# Patient Record
Sex: MALE | Race: WHITE | HISPANIC OR LATINO | Employment: OTHER | URBAN - METROPOLITAN AREA
[De-identification: names, ages, dates, MRNs, and addresses within clinical notes are randomized per-mention and may not be internally consistent; named-entity substitution may affect disease eponyms.]

---

## 2021-06-07 ENCOUNTER — HOSPITAL ENCOUNTER (INPATIENT)
Facility: HOSPITAL | Age: 61
LOS: 4 days | Discharge: HOME | DRG: 057 | End: 2021-06-13
Attending: EMERGENCY MEDICINE | Admitting: INTERNAL MEDICINE

## 2021-06-07 ENCOUNTER — APPOINTMENT (OUTPATIENT)
Dept: MRI IMAGING | Facility: HOSPITAL | Age: 61
DRG: 057 | End: 2021-06-07

## 2021-06-07 ENCOUNTER — APPOINTMENT (EMERGENCY)
Dept: RADIOLOGY | Facility: HOSPITAL | Age: 61
DRG: 057 | End: 2021-06-07

## 2021-06-07 ENCOUNTER — APPOINTMENT (EMERGENCY)
Dept: CT IMAGING | Facility: HOSPITAL | Age: 61
DRG: 057 | End: 2021-06-07

## 2021-06-07 DIAGNOSIS — I25.10 CAD (CORONARY ARTERY DISEASE): ICD-10-CM

## 2021-06-07 DIAGNOSIS — H02.402 PTOSIS OF LEFT EYELID: Primary | ICD-10-CM

## 2021-06-07 DIAGNOSIS — R51.9 HEADACHE: ICD-10-CM

## 2021-06-07 DIAGNOSIS — H49.02 LEFT OCULOMOTOR NERVE PALSY: ICD-10-CM

## 2021-06-07 DIAGNOSIS — H53.2 DIPLOPIA: ICD-10-CM

## 2021-06-07 LAB
ALBUMIN SERPL BCP-MCNC: 3.4 G/DL (ref 3.5–5)
ALP SERPL-CCNC: 91 U/L (ref 46–116)
ALT SERPL W P-5'-P-CCNC: 23 U/L (ref 12–78)
ANION GAP SERPL CALCULATED.3IONS-SCNC: 7 MMOL/L (ref 4–13)
APTT PPP: 28 SECONDS (ref 23–37)
AST SERPL W P-5'-P-CCNC: 11 U/L (ref 5–45)
ATRIAL RATE: 74 BPM
BASE EX.OXY STD BLDV CALC-SCNC: 80.1 % (ref 60–80)
BASE EXCESS BLDV CALC-SCNC: 2.6 MMOL/L
BASOPHILS # BLD AUTO: 0.03 THOUSANDS/ΜL (ref 0–0.1)
BASOPHILS NFR BLD AUTO: 0 % (ref 0–1)
BILIRUB DIRECT SERPL-MCNC: 0.08 MG/DL (ref 0–0.2)
BILIRUB SERPL-MCNC: 0.31 MG/DL (ref 0.2–1)
BUN SERPL-MCNC: 20 MG/DL (ref 5–25)
CALCIUM SERPL-MCNC: 8.8 MG/DL (ref 8.3–10.1)
CHLORIDE SERPL-SCNC: 102 MMOL/L (ref 100–108)
CO2 SERPL-SCNC: 32 MMOL/L (ref 21–32)
CREAT SERPL-MCNC: 0.95 MG/DL (ref 0.6–1.3)
EOSINOPHIL # BLD AUTO: 0.33 THOUSAND/ΜL (ref 0–0.61)
EOSINOPHIL NFR BLD AUTO: 4 % (ref 0–6)
ERYTHROCYTE [DISTWIDTH] IN BLOOD BY AUTOMATED COUNT: 13.8 % (ref 11.6–15.1)
GFR SERPL CREATININE-BSD FRML MDRD: 85 ML/MIN/1.73SQ M
GLUCOSE SERPL-MCNC: 160 MG/DL (ref 65–140)
HCO3 BLDV-SCNC: 28.2 MMOL/L (ref 24–30)
HCT VFR BLD AUTO: 47.6 % (ref 36.5–49.3)
HGB BLD-MCNC: 15.3 G/DL (ref 12–17)
IMM GRANULOCYTES # BLD AUTO: 0.06 THOUSAND/UL (ref 0–0.2)
IMM GRANULOCYTES NFR BLD AUTO: 1 % (ref 0–2)
INR PPP: 0.94 (ref 0.84–1.19)
LYMPHOCYTES # BLD AUTO: 2.04 THOUSANDS/ΜL (ref 0.6–4.47)
LYMPHOCYTES NFR BLD AUTO: 23 % (ref 14–44)
MCH RBC QN AUTO: 27.7 PG (ref 26.8–34.3)
MCHC RBC AUTO-ENTMCNC: 32.1 G/DL (ref 31.4–37.4)
MCV RBC AUTO: 86 FL (ref 82–98)
MONOCYTES # BLD AUTO: 0.63 THOUSAND/ΜL (ref 0.17–1.22)
MONOCYTES NFR BLD AUTO: 7 % (ref 4–12)
NEUTROPHILS # BLD AUTO: 5.61 THOUSANDS/ΜL (ref 1.85–7.62)
NEUTS SEG NFR BLD AUTO: 65 % (ref 43–75)
NRBC BLD AUTO-RTO: 0 /100 WBCS
O2 CT BLDV-SCNC: 18.1 ML/DL
P AXIS: 63 DEGREES
PCO2 BLDV: 46.8 MM HG (ref 42–50)
PH BLDV: 7.4 [PH] (ref 7.3–7.4)
PLATELET # BLD AUTO: 209 THOUSANDS/UL (ref 149–390)
PMV BLD AUTO: 10.5 FL (ref 8.9–12.7)
PO2 BLDV: 43.6 MM HG (ref 35–45)
POTASSIUM SERPL-SCNC: 3.6 MMOL/L (ref 3.5–5.3)
PR INTERVAL: 160 MS
PROT SERPL-MCNC: 7.4 G/DL (ref 6.4–8.2)
PROTHROMBIN TIME: 12.1 SECONDS (ref 11.6–14.5)
QRS AXIS: -56 DEGREES
QRSD INTERVAL: 98 MS
QT INTERVAL: 404 MS
QTC INTERVAL: 448 MS
RBC # BLD AUTO: 5.53 MILLION/UL (ref 3.88–5.62)
SARS-COV-2 RNA RESP QL NAA+PROBE: NEGATIVE
SODIUM SERPL-SCNC: 141 MMOL/L (ref 136–145)
T WAVE AXIS: 99 DEGREES
VENTRICULAR RATE: 74 BPM
WBC # BLD AUTO: 8.7 THOUSAND/UL (ref 4.31–10.16)

## 2021-06-07 PROCEDURE — 99284 EMERGENCY DEPT VISIT MOD MDM: CPT | Performed by: EMERGENCY MEDICINE

## 2021-06-07 PROCEDURE — 85610 PROTHROMBIN TIME: CPT | Performed by: EMERGENCY MEDICINE

## 2021-06-07 PROCEDURE — 99220 PR INITIAL OBSERVATION CARE/DAY 70 MINUTES: CPT | Performed by: NURSE PRACTITIONER

## 2021-06-07 PROCEDURE — 71045 X-RAY EXAM CHEST 1 VIEW: CPT

## 2021-06-07 PROCEDURE — 93010 ELECTROCARDIOGRAM REPORT: CPT | Performed by: INTERNAL MEDICINE

## 2021-06-07 PROCEDURE — 99285 EMERGENCY DEPT VISIT HI MDM: CPT

## 2021-06-07 PROCEDURE — 85025 COMPLETE CBC W/AUTO DIFF WBC: CPT | Performed by: EMERGENCY MEDICINE

## 2021-06-07 PROCEDURE — G1004 CDSM NDSC: HCPCS

## 2021-06-07 PROCEDURE — 80076 HEPATIC FUNCTION PANEL: CPT | Performed by: EMERGENCY MEDICINE

## 2021-06-07 PROCEDURE — U0005 INFEC AGEN DETEC AMPLI PROBE: HCPCS | Performed by: EMERGENCY MEDICINE

## 2021-06-07 PROCEDURE — 85730 THROMBOPLASTIN TIME PARTIAL: CPT | Performed by: EMERGENCY MEDICINE

## 2021-06-07 PROCEDURE — U0003 INFECTIOUS AGENT DETECTION BY NUCLEIC ACID (DNA OR RNA); SEVERE ACUTE RESPIRATORY SYNDROME CORONAVIRUS 2 (SARS-COV-2) (CORONAVIRUS DISEASE [COVID-19]), AMPLIFIED PROBE TECHNIQUE, MAKING USE OF HIGH THROUGHPUT TECHNOLOGIES AS DESCRIBED BY CMS-2020-01-R: HCPCS | Performed by: EMERGENCY MEDICINE

## 2021-06-07 PROCEDURE — 93005 ELECTROCARDIOGRAM TRACING: CPT

## 2021-06-07 PROCEDURE — 80048 BASIC METABOLIC PNL TOTAL CA: CPT | Performed by: EMERGENCY MEDICINE

## 2021-06-07 PROCEDURE — 70450 CT HEAD/BRAIN W/O DYE: CPT

## 2021-06-07 PROCEDURE — 36415 COLL VENOUS BLD VENIPUNCTURE: CPT | Performed by: EMERGENCY MEDICINE

## 2021-06-07 PROCEDURE — 82805 BLOOD GASES W/O2 SATURATION: CPT | Performed by: EMERGENCY MEDICINE

## 2021-06-07 PROCEDURE — 70551 MRI BRAIN STEM W/O DYE: CPT

## 2021-06-07 RX ORDER — ATORVASTATIN CALCIUM 40 MG/1
40 TABLET, FILM COATED ORAL EVERY EVENING
Status: DISCONTINUED | OUTPATIENT
Start: 2021-06-07 | End: 2021-06-13 | Stop reason: HOSPADM

## 2021-06-07 RX ORDER — MULTIVIT WITH MINERALS/LUTEIN
1000 TABLET ORAL DAILY
COMMUNITY
End: 2022-06-15

## 2021-06-07 RX ORDER — ONDANSETRON 2 MG/ML
4 INJECTION INTRAMUSCULAR; INTRAVENOUS EVERY 6 HOURS PRN
Status: DISCONTINUED | OUTPATIENT
Start: 2021-06-07 | End: 2021-06-13 | Stop reason: HOSPADM

## 2021-06-07 RX ORDER — ASPIRIN 81 MG/1
81 TABLET ORAL DAILY
Status: ON HOLD | COMMUNITY
End: 2022-06-14 | Stop reason: SDUPTHER

## 2021-06-07 RX ORDER — ACETAMINOPHEN 325 MG/1
650 TABLET ORAL EVERY 4 HOURS PRN
Status: DISCONTINUED | OUTPATIENT
Start: 2021-06-07 | End: 2021-06-13 | Stop reason: HOSPADM

## 2021-06-07 RX ORDER — ASPIRIN 81 MG/1
81 TABLET, CHEWABLE ORAL DAILY
Status: DISCONTINUED | OUTPATIENT
Start: 2021-06-08 | End: 2021-06-13 | Stop reason: HOSPADM

## 2021-06-07 RX ADMIN — ATORVASTATIN CALCIUM 40 MG: 40 TABLET, FILM COATED ORAL at 19:54

## 2021-06-07 NOTE — PLAN OF CARE
Problem: Potential for Falls  Goal: Patient will remain free of falls  Description: INTERVENTIONS:  - Assess patient frequently for physical needs  -  Identify cognitive and physical deficits and behaviors that affect risk of falls  -  [place] fall precautions as indicated by assessment   - Educate patient/family on patient safety including physical limitations  - Instruct patient to call for assistance with activity based on assessment  - Modify environment to reduce risk of injury  - Consider OT/PT consult to assist with strengthening/mobility  Outcome: Progressing     Problem: PAIN - ADULT  Goal: Verbalizes/displays adequate comfort level or baseline comfort level  Description: Interventions:  - Encourage patient to monitor pain and request assistance  - Assess pain using appropriate pain scale  - Administer analgesics based on type and severity of pain and evaluate response  - Implement non-pharmacological measures as appropriate and evaluate response  - Consider cultural and social influences on pain and pain management  - Notify physician/advanced practitioner if interventions unsuccessful or patient reports new pain  Outcome: Progressing     Problem: SAFETY ADULT  Goal: Patient will remain free of falls  Description: INTERVENTIONS:  - Assess patient frequently for physical needs  -  Identify cognitive and physical deficits and behaviors that affect risk of falls    -  [place] fall precautions as indicated by assessment   - Educate patient/family on patient safety including physical limitations  - Instruct patient to call for assistance with activity based on assessment  - Modify environment to reduce risk of injury  - Consider OT/PT consult to assist with strengthening/mobility  Outcome: Progressing     Problem: Neurological Deficit  Goal: Neurological status is stable or improving  Description: Interventions:  - Monitor and assess patient's level of consciousness, motor function, sensory function, and level of assistance needed for ADLs  - Monitor and report changes from baseline  Collaborate with interdisciplinary team to initiate plan and implement interventions as ordered  - Provide and maintain a safe environment  - Consider seizure precautions  - Consider fall precautions  - Consider aspiration precautions  - Consider bleeding precautions  Outcome: Progressing     Problem: Activity Intolerance/Impaired Mobility  Goal: Mobility/activity is maintained at optimum level for patient  Description: Interventions:  - Assess and monitor patient  barriers to mobility and need for assistive/adaptive devices  - Assess patient's emotional response to limitations  - Collaborate with interdisciplinary team and initiate plans and interventions as ordered  - Encourage independent activity per ability   - Maintain proper body alignment  - Perform active/passive rom as tolerated/ordered  - Plan activities to conserve energy   - Turn patient as appropriate  Outcome: Progressing     Problem: Communication Impairment  Goal: Ability to express needs and understand communication  Description: Assess patient's communication skills and ability to understand information  Patient will demonstrate use of effective communication techniques, alternative methods of communication and understanding even if not able to speak  - Encourage communication and provide alternate methods of communication as needed  - Collaborate with case management/ for discharge needs  - Include patient/family/caregiver in decisions related to communication  Outcome: Progressing     Problem: Potential for Aspiration  Goal: Non-ventilated patient's risk of aspiration is minimized  Description: Assess and monitor vital signs, respiratory status, and labs (WBC)  Monitor for signs of aspiration (tachypnea, cough, rales, wheezing, cyanosis, fever)  - Assess and monitor patient's ability to swallow    - Place patient up in chair to eat if possible  - HOB up at 90 degrees to eat if unable to get patient up into chair   - Supervise patient during oral intake  - Instruct patient/ family to take small bites  - Instruct patient/ family to take small single sips when taking liquids  - Follow patient-specific strategies generated by speech pathologist   Outcome: Progressing  Goal: Ventilated patient's risk of aspiration is minimized  Description: Assess and monitor vital signs, respiratory status, airway cuff pressure, and labs (WBC)  Monitor for signs of aspiration (tachypnea, cough, rales, wheezing, cyanosis, fever)  - Elevate head of bed 30 degrees if patient has tube feeding   - Monitor tube feeding  Outcome: Progressing     Problem: Nutrition  Goal: Nutrition/Hydration status is improving  Description: Monitor and assess patient's nutrition/hydration status for malnutrition (ex- brittle hair, bruises, dry skin, pale skin and conjunctiva, muscle wasting, smooth red tongue, and disorientation)  Collaborate with interdisciplinary team and initiate plan and interventions as ordered  Monitor patient's weight and dietary intake as ordered or per policy  Utilize nutrition screening tool and intervene per policy  Determine patient's food preferences and provide high-protein, high-caloric foods as appropriate  - Assist patient with eating   - Allow adequate time for meals   - Encourage patient to take dietary supplement as ordered  - Collaborate with clinical nutritionist   - Include patient/family/caregiver in decisions related to nutrition    Outcome: Progressing

## 2021-06-07 NOTE — ASSESSMENT & PLAN NOTE
· Frontal in nature associated with visual disturbance  · CT head negative  · Will initiate stroke pathway given presentation  · Will obtain MRI brain for completeness  · Initiate on migraine cocktail  · Consult Neurology  · Checking hemoglobin A1c, fasting lipid panel

## 2021-06-07 NOTE — ED PROVIDER NOTES
History  Chief Complaint   Patient presents with    Facial Pain     Patient reports facial pressure on forehead and L eye for 5 or 6 weeks  Patient reports this happened to him in september was well     58 yo male who presents to ED for evaluation of 5-6 weeks of intermittent diplopia, binocular  Resolves when covering an eye  Usually only happens when looking to one side or another  Associated mild bifrontal HA which started yesterday, gradual onset, still present, no modifiers  No eye pain  No eye drainage  Denies personal h/o known DM but states very strong family h/o same and does not see a physician due to no health insurance  Denies arm, neck, scapular and chest pain  Denies facial trauma  Denies facial swelling  None       Past Medical History:   Diagnosis Date    Coronary artery disease     Hyperlipidemia        History reviewed  No pertinent surgical history  History reviewed  No pertinent family history  I have reviewed and agree with the history as documented  E-Cigarette/Vaping     E-Cigarette/Vaping Substances     Social History     Tobacco Use    Smoking status: Never Smoker    Smokeless tobacco: Never Used   Substance Use Topics    Alcohol use: Not Currently    Drug use: Not Currently       Review of Systems   Eyes: Positive for visual disturbance  Negative for photophobia, pain, discharge, redness and itching  Neurological: Positive for headaches  Negative for dizziness, tremors, seizures, syncope, facial asymmetry, speech difficulty, weakness, light-headedness and numbness  All other systems reviewed and are negative  Physical Exam  Physical Exam  Vitals signs and nursing note reviewed  Constitutional:       General: He is not in acute distress  Appearance: Normal appearance  He is well-developed  He is not ill-appearing, toxic-appearing or diaphoretic  HENT:      Head: Normocephalic and atraumatic        Right Ear: External ear normal       Left Ear: External ear normal       Nose: Nose normal  No congestion  Eyes:      Conjunctiva/sclera: Conjunctivae normal       Pupils: Pupils are equal, round, and reactive to light  Comments: Disconjugate gaze  Ptosis L eyelid  L eye reactive to light  No APD  L eye deviates inferiorly with attempted medial gaze and pt reports diplopia at that time  Neck:      Musculoskeletal: Normal range of motion and neck supple  Vascular: No JVD  Cardiovascular:      Rate and Rhythm: Normal rate and regular rhythm  Heart sounds: Normal heart sounds  No murmur  No friction rub  No gallop  Pulmonary:      Effort: Pulmonary effort is normal  No respiratory distress  Breath sounds: Normal breath sounds  No stridor  No wheezing or rales  Abdominal:      General: There is no distension  Palpations: Abdomen is soft  Tenderness: There is no abdominal tenderness  Musculoskeletal: Normal range of motion  General: No tenderness or deformity  Skin:     General: Skin is warm and dry  Capillary Refill: Capillary refill takes less than 2 seconds  Neurological:      Mental Status: He is alert and oriented to person, place, and time  Cranial Nerves: Cranial nerve deficit present  Sensory: No sensory deficit  Motor: No weakness or abnormal muscle tone        Coordination: Coordination normal          Vital Signs  ED Triage Vitals [06/07/21 1105]   Temperature Pulse Respirations Blood Pressure SpO2   98 5 °F (36 9 °C) 77 20 147/86 98 %      Temp Source Heart Rate Source Patient Position - Orthostatic VS BP Location FiO2 (%)   Oral Monitor Lying Left arm --      Pain Score       2           Vitals:    06/07/21 1105 06/07/21 1300   BP: 147/86 119/66   Pulse: 77 74   Patient Position - Orthostatic VS: Lying Lying         Visual Acuity  Visual Acuity      Most Recent Value   L Pupil Size (mm)  4   R Pupil Size (mm)  4          ED Medications  Medications - No data to display    Diagnostic Studies  Results Reviewed     Procedure Component Value Units Date/Time    Novel Coronavirus Brianne Clay Mai Siskin - 2 Hour Stat [[id number]]  (Normal) Collected: 06/07/21 1136    Lab Status: Final result Specimen: Nares from Nasopharyngeal Swab Updated: 06/07/21 1238     SARS-CoV-2 Negative    Narrative: The specimen collection materials, transport medium, and/or testing methodology utilized in the production of these test results have been proven to be reliable in a limited validation with an abbreviated program under the Emergency Utilization Authorization provided by the FDA  Testing reported as "Presumptive positive" will be confirmed with secondary testing to ensure result accuracy  Clinical caution and judgement should be used with the interpretation of these results with consideration of the clinical impression and other laboratory testing  Testing reported as "Positive" or "Negative" has been proven to be accurate according to standard laboratory validation requirements  All testing is performed with control materials showing appropriate reactivity at standard intervals        Protime-INR [[id number]]  (Normal) Collected: 06/07/21 1135    Lab Status: Final result Specimen: Blood from Arm, Left Updated: 06/07/21 1207     Protime 12 1 seconds      INR 0 94    APTT [[id number]]  (Normal) Collected: 06/07/21 1135    Lab Status: Final result Specimen: Blood from Arm, Left Updated: 06/07/21 1207     PTT 28 seconds     Hepatic function panel [[id number]]  (Abnormal) Collected: 06/07/21 1135    Lab Status: Final result Specimen: Blood from Arm, Left Updated: 06/07/21 1201     Total Bilirubin 0 31 mg/dL      Bilirubin, Direct 0 08 mg/dL      Alkaline Phosphatase 91 U/L      AST 11 U/L      ALT 23 U/L      Total Protein 7 4 g/dL      Albumin 3 4 g/dL     Basic metabolic panel [[id number]]  (Abnormal) Collected: 06/07/21 1135    Lab Status: Final result Specimen: Blood from Arm, Left Updated: 06/07/21 1201 Sodium 141 mmol/L      Potassium 3 6 mmol/L      Chloride 102 mmol/L      CO2 32 mmol/L      ANION GAP 7 mmol/L      BUN 20 mg/dL      Creatinine 0 95 mg/dL      Glucose 160 mg/dL      Calcium 8 8 mg/dL      eGFR 85 ml/min/1 73sq m     Narrative:      [place] guidelines for Chronic Kidney Disease (CKD):     Stage 1 with normal or high GFR (GFR > 90 mL/min/1 73 square meters)    Stage 2 Mild CKD (GFR = 60-89 mL/min/1 73 square meters)    Stage 3A Moderate CKD (GFR = 45-59 mL/min/1 73 square meters)    Stage 3B Moderate CKD (GFR = 30-44 mL/min/1 73 square meters)    Stage 4 Severe CKD (GFR = 15-29 mL/min/1 73 square meters)    Stage 5 End Stage CKD (GFR <15 mL/min/1 73 square meters)  Note: GFR calculation is accurate only with a steady state creatinine    Blood gas, venous [[id number]]  (Abnormal) Collected: 06/07/21 1135    Lab Status: Final result Specimen: Blood from Arm, Left Updated: 06/07/21 1146     pH, Guillermo 7 398     pCO2, Guillermo 46 8 mm Hg      pO2, Guillermo 43 6 mm Hg      HCO3, Guillermo 28 2 mmol/L      Base Excess, Guillermo 2 6 mmol/L      O2 Content, Guillermo 18 1 ml/dL      O2 HGB, VENOUS 80 1 %     CBC and differential [[id number]] Collected: 06/07/21 1135    Lab Status: Final result Specimen: Blood from Arm, Left Updated: 06/07/21 1146     WBC 8 70 Thousand/uL      RBC 5 53 Million/uL      Hemoglobin 15 3 g/dL      Hematocrit 47 6 %      MCV 86 fL      MCH 27 7 pg      MCHC 32 1 g/dL      RDW 13 8 %      MPV 10 5 fL      Platelets 274 Thousands/uL      nRBC 0 /100 WBCs      Neutrophils Relative 65 %      Immat GRANS % 1 %      Lymphocytes Relative 23 %      Monocytes Relative 7 %      Eosinophils Relative 4 %      Basophils Relative 0 %      Neutrophils Absolute 5 61 Thousands/µL      Immature Grans Absolute 0 06 Thousand/uL      Lymphocytes Absolute 2 04 Thousands/µL      Monocytes Absolute 0 63 Thousand/µL      Eosinophils Absolute 0 33 Thousand/µL      Basophils Absolute 0 03 Thousands/µL XR chest 1 view portable   ED Interpretation by Daren Villeda MD (06/07 1213)   Lung apices clear  CT head without contrast   Final Result by Jules Win MD (06/07 1231)      No acute intracranial abnormality  Workstation performed: [id number]                    Procedures  ECG 12 Lead Documentation Only    Date/Time: 6/7/2021 11:42 AM  Performed by: Daren Villeda MD  Authorized by: Daren Villeda MD     Indications / Diagnosis:  Diplopia   ECG reviewed by me, the ED Provider: yes    Patient location:  ED  Previous ECG:     Previous ECG:  Unavailable  Interpretation:     Interpretation: non-specific    Rate:     ECG rate:  74    ECG rate assessment: normal    Rhythm:     Rhythm: sinus rhythm    Comments:      Sr, lad, septal CHARLIE, no STD  No comparison ekg available  ED Course                             SBIRT 20yo+      Most Recent Value   SBIRT (22 yo +)   In order to provide better care to our patients, we are screening all of our patients for alcohol and drug use  Would it be okay to ask you these screening questions? Yes Filed at: 06/07/2021 1224   Initial Alcohol Screen: US AUDIT-C    1  How often do you have a drink containing alcohol?  0 Filed at: 06/07/2021 1224   2  How many drinks containing alcohol do you have on a typical day you are drinking? 0 Filed at: 06/07/2021 1224   3a  Male UNDER 65: How often do you have five or more drinks on one occasion? 0 Filed at: 06/07/2021 1224   Audit-C Score  0 Filed at: 06/07/2021 1224   REAL: How many times in the past year have you    Used an illegal drug or used a prescription medication for non-medical reasons?   Never Filed at: 06/07/2021 1224                    MDM  Number of Diagnoses or Management Options  Diplopia:   Left oculomotor nerve palsy:   Ptosis of left eyelid:      Amount and/or Complexity of Data Reviewed  Clinical lab tests: ordered and reviewed  Tests in the radiology section of CPT®: ordered and reviewed  Tests in the medicine section of CPT®: reviewed and ordered  Independent visualization of images, tracings, or specimens: yes        Disposition  Final diagnoses:   Ptosis of left eyelid   Diplopia   Left oculomotor nerve palsy     Time reflects when diagnosis was documented in both MDM as applicable and the Disposition within this note     Time User Action Codes Description Comment    6/7/2021 12:48 PM Lilliana Fat Add [H02 402] Ptosis of left eyelid     6/7/2021 12:48 PM Lilliana Fat Add [H53 2] Diplopia     6/7/2021 12:48 PM Lilliana Fat Add [H49 02] Left oculomotor nerve palsy       ED Disposition     ED Disposition Condition Date/Time Comment    Admit Stable Mon Jun 7, 2021  1:35 PM Case was discussed with Dr Nataly Cornelius and the patient's admission status was agreed to be Admission Status: observation status to the service of Dr Nataly Cornelius   Follow-up Information    None         Patient's Medications    No medications on file     No discharge procedures on file      PDMP Review     None          ED Provider  Electronically Signed by           Elia Albarran MD  06/07/21 [phone]

## 2021-06-07 NOTE — H&P
[address] 1960, 57 y o  male MRN: [id number]  Unit/Bed#: FT 03 Encounter: [id number]  Primary Care Provider: No primary care provider on file  Date and time admitted to hospital: 6/7/2021 11:07 AM    * Headache  Assessment & Plan  · Frontal in nature associated with visual disturbance  · CT head negative  · Will initiate stroke pathway given presentation  · Will obtain MRI brain for completeness  · Initiate on migraine cocktail  · Consult Neurology  · Checking hemoglobin A1c, fasting lipid panel    CAD (coronary artery disease)  Assessment & Plan  · Reports history of cardiac event the past on ASA      VTE Pharmacologic Prophylaxis: VTE Score: 2 Low Risk (Score 0-2) - Encourage Ambulation  Code Status: No Order full code  Discussion with family: None at bedside  Anticipated Length of Stay: Patient will be admitted on an observation basis with an anticipated length of stay of less than 2 midnights secondary to Headache with visual disturbance rule out CVA versus TIA  Total Time for Visit, including Counseling / Coordination of Care: 40 minutes Greater than 50% of this total time spent on direct patient counseling and coordination of care  Chief Complaint:  My head hurts and my vision is off    History of Present Illness:  Gladys Velarde is a 57 y o  male with a PMH of CAD history COVID who presents with 5-6 days of visual disturbance with new onset of headache for 24 hours  Patient states he has not seen medical care in many years when he started with some gradual visual disturbance that occurred when he had COVID went away and now his come back and describes the symptoms as increased blurriness difficulty focusing especially when he turns his head left and right and now with increasing headache    Patient endorses a cardiac history of a cardiac event in 2006 and has since been on a baby aspirin patient does not seek medical care does not know if he has high cholesterol her hyperlipidemia or hypertension  Patient denies any speech difficulties, any upper or lower extremity weakness and cites that the visual disturbance gets better with closing 1 eye  Review of Systems:  Review of Systems   Constitutional: Positive for activity change and fatigue  Negative for appetite change, chills, diaphoresis and fever  HENT: Negative  Eyes: Positive for visual disturbance  Negative for photophobia and pain  Respiratory: Negative  Cardiovascular: Negative  Gastrointestinal: Negative  Genitourinary: Negative  Musculoskeletal: Negative  Neurological: Positive for headaches  Negative for dizziness, seizures, syncope, facial asymmetry, speech difficulty, weakness, light-headedness and numbness  Psychiatric/Behavioral: Negative  Past Medical and Surgical History:   Past Medical History:   Diagnosis Date    Coronary artery disease     Hyperlipidemia        History reviewed  No pertinent surgical history  Meds/Allergies:  Prior to Admission medications    Medication Sig Start Date End Date Taking? Authorizing Provider   Ascorbic Acid (vitamin C) 1000 MG tablet Take 1,000 mg by mouth daily   Yes Historical Provider, MD   aspirin (ECOTRIN LOW STRENGTH) 81 mg EC tablet Take 81 mg by mouth daily   Yes Historical Provider, MD     I have reviewed home medications with patient personally  Allergies: No Known Allergies    Social History:  Marital Status: /Civil Union   Occupation:    Patient Pre-hospital Living Situation: Home  Patient Pre-hospital Level of Mobility: walks  Patient Pre-hospital Diet Restrictions:  None  Substance Use History:   Social History     Substance and Sexual Activity   Alcohol Use Not Currently     Social History     Tobacco Use   Smoking Status Never Smoker   Smokeless Tobacco Never Used     Social History     Substance and Sexual Activity   Drug Use Not Currently       Family History:  History reviewed   No pertinent family history  Physical Exam:     Vitals:   Blood Pressure: 114/66 (06/07/21 1430)  Pulse: 73 (06/07/21 1430)  Temperature: 98 5 °F (36 9 °C) (06/07/21 1105)  Temp Source: Oral (06/07/21 1105)  Respirations: 18 (06/07/21 1430)  Weight - Scale: 135 kg (297 lb 13 5 oz) (06/07/21 1136)  SpO2: 97 % (06/07/21 1430)    Physical Exam  HENT:      Head: Normocephalic and atraumatic  Mouth/Throat:      Mouth: Mucous membranes are moist    Eyes:      Pupils: Pupils are equal, round, and reactive to light  Cardiovascular:      Rate and Rhythm: Normal rate and regular rhythm  Pulmonary:      Effort: Pulmonary effort is normal       Breath sounds: Normal breath sounds  Abdominal:      General: Bowel sounds are normal    Musculoskeletal: Normal range of motion  Skin:     General: Skin is warm and dry  Neurological:      General: No focal deficit present  Mental Status: He is alert and oriented to person, place, and time  Psychiatric:         Mood and Affect: Mood normal          Behavior: Behavior normal            Additional Data:     Lab Results:  Results from last 7 days   Lab Units 06/07/21  1135   WBC Thousand/uL 8 70   HEMOGLOBIN g/dL 15 3   HEMATOCRIT % 47 6   PLATELETS Thousands/uL 209   NEUTROS PCT % 65   LYMPHS PCT % 23   MONOS PCT % 7   EOS PCT % 4     Results from last 7 days   Lab Units 06/07/21  1135   SODIUM mmol/L 141   POTASSIUM mmol/L 3 6   CHLORIDE mmol/L 102   CO2 mmol/L 32   BUN mg/dL 20   CREATININE mg/dL 0 95   ANION GAP mmol/L 7   CALCIUM mg/dL 8 8   ALBUMIN g/dL 3 4*   TOTAL BILIRUBIN mg/dL 0 31   ALK PHOS U/L 91   ALT U/L 23   AST U/L 11   GLUCOSE RANDOM mg/dL 160*     Results from last 7 days   Lab Units 06/07/21  1135   INR  0 94                   Imaging: Reviewed radiology reports from this admission including: CT head  XR chest 1 view portable   ED Interpretation by George Amaya MD (06/07 1213)   Lung apices clear         CT head without contrast   Final Result by John Murguia Juliana Chang MD (06/07 2155)      No acute intracranial abnormality  Workstation performed: [id number]             EKG and Other Studies Reviewed on Admission:   · EKG: Reviewed  ** Please Note: This note has been constructed using a voice recognition system   **

## 2021-06-08 ENCOUNTER — APPOINTMENT (OUTPATIENT)
Dept: CT IMAGING | Facility: HOSPITAL | Age: 61
DRG: 057 | End: 2021-06-08

## 2021-06-08 ENCOUNTER — APPOINTMENT (OUTPATIENT)
Dept: NON INVASIVE DIAGNOSTICS | Facility: HOSPITAL | Age: 61
DRG: 057 | End: 2021-06-08

## 2021-06-08 ENCOUNTER — APPOINTMENT (OUTPATIENT)
Dept: MRI IMAGING | Facility: HOSPITAL | Age: 61
DRG: 057 | End: 2021-06-08

## 2021-06-08 PROBLEM — H49.00 THIRD NERVE PALSY: Status: ACTIVE | Noted: 2021-06-07

## 2021-06-08 PROBLEM — H53.8 BLURRED VISION: Status: ACTIVE | Noted: 2021-06-07

## 2021-06-08 PROBLEM — H49.02 THIRD NERVE PALSY OF LEFT EYE: Status: ACTIVE | Noted: 2021-06-07

## 2021-06-08 LAB
ALBUMIN SERPL BCP-MCNC: 3 G/DL (ref 3.5–5)
ALP SERPL-CCNC: 84 U/L (ref 46–116)
ALT SERPL W P-5'-P-CCNC: 23 U/L (ref 12–78)
ANION GAP SERPL CALCULATED.3IONS-SCNC: 6 MMOL/L (ref 4–13)
AST SERPL W P-5'-P-CCNC: 15 U/L (ref 5–45)
BILIRUB SERPL-MCNC: 0.32 MG/DL (ref 0.2–1)
BUN SERPL-MCNC: 16 MG/DL (ref 5–25)
CALCIUM ALBUM COR SERPL-MCNC: 9.7 MG/DL (ref 8.3–10.1)
CALCIUM SERPL-MCNC: 8.9 MG/DL (ref 8.3–10.1)
CHLORIDE SERPL-SCNC: 103 MMOL/L (ref 100–108)
CHOLEST SERPL-MCNC: 192 MG/DL (ref 50–200)
CO2 SERPL-SCNC: 31 MMOL/L (ref 21–32)
CREAT SERPL-MCNC: 0.87 MG/DL (ref 0.6–1.3)
ERYTHROCYTE [DISTWIDTH] IN BLOOD BY AUTOMATED COUNT: 13.8 % (ref 11.6–15.1)
EST. AVERAGE GLUCOSE BLD GHB EST-MCNC: 131 MG/DL
GFR SERPL CREATININE-BSD FRML MDRD: 92 ML/MIN/1.73SQ M
GLUCOSE SERPL-MCNC: 129 MG/DL (ref 65–140)
HBA1C MFR BLD: 6.2 %
HCT VFR BLD AUTO: 46.1 % (ref 36.5–49.3)
HCV AB SER QL: NORMAL
HDLC SERPL-MCNC: 50 MG/DL
HGB BLD-MCNC: 14.6 G/DL (ref 12–17)
LDLC SERPL CALC-MCNC: 114 MG/DL (ref 0–100)
MCH RBC QN AUTO: 27.8 PG (ref 26.8–34.3)
MCHC RBC AUTO-ENTMCNC: 31.7 G/DL (ref 31.4–37.4)
MCV RBC AUTO: 88 FL (ref 82–98)
PLATELET # BLD AUTO: 194 THOUSANDS/UL (ref 149–390)
PMV BLD AUTO: 10.5 FL (ref 8.9–12.7)
POTASSIUM SERPL-SCNC: 4.2 MMOL/L (ref 3.5–5.3)
PROT SERPL-MCNC: 6.6 G/DL (ref 6.4–8.2)
RBC # BLD AUTO: 5.25 MILLION/UL (ref 3.88–5.62)
SODIUM SERPL-SCNC: 140 MMOL/L (ref 136–145)
TRIGL SERPL-MCNC: 139 MG/DL
TSH SERPL DL<=0.05 MIU/L-ACNC: 1.67 UIU/ML (ref 0.36–3.74)
WBC # BLD AUTO: 8.28 THOUSAND/UL (ref 4.31–10.16)

## 2021-06-08 PROCEDURE — 80061 LIPID PANEL: CPT | Performed by: NURSE PRACTITIONER

## 2021-06-08 PROCEDURE — 83519 RIA NONANTIBODY: CPT | Performed by: NURSE PRACTITIONER

## 2021-06-08 PROCEDURE — 86803 HEPATITIS C AB TEST: CPT | Performed by: INTERNAL MEDICINE

## 2021-06-08 PROCEDURE — 93306 TTE W/DOPPLER COMPLETE: CPT

## 2021-06-08 PROCEDURE — 70498 CT ANGIOGRAPHY NECK: CPT

## 2021-06-08 PROCEDURE — 97163 PT EVAL HIGH COMPLEX 45 MIN: CPT

## 2021-06-08 PROCEDURE — 93306 TTE W/DOPPLER COMPLETE: CPT | Performed by: INTERNAL MEDICINE

## 2021-06-08 PROCEDURE — 84443 ASSAY THYROID STIM HORMONE: CPT | Performed by: INTERNAL MEDICINE

## 2021-06-08 PROCEDURE — 97165 OT EVAL LOW COMPLEX 30 MIN: CPT

## 2021-06-08 PROCEDURE — 85027 COMPLETE CBC AUTOMATED: CPT | Performed by: NURSE PRACTITIONER

## 2021-06-08 PROCEDURE — 99244 OFF/OP CNSLTJ NEW/EST MOD 40: CPT | Performed by: PSYCHIATRY & NEUROLOGY

## 2021-06-08 PROCEDURE — 84238 ASSAY NONENDOCRINE RECEPTOR: CPT | Performed by: NURSE PRACTITIONER

## 2021-06-08 PROCEDURE — 99225 PR SBSQ OBSERVATION CARE/DAY 25 MINUTES: CPT | Performed by: NURSE PRACTITIONER

## 2021-06-08 PROCEDURE — G1004 CDSM NDSC: HCPCS

## 2021-06-08 PROCEDURE — 83036 HEMOGLOBIN GLYCOSYLATED A1C: CPT | Performed by: NURSE PRACTITIONER

## 2021-06-08 PROCEDURE — 80053 COMPREHEN METABOLIC PANEL: CPT | Performed by: NURSE PRACTITIONER

## 2021-06-08 PROCEDURE — 70496 CT ANGIOGRAPHY HEAD: CPT

## 2021-06-08 PROCEDURE — A9585 GADOBUTROL INJECTION: HCPCS | Performed by: NURSE PRACTITIONER

## 2021-06-08 PROCEDURE — 70552 MRI BRAIN STEM W/DYE: CPT

## 2021-06-08 RX ORDER — PYRIDOSTIGMINE BROMIDE 60 MG/1
30 TABLET ORAL 3 TIMES DAILY
Status: DISCONTINUED | OUTPATIENT
Start: 2021-06-08 | End: 2021-06-10

## 2021-06-08 RX ADMIN — Medication 40 G: at 17:51

## 2021-06-08 RX ADMIN — PYRIDOSTIGMINE BROMIDE 30 MG: 60 TABLET ORAL at 22:00

## 2021-06-08 RX ADMIN — IOHEXOL 85 ML: 350 INJECTION, SOLUTION INTRAVENOUS at 10:45

## 2021-06-08 RX ADMIN — PERFLUTREN 0.8 ML/MIN: 6.52 INJECTION, SUSPENSION INTRAVENOUS at 13:46

## 2021-06-08 RX ADMIN — ATORVASTATIN CALCIUM 40 MG: 40 TABLET, FILM COATED ORAL at 17:51

## 2021-06-08 RX ADMIN — GADOBUTROL 12 ML: 604.72 INJECTION INTRAVENOUS at 13:30

## 2021-06-08 RX ADMIN — PYRIDOSTIGMINE BROMIDE 30 MG: 60 TABLET ORAL at 17:50

## 2021-06-08 RX ADMIN — ASPIRIN 81 MG CHEWABLE TABLET 81 MG: 81 TABLET CHEWABLE at 10:00

## 2021-06-08 NOTE — CASE MANAGEMENT
CM met w/ pt at bedside for d/c planning assessment  CM introduced self and Reviewed CM role  Pt reports that he lives in 2 story home but stays on 1st floor w/ 2 steps to enter  Pt lives w/ wife Bettina Duncan  Son is Next of Kin  No POA  Pt does not drive lately  Pt is self employed  Pt uses [employer] for pharmacy needs  And has no issues w/ purchasing medication or making copays  No insurance  Pt has never had short term rehab psych or D & A admission history  Pt has never had VNS  Pt does no own DME  Pt declines VNA services  CM Dept will follow through d/c      CM reviewed d/c planning process including the following: identifying help at home, patient preference for d/c planning needs, Discharge Lounge, [facility] Meds to Bed program, availability of treatment team to discuss questions or concerns patient and/or family may have regarding understanding medications and recognizing signs and symptoms once discharged  CM also encouraged patient to follow up with all recommended appointments after discharge  Patient advised of importance for patient and family to participate in managing patients medical well being

## 2021-06-08 NOTE — PHYSICAL THERAPY NOTE
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]    Problem List  Patient Active Problem List   Diagnosis    Third nerve palsy    CAD (coronary artery disease)     Past Medical History  Past Medical History:   Diagnosis Date    Coronary artery disease     Hyperlipidemia      Past Surgical History  History reviewed  No pertinent surgical history  06/08/21 0811   PT Last Visit   PT Visit Date 06/08/21   Note Type   Note type Evaluation   Pain Assessment   Pain Assessment Tool 0-10   Pain Score 5   Pain Location/Orientation Location: Head  (front of head)   Pain Radiating Towards eyes   Pain Onset/Description Descriptor: Pressure   Home Living   Type of Home House  (bi-level)   Home Layout Two level  (2 CHARLIE; 6 steps up and 6 steps down)   Bathroom Shower/Tub Walk-in shower   Bathroom Toilet Standard   Bathroom Equipment Other (Comment)  (none per patient)   [address] Other (Comment)  (none per patient)   Additional Comments Pt ambulates without an AD  Prior Function   Level of [place] Independent with ADLs and functional mobility   Lives With Spouse; Son   Nerissa Palomino Help From Family   ADL Assistance Independent   IADLs Independent   Falls in the last 6 months 1 to 4  (1 fall in december; "I can't focus  ")   Vocational Self employed   Restrictions/Precautions   Weight Bearing Precautions Per Order No   Braces or Orthoses Other (Comment)  (none per patient)   Other Precautions Chair Alarm; Bed Alarm;Multiple lines;Telemetry; Fall Risk;Pain;Visual impairment   General   Family/Caregiver Present No   Cognition   Overall Cognitive Status WFL   Arousal/Participation Alert   Orientation Level Oriented X4   Memory Within functional limits   Following Commands Follows all commands and directions without difficulty   Comments Pt agreeable to PT    RLE Assessment   RLE Assessment X   Strength RLE   R Hip Flexion 4-/5   R Knee Extension 4-/5   R Ankle Dorsiflexion 4/5   LLE Assessment   LLE Assessment X   Strength LLE   L Hip Flexion 4-/5   L Knee Extension 4-/5   L Ankle Dorsiflexion 4/5   Light Touch   RLE Light Touch Grossly intact   LLE Light Touch Grossly intact   Bed Mobility   Supine to Sit 6  Modified independent   Additional items HOB elevated   Transfers   Sit to Stand 5  Supervision   Additional items Assist x 1;Verbal cues   Stand to Sit 5  Supervision   Additional items Assist x 1;Verbal cues   Ambulation/Elevation   Gait pattern Short stride; Shuffling  (increased lateral sway)   Gait Assistance 5  Supervision  (close supervision/standby assist)   Additional items Assist x 1;Verbal cues   Assistive Device None   Distance 30 feet   Stair Management Assistance Not tested   Balance   Static Sitting Normal   Dynamic Sitting Good   Static Standing Fair +   Dynamic Standing Fair   Ambulatory Fair   Endurance Deficit   Endurance Deficit Yes   Activity Tolerance   Activity Tolerance Patient tolerated treatment well   Nurse Made Aware Discussed case with ELINOR Fuentes; post session pt was left seated in recliner in NAD, all belongings within reach, +chair alarm   Assessment   Prognosis Good   Problem List Decreased strength;Decreased endurance; Impaired balance;Decreased mobility; Decreased safety awareness; Impaired vision;Obesity;Pain   Assessment Pt is 57year old male seen for PT evaluation s/p admit to [facility] on 6/7/2021 with Third nerve palsy  PT consulted to assess pt's functional mobility and d/c needs  Order placed for PT eval and tx, with ambulate patient order  Comorbidities affecting pt's physical performance at time of assessment include CAD, hyperlipidemia, and headache  PTA, pt was independent with all functional mobility without an AD   Personal factors affecting pt at time of IE include lives in a bi-level house, inability to navigate community distances, unable to perform dynamic tasks in community, positive fall history, visual impairments, and inability to perform IADLs  Please find objective findings from PT assessment regarding body systems outlined above with impairments and limitations including weakness, impaired balance, decreased endurance, gait deviations, pain, decreased activity tolerance, decreased functional mobility tolerance, decreased safety awareness, visual impairments, and fall risk  The following objective measures performed on IE also reveal limitations: Barthel Index: 60/100 and Modified [place]: 3 (moderate disability)  Pt's clinical presentation is currently unstable/unpredictable seen in pt's presentation of need for ongoing medical management/monitoring and pt requires cues/assist for safety with functional mobility  Pt to benefit from continued PT tx to address deficits as defined above and maximize level of functional independent mobility and consistency  From PT/mobility standpoint, recommendation at time of d/c would be home with outpatient PT pending progress in order to facilitate return to PLOF  Barriers to Discharge Inaccessible home environment   Goals   STG Expiration Date 06/18/21   Short Term Goal #1 In 7-10 days: Increase bilateral LE strength 1/2 grade to facilitate independent mobility, Perform all transfers modified independent to improve independence, Ambulate > 150 ft  with least restrictive assistive device modified independent w/o LOB and w/ normalized gait pattern 100% of the time, Navigate 12 stairs modified independent with unilateral handrail to facilitate return to previous living environment and Increase all balance 1/2 grade to decrease risk for falls   Plan   Treatment/Interventions Functional transfer training;LE strengthening/ROM; Elevations; Therapeutic exercise; Endurance training;Patient/family training;Bed mobility;Gait training;Spoke to nursing;OT   PT Frequency 2-3x/wk   Recommendation   PT Discharge Recommendation Home with outpatient rehabilitation   PT - OK to Discharge No   AM-PAC Basic Mobility Inpatient   Turning in Bed Without Bedrails 4   Lying on Back to Sitting on Edge of Flat Bed 4   Moving Bed to Chair 3   Standing Up From Chair 3   Walk in Room 3   Climb 3-5 Stairs 3   Basic Mobility Inpatient Raw Score 20   Basic Mobility Standardized Score 43 99   Modified Phill Scale   Modified Dodge Scale 3   Barthel Index   Feeding 10   Bathing 0   Grooming Score 5   Dressing Score 10   Bladder Score 10   Bowels Score 10   Toilet Use Score 5   Transfers (Bed/Chair) Score 10   Mobility (Level Surface) Score 0   Stairs Score 0   Barthel Index Score 60     Margarette Bumpers, PT, DPT

## 2021-06-08 NOTE — UTILIZATION REVIEW
Initial Clinical Review    Admission: Date/Time/Statement:   Admission Orders (From admission, onward)     Ordered        06/07/21 1335  Place in Observation  Once                   Orders Placed This Encounter   Procedures    Place in Observation     Standing Status:   Standing     Number of Occurrences:   1     Order Specific Question:   Level of Care     Answer:   Med Surg [16]     ED Arrival Information     Expected Arrival Acuity Means of Arrival Escorted By Service Admission Type    - 6/7/2021 10:48 Urgent Walk-In Self General Medicine Urgent    Arrival Complaint    FACIAL SWELLING        Chief Complaint   Patient presents with    Facial Pain     Patient reports facial pressure on forehead and L eye for 5 or 6 weeks  Patient reports this happened to him in september was well       Initial Presentation:  56 yo male to ED from home w/ 5-6 days of visual disturbance with new onset of headache for 24 hours  Describes the symptoms as increased blurriness difficulty focusing especially when he turns his head left and right and now with increasing headache  CT head neg   Admitted OBS status plan for neuro consult , migraine cocktail , MRI brain , check A1c , lipid panel   6/8 IM Note   reporting no improvement of his vision favoring more on the left side, HA resolved  MRI neg for acute CVA , FLP total cholesterol 192  continue statin for now   Cont ASA  Pt w/ L eye lid droop on nursing assessment   No other deficits     6/8 Neuro consult   Third nerve palsy unclear etiology   MRI brain, check CTA head   Check labs: Acetylcholine binding, blocking, and modulating and MUSK antibody, Aspirin 81 mg , neuro checks , neuro checks      ED Triage Vitals [06/07/21 1105]   Temperature Pulse Respirations Blood Pressure SpO2   98 5 °F (36 9 °C) 77 20 147/86 98 %      Temp Source Heart Rate Source Patient Position - Orthostatic VS BP Location FiO2 (%)   Oral Monitor Lying Left arm --      Pain Score       2 Wt Readings from Last 1 Encounters:   06/07/21 (!) 139 kg (305 lb 12 5 oz)     Additional Vital Signs:   06/08/21 0600  98 4 °F (36 9 °C)  61  19  128/71  --  96 %  None (Room air)  Lying   06/08/21 04:14:04  --  58  --  131/72  92  95 %  --  --   06/08/21 0400  97 9 °F (36 6 °C)  66  19  142/80  105  97 %  None (Room air)  Lying   06/08/21 0200  97 5 °F (36 4 °C)  59  18  123/65  --  98 %  None (Room air)  Lying   06/08/21 0000  97 5 °F (36 4 °C)  --  18  120/69  --  97 %  None (Room air)  Lying   06/07/21 23:08:48  --  55  19  140/68  --  96 %  --  --   06/07/21 2200  97 6 °F (36 4 °C)  64  18  118/72  --  96 %  None (Room air)  Lying   06/07/21 2100  97 8 °F (36 6 °C)  64  19  114/65  --  96 %  None (Room air)  Sitting   06/07/21 2000  97 6 °F (36 4 °C)  --  18  110/62  --  95 %  None (Room air)  Sitting   06/07/21 19:22:01  97 7 °F (36 5 °C)  66  18  105/61  76  96 %  --  --   06/07/21 1901  --  --  --  --  --  98 %  None (Room air)  --   06/07/21 1900  97 7 °F (36 5 °C)  65  19  111/63  --  98 %  None (Room air)  Sitting   06/07/21 1737  --  --  --  --  --  --  None (Room air)  --   06/07/21 17:12:12  97 8 °F (36 6 °C)  62  18  120/72  88  95 %  None (Room air)  Sitting   06/07/21 1430  --  73  18  114/66  85  97 %  None (Room air)  Lying   06/07/21 1300  --  74  18  119/66  86  92 %  None (Room          Pertinent Labs/Diagnostic Test Results:   6/7  MRI brain pending   XR chest 1 view portable   ED Interpretation by Janis Dasilva MD (06/07 1213)   Lung apices clear         CT head without contrast   Final Result by Anurag Garcia MD (06/07 1231)       No acute intracranial abnormality     6/7 EKG  NSR   6/7 Echo - pending     Results from last 7 days   Lab Units 06/07/21  1136   SARS-COV-2  Negative     Results from last 7 days   Lab Units 06/08/21  0528 06/07/21  1135   WBC Thousand/uL 8 28 8 70   HEMOGLOBIN g/dL 14 6 15 3   HEMATOCRIT % 46 1 47 6   PLATELETS Thousands/uL 194 209   NEUTROS ABS Thousands/µL --  5 61     Results from last 7 days   Lab Units 06/07/21  1135   SODIUM mmol/L 141   POTASSIUM mmol/L 3 6   CHLORIDE mmol/L 102   CO2 mmol/L 32   ANION GAP mmol/L 7   BUN mg/dL 20   CREATININE mg/dL 0 95   EGFR ml/min/1 73sq m 85   CALCIUM mg/dL 8 8     Results from last 7 days   Lab Units 06/07/21  1135   AST U/L 11   ALT U/L 23   ALK PHOS U/L 91   TOTAL PROTEIN g/dL 7 4   ALBUMIN g/dL 3 4*   TOTAL BILIRUBIN mg/dL 0 31   BILIRUBIN DIRECT mg/dL 0 08     Results from last 7 days   Lab Units 06/07/21  1135   GLUCOSE RANDOM mg/dL 160*     Results from last 7 days   Lab Units 06/07/21  1135   PH PANCHITO  7 398   PCO2 PANCHITO mm Hg 46 8   PO2 PANCHITO mm Hg 43 6   HCO3 PANCHITO mmol/L 28 2   BASE EXC PANCHITO mmol/L 2 6   O2 CONTENT PANCHITO ml/dL 18 1   O2 HGB, VENOUS % 80 1*     Results from last 7 days   Lab Units 06/07/21  1135   PROTIME seconds 12 1   INR  0 94   PTT seconds 28     ED Treatment:   Medication Administration from 06/07/2021 1047 to 06/07/2021 1702     None        Past Medical History:   Diagnosis Date    Coronary artery disease     Hyperlipidemia      Present on Admission:  **None**      Admitting Diagnosis: Diplopia [H53 2]  Facial pain [R51 9]  CAD (coronary artery disease) [I25 10]  Ptosis of left eyelid [H02 402]  Headache [R51 9]  Left oculomotor nerve palsy [H49 02]  Age/Sex: 57 y o  male  Admission Orders:  Scheduled Medications:  aspirin, 81 mg, Oral, Daily  atorvastatin, 40 mg, Oral, QPM      Continuous IV Infusions:     PRN Meds:  acetaminophen, 650 mg, Oral, Q4H PRN  ondansetron, 4 mg, Intravenous, Q6H PRN    Tele       IP CONSULT TO NEUROLOGY  IP CONSULT TO CASE MANAGEMENT  IP CONSULT TO NUTRITION SERVICES    Network Utilization Review Department  ATTENTION: Please call with any questions or concerns to [phone] and carefully listen to the prompts so that you are directed to the right person   All voicemails are confidential   Kayleigh Buck all requests for admission clinical reviews, approved or denied determinations and any other requests to dedicated fax number below belonging to the campus where the patient is receiving treatment   List of dedicated fax numbers for the Facilities:  [address] [place] [address] DENIALS (Administrative/Medical Necessity) [phone]   [address] (Maternity/NICU/Pediatrics) [phone]   [address] [place] [address] [facility] [address] [address] [place] [address] [address] [facility] [place] [address] [phone]

## 2021-06-08 NOTE — PROGRESS NOTES
[address]  Progress Note Tiffany Martinez 1960, 57 y o  male MRN: [id number]  Unit/Bed#: -01 Encounter: [id number]  Primary Care Provider: No primary care provider on file  Date and time admitted to hospital: 2021 11:07 AM    * Headache  Assessment & Plan  · Frontal in nature associated with visual disturbance  · CT head negative  ·  MRI brain negative for acute CVA  · Neurology consulted feels could be concern for a 3rd nerve palsy further checking a CTA MRI with and without contrast also checking myasthenia antibodies appreciate ongoing recommendations  · hemoglobin A1c noted 6 2 education provided  · FLP total cholesterol 192  continue statin for now   · Continue ASA   · OOB with assistance     CAD (coronary artery disease)  Assessment & Plan  · Reports history of cardiac event the past on ASA  · Continue statin and ASA           VTE Pharmacologic Prophylaxis: VTE Score: 2 Low Risk (Score 0-2) - Encourage Ambulation  Patient Centered Rounds: I performed bedside rounds with nursing staff today  Discussions with Specialists or Other Care Team Provider:  Neurology    Education and Discussions with Family / Patient: Patient declined call to   Time Spent for Care: 30 minutes  More than 50% of total time spent on counseling and coordination of care as described above  Current Length of Stay: 0 day(s)  Current Patient Status: Observation   Certification Statement: The patient, admitted on an observation basis, will now require > 2 midnight hospital stay due to Ongoing treatment for visual disturbance  Discharge Plan: Anticipate discharge tomorrow to home  Code Status: Level 1 - Full Code    Subjective:   Patient reporting no improvement of his vision favoring more on the left side  Patient endorses his headache has resolved understands need for further and neurological recommendations      Objective:     Vitals:   Temp (24hrs), Av 7 °F (36 5 °C), Min:97 5 °F (36 4 °C), Max:98 4 °F (36 9 °C)    Temp:  [97 5 °F (36 4 °C)-98 4 °F (36 9 °C)] 97 7 °F (36 5 °C)  HR:  [55-74] 60  Resp:  [17-19] 17  BP: (105-142)/(61-87) 141/87  SpO2:  [92 %-98 %] 94 %  Body mass index is 41 47 kg/m²  Input and Output Summary (last 24 hours): Intake/Output Summary (Last 24 hours) at 6/8/2021 1138  Last data filed at 6/7/2021 1755  Gross per 24 hour   Intake 240 ml   Output --   Net 240 ml       Physical Exam:   Physical Exam  Constitutional:       Appearance: He is obese  HENT:      Head: Normocephalic and atraumatic  Eyes:      Extraocular Movements:      Right eye: No nystagmus  Left eye: No nystagmus  Pupils: Pupils are equal, round, and reactive to light  Cardiovascular:      Rate and Rhythm: Normal rate and regular rhythm  Pulmonary:      Effort: Pulmonary effort is normal       Breath sounds: Normal breath sounds  Abdominal:      General: Bowel sounds are normal    Musculoskeletal: Normal range of motion  Skin:     General: Skin is warm and dry  Neurological:      General: No focal deficit present  Mental Status: He is alert and oriented to person, place, and time     Psychiatric:         Mood and Affect: Mood normal          Behavior: Behavior normal           Additional Data:     Labs:  Results from last 7 days   Lab Units 06/08/21  0528 06/07/21  1135   WBC Thousand/uL 8 28 8 70   HEMOGLOBIN g/dL 14 6 15 3   HEMATOCRIT % 46 1 47 6   PLATELETS Thousands/uL 194 209   NEUTROS PCT %  --  65   LYMPHS PCT %  --  23   MONOS PCT %  --  7   EOS PCT %  --  4     Results from last 7 days   Lab Units 06/08/21  0528   SODIUM mmol/L 140   POTASSIUM mmol/L 4 2   CHLORIDE mmol/L 103   CO2 mmol/L 31   BUN mg/dL 16   CREATININE mg/dL 0 87   ANION GAP mmol/L 6   CALCIUM mg/dL 8 9   ALBUMIN g/dL 3 0*   TOTAL BILIRUBIN mg/dL 0 32   ALK PHOS U/L 84   ALT U/L 23   AST U/L 15   GLUCOSE RANDOM mg/dL 129     Results from last 7 days   Lab Units 06/07/21  1135   INR  0 94         Results from last 7 days   Lab Units 06/08/21  0528   HEMOGLOBIN A1C % 6 2*           Lines/Drains:  Invasive Devices     Peripheral Intravenous Line            Peripheral IV 06/08/21 Right Antecubital less than 1 day                      Imaging: Reviewed radiology reports from this admission including: MRI brain    Recent Cultures (last 7 days):         Last 24 Hours Medication List:   Current Facility-Administered Medications   Medication Dose Route Frequency Provider Last Rate    acetaminophen  650 mg Oral Q4H PRN MARCELLA Lazcano      aspirin  81 mg Oral Daily MARCELLA Lazcano      atorvastatin  40 mg Oral QPM MARCELLA Lazcano      ondansetron  4 mg Intravenous Q6H PRN MARCELLA Lazcano          Today, Patient Was Seen By: MARCELLA Lazcano    **Please Note: This note may have been constructed using a voice recognition system  **

## 2021-06-08 NOTE — PLAN OF CARE
Problem: OCCUPATIONAL THERAPY ADULT  Goal: Performs self-care activities at highest level of function for planned discharge setting  See evaluation for individualized goals  Description: Treatment Interventions: ADL retraining, Visual perceptual retraining, Compensatory technique education, Activityengagement          See flowsheet documentation for full assessment, interventions and recommendations  Note: Limitation: Decreased ADL status, Decreased Safe judgement during ADL, Visual deficit, Decreased high-level ADLs  Prognosis: Good  Assessment: Patient is a 58 y/o male who was admitted to [address] [place] [address] on 6/7/2021 with c/o frontal headache with visual disturbance and a diagnosis of third nerve palsy  Co-morbidities affecting performance include CAD  Two patient identifiers were noted to confirm patient ID  Patient lives with his wife and son in a multi-level home with 2 CHARLIE  Prior to admission, patient reports independence in ADLs/IADLs  At baseline patient ambulates with no AD, drives and is employed full time  Currently, patient requires a supervision/setup level of assistance for UB ADLs and a supervision/setup level of assistance for LB ADLs  Patient ambulates with a supervision/setup level of assistance  Patient is alert and oriented x4  Personal factors impacting functional performance include decreased independence in ADLs/IADLs  Patient limitations include limited safety awareness and visual impairments   Patient will benefit from continued skilled OT services 2-3 x/week while in the hospital to address the limitations mentioned and increase functional performance in ADLs and functional mobility  Occupational performance areas to address include dressing, bathing, toileting, grooming and functional transfers/mobility  From an OT perspective, recommendation at time of d/c will be home with outpatient services        OT Discharge Recommendation: Home with outpatient rehabilitation(w/ vision emphasis)  OT - OK to Discharge: Yes(pending medical clearance)

## 2021-06-08 NOTE — CONSULTS
Consultation - Neurology   Leora Méndez 57 y o  male MRN: [id number]  Unit/Bed#: -01 Encounter: [id number]      Assessment/Plan     * Third nerve palsy  Assessment & Plan  58 y/o male with HLD, CAD on aspirin, hx COVID, who presents with blurred vision x 5-6 days with new onset of headache x 24 hours  He presented to the ED for further evaluation  Unclear etiology at this time  Left ptosis, dysconjugate gaze, restricted left upgaze noted on exam  MRI without acute infarction  Will check CTA head and neck, MRI brain w and wo contrast, and MG antibodies for further evaluation  Plan:   MRI brain w contrast   CTA head and neck    Check labs: Acetylcholine binding, blocking, and modulating and MUSK antibody   Aspirin 81 mg    Atorvastatin 40 mg   Normotension   PT/OT/ST   Frequent neuro checks  Continue to monitor and notify neurology with any changes   Medical management and supportive care per primary team  Correction of any metabolic or infectious disturbances  Results:  - CT head: No acute intracranial abnormality   - MRI brain wo contrast:  1  No acute intracranial pathology  2  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  Mild microangiopathy   - Lipid panel: cholesterol 192, triglycerides 139, HDL 50,   - TSH 1 674, A1C 6 2    CAD (coronary artery disease)  Assessment & Plan  - On aspirin at baseline  - Medical management per primary team      Recommendations for outpatient neurological follow up have yet to be determined  Case and treatment plan reviewed with attending neurologist, Dr Oswaldo Toney  History of Present Illness     Reason for Consult / Principal Problem: Diplopia, headache  HPI: Leora Méndez is a 57 y o   male with HLD, CAD on aspirin, hx COVID, who presents with blurred vision and headache  Per H&P review, patient reporting 5-6 days of visual disturbance with new onset of headache x 24 hours   Patient reported onset of visual disturbance when he had COVID, which initially resolved but now has recurred  He notes his vision is blurry and has difficulty focusing, notably when he turns his head left and right  Patient does report not following up regularly or seeking medical care for many years now  In speaking with patient, he reports that he had onset of blurred and double vision in September of 2020  He notes the symptoms lasted for a few days and then resolved spontaneously  Symptoms recurred in October of 2020 and continued to worsen throughout November  He notes that he reported to the hospital and was told that he was going to lose his vision but he was not told what was causing his symptoms  Patient reports being diagnosed with COVID in December and was hospitalized for treatment  He reports his symptoms improved throughout the hospitalization and his vision was back to normal by the time he was discharged home  He also notes at one point he noticed his right pupil was bigger than the left  His symptoms then recurred in April and have continued worsen since then, which is why he presented hospital for further evaluation  Currently, he reports having bifrontal head pressure feeling like it is pushing his eyelids down  He reports double vision which resolves with closing 1 eye or the other  He also has blurred vision but notes this is mostly in his left eye  He also has difficulty opening his left eyelid  He reports bilateral shoulder pain but denies any dizziness/lightheadedness, numbness/tingling, weakness, speech difficulty, eye pain  Patient also reports having a tooth issue on the left side to which he pulled the teeth out himself so he's not sure if there is infection that may be causing his symptoms  Inpatient consult to Neurology  Consult performed by: MARCELLA Morrison  Consult ordered by: MARCELLA Henry          Review of Systems   Constitutional: Negative for fever  HENT: Negative for trouble swallowing      Eyes: Positive for visual disturbance  Negative for photophobia and pain  Respiratory: Negative for shortness of breath  Cardiovascular: Negative for chest pain  Gastrointestinal: Negative for abdominal pain  Musculoskeletal: Positive for arthralgias  Negative for back pain, myalgias and neck pain  Skin: Negative for rash  Neurological: Positive for facial asymmetry and headaches  Negative for dizziness, tremors, seizures, syncope, speech difficulty, weakness, light-headedness and numbness  Psychiatric/Behavioral: Negative for confusion  All other systems reviewed and are negative  Historical Information   Past Medical History:   Diagnosis Date    Coronary artery disease     Hyperlipidemia      History reviewed  No pertinent surgical history  Social History   Social History     Substance and Sexual Activity   Alcohol Use Not Currently     Social History     Substance and Sexual Activity   Drug Use Not Currently     E-Cigarette/Vaping     E-Cigarette/Vaping Substances     Social History     Tobacco Use   Smoking Status Never Smoker   Smokeless Tobacco Never Used     Family History: History reviewed  No pertinent family history  Review of previous medical records was completed  Meds/Allergies   all current active meds have been reviewed, current meds:   Current Facility-Administered Medications   Medication Dose Route Frequency    acetaminophen (TYLENOL) tablet 650 mg  650 mg Oral Q4H PRN    aspirin chewable tablet 81 mg  81 mg Oral Daily    atorvastatin (LIPITOR) tablet 40 mg  40 mg Oral QPM    ondansetron (ZOFRAN) injection 4 mg  4 mg Intravenous Q6H PRN    and PTA meds:   Prior to Admission Medications   Prescriptions Last Dose Informant Patient Reported? Taking?    Ascorbic Acid (vitamin C) 1000 MG tablet   Yes Yes   Sig: Take 1,000 mg by mouth daily   aspirin (ECOTRIN LOW STRENGTH) 81 mg EC tablet   Yes Yes   Sig: Take 81 mg by mouth daily      Facility-Administered Medications: None       No Known Allergies    Objective   Vitals:Blood pressure 141/87, pulse 60, temperature 97 7 °F (36 5 °C), resp  rate 17, height 6' (1 829 m), weight (!) 139 kg (305 lb 12 5 oz), SpO2 94 %  ,Body mass index is 41 47 kg/m²  Intake/Output Summary (Last 24 hours) at 6/8/2021 1242  Last data filed at 6/8/2021 0900  Gross per 24 hour   Intake 480 ml   Output --   Net 480 ml       Invasive Devices: Invasive Devices     Peripheral Intravenous Line            Peripheral IV 06/08/21 Right Antecubital less than 1 day                Physical Exam  Vitals signs and nursing note reviewed  Constitutional:       General: He is not in acute distress  Appearance: He is obese  He is not ill-appearing  HENT:      Head: Normocephalic  Mouth/Throat:      Mouth: Mucous membranes are dry  Pharynx: Oropharynx is clear  Eyes:      General: No scleral icterus  Right eye: No discharge  Left eye: No discharge  Conjunctiva/sclera: Conjunctivae normal       Pupils: Pupils are equal, round, and reactive to light  Neck:      Musculoskeletal: Normal range of motion  Cardiovascular:      Rate and Rhythm: Normal rate  Pulmonary:      Effort: Pulmonary effort is normal  No respiratory distress  Musculoskeletal: Normal range of motion  Skin:     General: Skin is warm and dry  Coloration: Skin is not jaundiced or pale  Neurological:      Mental Status: He is alert and oriented to person, place, and time  Psychiatric:         Mood and Affect: Mood normal          Speech: Speech normal          Behavior: Behavior normal          Thought Content: Thought content normal          Judgment: Judgment normal        Neurologic Exam     Mental Status   Oriented to person, place, and time  Attention: normal  Concentration: normal    Speech: speech is normal   Level of consciousness: alert  Able to follow commands appropriately  No dysarthria noted       Cranial Nerves     CN II   Right visual field deficit: none  Left visual field deficit: none     CN III, IV, VI   Pupils are equal, round, and reactive to light  Right pupil: Shape: regular  Reactivity: brisk  Left pupil: Shape: regular  Reactivity: brisk  CN V   Facial sensation intact  CN VIII   CN VIII normal      CN IX, X   Palate: symmetric    CN XI   CN XI normal      CN XII   CN XII normal    Left ptosis noted  Dysconjugate gaze  Restricted left upgaze     Motor Exam   Muscle bulk: normal  Bilateral upper extremity strength 5 of 5 deltoids, biceps, triceps, hand   Bilateral lower extremity strength 5/5 hip flexion, dorsiflexion, plantar flexion     Sensory Exam   Light touch normal      Gait, Coordination, and Reflexes     Tremor   Resting tremor: absent  Intention tremor: absent      Lab Results:   I have personally reviewed pertinent reports    , CBC:   Results from last 7 days   Lab Units 06/08/21  0528 06/07/21  1135   WBC Thousand/uL 8 28 8 70   RBC Million/uL 5 25 5 53   HEMOGLOBIN g/dL 14 6 15 3   HEMATOCRIT % 46 1 47 6   MCV fL 88 86   PLATELETS Thousands/uL 194 209   , BMP/CMP:   Results from last 7 days   Lab Units 06/08/21  0528 06/07/21  1135   SODIUM mmol/L 140 141   POTASSIUM mmol/L 4 2 3 6   CHLORIDE mmol/L 103 102   CO2 mmol/L 31 32   BUN mg/dL 16 20   CREATININE mg/dL 0 87 0 95   CALCIUM mg/dL 8 9 8 8   AST U/L 15 11   ALT U/L 23 23   ALK PHOS U/L 84 91   EGFR ml/min/1 73sq m 92 85   , Vitamin B12:   , HgBA1C:   Results from last 7 days   Lab Units 06/08/21  0528   HEMOGLOBIN A1C % 6 2*   , TSH:   Results from last 7 days   Lab Units 06/08/21  0528   TSH 3RD GENERATON uIU/mL 1 674   , Coagulation:   Results from last 7 days   Lab Units 06/07/21  1135   INR  0 94   , Lipid Profile:   Results from last 7 days   Lab Units 06/08/21  0528   HDL mg/dL 50   LDL CALC mg/dL 114*   TRIGLYCERIDES mg/dL 139   , Ammonia:   , Urinalysis:       Invalid input(s): URIBILINOGEN, Drug Screen:   , Medication Drug Levels:       Invalid input(s): CARBAMAZEPINE,  PHENOBARB, LACOSAMIDE, OXCARBAZEPINE         Imaging Studies: I have personally reviewed pertinent reports  EKG, Pathology, and Other Studies: I have personally reviewed pertinent reports      VTE Prophylaxis: Reason for no pharmacologic prophylaxis Order to ambulate patient

## 2021-06-08 NOTE — SPEECH THERAPY NOTE
Consult received and chart reviewed  Per chart review, and discussion with RN, pt passed RN dysphagia assessment and is tolerating regular diet with thin liquids with no s/s of aspiration  No dysarthria or language deficits noted or reported  Therefore, formal speech/swallow evaluation not indicated at this time  If new concerns arise, please reconsult       ABIGAIL Quesada , Franck  Pathologist   [phone]

## 2021-06-08 NOTE — PLAN OF CARE
Problem: Potential for Falls  Goal: Patient will remain free of falls  Description: INTERVENTIONS:  - Assess patient frequently for physical needs  -  Identify cognitive and physical deficits and behaviors that affect risk of falls    -  [place] fall precautions as indicated by assessment   - Educate patient/family on patient safety including physical limitations  - Instruct patient to call for assistance with activity based on assessment  - Modify environment to reduce risk of injury  - Consider OT/PT consult to assist with strengthening/mobility  Outcome: Progressing     Problem: PAIN - ADULT  Goal: Verbalizes/displays adequate comfort level or baseline comfort level  Description: Interventions:  - Encourage patient to monitor pain and request assistance  - Assess pain using appropriate pain scale  - Administer analgesics based on type and severity of pain and evaluate response  - Implement non-pharmacological measures as appropriate and evaluate response  - Consider cultural and social influences on pain and pain management  - Notify physician/advanced practitioner if interventions unsuccessful or patient reports new pain  Outcome: Progressing     Problem: INFECTION - ADULT  Goal: Absence or prevention of progression during hospitalization  Description: INTERVENTIONS:  - Assess and monitor for signs and symptoms of infection  - Monitor lab/diagnostic results  - Monitor all insertion sites, i e  indwelling lines, tubes, and drains  - Monitor endotracheal if appropriate and nasal secretions for changes in amount and color  - [place] appropriate cooling/warming therapies per order  - Administer medications as ordered  - Instruct and encourage patient and family to use good hand hygiene technique  - Identify and instruct in appropriate isolation precautions for identified infection/condition  Outcome: Progressing  Goal: Absence of fever/infection during neutropenic period  Description: INTERVENTIONS:  - Monitor WBC    Outcome: Progressing     Problem: SAFETY ADULT  Goal: Patient will remain free of falls  Description: INTERVENTIONS:  - Assess patient frequently for physical needs  -  Identify cognitive and physical deficits and behaviors that affect risk of falls    -  [place] fall precautions as indicated by assessment   - Educate patient/family on patient safety including physical limitations  - Instruct patient to call for assistance with activity based on assessment  - Modify environment to reduce risk of injury  - Consider OT/PT consult to assist with strengthening/mobility  Outcome: Progressing  Goal: Maintain or return to baseline ADL function  Description: INTERVENTIONS:  -  Assess patient's ability to carry out ADLs; assess patient's baseline for ADL function and identify physical deficits which impact ability to perform ADLs (bathing, care of mouth/teeth, toileting, grooming, dressing, etc )  - Assess/evaluate cause of self-care deficits   - Assess range of motion  - Assess patient's mobility; develop plan if impaired  - Assess patient's need for assistive devices and provide as appropriate  - Encourage maximum independence but intervene and supervise when necessary  - Involve family in performance of ADLs  - Assess for home care needs following discharge   - Consider OT consult to assist with ADL evaluation and planning for discharge  - Provide patient education as appropriate  Outcome: Progressing  Goal: Maintain or return mobility status to optimal level  Description: INTERVENTIONS:  - Assess patient's baseline mobility status (ambulation, transfers, stairs, etc )    - Identify cognitive and physical deficits and behaviors that affect mobility  - Identify mobility aids required to assist with transfers and/or ambulation (gait belt, sit-to-stand, lift, walker, cane, etc )  - [place] fall precautions as indicated by assessment  - Record patient progress and toleration of activity level on Mobility SBAR; progress patient to next Phase/Stage  - Instruct patient to call for assistance with activity based on assessment  - Consider rehabilitation consult to assist with strengthening/weightbearing, etc   Outcome: Progressing     Problem: DISCHARGE PLANNING  Goal: Discharge to home or other facility with appropriate resources  Description: INTERVENTIONS:  - Identify barriers to discharge w/patient and caregiver  - Arrange for needed discharge resources and transportation as appropriate  - Identify discharge learning needs (meds, wound care, etc )  - Arrange for interpretive services to assist at discharge as needed  - Refer to Case Management Department for coordinating discharge planning if the patient needs post-hospital services based on physician/advanced practitioner order or complex needs related to functional status, cognitive ability, or social support system  Outcome: Progressing     Problem: Knowledge Deficit  Goal: Patient/family/caregiver demonstrates understanding of disease process, treatment plan, medications, and discharge instructions  Description: Complete learning assessment and assess knowledge base  Interventions:  - Provide teaching at level of understanding  - Provide teaching via preferred learning methods  Outcome: Progressing     Problem: Neurological Deficit  Goal: Neurological status is stable or improving  Description: Interventions:  - Monitor and assess patient's level of consciousness, motor function, sensory function, and level of assistance needed for ADLs  - Monitor and report changes from baseline  Collaborate with interdisciplinary team to initiate plan and implement interventions as ordered  - Provide and maintain a safe environment  - Consider seizure precautions  - Consider fall precautions  - Consider aspiration precautions  - Consider bleeding precautions  Outcome: Progressing     Problem:  Activity Intolerance/Impaired Mobility  Goal: Mobility/activity is maintained at optimum level for patient  Description: Interventions:  - Assess and monitor patient  barriers to mobility and need for assistive/adaptive devices  - Assess patient's emotional response to limitations  - Collaborate with interdisciplinary team and initiate plans and interventions as ordered  - Encourage independent activity per ability   - Maintain proper body alignment  - Perform active/passive rom as tolerated/ordered  - Plan activities to conserve energy   - Turn patient as appropriate  Outcome: Progressing     Problem: Communication Impairment  Goal: Ability to express needs and understand communication  Description: Assess patient's communication skills and ability to understand information  Patient will demonstrate use of effective communication techniques, alternative methods of communication and understanding even if not able to speak  - Encourage communication and provide alternate methods of communication as needed  - Collaborate with case management/ for discharge needs  - Include patient/family/caregiver in decisions related to communication  Outcome: Progressing     Problem: Potential for Aspiration  Goal: Non-ventilated patient's risk of aspiration is minimized  Description: Assess and monitor vital signs, respiratory status, and labs (WBC)  Monitor for signs of aspiration (tachypnea, cough, rales, wheezing, cyanosis, fever)  - Assess and monitor patient's ability to swallow  - Place patient up in chair to eat if possible  - HOB up at 90 degrees to eat if unable to get patient up into chair   - Supervise patient during oral intake  - Instruct patient/ family to take small bites  - Instruct patient/ family to take small single sips when taking liquids    - Follow patient-specific strategies generated by speech pathologist   Outcome: Progressing  Goal: Ventilated patient's risk of aspiration is minimized  Description: Assess and monitor vital signs, respiratory status, airway cuff pressure, and labs (WBC)  Monitor for signs of aspiration (tachypnea, cough, rales, wheezing, cyanosis, fever)  - Elevate head of bed 30 degrees if patient has tube feeding   - Monitor tube feeding  Outcome: Progressing     Problem: Nutrition  Goal: Nutrition/Hydration status is improving  Description: Monitor and assess patient's nutrition/hydration status for malnutrition (ex- brittle hair, bruises, dry skin, pale skin and conjunctiva, muscle wasting, smooth red tongue, and disorientation)  Collaborate with interdisciplinary team and initiate plan and interventions as ordered  Monitor patient's weight and dietary intake as ordered or per policy  Utilize nutrition screening tool and intervene per policy  Determine patient's food preferences and provide high-protein, high-caloric foods as appropriate  - Assist patient with eating   - Allow adequate time for meals   - Encourage patient to take dietary supplement as ordered  - Collaborate with clinical nutritionist   - Include patient/family/caregiver in decisions related to nutrition    Outcome: Progressing

## 2021-06-08 NOTE — PLAN OF CARE
Problem: PHYSICAL THERAPY ADULT  Goal: Performs mobility at highest level of function for planned discharge setting  See evaluation for individualized goals  Description: Treatment/Interventions: Functional transfer training, LE strengthening/ROM, Elevations, Therapeutic exercise, Endurance training, Patient/family training, Bed mobility, Gait training, Spoke to nursing, OT          See flowsheet documentation for full assessment, interventions and recommendations  Note: Prognosis: Good  Problem List: Decreased strength, Decreased endurance, Impaired balance, Decreased mobility, Decreased safety awareness, Impaired vision, Obesity, Pain  Assessment: Pt is 57year old male seen for PT evaluation s/p admit to [facility] on 6/7/2021 with Third nerve palsy  PT consulted to assess pt's functional mobility and d/c needs  Order placed for PT eval and tx, with ambulate patient order  Comorbidities affecting pt's physical performance at time of assessment include CAD, hyperlipidemia, and headache  PTA, pt was independent with all functional mobility without an AD  Personal factors affecting pt at time of IE include lives in a bi-level house, inability to navigate community distances, unable to perform dynamic tasks in community, positive fall history, visual impairments, and inability to perform IADLs  Please find objective findings from PT assessment regarding body systems outlined above with impairments and limitations including weakness, impaired balance, decreased endurance, gait deviations, pain, decreased activity tolerance, decreased functional mobility tolerance, decreased safety awareness, visual impairments, and fall risk  The following objective measures performed on IE also reveal limitations: Barthel Index: 60/100 and Modified [place]: 3 (moderate disability)   Pt's clinical presentation is currently unstable/unpredictable seen in pt's presentation of need for ongoing medical management/monitoring and pt requires cues/assist for safety with functional mobility  Pt to benefit from continued PT tx to address deficits as defined above and maximize level of functional independent mobility and consistency  From PT/mobility standpoint, recommendation at time of d/c would be home with outpatient PT pending progress in order to facilitate return to PLOF  Barriers to Discharge: Inaccessible home environment     PT Discharge Recommendation: Home with outpatient rehabilitation     PT - OK to Discharge: No    See flowsheet documentation for full assessment

## 2021-06-08 NOTE — OCCUPATIONAL THERAPY NOTE
Occupational Therapy Evaluation        Patient Name: Jaylene Lee  FHEOP'K Date: 6/8/2021 06/08/21 1411   OT Last Visit   OT Visit Date 06/08/21   Note Type   Note type Evaluation   Restrictions/Precautions   Weight Bearing Precautions Per Order No   Braces or Orthoses Other (Comment)  (none per pt)   Other Precautions Chair Alarm; Bed Alarm; Fall Risk;Visual impairment   Pain Assessment   Pain Assessment Tool 0-10   Pain Score No Pain   Home Living   Type of Home House  (bi-level)   Home Layout Multi-level;Performs ADLs on one level;Stairs to enter without rails  (2 CHARLIE, 6 steps up and 6 steps down)   Bathroom Shower/Tub Walk-in shower   Bathroom Toilet Standard   Bathroom Equipment Built-in shower seat   Bathroom Accessibility Accessible   Home Equipment Other (Comment)  (none per pt)   Prior Function   Level of [place] Independent with ADLs and functional mobility   Lives With Spouse; Son   Shabana Guzman Help From Family   ADL Assistance Independent   IADLs Independent   Falls in the last 6 months 1 to 4  (1 fall in December)   Vocational Self employed   Lifestyle   Autonomy Patient lives with his wife and son in a multi-level home with 2 CHARLIE  Prior to admission, patient reports independence in ADLs/IADLs  At baseline patient ambulates with no AD, drives and is self employed     Reciprocal Relationships family   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   Psychosocial   Psychosocial (WDL) WDL   ADL   Eating Assistance 6  Modified independent   Grooming Assistance 5  Supervision/Setup   UB Bathing Assistance 5  Supervision/Setup   LB Bathing Assistance 5  Supervision/Setup   UB Dressing Assistance 5  Supervision/Setup   LB Dressing Assistance 5  Supervision/Setup   Toileting Assistance  5  Supervision/Setup   Functional Assistance 5  Supervision/Setup   Additional Comments ADL assist levels based on pt's functional performance during OT evaluation   Bed Mobility   Additional Comments pt found in bathroom upon OT arrival  Post session; pt seated OOB in recliner chair with call bell and all needs within reach, chair alarm activated  Transfers   Sit to Stand 6  Modified independent   Additional items Increased time required;Verbal cues   Stand to Sit 6  Modified independent   Additional items Increased time required;Verbal cues   Additional Comments pt performed functional transfers with no AD   Functional Mobility   Functional Mobility 5  Supervision   Additional Comments x1; pt walked to/from bathroom with no overt LOB or SOB  Additional items   (no AD)   Balance   Static Sitting Good   Dynamic Sitting Good   Static Standing Fair +   Dynamic Standing Fair -   Ambulatory Fair -   Activity Tolerance   Activity Tolerance Patient tolerated treatment well   Medical Staff Made Aware OT Thedeyanira Madsen   Nurse Made Aware case discussed with ELINOR Lechuga   RUE Assessment   RUE Assessment WFL  (AROM and strength formally assessed)   LUE Assessment   LUE Assessment WFL  (AROM and strength formally assessed)   Hand Function   Gross Motor Coordination Functional  (based on pt's functional performance)   Fine Motor Coordination Functional  (based on pt's functional performance)   Sensation   Light Touch No apparent deficits  (BUEs)   Vision-Basic Assessment   Current Vision Does not wear glasses   Patient Visual Report Diplopia; Blurring of vision when changing focal distance   Vision - Complex Assessment   Tracking Decreased smoothness of horizontal tracking;Decreased smoothness of vertical tracking   Saccades Decreased speed of saccadic movement   Visual Fields   (significant ptosis noted; L>R)   Acuity Able to read clock/calendar on wall without difficulty  (unable to read employee name badge)   Visual Screen Results Decreased visual acuity;Diplopia; Ocular motor dysfunction   Diplopia Assessment Objects split side to side; Other (Comment)  (pt reports diplopia with R, L and upward gaze)   Additional Comments Patient reports resolve in diplopia when covering L eye   Cognition   Overall Cognitive Status [facility]   Arousal/Participation Alert; Responsive; Cooperative   Attention Within functional limits   Orientation Level Oriented X4   Memory Within functional limits   Following Commands Follows all commands and directions without difficulty   Comments pt agreeable to OT evaluation   Assessment   Limitation Decreased ADL status; Decreased Safe judgement during ADL;Visual deficit; Decreased high-level ADLs   Prognosis Good   Assessment Patient is a 56 y/o male who was admitted to [address] [place] [address] on 6/7/2021 with c/o frontal headache with visual disturbance and a diagnosis of third nerve palsy  Co-morbidities affecting performance include CAD  Two patient identifiers were noted to confirm patient ID  Patient lives with his wife and son in a multi-level home with 2 CHARLIE  Prior to admission, patient reports independence in ADLs/IADLs  At baseline patient ambulates with no AD, drives and is employed full time  Currently, patient requires a supervision/setup level of assistance for UB ADLs and a supervision/setup level of assistance for LB ADLs  Patient ambulates with a supervision/setup level of assistance  Patient is alert and oriented x4  Personal factors impacting functional performance include decreased independence in ADLs/IADLs  Patient limitations include limited safety awareness and visual impairments   Patient will benefit from continued skilled OT services 2-3 x/week while in the hospital to address the limitations mentioned and increase functional performance in ADLs and functional mobility  Occupational performance areas to address include dressing, bathing, toileting, grooming and functional transfers/mobility  From an OT perspective, recommendation at time of d/c will be home with outpatient services  Plan   Treatment Interventions ADL retraining;Visual perceptual retraining; Compensatory technique education; Activityengagement Goal Expiration Date 06/15/21   OT Frequency 2-3x/wk   Recommendation   OT Discharge Recommendation Home with outpatient rehabilitation  (w/ vision emphasis)   OT - OK to Discharge Yes  (pending medical clearance)   Additional Comments  The patient's raw score on the AM-PAC Daily Activity inpatient short form is 19, standardized score is 40 22, greater than 39 4  Patients at this level are likely to benefit from discharge to home  Please refer to the recommendation of the Occupational Therapist for safe discharge planning  AM-PAC Daily Activity Inpatient   Lower Body Dressing 3   Bathing 3   Toileting 3   Upper Body Dressing 3   Grooming 3   Eating 4   Daily Activity Raw Score 19   Daily Activity Standardized Score (Calc for Raw Score >=11) 40 22   AM-PAC Applied Cognition Inpatient   Following a Speech/Presentation 4   Understanding Ordinary Conversation 4   Taking Medications 4   Remembering Where Things Are Placed or Put Away 4   Remembering List of 4-5 Errands 4   Taking Care of Complicated Tasks 4   Applied Cognition Raw Score 24   Applied Cognition Standardized Score 62 21   Barthel Index   Feeding 10   Bathing 0   Grooming Score 0   Dressing Score 10   Bladder Score 10   Bowels Score 10   Toilet Use Score 10   Transfers (Bed/Chair) Score 10   Mobility (Level Surface) Score 0   Stairs Score 0   Barthel Index Score 60   Modified Phill Scale   Modified Phill Scale 3     Occupational Therapy Goals to be addressed within 5-7 days:    Pt will verbalize and demonstrate good body mechanics and joint protections techniques during ADLs and IADLs with no verbal cues  Pt will verbalize and demonstrate visual compensatory techniques to increase independence in ADLs with no verbal cues  Pt will increase independence in UB ADLs to Mod I  Pt will increase independence in LB ADLs to Mod I while sitting, using adaptive equipment as indicated  Pt will increase independence in toileting to Mod I      Malgorzata Lund [address], Karen Carlos student

## 2021-06-08 NOTE — ASSESSMENT & PLAN NOTE
56 y/o male with HLD, CAD on aspirin, hx COVID, who presents with blurred vision x 5-6 days with new onset of headache x 24 hours  He presented to the ED for further evaluation  On exam, patient with bilateral ptosis (L>R) and dysconjugate gaze  He is reporting double and blurred vision  Etiology likely related to a neuromuscular disorder such as myasthenia gravis  Antibodies pending  Imaging negative thus far  Plan:   Acetylcholine binding, blocking, and modulating and MUSK antibody pending   Continues on IVIG 400 mg/kg x5 days (today is 3/5)   Increase Mestinon to 60 mg t i d    Initiate prednisone 60 mg daily   CT chest with contrast for evaluation of thymoma   Normotension   PT/OT/ST   Frequent neuro checks  Continue to monitor and notify neurology with any changes   Medical management and supportive care per primary team  Correction of any metabolic or infectious disturbances  Results:  - CT head: No acute intracranial abnormality   - MRI brain wo contrast:  1  No acute intracranial pathology  2  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  Mild microangiopathy   - Lipid panel: cholesterol 192, triglycerides 139, HDL 50,   - TSH 1 674, A1C 6 2  - MRI brain with contrast: No abnormal postcontrast enhancement  - CTA head and neck:   1  No acute intracranial abnormality  Chronic infarct right insular cortex  2  Mild stenosis right P1 segment  No large vessel occlusion   - TTE: Ejection fraction was estimated in the range of 40 % to 45 %, moderate hypokinesis of the apical wall, Wall thickness was at the upper limits of normal  Abnormal left ventricular relaxation (grade 1 diastolic dysfunction)  LA mildly dilated

## 2021-06-08 NOTE — ASSESSMENT & PLAN NOTE
· Frontal in nature associated with visual disturbance  · CT head negative  ·  MRI brain negative for acute CVA  · Neurology consulted feels could be concern for a 3rd nerve palsy further checking a CTA MRI with and without contrast also checking myasthenia antibodies appreciate ongoing recommendations  · hemoglobin A1c noted 6 2 education provided  · FLP total cholesterol 192  continue statin for now   · Continue ASA   · OOB with assistance

## 2021-06-09 PROCEDURE — 99232 SBSQ HOSP IP/OBS MODERATE 35: CPT | Performed by: NURSE PRACTITIONER

## 2021-06-09 PROCEDURE — 99214 OFFICE O/P EST MOD 30 MIN: CPT | Performed by: NURSE PRACTITIONER

## 2021-06-09 PROCEDURE — 97110 THERAPEUTIC EXERCISES: CPT

## 2021-06-09 PROCEDURE — 97116 GAIT TRAINING THERAPY: CPT

## 2021-06-09 RX ADMIN — PYRIDOSTIGMINE BROMIDE 30 MG: 60 TABLET ORAL at 20:12

## 2021-06-09 RX ADMIN — ASPIRIN 81 MG CHEWABLE TABLET 81 MG: 81 TABLET CHEWABLE at 08:31

## 2021-06-09 RX ADMIN — PYRIDOSTIGMINE BROMIDE 30 MG: 60 TABLET ORAL at 08:31

## 2021-06-09 RX ADMIN — Medication 40 G: at 15:22

## 2021-06-09 RX ADMIN — PYRIDOSTIGMINE BROMIDE 30 MG: 60 TABLET ORAL at 17:12

## 2021-06-09 RX ADMIN — ATORVASTATIN CALCIUM 40 MG: 40 TABLET, FILM COATED ORAL at 17:12

## 2021-06-09 NOTE — UTILIZATION REVIEW
Continued Stay Review    WAS OBSERVATION 06/07/2021 @ 1335, CONVERTED TO INPATIENT ADMISSION 06/09/2021 @ 1416, DUE TO CONTINUED STAY REQUIRED TO CARE FOR PATIENT WITH  Sandra Rodríguez #rd Nerve palsy  5 days of IVIG, today is day 2 of 5  Date: 06/09/2021 06/09/21 1416  Inpatient Admission Once     Question Answer Comment   Level of Care Med Surg    Estimated length of stay More than 2 Midnights    Certification I certify that inpatient services are medically necessary for this patient for a duration of greater than two midnights  See H&P and MD Progress Notes for additional information about the patient's course of treatment  Current Patient Class: Inpatient Current Level of Care: Med/Surg    HPI:61 y o  male initially admitted on 06/07/2021   Third nerve palsy  Assessment/Plan: Acetylcholine binding, blocking, and modulating and MUSK antibody pending  Continues on IVIG 400 mg/kg x5 days (Scheduled today for dose 2/5)  Continues on Mestinon 30 mg t i d  Aspirin 81 mg   Atorvastatin 40 mg  Normotension  PT/OT/ST  Frequent neuro checks  Continue to monitor and notify neurology with any changes  VTE Pharmacologic Prophylaxis: VTE Score: 2 Low Risk (Score 0-2) - Encourage Ambulation  Vital Signs: /72   Pulse 63   Temp 98 °F (36 7 °C)   Resp 18   Ht 6' (1 829 m)   Wt (!) 139 kg (305 lb 12 5 oz)   SpO2 93%   BMI 41 47 kg/m²     Pertinent Labs/Diagnostic Results:   06/07/2021  MRI Brain:  1  No acute intracranial pathology    2   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  Mild microangiopathy  06/08/2021 ECHO:  SUMMARY:  PROCEDURE INFORMATION:  This was a technically difficult study  Echocardiographic views were limited due to poor acoustic window availability, decreased penetration, and lung interference    Intravenous contrast (  8mL of Definity in NSS) was administered to opacify the left ventricle    LEFT VENTRICLE:  Systolic function was moderately reduced  Ejection fraction was estimated in the range of 40 % to 45 %  There was moderate hypokinesis of the apical wall(s)  Wall thickness was at the upper limits of normal   Doppler parameters were consistent with abnormal left ventricular relaxation (grade 1 diastolic dysfunction)    RIGHT VENTRICLE:  The size was normal   Systolic function was normal    LEFT ATRIUM:  The atrium was mildly dilated    TRICUSPID VALVE:  There was trace regurgitation    AORTA:  The root exhibited upper limit of normal size - 3 8 cm      Results from last 7 days   Lab Units 06/07/21  1136   SARS-COV-2  Negative     Results from last 7 days   Lab Units 06/08/21  0528 06/07/21  1135   WBC Thousand/uL 8 28 8 70   HEMOGLOBIN g/dL 14 6 15 3   HEMATOCRIT % 46 1 47 6   PLATELETS Thousands/uL 194 209   NEUTROS ABS Thousands/µL  --  5 61     Results from last 7 days   Lab Units 06/08/21  0528 06/07/21  1135   SODIUM mmol/L 140 141   POTASSIUM mmol/L 4 2 3 6   CHLORIDE mmol/L 103 102   CO2 mmol/L 31 32   ANION GAP mmol/L 6 7   BUN mg/dL 16 20   CREATININE mg/dL 0 87 0 95   EGFR ml/min/1 73sq m 92 85   CALCIUM mg/dL 8 9 8 8     Results from last 7 days   Lab Units 06/08/21  0528 06/07/21  1135   AST U/L 15 11   ALT U/L 23 23   ALK PHOS U/L 84 91   TOTAL PROTEIN g/dL 6 6 7 4   ALBUMIN g/dL 3 0* 3 4*   TOTAL BILIRUBIN mg/dL 0 32 0 31   BILIRUBIN DIRECT mg/dL  --  0 08     Results from last 7 days   Lab Units 06/08/21  0528 06/07/21  1135   GLUCOSE RANDOM mg/dL 129 160*     Results from last 7 days   Lab Units 06/08/21  0528   HEMOGLOBIN A1C % 6 2*   EAG mg/dl 131     Results from last 7 days   Lab Units 06/07/21  1135   PH PANCHITO  7 398   PCO2 PANCHITO mm Hg 46 8   PO2 PANCHITO mm Hg 43 6   HCO3 PANCHITO mmol/L 28 2   BASE EXC PANCHITO mmol/L 2 6   O2 CONTENT PANCHITO ml/dL 18 1   O2 HGB, VENOUS % 80 1*     Results from last 7 days   Lab Units 06/07/21  1135   PROTIME seconds 12 1   INR  0 94   PTT seconds 28     Results from last 7 days   Lab Units 06/08/21  0528   TSH 3RD [facility] uIU/mL 1 674     Results from last 7 days   Lab Units 06/08/21  0528   HEP C AB  Non-reactive     Medications:   Scheduled Medications:  aspirin, 81 mg, Oral, Daily  atorvastatin, 40 mg, Oral, QPM  immune globulin, human, 400 mg/kg (Adjusted), Intravenous, Daily  pyridostigmine, 30 mg, Oral, TID      Continuous IV Infusions:     PRN Meds:  acetaminophen, 650 mg, Oral, Q4H PRN  ondansetron, 4 mg, Intravenous, Q6H PRN        Discharge Plan: D    Network Utilization Review Department  ATTENTION: Please call with any questions or concerns to [phone] and carefully listen to the prompts so that you are directed to the right person  All voicemails are confidential   Alec Sim all requests for admission clinical reviews, approved or denied determinations and any other requests to dedicated fax number below belonging to the campus where the patient is receiving treatment   List of dedicated fax numbers for the Facilities:  [address] DENIALS (Administrative/Medical Necessity) [phone]   [address] (Maternity/NICU/Pediatrics) [phone]   [address] [place] [address] [address] [place] [address] [id number] [address] Jennifer Miles [address] [facility] [place] [address] [phone]

## 2021-06-09 NOTE — PROGRESS NOTES
[address] [facility]     Progress Note Rima Mejia 1960, 57 y o  male MRN: [id number]  Unit/Bed#: -Harshad Encounter: [id number]  Primary Care Provider: No primary care provider on file  Date and time admitted to hospital: 6/7/2021 11:07 AM    * Third nerve palsy  Assessment & Plan  · Patient presented to the ED with complaints of 5-6 days of visual disturbances with new onset of headache, frontal in nature  · CT head negative  · MRI brain negative for acute CVA  · Neurology consulted; feels could be concern for a 3rd nerve palsy  · Myasthenia antibodies pending  · Recommending 5 days of IVIG  Today is day 2   · CTA Head/Neck (6/8/21): No acute intracranial abnormality  Chronic infarct right insular cortex  Mild stenosis right P1 segment  No large vessel occlusion  · MRI Brain w/ contrast (6/8/21): No abnormal postcontrast enhancement  · Hemoglobin A1C noted 6 2 education provided  · FLP total cholesterol 192 ; Continue statin for now   · Continue ASA   · OOB with assistance     CAD (coronary artery disease)  Assessment & Plan  · Reports history of cardiac event the past on ASA  · Continue statin and ASA     VTE Pharmacologic Prophylaxis: VTE Score: 2 Low Risk (Score 0-2) - Encourage Ambulation  Patient Centered Rounds: I performed bedside rounds with nursing staff today  Discussions with Specialists or Other Care Team Provider: Case management, Neurology    Education and Discussions with Family / Patient: Attempted to update  (son) via phone  Left voicemail  Time Spent for Care: 20 minutes  More than 50% of total time spent on counseling and coordination of care as described above  Current Length of Stay: 0 day(s)  Current Patient Status: Inpatient   Certification Statement: The patient will continue to require additional inpatient hospital stay due to ongoing treatment in setting of third nerve palsy, day 2/5 of IVIG treatment    Discharge Plan: Anticipate discharge in 48-72 hrs to home  Code Status: Level 1 - Full Code    Subjective:   CC: "I feel fine today "    Patient resting in bed, denies complaints of chest pain, shortness of breath, fever, or chills  Patient still has some visual disturbances  Denies any headaches  Reports some lightheadedness on and off  Objective:     Vitals:   Temp (24hrs), Av °F (36 7 °C), Min:97 7 °F (36 5 °C), Max:98 1 °F (36 7 °C)    Temp:  [97 7 °F (36 5 °C)-98 1 °F (36 7 °C)] 98 °F (36 7 °C)  HR:  [63-70] 63  Resp:  [14-18] 18  BP: (105-134)/(53-77) 121/72  SpO2:  [93 %-95 %] 93 %  Body mass index is 41 47 kg/m²  Input and Output Summary (last 24 hours): Intake/Output Summary (Last 24 hours) at 2021 1443  Last data filed at 2021 0900  Gross per 24 hour   Intake 600 ml   Output --   Net 600 ml       Physical Exam:   Physical Exam  Vitals signs and nursing note reviewed  Constitutional:       Appearance: He is obese  Eyes:      General: Visual field deficit present  Cardiovascular:      Rate and Rhythm: Normal rate  Pulses: Normal pulses  Heart sounds: Normal heart sounds  Pulmonary:      Effort: Pulmonary effort is normal       Breath sounds: Normal breath sounds  Abdominal:      General: Bowel sounds are normal       Palpations: Abdomen is soft  Musculoskeletal: Normal range of motion  Skin:     General: Skin is warm and dry  Capillary Refill: Capillary refill takes less than 2 seconds  Neurological:      Mental Status: He is alert  Mental status is at baseline  Cranial Nerves: Facial asymmetry present     Psychiatric:         Mood and Affect: Mood normal           Additional Data:     Labs:  Results from last 7 days   Lab Units 21  0528 21  1135   WBC Thousand/uL 8 28 8 70   HEMOGLOBIN g/dL 14 6 15 3   HEMATOCRIT % 46 1 47 6   PLATELETS Thousands/uL 194 209   NEUTROS PCT %  --  65   LYMPHS PCT %  --  23   MONOS PCT %  --  7   EOS PCT %  --  4 Results from last 7 days   Lab Units 06/08/21  0528   SODIUM mmol/L 140   POTASSIUM mmol/L 4 2   CHLORIDE mmol/L 103   CO2 mmol/L 31   BUN mg/dL 16   CREATININE mg/dL 0 87   ANION GAP mmol/L 6   CALCIUM mg/dL 8 9   ALBUMIN g/dL 3 0*   TOTAL BILIRUBIN mg/dL 0 32   ALK PHOS U/L 84   ALT U/L 23   AST U/L 15   GLUCOSE RANDOM mg/dL 129     Results from last 7 days   Lab Units 06/07/21  1135   INR  0 94         Results from last 7 days   Lab Units 06/08/21  0528   HEMOGLOBIN A1C % 6 2*           Lines/Drains:  Invasive Devices     Peripheral Intravenous Line            Peripheral IV 06/08/21 Right Antecubital 1 day                Imaging: Reviewed radiology reports from this admission including: MRI brain    Recent Cultures (last 7 days):         Last 24 Hours Medication List:   Current Facility-Administered Medications   Medication Dose Route Frequency Provider Last Rate    acetaminophen  650 mg Oral Q4H PRN ViaMATEO MurrayNP      aspirin  81 mg Oral Daily ViaMARCELLA Murray      atorvastatin  40 mg Oral QPM MARCELLA Matos      immune globulin, human  400 mg/kg (Adjusted) Intravenous Daily Eddie Gordon MD      ondansetron  4 mg Intravenous Q6H PRN MARCELAL Matos      pyridostigmine  30 mg Oral TID Eddie Godron MD          Today, Patient Was Seen By: MARCELLA Cardoza    **Please Note: This note may have been constructed using a voice recognition system  **

## 2021-06-09 NOTE — MALNUTRITION/BMI
This medical record reflects one or more clinical indicators suggestive of malnutrition and/or morbid obesity  Malnutrition Findings:              BMI Findings:  Adult BMI Classifications: Morbid Obesity 40-44 9     Body mass index is 41 47 kg/m²  See Nutrition note dated 6/9/21 for additional details  Completed nutrition assessment is viewable in the nutrition documentation

## 2021-06-09 NOTE — PLAN OF CARE
Problem: PHYSICAL THERAPY ADULT  Goal: Performs mobility at highest level of function for planned discharge setting  See evaluation for individualized goals  Description: Treatment/Interventions: Functional transfer training, LE strengthening/ROM, Elevations, Therapeutic exercise, Endurance training, Patient/family training, Bed mobility, Gait training, Spoke to nursing, OT          See flowsheet documentation for full assessment, interventions and recommendations  Outcome: Progressing  Note: Prognosis: Good  Problem List: Decreased strength, Decreased endurance, Impaired balance, Decreased mobility, Decreased safety awareness, Impaired vision, Obesity, Pain  Assessment: pt began tx session lying supine in the bed and was agreeable to participate in PT intervention  pt continues to remain consistant with mod I for all bed mobility including perorming a supine<>sit EOB transfer with use of the bed rails to pukll up into the seated EOB position  while seated EOB pt was able to perorm dynamic seated balance activities with no LOB and good form throughout TE activities  progress was noted this tx session as pt was able to perform all transfers with a decraese level of assistance required /s  in todays tx session pt was able to increase ambulation distance to 250'x1 with min LOB (lateral sway) and /s  pt was able to complete 14 steps on a 6 inch step with no UE support safely with min VC's for foot placement only and /s throughout stait trials  pt would benefit from continued focus on OOB mobility with progression of ambulation and continuation of stair training as appropriate  post tx pt seated EOB with call bell in arms reach and bed alarm activated   Barriers to Discharge: Inaccessible home environment        PT Discharge Recommendation: Home with outpatient rehabilitation     PT - OK to Discharge: No    See flowsheet documentation for full assessment

## 2021-06-09 NOTE — ASSESSMENT & PLAN NOTE
· Patient presented to the ED with complaints of 5-6 days of visual disturbances with new onset of headache, frontal in nature  · CT head negative  · MRI brain negative for acute CVA  · Neurology consulted; feels could be concern for a 3rd nerve palsy  · Myasthenia antibodies pending  · Recommending 5 days of IVIG  Today is day 2   · CTA Head/Neck (6/8/21): No acute intracranial abnormality  Chronic infarct right insular cortex  Mild stenosis right P1 segment  No large vessel occlusion  · MRI Brain w/ contrast (6/8/21): No abnormal postcontrast enhancement    · Hemoglobin A1C noted 6 2 education provided  · FLP total cholesterol 192 ; Continue statin for now   · Continue ASA   · OOB with assistance

## 2021-06-09 NOTE — PHYSICAL THERAPY NOTE
PHYSICAL THERAPY NOTE          Patient Name: Rosalie Garcia  [id number] Date: 6/9/2021 06/09/21 1417   PT Last Visit   PT Visit Date 06/09/21   Note Type   Note Type Treatment   Pain Assessment   Pain Assessment Tool 0-10   Pain Score No Pain   Pain Location/Orientation Location: Head  (front of head )   Pain Radiating Towards eyes    Pain Onset/Description Descriptor: Pressure   Patient's Stated Pain Goal No pain   Hospital Pain Intervention(s) Repositioned; Ambulation/increased activity; Elevated; Emotional support; Rest   Multiple Pain Sites No   Restrictions/Precautions   Weight Bearing Precautions Per Order No   Braces or Orthoses Other (Comment)  (none per pt)   Other Precautions Chair Alarm; Bed Alarm; Fall Risk;Visual impairment   General   Chart Reviewed Yes   Response to Previous Treatment Patient with no complaints from previous session  Family/Caregiver Present No   Cognition   Overall Cognitive Status WFL   Arousal/Participation Alert; Responsive; Cooperative   Attention Within functional limits   Orientation Level Oriented X4   Memory Within functional limits   Following Commands Follows all commands and directions without difficulty   Comments pt is agreeable to participate in PT intervention    Subjective   Subjective pt states he feels good when he is walking  Bed Mobility   Supine to Sit 6  Modified independent   Additional items HOB elevated; Bedrails   Sit to Supine Unable to assess   Additional Comments pt seated EOB post tx session    Transfers   Sit to Stand 5  Supervision   Additional items Increased time required;Verbal cues   Stand to Sit 5  Supervision   Additional items Increased time required;Verbal cues   Additional Comments pt performed all functional transfers w/o an AD    Ambulation/Elevation   Gait pattern Short stride; Shuffling  (some lateral sway )   Gait Assistance 5  Supervision   Additional items Verbal cues   Assistive Device None   Distance 250'x1    Stair Management Assistance 5  Supervision   Additional items Increased time required   Stair Management Technique No rails   Number of Stairs 14   Balance   Static Sitting Good   Dynamic Sitting Good   Static Standing Fair +   Dynamic Standing Fair -   Ambulatory Fair -   Endurance Deficit   Endurance Deficit Yes   Activity Tolerance   Activity Tolerance Patient tolerated treatment well   Nurse Made Aware Spoke to RN Corinne Oilvia    Exercises   Heelslides Supine;20 reps;AROM; Bilateral   Hip Adduction Sitting;20 reps;AROM; Bilateral   Knee AROM Short Arc Quad Supine;20 reps;AROM; Bilateral   Knee AROM Long Arc Quad Sitting;20 reps;AROM; Bilateral   Ankle Pumps Sitting;20 reps;AROM; Bilateral   Marching Sitting;20 reps;AROM; Bilateral   Assessment   Prognosis Good   Problem List Decreased strength;Decreased endurance; Impaired balance;Decreased mobility; Decreased safety awareness; Impaired vision;Obesity;Pain   Assessment pt began tx session lying supine in the bed and was agreeable to participate in PT intervention  pt continues to remain consistant with mod I for all bed mobility including perorming a supine<>sit EOB transfer with use of the bed rails to pukll up into the seated EOB position  while seated EOB pt was able to perorm dynamic seated balance activities with no LOB and good form throughout TE activities  progress was noted this tx session as pt was able to perform all transfers with a decraese level of assistance required /s  in todays tx session pt was able to increase ambulation distance to 250'x1 with min LOB (lateral sway) and /s  pt was able to complete 14 steps on a 6 inch step with no UE support safely with min VC's for foot placement only and /s throughout stait trials  pt would benefit from continued focus on OOB mobility with progression of ambulation and continuation of stair training as appropriate   post tx pt seated EOB with call bell in arms reach and bed alarm activated    Barriers to Discharge Inaccessible home environment   Goals   STG Expiration Date 06/18/21   Plan   Treatment/Interventions Functional transfer training;LE strengthening/ROM; Elevations; Therapeutic exercise; Endurance training;Patient/family training;Equipment eval/education; Bed mobility;Gait training   PT Frequency 2-3x/wk   Recommendation   PT Discharge Recommendation Home with outpatient rehabilitation   PT - OK to Discharge No   AM-PAC Basic Mobility Inpatient   Turning in Bed Without Bedrails 4   Lying on Back to Sitting on Edge of Flat Bed 4   Moving Bed to Chair 3   Standing Up From Chair 4   Walk in Room 4   Climb 3-5 Stairs 4   Basic Mobility Inpatient Raw Score 23   Basic Mobility Standardized Score 50 88       Asad Ojeda, PTA

## 2021-06-09 NOTE — PROGRESS NOTES
Progress Note - Neurology   Francies Parent 57 y o  male [id number]  Unit/Bed#: /-01    Assessment/Plan:    * Third nerve palsy  Assessment & Plan  58 y/o male with HLD, CAD on aspirin, hx COVID, who presents with blurred vision x 5-6 days with new onset of headache x 24 hours  He presented to the ED for further evaluation  Etiology likely related to a neuromuscular disorder such as myasthenia gravis  Antibodies pending  Imaging negative thus far  Patient continue on IVIG  Plan:   Acetylcholine binding, blocking, and modulating and MUSK antibody pending   Continues on IVIG 400 mg/kg x5 days (Scheduled today for dose 2/5)   Continues on Mestinon 30 mg t i d    Aspirin 81 mg    Atorvastatin 40 mg   Normotension   PT/OT/ST   Frequent neuro checks  Continue to monitor and notify neurology with any changes   Medical management and supportive care per primary team  Correction of any metabolic or infectious disturbances  Results:  - CT head: No acute intracranial abnormality   - MRI brain wo contrast:  1  No acute intracranial pathology  2  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  Mild microangiopathy   - Lipid panel: cholesterol 192, triglycerides 139, HDL 50,   - TSH 1 674, A1C 6 2  - MRI brain with contrast: No abnormal postcontrast enhancement  - CTA head and neck:   1  No acute intracranial abnormality  Chronic infarct right insular cortex  2  Mild stenosis right P1 segment  No large vessel occlusion  CAD (coronary artery disease)  Assessment & Plan  - On aspirin at baseline  - Medical management per primary team    Patient will benefit from follow-up with neuromuscular specialist in 6 weeks  May possibly need EMG as outpatient  Subjective:   Patient was seen and examined in his room today resting comfortably in bed  Patient denies headache, dizziness or nausea  Patient reports that he feels the droopiness of his left eye is improving    Patient is status post 1 dose of IVIG  Past Medical History:   Diagnosis Date    Coronary artery disease     Hyperlipidemia      History reviewed  No pertinent surgical history  History reviewed  No pertinent family history  Social History     Socioeconomic History    Marital status: /Civil Union     Spouse name: None    Number of children: None    Years of education: None    Highest education level: None   Occupational History    None   Social Needs    Financial resource strain: None    Food insecurity     Worry: None     Inability: None    Transportation needs     Medical: None     Non-medical: None   Tobacco Use    Smoking status: Never Smoker    Smokeless tobacco: Never Used   Substance and Sexual Activity    Alcohol use: Not Currently    Drug use: Not Currently    Sexual activity: None   Lifestyle    Physical activity     Days per week: None     Minutes per session: None    Stress: None   Relationships    Social connections     Talks on phone: None     Gets together: None     Attends Christian service: None     Active member of club or organization: None     Attends meetings of clubs or organizations: None     Relationship status: None    Intimate partner violence     Fear of current or ex partner: None     Emotionally abused: None     Physically abused: None     Forced sexual activity: None   Other Topics Concern    None   Social History Narrative    None         Medications:   All current active meds have been reviewed and current meds:  Scheduled Meds:  Current Facility-Administered Medications   Medication Dose Route Frequency Provider Last Rate    acetaminophen  650 mg Oral Q4H PRN MARCELLA Almeida      aspirin  81 mg Oral Daily MARCELLA Almeida      atorvastatin  40 mg Oral QPM MARCELLA Almeida      immune globulin, human  400 mg/kg (Adjusted) Intravenous Daily Nazario Campbell MD      ondansetron  4 mg Intravenous Q6H PRN MARCELLA Almeida      pyridostigmine  30 mg Oral TID Diana Streeter MD       Continuous Infusions:   PRN Meds:   acetaminophen    ondansetron       ROS:   Review of Systems   Eyes: Positive for visual disturbance  Neurological: Positive for facial asymmetry, weakness and light-headedness  All other systems reviewed and are negative  Vitals:   /72   Pulse 63   Temp 98 °F (36 7 °C)   Resp 18   Ht 6' (1 829 m)   Wt (!) 139 kg (305 lb 12 5 oz)   SpO2 93%   BMI 41 47 kg/m²     Physical Exam:   Physical Exam  Vitals signs and nursing note reviewed  Constitutional:       General: He is not in acute distress  Appearance: Normal appearance  He is obese  He is not ill-appearing  HENT:      Head: Normocephalic and atraumatic  Right Ear: External ear normal       Left Ear: External ear normal       Nose: Nose normal       Mouth/Throat:      Mouth: Mucous membranes are moist    Eyes:      General: No scleral icterus  Extraocular Movements: Extraocular movements intact  Conjunctiva/sclera: Conjunctivae normal       Pupils: Pupils are equal, round, and reactive to light  Cardiovascular:      Rate and Rhythm: Normal rate  Pulses: Normal pulses  Pulmonary:      Effort: Pulmonary effort is normal  No respiratory distress  Musculoskeletal: Normal range of motion  General: No swelling, tenderness or signs of injury  Right lower leg: No edema  Left lower leg: No edema  Skin:     General: Skin is warm and dry  Capillary Refill: Capillary refill takes less than 2 seconds  Findings: No erythema or rash  Neurological:      General: No focal deficit present  Mental Status: He is alert and oriented to person, place, and time  Coordination: Finger-Nose-Finger Test normal       Gait: Gait is intact        Deep Tendon Reflexes: Strength normal       Comments: Detailed below   Psychiatric:         Mood and Affect: Mood normal          Speech: Speech normal        Neurologic Exam     Mental Status Oriented to person, place, and time  Follows 3 step commands  Attention: normal  Concentration: normal    Speech: speech is normal   Level of consciousness: alert  Able to name object  Cranial Nerves     CN III, IV, VI   Pupils are equal, round, and reactive to light  Right pupil: Size: 4 mm  Shape: regular  Left pupil: Size: 4 mm  Shape: regular  Upgaze: abnormal (in left eye)  Conjugate gaze: absent    CN V   Facial sensation intact  CN VIII   Hearing: intact    CN IX, X   CN IX normal    CN X normal      CN XI   CN XI normal      CN XII   CN XII normal    Bilateral ptosis L>R  Pupils 3-4 and equal   Slightly diminished EOMs in left eye  Smile symmetrical   Patient able to protrude tongue midline  Motor Exam   Muscle bulk: normal  Overall muscle tone: normal  Right arm pronator drift: absent  Left arm pronator drift: absent    Strength   Strength 5/5 throughout  Sensory Exam   Light touch normal      Gait, Coordination, and Reflexes     Gait  Gait: normal    Coordination   Finger to nose coordination: normal    Tremor   Resting tremor: absent  Intention tremor: absent    Labs: I have personally reviewed pertinent reports  No results found for this or any previous visit (from the past 24 hour(s))  Imaging: I have personally reviewed pertinent imaging in PACS, including MRI brain,  and I have personally reviewed PACS reports  EKG, Pathology, and Other Studies: I have personally reviewed pertinent reports  VTE Prophylaxis: Reason for no pharmacologic prophylaxis ambulating patient    Counseling / Coordination of Care  Total time spent today 30 minutes  Greater than 50% of total time was spent with the patient and/or family counseling and/or coordination of care  A description of the counseling/coordination of care:  Patient was seen and evaluated  Discussed with attending  Chart reviewed thoroughly including laboratory and imaging studies    Plan of care discussed with patient and primary team      **Please Note: This note may have been constructed using a voice recognition system  **

## 2021-06-09 NOTE — PLAN OF CARE
Problem: Potential for Falls  Goal: Patient will remain free of falls  Description: INTERVENTIONS:  - Assess patient frequently for physical needs  -  Identify cognitive and physical deficits and behaviors that affect risk of falls    -  [place] fall precautions as indicated by assessment   - Educate patient/family on patient safety including physical limitations  - Instruct patient to call for assistance with activity based on assessment  - Modify environment to reduce risk of injury  - Consider OT/PT consult to assist with strengthening/mobility  Outcome: Progressing     Problem: PAIN - ADULT  Goal: Verbalizes/displays adequate comfort level or baseline comfort level  Description: Interventions:  - Encourage patient to monitor pain and request assistance  - Assess pain using appropriate pain scale  - Administer analgesics based on type and severity of pain and evaluate response  - Implement non-pharmacological measures as appropriate and evaluate response  - Consider cultural and social influences on pain and pain management  - Notify physician/advanced practitioner if interventions unsuccessful or patient reports new pain  Outcome: Progressing     Problem: INFECTION - ADULT  Goal: Absence or prevention of progression during hospitalization  Description: INTERVENTIONS:  - Assess and monitor for signs and symptoms of infection  - Monitor lab/diagnostic results  - Monitor all insertion sites, i e  indwelling lines, tubes, and drains  - Monitor endotracheal if appropriate and nasal secretions for changes in amount and color  - [place] appropriate cooling/warming therapies per order  - Administer medications as ordered  - Instruct and encourage patient and family to use good hand hygiene technique  - Identify and instruct in appropriate isolation precautions for identified infection/condition  Outcome: Progressing  Goal: Absence of fever/infection during neutropenic period  Description: INTERVENTIONS:  - Monitor WBC    Outcome: Progressing     Problem: SAFETY ADULT  Goal: Patient will remain free of falls  Description: INTERVENTIONS:  - Assess patient frequently for physical needs  -  Identify cognitive and physical deficits and behaviors that affect risk of falls    -  [place] fall precautions as indicated by assessment   - Educate patient/family on patient safety including physical limitations  - Instruct patient to call for assistance with activity based on assessment  - Modify environment to reduce risk of injury  - Consider OT/PT consult to assist with strengthening/mobility  Outcome: Progressing  Goal: Maintain or return to baseline ADL function  Description: INTERVENTIONS:  -  Assess patient's ability to carry out ADLs; assess patient's baseline for ADL function and identify physical deficits which impact ability to perform ADLs (bathing, care of mouth/teeth, toileting, grooming, dressing, etc )  - Assess/evaluate cause of self-care deficits   - Assess range of motion  - Assess patient's mobility; develop plan if impaired  - Assess patient's need for assistive devices and provide as appropriate  - Encourage maximum independence but intervene and supervise when necessary  - Involve family in performance of ADLs  - Assess for home care needs following discharge   - Consider OT consult to assist with ADL evaluation and planning for discharge  - Provide patient education as appropriate  Outcome: Progressing  Goal: Maintain or return mobility status to optimal level  Description: INTERVENTIONS:  - Assess patient's baseline mobility status (ambulation, transfers, stairs, etc )    - Identify cognitive and physical deficits and behaviors that affect mobility  - Identify mobility aids required to assist with transfers and/or ambulation (gait belt, sit-to-stand, lift, walker, cane, etc )  - [place] fall precautions as indicated by assessment  - Record patient progress and toleration of activity level on Mobility SBAR; progress patient to next Phase/Stage  - Instruct patient to call for assistance with activity based on assessment  - Consider rehabilitation consult to assist with strengthening/weightbearing, etc   Outcome: Progressing     Problem: DISCHARGE PLANNING  Goal: Discharge to home or other facility with appropriate resources  Description: INTERVENTIONS:  - Identify barriers to discharge w/patient and caregiver  - Arrange for needed discharge resources and transportation as appropriate  - Identify discharge learning needs (meds, wound care, etc )  - Arrange for interpretive services to assist at discharge as needed  - Refer to Case Management Department for coordinating discharge planning if the patient needs post-hospital services based on physician/advanced practitioner order or complex needs related to functional status, cognitive ability, or social support system  Outcome: Progressing     Problem: Knowledge Deficit  Goal: Patient/family/caregiver demonstrates understanding of disease process, treatment plan, medications, and discharge instructions  Description: Complete learning assessment and assess knowledge base  Interventions:  - Provide teaching at level of understanding  - Provide teaching via preferred learning methods  Outcome: Progressing     Problem: Neurological Deficit  Goal: Neurological status is stable or improving  Description: Interventions:  - Monitor and assess patient's level of consciousness, motor function, sensory function, and level of assistance needed for ADLs  - Monitor and report changes from baseline  Collaborate with interdisciplinary team to initiate plan and implement interventions as ordered  - Provide and maintain a safe environment  - Consider seizure precautions  - Consider fall precautions  - Consider aspiration precautions  - Consider bleeding precautions  Outcome: Progressing     Problem:  Activity Intolerance/Impaired Mobility  Goal: Mobility/activity is maintained at optimum level for patient  Description: Interventions:  - Assess and monitor patient  barriers to mobility and need for assistive/adaptive devices  - Assess patient's emotional response to limitations  - Collaborate with interdisciplinary team and initiate plans and interventions as ordered  - Encourage independent activity per ability   - Maintain proper body alignment  - Perform active/passive rom as tolerated/ordered  - Plan activities to conserve energy   - Turn patient as appropriate  Outcome: Progressing     Problem: Communication Impairment  Goal: Ability to express needs and understand communication  Description: Assess patient's communication skills and ability to understand information  Patient will demonstrate use of effective communication techniques, alternative methods of communication and understanding even if not able to speak  - Encourage communication and provide alternate methods of communication as needed  - Collaborate with case management/ for discharge needs  - Include patient/family/caregiver in decisions related to communication  Outcome: Progressing     Problem: Potential for Aspiration  Goal: Non-ventilated patient's risk of aspiration is minimized  Description: Assess and monitor vital signs, respiratory status, and labs (WBC)  Monitor for signs of aspiration (tachypnea, cough, rales, wheezing, cyanosis, fever)  - Assess and monitor patient's ability to swallow  - Place patient up in chair to eat if possible  - HOB up at 90 degrees to eat if unable to get patient up into chair   - Supervise patient during oral intake  - Instruct patient/ family to take small bites  - Instruct patient/ family to take small single sips when taking liquids    - Follow patient-specific strategies generated by speech pathologist   Outcome: Progressing  Goal: Ventilated patient's risk of aspiration is minimized  Description: Assess and monitor vital signs, respiratory status, airway cuff pressure, and labs (WBC)  Monitor for signs of aspiration (tachypnea, cough, rales, wheezing, cyanosis, fever)  - Elevate head of bed 30 degrees if patient has tube feeding   - Monitor tube feeding  Outcome: Progressing     Problem: Nutrition  Goal: Nutrition/Hydration status is improving  Description: Monitor and assess patient's nutrition/hydration status for malnutrition (ex- brittle hair, bruises, dry skin, pale skin and conjunctiva, muscle wasting, smooth red tongue, and disorientation)  Collaborate with interdisciplinary team and initiate plan and interventions as ordered  Monitor patient's weight and dietary intake as ordered or per policy  Utilize nutrition screening tool and intervene per policy  Determine patient's food preferences and provide high-protein, high-caloric foods as appropriate  - Assist patient with eating   - Allow adequate time for meals   - Encourage patient to take dietary supplement as ordered  - Collaborate with clinical nutritionist   - Include patient/family/caregiver in decisions related to nutrition    Outcome: Progressing

## 2021-06-10 ENCOUNTER — APPOINTMENT (INPATIENT)
Dept: CT IMAGING | Facility: HOSPITAL | Age: 61
DRG: 057 | End: 2021-06-10

## 2021-06-10 PROBLEM — E66.01 MORBID OBESITY (HCC): Status: ACTIVE | Noted: 2021-06-10

## 2021-06-10 PROCEDURE — G1004 CDSM NDSC: HCPCS

## 2021-06-10 PROCEDURE — 97116 GAIT TRAINING THERAPY: CPT

## 2021-06-10 PROCEDURE — 99232 SBSQ HOSP IP/OBS MODERATE 35: CPT | Performed by: NURSE PRACTITIONER

## 2021-06-10 PROCEDURE — 71260 CT THORAX DX C+: CPT

## 2021-06-10 PROCEDURE — 99233 SBSQ HOSP IP/OBS HIGH 50: CPT | Performed by: PSYCHIATRY & NEUROLOGY

## 2021-06-10 RX ORDER — PREDNISONE 20 MG/1
60 TABLET ORAL DAILY
Status: DISCONTINUED | OUTPATIENT
Start: 2021-06-10 | End: 2021-06-13 | Stop reason: HOSPADM

## 2021-06-10 RX ORDER — PYRIDOSTIGMINE BROMIDE 60 MG/1
60 TABLET ORAL 3 TIMES DAILY
Status: DISCONTINUED | OUTPATIENT
Start: 2021-06-10 | End: 2021-06-13 | Stop reason: HOSPADM

## 2021-06-10 RX ADMIN — ASPIRIN 81 MG CHEWABLE TABLET 81 MG: 81 TABLET CHEWABLE at 08:02

## 2021-06-10 RX ADMIN — ATORVASTATIN CALCIUM 40 MG: 40 TABLET, FILM COATED ORAL at 17:22

## 2021-06-10 RX ADMIN — PYRIDOSTIGMINE BROMIDE 60 MG: 60 TABLET ORAL at 22:38

## 2021-06-10 RX ADMIN — PREDNISONE 60 MG: 20 TABLET ORAL at 12:22

## 2021-06-10 RX ADMIN — PYRIDOSTIGMINE BROMIDE 30 MG: 60 TABLET ORAL at 08:02

## 2021-06-10 RX ADMIN — GLYCERIN, HYPROMELLOSE, POLYETHYLENE GLYCOL 1 DROP: .2; .2; 1 LIQUID OPHTHALMIC at 20:55

## 2021-06-10 RX ADMIN — PYRIDOSTIGMINE BROMIDE 60 MG: 60 TABLET ORAL at 17:22

## 2021-06-10 RX ADMIN — Medication 40 G: at 15:34

## 2021-06-10 RX ADMIN — IOHEXOL 85 ML: 350 INJECTION, SOLUTION INTRAVENOUS at 18:27

## 2021-06-10 NOTE — ASSESSMENT & PLAN NOTE
Morbid obesity due to excess calories, as evidenced by a BMI of 41 47, requiring an evaluation and diet education by a dietician  Lifestyle modifications

## 2021-06-10 NOTE — PROGRESS NOTES
[facility]  Progress Note Vijaya Brooks 1960, 57 y o  male MRN: [id number]  Unit/Bed#: -01 Encounter: [id number]  Primary Care Provider: No primary care provider on file  Date and time admitted to hospital: 6/7/2021 11:07 AM    * Third nerve palsy  Assessment & Plan  · Patient presented to the ED with complaints of 5-6 days of visual disturbances with new onset of headache, frontal in nature  · Neurology consulted; concern for a 3rd nerve palsy  · Myasthenia antibodies pending  · Acetylcholine binding, blocking, and modulating and MUSK antibody pending  · Continues on IVIG 400 mg/kg x5 days (Scheduled today for dose 3/5)  · Improving ptosis on left  Increase mestinon to 60 mg, TID  · CTA Head/Neck (6/8/21): No acute intracranial abnormality  Chronic infarct right insular cortex  Mild stenosis right P1 segment  No large vessel occlusion  CT head negative  · MRI brain negative for acute CVA  · MRI Brain w/ contrast (6/8/21): No abnormal postcontrast enhancement  · Hemoglobin A1C noted 6 2 education provided  · FLP total cholesterol 192 ; Continue statin for now   · Continue ASA     CAD (coronary artery disease)  Assessment & Plan  · Reports history of cardiac event the past on ASA  · Continue statin and ASA      VTE Pharmacologic Prophylaxis:   Pharmacologic: Low risk ambulate  Mechanical VTE Prophylaxis in Place: Yes    Patient Centered Rounds: I have performed bedside rounds with nursing staff today  Discussions with Specialists or Other Care Team Provider:  Reviewed previous provider notes reviewed neurology notes discussed with case management and  primary RN    Education and Discussions with Family / Patient:  Discussed plan of care patient denies any additional questions or concerns at this time    Time Spent for Care: 20 minutes  More than 50% of total time spent on counseling and coordination of care as described above      Current Length of Stay: 1 day(s)    Current Patient Status: Inpatient   Certification Statement: The patient will continue to require additional inpatient hospital stay due to Management of 3rd nerve palsy, pending myasthenia gravis workup and IVIG    Discharge Plan / Estimated Discharge Date:   48 hours      Code Status: Level 1 - Full Code      Subjective:   Denies any chest pain chest tightness shortness of breath or difficulty breathing  Is ambulating without difficulty and reports improvement of his vision    Objective:     Vitals:   Temp (24hrs), Av 2 °F (36 8 °C), Min:98 1 °F (36 7 °C), Max:98 3 °F (36 8 °C)    Temp:  [98 1 °F (36 7 °C)-98 3 °F (36 8 °C)] 98 3 °F (36 8 °C)  HR:  [64-67] 65  Resp:  [18] 18  BP: (122-123)/(73) 122/73  SpO2:  [89 %-94 %] 91 %  Body mass index is 41 47 kg/m²  Input and Output Summary (last 24 hours): Intake/Output Summary (Last 24 hours) at 6/10/2021 1000  Last data filed at 6/10/2021 0900  Gross per 24 hour   Intake 960 ml   Output --   Net 960 ml       Physical Exam:     Physical Exam  Vitals signs and nursing note reviewed  Constitutional:       Appearance: He is ill-appearing  Cardiovascular:      Rate and Rhythm: Normal rate  Pulses: Normal pulses  Pulmonary:      Breath sounds: Normal breath sounds  Musculoskeletal: Normal range of motion  Skin:     General: Skin is warm and dry  Neurological:      General: No focal deficit present  Mental Status: He is alert  Mental status is at baseline  Psychiatric:         Mood and Affect: Mood normal          Thought Content:  Thought content normal          Judgment: Judgment normal          Additional Data:     Labs:    Results from last 7 days   Lab Units 21  0528 21  1135   WBC Thousand/uL 8 28 8 70   HEMOGLOBIN g/dL 14 6 15 3   HEMATOCRIT % 46 1 47 6   PLATELETS Thousands/uL 194 209   NEUTROS PCT %  --  65   LYMPHS PCT %  --  23   MONOS PCT %  --  7   EOS PCT %  --  4     Results from last 7 days   Lab Units 06/08/21  0528   POTASSIUM mmol/L 4 2   CHLORIDE mmol/L 103   CO2 mmol/L 31   BUN mg/dL 16   CREATININE mg/dL 0 87   CALCIUM mg/dL 8 9   ALK PHOS U/L 84   ALT U/L 23   AST U/L 15     Results from last 7 days   Lab Units 06/07/21  1135   INR  0 94       * I Have Reviewed All Lab Data Listed Above  * Additional Pertinent Lab Tests Reviewed: [facility]    Recent Cultures (last 7 days):           Last 24 Hours Medication List:   Current Facility-Administered Medications   Medication Dose Route Frequency Provider Last Rate    acetaminophen  650 mg Oral Q4H PRN Sharlet Glaze, CRNP      aspirin  81 mg Oral Daily Sharlet Glaze, CRNP      atorvastatin  40 mg Oral QPM Sharlet Glaze, CRNP      immune globulin, human  400 mg/kg (Adjusted) Intravenous Daily Jaci Pat  8 mL/hr at 06/09/21 1710    ondansetron  4 mg Intravenous Q6H PRN Sharlet Glaze, CRNP      pyridostigmine  30 mg Oral TID Jaci Pat MD          Today, Patient Was Seen By: MARCELLA Manley    ** Please Note: Dragon 360 Dictation voice to text software may have been used in the creation of this document   **

## 2021-06-10 NOTE — PROGRESS NOTES
Progress Note - Neurology   Atiya Marcano 57 y o  male [id number]  Unit/Bed#: /-01    Assessment:    * Third nerve palsy  Assessment & Plan  58 y/o male with HLD, CAD on aspirin, hx COVID, who presents with blurred vision x 5-6 days with new onset of headache x 24 hours  He presented to the ED for further evaluation  On exam, patient with bilateral ptosis (L>R) and dysconjugate gaze  He is reporting double and blurred vision  Etiology likely related to a neuromuscular disorder such as myasthenia gravis  Antibodies pending  Imaging negative thus far  Plan:   Acetylcholine binding, blocking, and modulating and MUSK antibody pending   Continues on IVIG 400 mg/kg x5 days (today is 3/5)   Increase Mestinon to 60 mg t i d    Initiate prednisone 60 mg daily   CT chest with contrast for evaluation of thymoma   Normotension   PT/OT/ST   Frequent neuro checks  Continue to monitor and notify neurology with any changes   Medical management and supportive care per primary team  Correction of any metabolic or infectious disturbances  Results:  - CT head: No acute intracranial abnormality   - MRI brain wo contrast:  1  No acute intracranial pathology  2  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  Mild microangiopathy   - Lipid panel: cholesterol 192, triglycerides 139, HDL 50,   - TSH 1 674, A1C 6 2  - MRI brain with contrast: No abnormal postcontrast enhancement  - CTA head and neck:   1  No acute intracranial abnormality  Chronic infarct right insular cortex  2  Mild stenosis right P1 segment  No large vessel occlusion   - TTE: Ejection fraction was estimated in the range of 40 % to 45 %, moderate hypokinesis of the apical wall, Wall thickness was at the upper limits of normal  Abnormal left ventricular relaxation (grade 1 diastolic dysfunction)  LA mildly dilated       CAD (coronary artery disease)  Assessment & Plan  - On aspirin at baseline  - Medical management per primary team       Saran Parent will need follow up in in 4 weeks with neuromuscular attending  He will not require outpatient neurological testing  Case and treatment plan reviewed with attending neurologist, Dr Steve Valdivia  Subjective:   Patient sitting in the recliner  He reports that he feels his left eyelid droop is slightly improved  He is asking for hydrops for his eyes due to dryness  He continues to have double and blurred vision  Past Medical History:   Diagnosis Date    Coronary artery disease     Hyperlipidemia      History reviewed  No pertinent surgical history  History reviewed  No pertinent family history  Social History     Socioeconomic History    Marital status: /Civil Union     Spouse name: None    Number of children: None    Years of education: None    Highest education level: None   Occupational History    None   Social Needs    Financial resource strain: None    Food insecurity     Worry: None     Inability: None    Transportation needs     Medical: None     Non-medical: None   Tobacco Use    Smoking status: Never Smoker    Smokeless tobacco: Never Used   Substance and Sexual Activity    Alcohol use: Not Currently    Drug use: Not Currently    Sexual activity: None   Lifestyle    Physical activity     Days per week: None     Minutes per session: None    Stress: None   Relationships    Social connections     Talks on phone: None     Gets together: None     Attends Baptism service: None     Active member of club or organization: None     Attends meetings of clubs or organizations: None     Relationship status: None    Intimate partner violence     Fear of current or ex partner: None     Emotionally abused: None     Physically abused: None     Forced sexual activity: None   Other Topics Concern    None   Social History Narrative    None         Medications:   All current active meds have been reviewed and current meds:  Scheduled Meds:  Current Facility-Administered Medications   Medication Dose Route Frequency Provider Last Rate    acetaminophen  650 mg Oral Q4H PRN Geovani Bras, CRNP      aspirin  81 mg Oral Daily Geovani Bras, CRNP      atorvastatin  40 mg Oral QPM Geovani Bras, CRNP      immune globulin, human  400 mg/kg (Adjusted) Intravenous Daily Maria Guadalupe Melgar  8 mL/hr at 06/09/21 1710    ondansetron  4 mg Intravenous Q6H PRN Geovani Bras, CRNP      predniSONE  60 mg Oral Daily An Jean-Paul, MARCELLA      pyridostigmine  60 mg Oral TID MARCELLA Morrison       Continuous Infusions:   PRN Meds:   acetaminophen    ondansetron       ROS:   Review of Systems   Constitutional: Negative for fever  HENT: Negative for trouble swallowing  Eyes: Positive for visual disturbance  Negative for photophobia  Respiratory: Negative for shortness of breath  Cardiovascular: Negative for chest pain  Gastrointestinal: Negative for abdominal pain  Musculoskeletal: Negative for arthralgias, back pain, myalgias and neck pain  Skin: Negative for rash  Neurological: Positive for facial asymmetry  Negative for dizziness, tremors, seizures, syncope, speech difficulty, weakness, light-headedness, numbness and headaches  Psychiatric/Behavioral: Negative for confusion  All other systems reviewed and are negative  Vitals:   /73   Pulse 65   Temp 98 3 °F (36 8 °C)   Resp 18   Ht 6' (1 829 m)   Wt (!) 139 kg (305 lb 12 5 oz)   SpO2 91%   BMI 41 47 kg/m²       Physical Exam:   Physical Exam  Vitals signs and nursing note reviewed  Constitutional:       General: He is not in acute distress  Appearance: Normal appearance  He is obese  He is not ill-appearing  HENT:      Head: Normocephalic  Mouth/Throat:      Mouth: Mucous membranes are dry  Pharynx: Oropharynx is clear  Eyes:      General: No scleral icterus  Right eye: No discharge  Left eye: No discharge        Conjunctiva/sclera: Conjunctivae normal    Neck:      Musculoskeletal: Normal range of motion  Cardiovascular:      Rate and Rhythm: Normal rate  Pulmonary:      Effort: Pulmonary effort is normal  No respiratory distress  Musculoskeletal: Normal range of motion  Skin:     General: Skin is warm and dry  Coloration: Skin is not jaundiced or pale  Neurological:      Mental Status: He is alert and oriented to person, place, and time  Coordination: Finger-Nose-Finger Test normal    Psychiatric:         Mood and Affect: Mood normal          Speech: Speech normal          Behavior: Behavior normal          Thought Content: Thought content normal          Judgment: Judgment normal        Neurologic Exam     Mental Status   Oriented to person, place, and time  Attention: normal  Concentration: normal    Speech: speech is normal   Level of consciousness: alert  Able to follow commands appropriately  No dysarthria noted  Cranial Nerves     CN II   Right visual field deficit: none  Left visual field deficit: none     CN V   Facial sensation intact  CN VIII   CN VIII normal      CN IX, X   Palate: symmetric    CN XI   CN XI normal      CN XII   CN XII normal    Restricted left up gaze  Dysconjugate gaze  Bilateral ptosis (L>R)     Motor Exam   Muscle bulk: normal  Bilateral upper extremity strength 5/5 deltoid, biceps, triceps, hand   Bilateral lower extremity strength 5/5 hip flexion, dorsiflexion, plantar flexion     Sensory Exam   Light touch normal      Gait, Coordination, and Reflexes     Coordination   Finger to nose coordination: normal    Tremor   Resting tremor: absent  Intention tremor: absent          Labs: I have personally reviewed pertinent reports  No results found for this or any previous visit (from the past 24 hour(s))  Imaging: I have personally reviewed pertinent imaging in PACS, and I have personally reviewed PACS reports       EKG, Pathology, and Other Studies: I have personally reviewed pertinent reports  VTE Prophylaxis: Reason for no pharmacologic prophylaxis Patient ambulating        Total time spent today 31 minutes  Greater than 50% of total time was spent with the patient and / or family counseling and / or coordination of care  A description of the counseling / coordination of care: Patient seen and evaluated  Case reviewed with attending  Chart thoroughly reviewed including labs and imaging  Coordination of care with SLIM AP  Discussion of imaging, medications, use of eye patch and follow-up with patient

## 2021-06-10 NOTE — PHYSICAL THERAPY NOTE
06/10/21 1347   PT Last Visit   PT Visit Date 06/10/21   Note Type   Note Type Treatment   Pain Assessment   Pain Assessment Tool Pain Assessment not indicated - pt denies pain   Restrictions/Precautions   Weight Bearing Precautions Per Order No   Other Precautions Chair Alarm; Bed Alarm; Fall Risk;Visual impairment  (wears eye patch L eye)   General   Chart Reviewed Yes   Family/Caregiver Present No   Cognition   Overall Cognitive Status WFL   Arousal/Participation Alert; Cooperative   Attention Within functional limits   Orientation Level Oriented X4   Memory Within functional limits   Following Commands Follows all commands and directions without difficulty   Comments pt agreed to PT session   Subjective   Subjective "I would like to walk  It feels good to move  I wear the eye patch because of double vision, but that is getting better now "   Bed Mobility   Supine to Sit 6  Modified independent   Additional items HOB elevated; Bedrails   Additional Comments pt sat at EOB to end session, all needs in reach   Transfers   Sit to Stand 5  Supervision   Additional items Increased time required;Verbal cues   Stand to Sit 5  Supervision   Additional items Increased time required;Verbal cues   Additional Comments pt performed all mobility without AD   Ambulation/Elevation   Gait pattern Shuffling; Short stride  (some lateral sway)   Gait Assistance 5  Supervision   Additional items Assist x 1;Verbal cues   Assistive Device None   Distance 500 feet   Stair Management Assistance 5  Supervision   Additional items Assist x 1; Increased time required   Stair Management Technique One rail L;Alternating pattern   Number of Stairs 14   Balance   Static Sitting Good   Dynamic Sitting Good   Static Standing Fair +   Dynamic Standing Fair -   Ambulatory Fair -   Endurance Deficit   Endurance Deficit Yes   Activity Tolerance   Activity Tolerance Patient tolerated treatment well   Nurse Made Aware ELINOR Randle   Assessment   Prognosis Good   Problem List Decreased strength;Decreased endurance; Impaired balance;Decreased mobility; Decreased safety awareness; Impaired vision;Obesity   Assessment Pt seen for PT treatment session this date with interventions consisting of gait training w/ emphasis on improving pt's ability to ambulate level surfaces x 500 feet with close S provided by therapist with no AD and stair training x 14 stairs 1 rail S x 1 step over step pattern  Pt reports performing LE ex independently in his room during the day  Pt agreeable to PT treatment session upon arrival, pt found supine in bed w/ HOB elevated, in no apparent distress  In comparison to previous session, pt with improvements in amb distance  Post session: pt returned BTB and all needs in reach  Continue to recommend home with outpatient rehabilitation at time of d/c in order to maximize pt's functional independence and safety w/ mobility  Pt continues to be functioning below baseline level, and remains limited 2* factors listed above and including decreased strength, endurance & safe functional mobility  PT will continue to see pt during current hospitalization in order to address the deficits listed above and provide interventions consistent w/ POC in effort to achieve STGs  Barriers to Discharge Comments pt safely ascended/descended 14 stairs   Goals   Patient Goals to go home soon   PT Treatment Day 2   Plan   Treatment/Interventions Functional transfer training;LE strengthening/ROM; Elevations; Therapeutic exercise; Endurance training;Patient/family training;Equipment eval/education; Bed mobility;Gait training;Spoke to nursing   Progress Progressing toward goals   PT Frequency 2-3x/wk   Recommendation   PT Discharge Recommendation Home with outpatient rehabilitation   PT - OK to Discharge Yes  (when med cleared)   AM-PAC Basic Mobility Inpatient   Turning in Bed Without Bedrails 4   Lying on Back to Sitting on Edge of Flat Bed 4   Moving Bed to Chair 3   Standing Up From Chair 4   Walk in Room 4   Climb 3-5 Stairs 3   Basic Mobility Inpatient Raw Score 22   Basic Mobility Standardized Score 47 4   The patient's AM-PAC Basic Mobility Inpatient Short Form Raw Score is 22, Standardized Score is 47 4  A standardized score greater than 42 9 suggests the patient may benefit from discharge to home with OPPT   Please also refer to the recommendation of the Physical Therapist for safe discharge planning as discussed with Physical Therapist   Ramez Carbajal PTA

## 2021-06-10 NOTE — PLAN OF CARE
Problem: PHYSICAL THERAPY ADULT  Goal: Performs mobility at highest level of function for planned discharge setting  See evaluation for individualized goals  Description: Treatment/Interventions: Functional transfer training, LE strengthening/ROM, Elevations, Therapeutic exercise, Endurance training, Patient/family training, Bed mobility, Gait training, Spoke to nursing, OT          See flowsheet documentation for full assessment, interventions and recommendations  Outcome: Progressing  Note: Prognosis: Good  Problem List: Decreased strength, Decreased endurance, Impaired balance, Decreased mobility, Decreased safety awareness, Impaired vision, Obesity  Assessment: Pt seen for PT treatment session this date with interventions consisting of gait training w/ emphasis on improving pt's ability to ambulate level surfaces x 500 feet with close S provided by therapist with no AD and stair training x 14 stairs 1 rail S x 1 step over step pattern  Pt reports performing LE ex independently in his room during the day  Pt agreeable to PT treatment session upon arrival, pt found supine in bed w/ HOB elevated, in no apparent distress  In comparison to previous session, pt with improvements in amb distance  Post session: pt returned BTB and all needs in reach  Continue to recommend home with outpatient rehabilitation at time of d/c in order to maximize pt's functional independence and safety w/ mobility  Pt continues to be functioning below baseline level, and remains limited 2* factors listed above and including decreased strength, endurance & safe functional mobility  PT will continue to see pt during current hospitalization in order to address the deficits listed above and provide interventions consistent w/ POC in effort to achieve STGs    Barriers to Discharge: Inaccessible home environment  Barriers to Discharge Comments: pt safely ascended/descended 14 stairs     PT Discharge Recommendation: Home with outpatient rehabilitation     PT - OK to Discharge: Yes(when med cleared)    See flowsheet documentation for full assessment

## 2021-06-10 NOTE — ASSESSMENT & PLAN NOTE
· Patient presented to the ED with complaints of 5-6 days of visual disturbances with new onset of headache, frontal in nature  · Neurology consulted; concern for a 3rd nerve palsy  · Myasthenia antibodies pending  · Acetylcholine binding, blocking, and modulating and MUSK antibody pending  · Continues on IVIG 400 mg/kg x5 days (Scheduled today for dose 3/5)  · Improving ptosis on left  Increase mestinon to 60 mg, TID  · CTA Head/Neck (6/8/21): No acute intracranial abnormality  Chronic infarct right insular cortex  Mild stenosis right P1 segment  No large vessel occlusion  CT head negative  · MRI brain negative for acute CVA  · MRI Brain w/ contrast (6/8/21): No abnormal postcontrast enhancement    · Hemoglobin A1C noted 6 2 education provided  · FLP total cholesterol 192 ; Continue statin for now   · Continue ASA

## 2021-06-11 LAB
ANION GAP SERPL CALCULATED.3IONS-SCNC: 7 MMOL/L (ref 4–13)
BUN SERPL-MCNC: 15 MG/DL (ref 5–25)
CALCIUM SERPL-MCNC: 9 MG/DL (ref 8.3–10.1)
CHLORIDE SERPL-SCNC: 101 MMOL/L (ref 100–108)
CO2 SERPL-SCNC: 30 MMOL/L (ref 21–32)
CREAT SERPL-MCNC: 1 MG/DL (ref 0.6–1.3)
GFR SERPL CREATININE-BSD FRML MDRD: 80 ML/MIN/1.73SQ M
GLUCOSE SERPL-MCNC: 123 MG/DL (ref 65–140)
POTASSIUM SERPL-SCNC: 3.6 MMOL/L (ref 3.5–5.3)
SODIUM SERPL-SCNC: 138 MMOL/L (ref 136–145)

## 2021-06-11 PROCEDURE — 80048 BASIC METABOLIC PNL TOTAL CA: CPT | Performed by: NURSE PRACTITIONER

## 2021-06-11 PROCEDURE — 99232 SBSQ HOSP IP/OBS MODERATE 35: CPT | Performed by: NURSE PRACTITIONER

## 2021-06-11 RX ADMIN — ASPIRIN 81 MG CHEWABLE TABLET 81 MG: 81 TABLET CHEWABLE at 09:20

## 2021-06-11 RX ADMIN — GLYCERIN, HYPROMELLOSE, POLYETHYLENE GLYCOL 1 DROP: .2; .2; 1 LIQUID OPHTHALMIC at 16:30

## 2021-06-11 RX ADMIN — PYRIDOSTIGMINE BROMIDE 60 MG: 60 TABLET ORAL at 21:10

## 2021-06-11 RX ADMIN — GLYCERIN, HYPROMELLOSE, POLYETHYLENE GLYCOL 1 DROP: .2; .2; 1 LIQUID OPHTHALMIC at 07:32

## 2021-06-11 RX ADMIN — GLYCERIN, HYPROMELLOSE, POLYETHYLENE GLYCOL 1 DROP: .2; .2; 1 LIQUID OPHTHALMIC at 21:10

## 2021-06-11 RX ADMIN — PYRIDOSTIGMINE BROMIDE 60 MG: 60 TABLET ORAL at 16:30

## 2021-06-11 RX ADMIN — PYRIDOSTIGMINE BROMIDE 60 MG: 60 TABLET ORAL at 09:20

## 2021-06-11 RX ADMIN — PREDNISONE 60 MG: 20 TABLET ORAL at 09:20

## 2021-06-11 RX ADMIN — Medication 40 G: at 16:50

## 2021-06-11 RX ADMIN — ENOXAPARIN SODIUM 40 MG: 40 INJECTION SUBCUTANEOUS at 12:33

## 2021-06-11 RX ADMIN — ATORVASTATIN CALCIUM 40 MG: 40 TABLET, FILM COATED ORAL at 17:23

## 2021-06-11 NOTE — ASSESSMENT & PLAN NOTE
· Patient presented to the ED with complaints of 5-6 days of visual disturbances with new onset of headache, frontal in nature  · Neurology consulted; concern for a 3rd nerve palsy  · Myasthenia antibodies pending  · Acetylcholine binding, blocking, and modulating and MUSK antibody pending  · Continues on IVIG 400 mg/kg x5 days (Scheduled today for dose 4/5)  · Improving ptosis on left  Increase mestinon to 60 mg, TID  · prednisone 60 mg daily  · CTA Head/Neck (6/8/21): No acute intracranial abnormality  Chronic infarct right insular cortex  Mild stenosis right P1 segment  No large vessel occlusion  CT head negative  · MRI brain negative for acute CVA  · MRI Brain w/ contrast (6/8/21): No abnormal postcontrast enhancement    · Hemoglobin A1C noted 6 2 education provided  · FLP total cholesterol 192 ; Continue statin for now   · Continue ASA

## 2021-06-11 NOTE — PROGRESS NOTES
[address] [facility]  Progress Note Diamond Dillonvereign 1960, 57 y o  male MRN: [id number]  Unit/Bed#: -[facility] Encounter: [id number]  Primary Care Provider: No primary care provider on file  Date and time admitted to hospital: 6/7/2021 11:07 AM    * Third nerve palsy  Assessment & Plan  · Patient presented to the ED with complaints of 5-6 days of visual disturbances with new onset of headache, frontal in nature  · Neurology consulted; concern for a 3rd nerve palsy  · Myasthenia antibodies pending  · Acetylcholine binding, blocking, and modulating and MUSK antibody pending  · Continues on IVIG 400 mg/kg x5 days (Scheduled today for dose 4/5)  · Improving ptosis on left  Increase mestinon to 60 mg, TID  · prednisone 60 mg daily  · CTA Head/Neck (6/8/21): No acute intracranial abnormality  Chronic infarct right insular cortex  Mild stenosis right P1 segment  No large vessel occlusion  CT head negative  · MRI brain negative for acute CVA  · MRI Brain w/ contrast (6/8/21): No abnormal postcontrast enhancement  · Hemoglobin A1C noted 6 2 education provided  · FLP total cholesterol 192 ; Continue statin for now   · Continue ASA     Morbid obesity ([facility] [address] )  Assessment & Plan  Morbid obesity due to excess calories, as evidenced by a BMI of 41 47, requiring an evaluation and diet education by a dietician  Lifestyle modifications    CAD (coronary artery disease)  Assessment & Plan  · Reports history of cardiac event the past on ASA  · Continue statin and ASA          VTE Pharmacologic Prophylaxis:   Pharmacologic: Enoxaparin (Lovenox)  Mechanical VTE Prophylaxis in Place: Yes    Patient Centered Rounds: I have performed bedside rounds with nursing staff today  Discussions with Specialists or Other Care Team Provider:  Reviewed neurology notes discussed with case management primary RN    Education and Discussions with Family / Patient: not available    Time Spent for Care: 20 minutes    More than 50% of total time spent on counseling and coordination of care as described above  Current Length of Stay: 2 day(s)    Current Patient Status: Inpatient   Certification Statement: The patient will continue to require additional inpatient hospital stay due to IVIG, prednisone    Discharge Plan / Estimated Discharge Date: tomorrow      Code Status: Level 1 - Full Code      Subjective: Any chest pain chest tightness shortness of breath or difficulty breathing  Reports blurred vision with continued improvement  Objective:     Vitals:   Temp (24hrs), Av 9 °F (36 6 °C), Min:97 8 °F (36 6 °C), Max:98 °F (36 7 °C)    Temp:  [97 8 °F (36 6 °C)-98 °F (36 7 °C)] 97 9 °F (36 6 °C)  HR:  [74-88] 88  Resp:  [18-20] 18  BP: (106-131)/(55-69) 127/69  SpO2:  [87 %-98 %] 98 %  Body mass index is 41 47 kg/m²  Input and Output Summary (last 24 hours): Intake/Output Summary (Last 24 hours) at 2021 1015  Last data filed at 2021 0825  Gross per 24 hour   Intake 720 ml   Output --   Net 720 ml       Physical Exam:     Physical Exam  Vitals signs and nursing note reviewed  Cardiovascular:      Rate and Rhythm: Normal rate  Pulses: Normal pulses  Pulmonary:      Effort: Pulmonary effort is normal    Musculoskeletal: Normal range of motion  Skin:     General: Skin is warm and dry  Neurological:      General: No focal deficit present  Mental Status: He is alert  Mental status is at baseline  Psychiatric:         Mood and Affect: Mood normal          Behavior: Behavior normal          Thought Content:  Thought content normal          Judgment: Judgment normal          Additional Data:     Labs:    Results from last 7 days   Lab Units 21  0528 21  1135   WBC Thousand/uL 8 28 8 70   HEMOGLOBIN g/dL 14 6 15 3   HEMATOCRIT % 46 1 47 6   PLATELETS Thousands/uL 194 209   NEUTROS PCT %  --  65   LYMPHS PCT %  --  23   MONOS PCT %  --  7   EOS PCT %  --  4     Results from last 7 days   Lab Units 06/08/21  0528   POTASSIUM mmol/L 4 2   CHLORIDE mmol/L 103   CO2 mmol/L 31   BUN mg/dL 16   CREATININE mg/dL 0 87   CALCIUM mg/dL 8 9   ALK PHOS U/L 84   ALT U/L 23   AST U/L 15     Results from last 7 days   Lab Units 06/07/21  1135   INR  0 94       * I Have Reviewed All Lab Data Listed Above  * Additional Pertinent Lab Tests Reviewed: [facility]    Recent Cultures (last 7 days):           Last 24 Hours Medication List:   Current Facility-Administered Medications   Medication Dose Route Frequency Provider Last Rate    acetaminophen  650 mg Oral Q4H PRN MARCELLA Mondragon      aspirin  81 mg Oral Daily MARCELLA Mondragon      atorvastatin  40 mg Oral QPM MARCELLA Mondragon      glycerin-hypromellose-  1 drop Both Eyes Q4H PRN MARCELLA Kelley      immune globulin, human  400 mg/kg (Adjusted) Intravenous Daily Dylan Ng MD Stopped (06/10/21 1805)    ondansetron  4 mg Intravenous Q6H PRN MARCELLA Mondragon      predniSONE  60 mg Oral Daily MARCELLA Morrison      pyridostigmine  60 mg Oral TID MARCELLA Morrison          Today, Patient Was Seen By: MARCELLA Kelley    ** Please Note: Dragon 360 Dictation voice to text software may have been used in the creation of this document   **

## 2021-06-11 NOTE — PLAN OF CARE
Problem: Potential for Falls  Goal: Patient will remain free of falls  Description: INTERVENTIONS:  - Assess patient frequently for physical needs  -  Identify cognitive and physical deficits and behaviors that affect risk of falls    -  [place] fall precautions as indicated by assessment   - Educate patient/family on patient safety including physical limitations  - Instruct patient to call for assistance with activity based on assessment  - Modify environment to reduce risk of injury  - Consider OT/PT consult to assist with strengthening/mobility  Outcome: Progressing     Problem: PAIN - ADULT  Goal: Verbalizes/displays adequate comfort level or baseline comfort level  Description: Interventions:  - Encourage patient to monitor pain and request assistance  - Assess pain using appropriate pain scale  - Administer analgesics based on type and severity of pain and evaluate response  - Implement non-pharmacological measures as appropriate and evaluate response  - Consider cultural and social influences on pain and pain management  - Notify physician/advanced practitioner if interventions unsuccessful or patient reports new pain  Outcome: Progressing     Problem: INFECTION - ADULT  Goal: Absence or prevention of progression during hospitalization  Description: INTERVENTIONS:  - Assess and monitor for signs and symptoms of infection  - Monitor lab/diagnostic results  - Monitor all insertion sites, i e  indwelling lines, tubes, and drains  - Monitor endotracheal if appropriate and nasal secretions for changes in amount and color  - [place] appropriate cooling/warming therapies per order  - Administer medications as ordered  - Instruct and encourage patient and family to use good hand hygiene technique  - Identify and instruct in appropriate isolation precautions for identified infection/condition  Outcome: Progressing  Goal: Absence of fever/infection during neutropenic period  Description: INTERVENTIONS:  - Monitor WBC    Outcome: Progressing     Problem: SAFETY ADULT  Goal: Patient will remain free of falls  Description: INTERVENTIONS:  - Assess patient frequently for physical needs  -  Identify cognitive and physical deficits and behaviors that affect risk of falls    -  [place] fall precautions as indicated by assessment   - Educate patient/family on patient safety including physical limitations  - Instruct patient to call for assistance with activity based on assessment  - Modify environment to reduce risk of injury  - Consider OT/PT consult to assist with strengthening/mobility  Outcome: Progressing  Goal: Maintain or return to baseline ADL function  Description: INTERVENTIONS:  -  Assess patient's ability to carry out ADLs; assess patient's baseline for ADL function and identify physical deficits which impact ability to perform ADLs (bathing, care of mouth/teeth, toileting, grooming, dressing, etc )  - Assess/evaluate cause of self-care deficits   - Assess range of motion  - Assess patient's mobility; develop plan if impaired  - Assess patient's need for assistive devices and provide as appropriate  - Encourage maximum independence but intervene and supervise when necessary  - Involve family in performance of ADLs  - Assess for home care needs following discharge   - Consider OT consult to assist with ADL evaluation and planning for discharge  - Provide patient education as appropriate  Outcome: Progressing  Goal: Maintain or return mobility status to optimal level  Description: INTERVENTIONS:  - Assess patient's baseline mobility status (ambulation, transfers, stairs, etc )    - Identify cognitive and physical deficits and behaviors that affect mobility  - Identify mobility aids required to assist with transfers and/or ambulation (gait belt, sit-to-stand, lift, walker, cane, etc )  - [place] fall precautions as indicated by assessment  - Record patient progress and toleration of activity level on Mobility SBAR; progress patient to next Phase/Stage  - Instruct patient to call for assistance with activity based on assessment  - Consider rehabilitation consult to assist with strengthening/weightbearing, etc   Outcome: Progressing     Problem: DISCHARGE PLANNING  Goal: Discharge to home or other facility with appropriate resources  Description: INTERVENTIONS:  - Identify barriers to discharge w/patient and caregiver  - Arrange for needed discharge resources and transportation as appropriate  - Identify discharge learning needs (meds, wound care, etc )  - Arrange for interpretive services to assist at discharge as needed  - Refer to Case Management Department for coordinating discharge planning if the patient needs post-hospital services based on physician/advanced practitioner order or complex needs related to functional status, cognitive ability, or social support system  Outcome: Progressing     Problem: Knowledge Deficit  Goal: Patient/family/caregiver demonstrates understanding of disease process, treatment plan, medications, and discharge instructions  Description: Complete learning assessment and assess knowledge base  Interventions:  - Provide teaching at level of understanding  - Provide teaching via preferred learning methods  Outcome: Progressing     Problem: Neurological Deficit  Goal: Neurological status is stable or improving  Description: Interventions:  - Monitor and assess patient's level of consciousness, motor function, sensory function, and level of assistance needed for ADLs  - Monitor and report changes from baseline  Collaborate with interdisciplinary team to initiate plan and implement interventions as ordered  - Provide and maintain a safe environment  - Consider seizure precautions  - Consider fall precautions  - Consider aspiration precautions  - Consider bleeding precautions  Outcome: Progressing     Problem:  Activity Intolerance/Impaired Mobility  Goal: Mobility/activity is maintained at optimum level for patient  Description: Interventions:  - Assess and monitor patient  barriers to mobility and need for assistive/adaptive devices  - Assess patient's emotional response to limitations  - Collaborate with interdisciplinary team and initiate plans and interventions as ordered  - Encourage independent activity per ability   - Maintain proper body alignment  - Perform active/passive rom as tolerated/ordered  - Plan activities to conserve energy   - Turn patient as appropriate  Outcome: Progressing     Problem: Communication Impairment  Goal: Ability to express needs and understand communication  Description: Assess patient's communication skills and ability to understand information  Patient will demonstrate use of effective communication techniques, alternative methods of communication and understanding even if not able to speak  - Encourage communication and provide alternate methods of communication as needed  - Collaborate with case management/ for discharge needs  - Include patient/family/caregiver in decisions related to communication  Outcome: Progressing     Problem: Potential for Aspiration  Goal: Non-ventilated patient's risk of aspiration is minimized  Description: Assess and monitor vital signs, respiratory status, and labs (WBC)  Monitor for signs of aspiration (tachypnea, cough, rales, wheezing, cyanosis, fever)  - Assess and monitor patient's ability to swallow  - Place patient up in chair to eat if possible  - HOB up at 90 degrees to eat if unable to get patient up into chair   - Supervise patient during oral intake  - Instruct patient/ family to take small bites  - Instruct patient/ family to take small single sips when taking liquids    - Follow patient-specific strategies generated by speech pathologist   Outcome: Progressing  Goal: Ventilated patient's risk of aspiration is minimized  Description: Assess and monitor vital signs, respiratory status, airway cuff pressure, and labs (WBC)  Monitor for signs of aspiration (tachypnea, cough, rales, wheezing, cyanosis, fever)  - Elevate head of bed 30 degrees if patient has tube feeding   - Monitor tube feeding  Outcome: Progressing     Problem: Nutrition  Goal: Nutrition/Hydration status is improving  Description: Monitor and assess patient's nutrition/hydration status for malnutrition (ex- brittle hair, bruises, dry skin, pale skin and conjunctiva, muscle wasting, smooth red tongue, and disorientation)  Collaborate with interdisciplinary team and initiate plan and interventions as ordered  Monitor patient's weight and dietary intake as ordered or per policy  Utilize nutrition screening tool and intervene per policy  Determine patient's food preferences and provide high-protein, high-caloric foods as appropriate  - Assist patient with eating   - Allow adequate time for meals   - Encourage patient to take dietary supplement as ordered  - Collaborate with clinical nutritionist   - Include patient/family/caregiver in decisions related to nutrition    Outcome: Progressing

## 2021-06-11 NOTE — PLAN OF CARE
Problem: Potential for Falls  Goal: Patient will remain free of falls  Description: INTERVENTIONS:  - Assess patient frequently for physical needs  -  Identify cognitive and physical deficits and behaviors that affect risk of falls    -  [place] fall precautions as indicated by assessment   - Educate patient/family on patient safety including physical limitations  - Instruct patient to call for assistance with activity based on assessment  - Modify environment to reduce risk of injury  - Consider OT/PT consult to assist with strengthening/mobility  Outcome: Progressing     Problem: PAIN - ADULT  Goal: Verbalizes/displays adequate comfort level or baseline comfort level  Description: Interventions:  - Encourage patient to monitor pain and request assistance  - Assess pain using appropriate pain scale  - Administer analgesics based on type and severity of pain and evaluate response  - Implement non-pharmacological measures as appropriate and evaluate response  - Consider cultural and social influences on pain and pain management  - Notify physician/advanced practitioner if interventions unsuccessful or patient reports new pain  Outcome: Progressing     Problem: INFECTION - ADULT  Goal: Absence or prevention of progression during hospitalization  Description: INTERVENTIONS:  - Assess and monitor for signs and symptoms of infection  - Monitor lab/diagnostic results  - Monitor all insertion sites, i e  indwelling lines, tubes, and drains  - Monitor endotracheal if appropriate and nasal secretions for changes in amount and color  - [place] appropriate cooling/warming therapies per order  - Administer medications as ordered  - Instruct and encourage patient and family to use good hand hygiene technique  - Identify and instruct in appropriate isolation precautions for identified infection/condition  Outcome: Progressing  Goal: Absence of fever/infection during neutropenic period  Description: INTERVENTIONS:  - Monitor WBC    Outcome: Progressing     Problem: SAFETY ADULT  Goal: Patient will remain free of falls  Description: INTERVENTIONS:  - Assess patient frequently for physical needs  -  Identify cognitive and physical deficits and behaviors that affect risk of falls    -  [place] fall precautions as indicated by assessment   - Educate patient/family on patient safety including physical limitations  - Instruct patient to call for assistance with activity based on assessment  - Modify environment to reduce risk of injury  - Consider OT/PT consult to assist with strengthening/mobility  Outcome: Progressing  Goal: Maintain or return to baseline ADL function  Description: INTERVENTIONS:  -  Assess patient's ability to carry out ADLs; assess patient's baseline for ADL function and identify physical deficits which impact ability to perform ADLs (bathing, care of mouth/teeth, toileting, grooming, dressing, etc )  - Assess/evaluate cause of self-care deficits   - Assess range of motion  - Assess patient's mobility; develop plan if impaired  - Assess patient's need for assistive devices and provide as appropriate  - Encourage maximum independence but intervene and supervise when necessary  - Involve family in performance of ADLs  - Assess for home care needs following discharge   - Consider OT consult to assist with ADL evaluation and planning for discharge  - Provide patient education as appropriate  Outcome: Progressing  Goal: Maintain or return mobility status to optimal level  Description: INTERVENTIONS:  - Assess patient's baseline mobility status (ambulation, transfers, stairs, etc )    - Identify cognitive and physical deficits and behaviors that affect mobility  - Identify mobility aids required to assist with transfers and/or ambulation (gait belt, sit-to-stand, lift, walker, cane, etc )  - [place] fall precautions as indicated by assessment  - Record patient progress and toleration of activity level on Mobility SBAR; progress patient to next Phase/Stage  - Instruct patient to call for assistance with activity based on assessment  - Consider rehabilitation consult to assist with strengthening/weightbearing, etc   Outcome: Progressing     Problem: DISCHARGE PLANNING  Goal: Discharge to home or other facility with appropriate resources  Description: INTERVENTIONS:  - Identify barriers to discharge w/patient and caregiver  - Arrange for needed discharge resources and transportation as appropriate  - Identify discharge learning needs (meds, wound care, etc )  - Arrange for interpretive services to assist at discharge as needed  - Refer to Case Management Department for coordinating discharge planning if the patient needs post-hospital services based on physician/advanced practitioner order or complex needs related to functional status, cognitive ability, or social support system  Outcome: Progressing     Problem: Knowledge Deficit  Goal: Patient/family/caregiver demonstrates understanding of disease process, treatment plan, medications, and discharge instructions  Description: Complete learning assessment and assess knowledge base  Interventions:  - Provide teaching at level of understanding  - Provide teaching via preferred learning methods  Outcome: Progressing     Problem: Neurological Deficit  Goal: Neurological status is stable or improving  Description: Interventions:  - Monitor and assess patient's level of consciousness, motor function, sensory function, and level of assistance needed for ADLs  - Monitor and report changes from baseline  Collaborate with interdisciplinary team to initiate plan and implement interventions as ordered  - Provide and maintain a safe environment  - Consider seizure precautions  - Consider fall precautions  - Consider aspiration precautions  - Consider bleeding precautions    Outcome: Progressing     Problem: Potential for Aspiration  Goal: Non-ventilated patient's risk of aspiration is minimized  Description: Assess and monitor vital signs, respiratory status, and labs (WBC)  Monitor for signs of aspiration (tachypnea, cough, rales, wheezing, cyanosis, fever)  - Assess and monitor patient's ability to swallow  - Place patient up in chair to eat if possible  - HOB up at 90 degrees to eat if unable to get patient up into chair   - Supervise patient during oral intake  - Instruct patient/ family to take small bites  - Instruct patient/ family to take small single sips when taking liquids  - Follow patient-specific strategies generated by speech pathologist   Outcome: Progressing  Goal: Ventilated patient's risk of aspiration is minimized  Description: Assess and monitor vital signs, respiratory status, airway cuff pressure, and labs (WBC)  Monitor for signs of aspiration (tachypnea, cough, rales, wheezing, cyanosis, fever)  - Elevate head of bed 30 degrees if patient has tube feeding   - Monitor tube feeding  Outcome: Progressing     Problem: Communication Impairment  Goal: Ability to express needs and understand communication  Description: Assess patient's communication skills and ability to understand information  Patient will demonstrate use of effective communication techniques, alternative methods of communication and understanding even if not able to speak  - Encourage communication and provide alternate methods of communication as needed  - Collaborate with case management/ for discharge needs  - Include patient/family/caregiver in decisions related to communication    Outcome: Progressing

## 2021-06-12 PROBLEM — H02.409 PTOSIS: Status: ACTIVE | Noted: 2021-06-07

## 2021-06-12 PROCEDURE — 99232 SBSQ HOSP IP/OBS MODERATE 35: CPT | Performed by: NURSE PRACTITIONER

## 2021-06-12 RX ADMIN — PYRIDOSTIGMINE BROMIDE 60 MG: 60 TABLET ORAL at 15:19

## 2021-06-12 RX ADMIN — GLYCERIN, HYPROMELLOSE, POLYETHYLENE GLYCOL 1 DROP: .2; .2; 1 LIQUID OPHTHALMIC at 12:04

## 2021-06-12 RX ADMIN — PYRIDOSTIGMINE BROMIDE 60 MG: 60 TABLET ORAL at 08:23

## 2021-06-12 RX ADMIN — Medication 40 G: at 15:17

## 2021-06-12 RX ADMIN — ENOXAPARIN SODIUM 40 MG: 40 INJECTION SUBCUTANEOUS at 08:24

## 2021-06-12 RX ADMIN — ATORVASTATIN CALCIUM 40 MG: 40 TABLET, FILM COATED ORAL at 17:25

## 2021-06-12 RX ADMIN — PREDNISONE 60 MG: 20 TABLET ORAL at 08:24

## 2021-06-12 RX ADMIN — GLYCERIN, HYPROMELLOSE, POLYETHYLENE GLYCOL 1 DROP: .2; .2; 1 LIQUID OPHTHALMIC at 07:14

## 2021-06-12 RX ADMIN — PYRIDOSTIGMINE BROMIDE 60 MG: 60 TABLET ORAL at 21:04

## 2021-06-12 RX ADMIN — ASPIRIN 81 MG CHEWABLE TABLET 81 MG: 81 TABLET CHEWABLE at 08:24

## 2021-06-12 NOTE — PLAN OF CARE
Problem: Potential for Falls  Goal: Patient will remain free of falls  Description: INTERVENTIONS:  - Assess patient frequently for physical needs  -  Identify cognitive and physical deficits and behaviors that affect risk of falls    -  [place] fall precautions as indicated by assessment   - Educate patient/family on patient safety including physical limitations  - Instruct patient to call for assistance with activity based on assessment  - Modify environment to reduce risk of injury  - Consider OT/PT consult to assist with strengthening/mobility  Outcome: Progressing     Problem: PAIN - ADULT  Goal: Verbalizes/displays adequate comfort level or baseline comfort level  Description: Interventions:  - Encourage patient to monitor pain and request assistance  - Assess pain using appropriate pain scale  - Administer analgesics based on type and severity of pain and evaluate response  - Implement non-pharmacological measures as appropriate and evaluate response  - Consider cultural and social influences on pain and pain management  - Notify physician/advanced practitioner if interventions unsuccessful or patient reports new pain  Outcome: Progressing     Problem: INFECTION - ADULT  Goal: Absence or prevention of progression during hospitalization  Description: INTERVENTIONS:  - Assess and monitor for signs and symptoms of infection  - Monitor lab/diagnostic results  - Monitor all insertion sites, i e  indwelling lines, tubes, and drains  - Monitor endotracheal if appropriate and nasal secretions for changes in amount and color  - [place] appropriate cooling/warming therapies per order  - Administer medications as ordered  - Instruct and encourage patient and family to use good hand hygiene technique  - Identify and instruct in appropriate isolation precautions for identified infection/condition  Outcome: Progressing  Goal: Absence of fever/infection during neutropenic period  Description: INTERVENTIONS:  - Monitor WBC    Outcome: Progressing     Problem: SAFETY ADULT  Goal: Patient will remain free of falls  Description: INTERVENTIONS:  - Assess patient frequently for physical needs  -  Identify cognitive and physical deficits and behaviors that affect risk of falls    -  [place] fall precautions as indicated by assessment   - Educate patient/family on patient safety including physical limitations  - Instruct patient to call for assistance with activity based on assessment  - Modify environment to reduce risk of injury  - Consider OT/PT consult to assist with strengthening/mobility  Outcome: Progressing  Goal: Maintain or return to baseline ADL function  Description: INTERVENTIONS:  -  Assess patient's ability to carry out ADLs; assess patient's baseline for ADL function and identify physical deficits which impact ability to perform ADLs (bathing, care of mouth/teeth, toileting, grooming, dressing, etc )  - Assess/evaluate cause of self-care deficits   - Assess range of motion  - Assess patient's mobility; develop plan if impaired  - Assess patient's need for assistive devices and provide as appropriate  - Encourage maximum independence but intervene and supervise when necessary  - Involve family in performance of ADLs  - Assess for home care needs following discharge   - Consider OT consult to assist with ADL evaluation and planning for discharge  - Provide patient education as appropriate  Outcome: Progressing  Goal: Maintain or return mobility status to optimal level  Description: INTERVENTIONS:  - Assess patient's baseline mobility status (ambulation, transfers, stairs, etc )    - Identify cognitive and physical deficits and behaviors that affect mobility  - Identify mobility aids required to assist with transfers and/or ambulation (gait belt, sit-to-stand, lift, walker, cane, etc )  - [place] fall precautions as indicated by assessment  - Record patient progress and toleration of activity level on Mobility SBAR; progress patient to next Phase/Stage  - Instruct patient to call for assistance with activity based on assessment  - Consider rehabilitation consult to assist with strengthening/weightbearing, etc   Outcome: Progressing     Problem: DISCHARGE PLANNING  Goal: Discharge to home or other facility with appropriate resources  Description: INTERVENTIONS:  - Identify barriers to discharge w/patient and caregiver  - Arrange for needed discharge resources and transportation as appropriate  - Identify discharge learning needs (meds, wound care, etc )  - Arrange for interpretive services to assist at discharge as needed  - Refer to Case Management Department for coordinating discharge planning if the patient needs post-hospital services based on physician/advanced practitioner order or complex needs related to functional status, cognitive ability, or social support system  Outcome: Progressing     Problem: Knowledge Deficit  Goal: Patient/family/caregiver demonstrates understanding of disease process, treatment plan, medications, and discharge instructions  Description: Complete learning assessment and assess knowledge base  Interventions:  - Provide teaching at level of understanding  - Provide teaching via preferred learning methods  Outcome: Progressing     Problem: Neurological Deficit  Goal: Neurological status is stable or improving  Description: Interventions:  - Monitor and assess patient's level of consciousness, motor function, sensory function, and level of assistance needed for ADLs  - Monitor and report changes from baseline  Collaborate with interdisciplinary team to initiate plan and implement interventions as ordered  - Provide and maintain a safe environment  - Consider seizure precautions  - Consider fall precautions  - Consider aspiration precautions  - Consider bleeding precautions  Outcome: Progressing     Problem:  Activity Intolerance/Impaired Mobility  Goal: Mobility/activity is maintained at optimum level for patient  Description: Interventions:  - Assess and monitor patient  barriers to mobility and need for assistive/adaptive devices  - Assess patient's emotional response to limitations  - Collaborate with interdisciplinary team and initiate plans and interventions as ordered  - Encourage independent activity per ability   - Maintain proper body alignment  - Perform active/passive rom as tolerated/ordered  - Plan activities to conserve energy   - Turn patient as appropriate  Outcome: Progressing     Problem: Communication Impairment  Goal: Ability to express needs and understand communication  Description: Assess patient's communication skills and ability to understand information  Patient will demonstrate use of effective communication techniques, alternative methods of communication and understanding even if not able to speak  - Encourage communication and provide alternate methods of communication as needed  - Collaborate with case management/ for discharge needs  - Include patient/family/caregiver in decisions related to communication  Outcome: Progressing     Problem: Potential for Aspiration  Goal: Non-ventilated patient's risk of aspiration is minimized  Description: Assess and monitor vital signs, respiratory status, and labs (WBC)  Monitor for signs of aspiration (tachypnea, cough, rales, wheezing, cyanosis, fever)  - Assess and monitor patient's ability to swallow  - Place patient up in chair to eat if possible  - HOB up at 90 degrees to eat if unable to get patient up into chair   - Supervise patient during oral intake  - Instruct patient/ family to take small bites  - Instruct patient/ family to take small single sips when taking liquids    - Follow patient-specific strategies generated by speech pathologist   Outcome: Progressing  Goal: Ventilated patient's risk of aspiration is minimized  Description: Assess and monitor vital signs, respiratory status, airway cuff pressure, and labs (WBC)  Monitor for signs of aspiration (tachypnea, cough, rales, wheezing, cyanosis, fever)  - Elevate head of bed 30 degrees if patient has tube feeding   - Monitor tube feeding  Outcome: Progressing     Problem: Nutrition  Goal: Nutrition/Hydration status is improving  Description: Monitor and assess patient's nutrition/hydration status for malnutrition (ex- brittle hair, bruises, dry skin, pale skin and conjunctiva, muscle wasting, smooth red tongue, and disorientation)  Collaborate with interdisciplinary team and initiate plan and interventions as ordered  Monitor patient's weight and dietary intake as ordered or per policy  Utilize nutrition screening tool and intervene per policy  Determine patient's food preferences and provide high-protein, high-caloric foods as appropriate  - Assist patient with eating   - Allow adequate time for meals   - Encourage patient to take dietary supplement as ordered  - Collaborate with clinical nutritionist   - Include patient/family/caregiver in decisions related to nutrition    Outcome: Progressing

## 2021-06-12 NOTE — PROGRESS NOTES
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, no further inpatient recommendations in can follow up outpatient  · Myasthenia antibodies pending, can follow up outpatient  · Acetylcholine binding, blocking, and modulating and MUSK antibody pending  · Continues on IVIG 400 mg/kg x5 days (Scheduled today for dose 5/5)  · Improving ptosis on left  Continue mestinon to 60 mg, TID  · Continue prednisone 60 mg daily  · CTA Head/Neck (6/8/21): No acute intracranial abnormality  Chronic infarct right insular cortex  Mild stenosis right P1 segment  No large vessel occlusion  CT head negative  · MRI brain negative for acute CVA  · MRI Brain w/ contrast (6/8/21): No abnormal postcontrast enhancement  · Hemoglobin A1C noted 6 2 education provided  · FLP total cholesterol 192 ; Continue statin for now   · Continue ASA     Morbid obesity ([address] )  Assessment & Plan  Morbid obesity due to excess calories, as evidenced by a BMI of 41 47, requiring an evaluation and diet education by a dietician  Lifestyle modifications    CAD (coronary artery disease)  Assessment & Plan  · Reports history of cardiac event the past on ASA  · Continue statin and ASA      VTE Pharmacologic Prophylaxis:   Pharmacologic: Enoxaparin (Lovenox)  Mechanical VTE Prophylaxis in Place: Yes    Patient Centered Rounds: I have performed bedside rounds with nursing staff today      Discussions with Specialists or Other Care Team Provider:  Reviewed neuro notes discussed with case management primary RN    Education and Discussions with Family / Patient:  Discussed plan of care with patient denies any additional questions or concerns at this time    Time Spent for Care: 20 minutes  More than 50% of total time spent on counseling and coordination of care as described above  Current Length of Stay: 3 day(s)    Current Patient Status: Inpatient   Certification Statement: The patient will continue to require additional inpatient hospital stay due to IV IG, steroids, supportive care    Discharge Plan / Estimated Discharge Date:  24 hours    Code Status: Level 1 - Full Code    Subjective:   Denies any chest pain chest tightness shortness of breath or difficulty breathing is ambulating without issue is concerned that his eye has not completely resolved, educated patient that this might take several days to week for things to improve    Objective:     Vitals:   Temp (24hrs), Av 6 °F (36 4 °C), Min:97 4 °F (36 3 °C), Max:97 8 °F (36 6 °C)    Temp:  [97 4 °F (36 3 °C)-97 8 °F (36 6 °C)] 97 4 °F (36 3 °C)  HR:  [63] 63  Resp:  [18-20] 18  BP: (108-121)/(57-70) 121/70  SpO2:  [91 %-92 %] 92 %  Body mass index is 41 47 kg/m²  Input and Output Summary (last 24 hours): Intake/Output Summary (Last 24 hours) at 2021 1203  Last data filed at 2021 0831  Gross per 24 hour   Intake 720 ml   Output --   Net 720 ml       Physical Exam:     Physical Exam  Vitals signs and nursing note reviewed  Cardiovascular:      Rate and Rhythm: Normal rate  Pulses: Normal pulses  Pulmonary:      Effort: Pulmonary effort is normal    Abdominal:      Palpations: Abdomen is soft  Musculoskeletal: Normal range of motion  Skin:     General: Skin is warm  Neurological:      General: No focal deficit present  Mental Status: He is alert  Mental status is at baseline  Psychiatric:         Mood and Affect: Mood normal          Thought Content:  Thought content normal          Judgment: Judgment normal        Additional Data: Labs:    Results from last 7 days   Lab Units 06/08/21  0528 06/07/21  1135   WBC Thousand/uL 8 28 8 70   HEMOGLOBIN g/dL 14 6 15 3   HEMATOCRIT % 46 1 47 6   PLATELETS Thousands/uL 194 209   NEUTROS PCT %  --  65   LYMPHS PCT %  --  23   MONOS PCT %  --  7   EOS PCT %  --  4     Results from last 7 days   Lab Units 06/11/21  1031 06/08/21  0528   POTASSIUM mmol/L 3 6 4 2   CHLORIDE mmol/L 101 103   CO2 mmol/L 30 31   BUN mg/dL 15 16   CREATININE mg/dL 1 00 0 87   CALCIUM mg/dL 9 0 8 9   ALK PHOS U/L  --  84   ALT U/L  --  23   AST U/L  --  15     Results from last 7 days   Lab Units 06/07/21  1135   INR  0 94       * I Have Reviewed All Lab Data Listed Above  * Additional Pertinent Lab Tests Reviewed: All [facility] Admission Reviewed    Recent Cultures (last 7 days):           Last 24 Hours Medication List:   Current Facility-Administered Medications   Medication Dose Route Frequency Provider Last Rate    acetaminophen  650 mg Oral Q4H PRN MARCELLA Em      aspirin  81 mg Oral Daily MARCELLA Em      atorvastatin  40 mg Oral QPM MARCELLA Em      enoxaparin  40 mg Subcutaneous Q24H [address] MARCELLA Garcia      glycerin-hypromellose-  1 drop Both Eyes Q4H PRN MARCELLA Garcia      immune globulin, human  400 mg/kg (Adjusted) Intravenous Daily Lm Vincent  8 mL/hr at 06/11/21 1750    ondansetron  4 mg Intravenous Q6H PRN MARCELLA Em      predniSONE  60 mg Oral Daily MARCELLA Morrison      pyridostigmine  60 mg Oral TID MARCELLA Morrison          Today, Patient Was Seen By: MARCELLA Garcia    ** Please Note: Dragon 360 Dictation voice to text software may have been used in the creation of this document   **

## 2021-06-12 NOTE — ASSESSMENT & PLAN NOTE
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, no further inpatient recommendations in can follow up outpatient  · Myasthenia antibodies pending, can follow up outpatient  · Acetylcholine binding, blocking, and modulating and MUSK antibody pending  · Continues on IVIG 400 mg/kg x5 days (Scheduled today for dose 5/5)  · Improving ptosis on left  Continue mestinon to 60 mg, TID  · Continue prednisone 60 mg daily  · CTA Head/Neck (6/8/21): No acute intracranial abnormality  Chronic infarct right insular cortex  Mild stenosis right P1 segment  No large vessel occlusion  CT head negative  · MRI brain negative for acute CVA  · MRI Brain w/ contrast (6/8/21): No abnormal postcontrast enhancement    · Hemoglobin A1C noted 6 2 education provided  · FLP total cholesterol 192 ; Continue statin for now   · Continue ASA

## 2021-06-13 VITALS
WEIGHT: 305.78 LBS | TEMPERATURE: 97.9 F | OXYGEN SATURATION: 98 % | SYSTOLIC BLOOD PRESSURE: 102 MMHG | DIASTOLIC BLOOD PRESSURE: 62 MMHG | BODY MASS INDEX: 41.42 KG/M2 | HEIGHT: 72 IN | HEART RATE: 55 BPM | RESPIRATION RATE: 17 BRPM

## 2021-06-13 PROCEDURE — 99239 HOSP IP/OBS DSCHRG MGMT >30: CPT | Performed by: NURSE PRACTITIONER

## 2021-06-13 RX ORDER — PREDNISONE 20 MG/1
60 TABLET ORAL DAILY
Qty: 90 TABLET | Refills: 0 | Status: SHIPPED | OUTPATIENT
Start: 2021-06-14 | End: 2021-07-14

## 2021-06-13 RX ORDER — PYRIDOSTIGMINE BROMIDE 60 MG/1
60 TABLET ORAL 3 TIMES DAILY
Qty: 90 TABLET | Refills: 0 | Status: SHIPPED | OUTPATIENT
Start: 2021-06-13 | End: 2021-10-01 | Stop reason: SDUPTHER

## 2021-06-13 RX ORDER — ATORVASTATIN CALCIUM 40 MG/1
40 TABLET, FILM COATED ORAL EVERY EVENING
Qty: 30 TABLET | Refills: 0 | Status: SHIPPED | OUTPATIENT
Start: 2021-06-13 | End: 2022-06-15

## 2021-06-13 RX ADMIN — PREDNISONE 60 MG: 20 TABLET ORAL at 08:17

## 2021-06-13 RX ADMIN — ASPIRIN 81 MG CHEWABLE TABLET 81 MG: 81 TABLET CHEWABLE at 08:17

## 2021-06-13 RX ADMIN — PYRIDOSTIGMINE BROMIDE 60 MG: 60 TABLET ORAL at 08:17

## 2021-06-13 RX ADMIN — GLYCERIN, HYPROMELLOSE, POLYETHYLENE GLYCOL 1 DROP: .2; .2; 1 LIQUID OPHTHALMIC at 08:17

## 2021-06-13 RX ADMIN — ENOXAPARIN SODIUM 40 MG: 40 INJECTION SUBCUTANEOUS at 08:17

## 2021-06-13 NOTE — DISCHARGE INSTR - AVS FIRST PAGE
Thank you for choosing [facility] year care, please take all prescriptions as instructed, please make appropriate follow-up visits    Follow-up with neurology in 2 weeks  Continue take medications as prescribed

## 2021-06-13 NOTE — DISCHARGE INSTRUCTIONS
Coronary Artery Disease   AMBULATORY CARE:   Coronary artery disease (CAD)  is narrowing of the arteries to your heart caused by a buildup of plaque  Plaque is made up of cholesterol and other substances  The narrowing in your arteries decreases the amount of blood that can flow to your heart  This causes your heart to get less oxygen  You may not have any symptoms of CAD  It is important for you to manage CAD even if you feel well  CAD can lead to a heart attack if it is not managed  Common symptoms include the following:   · Chest pain (angina), causing burning, squeezing, or crushing tightness in your chest    · Pain that spreads to your neck, jaw, or shoulder blade    · Nausea, vomiting, sweating, fainting, and hands and feet that are cold to the touch    Call 911 for any of the following:   · You have any of the following signs of a heart attack:      ? Squeezing, pressure, or pain in your chest    ? You may  also have any of the following:     § Discomfort or pain in your back, neck, jaw, stomach, or arm    § Shortness of breath    § Nausea or vomiting    § Lightheadedness or a sudden cold sweat      Contact your healthcare provider if:   · You have chest pain that is more frequent, or you have chest pain at rest     · You have questions or concerns about your condition or care  Medicines used to treat CAD:   · Blood pressure medicines  are given to lower your blood pressure  ACE inhibitors help keep your blood vessels relaxed and open to help keep blood flowing into your heart  Beta-blockers keep your heart pumping strongly and regularly so it does not work too hard to get oxygen  · Cholesterol medicines  help lower blood cholesterol levels  · Nitrates , such as nitroglycerin, relax the arteries of your heart so it gets more oxygen  They help to relieve your chest pain  · Antiplatelets , such as aspirin, help prevent blood clots  Take your antiplatelet medicine exactly as directed   These medicines make it more likely for you to bleed or bruise  If you are told to take aspirin, do not take acetaminophen or ibuprofen instead  · Blood thinners  keep clots from forming in your blood  Clots may cause heart attacks, strokes, or death  This medicine makes it more likely for you to bleed or bruise  · Do not take certain medicines without asking your healthcare provider first   These include NSAIDs, herbal or vitamin supplements, or hormones (estrogen or progestin)  Procedures used to treat CAD:   · Angioplasty  may be done to open an artery blocked by plaque  A tube with a balloon on the end is threaded into the blocked artery  Once the tube is in the artery, the balloon is inflated  As the balloon inflates, it presses the plaque against the artery wall to open the artery  A stent may be placed in your artery to keep it open  · Coronary artery bypass graft surgery (CABG)  is open heart surgery  Healthcare providers take arteries or veins from other areas in your body and use them to bypass or go around the blocked arteries of your heart  Cardiac rehabilitation:  Your healthcare provider may recommend that you attend cardiac rehabilitation (rehab)  This is a program run by specialists who will help you safely strengthen your heart and reduce the risk for more heart disease  The plan includes exercise, relaxation, stress management, and heart-healthy nutrition  Healthcare providers will also check to make sure any medicines you are taking are working  Manage CAD to prevent a heart attack:   · Do not smoke  Nicotine and other chemicals in cigarettes and cigars can cause heart and lung damage  Ask your healthcare provider for information if you currently smoke and need help to quit  E-cigarettes or smokeless tobacco still contain nicotine  Talk to your healthcare provider before you use these products  · Exercise regularly    Exercise at least 30 minutes each day, on most days of the week  Exercise helps to lower high cholesterol and high blood pressure  It can also help you maintain a healthy weight  Ask your healthcare provider about the kind of exercise you should do and how to get started  · Maintain a healthy weight  If you are overweight, talk to your healthcare provider about how to lose weight  A weight loss of 10% can improve your heart health  · Eat heart-healthy foods  Include fresh fruits and vegetables in your meal plan  Choose low-fat foods, such as skim or 1% fat milk, low-fat cheese and yogurt, fish, chicken (without skin), and lean meats  Eat two 4-ounce servings of fish high in omega-3 fats each week, such as salmon, fresh tuna, and herring  Do not eat foods that are high in sodium, such as canned foods, potato chips, salty snacks, and cold cuts  Put less table salt on your food  · Limit or do not drink alcohol  A drink of alcohol is 12 ounces of beer, 5 ounces of wine, or 1½ ounces of liquor  · Manage other health conditions  Follow your healthcare provider's advice on how to manage other conditions that can affect your heart health  These include diabetes, high blood pressure, and high cholesterol  You may need to take medicines for these conditions and make other lifestyle changes  · Ask if you should have a flu vaccine  The flu can be dangerous for a person who has CAD  The flu vaccine is available every year in the fall  Follow up with your healthcare provider as directed: You may need to return for other tests  You may also be referred to a cardiac surgeon  Write down your questions so you remember to ask them during your visits  © Copyright [employer] 2021 Information is for End User's use only and may not be sold, redistributed or otherwise used for commercial purposes   All illustrations and images included in CareNotes® are the copyrighted property of A D A M , Inc  or Sean Bender   The above information is an educational aid only  It is not intended as medical advice for individual conditions or treatments  Talk to your doctor, nurse or pharmacist before following any medical regimen to see if it is safe and effective for you  Ptosis, Ambulatory Care   GENERAL INFORMATION:   Ptosis  is the drooping of one or both eyelids  It may affect your vision  You may tilt your head back to see underneath the drooping eyelid  You may also try to lift your eyelids by raising your eyebrows  Contact your healthcare provider for the following symptoms:   · Worsening vision    · New symptoms    · Questions or concerns about your condition or care  Treatment for ptosis  may not be needed if your ptosis is mild  If your ptosis affects your vision, you may need surgery to tighten your levator muscle or to reattach it  If your levator muscle is too weak, your eyelid may be attached to or suspended from the area under your eyebrow  This will allow your forehead muscles to do the work of lifting your eyelid  Follow up with your healthcare provider as directed:  Write down your questions so you remember to ask them during your visits  CARE AGREEMENT:   You have the right to help plan your care  Learn about your health condition and how it may be treated  Discuss treatment options with your caregivers to decide what care you want to receive  You always have the right to refuse treatment  The above information is an  only  It is not intended as medical advice for individual conditions or treatments  Talk to your doctor, nurse or pharmacist before following any medical regimen to see if it is safe and effective for you  © 2014 [address] is for End User's use only and may not be sold, redistributed or otherwise used for commercial purposes  All illustrations and images included in CareNotes® are the copyrighted property of A D A M , Inc  or Mariusz Dorothy        Myasthenia Gravis   WHAT YOU NEED TO KNOW:   Myasthenia gravis, or MG, is a long-term disease that causes severe muscle weakness  It happens when your nerve endings fail to interact properly with your muscles  MG usually affects muscles of the eyes, face, neck, arms, and legs  MG is most common in young women 21to 27years of age, and in men 61to 79years of age  DISCHARGE INSTRUCTIONS:   Medicines:   · Anticholinesterase medicine: This medicine helps improve energy and strength  · Immunosuppressives:  Steroid medicine or other immunosuppressive medicine may be given to slow down your immune system and slow the progression of MG  · Take your medicine as directed  Contact your healthcare provider if you think your medicine is not helping or if you have side effects  Tell him or her if you are allergic to any medicine  Keep a list of the medicines, vitamins, and herbs you take  Include the amounts, and when and why you take them  Bring the list or the pill bottles to follow-up visits  Carry your medicine list with you in case of an emergency  Follow up with your healthcare provider or neurologist as directed:  Write down your questions so you remember to ask them during your visits  Prevent colds or the flu:  Stay away from people who have colds or the flu  Ask if you should get immunizations to prevent the flu and pneumonia  Try to avoid large groups of people to lower your exposure to germs  Home safety:  A 4 prong (pointed) cane or a walker may help you feel safer when you walk  Remove rugs and loose carpeting from the floor to help prevent falls  Chairs with side arms and hard cushions may make it easier to get up or out of a chair  Put grab bars on the walls beside toilets and inside showers and bathtubs  These will help you get up after using the toilet or after bathing  You may want to put a shower chair inside the shower     For support and more information:   · [address] , [place]  Phone: 7- 698 - [phone]  Web Address: [employer]    Contact your healthcare provider or neurologist if:   · You have a fever  · You have diarrhea  · You have more weakness than usual     · You are having problems swallowing  · You are depressed and feel that you cannot cope with your illness  · You have questions or concerns about your condition or care  Seek care immediately or call 911 if:   · You have severe breathing problems  © Copyright [address] Information is for End User's use only and may not be sold, redistributed or otherwise used for commercial purposes  All illustrations and images included in CareNotes® are the copyrighted property of A D A M , Inc  or [address] [place], [place] [address]   The above information is an  only  It is not intended as medical advice for individual conditions or treatments  Talk to your doctor, nurse or pharmacist before following any medical regimen to see if it is safe and effective for you

## 2021-06-13 NOTE — NURSING NOTE
Discharge documents given to the patient  Patient demonstrated understanding of given instructions  CM and RN explained that medications will be available in the [address] with discount and patient needs to follow up with family practitioner and neurologist  IV removed

## 2021-06-13 NOTE — PLAN OF CARE
Problem: Potential for Falls  Goal: Patient will remain free of falls  Description: INTERVENTIONS:  - Assess patient frequently for physical needs  -  Identify cognitive and physical deficits and behaviors that affect risk of falls    -  [place] fall precautions as indicated by assessment   - Educate patient/family on patient safety including physical limitations  - Instruct patient to call for assistance with activity based on assessment  - Modify environment to reduce risk of injury  - Consider OT/PT consult to assist with strengthening/mobility  Outcome: Completed     Problem: PAIN - ADULT  Goal: Verbalizes/displays adequate comfort level or baseline comfort level  Description: Interventions:  - Encourage patient to monitor pain and request assistance  - Assess pain using appropriate pain scale  - Administer analgesics based on type and severity of pain and evaluate response  - Implement non-pharmacological measures as appropriate and evaluate response  - Consider cultural and social influences on pain and pain management  - Notify physician/advanced practitioner if interventions unsuccessful or patient reports new pain  Outcome: Completed     Problem: INFECTION - ADULT  Goal: Absence or prevention of progression during hospitalization  Description: INTERVENTIONS:  - Assess and monitor for signs and symptoms of infection  - Monitor lab/diagnostic results  - Monitor all insertion sites, i e  indwelling lines, tubes, and drains  - Monitor endotracheal if appropriate and nasal secretions for changes in amount and color  - [place] appropriate cooling/warming therapies per order  - Administer medications as ordered  - Instruct and encourage patient and family to use good hand hygiene technique  - Identify and instruct in appropriate isolation precautions for identified infection/condition  Outcome: Completed  Goal: Absence of fever/infection during neutropenic period  Description: INTERVENTIONS:  - Monitor WBC    Outcome: Completed     Problem: SAFETY ADULT  Goal: Patient will remain free of falls  Description: INTERVENTIONS:  - Assess patient frequently for physical needs  -  Identify cognitive and physical deficits and behaviors that affect risk of falls    -  [place] fall precautions as indicated by assessment   - Educate patient/family on patient safety including physical limitations  - Instruct patient to call for assistance with activity based on assessment  - Modify environment to reduce risk of injury  - Consider OT/PT consult to assist with strengthening/mobility  Outcome: Completed  Goal: Maintain or return to baseline ADL function  Description: INTERVENTIONS:  -  Assess patient's ability to carry out ADLs; assess patient's baseline for ADL function and identify physical deficits which impact ability to perform ADLs (bathing, care of mouth/teeth, toileting, grooming, dressing, etc )  - Assess/evaluate cause of self-care deficits   - Assess range of motion  - Assess patient's mobility; develop plan if impaired  - Assess patient's need for assistive devices and provide as appropriate  - Encourage maximum independence but intervene and supervise when necessary  - Involve family in performance of ADLs  - Assess for home care needs following discharge   - Consider OT consult to assist with ADL evaluation and planning for discharge  - Provide patient education as appropriate  Outcome: Completed  Goal: Maintain or return mobility status to optimal level  Description: INTERVENTIONS:  - Assess patient's baseline mobility status (ambulation, transfers, stairs, etc )    - Identify cognitive and physical deficits and behaviors that affect mobility  - Identify mobility aids required to assist with transfers and/or ambulation (gait belt, sit-to-stand, lift, walker, cane, etc )  - [place] fall precautions as indicated by assessment  - Record patient progress and toleration of activity level on Mobility SBAR; progress patient to next Phase/Stage  - Instruct patient to call for assistance with activity based on assessment  - Consider rehabilitation consult to assist with strengthening/weightbearing, etc   Outcome: Completed     Problem: DISCHARGE PLANNING  Goal: Discharge to home or other facility with appropriate resources  Description: INTERVENTIONS:  - Identify barriers to discharge w/patient and caregiver  - Arrange for needed discharge resources and transportation as appropriate  - Identify discharge learning needs (meds, wound care, etc )  - Arrange for interpretive services to assist at discharge as needed  - Refer to Case Management Department for coordinating discharge planning if the patient needs post-hospital services based on physician/advanced practitioner order or complex needs related to functional status, cognitive ability, or social support system  Outcome: Completed     Problem: Knowledge Deficit  Goal: Patient/family/caregiver demonstrates understanding of disease process, treatment plan, medications, and discharge instructions  Description: Complete learning assessment and assess knowledge base  Interventions:  - Provide teaching at level of understanding  - Provide teaching via preferred learning methods  Outcome: Completed     Problem: Neurological Deficit  Goal: Neurological status is stable or improving  Description: Interventions:  - Monitor and assess patient's level of consciousness, motor function, sensory function, and level of assistance needed for ADLs  - Monitor and report changes from baseline  Collaborate with interdisciplinary team to initiate plan and implement interventions as ordered  - Provide and maintain a safe environment  - Consider seizure precautions  - Consider fall precautions  - Consider aspiration precautions  - Consider bleeding precautions  Outcome: Completed     Problem:  Activity Intolerance/Impaired Mobility  Goal: Mobility/activity is maintained at optimum level for patient  Description: Interventions:  - Assess and monitor patient  barriers to mobility and need for assistive/adaptive devices  - Assess patient's emotional response to limitations  - Collaborate with interdisciplinary team and initiate plans and interventions as ordered  - Encourage independent activity per ability   - Maintain proper body alignment  - Perform active/passive rom as tolerated/ordered  - Plan activities to conserve energy   - Turn patient as appropriate  Outcome: Completed     Problem: Communication Impairment  Goal: Ability to express needs and understand communication  Description: Assess patient's communication skills and ability to understand information  Patient will demonstrate use of effective communication techniques, alternative methods of communication and understanding even if not able to speak  - Encourage communication and provide alternate methods of communication as needed  - Collaborate with case management/ for discharge needs  - Include patient/family/caregiver in decisions related to communication  Outcome: Completed     Problem: Potential for Aspiration  Goal: Non-ventilated patient's risk of aspiration is minimized  Description: Assess and monitor vital signs, respiratory status, and labs (WBC)  Monitor for signs of aspiration (tachypnea, cough, rales, wheezing, cyanosis, fever)  - Assess and monitor patient's ability to swallow  - Place patient up in chair to eat if possible  - HOB up at 90 degrees to eat if unable to get patient up into chair   - Supervise patient during oral intake  - Instruct patient/ family to take small bites  - Instruct patient/ family to take small single sips when taking liquids    - Follow patient-specific strategies generated by speech pathologist   Outcome: Completed  Goal: Ventilated patient's risk of aspiration is minimized  Description: Assess and monitor vital signs, respiratory status, airway cuff pressure, and labs (WBC)  Monitor for signs of aspiration (tachypnea, cough, rales, wheezing, cyanosis, fever)  - Elevate head of bed 30 degrees if patient has tube feeding   - Monitor tube feeding  Outcome: Completed     Problem: Nutrition  Goal: Nutrition/Hydration status is improving  Description: Monitor and assess patient's nutrition/hydration status for malnutrition (ex- brittle hair, bruises, dry skin, pale skin and conjunctiva, muscle wasting, smooth red tongue, and disorientation)  Collaborate with interdisciplinary team and initiate plan and interventions as ordered  Monitor patient's weight and dietary intake as ordered or per policy  Utilize nutrition screening tool and intervene per policy  Determine patient's food preferences and provide high-protein, high-caloric foods as appropriate  - Assist patient with eating   - Allow adequate time for meals   - Encourage patient to take dietary supplement as ordered  - Collaborate with clinical nutritionist   - Include patient/family/caregiver in decisions related to nutrition    Outcome: Completed

## 2021-06-13 NOTE — ASSESSMENT & PLAN NOTE
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, no further inpatient recommendations in can follow up outpatient  · Myasthenia antibodies pending, can follow up outpatient  · Acetylcholine binding, blocking, and modulating and MUSK antibody  still pending follow up outpatient  · Completed  IVIG 400 mg/kg x5 days   · Improving ptosis on left  Continue mestinon to 60 mg, TID  · Continue prednisone 60 mg daily  · CTA Head/Neck (6/8/21): No acute intracranial abnormality  Chronic infarct right insular cortex  Mild stenosis right P1 segment  No large vessel occlusion  CT head negative  · MRI brain negative for acute CVA  · MRI Brain w/ contrast (6/8/21): No abnormal postcontrast enhancement    · Hemoglobin A1C noted 6 2 education provided  · FLP total cholesterol 192 ; Continue statin for now   · Continue ASA

## 2021-06-13 NOTE — CASE MANAGEMENT
Cm spoke w Domonique Alonso  All 3 new medications under 25$ each  Cm spoke to pt who is agreeable  Pt requesting lyft home  Cm set up for 1 p   Address verified and waiver signed and placed in medical records

## 2021-06-13 NOTE — DISCHARGE SUMMARY
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, no further inpatient recommendations in can follow up outpatient  · Myasthenia antibodies pending, can follow up outpatient  · Acetylcholine binding, blocking, and modulating and MUSK antibody  still pending follow up outpatient  · Completed  IVIG 400 mg/kg x5 days   · Improving ptosis on left  Continue mestinon to 60 mg, TID  · Continue prednisone 60 mg daily  · CTA Head/Neck (6/8/21): No acute intracranial abnormality  Chronic infarct right insular cortex  Mild stenosis right P1 segment  No large vessel occlusion  CT head negative  · MRI brain negative for acute CVA  · MRI Brain w/ contrast (6/8/21): No abnormal postcontrast enhancement  · Hemoglobin A1C noted 6 2 education provided  · FLP total cholesterol 192 ; Continue statin for now   · Continue ASA     Morbid obesity ([address] )  Assessment & Plan  Morbid obesity due to excess calories, as evidenced by a BMI of 41 47, requiring an evaluation and diet education by a dietician  Lifestyle modifications    CAD (coronary artery disease)  Assessment & Plan  · Reports history of cardiac event the past on ASA  · Continue statin and ASA           Discharging Physician / Practitioner: MARCELLA Hickman  PCP: No primary care provider on file    Admission Date:   Admission Orders (From admission, onward)     Ordered        06/09/21 1416  Inpatient Admission  Once         06/07/21 1335  Place in Observation  Once                   Discharge Date: 06/13/21    Medical Problems     Resolved Problems  Date Reviewed: 6/9/2021    None                Consultations During Hospital Stay:   [address] TO CASE MANAGEMENT  · IP CONSULT TO NUTRITION SERVICES    Procedures Performed:   · CT chest  · MRI brain  · Chest x-ray  · CT head  Significant Findings / Test Results:      CT chest w contrast   Final Result by Pineda Almeida MD (06/10 2119)      No evidence of thymoma  No acute findings in the chest   Several nonemergent findings above  Small left adrenal nodule, indeterminate by Hounsfield units, but most likely a benign adenoma  If there are no outside prior studies for comparison, then recommend nonemergent adrenal MR  Workstation performed: [id number]         MRI brain w contrast   Final Result by Kenzie Pinedo DO (06/08 7025)      No abnormal postcontrast enhancement  Workstation performed: [id number]         CTA head and neck w wo contrast   Final Result by Kenzie Pinedo DO (06/08 2687)      No acute intracranial abnormality  Chronic infarct right insular cortex  Mild stenosis right P1 segment  No large vessel occlusion  Workstation performed: [id number]         MRI brain wo contrast   Final Result by Carlton Franco MD (06/08 9654)      1  No acute intracranial pathology  2   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  Mild microangiopathy  Workstation performed: [id number]         XR chest 1 view portable   ED Interpretation by Vida Leal MD (06/07 1213)   Lung apices clear  Final Result by Suze Anderson MD (06/08 1268)      No acute cardiopulmonary disease  Workstation performed: [id number]         CT head without contrast   Final Result by Juni Castellanos MD (06/07 1231)      No acute intracranial abnormality                    Workstation performed: [id number]         ·   ·     Incidental Findings:   · none     Test Results Pending at Discharge (will require follow up): · MG labs     Outpatient Tests Requested:  · None    Complications:  None    Reason for Admission:    Chief Complaint   Patient presents with    Facial Pain     Patient reports facial pressure on forehead and L eye for 5 or 6 weeks  Patient reports this happened to him in september was well         Hospital Course:     Jaylene Lee is a 57 y o  male patient who originally presented to the hospital on 6/7/2021 due to facial pain and difficulty opening his eyes  Further evaluation patient was found to have concerning findings for myasthenia gravis labs were collected and are pending you need to follow-up with neurology  He was started on prednisone, Mooney tone in, and completed a course of IVIG symptoms have improved face pain has resolved the ptosis is improved  Denies any chest pain chest tightness shortness of breath or difficulty breathing overall feels significantly better than on admission    Please see above list of diagnoses and related plan for additional information  Condition at Discharge: stable     Discharge Day Visit / Exam:     Subjective:  Denies any chest pain chest tightness shortness of breath or difficulty breathing understands the importance of follow-up in taking medications as prescribed  Vitals: Blood Pressure: 102/62 (06/13/21 0718)  Pulse: 55 (06/12/21 2216)  Temperature: 97 9 °F (36 6 °C) (06/13/21 0718)  Temp Source: Oral (06/08/21 0600)  Respirations: 17 (06/12/21 2216)  Height: 6' (182 9 cm) (06/09/21 1232)  Weight - Scale: (!) 139 kg (305 lb 12 5 oz) (06/07/21 1712)  SpO2: 98 % (06/13/21 0718)  Exam:   Physical Exam  Vitals and nursing note reviewed  Constitutional:       General: He is not in acute distress  Appearance: He is obese  He is ill-appearing  HENT:      Head: Normocephalic  Cardiovascular:      Rate and Rhythm: Normal rate  Pulmonary:      Effort: Pulmonary effort is normal    Abdominal:      Palpations: Abdomen is soft     Musculoskeletal: General: Normal range of motion  Neurological:      General: No focal deficit present  Mental Status: He is alert  Mental status is at baseline  Psychiatric:         Mood and Affect: Mood normal          Thought Content: Thought content normal          Judgment: Judgment normal        Discussion with Family:  Not available    Discharge instructions/Information to patient and family:   See after visit summary for information provided to patient and family  Provisions for Follow-Up Care:  See after visit summary for information related to follow-up care and any pertinent home health orders  Disposition:     Home    For Discharges to [facility] SN[facility]:   · Not Applicable to this Patient - Not Applicable to this Patient    Planned Readmission:  No     Discharge Statement:  I spent 45 minutes discharging the patient  This time was spent on the day of discharge  I had direct contact with the patient on the day of discharge  Greater than 50% of the total time was spent examining patient, answering all patient questions, arranging and discussing plan of care with patient as well as directly providing post-discharge instructions  Additional time then spent on discharge activities  Discharge Medications:  See after visit summary for reconciled discharge medications provided to patient and family        ** Please Note: This note has been constructed using a voice recognition system **

## 2021-06-13 NOTE — CASE MANAGEMENT
Pt to [facility] today on 2 new medications  Needs neurology follow up OP  Has no ins  Cm met w pt who can afford his current medications oop  However, unsure about new ones  Cm to price check new ones at [facility]  Pt reports being out of work for 6 months post covid and not having a lot of money  He has met w [employer] and has all paperwork to complete  Pt is unsure if he will be able to afford an OP neurology appt  Cm instructed him to f u w the office on Monday for pricing   Cm provided him w [facility] resource should he need an affordable medical office in the interim until the medicaid comes thru

## 2021-06-13 NOTE — INCIDENTAL FINDINGS
The following findings require follow up:  Radiographic finding   Finding: CT chest w contrast: No evidence of thymoma , No acute findings in the chest   Several nonemergent findings above , Small left adrenal nodule, indeterminate by Hounsfield units, but most likely a benign adenoma    If there are no outside prior studies for comparison, then recommend nonemergent adrenal MR , Workstation performed: [id number]   Follow up required:  Yes   Follow up should be done within 6 month(s)    Please notify the following clinician to assist with the follow up:   Dr STERLING

## 2021-06-14 LAB — ACHR BIND AB SER-SCNC: 13.7 NMOL/L (ref 0–0.24)

## 2021-06-15 LAB
ACHR BLOCK AB/ACHR TOTAL SFR SER: 49 % (ref 0–25)
ACHR MOD AB/ACHR TOTAL SFR SER: 51 % (ref 0–20)

## 2021-06-16 LAB — MUSK AB SER IA-ACNC: <1 U/ML

## 2021-06-21 ENCOUNTER — TELEPHONE (OUTPATIENT)
Dept: NEUROLOGY | Facility: CLINIC | Age: 61
End: 2021-06-21

## 2021-06-21 NOTE — TELEPHONE ENCOUNTER
Spoke with patient to schedule his hospital follow up appointment  Patient is scheduled for 09/29 with Dr Anthony Song in the [facility] office  I did offer a sooner appointment in the [place] office but patient declined due to transportation  Placed patient on the waitlist      SLMO/Ptosis/Self Pay    Notes from chart:  Jesenia Blake will need follow up in in 4 weeks with neuromuscular attending  He will not require outpatient neurological testing

## 2021-09-28 ENCOUNTER — TELEPHONE (OUTPATIENT)
Dept: NEUROLOGY | Facility: CLINIC | Age: 61
End: 2021-09-28

## 2021-09-30 ENCOUNTER — TELEPHONE (OUTPATIENT)
Dept: NEUROLOGY | Facility: CLINIC | Age: 61
End: 2021-09-30

## 2021-10-01 ENCOUNTER — OFFICE VISIT (OUTPATIENT)
Dept: NEUROLOGY | Facility: CLINIC | Age: 61
End: 2021-10-01

## 2021-10-01 VITALS
HEIGHT: 72 IN | SYSTOLIC BLOOD PRESSURE: 132 MMHG | RESPIRATION RATE: 14 BRPM | BODY MASS INDEX: 42.66 KG/M2 | HEART RATE: 66 BPM | TEMPERATURE: 99.5 F | DIASTOLIC BLOOD PRESSURE: 80 MMHG | WEIGHT: 315 LBS

## 2021-10-01 DIAGNOSIS — I25.10 CORONARY ARTERY DISEASE WITHOUT ANGINA PECTORIS, UNSPECIFIED VESSEL OR LESION TYPE, UNSPECIFIED WHETHER NATIVE OR TRANSPLANTED HEART: ICD-10-CM

## 2021-10-01 DIAGNOSIS — H02.402 PTOSIS OF LEFT EYELID: ICD-10-CM

## 2021-10-01 DIAGNOSIS — G70.00 MYASTHENIA GRAVIS (HCC): Primary | ICD-10-CM

## 2021-10-01 DIAGNOSIS — E66.01 MORBID OBESITY (HCC): ICD-10-CM

## 2021-10-01 PROCEDURE — 99214 OFFICE O/P EST MOD 30 MIN: CPT | Performed by: PSYCHIATRY & NEUROLOGY

## 2021-10-01 RX ORDER — PYRIDOSTIGMINE BROMIDE 60 MG/1
60 TABLET ORAL 3 TIMES DAILY
Qty: 90 TABLET | Refills: 5 | Status: SHIPPED | OUTPATIENT
Start: 2021-10-01 | End: 2021-12-06 | Stop reason: HOSPADM

## 2021-10-04 DIAGNOSIS — G70.00 MYASTHENIA GRAVIS (HCC): Primary | ICD-10-CM

## 2021-10-08 ENCOUNTER — TELEPHONE (OUTPATIENT)
Dept: NEUROLOGY | Facility: CLINIC | Age: 61
End: 2021-10-08

## 2021-10-29 ENCOUNTER — TELEPHONE (OUTPATIENT)
Dept: NEUROLOGY | Facility: CLINIC | Age: 61
End: 2021-10-29

## 2021-11-08 ENCOUNTER — TELEPHONE (OUTPATIENT)
Dept: NEUROLOGY | Facility: CLINIC | Age: 61
End: 2021-11-08

## 2021-12-01 ENCOUNTER — TELEPHONE (OUTPATIENT)
Dept: NEUROLOGY | Facility: CLINIC | Age: 61
End: 2021-12-01

## 2021-12-01 DIAGNOSIS — G70.00 MYASTHENIA GRAVIS (HCC): Primary | ICD-10-CM

## 2021-12-01 DIAGNOSIS — R29.90 STROKE-LIKE SYMPTOMS: ICD-10-CM

## 2021-12-02 ENCOUNTER — HOSPITAL ENCOUNTER (INPATIENT)
Facility: HOSPITAL | Age: 61
LOS: 3 days | Discharge: LEFT BEFORE DISCHARGE | DRG: 057 | End: 2021-12-06
Attending: EMERGENCY MEDICINE | Admitting: STUDENT IN AN ORGANIZED HEALTH CARE EDUCATION/TRAINING PROGRAM

## 2021-12-02 DIAGNOSIS — H02.409 PTOSIS: Primary | ICD-10-CM

## 2021-12-02 DIAGNOSIS — G70.01 MYASTHENIA GRAVIS WITH ACUTE EXACERBATION (HCC): ICD-10-CM

## 2021-12-02 DIAGNOSIS — G70.00 MYASTHENIA GRAVIS (HCC): ICD-10-CM

## 2021-12-02 LAB
ALBUMIN SERPL BCP-MCNC: 3.2 G/DL (ref 3.5–5)
ALP SERPL-CCNC: 86 U/L (ref 46–116)
ALT SERPL W P-5'-P-CCNC: 22 U/L (ref 12–78)
ANION GAP SERPL CALCULATED.3IONS-SCNC: 6 MMOL/L (ref 4–13)
AST SERPL W P-5'-P-CCNC: 14 U/L (ref 5–45)
BASOPHILS # BLD AUTO: 0.03 THOUSANDS/ΜL (ref 0–0.1)
BASOPHILS NFR BLD AUTO: 0 % (ref 0–1)
BILIRUB SERPL-MCNC: 0.28 MG/DL (ref 0.2–1)
BUN SERPL-MCNC: 15 MG/DL (ref 5–25)
CALCIUM ALBUM COR SERPL-MCNC: 9.3 MG/DL (ref 8.3–10.1)
CALCIUM SERPL-MCNC: 8.7 MG/DL (ref 8.3–10.1)
CHLORIDE SERPL-SCNC: 105 MMOL/L (ref 100–108)
CHOLEST SERPL-MCNC: 198 MG/DL
CO2 SERPL-SCNC: 33 MMOL/L (ref 21–32)
CREAT SERPL-MCNC: 1.17 MG/DL (ref 0.6–1.3)
EOSINOPHIL # BLD AUTO: 0.38 THOUSAND/ΜL (ref 0–0.61)
EOSINOPHIL NFR BLD AUTO: 5 % (ref 0–6)
ERYTHROCYTE [DISTWIDTH] IN BLOOD BY AUTOMATED COUNT: 13.3 % (ref 11.6–15.1)
EST. AVERAGE GLUCOSE BLD GHB EST-MCNC: 128 MG/DL
FLUAV RNA RESP QL NAA+PROBE: NEGATIVE
FLUBV RNA RESP QL NAA+PROBE: NEGATIVE
FOLATE SERPL-MCNC: >20 NG/ML (ref 3.1–17.5)
GFR SERPL CREATININE-BSD FRML MDRD: 66 ML/MIN/1.73SQ M
GLUCOSE SERPL-MCNC: 107 MG/DL (ref 65–140)
HBA1C MFR BLD: 6.1 %
HCT VFR BLD AUTO: 46.8 % (ref 36.5–49.3)
HDLC SERPL-MCNC: 46 MG/DL
HGB BLD-MCNC: 15.1 G/DL (ref 12–17)
IMM GRANULOCYTES # BLD AUTO: 0.01 THOUSAND/UL (ref 0–0.2)
IMM GRANULOCYTES NFR BLD AUTO: 0 % (ref 0–2)
LDLC SERPL CALC-MCNC: 128 MG/DL (ref 0–100)
LYMPHOCYTES # BLD AUTO: 1.88 THOUSANDS/ΜL (ref 0.6–4.47)
LYMPHOCYTES NFR BLD AUTO: 24 % (ref 14–44)
MCH RBC QN AUTO: 28.3 PG (ref 26.8–34.3)
MCHC RBC AUTO-ENTMCNC: 32.3 G/DL (ref 31.4–37.4)
MCV RBC AUTO: 88 FL (ref 82–98)
MONOCYTES # BLD AUTO: 0.69 THOUSAND/ΜL (ref 0.17–1.22)
MONOCYTES NFR BLD AUTO: 9 % (ref 4–12)
NEUTROPHILS # BLD AUTO: 4.94 THOUSANDS/ΜL (ref 1.85–7.62)
NEUTS SEG NFR BLD AUTO: 62 % (ref 43–75)
NRBC BLD AUTO-RTO: 0 /100 WBCS
PLATELET # BLD AUTO: 224 THOUSANDS/UL (ref 149–390)
PMV BLD AUTO: 10.4 FL (ref 8.9–12.7)
POTASSIUM SERPL-SCNC: 3.9 MMOL/L (ref 3.5–5.3)
PROT SERPL-MCNC: 7 G/DL (ref 6.4–8.2)
RBC # BLD AUTO: 5.33 MILLION/UL (ref 3.88–5.62)
RSV RNA RESP QL NAA+PROBE: NEGATIVE
SARS-COV-2 RNA RESP QL NAA+PROBE: NEGATIVE
SODIUM SERPL-SCNC: 144 MMOL/L (ref 136–145)
TRIGL SERPL-MCNC: 118 MG/DL
TSH SERPL DL<=0.05 MIU/L-ACNC: 2.53 UIU/ML (ref 0.36–3.74)
VIT B12 SERPL-MCNC: 277 PG/ML (ref 100–900)
WBC # BLD AUTO: 7.93 THOUSAND/UL (ref 4.31–10.16)

## 2021-12-02 PROCEDURE — 94150 VITAL CAPACITY TEST: CPT

## 2021-12-02 PROCEDURE — 36415 COLL VENOUS BLD VENIPUNCTURE: CPT | Performed by: EMERGENCY MEDICINE

## 2021-12-02 PROCEDURE — 30233S1 TRANSFUSION OF NONAUTOLOGOUS GLOBULIN INTO PERIPHERAL VEIN, PERCUTANEOUS APPROACH: ICD-10-PCS | Performed by: STUDENT IN AN ORGANIZED HEALTH CARE EDUCATION/TRAINING PROGRAM

## 2021-12-02 PROCEDURE — 83036 HEMOGLOBIN GLYCOSYLATED A1C: CPT | Performed by: NURSE PRACTITIONER

## 2021-12-02 PROCEDURE — 96360 HYDRATION IV INFUSION INIT: CPT

## 2021-12-02 PROCEDURE — 82746 ASSAY OF FOLIC ACID SERUM: CPT | Performed by: NURSE PRACTITIONER

## 2021-12-02 PROCEDURE — 80053 COMPREHEN METABOLIC PANEL: CPT | Performed by: EMERGENCY MEDICINE

## 2021-12-02 PROCEDURE — 99245 OFF/OP CONSLTJ NEW/EST HI 55: CPT | Performed by: PSYCHIATRY & NEUROLOGY

## 2021-12-02 PROCEDURE — 0241U HB NFCT DS VIR RESP RNA 4 TRGT: CPT | Performed by: NURSE PRACTITIONER

## 2021-12-02 PROCEDURE — 85025 COMPLETE CBC W/AUTO DIFF WBC: CPT | Performed by: EMERGENCY MEDICINE

## 2021-12-02 PROCEDURE — 99285 EMERGENCY DEPT VISIT HI MDM: CPT | Performed by: EMERGENCY MEDICINE

## 2021-12-02 PROCEDURE — 84443 ASSAY THYROID STIM HORMONE: CPT | Performed by: NURSE PRACTITIONER

## 2021-12-02 PROCEDURE — 82607 VITAMIN B-12: CPT | Performed by: NURSE PRACTITIONER

## 2021-12-02 PROCEDURE — 99220 PR INITIAL OBSERVATION CARE/DAY 70 MINUTES: CPT | Performed by: INTERNAL MEDICINE

## 2021-12-02 PROCEDURE — 80061 LIPID PANEL: CPT | Performed by: NURSE PRACTITIONER

## 2021-12-02 PROCEDURE — 99284 EMERGENCY DEPT VISIT MOD MDM: CPT

## 2021-12-02 RX ORDER — PREDNISONE 20 MG/1
60 TABLET ORAL DAILY
Status: DISCONTINUED | OUTPATIENT
Start: 2021-12-03 | End: 2021-12-06 | Stop reason: HOSPADM

## 2021-12-02 RX ORDER — PYRIDOSTIGMINE BROMIDE 60 MG/1
60 TABLET ORAL ONCE
Status: DISCONTINUED | OUTPATIENT
Start: 2021-12-02 | End: 2021-12-02

## 2021-12-02 RX ORDER — ACETAMINOPHEN 325 MG/1
650 TABLET ORAL EVERY 6 HOURS PRN
Status: DISCONTINUED | OUTPATIENT
Start: 2021-12-02 | End: 2021-12-06 | Stop reason: HOSPADM

## 2021-12-02 RX ORDER — PANTOPRAZOLE SODIUM 40 MG/1
40 INJECTION, POWDER, FOR SOLUTION INTRAVENOUS DAILY
Status: DISCONTINUED | OUTPATIENT
Start: 2021-12-03 | End: 2021-12-04

## 2021-12-02 RX ORDER — PYRIDOSTIGMINE BROMIDE 60 MG/1
60 TABLET ORAL 4 TIMES DAILY
Status: DISCONTINUED | OUTPATIENT
Start: 2021-12-02 | End: 2021-12-06 | Stop reason: HOSPADM

## 2021-12-02 RX ADMIN — ACETAMINOPHEN 650 MG: 325 TABLET, FILM COATED ORAL at 19:14

## 2021-12-02 RX ADMIN — SODIUM CHLORIDE 500 ML: 0.9 INJECTION, SOLUTION INTRAVENOUS at 19:14

## 2021-12-02 RX ADMIN — PYRIDOSTIGMINE BROMIDE 60 MG: 60 TABLET ORAL at 19:14

## 2021-12-02 RX ADMIN — IMMUNE GLOBULIN (HUMAN) 40 G: 10 INJECTION INTRAVENOUS; SUBCUTANEOUS at 19:27

## 2021-12-02 RX ADMIN — SODIUM CHLORIDE 1000 ML: 0.9 INJECTION, SOLUTION INTRAVENOUS at 13:34

## 2021-12-03 LAB
ANION GAP SERPL CALCULATED.3IONS-SCNC: 5 MMOL/L (ref 4–13)
BASOPHILS # BLD AUTO: 0.03 THOUSANDS/ΜL (ref 0–0.1)
BASOPHILS NFR BLD AUTO: 1 % (ref 0–1)
BUN SERPL-MCNC: 13 MG/DL (ref 5–25)
CALCIUM SERPL-MCNC: 8.8 MG/DL (ref 8.3–10.1)
CHLORIDE SERPL-SCNC: 104 MMOL/L (ref 100–108)
CO2 SERPL-SCNC: 32 MMOL/L (ref 21–32)
CREAT SERPL-MCNC: 1.01 MG/DL (ref 0.6–1.3)
EOSINOPHIL # BLD AUTO: 0.37 THOUSAND/ΜL (ref 0–0.61)
EOSINOPHIL NFR BLD AUTO: 6 % (ref 0–6)
ERYTHROCYTE [DISTWIDTH] IN BLOOD BY AUTOMATED COUNT: 13.2 % (ref 11.6–15.1)
GFR SERPL CREATININE-BSD FRML MDRD: 79 ML/MIN/1.73SQ M
GLUCOSE P FAST SERPL-MCNC: 110 MG/DL (ref 65–99)
GLUCOSE SERPL-MCNC: 110 MG/DL (ref 65–140)
HCT VFR BLD AUTO: 47.1 % (ref 36.5–49.3)
HGB BLD-MCNC: 14.7 G/DL (ref 12–17)
IMM GRANULOCYTES # BLD AUTO: 0 THOUSAND/UL (ref 0–0.2)
IMM GRANULOCYTES NFR BLD AUTO: 0 % (ref 0–2)
LYMPHOCYTES # BLD AUTO: 1.79 THOUSANDS/ΜL (ref 0.6–4.47)
LYMPHOCYTES NFR BLD AUTO: 28 % (ref 14–44)
MCH RBC QN AUTO: 27.8 PG (ref 26.8–34.3)
MCHC RBC AUTO-ENTMCNC: 31.2 G/DL (ref 31.4–37.4)
MCV RBC AUTO: 89 FL (ref 82–98)
MONOCYTES # BLD AUTO: 0.65 THOUSAND/ΜL (ref 0.17–1.22)
MONOCYTES NFR BLD AUTO: 10 % (ref 4–12)
NEUTROPHILS # BLD AUTO: 3.55 THOUSANDS/ΜL (ref 1.85–7.62)
NEUTS SEG NFR BLD AUTO: 55 % (ref 43–75)
NRBC BLD AUTO-RTO: 0 /100 WBCS
PLATELET # BLD AUTO: 201 THOUSANDS/UL (ref 149–390)
PMV BLD AUTO: 10.4 FL (ref 8.9–12.7)
POTASSIUM SERPL-SCNC: 4.2 MMOL/L (ref 3.5–5.3)
RBC # BLD AUTO: 5.28 MILLION/UL (ref 3.88–5.62)
SODIUM SERPL-SCNC: 141 MMOL/L (ref 136–145)
WBC # BLD AUTO: 6.39 THOUSAND/UL (ref 4.31–10.16)

## 2021-12-03 PROCEDURE — 85025 COMPLETE CBC W/AUTO DIFF WBC: CPT | Performed by: INTERNAL MEDICINE

## 2021-12-03 PROCEDURE — C9113 INJ PANTOPRAZOLE SODIUM, VIA: HCPCS | Performed by: NURSE PRACTITIONER

## 2021-12-03 PROCEDURE — 99233 SBSQ HOSP IP/OBS HIGH 50: CPT | Performed by: PSYCHIATRY & NEUROLOGY

## 2021-12-03 PROCEDURE — 36415 COLL VENOUS BLD VENIPUNCTURE: CPT | Performed by: INTERNAL MEDICINE

## 2021-12-03 PROCEDURE — 99233 SBSQ HOSP IP/OBS HIGH 50: CPT | Performed by: STUDENT IN AN ORGANIZED HEALTH CARE EDUCATION/TRAINING PROGRAM

## 2021-12-03 PROCEDURE — 80048 BASIC METABOLIC PNL TOTAL CA: CPT | Performed by: INTERNAL MEDICINE

## 2021-12-03 RX ADMIN — PYRIDOSTIGMINE BROMIDE 60 MG: 60 TABLET ORAL at 22:33

## 2021-12-03 RX ADMIN — PANTOPRAZOLE SODIUM 40 MG: 40 INJECTION, POWDER, FOR SOLUTION INTRAVENOUS at 08:57

## 2021-12-03 RX ADMIN — ACETAMINOPHEN 650 MG: 325 TABLET, FILM COATED ORAL at 02:30

## 2021-12-03 RX ADMIN — PYRIDOSTIGMINE BROMIDE 60 MG: 60 TABLET ORAL at 13:20

## 2021-12-03 RX ADMIN — PYRIDOSTIGMINE BROMIDE 60 MG: 60 TABLET ORAL at 17:16

## 2021-12-03 RX ADMIN — IMMUNE GLOBULIN (HUMAN) 40 G: 10 INJECTION INTRAVENOUS; SUBCUTANEOUS at 20:17

## 2021-12-03 RX ADMIN — PREDNISONE 60 MG: 20 TABLET ORAL at 08:57

## 2021-12-03 RX ADMIN — PYRIDOSTIGMINE BROMIDE 60 MG: 60 TABLET ORAL at 08:57

## 2021-12-04 LAB
ALBUMIN SERPL BCP-MCNC: 2.9 G/DL (ref 3.5–5)
ALP SERPL-CCNC: 74 U/L (ref 46–116)
ALT SERPL W P-5'-P-CCNC: 16 U/L (ref 12–78)
ANION GAP SERPL CALCULATED.3IONS-SCNC: 4 MMOL/L (ref 4–13)
AST SERPL W P-5'-P-CCNC: 17 U/L (ref 5–45)
BILIRUB SERPL-MCNC: 0.25 MG/DL (ref 0.2–1)
BUN SERPL-MCNC: 15 MG/DL (ref 5–25)
CALCIUM ALBUM COR SERPL-MCNC: 9.6 MG/DL (ref 8.3–10.1)
CALCIUM SERPL-MCNC: 8.7 MG/DL (ref 8.3–10.1)
CHLORIDE SERPL-SCNC: 105 MMOL/L (ref 100–108)
CO2 SERPL-SCNC: 32 MMOL/L (ref 21–32)
CREAT SERPL-MCNC: 0.98 MG/DL (ref 0.6–1.3)
ERYTHROCYTE [DISTWIDTH] IN BLOOD BY AUTOMATED COUNT: 13.2 % (ref 11.6–15.1)
GFR SERPL CREATININE-BSD FRML MDRD: 82 ML/MIN/1.73SQ M
GLUCOSE SERPL-MCNC: 147 MG/DL (ref 65–140)
HCT VFR BLD AUTO: 43.1 % (ref 36.5–49.3)
HGB BLD-MCNC: 14.1 G/DL (ref 12–17)
MCH RBC QN AUTO: 28.8 PG (ref 26.8–34.3)
MCHC RBC AUTO-ENTMCNC: 32.7 G/DL (ref 31.4–37.4)
MCV RBC AUTO: 88 FL (ref 82–98)
PLATELET # BLD AUTO: 210 THOUSANDS/UL (ref 149–390)
PMV BLD AUTO: 10.5 FL (ref 8.9–12.7)
POTASSIUM SERPL-SCNC: 4 MMOL/L (ref 3.5–5.3)
PROT SERPL-MCNC: 7.7 G/DL (ref 6.4–8.2)
RBC # BLD AUTO: 4.9 MILLION/UL (ref 3.88–5.62)
SODIUM SERPL-SCNC: 141 MMOL/L (ref 136–145)
WBC # BLD AUTO: 9.56 THOUSAND/UL (ref 4.31–10.16)

## 2021-12-04 PROCEDURE — 80053 COMPREHEN METABOLIC PANEL: CPT | Performed by: STUDENT IN AN ORGANIZED HEALTH CARE EDUCATION/TRAINING PROGRAM

## 2021-12-04 PROCEDURE — C9113 INJ PANTOPRAZOLE SODIUM, VIA: HCPCS | Performed by: NURSE PRACTITIONER

## 2021-12-04 PROCEDURE — 85027 COMPLETE CBC AUTOMATED: CPT | Performed by: STUDENT IN AN ORGANIZED HEALTH CARE EDUCATION/TRAINING PROGRAM

## 2021-12-04 PROCEDURE — 99232 SBSQ HOSP IP/OBS MODERATE 35: CPT | Performed by: PSYCHIATRY & NEUROLOGY

## 2021-12-04 PROCEDURE — 99232 SBSQ HOSP IP/OBS MODERATE 35: CPT | Performed by: STUDENT IN AN ORGANIZED HEALTH CARE EDUCATION/TRAINING PROGRAM

## 2021-12-04 RX ORDER — PANTOPRAZOLE SODIUM 40 MG/1
40 TABLET, DELAYED RELEASE ORAL
Status: DISCONTINUED | OUTPATIENT
Start: 2021-12-05 | End: 2021-12-06 | Stop reason: HOSPADM

## 2021-12-04 RX ORDER — MINERAL OIL AND PETROLATUM 150; 830 MG/G; MG/G
OINTMENT OPHTHALMIC 2 TIMES DAILY PRN
Status: DISCONTINUED | OUTPATIENT
Start: 2021-12-04 | End: 2021-12-06 | Stop reason: HOSPADM

## 2021-12-04 RX ADMIN — PYRIDOSTIGMINE BROMIDE 60 MG: 60 TABLET ORAL at 14:52

## 2021-12-04 RX ADMIN — PYRIDOSTIGMINE BROMIDE 60 MG: 60 TABLET ORAL at 08:06

## 2021-12-04 RX ADMIN — PREDNISONE 60 MG: 20 TABLET ORAL at 08:01

## 2021-12-04 RX ADMIN — WHITE PETROLATUM 57.7 %-MINERAL OIL 31.9 % EYE OINTMENT: at 22:18

## 2021-12-04 RX ADMIN — PANTOPRAZOLE SODIUM 40 MG: 40 INJECTION, POWDER, FOR SOLUTION INTRAVENOUS at 08:01

## 2021-12-04 RX ADMIN — PYRIDOSTIGMINE BROMIDE 60 MG: 60 TABLET ORAL at 22:18

## 2021-12-04 RX ADMIN — PYRIDOSTIGMINE BROMIDE 60 MG: 60 TABLET ORAL at 17:55

## 2021-12-04 RX ADMIN — IMMUNE GLOBULIN (HUMAN) 40 G: 10 INJECTION INTRAVENOUS; SUBCUTANEOUS at 08:01

## 2021-12-04 RX ADMIN — WHITE PETROLATUM 57.7 %-MINERAL OIL 31.9 % EYE OINTMENT 1 APPLICATION: at 17:55

## 2021-12-04 RX ADMIN — ENOXAPARIN SODIUM 40 MG: 40 INJECTION SUBCUTANEOUS at 17:55

## 2021-12-05 LAB
ANION GAP SERPL CALCULATED.3IONS-SCNC: 5 MMOL/L (ref 4–13)
BUN SERPL-MCNC: 14 MG/DL (ref 5–25)
CALCIUM SERPL-MCNC: 8.7 MG/DL (ref 8.3–10.1)
CHLORIDE SERPL-SCNC: 105 MMOL/L (ref 100–108)
CO2 SERPL-SCNC: 31 MMOL/L (ref 21–32)
CREAT SERPL-MCNC: 1.01 MG/DL (ref 0.6–1.3)
ERYTHROCYTE [DISTWIDTH] IN BLOOD BY AUTOMATED COUNT: 13.3 % (ref 11.6–15.1)
GFR SERPL CREATININE-BSD FRML MDRD: 79 ML/MIN/1.73SQ M
GLUCOSE SERPL-MCNC: 83 MG/DL (ref 65–140)
HCT VFR BLD AUTO: 45.8 % (ref 36.5–49.3)
HGB BLD-MCNC: 14.2 G/DL (ref 12–17)
MCH RBC QN AUTO: 27.2 PG (ref 26.8–34.3)
MCHC RBC AUTO-ENTMCNC: 31 G/DL (ref 31.4–37.4)
MCV RBC AUTO: 88 FL (ref 82–98)
PLATELET # BLD AUTO: 214 THOUSANDS/UL (ref 149–390)
PMV BLD AUTO: 10.9 FL (ref 8.9–12.7)
POTASSIUM SERPL-SCNC: 4 MMOL/L (ref 3.5–5.3)
RBC # BLD AUTO: 5.22 MILLION/UL (ref 3.88–5.62)
SODIUM SERPL-SCNC: 141 MMOL/L (ref 136–145)
WBC # BLD AUTO: 8.7 THOUSAND/UL (ref 4.31–10.16)

## 2021-12-05 PROCEDURE — 85027 COMPLETE CBC AUTOMATED: CPT | Performed by: STUDENT IN AN ORGANIZED HEALTH CARE EDUCATION/TRAINING PROGRAM

## 2021-12-05 PROCEDURE — 80048 BASIC METABOLIC PNL TOTAL CA: CPT | Performed by: STUDENT IN AN ORGANIZED HEALTH CARE EDUCATION/TRAINING PROGRAM

## 2021-12-05 PROCEDURE — 99232 SBSQ HOSP IP/OBS MODERATE 35: CPT | Performed by: STUDENT IN AN ORGANIZED HEALTH CARE EDUCATION/TRAINING PROGRAM

## 2021-12-05 RX ADMIN — PYRIDOSTIGMINE BROMIDE 60 MG: 60 TABLET ORAL at 21:37

## 2021-12-05 RX ADMIN — PANTOPRAZOLE SODIUM 40 MG: 40 TABLET, DELAYED RELEASE ORAL at 07:30

## 2021-12-05 RX ADMIN — WHITE PETROLATUM 57.7 %-MINERAL OIL 31.9 % EYE OINTMENT: at 09:54

## 2021-12-05 RX ADMIN — ENOXAPARIN SODIUM 40 MG: 40 INJECTION SUBCUTANEOUS at 09:54

## 2021-12-05 RX ADMIN — PYRIDOSTIGMINE BROMIDE 60 MG: 60 TABLET ORAL at 09:55

## 2021-12-05 RX ADMIN — PYRIDOSTIGMINE BROMIDE 60 MG: 60 TABLET ORAL at 17:09

## 2021-12-05 RX ADMIN — PYRIDOSTIGMINE BROMIDE 60 MG: 60 TABLET ORAL at 12:18

## 2021-12-05 RX ADMIN — IMMUNE GLOBULIN (HUMAN) 40 G: 10 INJECTION INTRAVENOUS; SUBCUTANEOUS at 09:51

## 2021-12-05 RX ADMIN — PREDNISONE 60 MG: 20 TABLET ORAL at 09:54

## 2021-12-06 VITALS
HEART RATE: 69 BPM | OXYGEN SATURATION: 87 % | HEIGHT: 72 IN | TEMPERATURE: 98 F | WEIGHT: 315 LBS | RESPIRATION RATE: 18 BRPM | SYSTOLIC BLOOD PRESSURE: 167 MMHG | DIASTOLIC BLOOD PRESSURE: 73 MMHG | BODY MASS INDEX: 42.66 KG/M2

## 2021-12-06 PROCEDURE — 99232 SBSQ HOSP IP/OBS MODERATE 35: CPT | Performed by: STUDENT IN AN ORGANIZED HEALTH CARE EDUCATION/TRAINING PROGRAM

## 2021-12-06 PROCEDURE — 99239 HOSP IP/OBS DSCHRG MGMT >30: CPT | Performed by: STUDENT IN AN ORGANIZED HEALTH CARE EDUCATION/TRAINING PROGRAM

## 2021-12-06 PROCEDURE — 99232 SBSQ HOSP IP/OBS MODERATE 35: CPT | Performed by: PSYCHIATRY & NEUROLOGY

## 2021-12-06 RX ORDER — PYRIDOSTIGMINE BROMIDE 60 MG/1
60 TABLET ORAL 4 TIMES DAILY
Qty: 120 TABLET | Refills: 0 | Status: SHIPPED | OUTPATIENT
Start: 2021-12-06 | End: 2022-01-11 | Stop reason: SDUPTHER

## 2021-12-06 RX ORDER — PREDNISONE 20 MG/1
20 TABLET ORAL DAILY
Status: DISCONTINUED | OUTPATIENT
Start: 2021-12-31 | End: 2021-12-06 | Stop reason: HOSPADM

## 2021-12-06 RX ORDER — PREDNISONE 20 MG/1
40 TABLET ORAL DAILY
Status: DISCONTINUED | OUTPATIENT
Start: 2021-12-17 | End: 2021-12-06 | Stop reason: HOSPADM

## 2021-12-06 RX ORDER — PREDNISONE 20 MG/1
TABLET ORAL
Qty: 100 TABLET | Refills: 0 | Status: SHIPPED | OUTPATIENT
Start: 2021-12-07 | End: 2022-02-11

## 2021-12-06 RX ADMIN — PANTOPRAZOLE SODIUM 40 MG: 40 TABLET, DELAYED RELEASE ORAL at 05:20

## 2021-12-06 RX ADMIN — PYRIDOSTIGMINE BROMIDE 60 MG: 60 TABLET ORAL at 17:00

## 2021-12-06 RX ADMIN — PYRIDOSTIGMINE BROMIDE 60 MG: 60 TABLET ORAL at 12:01

## 2021-12-06 RX ADMIN — PREDNISONE 60 MG: 20 TABLET ORAL at 08:03

## 2021-12-06 RX ADMIN — ENOXAPARIN SODIUM 40 MG: 40 INJECTION SUBCUTANEOUS at 08:03

## 2021-12-06 RX ADMIN — IMMUNE GLOBULIN (HUMAN) 40 G: 10 INJECTION INTRAVENOUS; SUBCUTANEOUS at 10:34

## 2021-12-06 RX ADMIN — PYRIDOSTIGMINE BROMIDE 60 MG: 60 TABLET ORAL at 08:03

## 2021-12-15 ENCOUNTER — TELEPHONE (OUTPATIENT)
Dept: NEUROLOGY | Facility: CLINIC | Age: 61
End: 2021-12-15

## 2022-01-11 DIAGNOSIS — G70.01 MYASTHENIA GRAVIS WITH ACUTE EXACERBATION (HCC): ICD-10-CM

## 2022-01-11 DIAGNOSIS — G70.00 MYASTHENIA GRAVIS (HCC): ICD-10-CM

## 2022-01-11 RX ORDER — PYRIDOSTIGMINE BROMIDE 60 MG/1
60 TABLET ORAL 4 TIMES DAILY
Qty: 360 TABLET | Refills: 0 | Status: ON HOLD | OUTPATIENT
Start: 2022-01-11 | End: 2022-06-14 | Stop reason: SDUPTHER

## 2022-01-11 NOTE — TELEPHONE ENCOUNTER
Pt calling for refill of Mestinon  Pt taking QID  Request med be sent to [facility] DR MINDY JENKNIS  Next OV 1/13/2022    Requests cb if/when script approved  [phone]    Dr Tuan Panda - Rx entered  Please review and sign if in agreement

## 2022-02-24 ENCOUNTER — TELEPHONE (OUTPATIENT)
Dept: NEUROLOGY | Facility: CLINIC | Age: 62
End: 2022-02-24

## 2022-05-13 ENCOUNTER — TELEPHONE (OUTPATIENT)
Dept: NEUROLOGY | Facility: CLINIC | Age: 62
End: 2022-05-13

## 2022-05-13 NOTE — TELEPHONE ENCOUNTER
Pt called c/o worsening ptosis and at times, he cannot open his eyes  States that he was given steroids while in the hospital which helped his symptoms    New or worsening weakness? Yes-worsening ptosis  Difficulty walking? Yes  Off  balance at times  No falls    Shortness of breath? No    Difficulty speaking? Yes  At times difficulty speaking  Medications confirmed as:  Mestinon 60 mg qid     He was getting IVIG in the past  He tolerated it well  Advised that he missed his HFU appt on 3/3/22 (30 min)  States that he has no income that is why he missed a couple of his appts  He is agreeable to reschedule this appt     Accepted 5/18/22 at 11 am but I was having issue scheduling this pt    Message sent to Lolly to assist w/ scheduling pt on 5/18/22 at 11 am              [phone] ok to leave detailed message

## 2022-05-16 ENCOUNTER — TELEPHONE (OUTPATIENT)
Dept: NEUROLOGY | Facility: CLINIC | Age: 62
End: 2022-05-16

## 2022-06-09 ENCOUNTER — APPOINTMENT (INPATIENT)
Dept: CT IMAGING | Facility: HOSPITAL | Age: 62
DRG: 042 | End: 2022-06-09
Payer: COMMERCIAL

## 2022-06-09 ENCOUNTER — APPOINTMENT (EMERGENCY)
Dept: RADIOLOGY | Facility: HOSPITAL | Age: 62
DRG: 042 | End: 2022-06-09
Payer: COMMERCIAL

## 2022-06-09 ENCOUNTER — TELEPHONE (OUTPATIENT)
Dept: NEUROLOGY | Facility: CLINIC | Age: 62
End: 2022-06-09

## 2022-06-09 ENCOUNTER — HOSPITAL ENCOUNTER (INPATIENT)
Facility: HOSPITAL | Age: 62
LOS: 6 days | Discharge: HOME | DRG: 042 | End: 2022-06-15
Attending: EMERGENCY MEDICINE | Admitting: INTERNAL MEDICINE
Payer: COMMERCIAL

## 2022-06-09 DIAGNOSIS — G70.01 MYASTHENIA GRAVIS WITH ACUTE EXACERBATION (HCC): ICD-10-CM

## 2022-06-09 DIAGNOSIS — G70.01 MYASTHENIA GRAVIS IN CRISIS (HCC): ICD-10-CM

## 2022-06-09 DIAGNOSIS — G70.00 MYASTHENIA GRAVIS (HCC): Primary | ICD-10-CM

## 2022-06-09 LAB
2HR DELTA HS TROPONIN: 1 NG/L
4HR DELTA HS TROPONIN: 0 NG/L
ANION GAP SERPL CALCULATED.3IONS-SCNC: 8 MMOL/L (ref 4–13)
ATRIAL RATE: 64 BPM
ATRIAL RATE: 66 BPM
ATRIAL RATE: 68 BPM
ATRIAL RATE: 75 BPM
BASOPHILS # BLD AUTO: 0.03 THOUSANDS/ΜL (ref 0–0.1)
BASOPHILS NFR BLD AUTO: 0 % (ref 0–1)
BUN SERPL-MCNC: 18 MG/DL (ref 5–25)
CALCIUM SERPL-MCNC: 8.5 MG/DL (ref 8.3–10.1)
CARDIAC TROPONIN I PNL SERPL HS: 6 NG/L
CARDIAC TROPONIN I PNL SERPL HS: 6 NG/L
CARDIAC TROPONIN I PNL SERPL HS: 7 NG/L
CHLORIDE SERPL-SCNC: 103 MMOL/L (ref 100–108)
CO2 SERPL-SCNC: 27 MMOL/L (ref 21–32)
CREAT SERPL-MCNC: 1.02 MG/DL (ref 0.6–1.3)
EOSINOPHIL # BLD AUTO: 0.35 THOUSAND/ΜL (ref 0–0.61)
EOSINOPHIL NFR BLD AUTO: 5 % (ref 0–6)
ERYTHROCYTE [DISTWIDTH] IN BLOOD BY AUTOMATED COUNT: 13.4 % (ref 11.6–15.1)
FLUAV RNA RESP QL NAA+PROBE: NEGATIVE
FLUBV RNA RESP QL NAA+PROBE: NEGATIVE
GFR SERPL CREATININE-BSD FRML MDRD: 77 ML/MIN/1.73SQ M
GLUCOSE SERPL-MCNC: 150 MG/DL (ref 65–140)
GLUCOSE SERPL-MCNC: 152 MG/DL (ref 65–140)
HCT VFR BLD AUTO: 47 % (ref 36.5–49.3)
HGB BLD-MCNC: 15 G/DL (ref 12–17)
IMM GRANULOCYTES # BLD AUTO: 0.02 THOUSAND/UL (ref 0–0.2)
IMM GRANULOCYTES NFR BLD AUTO: 0 % (ref 0–2)
LYMPHOCYTES # BLD AUTO: 1.78 THOUSANDS/ΜL (ref 0.6–4.47)
LYMPHOCYTES NFR BLD AUTO: 25 % (ref 14–44)
MAGNESIUM SERPL-MCNC: 2 MG/DL (ref 1.6–2.6)
MCH RBC QN AUTO: 27.4 PG (ref 26.8–34.3)
MCHC RBC AUTO-ENTMCNC: 31.9 G/DL (ref 31.4–37.4)
MCV RBC AUTO: 86 FL (ref 82–98)
MONOCYTES # BLD AUTO: 0.56 THOUSAND/ΜL (ref 0.17–1.22)
MONOCYTES NFR BLD AUTO: 8 % (ref 4–12)
NEUTROPHILS # BLD AUTO: 4.34 THOUSANDS/ΜL (ref 1.85–7.62)
NEUTS SEG NFR BLD AUTO: 62 % (ref 43–75)
NRBC BLD AUTO-RTO: 0 /100 WBCS
NT-PROBNP SERPL-MCNC: 197 PG/ML
P AXIS: 10 DEGREES
P AXIS: 16 DEGREES
P AXIS: 6 DEGREES
P AXIS: 9 DEGREES
PLATELET # BLD AUTO: 219 THOUSANDS/UL (ref 149–390)
PLATELET # BLD AUTO: 222 THOUSANDS/UL (ref 149–390)
PMV BLD AUTO: 10.2 FL (ref 8.9–12.7)
PMV BLD AUTO: 10.5 FL (ref 8.9–12.7)
POTASSIUM SERPL-SCNC: 3.6 MMOL/L (ref 3.5–5.3)
PR INTERVAL: 132 MS
PR INTERVAL: 148 MS
PR INTERVAL: 150 MS
PR INTERVAL: 154 MS
QRS AXIS: -43 DEGREES
QRS AXIS: -45 DEGREES
QRS AXIS: -47 DEGREES
QRS AXIS: -64 DEGREES
QRSD INTERVAL: 110 MS
QRSD INTERVAL: 114 MS
QRSD INTERVAL: 118 MS
QRSD INTERVAL: 118 MS
QT INTERVAL: 390 MS
QT INTERVAL: 426 MS
QTC INTERVAL: 435 MS
QTC INTERVAL: 439 MS
QTC INTERVAL: 446 MS
QTC INTERVAL: 452 MS
RBC # BLD AUTO: 5.48 MILLION/UL (ref 3.88–5.62)
RSV RNA RESP QL NAA+PROBE: NEGATIVE
SARS-COV-2 RNA RESP QL NAA+PROBE: NEGATIVE
SODIUM SERPL-SCNC: 138 MMOL/L (ref 136–145)
T WAVE AXIS: 111 DEGREES
T WAVE AXIS: 124 DEGREES
T WAVE AXIS: 79 DEGREES
T WAVE AXIS: 84 DEGREES
VENTRICULAR RATE: 64 BPM
VENTRICULAR RATE: 66 BPM
VENTRICULAR RATE: 68 BPM
VENTRICULAR RATE: 75 BPM
WBC # BLD AUTO: 7.08 THOUSAND/UL (ref 4.31–10.16)

## 2022-06-09 PROCEDURE — 36415 COLL VENOUS BLD VENIPUNCTURE: CPT

## 2022-06-09 PROCEDURE — 96376 TX/PRO/DX INJ SAME DRUG ADON: CPT

## 2022-06-09 PROCEDURE — 80048 BASIC METABOLIC PNL TOTAL CA: CPT

## 2022-06-09 PROCEDURE — 83880 ASSAY OF NATRIURETIC PEPTIDE: CPT

## 2022-06-09 PROCEDURE — 93005 ELECTROCARDIOGRAM TRACING: CPT

## 2022-06-09 PROCEDURE — 99285 EMERGENCY DEPT VISIT HI MDM: CPT

## 2022-06-09 PROCEDURE — 85049 AUTOMATED PLATELET COUNT: CPT | Performed by: INTERNAL MEDICINE

## 2022-06-09 PROCEDURE — 99285 EMERGENCY DEPT VISIT HI MDM: CPT | Performed by: PSYCHIATRY & NEUROLOGY

## 2022-06-09 PROCEDURE — G1004 CDSM NDSC: HCPCS

## 2022-06-09 PROCEDURE — 93010 ELECTROCARDIOGRAM REPORT: CPT | Performed by: INTERNAL MEDICINE

## 2022-06-09 PROCEDURE — 71045 X-RAY EXAM CHEST 1 VIEW: CPT

## 2022-06-09 PROCEDURE — 94150 VITAL CAPACITY TEST: CPT

## 2022-06-09 PROCEDURE — 0241U HB NFCT DS VIR RESP RNA 4 TRGT: CPT

## 2022-06-09 PROCEDURE — 82948 REAGENT STRIP/BLOOD GLUCOSE: CPT

## 2022-06-09 PROCEDURE — 85025 COMPLETE CBC W/AUTO DIFF WBC: CPT

## 2022-06-09 PROCEDURE — 96365 THER/PROPH/DIAG IV INF INIT: CPT

## 2022-06-09 PROCEDURE — NC001 PR NO CHARGE: Performed by: PSYCHIATRY & NEUROLOGY

## 2022-06-09 PROCEDURE — 83735 ASSAY OF MAGNESIUM: CPT

## 2022-06-09 PROCEDURE — 84484 ASSAY OF TROPONIN QUANT: CPT

## 2022-06-09 PROCEDURE — 70450 CT HEAD/BRAIN W/O DYE: CPT

## 2022-06-09 PROCEDURE — 99223 1ST HOSP IP/OBS HIGH 75: CPT | Performed by: INTERNAL MEDICINE

## 2022-06-09 RX ORDER — PYRIDOSTIGMINE BROMIDE 60 MG/1
60 TABLET ORAL 4 TIMES DAILY
Status: DISCONTINUED | OUTPATIENT
Start: 2022-06-09 | End: 2022-06-15 | Stop reason: HOSPADM

## 2022-06-09 RX ORDER — INSULIN LISPRO 100 [IU]/ML
2-12 INJECTION, SOLUTION INTRAVENOUS; SUBCUTANEOUS
Status: DISCONTINUED | OUTPATIENT
Start: 2022-06-09 | End: 2022-06-11

## 2022-06-09 RX ORDER — ASPIRIN 81 MG/1
81 TABLET ORAL DAILY
Status: DISCONTINUED | OUTPATIENT
Start: 2022-06-10 | End: 2022-06-09

## 2022-06-09 RX ORDER — ENOXAPARIN SODIUM 100 MG/ML
40 INJECTION SUBCUTANEOUS DAILY
Status: DISCONTINUED | OUTPATIENT
Start: 2022-06-10 | End: 2022-06-15 | Stop reason: HOSPADM

## 2022-06-09 RX ORDER — ATORVASTATIN CALCIUM 40 MG/1
40 TABLET, FILM COATED ORAL EVERY EVENING
Status: DISCONTINUED | OUTPATIENT
Start: 2022-06-09 | End: 2022-06-15 | Stop reason: HOSPADM

## 2022-06-09 RX ORDER — ASPIRIN 81 MG/1
81 TABLET ORAL DAILY
Status: DISCONTINUED | OUTPATIENT
Start: 2022-06-09 | End: 2022-06-15 | Stop reason: HOSPADM

## 2022-06-09 RX ORDER — METHYLPREDNISOLONE SODIUM SUCCINATE 125 MG/2ML
125 INJECTION, POWDER, LYOPHILIZED, FOR SOLUTION INTRAMUSCULAR; INTRAVENOUS ONCE
Status: COMPLETED | OUTPATIENT
Start: 2022-06-09 | End: 2022-06-09

## 2022-06-09 RX ORDER — SODIUM CHLORIDE 9 MG/ML
3 INJECTION INTRAVENOUS
Status: DISCONTINUED | OUTPATIENT
Start: 2022-06-09 | End: 2022-06-15 | Stop reason: HOSPADM

## 2022-06-09 RX ADMIN — METHYLPREDNISOLONE SODIUM SUCCINATE 125 MG: 125 INJECTION, POWDER, FOR SOLUTION INTRAMUSCULAR; INTRAVENOUS at 14:59

## 2022-06-09 RX ADMIN — ASPIRIN 81 MG: 81 TABLET ORAL at 18:47

## 2022-06-09 RX ADMIN — SODIUM CHLORIDE 875 MG: 0.9 INJECTION, SOLUTION INTRAVENOUS at 15:47

## 2022-06-09 RX ADMIN — INSULIN LISPRO 2 UNITS: 100 INJECTION, SOLUTION INTRAVENOUS; SUBCUTANEOUS at 18:47

## 2022-06-09 RX ADMIN — ATORVASTATIN CALCIUM 40 MG: 40 TABLET, FILM COATED ORAL at 18:36

## 2022-06-09 RX ADMIN — PYRIDOSTIGMINE BROMIDE 60 MG: 60 TABLET ORAL at 21:51

## 2022-06-09 RX ADMIN — PYRIDOSTIGMINE BROMIDE 60 MG: 60 TABLET ORAL at 18:49

## 2022-06-09 NOTE — ASSESSMENT & PLAN NOTE
-patient with ocular bulbar myasthenia gravis presents with dysphagia, weakness, shortness of breath with CT chest no acute abnormality, chest x-ray NAD likely secondary myasthenic crisis  -started on IV Solu-Medrol 1 g in E  -will continue Solu-Medrol 1 g daily  -monitor FVC/NIF  -continue home pyridostigmine 60mg qid  -neuro on board  -possible IVIG infusion tomorrow if no improvement with above treatment

## 2022-06-09 NOTE — PLAN OF CARE
Problem: Potential for Falls  Goal: Patient will remain free of falls  Description: INTERVENTIONS:  - Educate patient/family on patient safety including physical limitations  - Instruct patient to call for assistance with activity   - Consult OT/PT to assist with strengthening/mobility   - Keep Call bell within reach  - Keep bed low and locked with side rails adjusted as appropriate  - Keep care items and personal belongings within reach  - Initiate and maintain comfort rounds  - Make Fall Risk Sign visible to staff  - Offer Toileting every 2 Hours, in advance of need  - Initiate/Maintain bed alarm  - Obtain necessary fall risk management equipment:   - Apply yellow socks and bracelet for high fall risk patients  - Consider moving patient to room near nurses station  Outcome: Progressing     Problem: PAIN - ADULT  Goal: Verbalizes/displays adequate comfort level or baseline comfort level  Description: Interventions:  - Encourage patient to monitor pain and request assistance  - Assess pain using appropriate pain scale  - Administer analgesics based on type and severity of pain and evaluate response  - Implement non-pharmacological measures as appropriate and evaluate response  - Consider cultural and social influences on pain and pain management  - Notify physician/advanced practitioner if interventions unsuccessful or patient reports new pain  Outcome: Progressing     Problem: INFECTION - ADULT  Goal: Absence or prevention of progression during hospitalization  Description: INTERVENTIONS:  - Assess and monitor for signs and symptoms of infection  - Monitor lab/diagnostic results  - Monitor all insertion sites, i e  indwelling lines, tubes, and drains  - Monitor endotracheal if appropriate and nasal secretions for changes in amount and color  - Java appropriate cooling/warming therapies per order  - Administer medications as ordered  - Instruct and encourage patient and family to use good hand hygiene technique  - Identify and instruct in appropriate isolation precautions for identified infection/condition  Outcome: Progressing     Problem: SAFETY ADULT  Goal: Patient will remain free of falls  Description: INTERVENTIONS:  - Educate patient/family on patient safety including physical limitations  - Instruct patient to call for assistance with activity   - Consult OT/PT to assist with strengthening/mobility   - Keep Call bell within reach  - Keep bed low and locked with side rails adjusted as appropriate  - Keep care items and personal belongings within reach  - Initiate and maintain comfort rounds  - Make Fall Risk Sign visible to staff  - Offer Toileting every 2 Hours, in advance of need  - Initiate/Maintain bed alarm  - Obtain necessary fall risk management equipment:   - Apply yellow socks and bracelet for high fall risk patients  - Consider moving patient to room near nurses station  Outcome: Progressing  Goal: Maintain or return to baseline ADL function  Description: INTERVENTIONS:  -  Assess patient's ability to carry out ADLs; assess patient's baseline for ADL function and identify physical deficits which impact ability to perform ADLs (bathing, care of mouth/teeth, toileting, grooming, dressing, etc )  - Assess/evaluate cause of self-care deficits   - Assess range of motion  - Assess patient's mobility; develop plan if impaired  - Assess patient's need for assistive devices and provide as appropriate  - Encourage maximum independence but intervene and supervise when necessary  - Involve family in performance of ADLs  - Assess for home care needs following discharge   - Consider OT consult to assist with ADL evaluation and planning for discharge  - Provide patient education as appropriate  Outcome: Progressing  Goal: Maintains/Returns to pre admission functional level  Description: INTERVENTIONS:  - Perform BMAT or MOVE assessment daily    - Set and communicate daily mobility goal to care team and patient/family/caregiver  - Collaborate with rehabilitation services on mobility goals if consulted  - Perform Range of Motion 3 times a day  - Reposition patient every 2 hours  - Dangle patient 3 times a day  - Stand patient 3 times a day  - Ambulate patient 3 times a day  - Out of bed to chair 3 times a day   - Out of bed for meals 3 times a day  - Out of bed for toileting  - Record patient progress and toleration of activity level   Outcome: Progressing     Problem: DISCHARGE PLANNING  Goal: Discharge to home or other facility with appropriate resources  Description: INTERVENTIONS:  - Identify barriers to discharge w/patient and caregiver  - Arrange for needed discharge resources and transportation as appropriate  - Identify discharge learning needs (meds, wound care, etc )  - Arrange for interpretive services to assist at discharge as needed  - Refer to Case Management Department for coordinating discharge planning if the patient needs post-hospital services based on physician/advanced practitioner order or complex needs related to functional status, cognitive ability, or social support system  Outcome: Progressing     Problem: Knowledge Deficit  Goal: Patient/family/caregiver demonstrates understanding of disease process, treatment plan, medications, and discharge instructions  Description: Complete learning assessment and assess knowledge base    Interventions:  - Provide teaching at level of understanding  - Provide teaching via preferred learning methods  Outcome: Progressing     Problem: CARDIOVASCULAR - ADULT  Goal: Maintains optimal cardiac output and hemodynamic stability  Description: INTERVENTIONS:  - Monitor I/O, vital signs and rhythm  - Monitor for S/S and trends of decreased cardiac output  - Administer and titrate ordered vasoactive medications to optimize hemodynamic stability  - Assess quality of pulses, skin color and temperature  - Assess for signs of decreased coronary artery perfusion  - Instruct patient to report change in severity of symptoms  Outcome: Progressing  Goal: Absence of cardiac dysrhythmias or at baseline rhythm  Description: INTERVENTIONS:  - Continuous cardiac monitoring, vital signs, obtain 12 lead EKG if ordered  - Administer antiarrhythmic and heart rate control medications as ordered  - Monitor electrolytes and administer replacement therapy as ordered  Outcome: Progressing

## 2022-06-09 NOTE — TELEPHONE ENCOUNTER
Patient calling in  With a flare up of his MG  States he has trouble swallowing " my own saliva", as well as trouble with his speech at times,  as well as both eyelids are drooping for x 1 week now  Pt is on Mestinon 60 mg tablets 1 tablet by mouth QID  Pt states when he was hospitalized last year he recv'd infusions x 4 days  Pt has not been seen in our office since 10/2021  He No Showed for appt in March 2022 for no insurance, and he currently     I am recommending the ED  Please advise    [phone]

## 2022-06-09 NOTE — CONSULTS
Consultation - Neurology   Maicol Ramírez 60 y o  male MRN: [id number]  Unit/Bed#: ED 08 Encounter: [id number]      Assessment/Plan     Myasthenia gravis, bulbar , Ptosis  Assessment & Plan  60 y o  male with myesthenia gravis maintained on mestinon, cad s/p mi, and hld who presented to the ED 6/9/2022 with reported SOB since yesterday  In addition to feeling SOB, he feels weak with BLE muscle aches  He also reports difficulty swallowing and bilateral eye ptosis  He reports that he may have missed some of his mestinon doses  NIF -40    Images:  CXR: IMPRESSION:  No acute cardiopulmonary disease  CTH: pending    Pertinent labs:  COVID/FLU/RSV negative        Recommendations:  -solu medrol 1gm iv x1 now  -nif/evc q8hr  -continue outpatient mestinon 60mg qid  -will consider ivig pending response to steroids  - Medical management and supportive care per primary team  Correction of any metabolic or infectious disturbances  Please refer to attending attestation for neurologic exam and additional recommendations      Pt has outpatient neurology appointment scheduled 7/15/2022 with Dr Jeni Gonzalez    Reason for Consult / Principal Problem: "myesthenia gravis in crisis"  HPI: Maicol Ramírez is a 60 y o  male with myesthenia gravis maintained on mestinon, cad s/p mi, and hld who presented to the ED 6/9/2022 with reported SOB since yesterday  In addition to feeling SOB, he feels weak with BLE muscle aches  He also reports difficulty swallowing and bilateral eye ptosis  He reports that he may have missed some of his mestinon doses  NIF -40    He called his outpatient neurologist office earlier today to report trouble swallowing his own saliva as well as trouble with his speech at times in addition to both eyelids drooping for the past week  He is on mestinon 60mg qid but has not been seen by outpatient neurology since 10/2021 therefore he was referred to the ED for further evaluation      He was initially diagnosed with myesthenia gravis 6/2021 following 5 days of visual disturbance with new onset headache  He was discharged on prednisone and mestinon for 1 month  He followed up with outpatient neurology 10/21 and was restarted on mestinon  He was hospitalized 12/2021 with ocular bulbar myesthenia gravis and completed ivig x5 12/6/2021  He was discharged on mestinon 60mg qid and prednisone with bilateral lid weakness and outpatient neurology follow-up     Consults    Review of Systems   HENT: Positive for trouble swallowing  Eyes: Positive for visual disturbance (double vision)  Bilateral eye ptosis   Respiratory: Positive for shortness of breath (worse with ambulation)  Musculoskeletal: Positive for myalgias  Neurological: Positive for weakness  Negative for dizziness, light-headedness and headaches  Feels off balance at times       Historical Information   Past Medical History:   Diagnosis Date    Coronary artery disease     Heart attack ([facility] [address] )     Hyperlipidemia      Past Surgical History:   Procedure Laterality Date    CORONARY ANGIOPLASTY WITH STENT PLACEMENT       Social History   Social History     Substance and Sexual Activity   Alcohol Use Not Currently     Social History     Substance and Sexual Activity   Drug Use Not Currently     E-Cigarette/Vaping    E-Cigarette Use Never User      E-Cigarette/Vaping Substances    Nicotine No     THC No     CBD No     Flavoring No     Other No     Unknown No      Social History     Tobacco Use   Smoking Status Never Smoker   Smokeless Tobacco Never Used     Family History: No family history on file  Review of previous medical records was completed       Meds/Allergies   current meds:   Current Facility-Administered Medications   Medication Dose Route Frequency    [START ON 6/10/2022] methylPREDNISolone sodium succinate (Solu-MEDROL) 1,000 mg in sodium chloride 0 9 % 250 mL IVPB  1,000 mg Intravenous Q24H    pyridostigmine (MESTINON) tablet 60 mg  60 mg Oral 4x Daily    sodium chloride (PF) 0 9 % injection 3 mL  3 mL Intravenous Q1H PRN    and PTA meds:   Prior to Admission Medications   Prescriptions Last Dose Informant Patient Reported? Taking? Ascorbic Acid (vitamin C) 1000 MG tablet   Yes No   Sig: Take 1,000 mg by mouth daily   aspirin (ECOTRIN LOW STRENGTH) 81 mg EC tablet   Yes No   Sig: Take 81 mg by mouth daily   atorvastatin (LIPITOR) 40 mg tablet   No No   Sig: Take 1 tablet (40 mg total) by mouth every evening   Patient not taking: Reported on 10/1/2021   pyridostigmine (MESTINON) 60 mg tablet   No No   Sig: Take 1 tablet (60 mg total) by mouth 4 (four) times a day      Facility-Administered Medications: None       No Known Allergies    Objective   Vitals:Blood pressure 152/81, pulse 60, temperature 98 6 °F (37 °C), temperature source Tympanic, resp  rate 18, SpO2 96 %  ,There is no height or weight on file to calculate BMI  Intake/Output Summary (Last 24 hours) at 6/9/2022 1737  Last data filed at 6/9/2022 1719  Gross per 24 hour   Intake 250 ml   Output --   Net 250 ml       Invasive Devices: Invasive Devices  Report    Peripheral Intravenous Line  Duration           Peripheral IV 06/09/22 Right Hand <1 day                Physical Exam  Vitals reviewed  Constitutional:       General: He is not in acute distress  Appearance: He is obese  HENT:      Head: Normocephalic and atraumatic  Pulmonary:      Effort: No respiratory distress  Musculoskeletal:      Cervical back: Neck supple  Skin:     General: Skin is warm and dry  Neurological:      Mental Status: He is alert         Neurologic Exam    Lab Results:   CBC:   Results from last 7 days   Lab Units 06/09/22  1502   WBC Thousand/uL 7 08   RBC Million/uL 5 48   HEMOGLOBIN g/dL 15 0   HEMATOCRIT % 47 0   MCV fL 86   PLATELETS Thousands/uL 222   BMP/CMP:   Results from last 7 days   Lab Units 06/09/22  1502   SODIUM mmol/L 138   POTASSIUM mmol/L 3 6   CHLORIDE mmol/L 103 CO2 mmol/L 27   BUN mg/dL 18   CREATININE mg/dL 1 02   CALCIUM mg/dL 8 5   EGFR ml/min/1 73sq m 77     Imaging Studies: I have personally reviewed pertinent reports  and I have personally reviewed pertinent films in PACS     EKG, Pathology, and Other Studies: I have personally reviewed pertinent reports  Counseling / Coordination of Care  Assessment and plan reviewed with Dr Vernadine Alpers   Plan discussed with patient and ED provider

## 2022-06-09 NOTE — ASSESSMENT & PLAN NOTE
-patient reports history of stents in 2006  -currently asymptomatic  -he has been noncompliant with statin and aspirin  -will restart atorvastatin and aspirin  -counseled on compliance of medications

## 2022-06-09 NOTE — TELEPHONE ENCOUNTER
Rodriguez Graham is correct - patients cannot be turned away based upon their inability to pay  MSW attempted to reach the [address] [place] [address],[facility] at [phone]  No answer  MSW left a message requesting callback  MSW then reached out to [employer], [employer] [facility]  She advised that patient has an outstanding balance because he refused to apply for PA MA in the past  Mary Ellen advised that [address] is not applicable if pt does not cooperate on PA MA application  Mary Ellen advised that the new ED visit he just had will be again referred to [facility] and patient should cooperate on the PA MA application  Mary Ellen stated that if patient would be denied for other reason than refusal/non-cooperation - pt can apply for [address]  Per notes on the inpatient accts -  Financial Counselor group even sent him application for Financial Assistance and he did not cooperate on that either back in 2021  It is not appropriate for MSW to arrange transportation to get to the ER  Due to patient's complaints of shortness of breath an ambulance would be the safest mode of transportation  MSW attempted to reach patient at [phone] to make him aware of above  No answer  MSW left message requesting callback  Awaiting same

## 2022-06-09 NOTE — TELEPHONE ENCOUNTER
Called patient and advised to go to the ED  Patient states he has no insurance and and has an outstanding bill of over $34,000  Patient is afraid he will be turned away from the ED due to having no insurance and money owed to the hospital     I reassured the patient it is law that a patient can't be turned away Emergency care due to having no insurance  Pt stated he will try to get transport to the hospital today or tomorrow  I advised for patient to go asap  Messaging MSW on this encounter      FYI Dr Chris Preciado

## 2022-06-09 NOTE — H&P
[address] 1960, 60 y o  male MRN: [id number]  Unit/Bed#: -01 Encounter: [id number]  Primary Care Provider: No primary care provider on file  Date and time admitted to hospital: 6/9/2022  2:37 PM    * Myasthenia gravis, bulbar , Ptosis  Assessment & Plan  -patient with ocular bulbar myasthenia gravis presents with dysphagia, weakness, shortness of breath with CT chest no acute abnormality, chest x-ray NAD likely secondary myasthenic crisis  -started on IV Solu-Medrol 1 g in E  -will continue Solu-Medrol 1 g daily  -monitor FVC/NIF  -continue home pyridostigmine 60mg qid  -neuro on board  -possible IVIG infusion tomorrow if no improvement with above treatment    Morbid obesity ([facility] [address] )  Assessment & Plan  -counseled on lifestyle intervention with diet, exercise and weight loss    CAD (coronary artery disease)  Assessment & Plan  -patient reports history of stents in 2006  -currently asymptomatic  -he has been noncompliant with statin and aspirin  -will restart atorvastatin and aspirin  -counseled on compliance of medications    VTE Pharmacologic Prophylaxis: VTE Score: 6 High Risk (Score >/= 5) - Pharmacological DVT Prophylaxis Ordered: heparin  Sequential Compression Devices Ordered  Code Status: Level 1 - Full Code   Discussion with family: Son not at bedside  Anticipated Length of Stay: Patient will be admitted on an inpatient basis with an anticipated length of stay of greater than 2 midnights secondary to Myasthenic crisis  Total Time for Visit, including Counseling / Coordination of Care: 60 minutes Greater than 50% of this total time spent on direct patient counseling and coordination of care  Chief Complaint: SOB    History of Present Illness:  Dorita Terry is a 60 y o  male with a PMH of CAD s/p stent 2006, myasthenia gravis who presents with shortness of breath started few days ago    This morning, he had progressive worsening shortness of breaths, headache, difficulty swallowing, weakness and increase bilateral eye proptosis  He reports noncompliance of pyridostigmine 3 days last week because he did not have his refill  He is also noncompliant with aspirin and atorvastatin  He denies history of asthma, COPD, CHF, nausea, vomiting, fever, chills, chest pain, palpitation, orthopnea, prior intubation, sick contact or any other complaint  Review of Systems:  Review of Systems  Comprehensive review of systems negative except in above HPI  Past Medical and Surgical History:   Past Medical History:   Diagnosis Date    Coronary artery disease     Heart attack ([facility] [address] )     Hyperlipidemia        Past Surgical History:   Procedure Laterality Date    CORONARY ANGIOPLASTY WITH STENT PLACEMENT         Meds/Allergies:  Prior to Admission medications    Medication Sig Start Date End Date Taking? Authorizing Provider   Ascorbic Acid (vitamin C) 1000 MG tablet Take 1,000 mg by mouth daily    Historical Provider, MD   aspirin (ECOTRIN LOW STRENGTH) 81 mg EC tablet Take 81 mg by mouth daily    Historical Provider, MD   atorvastatin (LIPITOR) 40 mg tablet Take 1 tablet (40 mg total) by mouth every evening  Patient not taking: Reported on 10/1/2021 6/13/21   MARCELLA Timmons   pyridostigmine (MESTINON) 60 mg tablet Take 1 tablet (60 mg total) by mouth 4 (four) times a day 1/11/22   Edy Hernandez MD     I have reviewed home medications with patient personally      Allergies: No Known Allergies    Social History:  Marital Status: /Civil Union   Occupation:   Patient Pre-hospital Living Situation: With other family member: son  Patient Pre-hospital Level of Mobility: walks  Patient Pre-hospital Diet Restrictions: none   Substance Use History:   Social History     Substance and Sexual Activity   Alcohol Use Not Currently     Social History     Tobacco Use   Smoking Status Never Smoker   Smokeless Tobacco Never Used     Social History     Substance and Sexual Activity   Drug Use Not Currently       Family History:  No family history on file  Physical Exam:     Vitals:   Blood Pressure: 152/81 (06/09/22 1819)  Pulse: 62 (06/09/22 1819)  Temperature: 98 °F (36 7 °C) (06/09/22 1819)  Temp Source: Oral (06/09/22 1819)  Respirations: 18 (06/09/22 1819)  Height: 6' (182 9 cm) (06/09/22 1819)  Weight - Scale: 67 9 kg (149 lb 12 8 oz) (06/09/22 1819)  SpO2: 93 % (06/09/22 1819)    Physical Exam  Vitals and nursing note reviewed  Constitutional:       Appearance: Normal appearance  He is well-developed  He is obese  He is not toxic-appearing or diaphoretic  HENT:      Head: Normocephalic and atraumatic  Eyes:      Extraocular Movements: Extraocular movements intact  Conjunctiva/sclera: Conjunctivae normal       Pupils: Pupils are equal, round, and reactive to light  Comments: Bilateral  proptosis   Cardiovascular:      Rate and Rhythm: Normal rate and regular rhythm  Pulses: Normal pulses  Heart sounds: Normal heart sounds  No murmur heard  Pulmonary:      Effort: Pulmonary effort is normal  No respiratory distress  Breath sounds: Normal breath sounds  Abdominal:      General: Abdomen is flat  Bowel sounds are normal  There is no distension  Palpations: Abdomen is soft  Tenderness: There is no abdominal tenderness  Musculoskeletal:      Cervical back: Neck supple  Right lower leg: No edema  Left lower leg: No edema  Skin:     General: Skin is warm and dry  Neurological:      Mental Status: He is alert and oriented to person, place, and time  Sensory: No sensory deficit  Motor: No weakness        Coordination: Coordination normal    Psychiatric:         Mood and Affect: Mood normal          Behavior: Behavior normal           Additional Data:     Lab Results:  Results from last 7 days   Lab Units 06/09/22  1502   WBC Thousand/uL 7 08   HEMOGLOBIN g/dL 15 0   HEMATOCRIT % 47 0   PLATELETS Thousands/uL 222   NEUTROS PCT % 62   LYMPHS PCT % 25   MONOS PCT % 8   EOS PCT % 5     Results from last 7 days   Lab Units 06/09/22  1502   SODIUM mmol/L 138   POTASSIUM mmol/L 3 6   CHLORIDE mmol/L 103   CO2 mmol/L 27   BUN mg/dL 18   CREATININE mg/dL 1 02   ANION GAP mmol/L 8   CALCIUM mg/dL 8 5   GLUCOSE RANDOM mg/dL 150*                       Imaging: Reviewed radiology reports from this admission including: chest xray  CT head wo contrast   Final Result by Mary La MD (06/09 1816)      No acute intracranial abnormality  Unchanged chronic right insular cortex infarct  Workstation performed: [id number]         X-ray chest 1 view portable   ED Interpretation by MARCELLA Mccormack (06/09 1522)   No acute cardiopulmonary disease      Final Result by Miguel Gustafson MD (06/09 1608)      No acute cardiopulmonary disease  Workstation performed: [id number]             EKG and Other Studies Reviewed on Admission:   · EKG: NSR  HR 75bpm IRBBB, LAFB  · Septal and lateral q wave    ** Please Note: This note has been constructed using a voice recognition system   **

## 2022-06-09 NOTE — ED PROVIDER NOTES
History  Chief Complaint   Patient presents with    Shortness of Breath     Patient c/o shortness of breath since yesterday  Patient is a 58-year-old male with a past medical history of myasthenia gravis, mi, high cholesterol arriving for shortness of breath that started last night  Patient reports that this morning he started having headache difficulty swallowing  Patient reports ongoing ptosis bilateral eyes months  Patient denies not complying with medication  Patient denies lateralizing weakness  Patient reports that this feels like his previous myasthenia gravis crisis, but worse  Patient denies fever  Patient denies intubations in the past   Patient maintaining on airway currently  Patient denies any recent fever, illness  Prior to Admission Medications   Prescriptions Last Dose Informant Patient Reported? Taking? Ascorbic Acid (vitamin C) 1000 MG tablet More than a month at Unknown time  Yes No   Sig: Take 1,000 mg by mouth daily   aspirin (ECOTRIN LOW STRENGTH) 81 mg EC tablet 6/8/2022 at Unknown time  Yes Yes   Sig: Take 81 mg by mouth daily   atorvastatin (LIPITOR) 40 mg tablet Not Taking at Unknown time  No No   Sig: Take 1 tablet (40 mg total) by mouth every evening   Patient not taking: No sig reported   pyridostigmine (MESTINON) 60 mg tablet 6/9/2022 at Unknown time  No Yes   Sig: Take 1 tablet (60 mg total) by mouth 4 (four) times a day      Facility-Administered Medications: None       Past Medical History:   Diagnosis Date    Coronary artery disease     Heart attack ([address] )     Hyperlipidemia        Past Surgical History:   Procedure Laterality Date    CORONARY ANGIOPLASTY WITH STENT PLACEMENT         No family history on file  I have reviewed and agree with the history as documented      E-Cigarette/Vaping    E-Cigarette Use Never User      E-Cigarette/Vaping Substances    Nicotine No     THC No     CBD No     Flavoring No     Other No     Unknown No      Social History     Tobacco Use    Smoking status: Never Smoker    Smokeless tobacco: Never Used   Vaping Use    Vaping Use: Never used   Substance Use Topics    Alcohol use: Not Currently    Drug use: Not Currently       Review of Systems   Constitutional: Negative  HENT: Negative  Eyes: Negative  Respiratory: Positive for shortness of breath  Negative for cough  Cardiovascular: Negative  Negative for chest pain, palpitations and leg swelling  Gastrointestinal: Negative  Endocrine: Negative  Genitourinary: Negative  Musculoskeletal: Negative  Skin: Negative  Neurological: Positive for weakness and headaches  Negative for dizziness, tremors, seizures, syncope, facial asymmetry, speech difficulty, light-headedness and numbness  Hematological: Negative  Psychiatric/Behavioral: Negative  All other systems reviewed and are negative  Physical Exam  Physical Exam  Vitals and nursing note reviewed  Constitutional:       Appearance: He is well-developed and normal weight  Interventions: He is not intubated  HENT:      Head: Normocephalic  Mouth/Throat:      Mouth: Mucous membranes are moist    Eyes:      Pupils: Pupils are equal, round, and reactive to light  Comments: B/l ptosis    Cardiovascular:      Rate and Rhythm: Normal rate and regular rhythm  Pulses: Normal pulses  Pulmonary:      Effort: Tachypnea present  No bradypnea, accessory muscle usage or respiratory distress  He is not intubated  Breath sounds: Normal breath sounds  Abdominal:      General: Bowel sounds are normal       Palpations: Abdomen is soft  Musculoskeletal:         General: Normal range of motion  Cervical back: Normal range of motion and neck supple  Skin:     General: Skin is warm  Capillary Refill: Capillary refill takes less than 2 seconds  Neurological:      General: No focal deficit present        Mental Status: He is alert and oriented to person, place, and time  Mental status is at baseline  GCS: GCS eye subscore is 4  GCS verbal subscore is 5  GCS motor subscore is 6  Cranial Nerves: Cranial nerves are intact  No cranial nerve deficit, dysarthria or facial asymmetry  Sensory: Sensation is intact  No sensory deficit  Motor: Motor function is intact  No weakness or pronator drift  Coordination: Coordination is intact  Heel to [facility] Test normal       Gait: Gait is intact   Gait normal       Comments: 5/5 upper extremity strength, no numbness or tingling  5/5 lower extremity strength, no numbness or tingling         Vital Signs  ED Triage Vitals   Temperature Pulse Respirations Blood Pressure SpO2   06/09/22 1436 06/09/22 1436 06/09/22 1436 06/09/22 1436 06/09/22 1436   98 6 °F (37 °C) 76 (!) 24 (!) 194/85 98 %      Temp Source Heart Rate Source Patient Position - Orthostatic VS BP Location FiO2 (%)   06/09/22 1436 06/09/22 1600 06/09/22 1436 06/09/22 1436 --   Tympanic Monitor Sitting Left arm       Pain Score       06/09/22 1700       No Pain           Vitals:    06/11/22 0709 06/11/22 0803 06/11/22 1620 06/11/22 2226   BP: 108/66  115/68 131/66   Pulse: 64 61 77 72   Patient Position - Orthostatic VS: Lying   Lying         Visual Acuity      ED Medications  Medications   sodium chloride (PF) 0 9 % injection 3 mL (has no administration in time range)   enoxaparin (LOVENOX) subcutaneous injection 40 mg (40 mg Subcutaneous Given 6/11/22 0918)   pyridostigmine (MESTINON) tablet 60 mg (60 mg Oral Given 6/11/22 2144)   atorvastatin (LIPITOR) tablet 40 mg (40 mg Oral Given 6/11/22 1802)   aspirin (ECOTRIN LOW STRENGTH) EC tablet 81 mg (81 mg Oral Given 6/11/22 0918)   polyvinyl alcohol (LIQUIFILM TEARS) 1 4 % ophthalmic solution 1 drop (1 drop Both Eyes Given 6/11/22 0925)   sodium chloride 0 9 % infusion (0 mL/hr Intravenous Stopped 6/11/22 1831)   immune globulin, human (GAMUNEX-C) 42 5 g 425 mL IV soln (0 g Intravenous Stopped 6/11/22 2571) insulin lispro (HumaLOG) 100 units/mL subcutaneous injection 2-12 Units (2 Units Subcutaneous Given 6/11/22 1802)   methylPREDNISolone sodium succinate (Solu-MEDROL) injection 125 mg (125 mg Intravenous Given 6/9/22 1459)   methylPREDNISolone sodium succinate (Solu-MEDROL) 875 mg in sodium chloride 0 9 % 250 mL IVPB (0 mg Intravenous Stopped 6/9/22 1719)       Diagnostic Studies  Results Reviewed     Procedure Component Value Units Date/Time    HS Troponin I 4hr [[id number]]  (Normal) Collected: 06/09/22 1951    Lab Status: Final result Specimen: Blood from Arm, Left Updated: 06/09/22 2033     hs TnI 4hr 6 ng/L      Delta 4hr hsTnI 0 ng/L     HS Troponin I 2hr [[id number]]  (Normal) Collected: 06/09/22 1659    Lab Status: Final result Specimen: Blood from Arm, Right Updated: 06/09/22 1739     hs TnI 2hr 7 ng/L      Delta 2hr hsTnI 1 ng/L     COVID/FLU/RSV [[id number]]  (Normal) Collected: 06/09/22 1502    Lab Status: Final result Specimen: Nares from Nose Updated: 06/09/22 1545     SARS-CoV-2 Negative     INFLUENZA A PCR Negative     INFLUENZA B PCR Negative     RSV PCR Negative    Narrative:      FOR PEDIATRIC PATIENTS - copy/paste COVID Guidelines URL to browser: [email or web address]    SARS-CoV-2 assay is a Nucleic Acid Amplification assay intended for the  qualitative detection of nucleic acid from SARS-CoV-2 in nasopharyngeal  swabs  Results are for the presumptive identification of SARS-CoV-2 RNA  Positive results are indicative of infection with SARS-CoV-2, the virus  causing COVID-19, but do not rule out bacterial infection or co-infection  with other viruses  Laboratories within the [place] and its  territories are required to report all positive results to the appropriate  public health authorities   Negative results do not preclude SARS-CoV-2  infection and should not be used as the sole basis for treatment or other  patient management decisions  Negative results must be combined with  clinical observations, patient history, and epidemiological information  This test has not been FDA cleared or approved  This test has been authorized by FDA under an Emergency Use Authorization  (EUA)  This test is only authorized for the duration of time the  declaration that circumstances exist justifying the authorization of the  emergency use of an in vitro diagnostic tests for detection of SARS-CoV-2  virus and/or diagnosis of COVID-19 infection under section 564(b)(1) of  the Act, 21 U  S C  051TZS-5(I)(8), unless the authorization is terminated  or revoked sooner  The test has been validated but independent review by FDA  and CLIA is pending  Test performed using [employer] GeneXpert: This RT-PCR assay targets N2,  a region unique to SARS-CoV-2  A conserved region in the E-gene was chosen  for pan-Sarbecovirus detection which includes SARS-CoV-2      HS Troponin 0hr (reflex protocol) [[id number]]  (Normal) Collected: 06/09/22 1502    Lab Status: Final result Specimen: Blood from Hand, Right Updated: 06/09/22 1534     hs TnI 0hr 6 ng/L     NT-BNP PRO [[id number]]  (Abnormal) Collected: 06/09/22 1502    Lab Status: Final result Specimen: Blood from Hand, Right Updated: 06/09/22 1533     NT-proBNP 197 pg/mL     Basic metabolic panel [[id number]]  (Abnormal) Collected: 06/09/22 1502    Lab Status: Final result Specimen: Blood from Hand, Right Updated: 06/09/22 1520     Sodium 138 mmol/L      Potassium 3 6 mmol/L      Chloride 103 mmol/L      CO2 27 mmol/L      ANION GAP 8 mmol/L      BUN 18 mg/dL      Creatinine 1 02 mg/dL      Glucose 150 mg/dL      Calcium 8 5 mg/dL      eGFR 77 ml/min/1 73sq m     Narrative:      Jelena guidelines for Chronic Kidney Disease (CKD):     Stage 1 with normal or high GFR (GFR > 90 mL/min/1 73 square meters)    Stage 2 Mild CKD (GFR = 60-89 mL/min/1 73 square meters)    Stage 3A Moderate CKD (GFR = 45-59 mL/min/1 73 square meters)    Stage 3B Moderate CKD (GFR = 30-44 mL/min/1 73 square meters)    Stage 4 Severe CKD (GFR = 15-29 mL/min/1 73 square meters)    Stage 5 End Stage CKD (GFR <15 mL/min/1 73 square meters)  Note: GFR calculation is accurate only with a steady state creatinine    Magnesium [[id number]]  (Normal) Collected: 06/09/22 1502    Lab Status: Final result Specimen: Blood from Hand, Right Updated: 06/09/22 1520     Magnesium 2 0 mg/dL     CBC and differential [[id number]] Collected: 06/09/22 1502    Lab Status: Final result Specimen: Blood from Hand, Right Updated: 06/09/22 1507     WBC 7 08 Thousand/uL      RBC 5 48 Million/uL      Hemoglobin 15 0 g/dL      Hematocrit 47 0 %      MCV 86 fL      MCH 27 4 pg      MCHC 31 9 g/dL      RDW 13 4 %      MPV 10 5 fL      Platelets 462 Thousands/uL      nRBC 0 /100 WBCs      Neutrophils Relative 62 %      Immat GRANS % 0 %      Lymphocytes Relative 25 %      Monocytes Relative 8 %      Eosinophils Relative 5 %      Basophils Relative 0 %      Neutrophils Absolute 4 34 Thousands/µL      Immature Grans Absolute 0 02 Thousand/uL      Lymphocytes Absolute 1 78 Thousands/µL      Monocytes Absolute 0 56 Thousand/µL      Eosinophils Absolute 0 35 Thousand/µL      Basophils Absolute 0 03 Thousands/µL                  CT head wo contrast   Final Result by Sander Kolb MD (06/09 1816)      No acute intracranial abnormality  Unchanged chronic right insular cortex infarct  Workstation performed: [id number]         X-ray chest 1 view portable   ED Interpretation by MARCELLA Teresa (06/09 1522)   No acute cardiopulmonary disease      Final Result by Ladonna Valerio MD (06/09 1608)      No acute cardiopulmonary disease                    Workstation performed: [id number]                    Procedures  ECG 12 Lead Documentation Only    Date/Time: 6/9/2022 3:00 PM  Performed by: MARCELLA Teresa  Authorized by: Kasey Shea CRNP     Indications / Diagnosis:  Shortness of breath  ECG reviewed by me, the ED Provider: yes    Patient location:  ED  Previous ECG:     Previous ECG:  Compared to current    Comparison ECG info:  6/07/2021    Similarity:  No change  Interpretation:     Interpretation: normal    Rate:     ECG rate:  75    ECG rate assessment: normal    Rhythm:     Rhythm: sinus rhythm    Ectopy:     Ectopy: none    QRS:     QRS axis:  Normal  Conduction:     Conduction: abnormal      Abnormal conduction: incomplete RBBB    ST segments:     ST segments:  Non-specific    Depression:  AVL  T waves:     T waves: non-specific               ED Course                               SBIRT 22yo+    Flowsheet Row Most Recent Value   SBIRT (23 yo +)    In order to provide better care to our patients, we are screening all of our patients for alcohol and drug use  Would it be okay to ask you these screening questions? Yes Filed at: 06/09/2022 1503   Initial Alcohol Screen: US AUDIT-C     1  How often do you have a drink containing alcohol? 0 Filed at: 06/09/2022 1503   2  How many drinks containing alcohol do you have on a typical day you are drinking? 0 Filed at: 06/09/2022 1503   3a  Male UNDER 65: How often do you have five or more drinks on one occasion? 0 Filed at: 06/09/2022 1503   Audit-C Score 0 Filed at: 06/09/2022 1503   REAL: How many times in the past year have you    Used an illegal drug or used a prescription medication for non-medical reasons? Never Filed at: 06/09/2022 1503                    MDM  Number of Diagnoses or Management Options  Myasthenia gravis ([facility] [address] )  Myasthenia gravis in crisis [facility])  Diagnosis management comments: Patient arriving shortness of breath, trouble swallowing, ptosis in the presence of known by myasthenia gravis  Patient denies ever being intubated to manage his MG  Pt maintaining own airway currently, without stridor   Pt has oxygen supplementation requirement, and slight increase work of breathing  Pt denies fever/chills  Pt denies sick contacts  Contacted Neurology, Renee Dillon who recommended 1 g of Solu-Medrol  NIF order placed for respiratory therapy  Nif= - 40  Will also rule out heart failure, and ACS for cause of shortness of breath, although it is likely a myasthenia gravis crisis  Pt reports his symptoms are improved after 1 g of solumedrol  Patient evaluated by neurologist   Patient admitted to plan for MG crisis  Will write for pt's mestinon 60 mg per neurology recommendation  Amount and/or Complexity of Data Reviewed  Clinical lab tests: reviewed and ordered  Tests in the radiology section of CPT®: ordered and reviewed    Risk of Complications, Morbidity, and/or Mortality  General comments: Pt admitted for MG crisis  Patient Progress  Patient progress: stable      Disposition  Final diagnoses:   Myasthenia gravis ([facility] [address] )   Myasthenia gravis in crisis [facility])     Time reflects when diagnosis was documented in both MDM as applicable and the Disposition within this note     Time User Action Codes Description Comment    6/9/2022  4:02 PM Kamilla German Add [G70 00] Myasthenia gravis ([facility] [address] )     6/9/2022  4:02 PM Kamilla German Add [G70 01] Myasthenia gravis in crisis [facility])       ED Disposition     ED Disposition   Admit    Condition   Stable    Date/Time   u Jun 9, 2022  4:03 PM    Comment   Case was discussed with Alexandrea and the patient's admission status was agreed to be Admission Status: inpatient status to the service of Dr Marion Gracia   Follow-up Information     Follow up With Specialties Details Why Contact Info    [employer]  Call  [phone]      [address] [place] [address]  Call Please call [phone] to complete MA application  [address] [place] [address], [address] [place] [address]  Email: [email or web address]  Phone: [phone]          Current Discharge Medication List      CONTINUE these medications which have NOT CHANGED    Details   aspirin (ECOTRIN LOW STRENGTH) 81 mg EC tablet Take 81 mg by mouth daily      pyridostigmine (MESTINON) 60 mg tablet Take 1 tablet (60 mg total) by mouth 4 (four) times a day  Qty: 360 tablet, Refills: 0    Associated Diagnoses: Myasthenia gravis ([address] ); Myasthenia gravis with acute exacerbation (HCC)      Ascorbic Acid (vitamin C) 1000 MG tablet Take 1,000 mg by mouth daily      atorvastatin (LIPITOR) 40 mg tablet Take 1 tablet (40 mg total) by mouth every evening  Qty: 30 tablet, Refills: 0    Associated Diagnoses: Ptosis of left eyelid             No discharge procedures on file      PDMP Review     None          ED Provider  Electronically Signed by           MARCELLA Barraza  06/12/22 0005

## 2022-06-09 NOTE — TELEPHONE ENCOUNTER
Discussed with patient, he is having some breathing difficulty I advised him to call 911 and go to the hospital ASAP

## 2022-06-10 LAB
ANION GAP SERPL CALCULATED.3IONS-SCNC: 11 MMOL/L (ref 4–13)
BUN SERPL-MCNC: 14 MG/DL (ref 5–25)
CALCIUM SERPL-MCNC: 9.3 MG/DL (ref 8.3–10.1)
CHLORIDE SERPL-SCNC: 102 MMOL/L (ref 100–108)
CO2 SERPL-SCNC: 23 MMOL/L (ref 21–32)
CREAT SERPL-MCNC: 1.09 MG/DL (ref 0.6–1.3)
ERYTHROCYTE [DISTWIDTH] IN BLOOD BY AUTOMATED COUNT: 13.1 % (ref 11.6–15.1)
GFR SERPL CREATININE-BSD FRML MDRD: 71 ML/MIN/1.73SQ M
GLUCOSE SERPL-MCNC: 175 MG/DL (ref 65–140)
GLUCOSE SERPL-MCNC: 191 MG/DL (ref 65–140)
GLUCOSE SERPL-MCNC: 210 MG/DL (ref 65–140)
GLUCOSE SERPL-MCNC: 225 MG/DL (ref 65–140)
GLUCOSE SERPL-MCNC: 257 MG/DL (ref 65–140)
HCT VFR BLD AUTO: 47.5 % (ref 36.5–49.3)
HGB BLD-MCNC: 15.5 G/DL (ref 12–17)
IGA SERPL-MCNC: 337 MG/DL (ref 70–400)
MCH RBC QN AUTO: 27.4 PG (ref 26.8–34.3)
MCHC RBC AUTO-ENTMCNC: 32.6 G/DL (ref 31.4–37.4)
MCV RBC AUTO: 84 FL (ref 82–98)
PLATELET # BLD AUTO: 242 THOUSANDS/UL (ref 149–390)
PMV BLD AUTO: 10.2 FL (ref 8.9–12.7)
POTASSIUM SERPL-SCNC: 4.4 MMOL/L (ref 3.5–5.3)
RBC # BLD AUTO: 5.65 MILLION/UL (ref 3.88–5.62)
SODIUM SERPL-SCNC: 136 MMOL/L (ref 136–145)
WBC # BLD AUTO: 11.8 THOUSAND/UL (ref 4.31–10.16)

## 2022-06-10 PROCEDURE — 80048 BASIC METABOLIC PNL TOTAL CA: CPT | Performed by: INTERNAL MEDICINE

## 2022-06-10 PROCEDURE — 82784 ASSAY IGA/IGD/IGG/IGM EACH: CPT | Performed by: NURSE PRACTITIONER

## 2022-06-10 PROCEDURE — 82948 REAGENT STRIP/BLOOD GLUCOSE: CPT

## 2022-06-10 PROCEDURE — 99232 SBSQ HOSP IP/OBS MODERATE 35: CPT | Performed by: PSYCHIATRY & NEUROLOGY

## 2022-06-10 PROCEDURE — 85027 COMPLETE CBC AUTOMATED: CPT | Performed by: INTERNAL MEDICINE

## 2022-06-10 PROCEDURE — 94150 VITAL CAPACITY TEST: CPT

## 2022-06-10 PROCEDURE — 99233 SBSQ HOSP IP/OBS HIGH 50: CPT | Performed by: INTERNAL MEDICINE

## 2022-06-10 RX ORDER — SODIUM CHLORIDE 9 MG/ML
50 INJECTION, SOLUTION INTRAVENOUS CONTINUOUS
Status: DISCONTINUED | OUTPATIENT
Start: 2022-06-10 | End: 2022-06-15 | Stop reason: HOSPADM

## 2022-06-10 RX ORDER — POLYVINYL ALCOHOL 14 MG/ML
1 SOLUTION/ DROPS OPHTHALMIC
Status: DISCONTINUED | OUTPATIENT
Start: 2022-06-10 | End: 2022-06-15 | Stop reason: HOSPADM

## 2022-06-10 RX ADMIN — PYRIDOSTIGMINE BROMIDE 60 MG: 60 TABLET ORAL at 17:48

## 2022-06-10 RX ADMIN — ATORVASTATIN CALCIUM 40 MG: 40 TABLET, FILM COATED ORAL at 17:48

## 2022-06-10 RX ADMIN — INSULIN LISPRO 2 UNITS: 100 INJECTION, SOLUTION INTRAVENOUS; SUBCUTANEOUS at 17:48

## 2022-06-10 RX ADMIN — PYRIDOSTIGMINE BROMIDE 60 MG: 60 TABLET ORAL at 12:19

## 2022-06-10 RX ADMIN — ASPIRIN 81 MG: 81 TABLET ORAL at 08:55

## 2022-06-10 RX ADMIN — PYRIDOSTIGMINE BROMIDE 60 MG: 60 TABLET ORAL at 21:44

## 2022-06-10 RX ADMIN — ENOXAPARIN SODIUM 40 MG: 40 INJECTION SUBCUTANEOUS at 08:54

## 2022-06-10 RX ADMIN — SODIUM CHLORIDE 50 ML/HR: 0.9 INJECTION, SOLUTION INTRAVENOUS at 15:41

## 2022-06-10 RX ADMIN — INSULIN LISPRO 2 UNITS: 100 INJECTION, SOLUTION INTRAVENOUS; SUBCUTANEOUS at 08:53

## 2022-06-10 RX ADMIN — Medication 42.5 G: at 15:41

## 2022-06-10 RX ADMIN — PYRIDOSTIGMINE BROMIDE 60 MG: 60 TABLET ORAL at 08:55

## 2022-06-10 NOTE — ASSESSMENT & PLAN NOTE
Patient is morbidly obese   -glucose elevated, last HbA1c 12/6/21: 6 1  -continue insulin SQ regimen  -recommend lifestyle modifications and PCP outpatient follow-up

## 2022-06-10 NOTE — PLAN OF CARE
Problem: Potential for Falls  Goal: Patient will remain free of falls  Description: INTERVENTIONS:  - Educate patient/family on patient safety including physical limitations  - Instruct patient to call for assistance with activity   - Consult OT/PT to assist with strengthening/mobility   - Keep Call bell within reach  - Keep bed low and locked with side rails adjusted as appropriate  - Keep care items and personal belongings within reach  - Initiate and maintain comfort rounds  - Make Fall Risk Sign visible to staff  - Offer Toileting every  Hours, in advance of need  - Initiate/Maintain alarm  - Obtain necessary fall risk management equipment:  - Apply yellow socks and bracelet for high fall risk patients  - Consider moving patient to room near nurses station  6/10/2022 1045 by Maritza De Anda, RN  Outcome: Progressing  6/10/2022 1044 by Maritza De Anda, RN  Outcome: Progressing

## 2022-06-10 NOTE — ASSESSMENT & PLAN NOTE
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

## 2022-06-10 NOTE — PLAN OF CARE
Problem: Potential for Falls  Goal: Patient will remain free of falls  Description: INTERVENTIONS:  - Educate patient/family on patient safety including physical limitations  - Instruct patient to call for assistance with activity   - Consult OT/PT to assist with strengthening/mobility   - Keep Call bell within reach  - Keep bed low and locked with side rails adjusted as appropriate  - Keep care items and personal belongings within reach  - Initiate and maintain comfort rounds  - Make Fall Risk Sign visible to staff  - Offer Toileting every  Hours, in advance of need  - Initiate/Maintain alarm  - Obtain necessary fall risk management equipment:  - Apply yellow socks and bracelet for high fall risk patients  - Consider moving patient to room near nurses station  Outcome: Progressing     Problem: PAIN - ADULT  Goal: Verbalizes/displays adequate comfort level or baseline comfort level  Description: Interventions:  - Encourage patient to monitor pain and request assistance  - Assess pain using appropriate pain scale  - Administer analgesics based on type and severity of pain and evaluate response  - Implement non-pharmacological measures as appropriate and evaluate response  - Consider cultural and social influences on pain and pain management  - Notify physician/advanced practitioner if interventions unsuccessful or patient reports new pain  Outcome: Progressing     Problem: INFECTION - ADULT  Goal: Absence or prevention of progression during hospitalization  Description: INTERVENTIONS:  - Assess and monitor for signs and symptoms of infection  - Monitor lab/diagnostic results  - Monitor all insertion sites, i e  indwelling lines, tubes, and drains  - Monitor endotracheal if appropriate and nasal secretions for changes in amount and color  - [place] appropriate cooling/warming therapies per order  - Administer medications as ordered  - Instruct and encourage patient and family to use good hand hygiene technique  - Identify and instruct in appropriate isolation precautions for identified infection/condition  Outcome: Progressing     Problem: SAFETY ADULT  Goal: Patient will remain free of falls  Description: INTERVENTIONS:  - Educate patient/family on patient safety including physical limitations  - Instruct patient to call for assistance with activity   - Consult OT/PT to assist with strengthening/mobility   - Keep Call bell within reach  - Keep bed low and locked with side rails adjusted as appropriate  - Keep care items and personal belongings within reach  - Initiate and maintain comfort rounds  - Make Fall Risk Sign visible to staff  - Offer Toileting every  Hours, in advance of need  - Initiate/Maintain alarm  - Obtain necessary fall risk management equipment:   - Apply yellow socks and bracelet for high fall risk patients  - Consider moving patient to room near nurses station  Outcome: Progressing  Goal: Maintain or return to baseline ADL function  Description: INTERVENTIONS:  -  Assess patient's ability to carry out ADLs; assess patient's baseline for ADL function and identify physical deficits which impact ability to perform ADLs (bathing, care of mouth/teeth, toileting, grooming, dressing, etc )  - Assess/evaluate cause of self-care deficits   - Assess range of motion  - Assess patient's mobility; develop plan if impaired  - Assess patient's need for assistive devices and provide as appropriate  - Encourage maximum independence but intervene and supervise when necessary  - Involve family in performance of ADLs  - Assess for home care needs following discharge   - Consider OT consult to assist with ADL evaluation and planning for discharge  - Provide patient education as appropriate  Outcome: Progressing  Goal: Maintains/Returns to pre admission functional level  Description: INTERVENTIONS:  - Perform BMAT or MOVE assessment daily    - Set and communicate daily mobility goal to care team and patient/family/caregiver  - Collaborate with rehabilitation services on mobility goals if consulted  - Perform Range of Motion  times a day  - Reposition patient every  hours  - Dangle patient  times a day  - Stand patient  times a day  - Ambulate patient  times a day  - Out of bed to chair  times a day   - Out of bed for meal times a day  - Out of bed for toileting  - Record patient progress and toleration of activity level   Outcome: Progressing     Problem: DISCHARGE PLANNING  Goal: Discharge to home or other facility with appropriate resources  Description: INTERVENTIONS:  - Identify barriers to discharge w/patient and caregiver  - Arrange for needed discharge resources and transportation as appropriate  - Identify discharge learning needs (meds, wound care, etc )  - Arrange for interpretive services to assist at discharge as needed  - Refer to Case Management Department for coordinating discharge planning if the patient needs post-hospital services based on physician/advanced practitioner order or complex needs related to functional status, cognitive ability, or social support system  Outcome: Progressing     Problem: Knowledge Deficit  Goal: Patient/family/caregiver demonstrates understanding of disease process, treatment plan, medications, and discharge instructions  Description: Complete learning assessment and assess knowledge base    Interventions:  - Provide teaching at level of understanding  - Provide teaching via preferred learning methods  Outcome: Progressing     Problem: CARDIOVASCULAR - ADULT  Goal: Maintains optimal cardiac output and hemodynamic stability  Description: INTERVENTIONS:  - Monitor I/O, vital signs and rhythm  - Monitor for S/S and trends of decreased cardiac output  - Administer and titrate ordered vasoactive medications to optimize hemodynamic stability  - Assess quality of pulses, skin color and temperature  - Assess for signs of decreased coronary artery perfusion  - Instruct patient to report change in severity of symptoms  Outcome: Progressing  Goal: Absence of cardiac dysrhythmias or at baseline rhythm  Description: INTERVENTIONS:  - Continuous cardiac monitoring, vital signs, obtain 12 lead EKG if ordered  - Administer antiarrhythmic and heart rate control medications as ordered  - Monitor electrolytes and administer replacement therapy as ordered  Outcome: Progressing

## 2022-06-10 NOTE — ASSESSMENT & PLAN NOTE
Patient had b/l ptosis, difficulty swallowing, difficulty speaking, SOB, and b/l LE weakness for a few days to a week, after which he was told to come to the ED by his outpatient neurology office  He did not take his 60 mg pyridostigmine qid for a few days because he ran out of it   He has not f/u with his outpatient neurologist since 10/2021   -was given solumedrol 1g IV on day of admission with minimal improvement  -IVIG started 6/10/22 per Neurology, continue for 5 days  -continue pyridostigmine 60 mg qid  -monitor NIF/EVC q8hr, improving since admission  -neurology following, appreciate recs

## 2022-06-10 NOTE — TELEPHONE ENCOUNTER
MAYA received the following staff message from Jordi Valdez [facility]:    "YANET Loving! Wanted to reach out to you regarding this patient  I know that you are following him out in the community  I did speak with him regarding completing the MA application and he was given another PATHS referrals for follow-up  He's definitely very high anxiety but I let him know that he has been given the resources and supports he needs to work through [employer]  Hopefully you can continue to follow with him after discharge       Thanks,     MAYA Loving, YANET, [facility]-   06/10/22 2:25 PM"

## 2022-06-10 NOTE — UTILIZATION REVIEW
Initial Clinical Review    Admission: Date/Time/Statement:   Admission Orders (From admission, onward)     Ordered        06/09/22 1604  Inpatient Admission  Once                      Orders Placed This Encounter   Procedures    Inpatient Admission     Standing Status:   Standing     Number of Occurrences:   1     Order Specific Question:   Level of Care     Answer:   Med Surg [16]     Order Specific Question:   Estimated length of stay     Answer:   More than 2 Midnights     Order Specific Question:   Certification     Answer:   I certify that inpatient services are medically necessary for this patient for a duration of greater than two midnights  See H&P and MD Progress Notes for additional information about the patient's course of treatment  ED Arrival Information     Expected   -    Arrival   6/9/2022 14:26    Acuity   Emergent            Means of arrival   Walk-In    Escorted by   Family Member    Service   General Medicine    Admission type   Emergency            Arrival complaint   SOB           Chief Complaint   Patient presents with    Shortness of Breath     Patient c/o shortness of breath since yesterday  Initial Presentation: 60 y o  male to the ED from home with complaints of shortness of breath for a day, bilateral lower extremity weakness  Admitted to inpatient for myasthenia gravis bulbar, ptosis, CAD  H/O CAD, myasthenia gravis, COPD, CHF, has had progressive worsening shortness of breaths, headache, difficulty swallowing, weakness and increase bilateral eye proptosis  He reports noncompliance of pyridostigmine 3 days last week because he did not have his refill  Noncompliant with ASA, atorvastatin  Has had prior intubation  Arrives with bilateral proptosis  CT head neg  Neurology consult  Neurology consult: H/O bulbar myasthenia  Start on IV solumedrol  COnsider IVIG       Date: 6/10   Day 2:       ED Triage Vitals   Temperature Pulse Respirations Blood Pressure SpO2   06/09/22 1436 06/09/22 1436 06/09/22 1436 06/09/22 1436 06/09/22 1436   98 6 °F (37 °C) 76 (!) 24 (!) 194/85 98 %      Temp Source Heart Rate Source Patient Position - Orthostatic VS BP Location FiO2 (%)   06/09/22 1436 06/09/22 1600 06/09/22 1436 06/09/22 1436 --   Tympanic Monitor Sitting Left arm       Pain Score       06/09/22 1700       No Pain          Wt Readings from Last 1 Encounters:   06/09/22 67 9 kg (149 lb 12 8 oz)     Additional Vital Signs:   Time Temp Pulse Resp BP MAP (mmHg) SpO2 Calculated FIO2 (%) - Nasal Cannula Nasal Cannula O2 Flow Rate (L/min) O2 Device Patient Position - Orthostatic VS   06/10/22 0750 -- 80 16 -- -- 94 % -- -- -- --   06/10/22 06:59:14 98 2 °F (36 8 °C) 74 18 135/76 96 92 % -- -- None (Room air) Sitting   06/09/22 22:22:31 98 5 °F (36 9 °C) 82 16 136/84 101 92 % -- -- -- --   06/09/22 21:53:07 97 6 °F (36 4 °C) 80 18 134/83 100 93 % -- -- None (Room air) --   06/09/22 18:19:01 98 °F (36 7 °C) 62 18 152/81 108 93 % -- -- None (Room air) Sitting   06/09/22 1700 98 °F (36 7 °C) 60 18 152/81 108 96 % -- -- -- Sitting   06/09/22 1600 -- 62 18 157/73 105 98 % -- -- Nasal cannula Sitting   06/09/22 1446 -- -- 14 -- -- -- -- -- -- --   06/09/22 1445 -- 78 24 Abnormal  185/79 Abnormal  114 93 % -- -- -- --   06/09/22 1436 98 6 °F (37 °C) 76 24 Abnormal  194/85 Abnormal  -- 98 % 24 1 L/min Nasal cannula Sitting     Pertinent Labs/Diagnostic Test Results:   6/9 EKG: Normal sinus rhythm  Left anterior fascicular block  Septal infarct (cited on or before 07-JUN-2021)  CT head wo contrast   Final Result by Dick Chaney MD (06/09 1816)      No acute intracranial abnormality  Unchanged chronic right insular cortex infarct  Workstation performed: [id number]         X-ray chest 1 view portable   ED Interpretation by MARCELLA Noel (06/09 1522)   No acute cardiopulmonary disease      Final Result by Jan Saravia MD (06/09 9731)      No acute cardiopulmonary disease  Workstation performed: [id number]           Results from last 7 days   Lab Units 06/09/22  1502   SARS-COV-2  Negative     Results from last 7 days   Lab Units 06/10/22  0527 06/09/22  1951 06/09/22  1502   WBC Thousand/uL 11 80*  --  7 08   HEMOGLOBIN g/dL 15 5  --  15 0   HEMATOCRIT % 47 5  --  47 0   PLATELETS Thousands/uL 242 219 222   NEUTROS ABS Thousands/µL  --   --  4 34         Results from last 7 days   Lab Units 06/10/22  0527 06/09/22  1502   SODIUM mmol/L 136 138   POTASSIUM mmol/L 4 4 3 6   CHLORIDE mmol/L 102 103   CO2 mmol/L 23 27   ANION GAP mmol/L 11 8   BUN mg/dL 14 18   CREATININE mg/dL 1 09 1 02   EGFR ml/min/1 73sq m 71 77   CALCIUM mg/dL 9 3 8 5   MAGNESIUM mg/dL  --  2 0         Results from last 7 days   Lab Units 06/10/22  0736 06/09/22  1847   POC GLUCOSE mg/dl 175* 152*     Results from last 7 days   Lab Units 06/10/22  0527 06/09/22  1502   GLUCOSE RANDOM mg/dL 210* 150*             Results from last 7 days   Lab Units 06/09/22 1951 06/09/22  1659 06/09/22  1502   HS TNI 0HR ng/L  --   --  6   HS TNI 2HR ng/L  --  7  --    HSTNI D2 ng/L  --  1  --    HS TNI 4HR ng/L 6  --   --    HSTNI D4 ng/L 0  --   --          Results from last 7 days   Lab Units 06/09/22  1502   NT-PRO BNP pg/mL 197*         Results from last 7 days   Lab Units 06/09/22  1502   INFLUENZA A PCR  Negative   INFLUENZA B PCR  Negative   RSV PCR  Negative         ED Treatment:   Medication Administration from 06/09/2022 1426 to 06/09/2022 1808       Date/Time Order Dose Route Action     06/09/2022 1459 methylPREDNISolone sodium succinate (Solu-MEDROL) injection 125 mg 125 mg Intravenous Given     06/09/2022 1547 methylPREDNISolone sodium succinate (Solu-MEDROL) 875 mg in sodium chloride 0 9 % 250 mL IVPB 875 mg Intravenous New Bag        Past Medical History:   Diagnosis Date    Coronary artery disease     Heart attack ([address] )     Hyperlipidemia        Admitting Diagnosis: SOB (shortness of breath) [R06 02]  Myasthenia gravis ([facility] [address] ) [G70 00]  Myasthenia gravis in crisis ([facility] ) [G70 01]  Age/Sex: 60 y o  male  Admission Orders:  Scheduled Medications:  aspirin, 81 mg, Oral, Daily  atorvastatin, 40 mg, Oral, QPM  enoxaparin, 40 mg, Subcutaneous, Daily  insulin lispro, 2-12 Units, Subcutaneous, BID after meals  methylPREDNISolone sodium succinate, 1,000 mg, Intravenous, Q24H  pyridostigmine, 60 mg, Oral, 4x Daily      Continuous IV Infusions:     PRN Meds:  polyvinyl alcohol, 1 drop, Both Eyes, Q3H PRN  sodium chloride (PF), 3 mL, Intravenous, Q1H PRN        IP CONSULT TO NEUROLOGY    Network Utilization Review Department  ATTENTION: Please call with any questions or concerns to [phone] and carefully listen to the prompts so that you are directed to the right person  All voicemails are confidential   Berniece Crystal all requests for admission clinical reviews, approved or denied determinations and any other requests to dedicated fax number below belonging to the campus where the patient is receiving treatment   List of dedicated fax numbers for the Facilities:  [address] DENIALS (Administrative/Medical Necessity) [phone]   [address] (Maternity/NICU/Pediatrics) [phone]   [address]  [address] [facility] [place] [address] [address] [place] [address] [address] [facility] [address] [phone]

## 2022-06-10 NOTE — PROGRESS NOTES
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 since June 21 presents with dysphagia, weakness, shortness of breath      CT chest no acute abnormality, chest x-ray NAD likely secondary myasthenic crisis  NIF--40 at presentation, improved to -55    -symptomatic improvement with 1 g of IV Solu-Medrol  -neurology on board, follow recommendations  -initiated patient on IVIG for 5 days  -continue home pyridostigmine 60mg qid  -follow IgA levels  -Check NIF and EVC daily  -outpatient neurology follow up, once stable for discharge    Morbid obesity ([address] )  Assessment & Plan  -counseled on lifestyle intervention with diet, exercise and weight loss    CAD (coronary artery disease)  Assessment & Plan  -patient reports history of stents in 2006  -currently asymptomatic  -he has been noncompliant with statin and aspirin  -will restart atorvastatin and aspirin  -counseled on compliance of medications      VTE Pharmacologic Prophylaxis:   VTE Score: 6 Moderate Risk (Score 3-4) - Pharmacological DVT Prophylaxis Ordered: Enoxaparin (Lovenox)  Mechanical VTE Prophylaxis in Place: Yes    Patient Centered Rounds: I have performed bedside rounds with nursing staff today  Discussions with Specialists or Other Care Team Provider: yes    Education and Discussions with Family / Patient: yes    Current Length of Stay: 1 day(s)    Current Patient Status: Inpatient     Discharge Plan / Estimated Discharge Date: Anticipate discharge in 48-72 hrs to home  Code Status: Level 1 - Full Code      Subjective:   Seen and examined at bedside, awake alert and oriented  Feeling better  Denied shortness of breath  Objective:     Vitals:   Temp (24hrs), Av °F (36 7 °C), Min:97 6 °F (36 4 °C), Max:98 5 °F (36 9 °C)    Temp:  [97 6 °F (36 4 °C)-98 5 °F (36 9 °C)] 97 7 °F (36 5 °C)  HR:  [60-85] 85  Resp:  [16-19] 19  BP: (134-157)/(73-84) 142/82  SpO2:  [92 %-98 %] 93 %  Body mass index is 45 12 kg/m²  Input and Output Summary (last 24 hours): Intake/Output Summary (Last 24 hours) at 6/10/2022 1447  Last data filed at 2022 1719  Gross per 24 hour   Intake 250 ml   Output --   Net 250 ml       Physical Exam:     Physical Exam  Vitals reviewed  Constitutional:       Appearance: He is obese  HENT:      Head: Atraumatic  Eyes:      General: No scleral icterus  Cardiovascular:      Rate and Rhythm: Normal rate and regular rhythm  Heart sounds: Normal heart sounds  Pulmonary:      Breath sounds: Normal breath sounds  Abdominal:      General: Bowel sounds are normal       Palpations: Abdomen is soft  Musculoskeletal:         General: Normal range of motion  Cervical back: Neck supple  Skin:     Findings: No lesion or rash  Neurological:      Mental Status: He is alert  Mental status is at baseline  Psychiatric:         Mood and Affect: Mood normal           Additional Data:     Labs:  Results from last 7 days   Lab Units 06/10/22  0522  19522  1502   WBC Thousand/uL 11 80*  --  7 08   HEMOGLOBIN g/dL 15 5  --  15 0   HEMATOCRIT % 47 5  --  47 0   PLATELETS Thousands/uL 242   < > 222   NEUTROS PCT %  --   --  62   LYMPHS PCT %  --   --  25   MONOS PCT %  --   --  8   EOS PCT %  --   --  5    < > = values in this interval not displayed       Results from last 7 days   Lab Units 06/10/22  0527   SODIUM mmol/L 136   POTASSIUM mmol/L 4 4   CHLORIDE mmol/L 102   CO2 mmol/L 23   BUN mg/dL 14   CREATININE mg/dL 1 09   ANION GAP mmol/L 11   CALCIUM mg/dL 9 3   GLUCOSE RANDOM mg/dL 210*         Results from last 7 days   Lab Units 06/10/22  1106 06/10/22  0736 06/09/22  1847   POC GLUCOSE mg/dl 257* 175* 152*               Imaging: No pertinent imaging reviewed  Recent Cultures (last 7 days):           Lines/Drains:  Invasive Devices  Report    Peripheral Intravenous Line  Duration           Peripheral IV 06/09/22 Right Hand <1 day                Telemetry:        Last 24 Hours Medication List:   Current Facility-Administered Medications   Medication Dose Route Frequency Provider Last Rate    aspirin  81 mg Oral Daily Janneth Hughes MD      atorvastatin  40 mg Oral QPM Janneth Hughes MD      enoxaparin  40 mg Subcutaneous Daily Janneth Hughes MD      immune globulin, human  0 4 g/kg (Adjusted) Intravenous Daily MARCELLA Saleh      insulin lispro  2-12 Units Subcutaneous BID after meals Janneth Hughes MD      polyvinyl alcohol  1 drop Both Eyes Q3H PRN Janneth Hughes MD      pyridostigmine  60 mg Oral 4x Daily MARCELLA Toney      sodium chloride (PF)  3 mL Intravenous Q1H PRN MARCELLA Marinelli      sodium chloride  50 mL/hr Intravenous Continuous MARCELLA Saleh          Today, Patient Was Seen By: Randy Cerrato MD    ** Please Note: This note has been constructed using a voice recognition system   **

## 2022-06-10 NOTE — RESPIRATORY THERAPY NOTE
06/10/22 0750   Respiratory Assessment   Assessment Type Assess only   General Appearance Alert; Awake   Respiratory Pattern Normal   Chest Assessment Chest expansion symmetrical   Bilateral Breath Sounds Diminished;Clear   Cough None   Resp Comments Pt sitting in chair in no distress at this time  Pt states breathing is ok, states having some trouble w/ his saliva     O2 Device RA   Additional Assessments   Pulse 80   Respirations 16   SpO2 94 %   $ Vital Capacity Mech/Peak Flow Yes   Position Sitting   Vital Capacity 2 4 L   NIF -55 cm H2O

## 2022-06-10 NOTE — ASSESSMENT & PLAN NOTE
Patient has history of MI in 2009 and PCI with stent placement   Patient not compliant with aspirin and statin   -multiple EKGs show normal sinus rhythm, septal infarct, left axis deviation, left fascicular block, occasional PVCs with ventricular escape complexes, and T wave abnormality  -EKGs not concerning for acute ischemia  -Lipitor 40 mg daily, aspirin 81 mg daily

## 2022-06-10 NOTE — ASSESSMENT & PLAN NOTE
Patient is morbidly obese     -glucose elevated, last HbA1c 12/6/21: 6 1  -continue insulin regimen  -recommend lifestyle modifications and PCP outpatient follow-up

## 2022-06-10 NOTE — CASE MANAGEMENT
Case Management Assessment & Discharge Planning Note    Patient name Theodora Alcala  Location /-55 MRN [id number]  : 1960 Date 6/10/2022       Current Admission Date: 2022  Current Admission Diagnosis:Myasthenia gravis, bulbar , Ptosis   Patient Active Problem List    Diagnosis Date Noted    Myasthenia gravis, bulbar , Ptosis 10/01/2021    Morbid obesity ([address] ) 06/10/2021    Ptosis 2021    CAD (coronary artery disease) 2021      LOS (days): 1  Geometric Mean LOS (GMLOS) (days):   Days to GMLOS:     OBJECTIVE:    Risk of Unplanned Readmission Score: 9 41         Current admission status: Inpatient       Preferred Pharmacy:   PATIENT/FAMILY REPORTS NO PREFERRED PHARMACY  No address on file      [facility] [address] MINDY [id number] [place] [address] 59  N  Phone: [phone] Fax: [phone]    Primary Care Provider: No primary care provider on file  Primary Insurance:   Secondary Insurance:     ASSESSMENT:    Readmission Root Cause  30 Day Readmission: No    Patient Information  Admitted from[de-identified] Home  Mental Status: Alert  During Assessment patient was accompanied by: Not accompanied during assessment  Assessment information provided by[de-identified] Patient  Primary Caregiver: Self  Support Systems: Self, [facility] of Residence: [place] [address] do you live in?: [address] entry access options   Select all that apply : Stairs  Number of steps to enter home : 2  Do the steps have railings?: Yes  Type of Current Residence: [place]  In the last 12 months, was there a time when you were not able to pay the mortgage or rent on time?: Patient refused  In the last 12 months, how many places have you lived?: 2  In the last 12 months, was there a time when you did not have a steady place to sleep or slept in a shelter (including now)?: No  Homeless/housing insecurity resource given?: No  Living Arrangements: Lives w/ Son  Is patient a ?: No    Activities of Daily Living Prior to Admission  Functional Status: Independent  Completes ADLs independently?: Yes  Ambulates independently?: Yes  Does patient use assisted devices?: No  Does patient currently own DME?: No  Does patient have a history of Outpatient Therapy (PT/OT)?: No  Does the patient have a history of Short-Term Rehab?: No  Does patient have a history of HHC?: No  Does patient currently have [address]?: No    Patient Information Continued  Income Source: Unemployed  Does patient have prescription coverage?: No  Within the past 12 months, you worried that your food would run out before you got the money to buy more : Never true  Within the past 12 months, the food you bought just didn't last and you didn't have money to get more : Never true  Food insecurity resource given?: No  Does patient receive dialysis treatments?: No  Does patient have a history of substance abuse?: No  Does patient have a history of Mental Health Diagnosis?: No    PHQ 2/9 Screening   Reviewed PHQ 2/9 Depression Screening Score?: No    Means of Transportation  Means of Transport to Appts[de-identified] Family transport  In the past 12 months, has lack of transportation kept you from medical appointments or from getting medications?: No  In the past 12 months, has lack of transportation kept you from meetings, work, or from getting things needed for daily living?: No  Was application for public transport provided?: No    DISCHARGE DETAILS:    Discharge planning discussed with[de-identified] Patient  Freedom of Choice: Yes     CM contacted family/caregiver?: No- see comments (Declined)  Were Treatment Team discharge recommendations reviewed with patient/caregiver?: Yes  Did patient/caregiver verbalize understanding of patient care needs?: Yes  Were patient/caregiver advised of the risks associated with not following Treatment Team discharge recommendations?: Yes    [facility]         Is the patient interested in [address] at discharge?: No    DME Referral Provided  Referral made for DME?: No    Other Referral/Resources/Interventions Provided:  Financial Resources Provided: Medicaid    Would you like to participate in our [address] service program?  : No - Declined    Treatment Team Recommendation: Home  Discharge Destination Plan[de-identified] Home  Transport at Discharge : Family, [facility]        Additional Comments: CM s/w patient bedside regarding no insurance  Patient reports that he was previously referred to [facility] by financial counselors during last admission  Patient reports that he failed to provide all needed documentation in order to complete application  Patient reports stressors related to finances and states that he has a bill over $30K due to last hopsitalization  CM encouraged patient to reach out to [facility] again and complete applications to avoid any additional healthcare-related expenses  Patient verbalized understanding  Patient reports that he does not have a PCP, refused CM establishing primary care with [facility]  CM provided [employer] card to patient to establish independently  Patient unable to be referred to OP CM due to not following with [facility]  Patient being followed OP Neuro MSW, in-basket message sent

## 2022-06-10 NOTE — PROGRESS NOTES
Progress Note - Neurology   Dexter Rowan 60 y o  male [id number]  Unit/Bed#: /-01    Assessment/Plan:  * Myasthenia gravis, bulbar , Ptosis  Assessment & Plan  60 y o  male with myesthenia gravis maintained on mestinon, cad s/p mi, and hld who presented to the ED 6/9/2022 with reported SOB since yesterday  In addition to feeling SOB, he feels weak with BLE muscle aches  He also reports difficulty swallowing and bilateral eye ptosis  He reports that he may have missed some of his mestinon doses  Initial NIF -40, now -55    Images:  CXR: IMPRESSION:  No acute cardiopulmonary disease  CTH: IMPRESSION:  No acute intracranial abnormality  Unchanged chronic right insular cortex infarct        Pertinent labs:  COVID/FLU/RSV negative    Recommendations:  -symptoms improved s/p solu medrol 1gm iv x1   -check nif/evc daily  -55/2 4L this am  -continue outpatient mestinon 60mg qid  -check IgA  -initiate ivig today 400mg/kg (#1/5)  - Medical management and supportive care per primary team  Correction of any metabolic or infectious disturbances  Please refer to attending attestation for additional recommendations      Pt has outpatient neurology appointment scheduled 7/15/2022 with Dr Fonda Goodpasture:   Josesito Mancia he only had transient improvement in his symptoms with steroids  SOB with conversation at present  Feels SOB and swallowing a little better than yesterday  B/L eye ptosis L>R slightly improved   Off balance at times for at least the past year  Pt requesting ivig   Does not drive; has difficulty getting to appointments    Past Medical History:   Diagnosis Date    Coronary artery disease     Heart attack [address] [address] )     Hyperlipidemia      Past Surgical History:   Procedure Laterality Date    CORONARY ANGIOPLASTY WITH STENT PLACEMENT       No family history on file    Social History     Socioeconomic History    Marital status: /Civil Union     Spouse name: Not on file    Number of children: Not on file    Years of education: Not on file    Highest education level: Not on file   Occupational History    Not on file   Tobacco Use    Smoking status: Never Smoker    Smokeless tobacco: Never Used   Vaping Use    Vaping Use: Never used   Substance and Sexual Activity    Alcohol use: Not Currently    Drug use: Not Currently    Sexual activity: Not on file   Other Topics Concern    Not on file   Social History Narrative    Not on file     Social Determinants of Health     Financial Resource Strain: Not on file   Food Insecurity: No Food Insecurity    Worried About Running Out of Food in the Last Year: Never true    Remy of Food in the Last Year: Never true   Transportation Needs: No Transportation Needs    Lack of Transportation (Medical): No    Lack of Transportation (Non-Medical): No   Physical Activity: Not on file   Stress: Not on file   Social Connections: Not on file   Intimate Partner Violence: Not on file   Housing Stability: Unknown    Unable to Pay for Housing in the Last Year: Patient refused    Number of Places Lived in the Last Year: 2    Unstable Housing in the Last Year: No     E-Cigarette/Vaping    E-Cigarette Use Never User      E-Cigarette/Vaping Substances    Nicotine No     THC No     CBD No     Flavoring No     Other No     Unknown No      Medications:   All current active meds have been reviewed and current meds:  Scheduled Meds:  Current Facility-Administered Medications   Medication Dose Route Frequency Provider Last Rate    aspirin  81 mg Oral Daily Laura Hernández MD      atorvastatin  40 mg Oral QPM Laura Hernández MD      enoxaparin  40 mg Subcutaneous Daily Laura Hernández MD      immune globulin, human  0 4 g/kg (Adjusted) Intravenous Daily MARCELLA Saleh      insulin lispro  2-12 Units Subcutaneous BID after meals Laura Hernández MD      methylPREDNISolone sodium succinate  1,000 mg Intravenous Q24H Laura Hernández MD      polyvinyl alcohol  1 drop Both Eyes Q3H PRN Dexter Stewart MD      pyridostigmine  60 mg Oral 4x Daily MARCELLA Retana      sodium chloride (PF)  3 mL Intravenous Q1H PRN MARCELLA David      sodium chloride  50 mL/hr Intravenous Continuous MARCELLA Saleh       Continuous Infusions:sodium chloride, 50 mL/hr      PRN Meds:    polyvinyl alcohol    Insert peripheral IV **AND** sodium chloride (PF)     ROS:   Review of Systems   Constitutional: Negative for chills and fever  Eyes: Positive for visual disturbance (some times has double vision)  Respiratory: Positive for shortness of breath (conversational dyspnea)  Cardiovascular: Negative for chest pain  Gastrointestinal: Negative for abdominal pain  Neurological: Negative for dizziness, speech difficulty, light-headedness, numbness and headaches  Feel off balance with ambulation   Psychiatric/Behavioral:        Forgetful at times for at least the past month     Vitals:   /82   Pulse 85   Temp 97 7 °F (36 5 °C)   Resp 19   Ht 6' (1 829 m)   Wt (!) 151 kg (332 lb 10 8 oz)   SpO2 93%   BMI 45 12 kg/m²     Physical Exam:   Physical Exam  Vitals reviewed  Constitutional:       General: He is not in acute distress  Eyes:      Extraocular Movements: Extraocular movements intact and EOM normal       Pupils: Pupils are equal, round, and reactive to light  Pulmonary:      Effort: Pulmonary effort is normal    Musculoskeletal:      Cervical back: Neck supple  Skin:     General: Skin is warm and dry  Neurological:      Mental Status: He is alert and oriented to person, place, and time  Coordination: Finger-Nose-Finger Test normal       Deep Tendon Reflexes: Strength normal       Reflex Scores:       Brachioradialis reflexes are 1+ on the right side and 1+ on the left side  Patellar reflexes are 0 on the right side and 0 on the left side    Psychiatric:         Speech: Speech normal        Neurologic Exam     Mental Status   Oriented to person, place, and time  Attention: normal  Concentration: normal    Speech: speech is normal   Level of consciousness: alert  Able to name object  Able to repeat  Unable to recall 3 objects after 5 minutes     Cranial Nerves     CN II   Visual acuity: normal  Right visual field deficit: none  Left visual field deficit: none     CN III, IV, VI   Pupils are equal, round, and reactive to light  Extraocular motions are normal    Nystagmus: none   Conjugate gaze: present    CN V   Right facial sensation deficit: none  Left facial sensation deficit: none    CN VIII   Hearing: intact    CN IX, X   Palate: symmetric    CN XI   Right sternocleidomastoid strength: normal  Right trapezius strength: normal    CN XII   Tongue deviation: none  Bilateral eye ptosis L>R  Decreased extraocular movements bilaterally      Motor Exam     Strength   Strength 5/5 throughout  Right neck flexion: 5/5  Left neck flexion: 5/5  Right neck extension: 5/5  Left neck extension: 5/5    Sensory Exam   Light touch normal      Gait, Coordination, and Reflexes     Coordination   Finger to nose coordination: normal    Tremor   Resting tremor: absent    Reflexes   Right brachioradialis: 1+  Left brachioradialis: 1+  Right patellar: 0  Left patellar: 0  Right plantar: normal  Left plantar: normalGait deferred       Labs: I have personally reviewed pertinent reports     Recent Results (from the past 24 hour(s))   ECG 12 lead    Collection Time: 06/09/22  2:45 PM   Result Value Ref Range    Ventricular Rate 75 BPM    Atrial Rate 75 BPM    [place] Interval 132 ms    QRSD Interval 110 ms    QT Interval 390 ms    QTC Interval 435 ms    P Axis 10 degrees    QRS Axis -64 degrees    T Wave Axis 84 degrees   CBC and differential    Collection Time: 06/09/22  3:02 PM   Result Value Ref Range    WBC 7 08 4 31 - 10 16 Thousand/uL    RBC 5 48 3 88 - 5 62 Million/uL    Hemoglobin 15 0 12 0 - 17 0 g/dL    Hematocrit 47 0 36 5 - 49 3 %    MCV 86 82 - 98 fL    MCH 27 4 26 8 - 34 3 pg    MCHC 31 9 31 4 - 37 4 g/dL    RDW 13 4 11 6 - 15 1 %    MPV 10 5 8 9 - 12 7 fL    Platelets 832 439 - 821 Thousands/uL    nRBC 0 /100 WBCs    Neutrophils Relative 62 43 - 75 %    Immat GRANS % 0 0 - 2 %    Lymphocytes Relative 25 14 - 44 %    Monocytes Relative 8 4 - 12 %    Eosinophils Relative 5 0 - 6 %    Basophils Relative 0 0 - 1 %    Neutrophils Absolute 4 34 1 85 - 7 62 Thousands/µL    Immature Grans Absolute 0 02 0 00 - 0 20 Thousand/uL    Lymphocytes Absolute 1 78 0 60 - 4 47 Thousands/µL    Monocytes Absolute 0 56 0 17 - 1 22 Thousand/µL    Eosinophils Absolute 0 35 0 00 - 0 61 Thousand/µL    Basophils Absolute 0 03 0 00 - 0 10 Thousands/µL   Basic metabolic panel    Collection Time: 06/09/22  3:02 PM   Result Value Ref Range    Sodium 138 136 - 145 mmol/L    Potassium 3 6 3 5 - 5 3 mmol/L    Chloride 103 100 - 108 mmol/L    CO2 27 21 - 32 mmol/L    ANION GAP 8 4 - 13 mmol/L    BUN 18 5 - 25 mg/dL    Creatinine 1 02 0 60 - 1 30 mg/dL    Glucose 150 (H) 65 - 140 mg/dL    Calcium 8 5 8 3 - 10 1 mg/dL    eGFR 77 ml/min/1 73sq m   HS Troponin 0hr (reflex protocol)    Collection Time: 06/09/22  3:02 PM   Result Value Ref Range    hs TnI 0hr 6 "Refer to ACS Flowchart"- see link ng/L   Magnesium    Collection Time: 06/09/22  3:02 PM   Result Value Ref Range    Magnesium 2 0 1 6 - 2 6 mg/dL   COVID/FLU/RSV    Collection Time: 06/09/22  3:02 PM    Specimen: Nose; Nares   Result Value Ref Range    SARS-CoV-2 Negative Negative    INFLUENZA A PCR Negative Negative    INFLUENZA B PCR Negative Negative    RSV PCR Negative Negative   NT-BNP PRO    Collection Time: 06/09/22  3:02 PM   Result Value Ref Range    NT-proBNP 197 (H) <125 pg/mL   HS Troponin I 2hr    Collection Time: 06/09/22  4:59 PM   Result Value Ref Range    hs TnI 2hr 7 "Refer to ACS Flowchart"- see link ng/L    Delta 2hr hsTnI 1 <20 ng/L   Fingerstick Glucose (POCT)    Collection Time: 06/09/22  6:47 PM   Result Value Ref Range POC Glucose 152 (H) 65 - 140 mg/dl   ECG 12 lead    Collection Time: 06/09/22  7:42 PM   Result Value Ref Range    Ventricular Rate 68 BPM    Atrial Rate 68 BPM    [place] Interval 148 ms    QRSD Interval 114 ms    QT Interval 426 ms    QTC Interval 452 ms    P Axis 6 degrees    QRS Axis -43 degrees    T Wave Axis 79 degrees   ECG 12 lead    Collection Time: 06/09/22  7:42 PM   Result Value Ref Range    Ventricular Rate 64 BPM    Atrial Rate 64 BPM    [place] Interval 150 ms    QRSD Interval 118 ms    QT Interval 426 ms    QTC Interval 439 ms    P [place] 16 degrees    QRS Axis -45 degrees    T Wave Axis 111 degrees   ECG 12 lead    Collection Time: 06/09/22  7:43 PM   Result Value Ref Range    Ventricular Rate 66 BPM    Atrial Rate 66 BPM    [place] Interval 154 ms    QRSD Interval 118 ms    QT Interval 426 ms    QTC Interval 446 ms    P Axis 9 degrees    QRS Axis -47 degrees    T Wave [place] 124 degrees   HS Troponin I 4hr    Collection Time: 06/09/22  7:51 PM   Result Value Ref Range    hs TnI 4hr 6 "Refer to ACS Flowchart"- see link ng/L    Delta 4hr hsTnI 0 <20 ng/L   Platelet count    Collection Time: 06/09/22  7:51 PM   Result Value Ref Range    Platelets 128 302 - 418 Thousands/uL    MPV 10 2 8 9 - 12 7 fL   Basic metabolic panel    Collection Time: 06/10/22  5:27 AM   Result Value Ref Range    Sodium 136 136 - 145 mmol/L    Potassium 4 4 3 5 - 5 3 mmol/L    Chloride 102 100 - 108 mmol/L    CO2 23 21 - 32 mmol/L    ANION GAP 11 4 - 13 mmol/L    BUN 14 5 - 25 mg/dL    Creatinine 1 09 0 60 - 1 30 mg/dL    Glucose 210 (H) 65 - 140 mg/dL    Calcium 9 3 8 3 - 10 1 mg/dL    eGFR 71 ml/min/1 73sq m   CBC    Collection Time: 06/10/22  5:27 AM   Result Value Ref Range    WBC 11 80 (H) 4 31 - 10 16 Thousand/uL    RBC 5 65 (H) 3 88 - 5 62 Million/uL    Hemoglobin 15 5 12 0 - 17 0 g/dL    Hematocrit 47 5 36 5 - 49 3 %    MCV 84 82 - 98 fL    MCH 27 4 26 8 - 34 3 pg    MCHC 32 6 31 4 - 37 4 g/dL    RDW 13 1 11 6 - 15 1 %    Platelets 997 149 - 390 Thousands/uL    MPV 10 2 8 9 - 12 7 fL   Fingerstick Glucose (POCT)    Collection Time: 06/10/22  7:36 AM   Result Value Ref Range    POC Glucose 175 (H) 65 - 140 mg/dl   Fingerstick Glucose (POCT)    Collection Time: 06/10/22 11:06 AM   Result Value Ref Range    POC Glucose 257 (H) 65 - 140 mg/dl     Imaging: I have personally reviewed pertinent imaging in PACS  and I have personally reviewed PACS reports  EKG, Pathology, and Other Studies: I have personally reviewed pertinent reports  Counseling / Coordination of Care  Total time spent today 25minutes  Greater than 50% of total time was spent with the patient and/or family counseling and/or coordination of care  Patient was seen and evaluated  Discussed with attending neurologist, Dr Jeannine Garcia  Chart reviewed thoroughly including laboratory and imaging studies    Plan of care discussed with patient and primary team

## 2022-06-11 LAB
ANION GAP SERPL CALCULATED.3IONS-SCNC: 5 MMOL/L (ref 4–13)
BASOPHILS # BLD AUTO: 0.02 THOUSANDS/ΜL (ref 0–0.1)
BASOPHILS NFR BLD AUTO: 0 % (ref 0–1)
BUN SERPL-MCNC: 17 MG/DL (ref 5–25)
CALCIUM SERPL-MCNC: 8.6 MG/DL (ref 8.3–10.1)
CHLORIDE SERPL-SCNC: 106 MMOL/L (ref 100–108)
CO2 SERPL-SCNC: 30 MMOL/L (ref 21–32)
CREAT SERPL-MCNC: 0.92 MG/DL (ref 0.6–1.3)
EOSINOPHIL # BLD AUTO: 0.04 THOUSAND/ΜL (ref 0–0.61)
EOSINOPHIL NFR BLD AUTO: 0 % (ref 0–6)
ERYTHROCYTE [DISTWIDTH] IN BLOOD BY AUTOMATED COUNT: 13.5 % (ref 11.6–15.1)
EST. AVERAGE GLUCOSE BLD GHB EST-MCNC: 134 MG/DL
GFR SERPL CREATININE-BSD FRML MDRD: 88 ML/MIN/1.73SQ M
GLUCOSE SERPL-MCNC: 115 MG/DL (ref 65–140)
GLUCOSE SERPL-MCNC: 124 MG/DL (ref 65–140)
GLUCOSE SERPL-MCNC: 152 MG/DL (ref 65–140)
HBA1C MFR BLD: 6.3 %
HCT VFR BLD AUTO: 45.1 % (ref 36.5–49.3)
HGB BLD-MCNC: 14.3 G/DL (ref 12–17)
IMM GRANULOCYTES # BLD AUTO: 0.06 THOUSAND/UL (ref 0–0.2)
IMM GRANULOCYTES NFR BLD AUTO: 0 % (ref 0–2)
LYMPHOCYTES # BLD AUTO: 2.02 THOUSANDS/ΜL (ref 0.6–4.47)
LYMPHOCYTES NFR BLD AUTO: 13 % (ref 14–44)
MCH RBC QN AUTO: 27.4 PG (ref 26.8–34.3)
MCHC RBC AUTO-ENTMCNC: 31.7 G/DL (ref 31.4–37.4)
MCV RBC AUTO: 87 FL (ref 82–98)
MONOCYTES # BLD AUTO: 0.99 THOUSAND/ΜL (ref 0.17–1.22)
MONOCYTES NFR BLD AUTO: 6 % (ref 4–12)
NEUTROPHILS # BLD AUTO: 12.35 THOUSANDS/ΜL (ref 1.85–7.62)
NEUTS SEG NFR BLD AUTO: 81 % (ref 43–75)
NRBC BLD AUTO-RTO: 0 /100 WBCS
PLATELET # BLD AUTO: 222 THOUSANDS/UL (ref 149–390)
PMV BLD AUTO: 10.1 FL (ref 8.9–12.7)
POTASSIUM SERPL-SCNC: 4.2 MMOL/L (ref 3.5–5.3)
RBC # BLD AUTO: 5.21 MILLION/UL (ref 3.88–5.62)
SODIUM SERPL-SCNC: 141 MMOL/L (ref 136–145)
WBC # BLD AUTO: 15.48 THOUSAND/UL (ref 4.31–10.16)

## 2022-06-11 PROCEDURE — 99232 SBSQ HOSP IP/OBS MODERATE 35: CPT | Performed by: STUDENT IN AN ORGANIZED HEALTH CARE EDUCATION/TRAINING PROGRAM

## 2022-06-11 PROCEDURE — 82948 REAGENT STRIP/BLOOD GLUCOSE: CPT

## 2022-06-11 PROCEDURE — 80048 BASIC METABOLIC PNL TOTAL CA: CPT | Performed by: INTERNAL MEDICINE

## 2022-06-11 PROCEDURE — 83036 HEMOGLOBIN GLYCOSYLATED A1C: CPT | Performed by: INTERNAL MEDICINE

## 2022-06-11 PROCEDURE — 92610 EVALUATE SWALLOWING FUNCTION: CPT

## 2022-06-11 PROCEDURE — 85025 COMPLETE CBC W/AUTO DIFF WBC: CPT | Performed by: INTERNAL MEDICINE

## 2022-06-11 PROCEDURE — 94150 VITAL CAPACITY TEST: CPT

## 2022-06-11 RX ORDER — INSULIN LISPRO 100 [IU]/ML
2-12 INJECTION, SOLUTION INTRAVENOUS; SUBCUTANEOUS
Status: DISCONTINUED | OUTPATIENT
Start: 2022-06-11 | End: 2022-06-15 | Stop reason: HOSPADM

## 2022-06-11 RX ADMIN — ASPIRIN 81 MG: 81 TABLET ORAL at 09:18

## 2022-06-11 RX ADMIN — SODIUM CHLORIDE 50 ML/HR: 0.9 INJECTION, SOLUTION INTRAVENOUS at 09:19

## 2022-06-11 RX ADMIN — ENOXAPARIN SODIUM 40 MG: 40 INJECTION SUBCUTANEOUS at 09:18

## 2022-06-11 RX ADMIN — PYRIDOSTIGMINE BROMIDE 60 MG: 60 TABLET ORAL at 11:59

## 2022-06-11 RX ADMIN — PYRIDOSTIGMINE BROMIDE 60 MG: 60 TABLET ORAL at 18:02

## 2022-06-11 RX ADMIN — ATORVASTATIN CALCIUM 40 MG: 40 TABLET, FILM COATED ORAL at 18:02

## 2022-06-11 RX ADMIN — Medication 42.5 G: at 09:18

## 2022-06-11 RX ADMIN — INSULIN LISPRO 2 UNITS: 100 INJECTION, SOLUTION INTRAVENOUS; SUBCUTANEOUS at 18:02

## 2022-06-11 RX ADMIN — PYRIDOSTIGMINE BROMIDE 60 MG: 60 TABLET ORAL at 09:18

## 2022-06-11 RX ADMIN — PYRIDOSTIGMINE BROMIDE 60 MG: 60 TABLET ORAL at 21:44

## 2022-06-11 RX ADMIN — POLYVINYL ALCOHOL 1 DROP: 14 SOLUTION/ DROPS OPHTHALMIC at 09:25

## 2022-06-11 NOTE — PLAN OF CARE
Problem: Potential for Falls  Goal: Patient will remain free of falls  Description: INTERVENTIONS:  - Educate patient/family on patient safety including physical limitations  - Instruct patient to call for assistance with activity   - Consult OT/PT to assist with strengthening/mobility   - Keep Call bell within reach  - Keep bed low and locked with side rails adjusted as appropriate  - Keep care items and personal belongings within reach  - Initiate and maintain comfort rounds  - Make Fall Risk Sign visible to staff  - Offer Toileting every 2 Hours, in advance of need  - Obtain necessary fall risk management equipment: nonskid socks  - Apply yellow socks and bracelet for high fall risk patients  - Consider moving patient to room near nurses station  Outcome: Progressing     Problem: PAIN - ADULT  Goal: Verbalizes/displays adequate comfort level or baseline comfort level  Description: Interventions:  - Encourage patient to monitor pain and request assistance  - Assess pain using appropriate pain scale  - Administer analgesics based on type and severity of pain and evaluate response  - Implement non-pharmacological measures as appropriate and evaluate response  - Consider cultural and social influences on pain and pain management  - Notify physician/advanced practitioner if interventions unsuccessful or patient reports new pain  Outcome: Progressing     Problem: INFECTION - ADULT  Goal: Absence or prevention of progression during hospitalization  Description: INTERVENTIONS:  - Assess and monitor for signs and symptoms of infection  - Monitor lab/diagnostic results  - Monitor all insertion sites, i e  indwelling lines, tubes, and drains  - Monitor endotracheal if appropriate and nasal secretions for changes in amount and color  - [place] appropriate cooling/warming therapies per order  - Administer medications as ordered  - Instruct and encourage patient and family to use good hand hygiene technique  - Identify and instruct in appropriate isolation precautions for identified infection/condition  Outcome: Progressing     Problem: SAFETY ADULT  Goal: Patient will remain free of falls  Description: INTERVENTIONS:  - Educate patient/family on patient safety including physical limitations  - Instruct patient to call for assistance with activity   - Consult OT/PT to assist with strengthening/mobility   - Keep Call bell within reach  - Keep bed low and locked with side rails adjusted as appropriate  - Keep care items and personal belongings within reach  - Initiate and maintain comfort rounds  - Make Fall Risk Sign visible to staff  - Offer Toileting every 2 Hours, in advance of need  - Obtain necessary fall risk management equipment: nonskid socks  - Apply yellow socks and bracelet for high fall risk patients  - Consider moving patient to room near nurses station  Outcome: Progressing  Goal: Maintain or return to baseline ADL function  Description: INTERVENTIONS:  -  Assess patient's ability to carry out ADLs; assess patient's baseline for ADL function and identify physical deficits which impact ability to perform ADLs (bathing, care of mouth/teeth, toileting, grooming, dressing, etc )  - Assess/evaluate cause of self-care deficits   - Assess range of motion  - Assess patient's mobility; develop plan if impaired  - Assess patient's need for assistive devices and provide as appropriate  - Encourage maximum independence but intervene and supervise when necessary  - Involve family in performance of ADLs  - Assess for home care needs following discharge   - Consider OT consult to assist with ADL evaluation and planning for discharge  - Provide patient education as appropriate  Outcome: Progressing  Goal: Maintains/Returns to pre admission functional level  Description: INTERVENTIONS:  - Perform BMAT or MOVE assessment daily    - Set and communicate daily mobility goal to care team and patient/family/caregiver     - Collaborate with rehabilitation services on mobility goals if consulted  - Perform Range of Motion 4 times a day  - Reposition patient every 4 hours  - Dangle patient 4 times a day  - Stand patient 4 times a day  - Ambulate patient 4 times a day  - Out of bed to chair 3 times a day   - Out of bed for meals 3 times a day  - Out of bed for toileting  - Record patient progress and toleration of activity level   Outcome: Progressing     Problem: DISCHARGE PLANNING  Goal: Discharge to home or other facility with appropriate resources  Description: INTERVENTIONS:  - Identify barriers to discharge w/patient and caregiver  - Arrange for needed discharge resources and transportation as appropriate  - Identify discharge learning needs (meds, wound care, etc )  - Arrange for interpretive services to assist at discharge as needed  - Refer to Case Management Department for coordinating discharge planning if the patient needs post-hospital services based on physician/advanced practitioner order or complex needs related to functional status, cognitive ability, or social support system  Outcome: Progressing     Problem: Knowledge Deficit  Goal: Patient/family/caregiver demonstrates understanding of disease process, treatment plan, medications, and discharge instructions  Description: Complete learning assessment and assess knowledge base    Interventions:  - Provide teaching at level of understanding  - Provide teaching via preferred learning methods  Outcome: Progressing     Problem: CARDIOVASCULAR - ADULT  Goal: Maintains optimal cardiac output and hemodynamic stability  Description: INTERVENTIONS:  - Monitor I/O, vital signs and rhythm  - Monitor for S/S and trends of decreased cardiac output  - Administer and titrate ordered vasoactive medications to optimize hemodynamic stability  - Assess quality of pulses, skin color and temperature  - Assess for signs of decreased coronary artery perfusion  - Instruct patient to report change in severity of symptoms  Outcome: Progressing  Goal: Absence of cardiac dysrhythmias or at baseline rhythm  Description: INTERVENTIONS:  - Continuous cardiac monitoring, vital signs, obtain 12 lead EKG if ordered  - Administer antiarrhythmic and heart rate control medications as ordered  - Monitor electrolytes and administer replacement therapy as ordered  Outcome: Progressing

## 2022-06-11 NOTE — RESPIRATORY THERAPY NOTE
06/11/22 0803   Respiratory Assessment   Assessment Type Assess only   General Appearance Alert; Awake   Respiratory Pattern Normal   Chest Assessment Chest expansion symmetrical   Bilateral Breath Sounds Diminished;Clear   Cough None   Resp Comments Pt sitting in chair in no distress  Does c/o some SOB/inability to take deep breath  Manuevers done     O2 Device RA   Additional Assessments   Pulse 61   Respirations 18   SpO2 91 %   $ Vital Capacity Mech/Peak Flow Yes   Position Sitting   Vital Capacity 2 7 L   NIF -50 cm H2O

## 2022-06-11 NOTE — SPEECH THERAPY NOTE
Speech-Language Pathology Bedside Swallow Evaluation        Patient Name: Anjali Foss    NAIXP'K Date: 6/11/2022     Problem List  Patient Active Problem List   Diagnosis    Ptosis    CAD (coronary artery disease)    Morbid obesity ([facility] [address] )    Exacerbation of Myasthenia gravis, bulbar , Ptosis       Past Medical History  Past Medical History:   Diagnosis Date    Coronary artery disease     Heart attack ([facility] [address] )     Hyperlipidemia        Past Surgical History  Past Surgical History:   Procedure Laterality Date    CORONARY ANGIOPLASTY WITH STENT PLACEMENT           Current Medical Status  Pt is a 60 y o  male who presented to [facility] with SOB, dysphagia and bilateral ptosis  ST consult requested to further assess the swallow function  The patient reports his dysphagia started with this myasthenia gravis crisis and waxes and wanes  He reports it is worse as the day progresses  He characterizes his dysphagia as globus sensation and if he is not careful he will cough or choke on the food  He reports he is avoiding certain foods that are more challenging ( hard meats, rice etc )  He admits to globus sensation even with his saliva at times  He reports going slow and utilizing liquid washes are beneficial      Past medical history:   Please see H&P for details    Special Studies:  6/9 CT head: No acute cardiopulmonary disease  6/9 CXR: No acute cardiopulmonary disease      Social/Education/Vocational Hx:  Pt lives with family    Swallow Information   Current Risks for Dysphagia & Aspiration: myasthenia gravis     Current Symptoms/Concerns: coughing with po and globus sensation    Current Diet: regular diet and thin liquids      Baseline Diet: regular diet and thin liquids      Baseline Assessment   Behavior/Cognition: alert    Speech/Language Status: able to participate in conversation and able to follow commands    Patient Positioning: upright in recliner    Swallow Mechanism Exam   Facial: symmetrical  Labial: WFL  Lingual: WFL  Velum: symmetrical  Mandible: adequate ROM  Dentition: adequate  Vocal quality:clear/adequate  Volitional Cough: strong/productive   Resp: RA    Consistencies Assessed and Performance   Consistencies Administered: thin liquids, puree, hard solids and mixed consistency  Specific materials administered included: applesauce, raisin bran with thin milk, thin juice    Oral Stage: WFL  Mastication was adequate with the materials administered today  Bolus formation and transfer were functional with nosignificant oral residue noted  No overt s/s reduced oral control  Pharyngeal Stage: [facility]  Swallowing initiation appeared prompt  Laryngeal rise was palpated and judged to be within functional limits  No coughing, throat clearing, change in vocal quality or respiratory status noted today  Esophageal Concerns: globus sensation    Summary   Pts oropharyngeal swallow function appears generally WFL at this time with the materials administered today  Per patient his dysphagia is variable with increased fatigue throughout the day consistent with myasthenia diagnosis  He is already utilizing strategies beneficial to him and denies any further coughing/choking episodes  We discussed diet downgrades but he reports he feels he does not need them at this time and I agree it is not necessary as the patient is independent in strategy use and is already choosing softer items  Reviewed benefical safe swallowing strategies  Reciprocal comprehension was verbally expressed  Recommendations: regular diet and thin liquids (patient to choose softer/moist items items)    Recommended Form of Meds: as tolerated     Aspiration precautions and compensatory swallowing strategies: upright posture, slow rate of feeding, small bites/sips, alternating bites and sips and use of added moisteners    Results Reviewed with: patient     Plan  No additional ST indicated at this time       ABIGAIL Melendez , CCC-SLP  Speech Language Pathologist   Available via [address] [place] [address] [id number]  [place] [id number]

## 2022-06-11 NOTE — PLAN OF CARE
Problem: Potential for Falls  Goal: Patient will remain free of falls  Description: INTERVENTIONS:  - Educate patient/family on patient safety including physical limitations  - Instruct patient to call for assistance with activity   - Consult OT/PT to assist with strengthening/mobility   - Keep Call bell within reach  - Keep bed low and locked with side rails adjusted as appropriate  - Keep care items and personal belongings within reach  - Initiate and maintain comfort rounds  - Make Fall Risk Sign visible to staff  - Offer Toileting every 2 Hours, in advance of need  - Initiate/Maintain bed alarm  - Obtain necessary fall risk management equipment:   - Apply yellow socks and bracelet for high fall risk patients  - Consider moving patient to room near nurses station  Outcome: Progressing     Problem: PAIN - ADULT  Goal: Verbalizes/displays adequate comfort level or baseline comfort level  Description: Interventions:  - Encourage patient to monitor pain and request assistance  - Assess pain using appropriate pain scale  - Administer analgesics based on type and severity of pain and evaluate response  - Implement non-pharmacological measures as appropriate and evaluate response  - Consider cultural and social influences on pain and pain management  - Notify physician/advanced practitioner if interventions unsuccessful or patient reports new pain  Outcome: Progressing     Problem: INFECTION - ADULT  Goal: Absence or prevention of progression during hospitalization  Description: INTERVENTIONS:  - Assess and monitor for signs and symptoms of infection  - Monitor lab/diagnostic results  - Monitor all insertion sites, i e  indwelling lines, tubes, and drains  - Monitor endotracheal if appropriate and nasal secretions for changes in amount and color  - [place] appropriate cooling/warming therapies per order  - Administer medications as ordered  - Instruct and encourage patient and family to use good hand hygiene technique  - Identify and instruct in appropriate isolation precautions for identified infection/condition  Outcome: Progressing     Problem: SAFETY ADULT  Goal: Patient will remain free of falls  Description: INTERVENTIONS:  - Educate patient/family on patient safety including physical limitations  - Instruct patient to call for assistance with activity   - Consult OT/PT to assist with strengthening/mobility   - Keep Call bell within reach  - Keep bed low and locked with side rails adjusted as appropriate  - Keep care items and personal belongings within reach  - Initiate and maintain comfort rounds  - Make Fall Risk Sign visible to staff  - Offer Toileting every 2 Hours, in advance of need  - Initiate/Maintain bed alarm  - Obtain necessary fall risk management equipment:   - Apply yellow socks and bracelet for high fall risk patients  - Consider moving patient to room near nurses station  Outcome: Progressing  Goal: Maintain or return to baseline ADL function  Description: INTERVENTIONS:  -  Assess patient's ability to carry out ADLs; assess patient's baseline for ADL function and identify physical deficits which impact ability to perform ADLs (bathing, care of mouth/teeth, toileting, grooming, dressing, etc )  - Assess/evaluate cause of self-care deficits   - Assess range of motion  - Assess patient's mobility; develop plan if impaired  - Assess patient's need for assistive devices and provide as appropriate  - Encourage maximum independence but intervene and supervise when necessary  - Involve family in performance of ADLs  - Assess for home care needs following discharge   - Consider OT consult to assist with ADL evaluation and planning for discharge  - Provide patient education as appropriate  Outcome: Progressing  Goal: Maintains/Returns to pre admission functional level  Description: INTERVENTIONS:  - Perform BMAT or MOVE assessment daily    - Set and communicate daily mobility goal to care team and patient/family/caregiver  - Collaborate with rehabilitation services on mobility goals if consulted  - Perform Range of Motion 3 times a day  - Reposition patient every 2 hours  - Dangle patient 3 times a day  - Stand patient 3 times a day  - Ambulate patient 3 times a day  - Out of bed to chair 3 times a day   - Out of bed for meals 3 times a day  - Out of bed for toileting  - Record patient progress and toleration of activity level   Outcome: Progressing     Problem: DISCHARGE PLANNING  Goal: Discharge to home or other facility with appropriate resources  Description: INTERVENTIONS:  - Identify barriers to discharge w/patient and caregiver  - Arrange for needed discharge resources and transportation as appropriate  - Identify discharge learning needs (meds, wound care, etc )  - Arrange for interpretive services to assist at discharge as needed  - Refer to Case Management Department for coordinating discharge planning if the patient needs post-hospital services based on physician/advanced practitioner order or complex needs related to functional status, cognitive ability, or social support system  Outcome: Progressing     Problem: Knowledge Deficit  Goal: Patient/family/caregiver demonstrates understanding of disease process, treatment plan, medications, and discharge instructions  Description: Complete learning assessment and assess knowledge base    Interventions:  - Provide teaching at level of understanding  - Provide teaching via preferred learning methods  Outcome: Progressing     Problem: CARDIOVASCULAR - ADULT  Goal: Maintains optimal cardiac output and hemodynamic stability  Description: INTERVENTIONS:  - Monitor I/O, vital signs and rhythm  - Monitor for S/S and trends of decreased cardiac output  - Administer and titrate ordered vasoactive medications to optimize hemodynamic stability  - Assess quality of pulses, skin color and temperature  - Assess for signs of decreased coronary artery perfusion  - Instruct patient to report change in severity of symptoms  Outcome: Progressing  Goal: Absence of cardiac dysrhythmias or at baseline rhythm  Description: INTERVENTIONS:  - Continuous cardiac monitoring, vital signs, obtain 12 lead EKG if ordered  - Administer antiarrhythmic and heart rate control medications as ordered  - Monitor electrolytes and administer replacement therapy as ordered  Outcome: Progressing

## 2022-06-11 NOTE — ASSESSMENT & PLAN NOTE
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

## 2022-06-11 NOTE — PROGRESS NOTES
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 presents with dysphagia, weakness, shortness of breath      CT chest no acute abnormality, chest x-ray NAD likely secondary myasthenic crisis  NIF--40 at presentation, improved to -55    -symptomatic improvement with 1 g of IV Solu-Medrol, currently on IVIG - day 2/5   -neurology on board, follow recommendations  -continue home pyridostigmine 60mg qid  -Check NIF and EVC daily  -outpatient neurology follow up, once stable for discharge    Morbid obesity ([address] )  Assessment & Plan  -counseled on lifestyle intervention with diet, exercise and weight loss    CAD (coronary artery disease)  Assessment & Plan  -patient reports history of stents in 2006  -currently asymptomatic  -he has been noncompliant with statin and aspirin  -will restart atorvastatin and aspirin  -counseled on compliance of medications        VTE Pharmacologic Prophylaxis:   VTE Score: 6 Moderate Risk (Score 3-4) - Pharmacological DVT Prophylaxis Ordered: Enoxaparin (Lovenox)  Mechanical VTE Prophylaxis in Place: Yes    Patient Centered Rounds: I have performed bedside rounds with nursing staff today  Discussions with Specialists or Other Care Team Provider: yes    Education and Discussions with Family / Patient: yes    Current Length of Stay: 2 day(s)    Current Patient Status: Inpatient     Discharge Plan / Estimated Discharge Date: Anticipate discharge in 48-72 hrs to home  Code Status: Level 1 - Full Code      Subjective:   Seen and examined bedside, weak alert and oriented  Comfortable on room air    Complains of intermittent shortness of breath  Having doses  No overnight event reported  Objective:     Vitals:   Temp (24hrs), Av 5 °F (36 4 °C), Min:97 3 °F (36 3 °C), Max:97 7 °F (36 5 °C)    Temp:  [97 3 °F (36 3 °C)-97 7 °F (36 5 °C)] 97 3 °F (36 3 °C)  HR:  [61-85] 61  Resp:  [16-19] 18  BP: ()/(45-82) 108/66  SpO2:  [90 %-93 %] 91 %  Body mass index is 45 12 kg/m²  Input and Output Summary (last 24 hours): Intake/Output Summary (Last 24 hours) at 2022 1031  Last data filed at 2022 0919  Gross per 24 hour   Intake 2231 67 ml   Output 2100 ml   Net 131 67 ml       Physical Exam:     Physical Exam  Vitals reviewed  Constitutional:       Appearance: He is obese  HENT:      Head: Atraumatic  Eyes:      General: No scleral icterus  Comments: ptosis   Cardiovascular:      Rate and Rhythm: Normal rate and regular rhythm  Heart sounds: Normal heart sounds  Pulmonary:      Effort: No respiratory distress  Breath sounds: Normal breath sounds  Abdominal:      General: Bowel sounds are normal       Palpations: Abdomen is soft  Musculoskeletal:         General: Normal range of motion  Cervical back: Neck supple  Skin:     Findings: No lesion or rash  Neurological:      Mental Status: He is alert  Mental status is at baseline     Psychiatric:         Mood and Affect: Mood normal           Additional Data:     Labs:  Results from last 7 days   Lab Units 22  0507   WBC Thousand/uL 15 48*   HEMOGLOBIN g/dL 14 3   HEMATOCRIT % 45 1   PLATELETS Thousands/uL 222   NEUTROS PCT % 81*   LYMPHS PCT % 13*   MONOS PCT % 6   EOS PCT % 0     Results from last 7 days   Lab Units 22  0507   SODIUM mmol/L 141   POTASSIUM mmol/L 4 2   CHLORIDE mmol/L 106   CO2 mmol/L 30   BUN mg/dL 17   CREATININE mg/dL 0 92   ANION GAP mmol/L 5   CALCIUM mg/dL 8 6   GLUCOSE RANDOM mg/dL 124         Results from last 7 days   Lab Units 22  0708 06/10/22  2056 06/10/22  9156 06/10/22  1106 06/10/22  0736 06/09/22  1847   POC GLUCOSE mg/dl 115 225* 191* 257* 175* 152*               Imaging: No pertinent imaging reviewed  Recent Cultures (last 7 days):           Lines/Drains:  Invasive Devices  Report    Peripheral Intravenous Line  Duration           Peripheral IV 06/09/22 Right Hand 1 day                Telemetry:        Last 24 Hours Medication List:   Current Facility-Administered Medications   Medication Dose Route Frequency Provider Last Rate    aspirin  81 mg Oral Daily Reid Castellano MD      atorvastatin  40 mg Oral QPM Reid Castellano MD      enoxaparin  40 mg Subcutaneous Daily Reid Castellano MD      immune globulin, human  0 4 g/kg (Adjusted) Intravenous Daily Tanja Dye, CRNP 192 6 mL/hr at 06/11/22 1020    insulin lispro  2-12 Units Subcutaneous BID after meals Heriberto Bella MD      polyvinyl alcohol  1 drop Both Eyes Q3H PRN Reid Castellano MD      pyridostigmine  60 mg Oral 4x Daily MARCELLA Retana      sodium chloride (PF)  3 mL Intravenous Q1H PRN MARCELLA Teresa      sodium chloride  50 mL/hr Intravenous Continuous Tanja Dye, CRNP 50 mL/hr (06/11/22 0919)        Today, Patient Was Seen By: Heriberto Bella MD    ** Please Note: This note has been constructed using a voice recognition system   **

## 2022-06-12 LAB
ANION GAP SERPL CALCULATED.3IONS-SCNC: 5 MMOL/L (ref 4–13)
BASOPHILS # BLD AUTO: 0.03 THOUSANDS/ΜL (ref 0–0.1)
BASOPHILS NFR BLD AUTO: 0 % (ref 0–1)
BUN SERPL-MCNC: 16 MG/DL (ref 5–25)
CALCIUM SERPL-MCNC: 8.6 MG/DL (ref 8.3–10.1)
CHLORIDE SERPL-SCNC: 104 MMOL/L (ref 100–108)
CO2 SERPL-SCNC: 31 MMOL/L (ref 21–32)
CREAT SERPL-MCNC: 0.92 MG/DL (ref 0.6–1.3)
EOSINOPHIL # BLD AUTO: 0.21 THOUSAND/ΜL (ref 0–0.61)
EOSINOPHIL NFR BLD AUTO: 3 % (ref 0–6)
ERYTHROCYTE [DISTWIDTH] IN BLOOD BY AUTOMATED COUNT: 13.7 % (ref 11.6–15.1)
GFR SERPL CREATININE-BSD FRML MDRD: 88 ML/MIN/1.73SQ M
GLUCOSE SERPL-MCNC: 111 MG/DL (ref 65–140)
GLUCOSE SERPL-MCNC: 159 MG/DL (ref 65–140)
GLUCOSE SERPL-MCNC: 164 MG/DL (ref 65–140)
HCT VFR BLD AUTO: 46.7 % (ref 36.5–49.3)
HGB BLD-MCNC: 14.8 G/DL (ref 12–17)
IMM GRANULOCYTES # BLD AUTO: 0.02 THOUSAND/UL (ref 0–0.2)
IMM GRANULOCYTES NFR BLD AUTO: 0 % (ref 0–2)
LYMPHOCYTES # BLD AUTO: 2.19 THOUSANDS/ΜL (ref 0.6–4.47)
LYMPHOCYTES NFR BLD AUTO: 28 % (ref 14–44)
MCH RBC QN AUTO: 27.4 PG (ref 26.8–34.3)
MCHC RBC AUTO-ENTMCNC: 31.7 G/DL (ref 31.4–37.4)
MCV RBC AUTO: 87 FL (ref 82–98)
MONOCYTES # BLD AUTO: 0.56 THOUSAND/ΜL (ref 0.17–1.22)
MONOCYTES NFR BLD AUTO: 7 % (ref 4–12)
NEUTROPHILS # BLD AUTO: 4.96 THOUSANDS/ΜL (ref 1.85–7.62)
NEUTS SEG NFR BLD AUTO: 62 % (ref 43–75)
NRBC BLD AUTO-RTO: 0 /100 WBCS
PLATELET # BLD AUTO: 226 THOUSANDS/UL (ref 149–390)
PMV BLD AUTO: 10.3 FL (ref 8.9–12.7)
POTASSIUM SERPL-SCNC: 4.3 MMOL/L (ref 3.5–5.3)
RBC # BLD AUTO: 5.4 MILLION/UL (ref 3.88–5.62)
SODIUM SERPL-SCNC: 140 MMOL/L (ref 136–145)
WBC # BLD AUTO: 7.97 THOUSAND/UL (ref 4.31–10.16)

## 2022-06-12 PROCEDURE — 99233 SBSQ HOSP IP/OBS HIGH 50: CPT | Performed by: INTERNAL MEDICINE

## 2022-06-12 PROCEDURE — 85025 COMPLETE CBC W/AUTO DIFF WBC: CPT | Performed by: INTERNAL MEDICINE

## 2022-06-12 PROCEDURE — 82948 REAGENT STRIP/BLOOD GLUCOSE: CPT

## 2022-06-12 PROCEDURE — 99233 SBSQ HOSP IP/OBS HIGH 50: CPT | Performed by: PSYCHIATRY & NEUROLOGY

## 2022-06-12 PROCEDURE — 94150 VITAL CAPACITY TEST: CPT

## 2022-06-12 PROCEDURE — 80048 BASIC METABOLIC PNL TOTAL CA: CPT | Performed by: INTERNAL MEDICINE

## 2022-06-12 RX ORDER — PANTOPRAZOLE SODIUM 40 MG/1
40 TABLET, DELAYED RELEASE ORAL
Status: DISCONTINUED | OUTPATIENT
Start: 2022-06-12 | End: 2022-06-15 | Stop reason: HOSPADM

## 2022-06-12 RX ORDER — PREDNISONE 20 MG/1
60 TABLET ORAL DAILY
Status: DISCONTINUED | OUTPATIENT
Start: 2022-06-12 | End: 2022-06-15 | Stop reason: HOSPADM

## 2022-06-12 RX ADMIN — PANTOPRAZOLE SODIUM 40 MG: 40 TABLET, DELAYED RELEASE ORAL at 12:33

## 2022-06-12 RX ADMIN — PYRIDOSTIGMINE BROMIDE 60 MG: 60 TABLET ORAL at 09:46

## 2022-06-12 RX ADMIN — PYRIDOSTIGMINE BROMIDE 60 MG: 60 TABLET ORAL at 21:38

## 2022-06-12 RX ADMIN — INSULIN LISPRO 2 UNITS: 100 INJECTION, SOLUTION INTRAVENOUS; SUBCUTANEOUS at 17:53

## 2022-06-12 RX ADMIN — PYRIDOSTIGMINE BROMIDE 60 MG: 60 TABLET ORAL at 12:34

## 2022-06-12 RX ADMIN — ASPIRIN 81 MG: 81 TABLET ORAL at 09:46

## 2022-06-12 RX ADMIN — PYRIDOSTIGMINE BROMIDE 60 MG: 60 TABLET ORAL at 17:53

## 2022-06-12 RX ADMIN — INSULIN LISPRO 2 UNITS: 100 INJECTION, SOLUTION INTRAVENOUS; SUBCUTANEOUS at 09:47

## 2022-06-12 RX ADMIN — ENOXAPARIN SODIUM 40 MG: 40 INJECTION SUBCUTANEOUS at 09:46

## 2022-06-12 RX ADMIN — ATORVASTATIN CALCIUM 40 MG: 40 TABLET, FILM COATED ORAL at 17:53

## 2022-06-12 RX ADMIN — PREDNISONE 60 MG: 20 TABLET ORAL at 12:33

## 2022-06-12 RX ADMIN — Medication 42.5 G: at 09:47

## 2022-06-12 NOTE — PROGRESS NOTES
[facility]  Progress Note Jon Anthony 1960, 60 y o  male MRN: [id number]  Unit/Bed#: -01 Encounter: [id number]  Primary Care Provider: No primary care provider on file  Date and time admitted to hospital: 6/9/2022  2:37 PM    * Exacerbation of Myasthenia gravis, bulbar , Ptosis  Assessment & Plan  Patient had b/l ptosis, difficulty swallowing, difficulty speaking, SOB, and b/l LE weakness for a few days to a week, after which he was told to come to the ED by his outpatient neurology office  He did not take his 60 mg pyridostigmine qid for a few days because he ran out of it  He has not f/u with his outpatient neurologist since 10/2021   -was given solumedrol 1g IV on day of admission with minimal improvement  -IVIG started 6/10/22 per Neurology, continue for 5 days  -continue pyridostigmine 60 mg qid  -monitor NIF/EVC q8hr, improving since admission  -neurology following, appreciate recs    CAD (coronary artery disease)  Assessment & Plan  Patient has history of MI in 2009 and PCI with stent placement  Patient not compliant with aspirin and statin   -multiple EKGs show normal sinus rhythm, septal infarct, left axis deviation, left fascicular block, occasional PVCs with ventricular escape complexes, and T wave abnormality  -EKGs not concerning for acute ischemia  -Lipitor 40 mg daily, aspirin 81 mg daily    Morbid obesity ([facility] [address] )  Assessment & Plan  Patient is morbidly obese   -glucose elevated, last HbA1c 12/6/21: 6 1  -continue insulin SQ regimen  -recommend lifestyle modifications and PCP outpatient follow-up    Ptosis  Assessment & Plan  · See plan under Myasthenia gravis        VTE Pharmacologic Prophylaxis:   VTE Score: 6 High Risk (Score >/= 5) - Pharmacological DVT Prophylaxis Ordered: Enoxaparin (Lovenox)  Sequential Compression Devices Ordered      Mechanical VTE Prophylaxis in Place: Yes    Patient Centered Rounds: Treatment team aware of changes    Discussions with Specialists or Other Care Team Provider: Neurology    Education and Discussions with Family / Patient: See additional notes    Current Length of Stay: 3 day(s)    Current Patient Status: Inpatient     Discharge Plan / Estimated Discharge Date: Anticipate discharge in 48 hrs to home  Code Status: Level 1 - Full Code      Subjective:   No acute overnight events  Patient reports some improvement with IVIG  Ptosis is still present  He states that his memory is declining and that IV fluids are decreasing his urine output  Patient also believes that he does need to be on insulin, but is agreeable to continue insulin during his hospital stay  He denies fever, chills, chest pain, SOB, abdominal pain, and extremity swelling  Objective:     Vitals:   Temp (24hrs), Av 4 °F (36 9 °C), Min:98 3 °F (36 8 °C), Max:98 4 °F (36 9 °C)    Temp:  [98 3 °F (36 8 °C)-98 4 °F (36 9 °C)] 98 4 °F (36 9 °C)  HR:  [72-85] 85  Resp:  [16-18] 16  BP: (115-137)/(66-81) 137/81  SpO2:  [91 %-92 %] 91 %  Body mass index is 45 12 kg/m²  Input and Output Summary (last 24 hours): Intake/Output Summary (Last 24 hours) at 2022 1350  Last data filed at 2022 0452  Gross per 24 hour   Intake 940 ml   Output 1200 ml   Net -260 ml       Physical Exam:     Physical Exam  Constitutional:       Appearance: He is obese  HENT:      Head: Normocephalic and atraumatic  Cardiovascular:      Rate and Rhythm: Normal rate and regular rhythm  Heart sounds: Normal heart sounds  No murmur heard  No friction rub  No gallop  Pulmonary:      Breath sounds: Normal breath sounds  Comments: Increased effort with repeated breaths  Abdominal:      General: Bowel sounds are normal       Palpations: Abdomen is soft  Skin:     General: Skin is warm and dry  Neurological:      General: No focal deficit present  Mental Status: He is alert and oriented to person, place, and time  Motor: No weakness        Comments: B/l ptosis, wearing tape to keep eyes open  5/5 strength b/l   Psychiatric:         Mood and Affect: Mood normal          Behavior: Behavior normal          Thought Content: Thought content normal           Additional Data:     Labs:  Results from last 7 days   Lab Units 06/12/22  0447   WBC Thousand/uL 7 97   HEMOGLOBIN g/dL 14 8   HEMATOCRIT % 46 7   PLATELETS Thousands/uL 226   NEUTROS PCT % 62   LYMPHS PCT % 28   MONOS PCT % 7   EOS PCT % 3     Results from last 7 days   Lab Units 06/12/22  0447   SODIUM mmol/L 140   POTASSIUM mmol/L 4 3   CHLORIDE mmol/L 104   CO2 mmol/L 31   BUN mg/dL 16   CREATININE mg/dL 0 92   ANION GAP mmol/L 5   CALCIUM mg/dL 8 6   GLUCOSE RANDOM mg/dL 111         Results from last 7 days   Lab Units 06/12/22  0734 06/11/22  1702 06/11/22  0708 06/10/22  2056 06/10/22  1546 06/10/22  1106 06/10/22  0736 06/09/22  1847   POC GLUCOSE mg/dl 164* 152* 115 225* 191* 257* 175* 152*     Results from last 7 days   Lab Units 06/11/22  0507   HEMOGLOBIN A1C % 6 3*           Imaging: No pertinent imaging reviewed      Recent Cultures (last 7 days):           Lines/Drains:  Invasive Devices  Report    Peripheral Intravenous Line  Duration           Peripheral IV 06/12/22 Left Antecubital <1 day                Telemetry:        Last 24 Hours Medication List:   Current Facility-Administered Medications   Medication Dose Route Frequency Provider Last Rate    aspirin  81 mg Oral Daily Aman King MD      atorvastatin  40 mg Oral QPM Aman King MD      enoxaparin  40 mg Subcutaneous Daily Aman King MD      immune globulin, human  0 4 g/kg (Adjusted) Intravenous Daily MARCELLA Saleh 0 g (06/11/22 1143)    insulin lispro  2-12 Units Subcutaneous BID after meals Colonel Ronnie MD      pantoprazole  40 mg Oral Early Morning Yinka Jerome MD      polyvinyl alcohol  1 drop Both Eyes Q3H PRN Aman King MD      predniSONE  60 mg Oral Daily Yinka Jerome MD      pyridostigmine  60 mg Oral 4x Daily MARCELLA Centeno      sodium chloride (PF)  3 mL Intravenous Q1H PRN MARCELLA Barraza      sodium chloride  50 mL/hr Intravenous Continuous MARCELLA Saleh Stopped (06/11/22 1831)        Today, Patient Was Seen By: Abdoulaye Mccullough

## 2022-06-12 NOTE — PROGRESS NOTES
[address] [facility]  Progress Note Jean Marie Bowles 1960, 60 y o  male MRN: [id number]  Unit/Bed#: -01 Encounter: [id number]  Primary Care Provider: No primary care provider on file  Date and time admitted to hospital: 6/9/2022  2:37 PM    * Exacerbation of Myasthenia gravis, bulbar , Ptosis  Assessment & Plan  Patient had b/l ptosis, difficulty swallowing, difficulty speaking, SOB, and b/l LE weakness for a few days to a week, after which he was told to come to the ED by his outpatient neurology office  He did not take his 60 mg pyridostigmine qid for a few days because he ran out of it  He has not f/u with his outpatient neurologist since 10/2021   -was given solumedrol 1g IV on day of admission with minimal improvement  -monitor NIF/EVC q8hr, improving since admission  -continue pyridostigmine 60 mg qid  -neurology following, appreciate recs  -on IVIG per Neurology- started on 6/10/2022    Morbid obesity [facility])  Assessment & Plan  Patient is morbidly obese   -glucose elevated, last HbA1c 12/6/21: 6 1  -continue insulin SQ regimen  -recommend lifestyle modifications and PCP outpatient follow-up    CAD (coronary artery disease)  Assessment & Plan  Patient has history of MI in 2009 and PCI with stent placement  Patient not compliant with aspirin and statin   -multiple EKGs show normal sinus rhythm, septal infarct, left axis deviation, left fascicular block, occasional PVCs with ventricular escape complexes, and T wave abnormality  -EKGs not concerning for acute ischemia  -Lipitor 40 mg daily, aspirin 81 mg daily    Ptosis  Assessment & Plan  · See plan under Myasthenia gravis      TE Pharmacologic Prophylaxis: Enoxaparin (Lovenox)    Patient Centered Rounds: I have performed bedside rounds with nursing staff today    Discussions with Specialists or Other Care Team Provider:  Neurology  Education and Discussions with Family / Patient:  Patient    Current Length of Stay: 3 day(s)  Current Patient Status: Inpatient     Certification Statement: The patient will continue to require additional inpatient hospital stay due to Myasthenia gravis, bulbar [facility])  Discharge Plan / Estimated Discharge Date:  2-3 days    Code Status: Level 1 - Full Code  ______________________________________________________________________________    Subjective:   Patient seen and examined by me  Patient in stools is weakness is much better but he still seems to have eyelid droop  Patient is and is a little forgetful but otherwise feels okay  He was initially refusing insulin but understands the rationale for the same  After detailed conversation with Neurology, the patient requested to be started on prednisone    Objective:   Vitals: Blood pressure 137/81, pulse 85, temperature 98 4 °F (36 9 °C), resp  rate 16, height 6' (1 829 m), weight (!) 151 kg (332 lb 10 8 oz), SpO2 91 %      Physical Exam:   General appearance: alert, appears stated age and cooperative  Constitutional- looks a little weak  HEENT - atraumatic and normocephalic  Neck- supple  Skin - no fresh rash  Extremities no fresh focal deformities  Cardiovascular- S1-S2 heard  Respiratory- bilateral air entry present, no crackles or rhonchi  Skin - no fresh rash  Abdomen - normal bowel sounds present, no rebound tenderness  CNS- No fresh focal deficits  Psych- no acute psychosis     Additional Data:   Labs:  Results from last 7 days   Lab Units 06/12/22 0447 06/11/22  0507 06/10/22  0527 06/09/22 1951 06/09/22  1502   WBC Thousand/uL 7 97 15 48* 11 80*  --  7 08   HEMOGLOBIN g/dL 14 8 14 3 15 5  --  15 0   HEMATOCRIT % 46 7 45 1 47 5  --  47 0   MCV fL 87 87 84  --  86   PLATELETS Thousands/uL 226 222 242 219 222     Results from last 7 days   Lab Units 06/12/22  0447 06/11/22  0507 06/10/22  0527 06/09/22  1502   SODIUM mmol/L 140 141 136 138   POTASSIUM mmol/L 4 3 4 2 4 4 3 6   CHLORIDE mmol/L 104 106 102 103   CO2 mmol/L 31 30 23 27   ANION GAP mmol/L 5 5 11 8 BUN mg/dL 16 17 14 18   CREATININE mg/dL 0 92 0 92 1 09 1 02   CALCIUM mg/dL 8 6 8 6 9 3 8 5   EGFR ml/min/1 73sq m 88 88 71 77   GLUCOSE RANDOM mg/dL 111 124 210* 150*     Results from last 7 days   Lab Units 06/09/22  1502   MAGNESIUM mg/dL 2 0         Results from last 7 days   Lab Units 06/09/22  1502   NT-PRO BNP pg/mL 197*          Results from last 7 days   Lab Units 06/12/22  0734 06/11/22  1702 06/11/22  0708 06/10/22  2056 06/10/22  1546 06/10/22  1106 06/10/22  0736 06/09/22  1847   POC GLUCOSE mg/dl 164* 152* 115 225* 191* 257* 175* 152*     Results from last 7 days   Lab Units 06/11/22  0507   HEMOGLOBIN A1C % 6 3*         * I Have Reviewed All Lab Data Listed Above  Cultures:   Results from last 7 days   Lab Units 06/09/22  1502   INFLUENZA A PCR  Negative     Results from last 7 days   Lab Units 06/09/22  1502   INFLUENZA A PCR  Negative   INFLUENZA B PCR  Negative   RSV PCR  Negative         Imaging:  Imaging Reports Reviewed Today Include:   X-ray chest 1 view portable    Result Date: 6/9/2022  Impression: No acute cardiopulmonary disease  Workstation performed: [id number]     CT head wo contrast    Result Date: 6/9/2022  Impression: No acute intracranial abnormality  Unchanged chronic right insular cortex infarct    Workstation performed: [id number]     Scheduled Meds:  Current Facility-Administered Medications   Medication Dose Route Frequency Provider Last Rate    aspirin  81 mg Oral Daily Ortiz Doe MD      atorvastatin  40 mg Oral QPM Ortiz Doe MD      enoxaparin  40 mg Subcutaneous Daily Ortiz Doe MD      immune globulin, human  0 4 g/kg (Adjusted) Intravenous Daily MARCELLA Saleh 0 g (06/11/22 1143)    insulin lispro  2-12 Units Subcutaneous BID after meals Tristan Sommer MD      polyvinyl alcohol  1 drop Both Eyes Q3H PRN Ortiz Doe MD      pyridostigmine  60 mg Oral 4x Daily MARCELLA Kwon      sodium chloride (PF)  3 mL Intravenous Q1H PRN Bartolo Murillo MARCELLA Goodwin      sodium chloride  50 mL/hr Intravenous Continuous MARCELLA Saleh Stopped (06/11/22 1831)       Kirsten Romero MD  [place] [address] Internal Medicine    ** Please Note: This note has been constructed using a voice recognition system   **

## 2022-06-12 NOTE — PLAN OF CARE
Problem: Potential for Falls  Goal: Patient will remain free of falls  Description: INTERVENTIONS:  - Educate patient/family on patient safety including physical limitations  - Instruct patient to call for assistance with activity   - Consult OT/PT to assist with strengthening/mobility   - Keep Call bell within reach  - Keep bed low and locked with side rails adjusted as appropriate  - Keep care items and personal belongings within reach  - Initiate and maintain comfort rounds  - Make Fall Risk Sign visible to staff  - Offer Toileting every  Hours, in advance of need  - Initiate/Maintain alarm  - Obtain necessary fall risk management equipment:   - Apply yellow socks and bracelet for high fall risk patients  - Consider moving patient to room near nurses station  Outcome: Progressing     Problem: PAIN - ADULT  Goal: Verbalizes/displays adequate comfort level or baseline comfort level  Description: Interventions:  - Encourage patient to monitor pain and request assistance  - Assess pain using appropriate pain scale  - Administer analgesics based on type and severity of pain and evaluate response  - Implement non-pharmacological measures as appropriate and evaluate response  - Consider cultural and social influences on pain and pain management  - Notify physician/advanced practitioner if interventions unsuccessful or patient reports new pain  Outcome: Progressing     Problem: INFECTION - ADULT  Goal: Absence or prevention of progression during hospitalization  Description: INTERVENTIONS:  - Assess and monitor for signs and symptoms of infection  - Monitor lab/diagnostic results  - Monitor all insertion sites, i e  indwelling lines, tubes, and drains  - Monitor endotracheal if appropriate and nasal secretions for changes in amount and color  - [place] appropriate cooling/warming therapies per order  - Administer medications as ordered  - Instruct and encourage patient and family to use good hand hygiene technique  - Identify and instruct in appropriate isolation precautions for identified infection/condition  Outcome: Progressing     Problem: SAFETY ADULT  Goal: Patient will remain free of falls  Description: INTERVENTIONS:  - Educate patient/family on patient safety including physical limitations  - Instruct patient to call for assistance with activity   - Consult OT/PT to assist with strengthening/mobility   - Keep Call bell within reach  - Keep bed low and locked with side rails adjusted as appropriate  - Keep care items and personal belongings within reach  - Initiate and maintain comfort rounds  - Make Fall Risk Sign visible to staff  - Offer Toileting every  Hours, in advance of need  - Initiate/Maintain alarm  - Obtain necessary fall risk management equipment:   - Apply yellow socks and bracelet for high fall risk patients  - Consider moving patient to room near nurses station  Outcome: Progressing  Goal: Maintain or return to baseline ADL function  Description: INTERVENTIONS:  -  Assess patient's ability to carry out ADLs; assess patient's baseline for ADL function and identify physical deficits which impact ability to perform ADLs (bathing, care of mouth/teeth, toileting, grooming, dressing, etc )  - Assess/evaluate cause of self-care deficits   - Assess range of motion  - Assess patient's mobility; develop plan if impaired  - Assess patient's need for assistive devices and provide as appropriate  - Encourage maximum independence but intervene and supervise when necessary  - Involve family in performance of ADLs  - Assess for home care needs following discharge   - Consider OT consult to assist with ADL evaluation and planning for discharge  - Provide patient education as appropriate  Outcome: Progressing  Goal: Maintains/Returns to pre admission functional level  Description: INTERVENTIONS:  - Perform BMAT or MOVE assessment daily    - Set and communicate daily mobility goal to care team and patient/family/caregiver  - Collaborate with rehabilitation services on mobility goals if consulted  - Perform Range of Motion  times a day  - Reposition patient every  hours  - Dangle patient  times a day  - Stand patient  times a day  - Ambulate patient  times a day  - Out of bed to chair  times a day   - Out of bed for meals times a day  - Out of bed for toileting  - Record patient progress and toleration of activity level   Outcome: Progressing     Problem: DISCHARGE PLANNING  Goal: Discharge to home or other facility with appropriate resources  Description: INTERVENTIONS:  - Identify barriers to discharge w/patient and caregiver  - Arrange for needed discharge resources and transportation as appropriate  - Identify discharge learning needs (meds, wound care, etc )  - Arrange for interpretive services to assist at discharge as needed  - Refer to Case Management Department for coordinating discharge planning if the patient needs post-hospital services based on physician/advanced practitioner order or complex needs related to functional status, cognitive ability, or social support system  Outcome: Progressing     Problem: Knowledge Deficit  Goal: Patient/family/caregiver demonstrates understanding of disease process, treatment plan, medications, and discharge instructions  Description: Complete learning assessment and assess knowledge base    Interventions:  - Provide teaching at level of understanding  - Provide teaching via preferred learning methods  Outcome: Progressing     Problem: CARDIOVASCULAR - ADULT  Goal: Maintains optimal cardiac output and hemodynamic stability  Description: INTERVENTIONS:  - Monitor I/O, vital signs and rhythm  - Monitor for S/S and trends of decreased cardiac output  - Administer and titrate ordered vasoactive medications to optimize hemodynamic stability  - Assess quality of pulses, skin color and temperature  - Assess for signs of decreased coronary artery perfusion  - Instruct patient to report change in severity of symptoms  Outcome: Progressing  Goal: Absence of cardiac dysrhythmias or at baseline rhythm  Description: INTERVENTIONS:  - Continuous cardiac monitoring, vital signs, obtain 12 lead EKG if ordered  - Administer antiarrhythmic and heart rate control medications as ordered  - Monitor electrolytes and administer replacement therapy as ordered  Outcome: Progressing

## 2022-06-12 NOTE — RESPIRATORY THERAPY NOTE
06/12/22 0721   Respiratory Assessment   Assessment Type Assess only   General Appearance Alert; Awake   Respiratory Pattern Normal   Chest Assessment Chest expansion symmetrical   Bilateral Breath Sounds Diminished;Clear   Cough None   Resp Comments Pt sitting in chair in no apparent distress  Offers no complaints     O2 Device RA   Additional Assessments   Pulse 80   Respirations 16   SpO2 91 %   $ Vital Capacity Mech/Peak Flow Yes   Position Sitting   Vital Capacity 2 4 L   NIF -45 cm H2O

## 2022-06-12 NOTE — PROGRESS NOTES
Progress Note - Neurology   Krystyna Davison 60 y o  male MRN: [id number]  Unit/Bed#: -01 Encounter: [id number]      Assessment/Plan   * Exacerbation of Myasthenia gravis, bulbar , Ptosis  Assessment & Plan  Krystyna Davison is a 60 y o  male with myesthenia gravis maintained on mestinon, CAD, and HLD who presented to [place] ED on 6/9/2022 with SOB and weakness with BLE muscle aches  He also reports difficulty swallowing and bilateral eye ptosis  He reports that he may have missed some of his mestinon doses  Initial NIF -40    Images:  CXR: IMPRESSION:  No acute cardiopulmonary disease  CTH: IMPRESSION:  No acute intracranial abnormality  Unchanged chronic right insular cortex infarct        Pertinent labs:  COVID/FLU/RSV negative  IgA WNL    Plan:  - Symptoms improved s/p solu medrol 1gm IV x1   - Continue to monitor NIF/VC daily  · Most recent this morning (06/12) at 0721 with a NIF of -45 and VC of 2 4  - Continue Mestinon 60mg 4 times daily   - Started on Prednisone 60 mg daily starting 06/12/2022  - Started IVIG on 06/10/2022 at 400 mg/kg, today is day 3/5  - Medical management and supportive care per primary team  Correction of any metabolic or infectious disturbances  - Please refer to attending attestation for additional recommendations    Krystyna Davison will need follow up in in 4 weeks with neuromuscular attending  He will not require outpatient neurological testing  Subjective:   Patient reports he continues to have some short of breath which he reports is worse when he receives IV fluids  Patient states he continue to have some weakness in his eyelids, requiring tape to keep his eyes open  Patient admits to some generalized weakness  Denies any chest pain, abdominal pain, nausea, vomiting, headache  ROS:  12 point ROS performed, as stated above, all others negative  Vitals: Blood pressure 137/81, pulse 85, temperature 98 4 °F (36 9 °C), resp   rate 16, height 6' (1 829 m), weight (!) 151 kg (332 lb 10 8 oz), SpO2 91 %  ,Body mass index is 45 12 kg/m²  Physical Exam  Vitals and nursing note reviewed  Constitutional:       General: He is not in acute distress  Appearance: He is obese  He is not ill-appearing, toxic-appearing or diaphoretic  HENT:      Head: Normocephalic and atraumatic  Eyes:      General: No scleral icterus  Right eye: No discharge  Left eye: No discharge  Conjunctiva/sclera: Conjunctivae normal       Comments: Bilateral eyelids taped with medical tape   Neck:      Comments: No cervical neck weakness with repetitive neck flexion testing  Musculoskeletal:      Cervical back: Normal range of motion and neck supple  Skin:     General: Skin is warm and dry  Coloration: Skin is not jaundiced or pale  Findings: No bruising, erythema, lesion or rash  Neurological:      Mental Status: He is alert  Psychiatric:         Mood and Affect: Mood normal          Behavior: Behavior normal          Thought Content: Thought content normal          Judgment: Judgment normal        Neurologic Exam     Mental Status   Patient is alert, sitting in chair  Oriented x3  No dysarthria or aphasia noted  Able to name objects provided, follows central and appendicular commands, answers all questions appropriately  Cranial Nerves     CN V   Facial sensation intact  CN VIII   Hearing: intact    CN IX, X   Asymmetric palate: Unable to visualize palate    Pupils equal, round, reactive bilaterally  Bilateral eyelids taped up with medical tape  When tape is removed, patient noted to have Left> Right ptosis   Unable to perform upgaze bilaterally   Limited horizontal EOMs bilaterally, worse in left eye  Primary gaze conjugate, becomes dysconjugate with EOM testing due to weakness in abduction>adduction of left eye  No evidence of nystagmus  No obvious visual field deficits  Symmetric smile  Tongue midline     Motor Exam   Muscle bulk: normal  Overall muscle tone: normal  No drift or pronation in bilateral upper extremities    strength symmetric, 5/5 bilaterally  Bilateral upper and lower extremity strength testing 5/5 throughout  No evidence of weakness with repetitive strength testing     Sensory Exam   Light touch normal      Gait, Coordination, and Reflexes     Tremor   Resting tremor: absent  No ataxia or dysmetria on bilateral upper extremity finger-to-nose testing  No involuntary movements noted throughout exam     Lab Results: I have personally reviewed pertinent reports    Recent Results (from the past 24 hour(s))   Fingerstick Glucose (POCT)    Collection Time: 06/11/22  5:02 PM   Result Value Ref Range    POC Glucose 152 (H) 65 - 140 mg/dl   CBC and differential    Collection Time: 06/12/22  4:47 AM   Result Value Ref Range    WBC 7 97 4 31 - 10 16 Thousand/uL    RBC 5 40 3 88 - 5 62 Million/uL    Hemoglobin 14 8 12 0 - 17 0 g/dL    Hematocrit 46 7 36 5 - 49 3 %    MCV 87 82 - 98 fL    MCH 27 4 26 8 - 34 3 pg    MCHC 31 7 31 4 - 37 4 g/dL    RDW 13 7 11 6 - 15 1 %    MPV 10 3 8 9 - 12 7 fL    Platelets 293 987 - 239 Thousands/uL    nRBC 0 /100 WBCs    Neutrophils Relative 62 43 - 75 %    Immat GRANS % 0 0 - 2 %    Lymphocytes Relative 28 14 - 44 %    Monocytes Relative 7 4 - 12 %    Eosinophils Relative 3 0 - 6 %    Basophils Relative 0 0 - 1 %    Neutrophils Absolute 4 96 1 85 - 7 62 Thousands/µL    Immature Grans Absolute 0 02 0 00 - 0 20 Thousand/uL    Lymphocytes Absolute 2 19 0 60 - 4 47 Thousands/µL    Monocytes Absolute 0 56 0 17 - 1 22 Thousand/µL    Eosinophils Absolute 0 21 0 00 - 0 61 Thousand/µL    Basophils Absolute 0 03 0 00 - 0 10 Thousands/µL   Basic metabolic panel    Collection Time: 06/12/22  4:47 AM   Result Value Ref Range    Sodium 140 136 - 145 mmol/L    Potassium 4 3 3 5 - 5 3 mmol/L    Chloride 104 100 - 108 mmol/L    CO2 31 21 - 32 mmol/L    ANION GAP 5 4 - 13 mmol/L    BUN 16 5 - 25 mg/dL    Creatinine 0 92 0 60 - 1 30 mg/dL    Glucose 111 65 - 140 mg/dL    Calcium 8 6 8 3 - 10 1 mg/dL    eGFR 88 ml/min/1 73sq m   Fingerstick Glucose (POCT)    Collection Time: 06/12/22  7:34 AM   Result Value Ref Range    POC Glucose 164 (H) 65 - 140 mg/dl   ]  Imaging Studies: I have personally reviewed pertinent reports and I have personally reviewed pertinent films in PACS  EKG, Pathology, and Other Studies: I have personally reviewed pertinent reports  VTE Prophylaxis: Enoxaparin (Lovenox)    Counseling / Coordination of Care  Total time spent today 32 minutes  Greater than 50% of total time was spent with the patient and/or family counseling and/or coordination of care  A description of the counseling/coordination of care:  Patient was seen and evaluated  Discussed with attending  Chart reviewed thoroughly including laboratory and imaging studies  Plan of care discussed with patient and primary team   Discussed plan to continue IVIG for a total 5 days with the patient  Dictation voice to text software has been used in the creation of this document  Please consider this in light of any contextual or grammatical errors

## 2022-06-12 NOTE — PLAN OF CARE
Problem: Potential for Falls  Goal: Patient will remain free of falls  Description: INTERVENTIONS:  - Educate patient/family on patient safety including physical limitations  - Instruct patient to call for assistance with activity   - Consult OT/PT to assist with strengthening/mobility   - Keep Call bell within reach  - Keep bed low and locked with side rails adjusted as appropriate  - Keep care items and personal belongings within reach  - Initiate and maintain comfort rounds  - Make Fall Risk Sign visible to staff  - Offer Toileting every 2 Hours, in advance of need  - Obtain necessary fall risk management equipment: nonskid socks  - Apply yellow socks and bracelet for high fall risk patients  - Consider moving patient to room near nurses station  Outcome: Progressing     Problem: PAIN - ADULT  Goal: Verbalizes/displays adequate comfort level or baseline comfort level  Description: Interventions:  - Encourage patient to monitor pain and request assistance  - Assess pain using appropriate pain scale  - Administer analgesics based on type and severity of pain and evaluate response  - Implement non-pharmacological measures as appropriate and evaluate response  - Consider cultural and social influences on pain and pain management  - Notify physician/advanced practitioner if interventions unsuccessful or patient reports new pain  Outcome: Progressing     Problem: INFECTION - ADULT  Goal: Absence or prevention of progression during hospitalization  Description: INTERVENTIONS:  - Assess and monitor for signs and symptoms of infection  - Monitor lab/diagnostic results  - Monitor all insertion sites, i e  indwelling lines, tubes, and drains  - Monitor endotracheal if appropriate and nasal secretions for changes in amount and color  - [place] appropriate cooling/warming therapies per order  - Administer medications as ordered  - Instruct and encourage patient and family to use good hand hygiene technique  - Identify and instruct in appropriate isolation precautions for identified infection/condition  Outcome: Progressing     Problem: SAFETY ADULT  Goal: Patient will remain free of falls  Description: INTERVENTIONS:  - Educate patient/family on patient safety including physical limitations  - Instruct patient to call for assistance with activity   - Consult OT/PT to assist with strengthening/mobility   - Keep Call bell within reach  - Keep bed low and locked with side rails adjusted as appropriate  - Keep care items and personal belongings within reach  - Initiate and maintain comfort rounds  - Make Fall Risk Sign visible to staff  - Offer Toileting every 2 Hours, in advance of need  - Obtain necessary fall risk management equipment: nonskid socks  - Apply yellow socks and bracelet for high fall risk patients  - Consider moving patient to room near nurses station  Outcome: Progressing  Goal: Maintain or return to baseline ADL function  Description: INTERVENTIONS:  -  Assess patient's ability to carry out ADLs; assess patient's baseline for ADL function and identify physical deficits which impact ability to perform ADLs (bathing, care of mouth/teeth, toileting, grooming, dressing, etc )  - Assess/evaluate cause of self-care deficits   - Assess range of motion  - Assess patient's mobility; develop plan if impaired  - Assess patient's need for assistive devices and provide as appropriate  - Encourage maximum independence but intervene and supervise when necessary  - Involve family in performance of ADLs  - Assess for home care needs following discharge   - Consider OT consult to assist with ADL evaluation and planning for discharge  - Provide patient education as appropriate  Outcome: Progressing  Goal: Maintains/Returns to pre admission functional level  Description: INTERVENTIONS:  - Perform BMAT or MOVE assessment daily    - Set and communicate daily mobility goal to care team and patient/family/caregiver     - Collaborate with rehabilitation services on mobility goals if consulted  - Perform Range of Motion 4 times a day  - Reposition patient every 4 hours  - Dangle patient 4 times a day  - Stand patient 4 times a day  - Ambulate patient 4 times a day  - Out of bed to chair 3 times a day   - Out of bed for meals 3 times a day  - Out of bed for toileting  - Record patient progress and toleration of activity level   Outcome: Progressing     Problem: DISCHARGE PLANNING  Goal: Discharge to home or other facility with appropriate resources  Description: INTERVENTIONS:  - Identify barriers to discharge w/patient and caregiver  - Arrange for needed discharge resources and transportation as appropriate  - Identify discharge learning needs (meds, wound care, etc )  - Arrange for interpretive services to assist at discharge as needed  - Refer to Case Management Department for coordinating discharge planning if the patient needs post-hospital services based on physician/advanced practitioner order or complex needs related to functional status, cognitive ability, or social support system  Outcome: Progressing     Problem: Knowledge Deficit  Goal: Patient/family/caregiver demonstrates understanding of disease process, treatment plan, medications, and discharge instructions  Description: Complete learning assessment and assess knowledge base    Interventions:  - Provide teaching at level of understanding  - Provide teaching via preferred learning methods  Outcome: Progressing     Problem: CARDIOVASCULAR - ADULT  Goal: Maintains optimal cardiac output and hemodynamic stability  Description: INTERVENTIONS:  - Monitor I/O, vital signs and rhythm  - Monitor for S/S and trends of decreased cardiac output  - Administer and titrate ordered vasoactive medications to optimize hemodynamic stability  - Assess quality of pulses, skin color and temperature  - Assess for signs of decreased coronary artery perfusion  - Instruct patient to report change in severity of symptoms  Outcome: Progressing  Goal: Absence of cardiac dysrhythmias or at baseline rhythm  Description: INTERVENTIONS:  - Continuous cardiac monitoring, vital signs, obtain 12 lead EKG if ordered  - Administer antiarrhythmic and heart rate control medications as ordered  - Monitor electrolytes and administer replacement therapy as ordered  Outcome: Progressing

## 2022-06-13 LAB
GLUCOSE SERPL-MCNC: 116 MG/DL (ref 65–140)
GLUCOSE SERPL-MCNC: 167 MG/DL (ref 65–140)

## 2022-06-13 PROCEDURE — 99232 SBSQ HOSP IP/OBS MODERATE 35: CPT | Performed by: PSYCHIATRY & NEUROLOGY

## 2022-06-13 PROCEDURE — 82948 REAGENT STRIP/BLOOD GLUCOSE: CPT

## 2022-06-13 PROCEDURE — 99233 SBSQ HOSP IP/OBS HIGH 50: CPT | Performed by: INTERNAL MEDICINE

## 2022-06-13 PROCEDURE — 94150 VITAL CAPACITY TEST: CPT

## 2022-06-13 PROCEDURE — 97163 PT EVAL HIGH COMPLEX 45 MIN: CPT

## 2022-06-13 RX ADMIN — Medication 42.5 G: at 09:38

## 2022-06-13 RX ADMIN — PREDNISONE 60 MG: 20 TABLET ORAL at 09:38

## 2022-06-13 RX ADMIN — PYRIDOSTIGMINE BROMIDE 60 MG: 60 TABLET ORAL at 21:55

## 2022-06-13 RX ADMIN — PYRIDOSTIGMINE BROMIDE 60 MG: 60 TABLET ORAL at 09:38

## 2022-06-13 RX ADMIN — ATORVASTATIN CALCIUM 40 MG: 40 TABLET, FILM COATED ORAL at 17:03

## 2022-06-13 RX ADMIN — PYRIDOSTIGMINE BROMIDE 60 MG: 60 TABLET ORAL at 11:43

## 2022-06-13 RX ADMIN — PANTOPRAZOLE SODIUM 40 MG: 40 TABLET, DELAYED RELEASE ORAL at 05:42

## 2022-06-13 RX ADMIN — INSULIN LISPRO 2 UNITS: 100 INJECTION, SOLUTION INTRAVENOUS; SUBCUTANEOUS at 17:04

## 2022-06-13 RX ADMIN — PYRIDOSTIGMINE BROMIDE 60 MG: 60 TABLET ORAL at 17:03

## 2022-06-13 RX ADMIN — ASPIRIN 81 MG: 81 TABLET ORAL at 09:40

## 2022-06-13 RX ADMIN — ENOXAPARIN SODIUM 40 MG: 40 INJECTION SUBCUTANEOUS at 09:39

## 2022-06-13 NOTE — PROGRESS NOTES
[facility]  Progress Note Sarah Beth Crowell 1960, 60 y o  male MRN: [id number]  Unit/Bed#: -01 Encounter: [id number]  Primary Care Provider: No primary care provider on file  Date and time admitted to hospital: 6/9/2022  2:37 PM    * Exacerbation of Myasthenia gravis, bulbar , Ptosis  Assessment & Plan  Patient had b/l ptosis, difficulty swallowing, difficulty speaking, SOB, and b/l LE weakness for a few days to a week, after which he was told to come to the ED by his outpatient neurology office  He did not take his 60 mg pyridostigmine qid for a few days because he ran out of it  He has not f/u with his outpatient neurologist since 10/2021   -was given solumedrol 1g IV on day of admission with minimal improvement  -IVIG started 6/10/22 per Neurology, continue for 5 days till 6/14/2022  -continue pyridostigmine 60 mg qid, started on oral steroids on 6/12/2022  -monitor NIF/EVC q8hr, improving since admission  -neurology following, appreciate input    Morbid obesity [facility])  Assessment & Plan  Patient is morbidly obese   -glucose elevated, last HbA1c 12/6/21: 6 1  -continue insulin SQ regimen while on steroids  -recommend lifestyle modifications and PCP outpatient follow-up  CAD (coronary artery disease)  Assessment & Plan  Patient has history of MI in 2009 and PCI with stent placement  Patient not compliant with aspirin and statin   -multiple EKGs show normal sinus rhythm, septal infarct, left axis deviation, left fascicular block, occasional PVCs with ventricular escape complexes, and T wave abnormality  -EKGs not concerning for acute ischemia  -Lipitor 40 mg daily, aspirin 81 mg daily  Ptosis  Assessment & Plan  · See plan under Myasthenia gravis  TE Pharmacologic Prophylaxis: Enoxaparin (Lovenox)    Patient Centered Rounds: I have performed bedside rounds with nursing staff today    Discussions with Specialists or Other Care Team Provider: neurology  Education and Discussions with Family / Patient:  Patient    Current Length of Stay: 4 day(s)  Current Patient Status: Inpatient     Certification Statement: The patient will continue to require additional inpatient hospital stay due to Myasthenia gravis, [facility]  Discharge Plan / Estimated Discharge Date: 1-2 days    Code Status: Level 1 - Full Code  ______________________________________________________________________________    Subjective:   Patient seen and examined by me  No chest pain, palpitations or diaphoresis  Weakness especially the swallowing better  Ptosis still present  No nausea or vomiting  Appetite okay  Objective:   Vitals: Blood pressure 153/72, pulse 61, temperature 97 7 °F (36 5 °C), temperature source Oral, resp  rate 16, height 6' (1 829 m), weight (!) 151 kg (332 lb 10 8 oz), SpO2 96 %      Physical Exam:   General appearance: alert, appears stated age and cooperative  Constitutional- looks a little weak  HEENT - atraumatic and normocephalic  Neck- supple  Skin - no fresh rash  Extremities no fresh focal deformities  Cardiovascular- S1-S2 heard  Respiratory- bilateral air entry present, no crackles or rhonchi  Skin - no fresh rash  Abdomen - normal bowel sounds present, no rebound tenderness  CNS- No fresh focal deficits except for ptosis  Psych- no acute psychosis     Additional Data:   Labs:  Results from last 7 days   Lab Units 06/12/22 0447 06/11/22  0507 06/10/22  0527 06/09/22  1951 06/09/22  1502   WBC Thousand/uL 7 97 15 48* 11 80*  --  7 08   HEMOGLOBIN g/dL 14 8 14 3 15 5  --  15 0   HEMATOCRIT % 46 7 45 1 47 5  --  47 0   MCV fL 87 87 84  --  86   PLATELETS Thousands/uL 226 222 242 219 222     Results from last 7 days   Lab Units 06/12/22 0447 06/11/22  0507 06/10/22  0527 06/09/22  1502   SODIUM mmol/L 140 141 136 138   POTASSIUM mmol/L 4 3 4 2 4 4 3 6   CHLORIDE mmol/L 104 106 102 103   CO2 mmol/L 31 30 23 27   ANION GAP mmol/L 5 5 11 8   BUN mg/dL 16 17 14 18   CREATININE mg/dL 0  92 0 92 1 09 1 02   CALCIUM mg/dL 8 6 8 6 9 3 8 5   EGFR ml/min/1 73sq m 88 88 71 77   GLUCOSE RANDOM mg/dL 111 124 210* 150*     Results from last 7 days   Lab Units 06/09/22  1502   MAGNESIUM mg/dL 2 0         Results from last 7 days   Lab Units 06/09/22  1502   NT-PRO BNP pg/mL 197*          Results from last 7 days   Lab Units 06/13/22  0644 06/12/22  1732 06/12/22  0734 06/11/22  1702 06/11/22  0708 06/10/22  2056 06/10/22  1546 06/10/22  1106 06/10/22  0736 06/09/22  1847   POC GLUCOSE mg/dl 116 159* 164* 152* 115 225* 191* 257* 175* 152*     Results from last 7 days   Lab Units 06/11/22  0507   HEMOGLOBIN A1C % 6 3*         * I Have Reviewed All Lab Data Listed Above  Cultures:   Results from last 7 days   Lab Units 06/09/22  1502   INFLUENZA A PCR  Negative     Results from last 7 days   Lab Units 06/09/22  1502   INFLUENZA A PCR  Negative   INFLUENZA B PCR  Negative   RSV PCR  Negative         Imaging:  Imaging Reports Reviewed Today Include:   X-ray chest 1 view portable    Result Date: 6/9/2022  Impression: No acute cardiopulmonary disease  Workstation performed: [id number]     CT head wo contrast    Result Date: 6/9/2022  Impression: No acute intracranial abnormality  Unchanged chronic right insular cortex infarct    Workstation performed: [id number]     Scheduled Meds:  Current Facility-Administered Medications   Medication Dose Route Frequency Provider Last Rate    aspirin  81 mg Oral Daily Claire Oden MD      atorvastatin  40 mg Oral QPM Claire Oden MD      enoxaparin  40 mg Subcutaneous Daily Claire Oden MD      immune globulin, human  0 4 g/kg (Adjusted) Intravenous Daily MARCELLA Saleh 0 g (06/11/22 1143)    insulin lispro  2-12 Units Subcutaneous BID after meals Helen Rizvi MD      pantoprazole  40 mg Oral Early Morning Sylvia Burnette MD      polyvinyl alcohol  1 drop Both Eyes Q3H PRN Claire Oden MD      predniSONE  60 mg Oral Daily Sylvia Burnette MD     [facility] pyridostigmine  60 mg Oral 4x Daily Bartlett Mcburney [place], CRNP      sodium chloride (PF)  3 mL Intravenous Q1H PRN MARCELLA Parikh      sodium chloride  50 mL/hr Intravenous Continuous MARCELLA Saleh Stopped (06/11/22 1831)       MD [place] [facility]    ** Please Note: This note has been constructed using a voice recognition system   **

## 2022-06-13 NOTE — ASSESSMENT & PLAN NOTE
Patient is morbidly obese   -glucose elevated, last HbA1c 12/6/21: 6 1  -continue insulin SQ regimen while on steroids  -recommend lifestyle modifications and PCP outpatient follow-up

## 2022-06-13 NOTE — TELEPHONE ENCOUNTER
MSW reviewed chart  Patient remains hospitalized  Patient was encouraged by inpatient  to work with PATHS to complete Medicaid application  MSW will follow

## 2022-06-13 NOTE — PLAN OF CARE
Problem: Potential for Falls  Goal: Patient will remain free of falls  Description: INTERVENTIONS:  - Educate patient/family on patient safety including physical limitations  - Instruct patient to call for assistance with activity   - Consult OT/PT to assist with strengthening/mobility   - Keep Call bell within reach  - Keep bed low and locked with side rails adjusted as appropriate  - Keep care items and personal belongings within reach  - Initiate and maintain comfort rounds  - Make Fall Risk Sign visible to staff  - Offer Toileting every  Hours, in advance of need  - Initiate/Maintain alarm  - Obtain necessary fall risk management equipment:   - Apply yellow socks and bracelet for high fall risk patients  - Consider moving patient to room near nurses station  Outcome: Progressing     Problem: PAIN - ADULT  Goal: Verbalizes/displays adequate comfort level or baseline comfort level  Description: Interventions:  - Encourage patient to monitor pain and request assistance  - Assess pain using appropriate pain scale  - Administer analgesics based on type and severity of pain and evaluate response  - Implement non-pharmacological measures as appropriate and evaluate response  - Consider cultural and social influences on pain and pain management  - Notify physician/advanced practitioner if interventions unsuccessful or patient reports new pain  Outcome: Progressing     Problem: INFECTION - ADULT  Goal: Absence or prevention of progression during hospitalization  Description: INTERVENTIONS:  - Assess and monitor for signs and symptoms of infection  - Monitor lab/diagnostic results  - Monitor all insertion sites, i e  indwelling lines, tubes, and drains  - Monitor endotracheal if appropriate and nasal secretions for changes in amount and color  - [place] appropriate cooling/warming therapies per order  - Administer medications as ordered  - Instruct and encourage patient and family to use good hand hygiene technique  - Identify and instruct in appropriate isolation precautions for identified infection/condition  Outcome: Progressing     Problem: SAFETY ADULT  Goal: Patient will remain free of falls  Description: INTERVENTIONS:  - Educate patient/family on patient safety including physical limitations  - Instruct patient to call for assistance with activity   - Consult OT/PT to assist with strengthening/mobility   - Keep Call bell within reach  - Keep bed low and locked with side rails adjusted as appropriate  - Keep care items and personal belongings within reach  - Initiate and maintain comfort rounds  - Make Fall Risk Sign visible to staff  - Offer Toileting every  Hours, in advance of need  - Initiate/Maintain alarm  - Obtain necessary fall risk management equipment:   - Apply yellow socks and bracelet for high fall risk patients  - Consider moving patient to room near nurses station  Outcome: Progressing  Goal: Maintain or return to baseline ADL function  Description: INTERVENTIONS:  -  Assess patient's ability to carry out ADLs; assess patient's baseline for ADL function and identify physical deficits which impact ability to perform ADLs (bathing, care of mouth/teeth, toileting, grooming, dressing, etc )  - Assess/evaluate cause of self-care deficits   - Assess range of motion  - Assess patient's mobility; develop plan if impaired  - Assess patient's need for assistive devices and provide as appropriate  - Encourage maximum independence but intervene and supervise when necessary  - Involve family in performance of ADLs  - Assess for home care needs following discharge   - Consider OT consult to assist with ADL evaluation and planning for discharge  - Provide patient education as appropriate  Outcome: Progressing  Goal: Maintains/Returns to pre admission functional level  Description: INTERVENTIONS:  - Perform BMAT or MOVE assessment daily    - Set and communicate daily mobility goal to care team and patient/family/caregiver  - Collaborate with rehabilitation services on mobility goals if consulted  - Perform Range of Motion  times a day  - Reposition patient every  hours  - Dangle patient  times a day  - Stand patient  times a day  - Ambulate patient  times a day  - Out of bed to chair  times a day   - Out of bed for meals times a day  - Out of bed for toileting  - Record patient progress and toleration of activity level   Outcome: Progressing     Problem: DISCHARGE PLANNING  Goal: Discharge to home or other facility with appropriate resources  Description: INTERVENTIONS:  - Identify barriers to discharge w/patient and caregiver  - Arrange for needed discharge resources and transportation as appropriate  - Identify discharge learning needs (meds, wound care, etc )  - Arrange for interpretive services to assist at discharge as needed  - Refer to Case Management Department for coordinating discharge planning if the patient needs post-hospital services based on physician/advanced practitioner order or complex needs related to functional status, cognitive ability, or social support system  Outcome: Progressing     Problem: Knowledge Deficit  Goal: Patient/family/caregiver demonstrates understanding of disease process, treatment plan, medications, and discharge instructions  Description: Complete learning assessment and assess knowledge base    Interventions:  - Provide teaching at level of understanding  - Provide teaching via preferred learning methods  Outcome: Progressing     Problem: CARDIOVASCULAR - ADULT  Goal: Maintains optimal cardiac output and hemodynamic stability  Description: INTERVENTIONS:  - Monitor I/O, vital signs and rhythm  - Monitor for S/S and trends of decreased cardiac output  - Administer and titrate ordered vasoactive medications to optimize hemodynamic stability  - Assess quality of pulses, skin color and temperature  - Assess for signs of decreased coronary artery perfusion  - Instruct patient to report change in severity of symptoms  Outcome: Progressing  Goal: Absence of cardiac dysrhythmias or at baseline rhythm  Description: INTERVENTIONS:  - Continuous cardiac monitoring, vital signs, obtain 12 lead EKG if ordered  - Administer antiarrhythmic and heart rate control medications as ordered  - Monitor electrolytes and administer replacement therapy as ordered  Outcome: Progressing

## 2022-06-13 NOTE — ASSESSMENT & PLAN NOTE
Patient had b/l ptosis, difficulty swallowing, difficulty speaking, SOB, and b/l LE weakness for a few days to a week, after which he was told to come to the ED by his outpatient neurology office  He did not take his 60 mg pyridostigmine qid for a few days because he ran out of it   He has not f/u with his outpatient neurologist since 10/2021   -was given solumedrol 1g IV on day of admission with minimal improvement  -IVIG started 6/10/22 per Neurology, continue for 5 days till 6/14/2022  -continue pyridostigmine 60 mg qid, started on oral steroids on 6/12/2022  -monitor NIF/EVC q8hr, improving since admission  -will be discharged with prednisone 60 mg to be tapered over a month, per neurology recs  -emphasize importance of follow-up with neuromuscular specialist after discharge  -neurology following, appreciate input

## 2022-06-13 NOTE — RESPIRATORY THERAPY NOTE
06/13/22 0728   Respiratory Assessment   Assessment Type Assess only   General Appearance Alert; Awake   Respiratory Pattern Normal   Chest Assessment Chest expansion symmetrical   Bilateral Breath Sounds Clear   Cough Non-productive   Resp Comments Pt sitting in chair in no distress  Offers no complaints  Manuevers completed     O2 Device RA   Additional Assessments   Pulse 61   Respirations 16   SpO2 96 %   $ Vital Capacity Mech/Peak Flow Yes   Position Sitting   Vital Capacity 2 6 L   NIF -55 cm H2O

## 2022-06-13 NOTE — ASSESSMENT & PLAN NOTE
Patient had b/l ptosis, difficulty swallowing, difficulty speaking, SOB, and b/l LE weakness for a few days to a week, after which he was told to come to the ED by his outpatient neurology office  He did not take his 60 mg pyridostigmine qid for a few days because he ran out of it   He has not f/u with his outpatient neurologist since 10/2021   -was given solumedrol 1g IV on day of admission with minimal improvement  -IVIG started 6/10/22 per Neurology, continue for 5 days till 6/14/2022  -continue pyridostigmine 60 mg qid, started on oral steroids on 6/12/2022  -monitor NIF/EVC q8hr, improving since admission  -neurology following, appreciate input

## 2022-06-13 NOTE — PROGRESS NOTES
Progress Note - Neurology   Nikki Olson 60 y o  male [id number]  Unit/Bed#: /-01    Assessment/Plan:  * Exacerbation of Myasthenia gravis, bulbar , Ptosis  Assessment & Plan  Nikki Olson is a 60 y o  male with myesthenia gravis maintained on mestinon, CAD, and HLD who presented to [place] [address] ED on 6/9/2022 with SOB and weakness with BLE muscle aches  He also reported difficulty swallowing and bilateral eye ptosis  He admits that he may have missed some of his mestinon doses  Initial NIF -40    Images:  CXR: IMPRESSION:  No acute cardiopulmonary disease  CTH: IMPRESSION:  No acute intracranial abnormality  Unchanged chronic right insular cortex infarct        Pertinent labs:  COVID/FLU/RSV negative  IgA WNL    Plan:  - Symptoms improved s/p solu medrol 1gm IV x1   - Continue to monitor NIF/VC daily  · 6/12 NIF -45 VC of 2 4L  · 6/13 NIF -55 VC 2 6L  - Continue Mestinon 60mg 4 times daily   - Prednisone 60 mg daily started 06/12/2022; plan to taper by 20mg weekly  - Started IVIG on 06/10/2022 at 400 mg/kg, today is day 4/5  - Medical management and supportive care per primary team  Correction of any metabolic or infectious disturbances  - Please refer to attending attestation for additional recommendations    Nikki Olson will need follow up in in 4 weeks with neuromuscular attending  He will not require outpatient neurological testing  Subjective:   "feeling much better today"  Denies difficulty swallowing or slurred speech  Continues with bilateral eyelid weakness requiring tape to keep his eyes open      Past Medical History:   Diagnosis Date    Coronary artery disease     [facility] [address] )     Hyperlipidemia      Past Surgical History:   Procedure Laterality Date    CORONARY ANGIOPLASTY WITH STENT PLACEMENT       No family history on file    Social History     Socioeconomic History    Marital status: /Civil Union     Spouse name: Not on file    Number of children: Not on file  Years of education: Not on file    Highest education level: Not on file   Occupational History    Not on file   Tobacco Use    Smoking status: Never Smoker    Smokeless tobacco: Never Used   Vaping Use    Vaping Use: Never used   Substance and Sexual Activity    Alcohol use: Not Currently    Drug use: Not Currently    Sexual activity: Not on file   Other Topics Concern    Not on file   Social History Narrative    Not on file     Social Determinants of Health     Financial Resource Strain: Not on file   Food Insecurity: No Food Insecurity    Worried About Running Out of Food in the Last Year: Never true    Remy of Food in the Last Year: Never true   Transportation Needs: No Transportation Needs    Lack of Transportation (Medical): No    Lack of Transportation (Non-Medical): No   Physical Activity: Not on file   Stress: Not on file   Social Connections: Not on file   Intimate Partner Violence: Not on file   Housing Stability: Unknown    Unable to Pay for Housing in the Last Year: Patient refused    Number of Places Lived in the Last Year: 2    Unstable Housing in the Last Year: No     E-Cigarette/Vaping    E-Cigarette Use Never User      E-Cigarette/Vaping Substances    Nicotine No     THC No     CBD No     Flavoring No     Other No     Unknown No      Medications:   All current active meds have been reviewed and current meds:  Scheduled Meds:  Current Facility-Administered Medications   Medication Dose Route Frequency Provider Last Rate    aspirin  81 mg Oral Daily Luis Lucero MD      atorvastatin  40 mg Oral QPM Luis Lucero MD      enoxaparin  40 mg Subcutaneous Daily Luis Lucero MD      immune globulin, human  0 4 g/kg (Adjusted) Intravenous Daily MARCELLA Saleh 0 g (06/11/22 1143)    insulin lispro  2-12 Units Subcutaneous BID after meals Carol Silva MD      pantoprazole  40 mg Oral Early Morning Jomar Russo MD      polyvinyl alcohol  1 drop Both Eyes Q3H PRN Boston Mejia MD      predniSONE  60 mg Oral Daily Bubba Montelongo MD      pyridostigmine  60 mg Oral 4x Daily MARCELLA Whitten      sodium chloride (PF)  3 mL Intravenous Q1H PRN MARCELLA Miller      sodium chloride  50 mL/hr Intravenous Continuous MARCELLA Saleh Stopped (06/11/22 1831)     Continuous Infusions:sodium chloride, 50 mL/hr, Last Rate: Stopped (06/11/22 1831)      PRN Meds:    polyvinyl alcohol    Insert peripheral IV **AND** sodium chloride (PF)     ROS:   Review of Systems   HENT: Negative for trouble swallowing  Neurological: Negative for speech difficulty and weakness  All other systems reviewed and are negative  Vitals:   /72 (BP Location: Left arm)   Pulse 61   Temp 97 7 °F (36 5 °C) (Oral)   Resp 16   Ht 6' (1 829 m)   Wt (!) 151 kg (332 lb 10 8 oz)   SpO2 96%   BMI 45 12 kg/m²     Physical Exam:   Physical Exam  Vitals reviewed  Constitutional:       General: He is not in acute distress  Appearance: He is obese  HENT:      Head: Normocephalic and atraumatic  Eyes:      Pupils: Pupils are equal, round, and reactive to light  Comments: Tape to bilateral eye lids to assist with eye opening   Pulmonary:      Effort: Pulmonary effort is normal    Musculoskeletal:      Cervical back: Neck supple  Skin:     General: Skin is warm and dry  Neurological:      Mental Status: He is alert and oriented to person, place, and time  Coordination: Finger-Nose-Finger Test normal       Deep Tendon Reflexes: Strength normal    Psychiatric:         Speech: Speech normal        Neurologic Exam     Mental Status   Oriented to person, place, and time  Attention: normal  Concentration: normal    Speech: speech is normal   Level of consciousness: alert  Able to name object  Able to repeat       Cranial Nerves     CN II   Right visual field deficit: none  Left visual field deficit: none     CN III, IV, VI   Pupils are equal, round, and reactive to light   Nystagmus: none   Ophthalmoparesis: right abduction, right adduction, left abduction and left adduction  Conjugate gaze: present    CN V   Facial sensation intact  CN VII   Facial expression full, symmetric  CN VIII   Hearing: intact    CN XI   Right sternocleidomastoid strength: normal  Right trapezius strength: normal    CN XII   Tongue deviation: none  Weak extra-occular movements with limited horizontal and upgaze  When eyelid tape is removed, patient noted to have Left> Right ptosis          Motor Exam     Strength   Strength 5/5 throughout  Sensory Exam   Light touch normal      Gait, Coordination, and Reflexes     Coordination   Finger to nose coordination: normal    Tremor   Resting tremor: absentGait deferred     Labs: I have personally reviewed pertinent reports  Recent Results (from the past 24 hour(s))   Fingerstick Glucose (POCT)    Collection Time: 06/12/22  5:32 PM   Result Value Ref Range    POC Glucose 159 (H) 65 - 140 mg/dl   Fingerstick Glucose (POCT)    Collection Time: 06/13/22  6:44 AM   Result Value Ref Range    POC Glucose 116 65 - 140 mg/dl     Imaging: I have personally reviewed pertinent imaging in PACS and I have personally reviewed PACS reports  EKG, Pathology, and Other Studies: I have personally reviewed pertinent reports  Counseling / Coordination of Care  Total time spent today 25 minutes  Greater than 50% of total time was spent with the patient and/or family counseling and/or coordination of care  Chart reviewed thoroughly including laboratory and imaging studies  Patient was seen and evaluated  Discussed with attending neurologist, Dr Matthew Case    Plan of care discussed with patient and primary team

## 2022-06-13 NOTE — PLAN OF CARE
Problem: Potential for Falls  Goal: Patient will remain free of falls  Description: INTERVENTIONS:  - Educate patient/family on patient safety including physical limitations  - Instruct patient to call for assistance with activity   - Consult OT/PT to assist with strengthening/mobility   - Keep Call bell within reach  - Keep bed low and locked with side rails adjusted as appropriate  - Keep care items and personal belongings within reach  - Initiate and maintain comfort rounds  - Make Fall Risk Sign visible to staff  - Offer Toileting every 2 Hours, in advance of need  - Initiate/Maintain bed alarm  - Obtain necessary fall risk management equipment: bed alarm, nonskid socks  - Apply yellow socks and bracelet for high fall risk patients  - Consider moving patient to room near nurses station  Outcome: Progressing     Problem: PAIN - ADULT  Goal: Verbalizes/displays adequate comfort level or baseline comfort level  Description: Interventions:  - Encourage patient to monitor pain and request assistance  - Assess pain using appropriate pain scale  - Administer analgesics based on type and severity of pain and evaluate response  - Implement non-pharmacological measures as appropriate and evaluate response  - Consider cultural and social influences on pain and pain management  - Notify physician/advanced practitioner if interventions unsuccessful or patient reports new pain  Outcome: Progressing     Problem: INFECTION - ADULT  Goal: Absence or prevention of progression during hospitalization  Description: INTERVENTIONS:  - Assess and monitor for signs and symptoms of infection  - Monitor lab/diagnostic results  - Monitor all insertion sites, i e  indwelling lines, tubes, and drains  - Monitor endotracheal if appropriate and nasal secretions for changes in amount and color  - [place] appropriate cooling/warming therapies per order  - Administer medications as ordered  - Instruct and encourage patient and family to use good hand hygiene technique  - Identify and instruct in appropriate isolation precautions for identified infection/condition  Outcome: Progressing     Problem: SAFETY ADULT  Goal: Patient will remain free of falls  Description: INTERVENTIONS:  - Educate patient/family on patient safety including physical limitations  - Instruct patient to call for assistance with activity   - Consult OT/PT to assist with strengthening/mobility   - Keep Call bell within reach  - Keep bed low and locked with side rails adjusted as appropriate  - Keep care items and personal belongings within reach  - Initiate and maintain comfort rounds  - Make Fall Risk Sign visible to staff  - Offer Toileting every 2 Hours, in advance of need  - Obtain necessary fall risk management equipment: nonskid socks  - Apply yellow socks and bracelet for high fall risk patients  - Consider moving patient to room near nurses station  Outcome: Progressing  Goal: Maintain or return to baseline ADL function  Description: INTERVENTIONS:  -  Assess patient's ability to carry out ADLs; assess patient's baseline for ADL function and identify physical deficits which impact ability to perform ADLs (bathing, care of mouth/teeth, toileting, grooming, dressing, etc )  - Assess/evaluate cause of self-care deficits   - Assess range of motion  - Assess patient's mobility; develop plan if impaired  - Assess patient's need for assistive devices and provide as appropriate  - Encourage maximum independence but intervene and supervise when necessary  - Involve family in performance of ADLs  - Assess for home care needs following discharge   - Consider OT consult to assist with ADL evaluation and planning for discharge  - Provide patient education as appropriate  Outcome: Progressing  Goal: Maintains/Returns to pre admission functional level  Description: INTERVENTIONS:  - Perform BMAT or MOVE assessment daily    - Set and communicate daily mobility goal to care team and patient/family/caregiver  - Collaborate with rehabilitation services on mobility goals if consulted  - Perform Range of Motion 4 times a day  - Reposition patient every 4 hours  - Dangle patient 4 times a day  - Stand patient 4 times a day  - Ambulate patient 4 times a day  - Out of bed to chair 3 times a day   - Out of bed for meals 3 times a day  - Out of bed for toileting  - Record patient progress and toleration of activity level   Outcome: Progressing     Problem: DISCHARGE PLANNING  Goal: Discharge to home or other facility with appropriate resources  Description: INTERVENTIONS:  - Identify barriers to discharge w/patient and caregiver  - Arrange for needed discharge resources and transportation as appropriate  - Identify discharge learning needs (meds, wound care, etc )  - Arrange for interpretive services to assist at discharge as needed  - Refer to Case Management Department for coordinating discharge planning if the patient needs post-hospital services based on physician/advanced practitioner order or complex needs related to functional status, cognitive ability, or social support system  Outcome: Progressing     Problem: Knowledge Deficit  Goal: Patient/family/caregiver demonstrates understanding of disease process, treatment plan, medications, and discharge instructions  Description: Complete learning assessment and assess knowledge base    Interventions:  - Provide teaching at level of understanding  - Provide teaching via preferred learning methods  Outcome: Progressing     Problem: CARDIOVASCULAR - ADULT  Goal: Maintains optimal cardiac output and hemodynamic stability  Description: INTERVENTIONS:  - Monitor I/O, vital signs and rhythm  - Monitor for S/S and trends of decreased cardiac output  - Administer and titrate ordered vasoactive medications to optimize hemodynamic stability  - Assess quality of pulses, skin color and temperature  - Assess for signs of decreased coronary artery perfusion  - Instruct patient to report change in severity of symptoms  Outcome: Progressing  Goal: Absence of cardiac dysrhythmias or at baseline rhythm  Description: INTERVENTIONS:  - Continuous cardiac monitoring, vital signs, obtain 12 lead EKG if ordered  - Administer antiarrhythmic and heart rate control medications as ordered  - Monitor electrolytes and administer replacement therapy as ordered  Outcome: Progressing

## 2022-06-13 NOTE — PLAN OF CARE
Problem: PHYSICAL THERAPY ADULT  Goal: Performs mobility at highest level of function for planned discharge setting  See evaluation for individualized goals  Description: Treatment/Interventions: Functional transfer training, LE strengthening/ROM, Elevations, Therapeutic exercise, Endurance training, Patient/family training, Equipment eval/education, Bed mobility, Gait training, Spoke to nursing          See flowsheet documentation for full assessment, interventions and recommendations  6/13/2022 1321 by Oly Will PT  Note: Prognosis: Good  Problem List: Decreased strength, Decreased endurance, Impaired balance, Decreased mobility, Impaired vision, Obesity  Assessment: Pt is 60 y o  male seen for high-complexity PT evaluation on 6/13/2022 s/p admit to [facility] on 6/9/2022 w/ Myasthenia gravis, bulbar ([facility] [place] [address] )  Patient had b/l ptosis, difficulty swallowing, difficulty speaking, SOB, and b/l LE weakness for a few days to a week, after which he was told to come to the ED by his outpatient neurology office  He did not take his 60 mg pyridostigmine qid for a few days because he ran out of it  He has not f/u with his outpatient neurologist since 10/2021  PT was consulted to assess pt's functional mobility and d/c needs  Order placed for PT eval and tx, w/ up and OOB as tolerated order  PTA, pt resides with spouse and son in [facility] with 1 CHARLIE, ambulatory without AD, requires A for IADLs, no working or driving x1 year  At time of eval, pt performing transfers SBA, level surface household distance gait trial at [facility] without overt LOB observed  Upon evaluation, pt presenting with impaired functional mobility d/t decreased strength, decreased endurance, impaired balance, decreased mobility, impaired vision and activity intolerance   Pertinent PMHx and current co-morbidities affecting pt's physical performance at time of assessment include: exacerbation of myasthenia gravis, ptosis, CAD, morbid obesity, heart attack, HLD  Personal factors affecting pt at time of eval include: stairs to enter home, inability to navigate community distances and visual impairments  The following objective measures performed on IE also reveal limitations: Barthel Index: 65/100, Modified [place]: 3 (moderate disability) and AM-PAC 6-Clicks: 12/13  Pt's clinical presentation is currently unstable/unpredictable seen in pt's presentation of abnormal lab value(s), need for input for task focus and mobility technique and ongoing medical assessment  Overall, pt's rehab potential and prognosis to return to PLOF is good as impacted by objective findings, warranting pt to receive further skilled PT interventions to address identified impairments, activity limitation(s), and participation restriction(s)  Pt to benefit from continued PT tx to address deficits as defined above and maximize level of functional independent mobility and consistency  From PT/mobility standpoint, recommendation at time of d/c would be home with outpatient rehabilitation pending progress in order to facilitate return to PLOF  Barriers to Discharge: None        PT Discharge Recommendation: Home with outpatient rehabilitation          See flowsheet documentation for full assessment  6/13/2022 1321 by Skye Alaniz PT  Note: Prognosis: Good  Problem List: Decreased strength, Decreased endurance, Impaired balance, Decreased mobility, Impaired vision, Obesity  Assessment: Pt is 60 y o  male seen for high-complexity PT evaluation on 6/13/2022 s/p admit to [facility] on 6/9/2022 w/ Myasthenia gravis, bulbar ([facility] [address] )  Patient had b/l ptosis, difficulty swallowing, difficulty speaking, SOB, and b/l LE weakness for a few days to a week, after which he was told to come to the ED by his outpatient neurology office  He did not take his 60 mg pyridostigmine qid for a few days because he ran out of it  He has not f/u with his outpatient neurologist since 10/2021  PT was consulted to assess pt's functional mobility and d/c needs  Order placed for PT eval and tx, w/ up and OOB as tolerated order  PTA, pt resides with spouse and son in [facility] with 1 CHARLIE, ambulatory without AD, requires A for IADLs, no working or driving x1 year  At time of eval, pt performing transfers SBA, level surface household distance gait trial at [facility] without overt LOB observed  Upon evaluation, pt presenting with impaired functional mobility d/t decreased strength, decreased endurance, impaired balance, decreased mobility, impaired vision and activity intolerance  Pertinent PMHx and current co-morbidities affecting pt's physical performance at time of assessment include: exacerbation of myasthenia gravis, ptosis, CAD, morbid obesity, heart attack, HLD  Personal factors affecting pt at time of eval include: stairs to enter home, inability to navigate community distances and visual impairments  The following objective measures performed on IE also reveal limitations: Barthel Index: 65/100, Modified Phill: 3 (moderate disability) and AM-PAC 6-Clicks: 49/80  Pt's clinical presentation is currently unstable/unpredictable seen in pt's presentation of abnormal lab value(s), need for input for task focus and mobility technique and ongoing medical assessment  Overall, pt's rehab potential and prognosis to return to PLOF is good as impacted by objective findings, warranting pt to receive further skilled PT interventions to address identified impairments, activity limitation(s), and participation restriction(s)  Pt to benefit from continued PT tx to address deficits as defined above and maximize level of functional independent mobility and consistency  From PT/mobility standpoint, recommendation at time of d/c would be home with outpatient rehabilitation pending progress in order to facilitate return to PLOF    Barriers to Discharge: None        PT Discharge Recommendation: Home with outpatient rehabilitation          See flowsheet documentation for full assessment

## 2022-06-13 NOTE — QUICK NOTE
Spoke to son, Angeli Reynaga, about patient's hospital course  Emphasized the importance of outpatient follow-up and medication adherence  Answered all questions and addressed all concerns

## 2022-06-13 NOTE — PHYSICAL THERAPY NOTE
Physical Therapy Evaluation   Time in: 852  Time out: 908  Total evaluation time: 16 minutes    Patient's Name: Huan Villafana    Admitting Diagnosis  SOB (shortness of breath) [R06 02]  Myasthenia gravis ([facility] [address] ) [G70 00]  Myasthenia gravis in crisis ([facility] ) [G70 01]    Problem List  Patient Active Problem List   Diagnosis    Ptosis    CAD (coronary artery disease)    Morbid obesity ([facility] [address] )    Exacerbation of Myasthenia gravis, bulbar , Ptosis       Past Medical History  Past Medical History:   Diagnosis Date    Coronary artery disease     Heart attack ([facility] )     Hyperlipidemia        Past Surgical History  Past Surgical History:   Procedure Laterality Date    CORONARY ANGIOPLASTY WITH STENT PLACEMENT         PT performed at least 2 patient identifiers during session: Name and wristband  06/13/22 0852   PT Last Visit   PT Visit Date 06/13/22   Note Type   Note type Evaluation   Pain Assessment   Pain Assessment Tool 0-10   Pain Score No Pain   Restrictions/Precautions   Weight Bearing Precautions Per Order No   Braces or Orthoses   (none reported)   Other Precautions Fall Risk;Visual impairment   Home Living   Type of [address] [place] [address] One level;Performs ADLs on one level; Able to live on main level with bedroom/bathroom;Stairs to enter with rails  (1st floor set up  1 CHARLIE)   Bathroom Shower/Tub Walk-in shower   Bathroom Toilet Standard   Bathroom Equipment Built-in shower seat   [facility] Other (Comment)  (no AD used at baseline)   Prior Function   Level of Steele Independent with ADLs and functional mobility   Lives With Spouse; Son   Merlene Del Castillo Help From Family   ADL Assistance Independent   IADLs Needs assistance  (wife performs at baseline)   Falls in the last 6 months 0   Vocational Other (Comment)  (not working x 1 year)   Comments (-) since November   General   Family/Caregiver Present No   Cognition   Overall Cognitive Status WFL   Arousal/Participation Alert   Orientation Level Oriented X4   Memory Within functional limits   Following Commands Follows all commands and directions without difficulty   Comments pt agreeable to PT eval   RLE Assessment   RLE Assessment   (grossly 4/5)   LLE Assessment   LLE Assessment   (grossly 4/5)   Vision-Basic Assessment   Current Vision   (bilateral eye ptosis)   Coordination   Sensation WFL   Light Touch   RLE Light Touch Grossly intact   LLE Light Touch Grossly intact   Bed Mobility   Additional Comments pt received OOB to recliner upon arrival   Transfers   Sit to Stand 5  Supervision   Additional items Assist x 1; Armrests; Increased time required;Verbal cues   Stand to Sit 5  Supervision   Additional items Assist x 1; Armrests; Increased time required;Verbal cues   Ambulation/Elevation   Gait pattern Decreased foot clearance; Wide [place]; Short stride   Gait Assistance 5  Supervision  (CGA)   Additional items Assist x 1;Verbal cues   Assistive Device None   Distance 25'   Balance   Static Sitting Good   Dynamic Sitting Fair +   Static Standing Fair   Dynamic Standing Fair   Ambulatory Fair -   Endurance Deficit   Endurance Deficit Yes   Activity Tolerance   Activity Tolerance Patient limited by fatigue   Medical Staff Made Aware SPT  [address] includes the [facility] [address]   Nurse Made Aware ELINOR Reyes   Assessment   Prognosis Good   Problem List Decreased strength;Decreased endurance; Impaired balance;Decreased mobility; Impaired vision;Obesity   Assessment Pt is 60 y o  male seen for high-complexity PT evaluation on 6/13/2022 s/p admit to [facility] on 6/9/2022 w/ Myasthenia gravis, bulbar ([address] )  Patient had b/l ptosis, difficulty swallowing, difficulty speaking, SOB, and b/l LE weakness for a few days to a week, after which he was told to come to the ED by his outpatient neurology office  He did not take his 60 mg pyridostigmine qid for a few days because he ran out of it  He has not f/u with his outpatient neurologist since 10/2021  PT was consulted to assess pt's functional mobility and d/c needs  Order placed for PT eval and tx, w/ up and OOB as tolerated order  PTA, pt resides with spouse and son in [facility] with 1 CHARLIE, ambulatory without AD, requires A for IADLs, no working or driving x1 year  At time of eval, pt performing transfers SBA, level surface household distance gait trial at [facility] without overt LOB observed  Upon evaluation, pt presenting with impaired functional mobility d/t decreased strength, decreased endurance, impaired balance, decreased mobility, impaired vision and activity intolerance  Pertinent PMHx and current co-morbidities affecting pt's physical performance at time of assessment include: exacerbation of myasthenia gravis, ptosis, CAD, morbid obesity, heart attack, HLD  Personal factors affecting pt at time of eval include: stairs to enter home, inability to navigate community distances and visual impairments  The following objective measures performed on IE also reveal limitations: Barthel Index: 65/100, Modified [place]: 3 (moderate disability) and AM-PAC 6-Clicks: 61/80  Pt's clinical presentation is currently unstable/unpredictable seen in pt's presentation of abnormal lab value(s), need for input for task focus and mobility technique and ongoing medical assessment  Overall, pt's rehab potential and prognosis to return to PLOF is good as impacted by objective findings, warranting pt to receive further skilled PT interventions to address identified impairments, activity limitation(s), and participation restriction(s)  Pt to benefit from continued PT tx to address deficits as defined above and maximize level of functional independent mobility and consistency  From PT/mobility standpoint, recommendation at time of d/c would be home with outpatient rehabilitation pending progress in order to facilitate return to PLOF     Barriers to Discharge None   Goals   Patient Goals to be able to work   STG Expiration Date 06/23/22   Short Term Goal #1 In 7-10 days: Increase bilateral LE strength 1/2 grade to facilitate independent mobility, Perform all bed mobility tasks modified independent to decrease caregiver burden, Perform all transfers modified independent to improve independence, Ambulate > 150 ft  with least restrictive assistive device modified independent w/o LOB and w/ normalized gait pattern 100% of the time, Navigate 1 stairs modified independent with unilateral handrail to facilitate return to previous living environment, Increase all balance 1/2 grade to decrease risk for falls, Tolerate 4 hr OOB to faciliate upright tolerance, Improve Barthel Index score to 80 or greater to facilitate independence and PT provider will perform functional balance assessment to determine fall risk   PT Treatment Day 0   Plan   Treatment/Interventions Functional transfer training;LE strengthening/ROM; Elevations; Therapeutic exercise; Endurance training;Patient/family training;Equipment eval/education; Bed mobility;Gait training;Spoke to nursing   PT Frequency 2-3x/wk   Recommendation   PT Discharge Recommendation Home with outpatient rehabilitation   AM-PAC Basic Mobility Inpatient   Turning in Bed Without Bedrails 3   Lying on Back to Sitting on Edge of Flat Bed 3   Moving Bed to Chair 3   Standing Up From Chair 3   Walk in Room 3   Climb 3-5 Stairs 3   Basic Mobility Inpatient Raw Score 18   Basic Mobility Standardized Score 41 05   Highest Level Of Mobility   JH-HLM Goal 6: Walk 10 steps or more   JH-HLM Achieved 7: Walk 25 feet or more   Modified Steele Scale   Modified Phill Scale 3   Barthel Index   Feeding 10   Bathing 5   Grooming Score 5   Dressing Score 10   Bladder Score 10   Bowels Score 10   Toilet Use Score 5   Transfers (Bed/Chair) Score 10   Mobility (Level Surface) Score 0   Stairs Score 0   Barthel Index Score 65       Ramírez Horner, PT, DPT

## 2022-06-13 NOTE — PROGRESS NOTES
[address]  Progress Note Maritza Crowley 1960, 60 y o  male MRN: [id number]  Unit/Bed#: -01 Encounter: [id number]  Primary Care Provider: No primary care provider on file  Date and time admitted to hospital: 6/9/2022  2:37 PM    * Exacerbation of Myasthenia gravis, bulbar , Ptosis  Assessment & Plan  Patient had b/l ptosis, difficulty swallowing, difficulty speaking, SOB, and b/l LE weakness for a few days to a week, after which he was told to come to the ED by his outpatient neurology office  He did not take his 60 mg pyridostigmine qid for a few days because he ran out of it  He has not f/u with his outpatient neurologist since 10/2021   -was given solumedrol 1g IV on day of admission with minimal improvement  -IVIG started 6/10/22 per Neurology, continue for 5 days till 6/14/2022  -continue pyridostigmine 60 mg qid, started on oral steroids on 6/12/2022  -monitor NIF/EVC q8hr, improving since admission  -will be discharged with prednisone 60 mg to be tapered over a month, per neurology recs  -emphasize importance of follow-up with neuromuscular specialist after discharge  -neurology following, appreciate input    CAD (coronary artery disease)  Assessment & Plan  Patient has history of MI in 2009 and PCI with stent placement  Patient not compliant with aspirin and statin   -multiple EKGs show normal sinus rhythm, septal infarct, left axis deviation, left fascicular block, occasional PVCs with ventricular escape complexes, and T wave abnormality  -EKGs not concerning for acute ischemia  -Lipitor 40 mg daily, aspirin 81 mg daily  Morbid obesity ([facility] [address] )  Assessment & Plan  Patient is morbidly obese   -glucose elevated, last HbA1c 12/6/21: 6 1  -continue insulin SQ regimen while on steroids  -recommend lifestyle modifications and PCP outpatient follow-up    Ptosis  Assessment & Plan  · See plan under Myasthenia gravis            VTE Pharmacologic Prophylaxis:   VTE Score: 6 High Risk (Score >/= 5) - Pharmacological DVT Prophylaxis Ordered: Enoxaparin (Lovenox)  Sequential Compression Devices Ordered  Mechanical VTE Prophylaxis in Place: Yes    Patient Centered Rounds: Treatment team aware of changes    Discussions with Specialists or Other Care Team Provider: Neurology    Education and Discussions with Family / Patient: See additional notes    Current Length of Stay: 4 day(s)    Current Patient Status: Inpatient     Discharge Plan / Estimated Discharge Date: Anticipate discharge tomorrow to home  Code Status: Level 1 - Full Code      Subjective:   No acute overnight events  Patient reports further improvement in facial weakness, particularly with swallowing and breathing  Patient reports mild improvement in ptosis  Patient expressed interest in obtaining IVIG outpatient and inquired about costs  Explained the importance of maintaining Mestinon, doing the prednisone taper, and following-up with the neuromuscular specialist outpatient after discharge  Reassured patient that he will be discharged with the medications he needs  Patient emphasized that he does not need to be on insulin after discharge and that he does not plan to apply for Medicaid  Patient denies fevers, chills, nausea, vomiting, chest pain, abdominal pain, extremity swelling, but endorses occasional, transient blurry vision that sometimes occurs upon awakening  Objective:     Vitals:   Temp (24hrs), Av 1 °F (36 7 °C), Min:97 7 °F (36 5 °C), Max:98 4 °F (36 9 °C)    Temp:  [97 7 °F (36 5 °C)-98 4 °F (36 9 °C)] 97 7 °F (36 5 °C)  HR:  [61-86] 61  Resp:  [16-18] 16  BP: (127-153)/(72-89) 153/72  SpO2:  [88 %-96 %] 96 %  Body mass index is 45 12 kg/m²  Input and Output Summary (last 24 hours):        Intake/Output Summary (Last 24 hours) at 2022 0929  Last data filed at 2022 0701  Gross per 24 hour   Intake 620 ml   Output 1400 ml   Net -780 ml       Physical Exam:     Physical Exam  Constitutional: Appearance: He is obese  HENT:      Head: Normocephalic and atraumatic  Eyes:      Conjunctiva/sclera: Conjunctivae normal       Comments: Stable ptosis  Minimal leftward gaze  Gaze is improved if eyelids are manually lifted, per patient  Cardiovascular:      Rate and Rhythm: Normal rate and regular rhythm  Heart sounds: No murmur heard  No friction rub  No gallop  Pulmonary:      Breath sounds: Normal breath sounds  Comments: SOB and increased effort with repetitive deep breaths  Abdominal:      General: Bowel sounds are normal       Palpations: Abdomen is soft  Musculoskeletal:      Right lower leg: No edema  Left lower leg: No edema  Skin:     General: Skin is warm and dry  Neurological:      General: No focal deficit present  Mental Status: He is alert  Sensory: No sensory deficit  Motor: No weakness  Psychiatric:         Mood and Affect: Mood normal          Behavior: Behavior normal          Thought Content: Thought content normal          Judgment: Judgment normal          Additional Data:     Labs:  Results from last 7 days   Lab Units 06/12/22  0447   WBC Thousand/uL 7 97   HEMOGLOBIN g/dL 14 8   HEMATOCRIT % 46 7   PLATELETS Thousands/uL 226   NEUTROS PCT % 62   LYMPHS PCT % 28   MONOS PCT % 7   EOS PCT % 3     Results from last 7 days   Lab Units 06/12/22  0447   SODIUM mmol/L 140   POTASSIUM mmol/L 4 3   CHLORIDE mmol/L 104   CO2 mmol/L 31   BUN mg/dL 16   CREATININE mg/dL 0 92   ANION GAP mmol/L 5   CALCIUM mg/dL 8 6   GLUCOSE RANDOM mg/dL 111         Results from last 7 days   Lab Units 06/13/22  0644 06/12/22  1732 06/12/22  0734 06/11/22  1702 06/11/22  0708 06/10/22  2056 06/10/22  1546 06/10/22  1106 06/10/22  0736 06/09/22  1847   POC GLUCOSE mg/dl 116 159* 164* 152* 115 225* 191* 257* 175* 152*     Results from last 7 days   Lab Units 06/11/22  0507   HEMOGLOBIN A1C % 6 3*           Imaging: No pertinent imaging reviewed  Lines/Drains:  Invasive Devices  Report    Peripheral Intravenous Line  Duration           Peripheral IV 06/13/22 Left;Ventral (anterior) Forearm <1 day                Telemetry:        Last 24 Hours Medication List:   Current Facility-Administered Medications   Medication Dose Route Frequency Provider Last Rate    aspirin  81 mg Oral Daily Luis Lucero MD      atorvastatin  40 mg Oral QPM Luis Lucero MD      enoxaparin  40 mg Subcutaneous Daily Luis Lucero MD      immune globulin, human  0 4 g/kg (Adjusted) Intravenous Daily MARCELLA Saleh 0 g (06/11/22 1143)    insulin lispro  2-12 Units Subcutaneous BID after meals Carol Silva MD      pantoprazole  40 mg Oral Early Morning Jomar Russo MD      polyvinyl alcohol  1 drop Both Eyes Q3H PRN Luis Lucero MD      predniSONE  60 mg Oral Daily Jomar Russo MD      pyridostigmine  60 mg Oral 4x Daily MARCELLA Retana      sodium chloride (PF)  3 mL Intravenous Q1H PRN MARCELLA Serrato      sodium chloride  50 mL/hr Intravenous Continuous MARCELLA Saleh Stopped (06/11/22 5591)        Today, Patient Was Seen By: Markell Campbell

## 2022-06-14 LAB
ANION GAP SERPL CALCULATED.3IONS-SCNC: 4 MMOL/L (ref 4–13)
BASOPHILS # BLD AUTO: 0.03 THOUSANDS/ΜL (ref 0–0.1)
BASOPHILS NFR BLD AUTO: 0 % (ref 0–1)
BUN SERPL-MCNC: 20 MG/DL (ref 5–25)
CALCIUM SERPL-MCNC: 8.4 MG/DL (ref 8.3–10.1)
CHLORIDE SERPL-SCNC: 102 MMOL/L (ref 100–108)
CO2 SERPL-SCNC: 31 MMOL/L (ref 21–32)
CREAT SERPL-MCNC: 0.92 MG/DL (ref 0.6–1.3)
EOSINOPHIL # BLD AUTO: 0.1 THOUSAND/ΜL (ref 0–0.61)
EOSINOPHIL NFR BLD AUTO: 1 % (ref 0–6)
ERYTHROCYTE [DISTWIDTH] IN BLOOD BY AUTOMATED COUNT: 13.4 % (ref 11.6–15.1)
GFR SERPL CREATININE-BSD FRML MDRD: 88 ML/MIN/1.73SQ M
GLUCOSE SERPL-MCNC: 103 MG/DL (ref 65–140)
GLUCOSE SERPL-MCNC: 130 MG/DL (ref 65–140)
GLUCOSE SERPL-MCNC: 152 MG/DL (ref 65–140)
GLUCOSE SERPL-MCNC: 215 MG/DL (ref 65–140)
HCT VFR BLD AUTO: 43.6 % (ref 36.5–49.3)
HGB BLD-MCNC: 14.1 G/DL (ref 12–17)
IMM GRANULOCYTES # BLD AUTO: 0.02 THOUSAND/UL (ref 0–0.2)
IMM GRANULOCYTES NFR BLD AUTO: 0 % (ref 0–2)
LYMPHOCYTES # BLD AUTO: 2.48 THOUSANDS/ΜL (ref 0.6–4.47)
LYMPHOCYTES NFR BLD AUTO: 21 % (ref 14–44)
MCH RBC QN AUTO: 27.4 PG (ref 26.8–34.3)
MCHC RBC AUTO-ENTMCNC: 32.3 G/DL (ref 31.4–37.4)
MCV RBC AUTO: 85 FL (ref 82–98)
MONOCYTES # BLD AUTO: 1.03 THOUSAND/ΜL (ref 0.17–1.22)
MONOCYTES NFR BLD AUTO: 9 % (ref 4–12)
NEUTROPHILS # BLD AUTO: 8.03 THOUSANDS/ΜL (ref 1.85–7.62)
NEUTS SEG NFR BLD AUTO: 69 % (ref 43–75)
NRBC BLD AUTO-RTO: 0 /100 WBCS
PLATELET # BLD AUTO: 203 THOUSANDS/UL (ref 149–390)
PMV BLD AUTO: 10.5 FL (ref 8.9–12.7)
POTASSIUM SERPL-SCNC: 3.9 MMOL/L (ref 3.5–5.3)
RBC # BLD AUTO: 5.14 MILLION/UL (ref 3.88–5.62)
SODIUM SERPL-SCNC: 137 MMOL/L (ref 136–145)
WBC # BLD AUTO: 11.69 THOUSAND/UL (ref 4.31–10.16)

## 2022-06-14 PROCEDURE — 99239 HOSP IP/OBS DSCHRG MGMT >30: CPT | Performed by: INTERNAL MEDICINE

## 2022-06-14 PROCEDURE — 82948 REAGENT STRIP/BLOOD GLUCOSE: CPT

## 2022-06-14 PROCEDURE — 99232 SBSQ HOSP IP/OBS MODERATE 35: CPT | Performed by: PSYCHIATRY & NEUROLOGY

## 2022-06-14 PROCEDURE — 80048 BASIC METABOLIC PNL TOTAL CA: CPT | Performed by: INTERNAL MEDICINE

## 2022-06-14 PROCEDURE — 99232 SBSQ HOSP IP/OBS MODERATE 35: CPT | Performed by: INTERNAL MEDICINE

## 2022-06-14 PROCEDURE — 85025 COMPLETE CBC W/AUTO DIFF WBC: CPT | Performed by: INTERNAL MEDICINE

## 2022-06-14 RX ORDER — PYRIDOSTIGMINE BROMIDE 60 MG/1
60 TABLET ORAL 4 TIMES DAILY
Qty: 360 TABLET | Refills: 1 | Status: SHIPPED | OUTPATIENT
Start: 2022-06-14 | End: 2022-07-15 | Stop reason: SDUPTHER

## 2022-06-14 RX ORDER — PANTOPRAZOLE SODIUM 40 MG/1
40 TABLET, DELAYED RELEASE ORAL
Qty: 30 TABLET | Refills: 0 | Status: SHIPPED | OUTPATIENT
Start: 2022-06-15

## 2022-06-14 RX ORDER — POLYVINYL ALCOHOL 14 MG/ML
1 SOLUTION/ DROPS OPHTHALMIC
Qty: 15 ML | Refills: 0 | Status: SHIPPED | OUTPATIENT
Start: 2022-06-14

## 2022-06-14 RX ORDER — PREDNISONE 20 MG/1
TABLET ORAL
Qty: 33 TABLET | Refills: 0 | Status: SHIPPED | OUTPATIENT
Start: 2022-06-15 | End: 2022-07-03

## 2022-06-14 RX ORDER — ASPIRIN 81 MG/1
81 TABLET ORAL DAILY
Qty: 60 TABLET | Refills: 2 | Status: SHIPPED | OUTPATIENT
Start: 2022-06-14

## 2022-06-14 RX ADMIN — PYRIDOSTIGMINE BROMIDE 60 MG: 60 TABLET ORAL at 12:56

## 2022-06-14 RX ADMIN — PYRIDOSTIGMINE BROMIDE 60 MG: 60 TABLET ORAL at 18:15

## 2022-06-14 RX ADMIN — PYRIDOSTIGMINE BROMIDE 60 MG: 60 TABLET ORAL at 08:11

## 2022-06-14 RX ADMIN — INSULIN LISPRO 4 UNITS: 100 INJECTION, SOLUTION INTRAVENOUS; SUBCUTANEOUS at 18:17

## 2022-06-14 RX ADMIN — ASPIRIN 81 MG: 81 TABLET ORAL at 08:10

## 2022-06-14 RX ADMIN — ENOXAPARIN SODIUM 40 MG: 40 INJECTION SUBCUTANEOUS at 08:10

## 2022-06-14 RX ADMIN — PREDNISONE 60 MG: 20 TABLET ORAL at 08:10

## 2022-06-14 RX ADMIN — PANTOPRAZOLE SODIUM 40 MG: 40 TABLET, DELAYED RELEASE ORAL at 05:19

## 2022-06-14 RX ADMIN — Medication 42.5 G: at 10:06

## 2022-06-14 RX ADMIN — PYRIDOSTIGMINE BROMIDE 60 MG: 60 TABLET ORAL at 22:07

## 2022-06-14 RX ADMIN — ATORVASTATIN CALCIUM 40 MG: 40 TABLET, FILM COATED ORAL at 18:15

## 2022-06-14 NOTE — DISCHARGE INSTR - AVS FIRST PAGE
Please make an appointment with PCP within 1 week concerning recent hospitalization  Please follow-up with Neurology  Please continue to take steroids as directed

## 2022-06-14 NOTE — PROGRESS NOTES
Progress Note - Neurology   Jayme Edward 60 y o  male [id number]  Unit/Bed#: /-01    Assessment:    * Myasthenia gravis, bulbar ([address] )  Assessment & Plan  60 y o  male with myesthenia gravis on Mestinon, CAD, and HLD, who presented to [facility] ED on 6/9/2022 with SOB and weakness with BLE muscle aches  He also reported difficulty swallowing and bilateral eye ptosis  He admits that he may have missed some of his mestinon doses  Initial NIF -40  Suspect most likely MG exacerbation in the setting of medication noncompliance  Patient reports improvement of symptoms with treatment noted below throughout admission  Plan:  - Symptoms improved s/p solu medrol 1gm IV x1   - Continue to monitor NIF/VC daily  · 6/12 NIF -45 VC of 2 4L  · 6/13 NIF -55 VC 2 6L  - Continue Mestinon 60mg 4 times daily   - Prednisone 60 mg daily started 06/12/2022; plan to taper by 20mg weekly  - Started IVIG on 06/10/2022 at 400 mg/kg, today is day 5/5  - PT/OT  - Monitor neuro exam; notify with any changes  - Medical management and supportive care per primary team  Correction of any metabolic or infectious disturbances    - Patient will need to follow up with neuromuscular specialist outpatient  Consider immunomodulatory therapy- defer to neuromuscular specialist     Results:  - CT head:  1  No acute intracranial abnormality  2  Unchanged chronic right insular cortex infarct  - CXR: No acute cardiopulmonary disease   - COVID/FLU/RSV negative, IgA WNL       Jayme Edward will need follow up in in 4 weeks with neuromuscular attending/AP  He will not require outpatient neurological testing  Will review case and treatment plan with attending neurologist, Dr Chong Chan  Subjective:   Patient sitting in the chair  He is currently getting his 5th IVIG treatment today  He states he feels much improved  He does have some left eye ptosis still but notes it feels better          Past Medical History:   Diagnosis Date    Coronary artery disease     Heart attack ([facility] )     Hyperlipidemia      Past Surgical History:   Procedure Laterality Date    CORONARY ANGIOPLASTY WITH STENT PLACEMENT       No family history on file  Social History     Socioeconomic History    Marital status: /Civil Union     Spouse name: Not on file    Number of children: Not on file    Years of education: Not on file    Highest education level: Not on file   Occupational History    Not on file   Tobacco Use    Smoking status: Never Smoker    Smokeless tobacco: Never Used   Vaping Use    Vaping Use: Never used   Substance and Sexual Activity    Alcohol use: Not Currently    Drug use: Not Currently    Sexual activity: Not on file   Other Topics Concern    Not on file   Social History Narrative    Not on file     Social Determinants of Health     Financial Resource Strain: Not on file   Food Insecurity: No Food Insecurity    Worried About [address] in the Last Year: Never true    Remy of Food in the Last Year: Never true   Transportation Needs: No Transportation Needs    Lack of Transportation (Medical): No    Lack of Transportation (Non-Medical): No   Physical Activity: Not on file   Stress: Not on file   Social Connections: Not on file   Intimate Partner Violence: Not on file   Housing Stability: Unknown    Unable to Pay for Housing in the Last Year: Patient refused    Number of Places Lived in the Last Year: 2    Unstable Housing in the Last Year: No         Medications:   All current active meds have been reviewed and current meds:  Scheduled Meds:  Current Facility-Administered Medications   Medication Dose Route Frequency Provider Last Rate    aspirin  81 mg Oral Daily Dexter Stewart MD      atorvastatin  40 mg Oral QPM Dexter Stewart MD      enoxaparin  40 mg Subcutaneous Daily Dexter Stewart MD      insulin lispro  2-12 Units Subcutaneous BID after meals Brook Sotelo MD      pantoprazole  40 mg Oral Early Morning Carlton Wise MD      polyvinyl alcohol  1 drop Both Eyes Q3H PRN Kitty Lyon MD      predniSONE  60 mg Oral Daily Radha Kolb MD      pyridostigmine  60 mg Oral 4x Daily MARCELLA Retana      sodium chloride (PF)  3 mL Intravenous Q1H PRN MARCELLA Isaac      sodium chloride  50 mL/hr Intravenous Continuous MARCELLA Saleh Stopped (06/11/22 1831)     Continuous Infusions:sodium chloride, 50 mL/hr, Last Rate: Stopped (06/11/22 1831)      PRN Meds:    polyvinyl alcohol    Insert peripheral IV **AND** sodium chloride (PF)       ROS:   Review of Systems   Constitutional: Negative for fever  HENT: Negative for trouble swallowing  Eyes: Negative for photophobia  Respiratory: Positive for shortness of breath (improving)  Cardiovascular: Negative for chest pain  Gastrointestinal: Negative for abdominal pain  Musculoskeletal: Negative for arthralgias, back pain, myalgias and neck pain  Skin: Negative for rash  Neurological: Positive for facial asymmetry  Negative for dizziness, tremors, seizures, syncope, speech difficulty, weakness, light-headedness, numbness and headaches  Psychiatric/Behavioral: Negative for confusion  All other systems reviewed and are negative  Vitals:   /65   Pulse 77   Temp 98 1 °F (36 7 °C)   Resp 18   Ht 6' (1 829 m)   Wt (!) 151 kg (332 lb 10 8 oz)   SpO2 96%   BMI 45 12 kg/m²       Physical Exam:   Physical Exam  Vitals and nursing note reviewed  Constitutional:       General: He is not in acute distress  Appearance: Normal appearance  He is not ill-appearing  HENT:      Head: Normocephalic  Mouth/Throat:      Mouth: Mucous membranes are moist       Pharynx: Oropharynx is clear  Eyes:      General: No scleral icterus  Right eye: No discharge  Left eye: No discharge  Conjunctiva/sclera: Conjunctivae normal    Cardiovascular:      Rate and Rhythm: Normal rate     Pulmonary:      Effort: Pulmonary effort is normal  No respiratory distress  Musculoskeletal:         General: Normal range of motion  Cervical back: Normal range of motion  Skin:     General: Skin is warm and dry  Coloration: Skin is not jaundiced or pale  Neurological:      Mental Status: He is alert and oriented to person, place, and time  Coordination: Finger-Nose-Finger Test normal    Psychiatric:         Mood and Affect: Mood normal        Neurologic Exam     Mental Status   Oriented to person, place, and time  Level of consciousness: alert  Able to follow commands appropriately  No dysarthria noted  Cranial Nerves     CN II   Right visual field deficit: none  Left visual field deficit: none     CN III, IV, VI   Ophthalmoparesis: left abduction and right adduction    CN V   Facial sensation intact  CN VIII   Hearing: intact    CN IX, X   Palate: symmetric    CN XI   CN XI normal      CN XII   CN XII normal    Left>right ptosis     Motor Exam   Muscle bulk: normal  Bilateral UE strength 5/5 deltoids, biceps, triceps, hand   Bilateral LE strength 5/5 hip flexion, knee flexion, knee extension, dorsiflexion     Sensory Exam   Light touch normal      Gait, Coordination, and Reflexes     Coordination   Finger to nose coordination: normal    Tremor   Resting tremor: absent  Intention tremor: absent          Labs: I have personally reviewed pertinent reports     Recent Results (from the past 24 hour(s))   CBC and differential    Collection Time: 06/14/22  5:14 AM   Result Value Ref Range    WBC 11 69 (H) 4 31 - 10 16 Thousand/uL    RBC 5 14 3 88 - 5 62 Million/uL    Hemoglobin 14 1 12 0 - 17 0 g/dL    Hematocrit 43 6 36 5 - 49 3 %    MCV 85 82 - 98 fL    MCH 27 4 26 8 - 34 3 pg    MCHC 32 3 31 4 - 37 4 g/dL    RDW 13 4 11 6 - 15 1 %    MPV 10 5 8 9 - 12 7 fL    Platelets 267 927 - 619 Thousands/uL    nRBC 0 /100 WBCs    Neutrophils Relative 69 43 - 75 %    Immat GRANS % 0 0 - 2 %    Lymphocytes Relative 21 14 - 44 % Monocytes Relative 9 4 - 12 %    Eosinophils Relative 1 0 - 6 %    Basophils Relative 0 0 - 1 %    Neutrophils Absolute 8 03 (H) 1 85 - 7 62 Thousands/µL    Immature Grans Absolute 0 02 0 00 - 0 20 Thousand/uL    Lymphocytes Absolute 2 48 0 60 - 4 47 Thousands/µL    Monocytes Absolute 1 03 0 17 - 1 22 Thousand/µL    Eosinophils Absolute 0 10 0 00 - 0 61 Thousand/µL    Basophils Absolute 0 03 0 00 - 0 10 Thousands/µL   Basic metabolic panel    Collection Time: 06/14/22  5:14 AM   Result Value Ref Range    Sodium 137 136 - 145 mmol/L    Potassium 3 9 3 5 - 5 3 mmol/L    Chloride 102 100 - 108 mmol/L    CO2 31 21 - 32 mmol/L    ANION GAP 4 4 - 13 mmol/L    BUN 20 5 - 25 mg/dL    Creatinine 0 92 0 60 - 1 30 mg/dL    Glucose 130 65 - 140 mg/dL    Calcium 8 4 8 3 - 10 1 mg/dL    eGFR 88 ml/min/1 73sq m   Fingerstick Glucose (POCT)    Collection Time: 06/14/22  7:42 AM   Result Value Ref Range    POC Glucose 103 65 - 140 mg/dl   Fingerstick Glucose (POCT)    Collection Time: 06/14/22 11:11 AM   Result Value Ref Range    POC Glucose 152 (H) 65 - 140 mg/dl   Fingerstick Glucose (POCT)    Collection Time: 06/14/22  3:23 PM   Result Value Ref Range    POC Glucose 215 (H) 65 - 140 mg/dl       Imaging: I have personally reviewed pertinent imaging in PACS, and I have personally reviewed PACS reports  EKG, Pathology, and Other Studies: I have personally reviewed pertinent reports  VTE Prophylaxis: Sequential compression device (Venodyne)  and Enoxaparin (Lovenox)        Total time spent today 24 minutes  Greater than 50% of total time was spent with the patient and / or family counseling and / or coordination of care  A description of the counseling / coordination of care: Patient seen and evaluated  Case reviewed with attending  Chart thoroughly reviewed including labs and medications  Coordination of care with RN  Discussion of medications and follow up with patient

## 2022-06-14 NOTE — DISCHARGE SUMMARY
[facility]  Discharge- Saritha Mejia 1960, 60 y o  male MRN: [id number]  Unit/Bed#: -Harshad Encounter: [id number]  Primary Care Provider: No primary care provider on file  Date and time admitted to hospital: 6/9/2022  2:37 PM    * Myasthenia gravis, bulbar ([facility] )  Assessment & Plan  -patient with established diagnosis of ocular bulbar myasthenia gravis since June 21 presents with dysphagia, weakness, shortness of breath     asawCT chest no acute abnormality, chest x-ray NAD likely secondary myasthenic crisis  NIF--40 at presentation, now improved    -symptomatic improvement with 1 g of IV Solu-Medrol x 1 , status post 5/5 IVIG   -continue home pyridostigmine 60mg qid  -Continue prednisone taper  -outpatient neurology follow up, once stable for discharge    Morbid obesity ([facility] )  Assessment & Plan  -counseled on lifestyle intervention with diet, exercise and weight loss    CAD (coronary artery disease)  Assessment & Plan  -patient reports history of stents in 2006  -currently asymptomatic  -he has been noncompliant with statin and aspirin  -will restart atorvastatin and aspirin  -counseled on compliance of medications    Ptosis  Assessment & Plan  See above               Medical Problems             Resolved Problems  Date Reviewed: 6/14/2022   None                 Admission Date:   Admission Orders (From admission, onward)     Ordered        06/09/22 1604  Inpatient Admission  Once                        Admitting Diagnosis: SOB (shortness of breath) [R06 02]  Myasthenia gravis ([facility] ) [G70 00]  Myasthenia gravis in crisis ([facility] ) [G70 01]    HPI and summary of hospital course :    Patient is a known case of myasthenia gravis, diagnosed June 21  Presented with both are weakness/ptosis due to exacerbation, attributed to noncompliance with medications and followups  Patient stated that he has affordability issues  Twelve the hospital stay, patient remained hemodynamically stable    He was closely followed by Neurology  He was started on IV steroid, and then transition to 5 day course of IVIG  Patient respiratory status was monitored closely  Patient is being discharged on pyridostigmine and prednisone taper  Patient is advised to have close follow-up with PCP and Neurology  Procedures Performed:   Orders Placed This Encounter   Procedures    ED ECG Documentation Only     Significant Findings, Care, Treatment and Services Provided: see above     Complications: none    Condition at Discharge: stable         Discharge instructions/Information to patient and family:   See after visit summary for information provided to patient and family  Provisions for Follow-Up Care:  See after visit summary for information related to follow-up care and any pertinent home health orders  PCP: No primary care provider on file  Disposition: Home    Planned Readmission: No    Discharge Statement   I spent 35 minutes discharging the patient  This time was spent on the day of discharge  I had direct contact with the patient on the day of discharge  Additional documentation is required if more than 30 minutes were spent on discharge  Discharge Medications:  See after visit summary for reconciled discharge medications provided to patient and family

## 2022-06-14 NOTE — CASE MANAGEMENT
Case Management Discharge Planning Note    Patient name Theodora Alcala  Location /-45 MRN [id number]  : 1960 Date 2022       Current Admission Date: 2022  Current Admission Diagnosis:Myasthenia gravis, bulbar [facility])   Patient Active Problem List    Diagnosis Date Noted    Myasthenia gravis, bulbar ([facility] ) 10/01/2021    Morbid obesity ([facility] ) 06/10/2021    Ptosis 2021    CAD (coronary artery disease) 2021      LOS (days): 5  Geometric Mean LOS (GMLOS) (days):   Days to GMLOS:     OBJECTIVE:  Risk of Unplanned Readmission Score: 10 76      Current admission status: Inpatient   Preferred Pharmacy:   Primary Care Provider: No primary care provider on file  Primary Insurance: SADIE ONEAL PENDING  Secondary Insurance:     DISCHARGE DETAILS:    Would you like to participate in our [address] [place] [address] service program?  : Yes    Treatment Team Recommendation: Home     Additional Comments: Per the medical team, the patient is medically ready to discharge home today  However, the patient has a Medicaid piper pending and will need discharge medications  The CM received and submitted the discharge medications to [address] [place] [address] via fax  The cost of the medications will be $67 75  The CM supervior has aprroved the cost of the discharge medications  [facility] will delivery the patients medication tomorrow to the hospital after 10:30am  The patient will be discharged at that time  The medical team, the patient, and the bedside nurse are agreeable with this plan  The patient will also need a Lyft for transportation home

## 2022-06-14 NOTE — PROGRESS NOTES
[address] [place] [address]  Progress Note [place] 1960, 60 y o  male MRN: [id number]  Unit/Bed#: - Encounter: [id number]  Primary Care Provider: No primary care provider on file  Date and time admitted to hospital: 2022  2:37 PM    * Myasthenia gravis, bulbar ([facility] )  Assessment & Plan  -patient with established diagnosis of ocular bulbar myasthenia gravis since  presents with dysphagia, weakness, shortness of breath     asawCT chest no acute abnormality, chest x-ray NAD likely secondary myasthenic crisis  NIF--40 at presentation, now improved    -symptomatic improvement with 1 g of IV Solu-Medrol x 1 , status post  IVIG   -continue home pyridostigmine 60mg qid  -Continue prednisone taper  -outpatient neurology follow up, once stable for discharge    Morbid obesity ([facility] )  Assessment & Plan  -counseled on lifestyle intervention with diet, exercise and weight loss    CAD (coronary artery disease)  Assessment & Plan  -patient reports history of stents in   -currently asymptomatic  -he has been noncompliant with statin and aspirin  -will restart atorvastatin and aspirin  -counseled on compliance of medications    Ptosis  Assessment & Plan  See above         VTE Pharmacologic Prophylaxis:   VTE Score: 6 Moderate Risk (Score 3-4) - Pharmacological DVT Prophylaxis Ordered: Enoxaparin (Lovenox)  Mechanical VTE Prophylaxis in Place: Yes    Patient Centered Rounds: I have performed bedside rounds with nursing staff today  Discussions with Specialists or Other Care Team Provider: yes    Education and Discussions with Family / Patient: yes    Current Length of Stay: 5 day(s)    Current Patient Status: Inpatient     Discharge Plan / Estimated Discharge Date: Anticipate discharge tomorrow to home      Code Status: Level 1 - Full Code      Subjective:   I am feeling better    Objective:     Vitals:   Temp (24hrs), Av 1 °F (36 7 °C), Min:98 1 °F (36 7 °C), Max:98 2 °F (36 8 °C)    Temp:  [98 1 °F (36 7 °C)-98 2 °F (36 8 °C)] 98 1 °F (36 7 °C)  HR:  [65-77] 77  Resp:  [18] 18  BP: (117-136)/(61-77) 136/65  SpO2:  [96 %] 96 %  Body mass index is 45 12 kg/m²  Input and Output Summary (last 24 hours): Intake/Output Summary (Last 24 hours) at 6/14/2022 1538  Last data filed at 6/14/2022 1259  Gross per 24 hour   Intake 2600 ml   Output 4400 ml   Net -1800 ml       Physical Exam:     Physical Exam  Constitutional:       Appearance: He is obese  HENT:      Head: Normocephalic and atraumatic  Eyes:      Conjunctiva/sclera: Conjunctivae normal       Comments: Stable ptosis  Minimal leftward gaze  Gaze is improved if eyelids are manually lifted, per patient  Cardiovascular:      Rate and Rhythm: Normal rate and regular rhythm  Heart sounds: No murmur heard  No friction rub  No gallop  Pulmonary:      Breath sounds: Normal breath sounds  Abdominal:      General: Bowel sounds are normal       Palpations: Abdomen is soft  Musculoskeletal:      Right lower leg: No edema  Left lower leg: No edema  Skin:     General: Skin is warm and dry  Neurological:      General: No focal deficit present  Mental Status: He is alert  Sensory: No sensory deficit  Motor: No weakness  Psychiatric:         Mood and Affect: Mood normal          Behavior: Behavior normal          Thought Content:  Thought content normal          Judgment: Judgment normal           Additional Data:     Labs:  Results from last 7 days   Lab Units 06/14/22  0514   WBC Thousand/uL 11 69*   HEMOGLOBIN g/dL 14 1   HEMATOCRIT % 43 6   PLATELETS Thousands/uL 203   NEUTROS PCT % 69   LYMPHS PCT % 21   MONOS PCT % 9   EOS PCT % 1     Results from last 7 days   Lab Units 06/14/22  0514   SODIUM mmol/L 137   POTASSIUM mmol/L 3 9   CHLORIDE mmol/L 102   CO2 mmol/L 31   BUN mg/dL 20   CREATININE mg/dL 0 92   ANION GAP mmol/L 4   CALCIUM mg/dL 8 4   GLUCOSE RANDOM mg/dL 130         Results from last 7 days   Lab Units 06/14/22  1111 06/14/22  0742 06/13/22  1521 06/13/22  0644 06/12/22  1732 06/12/22  0734 06/11/22  1702 06/11/22  0708 06/10/22  2056 06/10/22  1546 06/10/22  1106 06/10/22  0736   POC GLUCOSE mg/dl 152* 103 167* 116 159* 164* 152* 115 225* 191* 257* 175*     Results from last 7 days   Lab Units 06/11/22  0507   HEMOGLOBIN A1C % 6 3*           Imaging: No pertinent imaging reviewed  Recent Cultures (last 7 days):           Lines/Drains:  Invasive Devices  Report    Peripheral Intravenous Line  Duration           Peripheral IV 06/13/22 Left;Ventral (anterior) Forearm 1 day                Telemetry:        Last 24 Hours Medication List:   Current Facility-Administered Medications   Medication Dose Route Frequency Provider Last Rate    aspirin  81 mg Oral Daily Colette Miranda MD      atorvastatin  40 mg Oral QPM Colette Miranda MD      enoxaparin  40 mg Subcutaneous Daily Colette Miranda MD      insulin lispro  2-12 Units Subcutaneous BID after meals Arnaldo Soni MD      pantoprazole  40 mg Oral Early Morning Raad Rojas MD      polyvinyl alcohol  1 drop Both Eyes Q3H PRN Colette Miranda MD      predniSONE  60 mg Oral Daily Raad Rojas MD      pyridostigmine  60 mg Oral 4x Daily MARCELLA Retana      sodium chloride (PF)  3 mL Intravenous Q1H PRN MARCELLA German      sodium chloride  50 mL/hr Intravenous Continuous MARCELLA Saleh Stopped (06/11/22 1831)        Today, Patient Was Seen By: Arnaldo Soni MD    ** Please Note: This note has been constructed using a voice recognition system   **

## 2022-06-14 NOTE — PLAN OF CARE
Problem: Potential for Falls  Goal: Patient will remain free of falls  Description: INTERVENTIONS:  - Educate patient/family on patient safety including physical limitations  - Instruct patient to call for assistance with activity   - Consult OT/PT to assist with strengthening/mobility   - Keep Call bell within reach  - Keep bed low and locked with side rails adjusted as appropriate  - Keep care items and personal belongings within reach  - Initiate and maintain comfort rounds  - Make Fall Risk Sign visible to staff  - Offer Toileting every  Hours, in advance of need  - Initiate/Maintain alarm  - Obtain necessary fall risk management equipment:   - Apply yellow socks and bracelet for high fall risk patients  - Consider moving patient to room near nurses station  Outcome: Progressing     Problem: PAIN - ADULT  Goal: Verbalizes/displays adequate comfort level or baseline comfort level  Description: Interventions:  - Encourage patient to monitor pain and request assistance  - Assess pain using appropriate pain scale  - Administer analgesics based on type and severity of pain and evaluate response  - Implement non-pharmacological measures as appropriate and evaluate response  - Consider cultural and social influences on pain and pain management  - Notify physician/advanced practitioner if interventions unsuccessful or patient reports new pain  Outcome: Progressing     Problem: INFECTION - ADULT  Goal: Absence or prevention of progression during hospitalization  Description: INTERVENTIONS:  - Assess and monitor for signs and symptoms of infection  - Monitor lab/diagnostic results  - Monitor all insertion sites, i e  indwelling lines, tubes, and drains  - Monitor endotracheal if appropriate and nasal secretions for changes in amount and color  - [place] appropriate cooling/warming therapies per order  - Administer medications as ordered  - Instruct and encourage patient and family to use good hand hygiene technique  - Identify and instruct in appropriate isolation precautions for identified infection/condition  Outcome: Progressing     Problem: SAFETY ADULT  Goal: Patient will remain free of falls  Description: INTERVENTIONS:  - Educate patient/family on patient safety including physical limitations  - Instruct patient to call for assistance with activity   - Consult OT/PT to assist with strengthening/mobility   - Keep Call bell within reach  - Keep bed low and locked with side rails adjusted as appropriate  - Keep care items and personal belongings within reach  - Initiate and maintain comfort rounds  - Make Fall Risk Sign visible to staff  - Offer Toileting every  Hours, in advance of need  - Initiate/Maintain alarm  - Obtain necessary fall risk management equipment:   - Apply yellow socks and bracelet for high fall risk patients  - Consider moving patient to room near nurses station  Outcome: Progressing  Goal: Maintain or return to baseline ADL function  Description: INTERVENTIONS:  -  Assess patient's ability to carry out ADLs; assess patient's baseline for ADL function and identify physical deficits which impact ability to perform ADLs (bathing, care of mouth/teeth, toileting, grooming, dressing, etc )  - Assess/evaluate cause of self-care deficits   - Assess range of motion  - Assess patient's mobility; develop plan if impaired  - Assess patient's need for assistive devices and provide as appropriate  - Encourage maximum independence but intervene and supervise when necessary  - Involve family in performance of ADLs  - Assess for home care needs following discharge   - Consider OT consult to assist with ADL evaluation and planning for discharge  - Provide patient education as appropriate  Outcome: Progressing  Goal: Maintains/Returns to pre admission functional level  Description: INTERVENTIONS:  - Perform BMAT or MOVE assessment daily    - Set and communicate daily mobility goal to care team and patient/family/caregiver  - Collaborate with rehabilitation services on mobility goals if consulted  - Perform Range of Motion  times a day  - Reposition patient every  hours  - Dangle patient  times a day  - Stand patient  times a day  - Ambulate patient  times a day  - Out of bed to chair  times a day   - Out of bed for meals times a day  - Out of bed for toileting  - Record patient progress and toleration of activity level   Outcome: Progressing     Problem: DISCHARGE PLANNING  Goal: Discharge to home or other facility with appropriate resources  Description: INTERVENTIONS:  - Identify barriers to discharge w/patient and caregiver  - Arrange for needed discharge resources and transportation as appropriate  - Identify discharge learning needs (meds, wound care, etc )  - Arrange for interpretive services to assist at discharge as needed  - Refer to Case Management Department for coordinating discharge planning if the patient needs post-hospital services based on physician/advanced practitioner order or complex needs related to functional status, cognitive ability, or social support system  Outcome: Progressing     Problem: Knowledge Deficit  Goal: Patient/family/caregiver demonstrates understanding of disease process, treatment plan, medications, and discharge instructions  Description: Complete learning assessment and assess knowledge base    Interventions:  - Provide teaching at level of understanding  - Provide teaching via preferred learning methods  Outcome: Progressing     Problem: CARDIOVASCULAR - ADULT  Goal: Maintains optimal cardiac output and hemodynamic stability  Description: INTERVENTIONS:  - Monitor I/O, vital signs and rhythm  - Monitor for S/S and trends of decreased cardiac output  - Administer and titrate ordered vasoactive medications to optimize hemodynamic stability  - Assess quality of pulses, skin color and temperature  - Assess for signs of decreased coronary artery perfusion  - Instruct patient to report change in severity of symptoms  Outcome: Progressing  Goal: Absence of cardiac dysrhythmias or at baseline rhythm  Description: INTERVENTIONS:  - Continuous cardiac monitoring, vital signs, obtain 12 lead EKG if ordered  - Administer antiarrhythmic and heart rate control medications as ordered  - Monitor electrolytes and administer replacement therapy as ordered  Outcome: Progressing

## 2022-06-14 NOTE — PLAN OF CARE
Problem: Potential for Falls  Goal: Patient will remain free of falls  Description: INTERVENTIONS:  - Educate patient/family on patient safety including physical limitations  - Instruct patient to call for assistance with activity   - Consult OT/PT to assist with strengthening/mobility   - Keep Call bell within reach  - Keep bed low and locked with side rails adjusted as appropriate  - Keep care items and personal belongings within reach  - Initiate and maintain comfort rounds  - Make Fall Risk Sign visible to staff  - Offer Toileting every 2 Hours, in advance of need  - Initiate/Maintain 2alarm  - Obtain necessary fall risk management equipment: 2  - Apply yellow socks and bracelet for high fall risk patients  - Consider moving patient to room near nurses station  Outcome: Progressing     Problem: PAIN - ADULT  Goal: Verbalizes/displays adequate comfort level or baseline comfort level  Description: Interventions:  - Encourage patient to monitor pain and request assistance  - Assess pain using appropriate pain scale  - Administer analgesics based on type and severity of pain and evaluate response  - Implement non-pharmacological measures as appropriate and evaluate response  - Consider cultural and social influences on pain and pain management  - Notify physician/advanced practitioner if interventions unsuccessful or patient reports new pain  Outcome: Progressing     Problem: INFECTION - ADULT  Goal: Absence or prevention of progression during hospitalization  Description: INTERVENTIONS:  - Assess and monitor for signs and symptoms of infection  - Monitor lab/diagnostic results  - Monitor all insertion sites, i e  indwelling lines, tubes, and drains  - Monitor endotracheal if appropriate and nasal secretions for changes in amount and color  - [place] appropriate cooling/warming therapies per order  - Administer medications as ordered  - Instruct and encourage patient and family to use good hand hygiene technique  - Identify and instruct in appropriate isolation precautions for identified infection/condition  Outcome: Progressing     Problem: SAFETY ADULT  Goal: Patient will remain free of falls  Description: INTERVENTIONS:  - Educate patient/family on patient safety including physical limitations  - Instruct patient to call for assistance with activity   - Consult OT/PT to assist with strengthening/mobility   - Keep Call bell within reach  - Keep bed low and locked with side rails adjusted as appropriate  - Keep care items and personal belongings within reach  - Initiate and maintain comfort rounds  - Make Fall Risk Sign visible to staff  - Offer Toileting every 2 Hours, in advance of need  - Initiate/Maintain 2alarm  - Obtain necessary fall risk management equipment: 2  - Apply yellow socks and bracelet for high fall risk patients  - Consider moving patient to room near nurses station  Outcome: Progressing  Goal: Maintain or return to baseline ADL function  Description: INTERVENTIONS:  -  Assess patient's ability to carry out ADLs; assess patient's baseline for ADL function and identify physical deficits which impact ability to perform ADLs (bathing, care of mouth/teeth, toileting, grooming, dressing, etc )  - Assess/evaluate cause of self-care deficits   - Assess range of motion  - Assess patient's mobility; develop plan if impaired  - Assess patient's need for assistive devices and provide as appropriate  - Encourage maximum independence but intervene and supervise when necessary  - Involve family in performance of ADLs  - Assess for home care needs following discharge   - Consider OT consult to assist with ADL evaluation and planning for discharge  - Provide patient education as appropriate  Outcome: Progressing  Goal: Maintains/Returns to pre admission functional level  Description: INTERVENTIONS:  - Perform BMAT or MOVE assessment daily    - Set and communicate daily mobility goal to care team and patient/family/caregiver  - Collaborate with rehabilitation services on mobility goals if consulted  - Perform Range of Motion 2 times a day  - Reposition patient every 2 hours  - Dangle patient 2 times a day  - Stand patient 2 times a day  - Ambulate patient 2 times a day  - Out of bed to chair 2 times a day   - Out of bed for meals 2 times a day  - Out of bed for toileting  - Record patient progress and toleration of activity level   Outcome: Progressing     Problem: DISCHARGE PLANNING  Goal: Discharge to home or other facility with appropriate resources  Description: INTERVENTIONS:  - Identify barriers to discharge w/patient and caregiver  - Arrange for needed discharge resources and transportation as appropriate  - Identify discharge learning needs (meds, wound care, etc )  - Arrange for interpretive services to assist at discharge as needed  - Refer to Case Management Department for coordinating discharge planning if the patient needs post-hospital services based on physician/advanced practitioner order or complex needs related to functional status, cognitive ability, or social support system  Outcome: Progressing     Problem: Knowledge Deficit  Goal: Patient/family/caregiver demonstrates understanding of disease process, treatment plan, medications, and discharge instructions  Description: Complete learning assessment and assess knowledge base    Interventions:  - Provide teaching at level of understanding  - Provide teaching via preferred learning methods  Outcome: Progressing     Problem: CARDIOVASCULAR - ADULT  Goal: Maintains optimal cardiac output and hemodynamic stability  Description: INTERVENTIONS:  - Monitor I/O, vital signs and rhythm  - Monitor for S/S and trends of decreased cardiac output  - Administer and titrate ordered vasoactive medications to optimize hemodynamic stability  - Assess quality of pulses, skin color and temperature  - Assess for signs of decreased coronary artery perfusion  - Instruct patient to report change in severity of symptoms  Outcome: Progressing  Goal: Absence of cardiac dysrhythmias or at baseline rhythm  Description: INTERVENTIONS:  - Continuous cardiac monitoring, vital signs, obtain 12 lead EKG if ordered  - Administer antiarrhythmic and heart rate control medications as ordered  - Monitor electrolytes and administer replacement therapy as ordered  Outcome: Progressing     Problem: NEUROSENSORY - ADULT  Goal: Achieves maximal functionality and self care  Description: INTERVENTIONS  - Monitor swallowing and airway patency with patient fatigue and changes in neurological status  - Encourage and assist patient to increase activity and self care     - Encourage visually impaired, hearing impaired and aphasic patients to use assistive/communication devices  Outcome: Progressing     Problem: MUSCULOSKELETAL - ADULT  Goal: Maintain or return mobility to safest level of function  Description: INTERVENTIONS:  - Assess patient's ability to carry out ADLs; assess patient's baseline for ADL function and identify physical deficits which impact ability to perform ADLs (bathing, care of mouth/teeth, toileting, grooming, dressing, etc )  - Assess/evaluate cause of self-care deficits   - Assess range of motion  - Assess patient's mobility  - Assess patient's need for assistive devices and provide as appropriate  - Encourage maximum independence but intervene and supervise when necessary  - Involve family in performance of ADLs  - Assess for home care needs following discharge   - Consider OT consult to assist with ADL evaluation and planning for discharge  - Provide patient education as appropriate  Outcome: Progressing

## 2022-06-14 NOTE — ASSESSMENT & PLAN NOTE
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

## 2022-06-15 VITALS
OXYGEN SATURATION: 98 % | TEMPERATURE: 98 F | WEIGHT: 315 LBS | RESPIRATION RATE: 18 BRPM | BODY MASS INDEX: 42.66 KG/M2 | DIASTOLIC BLOOD PRESSURE: 73 MMHG | SYSTOLIC BLOOD PRESSURE: 137 MMHG | HEART RATE: 75 BPM | HEIGHT: 72 IN

## 2022-06-15 LAB
GLUCOSE SERPL-MCNC: 111 MG/DL (ref 65–140)
GLUCOSE SERPL-MCNC: 165 MG/DL (ref 65–140)

## 2022-06-15 PROCEDURE — 99239 HOSP IP/OBS DSCHRG MGMT >30: CPT | Performed by: INTERNAL MEDICINE

## 2022-06-15 PROCEDURE — 97166 OT EVAL MOD COMPLEX 45 MIN: CPT

## 2022-06-15 PROCEDURE — 82948 REAGENT STRIP/BLOOD GLUCOSE: CPT

## 2022-06-15 RX ADMIN — PANTOPRAZOLE SODIUM 40 MG: 40 TABLET, DELAYED RELEASE ORAL at 05:29

## 2022-06-15 RX ADMIN — ENOXAPARIN SODIUM 40 MG: 40 INJECTION SUBCUTANEOUS at 08:41

## 2022-06-15 RX ADMIN — PYRIDOSTIGMINE BROMIDE 60 MG: 60 TABLET ORAL at 08:42

## 2022-06-15 RX ADMIN — PREDNISONE 60 MG: 20 TABLET ORAL at 08:41

## 2022-06-15 RX ADMIN — ASPIRIN 81 MG: 81 TABLET ORAL at 08:41

## 2022-06-15 RX ADMIN — PYRIDOSTIGMINE BROMIDE 60 MG: 60 TABLET ORAL at 13:05

## 2022-06-15 NOTE — TELEPHONE ENCOUNTER
Patient is being discharged from the hospital this date  MSW will follow-up with patient on 6/16 to encourage him to work with PATHS to apply for MA for hospital stay and ongoing coverage

## 2022-06-15 NOTE — OCCUPATIONAL THERAPY NOTE
Occupational Therapy Evaluation      Jay Mcnamara    6/15/2022    Patient Active Problem List   Diagnosis    Ptosis    CAD (coronary artery disease)    Morbid obesity ([facility] [address] )    Myasthenia gravis, bulbar ([facility] [address] )       Past Medical History:   Diagnosis Date    Coronary artery disease     Heart attack ([facility] [address] )     Hyperlipidemia        Past Surgical History:   Procedure Laterality Date    CORONARY ANGIOPLASTY WITH STENT PLACEMENT          06/15/22 0841   OT Last Visit   OT Visit Date 06/15/22   Note Type   Note type Evaluation   Additional Comments Pt agreeable to OT eval  Upon arrival pt seated OOB in recliner  Restrictions/Precautions   Weight Bearing Precautions Per Order No   Other Precautions Fall Risk;Visual impairment   Pain Assessment   Pain Assessment Tool 0-10   Pain Score No Pain   Home Living   Type of [address] [place] [address] One level;Performs ADLs on one level; Able to live on main level with bedroom/bathroom;Stairs to enter with rails  (1 CHARLIE)   Bathroom Shower/Tub Walk-in shower   Bathroom Toilet [facility]   (no AD used at baseline)   Prior Function   Level of [place] Independent with ADLs and functional mobility   Lives With Spouse; Son   [place] Help From Family   ADL Assistance Independent   IADLs Needs assistance  (Wife does cooking and laundry)   Falls in the last 6 months 0   Vocational Unemployed   Comments (-) driving; son assists c transportation   Lifestyle   Autonomy Patient reporting being independent with ADLs, ambulatory with no AD and lives with family in a one level house with 1 CHARLIE   Reciprocal Relationships Wife and son   Service to Others Pt has not worked in over a year   Intrinsic Gratification Watching TV   ADL   Eating Assistance 6  Modified independent   [address] [place] Supervision/Setup   UB Dressing Assistance 5  Supervision/Setup   LB Dressing Assistance 4  Minimal Assistance   Toileting Assistance  5  Supervision/Setup   Additional Comments ADL levels based on functional performance during OT eval    Bed Mobility   Additional Comments DNT bed mobility: pt seated in recliner upon arrival and at end of session   Transfers   Sit to Stand 5  Supervision   Additional items Assist x 1; Armrests   Stand to Sit 5  Supervision   Additional items Assist x 1; Armrests   Additional Comments Pt denied lightheaded/dizziness with transitional movements  Functional Mobility   Functional Mobility 5  Supervision   Additional Comments Pt ambulated household distance in hallway with no overt SOB or LOB  Pt noted to have 1 lateral LOB but able to self-correct  Additional items   (Pt ambulated with no AD)   Balance   Static Sitting Good   Dynamic Sitting Fair +   Static Standing Fair   Dynamic Standing Fair   Activity Tolerance   Activity Tolerance Patient limited by fatigue   Nurse Made Aware RN verbalized pt appropriate for therapy and made aware of outcomes   RUE Assessment   RUE Assessment WFL  (full AROM, 4/5 MMT)   LUE Assessment   LUE Assessment WFL  (full AROM, 4/5 MMT)   Hand Function   Gross Motor Coordination Functional   Fine Motor Coordination Functional   Sensation   Light Touch No apparent deficits  (BUE tested)   Vision-Basic Assessment   Current Vision Wears glasses for distance only   Visual History Corrective eye surgery   Patient Visual Report Balance difficulty  (Ptosis noted in L eye)   Vision - Complex Assessment   Ocular Range of Motion WFL   Head Position WDL   Tracking Able to track stimulus in all quads without difficulty   Acuity Able to read clock/calendar on wall without difficulty; Able to read employee name badge without difficulty; Able to read normal print without difficulty  (Pt manually lifts L eyelid to have read)   Visual Screen Results   (Pt lifts L eyelid as needed to have full visual field)   Additional Comments Pt has WFL ocular ROM and acuity  Pt is limited by ptosis in L eye  Perception   Inattention/Neglect Appears intact   Cognition   Overall Cognitive Status WFL   Arousal/Participation Alert; Responsive; Cooperative   Attention Within functional limits   Orientation Level Oriented X4   Memory Within functional limits   Following Commands Follows all commands and directions without difficulty   Assessment   Limitation Decreased ADL status; Decreased UE strength;Decreased endurance;Visual deficit; Decreased self-care trans;Decreased high-level ADLs   Prognosis Good   Assessment Patient is a 60 y o  male seen for OT evaluation s/p admit to [facility]  on 6/9/2022 w/Myasthenia gravis, bulbar ([facility] [address] )  Commorbidities affecting patient's functional performance at time of assessment include: CAD, obesity, and ptosis  Orders placed for OT evaluation and treatment and activity as tolerated   Performed at least two patient identifiers during session including name and wristband  Prior to admission, Patient reporting being independent with ADLs, ambulatory with no AD and lives with family in a one level house with 1 CHARLIE  Personal factors affecting patient at time of initial evaluation include: steps to enter, difficulty performing ADLs and difficulty performing IADLs  Upon evaluation, patient requires supervision assist for UB ADLs, supervision and minimal  assist for LB ADLs, transfers and functional ambulation in room and bathroom with supervision assist, with the use of no AD  Patient is oriented x 4  Occupational performance is affected by the following deficits: visual impairment, decreased muscle strength, dynamic sit/ stand balance deficit with poor standing tolerance time for self care and functional mobility, decreased activity tolerance and delayed righting and equilibrium reactions   Patient to benefit from continued Occupational Therapy treatment while in the hospital to address deficits as defined above and maximize level of functional independence with ADLs and functional mobility  Occupational Performance areas to address include: grooming , bathing/ shower, dressing, toilet hygiene, transfer to all surfaces, functional ambulation, medication routine/ management, IADLS: Household maintenance, IADLs: safety procedures and IADLs: meal prep/ clean up  From OT standpoint, recommendation at time of d/c would be Home with outpatient rehabilitation  Goals   Patient Goals to be able to do more at home   Plan   Treatment Interventions ADL retraining;Visual perceptual retraining;Functional transfer training;UE strengthening/ROM; Endurance training;Patient/family training;Equipment evaluation/education; Compensatory technique education; Neuromuscular reeducation; Activityengagement   Goal Expiration Date 06/25/22   OT Treatment Day 0   OT Frequency 2-3x/wk   Recommendation   OT Discharge Recommendation Home with outpatient rehabilitation   OT - OK to Discharge Yes  (Once medically cleared)   Additional Comments  At end of eval, pt seated OOB in recliner with all needs met  Additional Comments 2 The patient's raw score on the AM-PAC Daily Activity inpatient short form is 20, standardized score is 42 03, greater than 39 4  Patients at this level are likely to benefit from discharge to home  Please refer to the recommendation of the Occupational Therapist for safe discharge planning     AM-PAC Daily Activity Inpatient   Lower Body Dressing 3   Bathing 3   Toileting 3   Upper Body Dressing 3   Grooming 4   Eating 4   Daily Activity Raw Score 20   Daily Activity Standardized Score (Calc for Raw Score >=11) 42 03   AM-PAC Applied Cognition Inpatient   Following a Speech/Presentation 4   Understanding Ordinary Conversation 4   Taking Medications 4   Remembering Where Things Are Placed or Put Away 4   Remembering List of 4-5 Errands 4   Taking Care of Complicated Tasks 4   Applied Cognition Raw Score 24   Applied Cognition Standardized Score 62 21     GOALS:    *ADL transfers with (I) for inc'd independence with ADLs/purposeful tasks    *UB ADL with (I) for inc'd independence with self cares    *LB ADL with (I) using AE prn for inc'd independence with self cares    *Toileting with (I) for clothing management and hygiene for return to PLOF with personal care    *Increase stand tolerance x 5  m for inc'd tolerance with standing purposeful tasks    *Participate in 10m UE therex to increase overall stamina/activity tolerance for purposeful tasks    *Bed mobility- (I) for inc'd independence to manage own comfort and initiate EOB & OOB purposeful tasks    *Patient will verbalize 3 safety awareness/ principles to prevent falls in the home setting  *Patient will verbalize and demonstrate use of visual perceptual techniques during functional activities with no verbal cues  *Patient will increase OOB/sitting tolerance to 2-4 hours per day to increase participation in self-care and leisure tasks with no s/s of exertion       Alexandra Gibson, OTD, OTR/L

## 2022-06-15 NOTE — PLAN OF CARE
Problem: OCCUPATIONAL THERAPY ADULT  Goal: Performs self-care activities at highest level of function for planned discharge setting  See evaluation for individualized goals  Description: Treatment Interventions: ADL retraining, Visual perceptual retraining, Functional transfer training, UE strengthening/ROM, Endurance training, Patient/family training, Equipment evaluation/education, Compensatory technique education, Neuromuscular reeducation, Activityengagement          See flowsheet documentation for full assessment, interventions and recommendations  Note: Limitation: Decreased ADL status, Decreased UE strength, Decreased endurance, Visual deficit, Decreased self-care trans, Decreased high-level ADLs  Prognosis: Good  Assessment: Patient is a 60 y o  male seen for OT evaluation s/p admit to [facility]  on 6/9/2022 w/Myasthenia gravis, bulbar ([facility] [address] )  Commorbidities affecting patient's functional performance at time of assessment include: CAD, obesity, and ptosis  Orders placed for OT evaluation and treatment and activity as tolerated   Performed at least two patient identifiers during session including name and wristband  Prior to admission, Patient reporting being independent with ADLs, ambulatory with no AD and lives with family in a one level house with 1 CHALRIE  Personal factors affecting patient at time of initial evaluation include: steps to enter, difficulty performing ADLs and difficulty performing IADLs  Upon evaluation, patient requires supervision assist for UB ADLs, supervision and minimal  assist for LB ADLs, transfers and functional ambulation in room and bathroom with supervision assist, with the use of no AD  Patient is oriented x 4    Occupational performance is affected by the following deficits: visual impairment, decreased muscle strength, dynamic sit/ stand balance deficit with poor standing tolerance time for self care and functional mobility, decreased activity tolerance and delayed righting and equilibrium reactions  Patient to benefit from continued Occupational Therapy treatment while in the hospital to address deficits as defined above and maximize level of functional independence with ADLs and functional mobility  Occupational Performance areas to address include: grooming , bathing/ shower, dressing, toilet hygiene, transfer to all surfaces, functional ambulation, medication routine/ management, IADLS: Household maintenance, IADLs: safety procedures and IADLs: meal prep/ clean up  From OT standpoint, recommendation at time of d/c would be Home with outpatient rehabilitation       OT Discharge Recommendation: Home with outpatient rehabilitation  OT - OK to Discharge: Yes (Once medically cleared)     Monica Abdullahi OT

## 2022-06-15 NOTE — PLAN OF CARE
Problem: Potential for Falls  Goal: Patient will remain free of falls  Description: INTERVENTIONS:  - Educate patient/family on patient safety including physical limitations  - Instruct patient to call for assistance with activity   - Consult OT/PT to assist with strengthening/mobility   - Keep Call bell within reach  - Keep bed low and locked with side rails adjusted as appropriate  - Keep care items and personal belongings within reach  - Initiate and maintain comfort rounds  - Make Fall Risk Sign visible to staff  - Offer Toileting every 2 Hours, in advance of need  - Initiate/Maintain alarm  - Obtain necessary fall risk management equipment  - Apply yellow socks and bracelet for high fall risk patients  - Consider moving patient to room near nurses station  Outcome: Progressing     Problem: PAIN - ADULT  Goal: Verbalizes/displays adequate comfort level or baseline comfort level  Description: Interventions:  - Encourage patient to monitor pain and request assistance  - Assess pain using appropriate pain scale  - Administer analgesics based on type and severity of pain and evaluate response  - Implement non-pharmacological measures as appropriate and evaluate response  - Consider cultural and social influences on pain and pain management  - Notify physician/advanced practitioner if interventions unsuccessful or patient reports new pain  Outcome: Progressing     Problem: INFECTION - ADULT  Goal: Absence or prevention of progression during hospitalization  Description: INTERVENTIONS:  - Assess and monitor for signs and symptoms of infection  - Monitor lab/diagnostic results  - Monitor all insertion sites, i e  indwelling lines, tubes, and drains  - Monitor endotracheal if appropriate and nasal secretions for changes in amount and color  - [place] appropriate cooling/warming therapies per order  - Administer medications as ordered  - Instruct and encourage patient and family to use good hand hygiene technique  - Identify and instruct in appropriate isolation precautions for identified infection/condition  Outcome: Progressing     Problem: SAFETY ADULT  Goal: Patient will remain free of falls  Description: INTERVENTIONS:  - Educate patient/family on patient safety including physical limitations  - Instruct patient to call for assistance with activity   - Consult OT/PT to assist with strengthening/mobility   - Keep Call bell within reach  - Keep bed low and locked with side rails adjusted as appropriate  - Keep care items and personal belongings within reach  - Initiate and maintain comfort rounds  - Make Fall Risk Sign visible to staff  - Offer Toileting every 2 Hours, in advance of need  - Obtain necessary fall risk management equipment  - Apply yellow socks and bracelet for high fall risk patients  - Consider moving patient to room near nurses station  Outcome: Progressing  Goal: Maintain or return to baseline ADL function  Description: INTERVENTIONS:  -  Assess patient's ability to carry out ADLs; assess patient's baseline for ADL function and identify physical deficits which impact ability to perform ADLs (bathing, care of mouth/teeth, toileting, grooming, dressing, etc )  - Assess/evaluate cause of self-care deficits   - Assess range of motion  - Assess patient's mobility; develop plan if impaired  - Assess patient's need for assistive devices and provide as appropriate  - Encourage maximum independence but intervene and supervise when necessary  - Involve family in performance of ADLs  - Assess for home care needs following discharge   - Consider OT consult to assist with ADL evaluation and planning for discharge  - Provide patient education as appropriate  Outcome: Progressing  Goal: Maintains/Returns to pre admission functional level  Description: INTERVENTIONS:  - Perform BMAT or MOVE assessment daily    - Set and communicate daily mobility goal to care team and patient/family/caregiver     - Collaborate with rehabilitation services on mobility goals if consulted  - Perform Range of Motion 3 times a day  - Reposition patient every 2 hours  - Dangle patient 3 times a day  - Stand patient 3 times a day  - Ambulate patient 3 times a day  - Out of bed to chair 3 times a day   - Out of bed for meals 3 times a day  - Out of bed for toileting  - Record patient progress and toleration of activity level   Outcome: Progressing     Problem: DISCHARGE PLANNING  Goal: Discharge to home or other facility with appropriate resources  Description: INTERVENTIONS:  - Identify barriers to discharge w/patient and caregiver  - Arrange for needed discharge resources and transportation as appropriate  - Identify discharge learning needs (meds, wound care, etc )  - Arrange for interpretive services to assist at discharge as needed  - Refer to Case Management Department for coordinating discharge planning if the patient needs post-hospital services based on physician/advanced practitioner order or complex needs related to functional status, cognitive ability, or social support system  Outcome: Progressing     Problem: Knowledge Deficit  Goal: Patient/family/caregiver demonstrates understanding of disease process, treatment plan, medications, and discharge instructions  Description: Complete learning assessment and assess knowledge base    Interventions:  - Provide teaching at level of understanding  - Provide teaching via preferred learning methods  Outcome: Progressing     Problem: CARDIOVASCULAR - ADULT  Goal: Maintains optimal cardiac output and hemodynamic stability  Description: INTERVENTIONS:  - Monitor I/O, vital signs and rhythm  - Monitor for S/S and trends of decreased cardiac output  - Administer and titrate ordered vasoactive medications to optimize hemodynamic stability  - Assess quality of pulses, skin color and temperature  - Assess for signs of decreased coronary artery perfusion  - Instruct patient to report change in severity of symptoms  Outcome: Progressing  Goal: Absence of cardiac dysrhythmias or at baseline rhythm  Description: INTERVENTIONS:  - Continuous cardiac monitoring, vital signs, obtain 12 lead EKG if ordered  - Administer antiarrhythmic and heart rate control medications as ordered  - Monitor electrolytes and administer replacement therapy as ordered  Outcome: Progressing     Problem: NEUROSENSORY - ADULT  Goal: Achieves maximal functionality and self care  Description: INTERVENTIONS  - Monitor swallowing and airway patency with patient fatigue and changes in neurological status  - Encourage and assist patient to increase activity and self care     - Encourage visually impaired, hearing impaired and aphasic patients to use assistive/communication devices  Outcome: Progressing     Problem: MUSCULOSKELETAL - ADULT  Goal: Maintain or return mobility to safest level of function  Description: INTERVENTIONS:  - Assess patient's ability to carry out ADLs; assess patient's baseline for ADL function and identify physical deficits which impact ability to perform ADLs (bathing, care of mouth/teeth, toileting, grooming, dressing, etc )  - Assess/evaluate cause of self-care deficits   - Assess range of motion  - Assess patient's mobility  - Assess patient's need for assistive devices and provide as appropriate  - Encourage maximum independence but intervene and supervise when necessary  - Involve family in performance of ADLs  - Assess for home care needs following discharge   - Consider OT consult to assist with ADL evaluation and planning for discharge  - Provide patient education as appropriate  Outcome: Progressing

## 2022-06-16 NOTE — TELEPHONE ENCOUNTER
MSW attempted to reach patient this date at [phone]  No answer and call went right to voicemail  MSW encouraged patient to work with PATHS to apply for [facility]  MSW will follow-up with patient early next week to ensure that he is working with [email or web address]

## 2022-06-20 NOTE — TELEPHONE ENCOUNTER
MSW phoned patient this date at [phone]  Patient stated that he was working up until December 2020 and then was diagnosed with Myasthenia Gravis in April 2021  Patient stated that he cannot drive and cannot work due to his current condition  MSW encouraged patient to work with PATHS to try to get Medicaid coverage for his hospitalization  Patient stated that he does not qualify for Medicaid as he is not a [address], does not have a SSN, and does not pay taxes  Patient stated that he was born in [facility] and never went through the citizenship process  MSW explained that PATHS may be able to assist him in getting Medicaid to cover the cost of his emergency care, but that he likely will not qualify for ongoing coverage since he is not a citizen  Patient stated that he has spoken with PATHS 3-4 times in the past and that he is unable to provide all the documentation they ask for him  Patient is interested in financial help from [address], if possible  MSW advised that this writer will ask a [facility] Financial Counselor to contact him with any options for assistance  Patient reports that he has no income and had to sell his car to pay bills  Patient stated that he relies on his son, who was born in the [address] [facility] in [address] [place] [address], to provide all necessities (shelter, food, medication, utilities, transportation, etc)  MSW offered to provide the 211 phone number to see if patient qualifies for any additional assistance as [phone] but patient was not agreeable to take this number  Patient stated that he will continue to relay on his son  MSW offered to send patient an application for [facility]) as there are no residency requirements (patients only need to show a proof of age document)  MSW advised that patient will need to pay a fare for his trips (a portion of the cost since he will likely qualify under the PWD program), but when patient heard this he declined the application   Patient stated that his eyesight is currently better due to the medication he was given in the hospital, and if his eyelids continue to stay open that he will drive himself, or if they do not, that he will continue to rely on his son for transportation  MSW phoned the [facility] office at [phone] and spoke with [facility] and the [place]  She advised that patients must comply with PATHS  If patients do not comply with PATHS, then they will not be eligible to apply for [facility] and the [facility] stated that she will contact patient to make him aware of this  [facility] and the [facility] was able to verify that the bill from 2020 went to "bad debt"  MSW looked to see what neurology medications patient is taking - it appears that he is currently on Prednisone taper and pyridostigmine  Both medications are available via [facility] with pricing as follows:    Prednisone 20mg, quantity of 33  $8 68 at [facility]    Pyridostigmine 60mg, quantity of 240 for one month:  $39 76 at [facility]  $68 02 at [facility]  $73 69 at [facility] looked into options to help with affordability for pyridostigmine with the following results:    [facility]  [phone]  *MSW attempted to call to inquire if patient will be eligible since he is not  [address]  Their office was closed for Juneteenth  MSW left a message requesting callback  [facility]  *Mestinon Timespan, Mestinon syrup are available but Mestinon 60mg is not available through the PAP    Rx[facility]ist - directs you to Rx Outreach    [facility] (discount mail order pharmacy)-   Pyridostigimine BR 60mg  $17 for up to 30 tabs  $37 for up to 90 tabs

## 2022-06-21 NOTE — TELEPHONE ENCOUNTER
MSW followed-up with [employer] Financial Counselor, this date  Nila Mcardle stated that she did speak with patient and that he is aware that he will have to follow through with PATHS before he can be considered for any [address] [place] [address]  Nila Mcardle provided the phone number for PATHS as [phone]  MSW phoned PATHS this date at [phone] and spoke with Serena Woods ()  Serena Woods stated that last week, a PATHS representative reached out to patient to encourage him to complete the MA piper, but patient refused  Serena Woods stated that patient was told that he needs to provide a letter stating that he accepts responsibility for the the bill  Serena Woods stated that they have not received same yet  Serena Woods stated that she will follow-up with patient this date  MSW will follow-up with Serena Woods on 6/22 to see if there is an update  MSW also attempted to reach [place] [address] again this date at [phone]  A recording stated that all representatives were on the phone, so MSW left another message to inquire if a non-citizen would be eligible for their program  MSW requested callback  Awaiting same

## 2022-06-22 NOTE — TELEPHONE ENCOUNTER
MAYA received a callback from [facility] at [place] [address] - she advised that patient must have a SSN to use their program as same are required by the PAPs  [facility] stated that there is a charge for their medications - one medication is $45 per month and 2-5 meds is $85 per month  Other more affordable options exist for this patient

## 2022-06-24 ENCOUNTER — TELEPHONE (OUTPATIENT)
Dept: NEUROLOGY | Facility: CLINIC | Age: 62
End: 2022-06-24

## 2022-06-24 NOTE — TELEPHONE ENCOUNTER
MSW followed-up with Osvaldo Beal this date at [facility] at [phone] ext [phone]  Osvaldo Beal stated that she did speak with patient on 6/21 and that she explained the process of applying for Medicaid  Per Osvaldo Beal, patient stated that he wanted time to decide if he was willing to proceed and that he will call her back if he wants to apply  Osvaldo Beal stated that patient reported being apprehensive about reapplying, as he applied last year and was denied due to being over income  Osvaldo Beal states that patient is currently reporting that he has no income so she encouraged him to apply  Osvaldo Beal stated that patient also does not want to disclose his information  Osvaldo Beal stated that if patient refuses to apply that he will need to sign a letter stating that he is financially responsible for the bill  Osvaldo Beal stated that she will continue to follow-up with patient  MSW attempted to reach patient at [phone] to see if he has decided to apply for MA  No answer  MSW left a message requesting callback  If patient does not call back by the end of the day this date, MSW will follow-up with him the week of 7/5/22

## 2022-06-24 NOTE — TELEPHONE ENCOUNTER
Pt calling to inform that last week he was discharged from hospital  States that 3 days later his son's gf got COVID  Pt reports that on Wednesday was experiencing HA, runny nose, sore throat, coughing  Pt states that he tried to take Nyquil Wednesday but says it made him choke and couldn't breath  Pt ok now, advised him to not take again  Pt says he got COVID before 12/2020  Pt son retrieving home test  Currently no fever  Pt states he came home with medications from the hospital   - Mestinon 60 mg QID  - Prednisone titration  - Pantoprazole 40 mg daily  - Aspirin 81 mg javi    Advised pt to continue medications as prescribed and to call back with results of COVID home test     Awaiting   Best cb [phone], ok to leave detailed message

## 2022-06-30 NOTE — TELEPHONE ENCOUNTER
MSW phoned patient to discuss transportation for upcoming appt with Neurology  VM has not been set up yet  Unable to leave a vm

## 2022-06-30 NOTE — TELEPHONE ENCOUNTER
Called pt to follow up with his last phone call  Pt states that he tested positive for COVID  Pt states he has no fever or cough  He says he was scheduled for hospital follow up for 7/15/22  Pt says he does not work and has no car, not sure he will make it to appt  He says he is trying to apply for transportation services ([place] [employer])  Pt says they have not called him back  MSW - Do we have any kind of assistance in his area for this type of situation of no transportation to appointments? Thank you! Best aisha [phone], ok to leave detailed message

## 2022-07-01 NOTE — TELEPHONE ENCOUNTER
MSW phoned pt to address transportation needs and to learn if he applied for MA  Pt did not answer  Mailbox has not been set up yet

## 2022-07-05 NOTE — TELEPHONE ENCOUNTER
Attempt #3    MSW phoned pt to address transportation needs and to learn if he applied for MA  Pt did not answer  Mailbox has not been set up yet

## 2022-07-06 NOTE — TELEPHONE ENCOUNTER
MSW attempted to follow-up with Mickey Mayers at [facility] this date at [phone], ext [phone] but MSW was told that Mickey Mayers no longer works for [facility]  Instead, MSW's call was transferred to [facility] extension  No answer  MSW left a message requesting callback to check status of patient's applications for MA via PATHS  Awaiting same  MSW attempted to reach patient at [phone]  No answer and no way to leave a message   (Per patient's son on another encounter, patient's phone is not working at this time )

## 2022-07-06 NOTE — TELEPHONE ENCOUNTER
MSW phoned [phone] and patient's son, Diane Hernández, answered  He advised that patient's phone is not working at this time  MSW inquired if patient will have transportation to his upcoming neurology appt on 7/15  Patient's son stated that he was "not sure"  MSW inquired if patient had applied for the [facility] for transportation, but patient's son was not sure and stated that he will ask patient this evening   MSW will follow-up with patient's son on 7/7 after 10AM per his request

## 2022-07-07 NOTE — TELEPHONE ENCOUNTER
MSW retrieved a voicemail left by Jose Maria Ellison at [facility]  She advised that they do not yet have a completed MA application for this patient  Jose Maria Ellison stated that they have been trying to reach patient without success  MSW attempted to reach patient at [phone]  No answer, call goes right to voicemail and there is a recording that states: "The number that you are trying to call cannot be contacted  message [phone] "    MSW will try to reach patient again on 7/8

## 2022-07-07 NOTE — TELEPHONE ENCOUNTER
MSW phoned patient's son this date at [phone], he was with patient so he put him on the phone  MSW inquired if patient planned to apply for Medicaid with [facility]  Patient again stated that he tried to apply previously but was not able to provide needed documents so he was denied  Patient asked if he could apply for [facility]  MSW advised that he must cooperate with [facility] MA application process and if he is denied, he may then be able to apply for [facility]  MSW then asked if patient has a ride to his upcoming neurology appt on 7/15  Patient stated I don't have insurance, "will they take care of me?"  MSW stated that there would likely be $30 copay for self pay patients  Patient stated that he could not afford same  MSW again encouraged patient to cooperate with [facility] and if denial is issued that he may be eligible for [facility]  Patient then began asking billing questions that this writer could not address  MSW advised that this writer will ask a [facility] Financial Navigator to give him a call to further discuss his options  MSW reached out to [employer], [facility]  Awaiting response

## 2022-07-11 NOTE — TELEPHONE ENCOUNTER
MSW was able to speak with Christiane Cabrera, Financial Navigator this date  Mary Ellen advised that she will call patient to encourage him to apply for Medicaid since he is reporting no income at this time  Mary Ellen stated that patient may be able to qualify for emergency MA, and as long as he cooperates, that he may then be eligible for [facility]  Mary Ellen stated that patient will be expected to pay $30 copay for his upcoming visit, but that he can be billed that amount  Mary Ellen aware that patient can only be reached by way of his son, Florencio Berg, at [phone]  Mary Ellen will update this writer after speaking with patient/son

## 2022-07-12 ENCOUNTER — TELEPHONE (OUTPATIENT)
Dept: NEUROLOGY | Facility: CLINIC | Age: 62
End: 2022-07-12

## 2022-07-12 NOTE — TELEPHONE ENCOUNTER
MSW spoke with Mary Ellen this date - she tried to reach patient/son, but the call went right to voicemail  She will try to reach patient/son again on 7/13  MSW will await update from Lluvia Bai

## 2022-07-13 NOTE — TELEPHONE ENCOUNTER
MSW attempted to reach patient to see if he has a ride to his 7/15 neuro appt in [facility]  No answer at [phone]  MSW left a message requesting callback  Awaiting same  Awaiting update from [facility], [address] [place] [address]

## 2022-07-14 NOTE — TELEPHONE ENCOUNTER
MSW phoned patient this date at [phone]  Patient stated that he does not have a ride to his neurology appt tomorrow  He stated that his son has to work and that he never applied for the TBS as they would have made him pay for rides  MSW obtained permission from Marek Latham, [facility], to provide a round trip [place] ride for patient as a one time courtesy  Patient is aware that the [place] ride is subject to  availability in his area at the time of the appointment, and if no drivers are available, that the appt will need to be rescheduled  Patient is aware that the  will pick him up outside, and cannot assist him with getting out of the house  Patient [place] that [place], but  can provide his license # to the gate worker and they can give him a pass to go through  MSW again encouraged patient to cooperate with PATHS to apply for Medicaid  MSW advised that he may be able to qualify for Emergency Medicaid to cover his hospitalization, and if he cooperates with that process that he may be able to apply for [facility] for more ongoing coverage  MSW did advise that a [facility]  [employer] will be calling him to discuss the processes in more detail  (Mary Ellen was unable to contact patient yesterday, is out of the office this date, and will try to contact patient tomorrow  )  MSW spoke with Ava Rowell, Practice Coordinator, to inquire if patient will need to pay the $30 at the time of his appointment on 7/15  Ava Rowell stated that she will put a note in the system to waive the $30 copay at the time of the appt, but that he will receive a bill for same  MSW phoned [employer] at [phone] and spoke with Junie Babin - he advised that  availability in the [facility] area is hit or miss Junie Babin took down the reservation info, and advised that the request will go out at ~940AM  Junie Babin entered a note in the reservation that  will need to stop at the gate to give license # and obtain pass  Vivian Leon stated that this writer can call back on 7/15 around 945AM to see if a  is available for the trip  Lyft Qualifier Tool completed  MSW updated the [facility] Appointment Desk to reflect the projected [place] transport  MSW will ask the MR's to have patient sign a Lyft Waiver at the time of his appt  MSW notified patient to be ready and waiting for a Lyft by 945AM on 7/15  MSW advised that if no  is available, that this writer will assist him in getting an appt rescheduled

## 2022-07-15 ENCOUNTER — OFFICE VISIT (OUTPATIENT)
Dept: NEUROLOGY | Facility: CLINIC | Age: 62
End: 2022-07-15

## 2022-07-15 VITALS
TEMPERATURE: 99.9 F | BODY MASS INDEX: 41.75 KG/M2 | DIASTOLIC BLOOD PRESSURE: 88 MMHG | SYSTOLIC BLOOD PRESSURE: 130 MMHG | HEART RATE: 61 BPM | WEIGHT: 315 LBS | HEIGHT: 73 IN | OXYGEN SATURATION: 95 %

## 2022-07-15 DIAGNOSIS — I25.10 CORONARY ARTERY DISEASE WITHOUT ANGINA PECTORIS, UNSPECIFIED VESSEL OR LESION TYPE, UNSPECIFIED WHETHER NATIVE OR TRANSPLANTED HEART: ICD-10-CM

## 2022-07-15 DIAGNOSIS — G70.00 MYASTHENIA GRAVIS (HCC): ICD-10-CM

## 2022-07-15 PROCEDURE — 99214 OFFICE O/P EST MOD 30 MIN: CPT | Performed by: PSYCHIATRY & NEUROLOGY

## 2022-07-15 RX ORDER — PYRIDOSTIGMINE BROMIDE 60 MG/1
60 TABLET ORAL 4 TIMES DAILY
Qty: 360 TABLET | Refills: 1 | Status: ON HOLD | OUTPATIENT
Start: 2022-07-15 | End: 2022-10-14 | Stop reason: SDUPTHER

## 2022-07-15 NOTE — PROGRESS NOTES
Gisel Gandara is a 60 y o  male  Chief Complaint   Patient presents with    Myasthenia gravis [facility])        Assessment:  1  Myasthenia gravis, bulbar ([address] )    2  Coronary artery disease without angina pectoris, unspecified vessel or lesion type, unspecified whether native or transplanted heart        Plan:  Continue with Mestinon 60 mg 4 times a day  Referred to Internal Medicine    Discussion:  Patient's hospital records were reviewed, I have advised patient to continue with Mestinon 60 mg 4 times a day, also was advised to see a neuromuscular specialist as patient may need to go on immunosuppression, I did consult our neuromuscular attending Dr Alexandra Anderson, also consulted [facility] as patient does not have a family physician and may need a complete cardiac workup and other routine screenings and also consulted Ophthalmology for his left eye ptosis, patient does not have insurance and I did consult  so that he can get state insurance and help coordinate with all these physicians, he understands the importance of following up with his other physicians and compliance with the medications, to go to the hospital if has any worsening symptoms and call me otherwise to see me back in 3-4 months      Subjective:    HPI     70-year-old male with myasthenia gravis on Mestinon, coronary artery disease and hyperlipidemia who was recently admitted to [facility] on 06/09/2022 with shortness of breath and weakness and bilaterally lower extremity muscle aches with some difficulty in swallowing and bilateral eye ptosis, initial NIF was -40, it was felt that he had myasthenia gravis exacerbation in the setting of medication noncompliance, he was treated with steroid taper and also got 5 day treatment of IVIG, since his discharge he tells me that he has been doing reasonably okay except for his left eye ptosis denies any difficulty in swallowing no difficulty in breathing no muscle aches, patient does not have a family physician and has a history of coronary artery disease and is on aspirin, no side effects to Mestinon, patient is not a great historian, denying any other complaints  Vitals:    07/15/22 1058   BP: 130/88   BP Location: Right arm   Patient Position: Sitting   Cuff Size: Adult   Pulse: 61   Temp: 99 9 °F (37 7 °C)   TempSrc: Temporal   SpO2: 95%   Weight: (!) 153 kg (337 lb)   Height: 6' 1" (1 854 m)       Current Medications    Current Outpatient Medications:     aspirin (ECOTRIN LOW STRENGTH) 81 mg EC tablet, Take 1 tablet (81 mg total) by mouth daily, Disp: 60 tablet, Rfl: 2    polyvinyl alcohol (LIQUIFILM TEARS) 1 4 % ophthalmic solution, Administer 1 drop to both eyes every 3 (three) hours as needed for dry eyes, Disp: 15 mL, Rfl: 0    pyridostigmine (MESTINON) 60 mg tablet, Take 1 tablet (60 mg total) by mouth 4 (four) times a day, Disp: 360 tablet, Rfl: 1    pantoprazole (PROTONIX) 40 mg tablet, Take 1 tablet (40 mg total) by mouth daily in the early morning (Patient not taking: Reported on 7/15/2022), Disp: 30 tablet, Rfl: 0      Allergies  Patient has no known allergies  Past Medical History  Past Medical History:   Diagnosis Date    Coronary artery disease     Heart attack ([facility] [address] )     Hyperlipidemia          Past Surgical History:  Past Surgical History:   Procedure Laterality Date    CORONARY ANGIOPLASTY WITH STENT PLACEMENT           Family History:  History reviewed  No pertinent family history  Social History:   reports that he has never smoked  He has never used smokeless tobacco  He reports previous alcohol use  He reports previous drug use  I have reviewed the past medical history, surgical history, social and family history, current medications, allergies vitals, review of systems, and updated this information as appropriate today  Objective:    Physical Exam    Neurological Exam    GENERAL:  Cooperative in no acute distress   Well-developed and well-nourished    HEAD and NECK   Head is atraumatic normocephalic with no lesions or masses  Neck is supple with full range of motion    CARDIOVASCULAR  Carotid Arteries-no carotid bruits  NEUROLOGIC:  Mental Status-the patient is awake alert and oriented without aphasia or apraxia  Cranial Nerves: Visual fields are full to confrontation    Extraocular movements are full without nystagmus  Except for left ptosis Pupils are 2-1/2 mm and reactive  Face is symmetrical to light touch  Movements of facial expression move symmetrically  Hearing is normal to finger rub bilaterally  Soft palate lifts symmetrically  Shoulder shrug is symmetrical  Tongue is midline without atrophy  Motor: No drift is noted on arm extension  Strength is full in the upper and lower extremities with normal bulk and tone  Sensory: Intact to temperature and vibratory sensation in the upper and lower extremities bilaterally  Cortical function is intact  Coordination: Finger to nose testing is performed accurately  Gait reveals a normal base with symmetrical arm swing  Reflexes 2+ and symmetrical          ROS:  Review of Systems   Constitutional: Negative  Negative for appetite change and fever  HENT: Negative  Negative for hearing loss, tinnitus, trouble swallowing and voice change  Eyes: Negative  Negative for photophobia and pain  Respiratory: Negative  Negative for shortness of breath  Cardiovascular: Negative  Negative for palpitations  Gastrointestinal: Negative  Negative for nausea and vomiting  Endocrine: Negative  Negative for cold intolerance  Genitourinary: Positive for frequency and urgency  Negative for dysuria  Musculoskeletal: Positive for gait problem  Negative for myalgias and neck pain  Skin: Negative  Negative for rash  Neurological: Positive for weakness  Negative for dizziness, tremors, seizures, syncope, facial asymmetry, speech difficulty, light-headedness, numbness and headaches  Hematological: Negative  Does not bruise/bleed easily  Psychiatric/Behavioral: Positive for sleep disturbance  Negative for confusion and hallucinations

## 2022-07-15 NOTE — TELEPHONE ENCOUNTER
was available in patient's area and patient did make it to [place] office for his appointment this date  MSW provided [employer] to Kerri keller, , and she will have patient sign this document/scan it into the Media section of chart  MSW did provide the number to call to request return Lyft ride in the Appointment Desk, but MSW will be available to assist if needed  Yonas Garciasator, will attempt to reach patient later this date to further discuss the processes of applying for MA with [facility]  MSW will follow and be available if needed

## 2022-07-18 NOTE — TELEPHONE ENCOUNTER
LATE ENTRY from 7/15/22:    MSW was made aware by MR at [place], that patient is in need of additional consults/work-up/medication and that he needs assistance in getting insurance  MSW did advise that patient must cooperate with applying for MA with PATHS to be considered for [facility]  MSW advised that a [address] [place] will call patient to explain processes

## 2022-07-21 NOTE — TELEPHONE ENCOUNTER
MAYA spoke with Lino Hyde[address] [place] [address], this date  She has not been successful at reaching patient, but will try again on 7/22  MAYA will also see if the Financial Counselors can attempt to reach patient as well

## 2022-07-22 NOTE — TELEPHONE ENCOUNTER
MSW phoned PATHS at [phone] to inquire if patient is following through with the MA application  MSW had to leave message for [facility] requesting callback  Awaiting same  MSW attempted to reach patient at [phone] regarding following through with PATHS to apply for MA and if denied, that he may be able to qualify for [facility] financial assistance  No answer  MSW did leave a detailed message  MSW advised that this writer will ask the [facility] Counselor/Navigator to give him a call to provide more details about the process for applying for MA/financial assistance in hopes of obtaining covering for ongoing consults/testing that have been recommended  Gloria Ewirelessalexander Financial Navigator, will attempt to reach patient this date  MSW also phoned the [facility] department at [phone] and spoke with Elizabeth Haynes  She advised that counselors have reached out to patient in the past, but she will send a message to [facility] to see if she can try to reach him again to encourage him to apply for MA and if denied then apply for [address] [place] [address]

## 2022-07-25 NOTE — TELEPHONE ENCOUNTER
MSW attempted to reach patient at [phone] regarding following through with PATHS to apply for MA and if denied, that he may be able to qualify for [facility] financial assistance  No answer  MSW did leave a detailed message  MSW advised that this writer will ask the [facility] Counselor/Navigator to give him a call to provide more details about the process for applying for MA/financial assistance in hopes of obtaining covering for ongoing consults/testing that have been recommended

## 2022-07-26 NOTE — TELEPHONE ENCOUNTER
MSW attempted to reach patient at [phone] regarding following through with PATHS to apply for MA and if denied, that he may be able to qualify for [facility] financial assistance  No answer  MSW did leave a detailed message  MSW advised that this writer will ask the [facility] Counselor/Navigator to give him a call to provide more details about the process for applying for MA/financial assistance in hopes of obtaining covering for ongoing consults/testing that have been recommended  Since there have been 3 unsuccessful attempts to reach patient, an "Unable to Reach" letter was placed in the mail this date  MSW will be available should patient call back

## 2022-07-28 NOTE — TELEPHONE ENCOUNTER
MSW phoned PATHS this date at [phone]  MSW spoke with Darrius Trivedi who advised that they have made several attempts to reach patient to encourage him to complete medical assistance application and patient has not responded

## 2022-08-18 ENCOUNTER — TELEPHONE (OUTPATIENT)
Dept: NEUROLOGY | Facility: CLINIC | Age: 62
End: 2022-08-18

## 2022-08-18 NOTE — TELEPHONE ENCOUNTER
MSW was previously in contact with this patient regarding his need for financial assistance/insurance and for transportation to his 7/15 neurology appt  Several times MSW stressed the importance of cooperating with the PATHS process to apply for Medicaid, and if he were to be denied for same, that he may then be able to apply for [address]  MSW aware that PATHS representatives and [address] Counselors/Navigators were trying to reach him as well to re-iterate above  MSW last tried to contact patient on 7/22, 7/25, and 7/26 with no answer and no call back despite messages left  "Unable to Reach" letter was mailed on 7/26  After receiving this outdated message from the clinical team this date, MSW tried to reach patient again at [phone]  No answer  There was a recording in Bengali  MSW will ask Bengali-speaking MSW to listen to translate message

## 2022-08-18 NOTE — TELEPHONE ENCOUNTER
Faroese-speaking  phoned patient at [phone]  The recording states "the number you are trying to call cannot be contacted PTS [id number] "    Communication Consent not on file  MSW will try to reach patient again on 8/19/22

## 2022-08-18 NOTE — TELEPHONE ENCOUNTER
Per last office note-patient does not have insurance and I did consult  so that he can get state insurance and help coordinate with all these physicians    Can you assist

## 2022-08-18 NOTE — TELEPHONE ENCOUNTER
RE: Incorrect referral order used  Received: 1 month ago  MD Octaviano Padilla; P Neurology [facility] Clinical Team 6  Neurology team please let me know what should I do             Previous Messages       ----- Message -----   From: Octaviano Jacob   Sent: 7/15/2022   4:08 PM EDT   To: Gisselle Gan MD, Neurology    Subject: Incorrect referral order used                     Good afternoon,     I believe this referral was entered using the wrong order code  I have denied the current referral so as to remove it from our OP Care Management work queue  Thank you!

## 2022-08-19 NOTE — TELEPHONE ENCOUNTER
MSW phoned patient at [phone]  The recording states "the number you are trying to call cannot be contacted PTS [phone] "    Communication Consent not on file  MSW will try to reach patient again on 8/22/22

## 2022-08-22 NOTE — TELEPHONE ENCOUNTER
MSW phoned patient at [phone]  The recording states "the number you are trying to call cannot be contacted PTS [phone] "    Communication Consent not on file  MSW will try to reach patient again on 8/22/22  Since there have been 3 unsuccessful attempts to reach patient, MSW placed an "Unable to Reach" letter in the mail this date  MSW will be available should patient call back

## 2022-10-09 ENCOUNTER — NURSE TRIAGE (OUTPATIENT)
Dept: OTHER | Facility: OTHER | Age: 62
End: 2022-10-09

## 2022-10-09 NOTE — TELEPHONE ENCOUNTER
Reason for Disposition  • SEVERE difficulty breathing (e g , struggling for each breath, speaks in single words)    Answer Assessment - Initial Assessment Questions  1  DESCRIPTION: "Describe how you are feeling "     Left arm weakness, can't chew or swallow  2  SEVERITY: "How bad is it?"  "Can you stand and walk?"    - MILD - Feels weak or tired, but does not interfere with work, school or normal activities    - [facility] to stand and walk; weakness interferes with work, school, or normal activities    - SEVERE - Unable to stand or walk      severe  3  ONSET:  "When did the weakness begin?"      This moening  4  CAUSE: "What do you think is causing the weakness?"      MG  5  MEDICINES: "Have you recently started a new medicine or had a change in the amount of a medicine?"      no  6   OTHER SYMPTOMS: "Do you have any other symptoms?" (e g , chest pain, fever, cough, SOB, vomiting, diarrhea, bleeding, other areas of pain)      Trouble breathing    Protocols used: WEAKNESS (GENERALIZED) AND FATIGUE-ADULT-

## 2022-10-09 NOTE — TELEPHONE ENCOUNTER
Regarding: Trouble breathing weakness in left arm  ----- Message from Judy Childress sent at 10/9/2022  9:28 AM EDT -----  "I'm having trouble breathing and talking  I have weakness in my left arm   I can't chew or swallow "

## 2022-10-10 ENCOUNTER — APPOINTMENT (OUTPATIENT)
Dept: RADIOLOGY | Facility: HOSPITAL | Age: 62
DRG: 057 | End: 2022-10-10

## 2022-10-10 ENCOUNTER — TELEPHONE (OUTPATIENT)
Dept: NEUROLOGY | Facility: CLINIC | Age: 62
End: 2022-10-10

## 2022-10-10 ENCOUNTER — HOSPITAL ENCOUNTER (INPATIENT)
Facility: HOSPITAL | Age: 62
LOS: 7 days | Discharge: HOME | DRG: 057 | End: 2022-10-17
Attending: EMERGENCY MEDICINE | Admitting: FAMILY MEDICINE

## 2022-10-10 DIAGNOSIS — R06.02 SOB (SHORTNESS OF BREATH): Primary | ICD-10-CM

## 2022-10-10 DIAGNOSIS — G70.01 MYASTHENIA GRAVIS IN CRISIS (HCC): ICD-10-CM

## 2022-10-10 DIAGNOSIS — H02.402 PTOSIS OF LEFT EYELID: ICD-10-CM

## 2022-10-10 DIAGNOSIS — G70.00 MYASTHENIA GRAVIS, BULBAR (HCC): ICD-10-CM

## 2022-10-10 DIAGNOSIS — G70.00 MYASTHENIA GRAVIS (HCC): ICD-10-CM

## 2022-10-10 DIAGNOSIS — H02.403 PTOSIS, BILATERAL: ICD-10-CM

## 2022-10-10 DIAGNOSIS — G70.01 MYASTHENIC CRISIS (HCC): Primary | ICD-10-CM

## 2022-10-10 LAB
ALBUMIN SERPL BCP-MCNC: 3.3 G/DL (ref 3.5–5)
ALP SERPL-CCNC: 104 U/L (ref 46–116)
ALT SERPL W P-5'-P-CCNC: 32 U/L (ref 12–78)
ANION GAP SERPL CALCULATED.3IONS-SCNC: 4 MMOL/L (ref 4–13)
APTT PPP: 28 SECONDS (ref 23–37)
AST SERPL W P-5'-P-CCNC: 24 U/L (ref 5–45)
BASOPHILS # BLD AUTO: 0.03 THOUSANDS/ΜL (ref 0–0.1)
BASOPHILS NFR BLD AUTO: 0 % (ref 0–1)
BILIRUB SERPL-MCNC: 0.33 MG/DL (ref 0.2–1)
BUN SERPL-MCNC: 15 MG/DL (ref 5–25)
CALCIUM ALBUM COR SERPL-MCNC: 9.7 MG/DL (ref 8.3–10.1)
CALCIUM SERPL-MCNC: 9.1 MG/DL (ref 8.3–10.1)
CHLORIDE SERPL-SCNC: 104 MMOL/L (ref 96–108)
CO2 SERPL-SCNC: 30 MMOL/L (ref 21–32)
CREAT SERPL-MCNC: 1.21 MG/DL (ref 0.6–1.3)
EOSINOPHIL # BLD AUTO: 0.29 THOUSAND/ΜL (ref 0–0.61)
EOSINOPHIL NFR BLD AUTO: 4 % (ref 0–6)
ERYTHROCYTE [DISTWIDTH] IN BLOOD BY AUTOMATED COUNT: 13.7 % (ref 11.6–15.1)
FLUAV RNA RESP QL NAA+PROBE: NEGATIVE
FLUBV RNA RESP QL NAA+PROBE: NEGATIVE
GFR SERPL CREATININE-BSD FRML MDRD: 63 ML/MIN/1.73SQ M
GLUCOSE SERPL-MCNC: 107 MG/DL (ref 65–140)
HCT VFR BLD AUTO: 48.1 % (ref 36.5–49.3)
HGB BLD-MCNC: 15.3 G/DL (ref 12–17)
IMM GRANULOCYTES # BLD AUTO: 0.01 THOUSAND/UL (ref 0–0.2)
IMM GRANULOCYTES NFR BLD AUTO: 0 % (ref 0–2)
INR PPP: 1.02 (ref 0.84–1.19)
LYMPHOCYTES # BLD AUTO: 1.54 THOUSANDS/ΜL (ref 0.6–4.47)
LYMPHOCYTES NFR BLD AUTO: 20 % (ref 14–44)
MCH RBC QN AUTO: 27.8 PG (ref 26.8–34.3)
MCHC RBC AUTO-ENTMCNC: 31.8 G/DL (ref 31.4–37.4)
MCV RBC AUTO: 87 FL (ref 82–98)
MONOCYTES # BLD AUTO: 0.7 THOUSAND/ΜL (ref 0.17–1.22)
MONOCYTES NFR BLD AUTO: 9 % (ref 4–12)
NEUTROPHILS # BLD AUTO: 5.04 THOUSANDS/ΜL (ref 1.85–7.62)
NEUTS SEG NFR BLD AUTO: 67 % (ref 43–75)
NRBC BLD AUTO-RTO: 0 /100 WBCS
PLATELET # BLD AUTO: 238 THOUSANDS/UL (ref 149–390)
PMV BLD AUTO: 10.8 FL (ref 8.9–12.7)
POTASSIUM SERPL-SCNC: 4.2 MMOL/L (ref 3.5–5.3)
PROT SERPL-MCNC: 7.4 G/DL (ref 6.4–8.4)
PROTHROMBIN TIME: 13.2 SECONDS (ref 11.6–14.5)
RBC # BLD AUTO: 5.51 MILLION/UL (ref 3.88–5.62)
RSV RNA RESP QL NAA+PROBE: NEGATIVE
SARS-COV-2 RNA RESP QL NAA+PROBE: NEGATIVE
SODIUM SERPL-SCNC: 138 MMOL/L (ref 135–147)
WBC # BLD AUTO: 7.61 THOUSAND/UL (ref 4.31–10.16)

## 2022-10-10 PROCEDURE — 99223 1ST HOSP IP/OBS HIGH 75: CPT

## 2022-10-10 PROCEDURE — 71045 X-RAY EXAM CHEST 1 VIEW: CPT

## 2022-10-10 PROCEDURE — 99285 EMERGENCY DEPT VISIT HI MDM: CPT

## 2022-10-10 PROCEDURE — 85730 THROMBOPLASTIN TIME PARTIAL: CPT | Performed by: EMERGENCY MEDICINE

## 2022-10-10 PROCEDURE — 85610 PROTHROMBIN TIME: CPT | Performed by: EMERGENCY MEDICINE

## 2022-10-10 PROCEDURE — 99285 EMERGENCY DEPT VISIT HI MDM: CPT | Performed by: EMERGENCY MEDICINE

## 2022-10-10 PROCEDURE — 0241U HB NFCT DS VIR RESP RNA 4 TRGT: CPT | Performed by: EMERGENCY MEDICINE

## 2022-10-10 PROCEDURE — 93005 ELECTROCARDIOGRAM TRACING: CPT

## 2022-10-10 PROCEDURE — 94150 VITAL CAPACITY TEST: CPT

## 2022-10-10 PROCEDURE — 85025 COMPLETE CBC W/AUTO DIFF WBC: CPT | Performed by: EMERGENCY MEDICINE

## 2022-10-10 PROCEDURE — 80053 COMPREHEN METABOLIC PANEL: CPT | Performed by: EMERGENCY MEDICINE

## 2022-10-10 PROCEDURE — 36415 COLL VENOUS BLD VENIPUNCTURE: CPT | Performed by: EMERGENCY MEDICINE

## 2022-10-10 RX ORDER — PYRIDOSTIGMINE BROMIDE 60 MG/1
60 TABLET ORAL 4 TIMES DAILY
Status: DISCONTINUED | OUTPATIENT
Start: 2022-10-10 | End: 2022-10-17 | Stop reason: HOSPADM

## 2022-10-10 RX ORDER — PANTOPRAZOLE SODIUM 40 MG/1
40 TABLET, DELAYED RELEASE ORAL
Status: DISCONTINUED | OUTPATIENT
Start: 2022-10-11 | End: 2022-10-17 | Stop reason: HOSPADM

## 2022-10-10 RX ORDER — ENOXAPARIN SODIUM 100 MG/ML
40 INJECTION SUBCUTANEOUS DAILY
Status: DISCONTINUED | OUTPATIENT
Start: 2022-10-11 | End: 2022-10-17 | Stop reason: HOSPADM

## 2022-10-10 RX ORDER — ACETAMINOPHEN 325 MG/1
650 TABLET ORAL EVERY 6 HOURS PRN
Status: DISCONTINUED | OUTPATIENT
Start: 2022-10-10 | End: 2022-10-17 | Stop reason: HOSPADM

## 2022-10-10 RX ORDER — ASPIRIN 81 MG/1
81 TABLET ORAL DAILY
Status: DISCONTINUED | OUTPATIENT
Start: 2022-10-11 | End: 2022-10-17 | Stop reason: HOSPADM

## 2022-10-10 RX ADMIN — SODIUM CHLORIDE 1000 MG: 0.9 INJECTION, SOLUTION INTRAVENOUS at 20:54

## 2022-10-10 RX ADMIN — PYRIDOSTIGMINE BROMIDE 60 MG: 60 TABLET ORAL at 22:24

## 2022-10-10 NOTE — TELEPHONE ENCOUNTER
I this is [facility]  I am calling pt Dorita Crooksjacklyn ortiz [birth year]    Just calling to receive a phone call yesterday to check on him  Right now, he's on the oxygen portable pump  He's having difficulty speaking  His neck is weak  He can barely lift us his arms  He is having trouble chewing and swallowing  In [address], he was getting in an infusion, ivig  He is supposed to get it every after every 3 months  But the last time you weren't, they said they won't do it again  So now it's been 4 months  He's been taking his medication to help relieve it  But just in case give me a call back  [phone]   Thanks

## 2022-10-10 NOTE — TELEPHONE ENCOUNTER
Called pt's son [place] to get more information  Asked son if pt went to ED to get Evaluated as advised by health call  Son reported that pt doesn't want to go to ED  New or worsening weakness? Yes  {both arms,left is worse started yesterday     Difficulty walking? Yes gets tired very quickly     Shortness of breath? Yes 3 days ago    Difficulty speaking? Yes,comes and goes  Medications confirmed as:  Mestinon 60 mg po daily ,son reported that pt has missed 2 doses Satarday due to not having medication  Son stated that he got medication yesterday but only 12 tablets because that is how much he can afford at this time  Last IVIG in June while he went to [facility]   {Son reported that Pulse OX 90% on RA and uses portable O2 on 2L and goes up to 94%   Pt's son was advised that pt needs to  go to ED and get evaluated  Pt's son verbalized understanding and  stated he will take him to [facility]    Order placed in the system    Just FYI

## 2022-10-10 NOTE — TELEPHONE ENCOUNTER
Called pt to follow up and see if he went to the ED  Left message for him to call back and give us an update of his condition  no

## 2022-10-10 NOTE — ED NOTES
Patient requesting to sit in a chair instead of the bed  Recliner moved into room and patient now sitting comfortably       Graciela Haynes RN  10/10/22 7070

## 2022-10-10 NOTE — TELEPHONE ENCOUNTER
Conversation: Extremity Weakness  [employer])  October 9, 2022         PB    9:26 AM  Brandy Byrne contacted Melida Campos, ELINOR     CB    9:32 AM  Note    Regarding: Trouble breathing weakness in left arm  ----- Message from Fidencio Duncan sent at 10/9/2022  9:28 AM EDT -----  "I'm having trouble breathing and talking  I have weakness in my left arm  I can't chew or swallow "                  CB    9:32 AM  Nora Rush, RN contacted Cinthia Chou RN     CB    9:42 AM  Note       Reason for Disposition  • SEVERE difficulty breathing (e g , struggling for each breath, speaks in single words)    Answer Assessment - Initial Assessment Questions  1  DESCRIPTION: "Describe how you are feeling "     Left arm weakness, can't chew or swallow  2  SEVERITY: "How bad is it?"  "Can you stand and walk?"    - MILD - Feels weak or tired, but does not interfere with work, school or normal activities    - [facility] to stand and walk; weakness interferes with work, school, or normal activities    - SEVERE - Unable to stand or walk      severe  3  ONSET:  "When did the weakness begin?"      This moening  4  CAUSE: "What do you think is causing the weakness?"      MG  5  MEDICINES: "Have you recently started a new medicine or had a change in the amount of a medicine?"      no  6   OTHER SYMPTOMS: "Do you have any other symptoms?" (e g , chest pain, fever, cough, SOB, vomiting, diarrhea, bleeding, other areas of pain)      Trouble breathing    Protocols used: WEAKNESS (GENERALIZED) AND FATIGUE-ADULT-

## 2022-10-10 NOTE — ED PROVIDER NOTES
Pt Name: Gladys Velarde  MRN: [id number]  [place] 1960  Age/Sex: 60 y o  male  Date of evaluation: 10/10/2022  PCP: No primary care provider on file  CHIEF COMPLAINT    Chief Complaint   Patient presents with   • Shortness of Breath     Patient c/o shortness of breath that started two days ago  Patient on 2 liters nasal cannula baseline  Patient also c/o neck pain, and bilateral shoulder pain and heaviness  Patient oxygen saturation 93% room air  HPI    60 y o  male presenting with shortness of breath  Patient has a history of myasthenia gravis began having shortness of breath 2 days ago  Of note, patient missed several doses of Mestinon as he ran out  He states that his son bought 12 more tablets which will run out tomorrow, will not get pain until Friday and cannot afford more  Patient states he has resumed his Mestinon since then  He is complaining of pain and weakness in both shoulders, his neck, heaviness in the eyes, and shortness of breath  Is also complaining of weakness in both arms  He is on 2 L of oxygen by nasal cannula baseline  He denies fever, nausea, vomiting, trauma, other symptoms  Last IVIG was approximately 4 months ago  HPI      Past Medical and Surgical History    Past Medical History:   Diagnosis Date   • Coronary artery disease    • Heart attack ([facility] [address] )    • Hyperlipidemia    • Myasthenia gravis ([facility] [address] )        Past Surgical History:   Procedure Laterality Date   • CORONARY ANGIOPLASTY WITH STENT PLACEMENT         History reviewed  No pertinent family history  Social History     Tobacco Use   • Smoking status: Never Smoker   • Smokeless tobacco: Never Used   Vaping Use   • Vaping Use: Never used   Substance Use Topics   • Alcohol use: Not Currently   • Drug use: Not Currently           Allergies    No Known Allergies    Home Medications    Prior to Admission medications    Medication Sig Start Date End Date Taking?  Authorizing Provider   aspirin (ECOTRIN LOW STRENGTH) 81 mg EC tablet Take 1 tablet (81 mg total) by mouth daily 6/14/22   Caitlyn Ansari MD   pantoprazole (PROTONIX) 40 mg tablet Take 1 tablet (40 mg total) by mouth daily in the early morning 6/15/22   Caitlyn Ansari MD   polyvinyl alcohol (LIQUIFILM TEARS) 1 4 % ophthalmic solution Administer 1 drop to both eyes every 3 (three) hours as needed for dry eyes 6/14/22   Caitlyn Ansari MD   pyridostigmine (MESTINON) 60 mg tablet Take 1 tablet (60 mg total) by mouth 4 (four) times a day 7/15/22   Karly Hart MD           Review of Systems    Review of Systems   Constitutional: Negative for appetite change, chills and diaphoresis  HENT: Negative for drooling, facial swelling, trouble swallowing and voice change  Respiratory: Positive for shortness of breath  Negative for apnea and wheezing  Cardiovascular: Negative for chest pain and leg swelling  Gastrointestinal: Negative for abdominal distention, abdominal pain, diarrhea, nausea and vomiting  Genitourinary: Negative for dysuria and urgency  Musculoskeletal: Negative for arthralgias, back pain, gait problem and neck pain  Skin: Negative for color change, rash and wound  Neurological: Positive for weakness  Negative for seizures, speech difficulty and headaches  Psychiatric/Behavioral: Negative for agitation, behavioral problems and dysphoric mood  The patient is not nervous/anxious  All other systems reviewed and negative  Physical Exam      ED Triage Vitals   Temperature Pulse Respirations Blood Pressure SpO2   10/10/22 1826 10/10/22 1826 10/10/22 1826 10/10/22 1826 10/10/22 1826   98 5 °F (36 9 °C) 82 21 (!) 173/92 93 %      Temp src Heart Rate Source Patient Position - Orthostatic VS BP Location FiO2 (%)   -- 10/10/22 1930 -- -- --    Monitor         Pain Score       --                      Physical Exam  Vitals and nursing note reviewed  Constitutional:       Appearance: He is well-developed  He is ill-appearing     HENT: Head: Normocephalic and atraumatic  Right Ear: External ear normal       Left Ear: External ear normal       Nose: Nose normal  No congestion  Mouth/Throat:      Mouth: Mucous membranes are moist       Pharynx: Oropharynx is clear  Eyes:      Conjunctiva/sclera: Conjunctivae normal       Pupils: Pupils are equal, round, and reactive to light  Neck:      Trachea: No tracheal deviation  Cardiovascular:      Rate and Rhythm: Normal rate and regular rhythm  Pulses: Normal pulses  Heart sounds: Normal heart sounds  No murmur heard  Pulmonary:      Effort: Pulmonary effort is normal  No respiratory distress  Breath sounds: Normal breath sounds  No stridor  No wheezing or rales  Abdominal:      General: There is no distension  Palpations: Abdomen is soft  Tenderness: There is no abdominal tenderness  There is no guarding or rebound  Musculoskeletal:         General: No deformity  Normal range of motion  Cervical back: Normal range of motion and neck supple  Skin:     General: Skin is warm and dry  Capillary Refill: Capillary refill takes less than 2 seconds  Findings: No rash  Neurological:      Mental Status: He is alert and oriented to person, place, and time  Cranial Nerves: Cranial nerve deficit present  Motor: Weakness present  Comments: 4/5 strength in both upper extremities, patient able to hold his head up but having some difficulty doing so, bilateral ptosis noted, left greater than right  Extraocular movements of the left eye are impaired  Psychiatric:         Behavior: Behavior normal          Thought Content:  Thought content normal          Judgment: Judgment normal               Diagnostic Results  EKG Interpretation    Rate:  78  BPM  Rhythm:  Normal Sinus Rhythm   Axis:  Normal   Intervals: Normal, no blocks, QTc  446 ms  Q waves:  Q-waves in septal leads  T waves:  Normal   ST segments:  No significant elevations or depressions     Impression:  Normal sinus rhythm with 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 without evidence of acute ischemia or significant arrhythmia      EKG for comparison:  EKG dated 9 June 2022 similar in character with no changes  EKG interpreted by me  Labs:    Results Reviewed     Procedure Component Value Units Date/Time    FLU/RSV/COVID - if FLU/RSV clinically relevant [[id number]] Collected: 10/10/22 1958    Lab Status:  In process Specimen: Nares from Nose Updated: 10/10/22 2000    Comprehensive metabolic panel [[id number]]  (Abnormal) Collected: 10/10/22 1902    Lab Status: Final result Specimen: Blood from Arm, Left Updated: 10/10/22 1927     Sodium 138 mmol/L      Potassium 4 2 mmol/L      Chloride 104 mmol/L      CO2 30 mmol/L      ANION GAP 4 mmol/L      BUN 15 mg/dL      Creatinine 1 21 mg/dL      Glucose 107 mg/dL      Calcium 9 1 mg/dL      Corrected Calcium 9 7 mg/dL      AST 24 U/L      ALT 32 U/L      Alkaline Phosphatase 104 U/L      Total Protein 7 4 g/dL      Albumin 3 3 g/dL      Total Bilirubin 0 33 mg/dL      eGFR 63 ml/min/1 73sq m     Narrative:      [facility] guidelines for Chronic Kidney Disease (CKD):   •  Stage 1 with normal or high GFR (GFR > 90 mL/min/1 73 square meters)  •  Stage 2 Mild CKD (GFR = 60-89 mL/min/1 73 square meters)  •  Stage 3A Moderate CKD (GFR = 45-59 mL/min/1 73 square meters)  •  Stage 3B Moderate CKD (GFR = 30-44 mL/min/1 73 square meters)  •  Stage 4 Severe CKD (GFR = 15-29 mL/min/1 73 square meters)  •  Stage 5 End Stage CKD (GFR <15 mL/min/1 73 square meters)  Note: GFR calculation is accurate only with a steady state creatinine    Protime-INR [[id number]]  (Normal) Collected: 10/10/22 1902    Lab Status: Final result Specimen: Blood from Arm, Left Updated: 10/10/22 1922     Protime 13 2 seconds      INR 1 02    APTT [[id number]]  (Normal) Collected: 10/10/22 1902    Lab Status: Final result Specimen: Blood from Arm, Left Updated: 10/10/22 1922     PTT 28 seconds     CBC and differential [[id number]] Collected: 10/10/22 1902    Lab Status: Final result Specimen: Blood from Arm, Left Updated: 10/10/22 1906     WBC 7 61 Thousand/uL      RBC 5 51 Million/uL      Hemoglobin 15 3 g/dL      Hematocrit 48 1 %      MCV 87 fL      MCH 27 8 pg      MCHC 31 8 g/dL      RDW 13 7 %      MPV 10 8 fL      Platelets 275 Thousands/uL      nRBC 0 /100 WBCs      Neutrophils Relative 67 %      Immat GRANS % 0 %      Lymphocytes Relative 20 %      Monocytes Relative 9 %      Eosinophils Relative 4 %      Basophils Relative 0 %      Neutrophils Absolute 5 04 Thousands/µL      Immature Grans Absolute 0 01 Thousand/uL      Lymphocytes Absolute 1 54 Thousands/µL      Monocytes Absolute 0 70 Thousand/µL      Eosinophils Absolute 0 29 Thousand/µL      Basophils Absolute 0 03 Thousands/µL           All labs reviewed and utilized in the medical decision making process    Radiology:    XR chest 1 view portable    (Results Pending)       All radiology studies independently viewed by me and interpreted by the radiologist     Procedure    Procedures        ED Course of Care and Re-Assessments      On review last admission, patient responded well to methylprednisolone, given 1 g methylprednisolone ER  NIF of 50, appreciate assistance of respiratory therapy      Medications   methylPREDNISolone sodium succinate (Solu-MEDROL) 1,000 mg in sodium chloride 0 9 % 250 mL IVPB (has no administration in time range)           FINAL IMPRESSION    Final diagnoses:   Myasthenic crisis [facility])         DISPOSITION/PLAN    Presentation as above felt most consistent myasthenic crisis  Vital signs reassuring, examination remarkable for findings as above  Overall, suspect crisis secondary to inability to access medications as prescribed, length of time since last IVIG treatment also potential contributor    After initial workup and treatment emergency department, admitted Internal Medicine for further care, hemodynamically stable and comfortable at that time  Time reflects when diagnosis was documented in both MDM as applicable and the Disposition within this note     Time User Action Codes Description Comment    10/10/2022  7:50 PM Darral Salts Add [G70 01] Myasthenic crisis [facility])       ED Disposition     ED Disposition   Admit    Condition   Stable    Date/Time   Mon Oct 10, 2022  8:04 PM    Comment   Case was discussed with CHARISSE and the patient's admission status was agreed to be Admission Status: observation status to the service of Dr Tyson Herrera  Follow-up Information    None           PATIENT REFERRED TO:    No follow-up provider specified  DISCHARGE MEDICATIONS:    Patient's Medications   Discharge Prescriptions    No medications on file       No discharge procedures on file  Shadi Mcfarlane MD    Portions of the record may have been created with voice recognition software  Occasional wrong word or "sound alike" substitutions may have occurred due to the inherent limitations of voice recognition software    Please read the chart carefully and recognize, using context, where substitutions have occurred     Shadi Mcfarlane MD  10/10/22 2018

## 2022-10-10 NOTE — Clinical Note
Case was discussed with CHARISSE and the patient's admission status was agreed to be Admission Status: observation status to the service of Dr Quincy Arceo

## 2022-10-11 LAB
ANION GAP SERPL CALCULATED.3IONS-SCNC: 8 MMOL/L (ref 4–13)
ATRIAL RATE: 78 BPM
BASOPHILS # BLD AUTO: 0 THOUSANDS/ΜL (ref 0–0.1)
BASOPHILS NFR BLD AUTO: 0 % (ref 0–1)
BUN SERPL-MCNC: 13 MG/DL (ref 5–25)
CALCIUM SERPL-MCNC: 9.5 MG/DL (ref 8.3–10.1)
CHLORIDE SERPL-SCNC: 102 MMOL/L (ref 96–108)
CO2 SERPL-SCNC: 26 MMOL/L (ref 21–32)
CREAT SERPL-MCNC: 1.06 MG/DL (ref 0.6–1.3)
EOSINOPHIL # BLD AUTO: 0.01 THOUSAND/ΜL (ref 0–0.61)
EOSINOPHIL NFR BLD AUTO: 0 % (ref 0–6)
ERYTHROCYTE [DISTWIDTH] IN BLOOD BY AUTOMATED COUNT: 13.5 % (ref 11.6–15.1)
GFR SERPL CREATININE-BSD FRML MDRD: 74 ML/MIN/1.73SQ M
GLUCOSE SERPL-MCNC: 174 MG/DL (ref 65–140)
HCT VFR BLD AUTO: 48.9 % (ref 36.5–49.3)
HGB BLD-MCNC: 15.9 G/DL (ref 12–17)
IMM GRANULOCYTES # BLD AUTO: 0.01 THOUSAND/UL (ref 0–0.2)
IMM GRANULOCYTES NFR BLD AUTO: 0 % (ref 0–2)
LYMPHOCYTES # BLD AUTO: 0.78 THOUSANDS/ΜL (ref 0.6–4.47)
LYMPHOCYTES NFR BLD AUTO: 15 % (ref 14–44)
MCH RBC QN AUTO: 28.2 PG (ref 26.8–34.3)
MCHC RBC AUTO-ENTMCNC: 32.5 G/DL (ref 31.4–37.4)
MCV RBC AUTO: 87 FL (ref 82–98)
MONOCYTES # BLD AUTO: 0.03 THOUSAND/ΜL (ref 0.17–1.22)
MONOCYTES NFR BLD AUTO: 1 % (ref 4–12)
NEUTROPHILS # BLD AUTO: 4.34 THOUSANDS/ΜL (ref 1.85–7.62)
NEUTS SEG NFR BLD AUTO: 84 % (ref 43–75)
NRBC BLD AUTO-RTO: 0 /100 WBCS
P AXIS: 46 DEGREES
PLATELET # BLD AUTO: 234 THOUSANDS/UL (ref 149–390)
PMV BLD AUTO: 10.7 FL (ref 8.9–12.7)
POTASSIUM SERPL-SCNC: 4.3 MMOL/L (ref 3.5–5.3)
PR INTERVAL: 154 MS
QRS AXIS: -82 DEGREES
QRSD INTERVAL: 120 MS
QT INTERVAL: 392 MS
QTC INTERVAL: 446 MS
RBC # BLD AUTO: 5.63 MILLION/UL (ref 3.88–5.62)
SODIUM SERPL-SCNC: 136 MMOL/L (ref 135–147)
T WAVE AXIS: 78 DEGREES
VENTRICULAR RATE: 78 BPM
WBC # BLD AUTO: 5.17 THOUSAND/UL (ref 4.31–10.16)

## 2022-10-11 PROCEDURE — 97162 PT EVAL MOD COMPLEX 30 MIN: CPT

## 2022-10-11 PROCEDURE — 80048 BASIC METABOLIC PNL TOTAL CA: CPT

## 2022-10-11 PROCEDURE — 85025 COMPLETE CBC W/AUTO DIFF WBC: CPT

## 2022-10-11 PROCEDURE — 99233 SBSQ HOSP IP/OBS HIGH 50: CPT | Performed by: INTERNAL MEDICINE

## 2022-10-11 PROCEDURE — 97167 OT EVAL HIGH COMPLEX 60 MIN: CPT

## 2022-10-11 PROCEDURE — 99223 1ST HOSP IP/OBS HIGH 75: CPT | Performed by: STUDENT IN AN ORGANIZED HEALTH CARE EDUCATION/TRAINING PROGRAM

## 2022-10-11 PROCEDURE — 97530 THERAPEUTIC ACTIVITIES: CPT

## 2022-10-11 PROCEDURE — 92610 EVALUATE SWALLOWING FUNCTION: CPT

## 2022-10-11 PROCEDURE — 93010 ELECTROCARDIOGRAM REPORT: CPT | Performed by: INTERNAL MEDICINE

## 2022-10-11 PROCEDURE — 97110 THERAPEUTIC EXERCISES: CPT

## 2022-10-11 PROCEDURE — 94150 VITAL CAPACITY TEST: CPT

## 2022-10-11 RX ADMIN — ASPIRIN 81 MG: 81 TABLET, COATED ORAL at 08:52

## 2022-10-11 RX ADMIN — Medication 42.5 G: at 16:14

## 2022-10-11 RX ADMIN — PYRIDOSTIGMINE BROMIDE 60 MG: 60 TABLET ORAL at 21:28

## 2022-10-11 RX ADMIN — ENOXAPARIN SODIUM 40 MG: 40 INJECTION SUBCUTANEOUS at 08:52

## 2022-10-11 RX ADMIN — PYRIDOSTIGMINE BROMIDE 60 MG: 60 TABLET ORAL at 19:31

## 2022-10-11 RX ADMIN — PYRIDOSTIGMINE BROMIDE 60 MG: 60 TABLET ORAL at 12:01

## 2022-10-11 RX ADMIN — PYRIDOSTIGMINE BROMIDE 60 MG: 60 TABLET ORAL at 08:52

## 2022-10-11 RX ADMIN — PANTOPRAZOLE SODIUM 40 MG: 40 TABLET, DELAYED RELEASE ORAL at 05:06

## 2022-10-11 NOTE — PLAN OF CARE
Problem: Potential for Falls  Goal: Patient will remain free of falls  Description: INTERVENTIONS:  - Educate patient/family on patient safety including physical limitations  - Instruct patient to call for assistance with activity   - Consult OT/PT to assist with strengthening/mobility   - Keep Call bell within reach  - Keep bed low and locked with side rails adjusted as appropriate  - Keep care items and personal belongings within reach  - Initiate and maintain comfort rounds  - Make Fall Risk Sign visible to staff  - Offer Toileting every  Hours, in advance of need  - Initiate/Maintain alarm  - Obtain necessary fall risk management equipment:   - Apply yellow socks and bracelet for high fall risk patients  - Consider moving patient to room near nurses station  Outcome: Progressing     Problem: PAIN - ADULT  Goal: Verbalizes/displays adequate comfort level or baseline comfort level  Description: Interventions:  - Encourage patient to monitor pain and request assistance  - Assess pain using appropriate pain scale  - Administer analgesics based on type and severity of pain and evaluate response  - Implement non-pharmacological measures as appropriate and evaluate response  - Consider cultural and social influences on pain and pain management  - Notify physician/advanced practitioner if interventions unsuccessful or patient reports new pain  Outcome: Progressing     Problem: INFECTION - ADULT  Goal: Absence or prevention of progression during hospitalization  Description: INTERVENTIONS:  - Assess and monitor for signs and symptoms of infection  - Monitor lab/diagnostic results  - Monitor all insertion sites, i e  indwelling lines, tubes, and drains  - Monitor endotracheal if appropriate and nasal secretions for changes in amount and color  - [place] appropriate cooling/warming therapies per order  - Administer medications as ordered  - Instruct and encourage patient and family to use good hand hygiene technique  - Identify and instruct in appropriate isolation precautions for identified infection/condition  Outcome: Progressing  Goal: Absence of fever/infection during neutropenic period  Description: INTERVENTIONS:  - Monitor WBC    Outcome: Progressing     Problem: SAFETY ADULT  Goal: Patient will remain free of falls  Description: INTERVENTIONS:  - Educate patient/family on patient safety including physical limitations  - Instruct patient to call for assistance with activity   - Consult OT/PT to assist with strengthening/mobility   - Keep Call bell within reach  - Keep bed low and locked with side rails adjusted as appropriate  - Keep care items and personal belongings within reach  - Initiate and maintain comfort rounds  - Make Fall Risk Sign visible to staff  - Offer Toileting every  Hours, in advance of need  - Initiate/Maintain alarm  - Obtain necessary fall risk management equipment:   - Apply yellow socks and bracelet for high fall risk patients  - Consider moving patient to room near nurses station  Outcome: Progressing  Goal: Maintain or return to baseline ADL function  Description: INTERVENTIONS:  -  Assess patient's ability to carry out ADLs; assess patient's baseline for ADL function and identify physical deficits which impact ability to perform ADLs (bathing, care of mouth/teeth, toileting, grooming, dressing, etc )  - Assess/evaluate cause of self-care deficits   - Assess range of motion  - Assess patient's mobility; develop plan if impaired  - Assess patient's need for assistive devices and provide as appropriate  - Encourage maximum independence but intervene and supervise when necessary  - Involve family in performance of ADLs  - Assess for home care needs following discharge   - Consider OT consult to assist with ADL evaluation and planning for discharge  - Provide patient education as appropriate  Outcome: Progressing  Goal: Maintains/Returns to pre admission functional level  Description: INTERVENTIONS:  - Perform BMAT or MOVE assessment daily    - Set and communicate daily mobility goal to care team and patient/family/caregiver  - Collaborate with rehabilitation services on mobility goals if consulted  - Perform Range of Motion  times a day  - Reposition patient every  hours  - Dangle patient  times a day  - Stand patient  times a day  - Ambulate patient  times a day  - Out of bed to chair  times a day   - Out of bed for meals  times a day  - Out of bed for toileting  - Record patient progress and toleration of activity level   Outcome: Progressing     Problem: DISCHARGE PLANNING  Goal: Discharge to home or other facility with appropriate resources  Description: INTERVENTIONS:  - Identify barriers to discharge w/patient and caregiver  - Arrange for needed discharge resources and transportation as appropriate  - Identify discharge learning needs (meds, wound care, etc )  - Arrange for interpretive services to assist at discharge as needed  - Refer to Case Management Department for coordinating discharge planning if the patient needs post-hospital services based on physician/advanced practitioner order or complex needs related to functional status, cognitive ability, or social support system  Outcome: Progressing     Problem: Knowledge Deficit  Goal: Patient/family/caregiver demonstrates understanding of disease process, treatment plan, medications, and discharge instructions  Description: Complete learning assessment and assess knowledge base  Interventions:  - Provide teaching at level of understanding  - Provide teaching via preferred learning methods  Outcome: Progressing     Problem: Neurological Deficit  Goal: Neurological status is stable or improving  Description: Interventions:  - Monitor and assess patient's level of consciousness, motor function, sensory function, and level of assistance needed for ADLs  - Monitor and report changes from baseline   Collaborate with interdisciplinary team to initiate plan and implement interventions as ordered  - Provide and maintain a safe environment  - Consider seizure precautions  - Consider fall precautions  - Consider aspiration precautions  - Consider bleeding precautions  Outcome: Progressing     Problem: Activity Intolerance/Impaired Mobility  Goal: Mobility/activity is maintained at optimum level for patient  Description: Interventions:  - Assess and monitor patient  barriers to mobility and need for assistive/adaptive devices  - Assess patient's emotional response to limitations  - Collaborate with interdisciplinary team and initiate plans and interventions as ordered  - Encourage independent activity per ability   - Maintain proper body alignment  - Perform active/passive rom as tolerated/ordered  - Plan activities to conserve energy   - Turn patient as appropriate  Outcome: Progressing     Problem: Communication Impairment  Goal: Ability to express needs and understand communication  Description: Assess patient's communication skills and ability to understand information  Patient will demonstrate use of effective communication techniques, alternative methods of communication and understanding even if not able to speak  - Encourage communication and provide alternate methods of communication as needed  - Collaborate with case management/ for discharge needs  - Include patient/family/caregiver in decisions related to communication  Outcome: Progressing     Problem: Potential for Aspiration  Goal: Non-ventilated patient's risk of aspiration is minimized  Description: Assess and monitor vital signs, respiratory status, and labs (WBC)  Monitor for signs of aspiration (tachypnea, cough, rales, wheezing, cyanosis, fever)  - Assess and monitor patient's ability to swallow  - Place patient up in chair to eat if possible    - HOB up at 90 degrees to eat if unable to get patient up into chair   - Supervise patient during oral intake  - Instruct patient/ family to take small bites  - Instruct patient/ family to take small single sips when taking liquids  - Follow patient-specific strategies generated by speech pathologist   Outcome: Progressing  Goal: Ventilated patient's risk of aspiration is minimized  Description: Assess and monitor vital signs, respiratory status, airway cuff pressure, and labs (WBC)  Monitor for signs of aspiration (tachypnea, cough, rales, wheezing, cyanosis, fever)  - Elevate head of bed 30 degrees if patient has tube feeding   - Monitor tube feeding  Outcome: Progressing     Problem: Nutrition  Goal: Nutrition/Hydration status is improving  Description: Monitor and assess patient's nutrition/hydration status for malnutrition (ex- brittle hair, bruises, dry skin, pale skin and conjunctiva, muscle wasting, smooth red tongue, and disorientation)  Collaborate with interdisciplinary team and initiate plan and interventions as ordered  Monitor patient's weight and dietary intake as ordered or per policy  Utilize nutrition screening tool and intervene per policy  Determine patient's food preferences and provide high-protein, high-caloric foods as appropriate  - Assist patient with eating   - Allow adequate time for meals   - Encourage patient to take dietary supplement as ordered  - Collaborate with clinical nutritionist   - Include patient/family/caregiver in decisions related to nutrition  Outcome: Progressing     Problem: Nutrition/Hydration-ADULT  Goal: Nutrient/Hydration intake appropriate for improving, restoring or maintaining nutritional needs  Description: Monitor and assess patient's nutrition/hydration status for malnutrition  Collaborate with interdisciplinary team and initiate plan and interventions as ordered  Monitor patient's weight and dietary intake as ordered or per policy  Utilize nutrition screening tool and intervene as necessary   Determine patient's food preferences and provide high-protein, high-caloric foods as appropriate  INTERVENTIONS:  - Monitor oral intake, urinary output, labs, and treatment plans  - Assess nutrition and hydration status and recommend course of action  - Evaluate amount of meals eaten  - Assist patient with eating if necessary   - Allow adequate time for meals  - Recommend/ encourage appropriate diets, oral nutritional supplements, and vitamin/mineral supplements  - Order, calculate, and assess calorie counts as needed  - Recommend, monitor, and adjust tube feedings and TPN/PPN based on assessed needs  - Assess need for intravenous fluids  - Provide specific nutrition/hydration education as appropriate  - Include patient/family/caregiver in decisions related to nutrition  Outcome: Progressing     Problem: MOBILITY - ADULT  Goal: Maintain or return to baseline ADL function  Description: INTERVENTIONS:  -  Assess patient's ability to carry out ADLs; assess patient's baseline for ADL function and identify physical deficits which impact ability to perform ADLs (bathing, care of mouth/teeth, toileting, grooming, dressing, etc )  - Assess/evaluate cause of self-care deficits   - Assess range of motion  - Assess patient's mobility; develop plan if impaired  - Assess patient's need for assistive devices and provide as appropriate  - Encourage maximum independence but intervene and supervise when necessary  - Involve family in performance of ADLs  - Assess for home care needs following discharge   - Consider OT consult to assist with ADL evaluation and planning for discharge  - Provide patient education as appropriate  Outcome: Progressing  Goal: Maintains/Returns to pre admission functional level  Description: INTERVENTIONS:  - Perform BMAT or MOVE assessment daily    - Set and communicate daily mobility goal to care team and patient/family/caregiver     - Collaborate with rehabilitation services on mobility goals if consulted  - Perform Range of Motion  times a day   - Reposition patient every  hours    - Dangle patient  times a day  - Stand patient  times a day  - Ambulate patient  times a day  - Out of bed to chair  times a day   - Out of bed for meals  times a day  - Out of bed for toileting  - Record patient progress and toleration of activity level   Outcome: Progressing

## 2022-10-11 NOTE — OCCUPATIONAL THERAPY NOTE
Occupational Therapy treatment Note        Patient Name: Gilford Clerk  APBCW'F Date: 10/11/2022       10/11/22 1334   OT Last Visit   OT Visit Date 10/11/22   Note Type   Note Type Treatment   Pain Assessment   Pain Assessment Tool 0-10   Pain Score No Pain   Therapeutic Exercise - ROM   UE-ROM Yes   ROM- Right Upper Extremities   R Shoulder AROM; Flexion;ABduction; Extension;Horizontal ABduction  (swimmer warm up)   RUE ROM Comment educated patient to  complete UE HEP 3x day  ROM - Left Upper Extremities    L Shoulder AROM; Flexion;ABduction; Extension;Horizontal ABduction  (swimmer warm up)   LUE ROM Comment educated patient to  complete UE HEP 3x day  Therapeutic Excerise-Strength   UE Strength Yes   Right Upper Extremity- Strength   R Shoulder Flexion  (lift and hold)   R Weight/Reps/Sets body weight 3x5   RUE Strength Comment educated patient to  complete UE HEP 3x day  Left Upper Extremity-Strength   L Shoulder Flexion   L Weights/Reps/Sets body weight 3x5   LUE Strength Comment educated patient to  complete UE HEP 3x day  Cognition   Overall Cognitive Status WFL   Arousal/Participation Alert; Responsive; Cooperative   Attention Within functional limits   Orientation Level Oriented X4   Memory Within functional limits   Following Commands Follows all commands and directions without difficulty   Vision   Occulomotor Range of motion exercises;Tracking   Double Vision Patching;Education   Additional Activities   Additional Activities Other (Comment)  (energy conservation)   Additional Activities Comments Pt presented with energy conservation tip sheet  During energy conservation education, pt reported he has difficulty bringing his toothbrush to his mouth  Pt expressed understanding of tip sheet and agreed to using some techniques     Activity Tolerance   Activity Tolerance Patient limited by fatigue   Assessment   Assessment Patient participated in Skilled OT session this date with interventions consisting of therapeutic exercise to: increase functional use of BUEs, increase BUE muscle strength ,  therapeutic activities to: increase activity tolerance and increase cardiovascular endurance    Patient agreeable to OT treatment session, upon arrival patient was found seated OOB to Recliner, alert, responsive  and in no apparent distress  Patient was educated on UE HEP for improved AROM and UE strength with focus on shoulder flexion/ abd  Patient also completed ocular motor exercises of horizontal tracking, far and near visual points, therapist provided training in energy conservation techniques and other compensatory strategies/ techniques to facilitate independence and prevent accidents / injury  Patient was very receptive to education provided with return demonstration  Patient requiring verbal cues for safety, verbal cues for correct technique and frequent rest periods  Patient continues to be functioning below baseline level, occupational performance remains limited secondary to factors listed above and increased risk for falls and injury  From OT standpoint, recommendation at time of d/c would be Home with family support and Outpatient OT  Patient to benefit from continued Occupational Therapy treatment while in the hospital to address deficits as defined above and maximize level of functional independence with ADLs and functional mobility  Plan   Treatment Interventions ADL retraining;Visual perceptual retraining;UE strengthening/ROM; Endurance training;Patient/family training;Equipment evaluation/education; Compensatory technique education; Fine motor coordination activities;Continued evaluation; Energy conservation; Activityengagement   Goal Expiration Date 10/25/22   OT Treatment Day 1   OT Frequency 2-3x/wk   Recommendation   OT Discharge Recommendation Home with outpatient rehabilitation   AM-PAC Daily Activity Inpatient   Lower Body Dressing 3   Bathing 3   Toileting 3   Upper Body Dressing 3 Grooming 3   Eating 3   Daily Activity Raw Score 18   Daily Activity Standardized Score (Calc for Raw Score >=11) 38 66   AM-PAC Applied Cognition Inpatient   Following a Speech/Presentation 4   Understanding Ordinary Conversation 4   Taking Medications 4   Remembering Where Things Are Placed or Put Away 4   Remembering List of 4-5 Errands 4   Taking Care of Complicated Tasks 4   Applied Cognition Raw Score 24   Applied Cognition Standardized Score 62 21

## 2022-10-11 NOTE — ASSESSMENT & PLAN NOTE
60 y o  male with myasthenia gravis maintained on mestinon, CAD, HLD and obesity who presented to the ED 10/10/2022 with BUE (L>R) and neck weakness, eye ptosis with blurred/ double vision, and difficulty swallowing since Friday  He ran out of his Mestinon on Saturday and missed several doses  As a result, his symptoms got progressively worse over the weekend and he presented to the ED for ongoing management  NIF -45/VC 2L  MG history:  -He was initially diagnosed with myesthenia gravis 6/2021 following 5 days of visual disturbance with new onset headache  He was treated with ivig x5 days and discharged on prednisone and mestinon 60mg tid for 1 month  He followed up with outpatient neurology 10/21 and was restarted on mestinon    -He was hospitalized 12/2021 with ocular bulbar myesthenia gravis and completed ivig x5 12/6/2021  He was discharged on mestinon 60mg qid and prednisone with bilateral lid weakness and outpatient neurology follow-up   -Last hospital admission for myasthenia exacerbation was 6/9/2022 when he presented with shortness of breath and weakness with some difficulty swallowing and bilateral eye ptosis in the setting of medication noncompliance  Initial NIF was -40  He was treated with steroid taper and 5 days of IVIG    -He was last seen by outpatient neurology 7/15/2022 and advised to continue mestinon 60mg qid and also was advised to see a neuromuscular specialist as patient may need to go on immunosuppression    - Imaging- 10/10/2022 CXR: No acute cardiopulmonary disease   - Pertinent labs: Covid/flu/rsv negative  - Relevant outpatient meds: Mestinon 60mg QID    Plan:  - Continue mestinon 60mg QID  - S/P solumedrol 1gm iv x1  - Continue IVIG at 400mg/kg x 5 days; today is day 3/5  - Monitor NIF/VC:  NIF -40, VC 3 5 this morning (-60/1 5 yesterday)  - will plan for prednisone taper at time of discharge  - PT/OT/ST  - Aspiration precautions  - Monitor neuro exam; notify with any changes  - Medical management and supportive care per primary team  Correction of any metabolic or infectious disturbances  - appreciate case management assistance with insurance/medication/transportation issues  - Discussed immunosuppressive therapy with [facility] including risks and benefits as well as frequent labs monitoring  He is willing to consider this as an option in addition to his mestinon once he has insurance and transportation for required lab work

## 2022-10-11 NOTE — RESPIRATORY THERAPY NOTE
10/10/22 1921   Respiratory Assessment   Assessment Type Assess only   General Appearance Awake; Alert   Respiratory Pattern Normal   Chest Assessment Chest expansion symmetrical   Resp Comments called to complete a NIF on the patient due to possible MG crisis   Pt able to reach a NIF of -50   Additional Assessments   $ Vital Capacity Mech/Peak Flow Yes   NIF 50 cm H2O

## 2022-10-11 NOTE — ASSESSMENT & PLAN NOTE
Body mass index is 44 46 kg/m²      • Recommend incorporating a more whole foods plant-predominant diet along with decreasing consumption of red meats and processed foods  • Per AHA guidelines, recommend moderate-vigorous intensity exercise for 30 minutes a day for 5 days a week or a total of 150 min/week

## 2022-10-11 NOTE — CASE MANAGEMENT
Case Management Progress Note    Patient name Edward Cuff  Location /-18 MRN [id number]  : 1960 Date 10/11/2022       LOS (days): 1  Geometric Mean LOS (GMLOS) (days):   Days to 85 [facility]:        OBJECTIVE:        Current admission status: Inpatient  Preferred Pharmacy:   [facility] [address]  [address] [address] [address] [facility] [address]  [address] [address]  Phone: [phone] Fax: [phone]    Primary Care Provider: No primary care provider on file  Primary Insurance:   Secondary Insurance:     PROGRESS NOTE:  CM acknowledged CM consult(s)  CM unable to review with patient today  Will follow up to review and provide resources/support as needed

## 2022-10-11 NOTE — ASSESSMENT & PLAN NOTE
Reports worsening BL upper extremity weakness and ptosis since Saturday  On Saturday he ran out of his Mestinon and reports it has been around 4 months since his last outpatient IVIG treatment (typically gets every 3 months)  He denies any respiratory complaint or SOB at this time  /71   Pulse 74   Temp 98 1 °F (36 7 °C)   Resp 17   Ht 6' 1" (1 854 m)   SpO2 96%   BMI 44 46 kg/m²     · Typically gets outpatient IVIG [id number]; has been 4month since last  · Reports running out of his mestanon Saturday   · Developed mild BL upper extremity weakness, right eye ptosis, and resulting double vision since Sat  · Denies any SOB or respiratory difficulty; not in respiratory distress    NIF: -45 cm H2O      · CXR wet read w/o acute abnormality  · Not hypoglycemic; no major electrolyte abnormalities   · Lower suspicion of acute infectious process at this time  · Given 1g IV solumedrol in ED; continue 1g IV solumedrol daily; neurology may adjust regime;  Neurology consulted for IVIG consideration  · Continue mestinon 60mg qid   · Maintain close NIF/respiratory monitoring

## 2022-10-11 NOTE — PHYSICAL THERAPY NOTE
Physical Therapy Evaluation     Patient's Name: Asa Gums    Admitting Diagnosis  Myast[facility]ic crisis ([facility] ) [G70 01]  Myasthenia gravis, bulbar ([facility] ) [G70 00]    Problem List  Patient Active Problem List   Diagnosis    Ptosis    CAD (coronary artery disease)    Morbid obesity ([facility] )    Myasthenia gravis, bulbar ([facility] )    Hyperlipidemia    Myasthenia gravis in crisis [facility])     Past Medical History  Past Medical History:   Diagnosis Date    Coronary artery disease     Heart attack ([facility] )     Hyperlipidemia     Myasthenia gravis ([facility] )      Past Surgical History  Past Surgical History:   Procedure Laterality Date    CORONARY ANGIOPLASTY WITH STENT PLACEMENT        10/11/22 0742   PT Last Visit   PT Visit Date 10/11/22   Note Type   Note type Evaluation   Pain Assessment   Pain Assessment Tool 0-10   Pain Score   (no numeric number given)   Pain Location/Orientation Location: Head  (frontal)   Pain Onset/Description Other (Comment)  ("heavy")   Restrictions/Precautions   Weight Bearing Precautions Per Order No   Braces or Orthoses Other (Comment)  (none per patient)   Other Precautions Chair Alarm; Bed Alarm;Multiple lines;O2;Fall Risk   Home Living   Type of [address] [place] [address] One level; Able to live on main level with bedroom/bathroom  (1 CHARLIE)   Bathroom Shower/Tub Walk-in shower   Bathroom Toilet Standard   Bathroom Equipment Shower chair   [facility]; Wheelchair-manual;Wheelchair-electric; Other (Comment)  (home O2)   Additional Comments Pt ambulates wtihout an AD  Prior Function   Level of [place] Independent with ADLs; Independent with functional mobility   Lives With Son   Receives Help From Family   IADLs Family/Friend/Other provides transportation   Falls in the last 6 months 0   Vocational On disability   General   Family/Caregiver Present No   Cognition   Overall Cognitive Status [facility]   Arousal/Participation Alert   Orientation Level Oriented X4   Memory Within functional limits   Following Commands Follows all commands and directions without difficulty   Comments Pt agreeable to PT  Subjective   Subjective "My walking isn't perfect "   RUE Assessment   RUE Assessment   (defer to OT assessment)   LUE Assessment   LUE Assessment   (defer to OT assessment)   RLE Assessment   RLE Assessment WFL   LLE Assessment   LLE Assessment WFL   Light Touch   RLE Light Touch Grossly intact   LLE Light Touch Grossly intact   Bed Mobility   Additional Comments Pt was received seated in recliner in NAD  Transfers   Sit to Stand 5  Supervision   Additional items Assist x 1; Armrests; Verbal cues   Stand to Sit 5  Supervision   Additional items Assist x 1; Armrests; Verbal cues   Ambulation/Elevation   Gait pattern Short stride; Shuffling  (occasional increased lateral trunk sway, but no overt LOB; pt reports that he needs to look at the floor when walking to prevent double vision)   Gait Assistance   (close supervision/CG assist)   Additional items Assist x 1;Verbal cues   Assistive Device None   Distance 200 feet   Stair Management Assistance   (CG assist)   Additional items Assist x 1;Verbal cues   Stair Management Technique One rail R;Step to pattern; Foreward   Number of Stairs 2   Balance   Static Sitting Normal   Dynamic Sitting Good   Static Standing Fair +   Dynamic Standing Fair   Ambulatory Fair   Endurance Deficit   Endurance Deficit Yes   Endurance Deficit Description decreased activity tolerance; pt was c/o of fatigue post ambulation, pt was received on 2L O2 via NC; SpO2 post ambulation 95%   Activity Tolerance   Activity Tolerance Patient tolerated treatment well   Medical Staff Made Aware OT Noe Leroy   Nurse Made Aware Discussed case with ELINOR Huffman   Assessment   Prognosis Good   Problem List Decreased endurance; Impaired balance;Decreased mobility;Obesity   Assessment Pt is 60year old male seen for PT evaluation s/p admit to [facility] on 10/10/2022 with Myasthenia gravis in crisis [facility])  PT consulted to assess pt's functional mobility and d/c needs  Order placed for PT eval and tx, with up with assist order  Comorbidities affecting pt's physical performance at time of assessment include CAD, morbid obesity, bulbar myasthenia gravis, and hyperlipidemia  PTA, pt was independent with all functional mobility without an AD  Pt ambulates household and community distances on all terrain and elevations  Pt resides with his son in a one level house with one step to enter  Personal factors affecting pt at time of IE include step to enter home, inability to navigate level surfaces without external assistance, and inability to perform IADLs  Please find objective findings from PT assessment regarding body systems outlined above with impairments and limitations including impaired balance, decreased endurance, pain, decreased activity tolerance, decreased functional mobility tolerance, and fall risk  The following objective measures performed on IE also reveal limitations: Barthel Index: 80/100, Modified [place]: 3 (moderate disability) and AM-PAC 6-Clicks: 52/91  Pt's clinical presentation is currently evolving seen in pt's presentation of need for ongoing medical management/monitroing, pt is a fall risk, and pt requires cues and assist for safety with functional mobility  Pt to benefit from continued PT tx to address deficits as defined above and maximize level of functional independent mobility and consistency  From PT/mobility standpoint, recommendation at time of d/c would be home with family support and outpatient PT pending progress in order to facilitate return to PLOF     Barriers to Discharge None   Goals   STG Expiration Date 10/21/22   Short Term Goal #1 In 10 days: Perform all bed mobility tasks modified independent to decrease caregiver burden, Perform all transfers modified independent to improve independence, Ambulate > 150 ft  with least restrictive assistive device modified independent w/o LOB and w/ normalized gait pattern 100% of the time, Navigate 2 stairs modified independent without handrail to facilitate return to previous living environment and Increase all balance 1/2 grade to decrease risk for falls   Plan   Treatment/Interventions Elevations; Functional transfer training; Endurance training;Patient/family training;Bed mobility;Gait training;Spoke to nursing;OT   PT Frequency 1-2x/wk   Recommendation   PT Discharge Recommendation Home with outpatient rehabilitation   AM-PAC Basic Mobility Inpatient   Turning in Bed Without Bedrails 4   Lying on Back to Sitting on Edge of Flat Bed 3   Moving Bed to Chair 3   Standing Up From Chair 3   Walk in Room 3   Climb 3-5 Stairs 3   Basic Mobility Inpatient Raw Score 19   Basic Mobility Standardized Score 42 48   Highest Level Of Mobility   JH-HLM Goal 6: Walk 10 steps or more   JH-HLM Achieved 7: Walk 25 feet or more   Modified [place] Scale   Modified [place] Scale 3   Barthel Index   Feeding 10   Bathing 0   Grooming Score 5   Dressing Score 10   Bladder Score 10   Bowels Score 10   Toilet Use Score 10   Transfers (Bed/Chair) Score 10   Mobility (Level Surface) Score 10   Stairs Score 5   Barthel Index Score 80     PT Evaluation Time: 2480-9333  Gonzalez See, PT, DPT

## 2022-10-11 NOTE — SPEECH THERAPY NOTE
Speech Language/Pathology    During OT session patient reported fatigue with use of strategy while eating lunch (alternate solids/liquids) resulting in pocketing of dysphagia 3 solids  Suggest temporary downgrade to dysphagia 2, continue small sips of thins and meds crushed in puree  SLP will f/u within 24 hours  Hold all PO if Dysphagia symptoms continue to worsen    D/w MD Denver Rider, [address], [address] [facility]  Speech-Language Pathologist  [place] #[id number]  [place] #[id number]

## 2022-10-11 NOTE — PLAN OF CARE
Recommendations:   Diet: soft/level 3 diet and thin liquids   Meds: crushed with puree   Feeding assistance: as needed  Frequent Oral care: 2-4x/day  Aspiration precautions and compensatory swallowing strategies: upright posture, only feed when fully alert, slow rate of feeding, small bites/sips and alternating bites and sips  Other Recommendations/ considerations: SLP to follow closely, ensure tolerance to oral diet and adherence to mealtime strategies

## 2022-10-11 NOTE — SPEECH THERAPY NOTE
Speech-Language Pathology Bedside Swallow Evaluation        Patient Name: Keyla Whitney  XTTRQ'F Date: 10/11/2022     Problem List  Principal Problem:    Myasthenia gravis in crisis [facility])  Active Problems:    CAD (coronary artery disease)    Morbid obesity ([facility] )    Myasthenia gravis, bulbar ([facility] )    Hyperlipidemia       Past Medical History  Past Medical History:   Diagnosis Date   • Coronary artery disease    • Heart attack ([facility] )    • Hyperlipidemia    • Myasthenia gravis ([facility] )        Past Surgical History  Past Surgical History:   Procedure Laterality Date   • CORONARY ANGIOPLASTY WITH STENT PLACEMENT         Summary/Impressions:    Pt presents with s/s suggestive of oropharyngeal dysphagia secondary to symptoms of myasthenia gravis  Prolonged mastication w/ decreased efficiency of bolus formation and transfer, specifically w/ hard solids  Multiple swallows on a single bolus (hard solids>soft), benefits from liquid wash  Suspect mismanagement w/ delayed cough/gurgled vocal quality when taking consecutive sips of thins  Rectified w/ small, single sips  Slow paced eating/drinking advised  Pt education provided, understanding verbalized  Additionally, pt reports to chew pills and swallow with liquid following onset of MG symptoms; agreeable to try pills pre-crushed in teaspoon of puree  Recommendations:   Diet: soft/level 3 diet and thin liquids   Meds: crushed with puree   Feeding assistance: as needed  Frequent Oral care: 2-4x/day  Aspiration precautions and compensatory swallowing strategies: upright posture, only feed when fully alert, slow rate of feeding, small bites/sips and alternating bites and sips  Other Recommendations/ considerations: SLP to follow closely, ensure tolerance to oral diet and adherence to mealtime strategies      Current Medical Status  Pt is a 60 y o  male who presented to [facility] with  shortness of breath    Patient has a history of myasthenia gravis began having shortness of breath 2 days ago  Of note, patient missed several doses of Mestinon as he ran out  He states that his son bought 12 more tablets which will run out tomorrow, will not get pain until Friday and cannot afford more  Patient states he has resumed his Mestinon since then  He is complaining of pain and weakness in both shoulders, his neck, heaviness in the eyes, and shortness of breath  Is also complaining of weakness in both arms  He is on 2 L of oxygen by nasal cannula baseline  He denies fever, nausea, vomiting, trauma, other symptoms  Last IVIG was approximately 4 months ago  Patient reports onset of dysphagia symptoms ~3 days prior  Symptoms include globus sensation and occasional cough  Denies SLP intervention/swallowing evaluation in the past      Past medical history:  Please see H&P for details    Special Studies:  10/10/22 CXR:  No acute cardiopulmonary disease      Social/Education/Vocational Hx:  Pt lives with family    Swallow Information   Current Risks for Dysphagia & Aspiration: MG  Current Symptoms/Concerns: coughing with po  Current Diet: regular diet and thin liquids   Baseline Diet: regular diet and thin liquids    Baseline Assessment   Behavior/Cognition: alert  Speech/Language Status: able to participate in conversation  Patient Positioning: upright in chair    Swallow Mechanism Exam   Facial: symmetrical  Labial: WFL  Lingual: WFL  Velum: unable to visualize  Mandible: adequate ROM  Dentition: adequate  Vocal quality:clear/adequate   Volitional Cough: strong/productive   Respiratory: 2L NC    Consistencies Assessed and Performance   Consistencies Administered: thin liquids, puree, soft solids and hard solids    Oral Stage: Adequate bolus containment  Mildly reduced manipulation, formation and prolonged mastication  Improved efficient w/ softer textures  Transfer prolonged  Minimal residue  Pharyngeal Stage: Laryngeal rise noted upon palpation    Swallow initiation variable, slightly delayed  Dbl swallows with hard solids  Delayed coughing, ?able mismanagement of thins by conescutive sip  Esophageal Concerns: none reported      Results Reviewed with: patient, RN and MD     Plan  Will continue to follow for 3-5x    Dysphagia LTG  -Patient will demonstrate safe and effective oral intake (without overt s/s significant oral/pharyngeal dysphagia including s/s penetration or aspiration) for the highest appropriate diet level  Short Term Goals:  -Pt will tolerate soft solids and thin liquid with no significant s/s oral or pharyngeal dysphagia across 1-3 diagnostic session/s     -Pt will tolerate regular solids and thin liquids with no significant s/s oral or pharyngeal dysphagia across 1-3 diagnostic session/s      -Patient will utilize trained compensatory strategies with 80% accuracy to eliminate overt s/s penetration/aspiration with the least restrictive food/liquid consistencies     - Patient and/or caregiver will demonstrate adherence to recommended diet, as well as application of aspiration precautions and compensatory strategies      Discharge recommendation: likely no follow up needed for swallowing depending pt's response to MG meds    Speech Therapy Prognosis   Prognosis: good  Prognosis Considerations: medical status        Sarai [address], [address] [facility]  Speech-Language Pathologist  [place] #[id number]  [place] #[id number]

## 2022-10-11 NOTE — CONSULTS
Consultation - Neurology   Teresia Merlin 60 y o  male MRN: [id number]  Unit/Bed#: -01 Encounter: [id number]      Assessment/Plan     Myasthenia gravis exacerbation, bulbar [facility])  Assessment & Plan  60 y o   male with myasthenia gravis maintained on mestinon, CAD, HLD and obesity who presented to the ED 10/10/2022 with BUE (L>R) and neck weakness, eye ptosis with blurred/ double vision, and difficulty swallowing since Friday  He ran out of his Mestinon on Saturday and missed several doses  As a result, his symptoms got progressively worse over the weekend and he presented to the ED for ongoing management  NIF -45/VC 2L  In regard to his myasthenia history:  -He was initially diagnosed with myesthenia gravis 6/2021 following 5 days of visual disturbance with new onset headache  He was treated with ivig x5 days and discharged on prednisone and mestinon 60mg tid for 1 month  He followed up with outpatient neurology 10/21 and was restarted on mestinon    -He was hospitalized 12/2021 with ocular bulbar myesthenia gravis and completed ivig x5 12/6/2021  He was discharged on mestinon 60mg qid and prednisone with bilateral lid weakness and outpatient neurology follow-up   -Last hospital admission for myasthenia exacerbation was 6/9/2022 when he presented with shortness of breath and weakness with some difficulty swallowing and bilateral eye ptosis in the setting of medication noncompliance  Initial NIF was -40  He was treated with steroid taper and 5 days of IVIG  -He was last seen by outpatient neurology 7/15/2022 and advised to continue mestinon 60mg qid and also was advised to see a neuromuscular specialist as patient may need to go on immunosuppression  Imaging:  10/10/2022 CXR: IMPRESSION:  No acute cardiopulmonary disease    Pertinent labs:  Covid/flu/rsv negative  Relevant outpatient meds:  Mestinon 60mg QID  Recommendations:  Continue mestinon 60mg QID  S/P solumedrol 1gm iv x1  Patient reported some brief improvement in his symptoms following steroids however, will initiate ivig at 400mg/kg x5 days  Monitor NIF/VC q8hr for now  PT/OT/ST  Medical management and correction of any metabolic or infectious disturbances per primary service   Consider immunomodulatory therapy-defer to neuromuscular specialist   Patient will need to outpatient follow up with neuromuscular specialist in 4-6 weeks  Case management consulted       Patient will need to follow up with neuromuscular specialist outpatient in 4-6 weeks  Consider immunomodulatory therapy- defer to neuromuscular specialist      Reason for Consult / Principal Problem: "myasthenia crisis"   HPI: Harish Irvin is a 60 y o  male with myasthenia gravis maintained on mestinon, CAD, HLD and obesity who presented to the ED 10/10/2022 with BUE (L>R) and neck weakness, eye ptosis with blurred/ double vision, and difficulty swallowing since Friday  On Saturday he only took the last 2 of his Mestinon  He was able to get his prescription refilled for 12 pills on Sunday and continued as previously prescribed, however his symptoms got progressively worse on Saturday into Sunday  He also reported that it has been approximately 4 months since his last outpatient IVIG treatment (typically gets every 3 months)  He denies any respiratory complaint or SOB at this time  He was given solumedrol 1gm iv x1, started on his outpatient mestinon, and admitted for ongoing management  He was evaluated by SLP who recommended a dysphagia diet  NIF/VC-45/2L this morning      Inpatient consult to Neurology  Consult performed by: MARCELLA Carty  Consult ordered by: Manuel Kraft PA-C        Review of Systems   HENT: Positive for drooling and trouble swallowing  Eyes: Positive for visual disturbance  Respiratory: Positive for shortness of breath (with ambulation)  Neurological: Positive for weakness  Negative for headaches     see HPI     Historical Information   Past Medical History:   Diagnosis Date   • Coronary artery disease    • Heart attack ([facility] [place] [address] )    • Hyperlipidemia    • Myasthenia gravis ([facility] [place] [address] )      Past Surgical History:   Procedure Laterality Date   • CORONARY ANGIOPLASTY WITH STENT PLACEMENT       Social History   Social History     Substance and Sexual Activity   Alcohol Use Not Currently     Social History     Substance and Sexual Activity   Drug Use Not Currently     E-Cigarette/Vaping   • E-Cigarette Use Never User      E-Cigarette/Vaping Substances   • Nicotine No    • THC No    • CBD No    • Flavoring No    • Other No    • Unknown No      Social History     Tobacco Use   Smoking Status Never Smoker   Smokeless Tobacco Never Used     Family History: History reviewed  No pertinent family history  Review of previous medical records was completed  Meds/Allergies   current meds:   Current Facility-Administered Medications   Medication Dose Route Frequency   • acetaminophen (TYLENOL) tablet 650 mg  650 mg Oral Q6H PRN   • aspirin (ECOTRIN LOW STRENGTH) EC tablet 81 mg  81 mg Oral Daily   • enoxaparin (LOVENOX) subcutaneous injection 40 mg  40 mg Subcutaneous Daily   • glycerin-hypromellose- (ARTIFICIAL TEARS) ophthalmic solution 1 drop  1 drop Both Eyes Q4H PRN   • immune globulin, human (GAMUNEX-C) 42 5 g 425 mL IV soln  400 mg/kg (Adjusted) Intravenous Q24H   • pantoprazole (PROTONIX) EC tablet 40 mg  40 mg Oral Early Morning   • pyridostigmine (MESTINON) tablet 60 mg  60 mg Oral 4x Daily    and PTA meds:   Prior to Admission Medications   Prescriptions Last Dose Informant Patient Reported?  Taking?   aspirin (ECOTRIN LOW STRENGTH) 81 mg EC tablet   No No   Sig: Take 1 tablet (81 mg total) by mouth daily   pantoprazole (PROTONIX) 40 mg tablet   No No   Sig: Take 1 tablet (40 mg total) by mouth daily in the early morning   polyvinyl alcohol (LIQUIFILM TEARS) 1 4 % ophthalmic solution   No No   Sig: Administer 1 drop to both eyes every 3 (three) hours as needed for dry eyes   pyridostigmine (MESTINON) 60 mg tablet   No No   Sig: Take 1 tablet (60 mg total) by mouth 4 (four) times a day      Facility-Administered Medications: None       No Known Allergies    Objective   Vitals:Blood pressure 138/70, pulse 74, temperature 98 2 °F (36 8 °C), temperature source Oral, resp  rate 17, height 6' 1" (1 854 m), SpO2 96 %  ,Body mass index is 44 46 kg/m²  No intake or output data in the 24 hours ending 10/11/22 1519    Invasive Devices: Invasive Devices  Report    Peripheral Intravenous Line  Duration           Peripheral IV 10/10/22 Left Antecubital <1 day                Physical Exam  Vitals reviewed  Constitutional:       General: He is not in acute distress  HENT:      Head: Normocephalic and atraumatic  Eyes:      Pupils: Pupils are equal, round, and reactive to light  Pulmonary:      Effort: Pulmonary effort is normal    Skin:     General: Skin is warm and dry  Neurological:      Mental Status: He is alert and oriented to person, place, and time  Coordination: Finger-Nose-Finger Test normal       Deep Tendon Reflexes: Strength normal    Psychiatric:         Speech: Speech normal        Neurologic Exam     Mental Status   Oriented to person, place, and time  Attention: normal  Concentration: normal    Speech: speech is normal   Level of consciousness: alert    Cranial Nerves     CN II   Right visual field deficit: none  Left visual field deficit: none     CN III, IV, VI   Pupils are equal, round, and reactive to light    Nystagmus: none   Ophthalmoparesis: right abduction and left abduction    CN V   Right facial sensation deficit: none  Left facial sensation deficit: none    CN IX, X   Palate: symmetric    CN XI   Right sternocleidomastoid strength: normal  Right trapezius strength: normal    CN XII   Tongue deviation: none  ophthalmoparesis noted with right abduction and left abduction as well up upgaze  bilateral eye ptosis     Motor Exam     Strength   Strength 5/5 throughout  Sensory Exam   Light touch normal    Right leg vibration: decreased from knee  Left leg vibration: decreased from knee  Pinprick normal      Gait, Coordination, and Reflexes     Coordination   Finger to nose coordination: normal    Tremor   Resting tremor: absent    Reflexes   Right plantar: normal  Left plantar: normal      Lab Results:   CBC:   Results from last 7 days   Lab Units 10/11/22  0419 10/10/22  1902   WBC Thousand/uL 5 17 7 61   RBC Million/uL 5 63* 5 51   HEMOGLOBIN g/dL 15 9 15 3   HEMATOCRIT % 48 9 48 1   MCV fL 87 87   PLATELETS Thousands/uL 234 238   , BMP/CMP:   Results from last 7 days   Lab Units 10/11/22  0419 10/10/22  1902   SODIUM mmol/L 136 138   POTASSIUM mmol/L 4 3 4 2   CHLORIDE mmol/L 102 104   CO2 mmol/L 26 30   BUN mg/dL 13 15   CREATININE mg/dL 1 06 1 21   CALCIUM mg/dL 9 5 9 1   AST U/L  --  24   ALT U/L  --  32   ALK PHOS U/L  --  104   EGFR ml/min/1 73sq m 74 63   , Vitamin B12:   , HgBA1C:   , TSH:   , Coagulation:   Results from last 7 days   Lab Units 10/10/22  1902   INR  1 02     Imaging Studies: I have personally reviewed pertinent reports  and I have personally reviewed pertinent films in PACS     EKG, Pathology, and Other Studies: I have personally reviewed pertinent reports  Code Status: Level 1 - Full Code    Counseling / Coordination of Care  Assessment, images, and plan reviewed with Dr Parr   Plan discussed with patient and primary service

## 2022-10-11 NOTE — PROGRESS NOTES
[address] [facility]  Progress Note Ruth Hanna 1960, 60 y o  male MRN: [id number]  Unit/Bed#: MS Miranda-01 Encounter: [id number]  Primary Care Provider: No primary care provider on file  Date and time admitted to hospital: 10/10/2022  6:21 PM    * Myasthenia gravis in crisis [facility])  Assessment & Plan  Reports worsening BL upper extremity weakness and ptosis since Saturday  On Saturday he ran out of his Mestinon and reports it has been around 4 months since his last outpatient IVIG treatment (typically gets every 3 months)  He denies any respiratory complaint or SOB at this time  /71   Pulse 74   Temp 98 1 °F (36 7 °C)   Resp 17   Ht 6' 1" (1 854 m)   SpO2 96%   BMI 44 46 kg/m²     · Typically gets outpatient IVIG [id number]; has been 4month since last  · Reports running out of his mestanon Saturday   · Developed mild BL upper extremity weakness, right eye ptosis, and resulting double vision since Sat  · Denies any SOB or respiratory difficulty; not in respiratory distress    NIF: -45 cm H2O      · CXR wet read w/o acute abnormality  · Not hypoglycemic; no major electrolyte abnormalities   · Lower suspicion of acute infectious process at this time  · Given 1g IV solumedrol in ED; continue 1g IV solumedrol daily; neurology may adjust regime; Neurology consulted for IVIG consideration  · Continue mestinon 60mg qid   · Maintain close NIF/respiratory monitoring     Hyperlipidemia  Assessment & Plan  Lab Results   Component Value Date    CHOLESTEROL 198 12/02/2021    TRIG 118 12/02/2021    HDL 46 12/02/2021    [facility] 128 (H) 12/02/2021       · Not currently on outpatient statin    Myasthenia gravis exacerbation, bulbar ([address] )  Assessment & Plan  · In suspected crisis as above     Morbid obesity ([facility] [address] )  Assessment & Plan  Body mass index is 44 46 kg/m²      • Recommend incorporating a more whole foods plant-predominant diet along with decreasing consumption of red meats and processed foods  • Per AHA guidelines, recommend moderate-vigorous intensity exercise for 30 minutes a day for 5 days a week or a total of 150 min/week      CAD (coronary artery disease)  Assessment & Plan  · Denies chest pain  · Continue home daily ASA       VTE Pharmacologic Prophylaxis: VTE Score: 4 Moderate Risk (Score 3-4) - Pharmacological DVT Prophylaxis Ordered: enoxaparin (Lovenox)  Patient Centered Rounds: I performed bedside rounds with nursing staff today  Discussions with Specialists or Other Care Team Provider:  Neurology    Education and Discussions with Family / Patient: Patient declined call to   Time Spent for Care: 70 minutes  More than 50% of total time spent on counseling and coordination of care as described above  Current Length of Stay: 1 day(s)  Current Patient Status: Inpatient   Certification Statement: The patient will continue to require additional inpatient hospital stay due to IVIG/neurology workup  Discharge Plan: Anticipate discharge in 24-48 hrs to home  Code Status: Level 1 - Full Code    Subjective: Today, patient continues to endorse complaints of upper extremity weakness  No acute respiratory distress at this time  No chest pain, shortness a breath, cough  Objective:     Vitals:   Temp (24hrs), Av 4 °F (36 9 °C), Min:98 1 °F (36 7 °C), Max:98 6 °F (37 °C)    Temp:  [98 1 °F (36 7 °C)-98 6 °F (37 °C)] 98 1 °F (36 7 °C)  HR:  [62-82] 74  Resp:  [17-24] 17  BP: (124-173)/(70-92) 124/71  SpO2:  [93 %-99 %] 96 %  Body mass index is 44 46 kg/m²  Input and Output Summary (last 24 hours):   No intake or output data in the 24 hours ending 10/11/22 1702    Physical Exam:   Physical Exam  Vitals and nursing note reviewed  Constitutional:       General: He is not in acute distress  Appearance: Normal appearance  HENT:      Head: Normocephalic and atraumatic        Right Ear: External ear normal       Left Ear: External ear normal       Nose: Nose normal       Mouth/Throat:      Mouth: Mucous membranes are moist    Eyes:      Pupils: Pupils are equal, round, and reactive to light  Comments: Ptosis is noted   Cardiovascular:      Rate and Rhythm: Normal rate and regular rhythm  Pulses: Normal pulses  Heart sounds: Normal heart sounds  No murmur heard  Pulmonary:      Effort: Pulmonary effort is normal  No respiratory distress  Breath sounds: Normal breath sounds  No wheezing or rales  Chest:      Chest wall: No tenderness  Abdominal:      General: Bowel sounds are normal  There is no distension  Palpations: Abdomen is soft  There is no mass  Tenderness: There is no abdominal tenderness  There is no guarding  Musculoskeletal:         General: No swelling or tenderness  Cervical back: Normal range of motion and neck supple  No rigidity or tenderness  Right lower leg: No edema  Left lower leg: No edema  Skin:     General: Skin is warm and dry  Capillary Refill: Capillary refill takes less than 2 seconds  Findings: No lesion or rash  Neurological:      General: No focal deficit present  Mental Status: He is alert and oriented to person, place, and time     Psychiatric:         Mood and Affect: Mood normal           Additional Data:     Labs:  Results from last 7 days   Lab Units 10/11/22  0419   WBC Thousand/uL 5 17   HEMOGLOBIN g/dL 15 9   HEMATOCRIT % 48 9   PLATELETS Thousands/uL 234   NEUTROS PCT % 84*   LYMPHS PCT % 15   MONOS PCT % 1*   EOS PCT % 0     Results from last 7 days   Lab Units 10/11/22  0419 10/10/22  1902   SODIUM mmol/L 136 138   POTASSIUM mmol/L 4 3 4 2   CHLORIDE mmol/L 102 104   CO2 mmol/L 26 30   BUN mg/dL 13 15   CREATININE mg/dL 1 06 1 21   ANION GAP mmol/L 8 4   CALCIUM mg/dL 9 5 9 1   ALBUMIN g/dL  --  3 3*   TOTAL BILIRUBIN mg/dL  --  0 33   ALK PHOS U/L  --  104   ALT U/L  --  32   AST U/L  --  24   GLUCOSE RANDOM mg/dL 174* 107     Results from last 7 days   Lab Units 10/10/22  1902   INR  1 02                   Lines/Drains:  Invasive Devices  Report    Peripheral Intravenous Line  Duration           Peripheral IV 10/10/22 Left Antecubital <1 day                      Imaging: Reviewed radiology reports from this admission including: procedure reports    Recent Cultures (last 7 days):         Last 24 Hours Medication List:   Current Facility-Administered Medications   Medication Dose Route Frequency Provider Last Rate   • acetaminophen  650 mg Oral Q6H PRN Estrada Spivey PA-C     • aspirin  81 mg Oral Daily Estrada Spivey PA-C     • enoxaparin  40 mg Subcutaneous Daily Estrada Spivey PA-C     • glycerin-hypromellose-  1 drop Both Eyes Q4H PRN Estrada Spivey PA-C     • immune globulin, human  400 mg/kg (Adjusted) Intravenous Q24H MARCELLA Thompson     • pantoprazole  40 mg Oral Early Morning Estrada Spivey PA-C     • pyridostigmine  60 mg Oral 4x Daily Estrada Spivey PA-C          Today, Patient Was Seen By: Robert Lozano    **Please Note: This note may have been constructed using a voice recognition system  **

## 2022-10-11 NOTE — ASSESSMENT & PLAN NOTE
Lab Results   Component Value Date    CHOLESTEROL 198 12/02/2021    TRIG 118 12/02/2021    HDL 46 12/02/2021    LDLCALC 128 (H) 12/02/2021       · Not currently on outpatient statin

## 2022-10-11 NOTE — RESPIRATORY THERAPY NOTE
10/11/22 1704   Additional Assessments   SpO2 95 %   $ Vital Capacity Mech/Peak Flow Yes   Position Sitting   Vital Capacity 1 5 L   NIF -60 cm H2O

## 2022-10-11 NOTE — PLAN OF CARE
Problem: OCCUPATIONAL THERAPY ADULT  Goal: Performs self-care activities at highest level of function for planned discharge setting  See evaluation for individualized goals  Description: Treatment Interventions: ADL retraining, Visual perceptual retraining, UE strengthening/ROM, Endurance training, Patient/family training, Equipment evaluation/education, Compensatory technique education, Fine motor coordination activities, Continued evaluation, Energy conservation, Activityengagement          See flowsheet documentation for full assessment, interventions and recommendations  10/11/2022 1906 by Stephanie Turner OT  Note: Limitation: Decreased UE ROM, Decreased UE strength, Decreased endurance, Decreased high-level ADLs, Decreased fine motor control  Prognosis: Good  Assessment: Patient is a 60 y o  male seen for OT evaluation s/p admit to [id number] [facility] on 10/10/2022 w/Myasthenia gravis in crisis [facility])  Commorbidities affecting patient's functional performance at time of assessment include:  CAD, morbid obesity, bulbar myasthenia gravis, and hyperlipidemia  Orders placed for OT evaluation and treatment  Performed at least two patient identifiers during session including name and wristband  Prior to admission, Pt lives in one story house with son and 1 CHARLIE  Pt reports having a walk-in shower with a shower chair but no grab bars around shower or toilet  Pt reports having AD at home but not needing to use them  Pt reports independence with ADLs/needs assistance with transportation  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  Personal factors affecting patient at time of initial evaluation include: limited caregiver support, difficulty performing ADLs and difficulty performing IADLs   Upon evaluation, patient requires supervision assist for UB ADLs, minimal  assist for LB ADLs, transfers and functional ambulation in room and bathroom with supervision and contact guard assist, with no AD  Occupational performance is affected by the following deficits: in hand manipulation deficit with impaired reach, grasp and coordination, limited active ROM, decreased muscle strength, degenerative arthritic joint changes, impaired fine motor coordination, dynamic sit/ stand balance deficit with poor standing tolerance time for self care and functional mobility, decreased activity tolerance and increased pain  Visual deficits with involuntary eye lid closing and frequent episodes of double vision  Patient to benefit from continued Occupational Therapy treatment while in the hospital to address deficits as defined above and maximize level of functional independence with ADLs and functional mobility  Occupational Performance areas to address include: bathing/ shower, dressing, toilet hygiene, transfer to all surfaces, functional ambulation, functional mobility, emergency response, medication routine/ management, IADLs: safety procedures, IADLs: meal prep/ clean up and Leisure Participation  From OT standpoint, recommendation at time of d/c would be Home with family support and Outpatient OT    Recommendation: Speech consult  OT Discharge Recommendation: Home with outpatient rehabilitation

## 2022-10-11 NOTE — ASSESSMENT & PLAN NOTE
Reports worsening BL upper extremity weakness and prosis since Saturday  On Saturday he ran out of his Mestinon and reports it has been around 4 months since his last outpatient IVIG treatment (typically gets every 3 months)  He denies any respiratory complaint or SOB at this time  /76   Pulse 64   Temp 98 5 °F (36 9 °C)   Resp 21   SpO2 97%     · Typically gets outpatient IVIG [id number]; has been 4month since last  · Ran out of his mestanon Saturday   · Developed mild BL upper extremity weakness, right eye ptosis, and resulting double vision since Sat    · Denies any SOB or respiratory difficulty; not in respiratory distress  · NIF -50 on admission  · CXR wet read w/o acute abnormality  · Not hypoglycemic; no major electrolyte abnormalities   · Lower suspicion of acute infectious process at this time  · Given 1g IV solumedrol in ED; continue 1g IV solumedrol daily  · Continue outpatient mestinon 60mg qid   · Maintain close NIF/respiratory monitoring overnight   · Neurology consulted for IVIG consideration

## 2022-10-11 NOTE — OCCUPATIONAL THERAPY NOTE
Occupational Therapy Evaluation        Patient Name: Violette Church  JZHPV'N Date: 10/11/2022       10/11/22 0807   OT Last Visit   OT Visit Date 10/11/22   Note Type   Note type Evaluation   Additional Comments Pt received with head feeling heavy upon arrival   Pain Assessment   Pain Assessment Tool 0-10   Pain Score   (no numeric number given)   Pain Location/Orientation Location: Head  (frontal)   Pain Onset/Description Other (Comment)  ("heavy")   Restrictions/Precautions   Weight Bearing Precautions Per Order No   Braces or Orthoses Other (Comment)  (none per patient)   Other Precautions Chair Alarm; Bed Alarm;Multiple lines;O2;Fall Risk   Home Living   Type of [address] [place] [address] One level; Able to live on main level with bedroom/bathroom  (1 CHARLIE)   Bathroom Shower/Tub Walk-in shower   Bathroom Toilet Standard   Bathroom Equipment Shower chair   [facility]; Wheelchair-manual;Wheelchair-electric; Other (Comment)  (home O2)   Additional Comments Pt ambulates without AD at home   Prior Function   Level of [place] Independent with ADLs; Independent with functional mobility   Lives With Son   Receives Help From Family   IADLs Family/Friend/Other provides transportation   Falls in the last 6 months 0   Vocational On disability   IADLs Family/Friend/Other provides transportation   Lifestyle   Autonomy Pt lives in one story house with son and 1 CHARLIE  Pt reports having a walk-in shower with a shower chair but no grab bars around shower or toilet  Pt reports having AD at home but not needing to use them  Pt reports independence with ADLs/needs assistance with transportation  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     Reciprocal Relationships supportive family   Service to Others former    Intrinsic Gratification perry maria   General   Family/Caregiver Present No   ADL   Where Assessed Chair   Equipment Provided   (no AD provided)   Eating Assistance 7  Independent   Grooming Assistance 6  Modified Independent   UB Bathing Assistance 5  Supervision/Setup   UB Bathing Deficit Setup;Supervision/safety; Increased time to complete   LB Bathing Assistance 4  Minimal Assistance   LB Bathing Deficit Increased time to complete;Setup;Supervision/safety   UB Dressing Assistance 5  Supervision/Setup   UB Dressing Deficit Setup;Supervision/safety; Increased time to complete   LB Dressing Assistance 4  Minimal Assistance   LB Dressing Deficit Setup;Supervision/safety; Increased time to complete   Toileting Assistance  5  Supervision/Setup   Toileting Deficit Increased time to complete   Functional Assistance 5  Supervision/Setup   Functional Deficit Setup;Verbal cueing;Supervision/safety   Bed Mobility   Additional Comments Pt received seated in recliner   Transfers   Sit to Stand 5  Supervision   Additional items Assist x 1; Armrests; Verbal cues   Stand to Sit 5  Supervision   Additional items Assist x 1; Armrests; Verbal cues   Functional Mobility   Functional Mobility 5  Supervision   Additional Comments Pt ambulated with no AD, contact guard assist of 1 for supervision and verbal cues for safety, presents with frequent episodes of double vision symptoms   Additional items   (no AD)   Balance   Static Sitting Normal   Dynamic Sitting Good   Static Standing Fair +   Dynamic Standing Fair   Ambulatory Fair   Activity Tolerance   Activity Tolerance Patient tolerated treatment well   Nurse Made Aware Discussed with ELINOR Lin   RUE Assessment   RUE Assessment X  (defer to OT assessment)   RUE Overall AROM   R Shoulder Flexion 95 degrees   R Shoulder Extension WNL   R Shoulder ABduction WNL   R Shoulder ADduction WNL   R Elbow Flexion WNL   R Elbow Extension WNL   RUE Strength   RUE Overall Strength Deficits   R Shoulder Flexion 4-/5   R Elbow Flexion 4+/5   R Elbow Extension 4+/5   LUE Assessment   LUE Assessment X   LUE Overall AROM L Shoulder Flexion 95 degrees  (L shoulder Extension WNL)   L Shoulder ABduction WNL   L Shoulder ADduction WNL   L Elbow Flexion WNL   L Elbow Extension WNL   LUE Strength   LUE Overall Strength Deficits   L Shoulder Flexion 4-/5   L Elbow Flexion 4/5   L Elbow Extension 4/5   Hand Function   Gross Motor Coordination Functional   Fine Motor Coordination Impaired  (complains of left hand difficulty with three jaw dinah, "fingers stay extended")   Sensation   Light Touch No apparent deficits  (BUEs)   Vision-Basic Assessment   Current Vision Does not wear glasses   Patient Visual Report Other (Comment); Difficulty maintaining concentration with focus; Eye fatigue/eye pain/headache  (Pt reports some double vision symptoms on occasion)   Psychosocial   Psychosocial (WDL) WDL   Cognition   Overall Cognitive Status WFL   Arousal/Participation Alert; Responsive; Cooperative   Attention Within functional limits   Orientation Level Oriented X4   Memory Within functional limits   Following Commands Follows all commands and directions without difficulty   Assessment   Limitation Decreased UE ROM; Decreased UE strength;Decreased endurance;Decreased high-level ADLs; Decreased fine motor control   Prognosis Good   Assessment Patient is a 60 y o  male seen for OT evaluation s/p admit to [address] [facility] on 10/10/2022 w/Myasthenia gravis in crisis [facility])  Commorbidities affecting patient's functional performance at time of assessment include:  CAD, morbid obesity, bulbar myasthenia gravis, and hyperlipidemia  Orders placed for OT evaluation and treatment  Performed at least two patient identifiers during session including name and wristband  Prior to admission, Pt lives in one story house with son and 1 CHARLIE  Pt reports having a walk-in shower with a shower chair but no grab bars around shower or toilet  Pt reports having AD at home but not needing to use them   Pt reports independence with ADLs/needs assistance with transportation  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  Personal factors affecting patient at time of initial evaluation include: limited caregiver support, difficulty performing ADLs and difficulty performing IADLs  Upon evaluation, patient requires supervision assist for UB ADLs, minimal  assist for LB ADLs, transfers and functional ambulation in room and bathroom with supervision and contact guard assist, with no AD  Occupational performance is affected by the following deficits: in hand manipulation deficit with impaired reach, grasp and coordination, limited active ROM, decreased muscle strength, degenerative arthritic joint changes, impaired fine motor coordination, dynamic sit/ stand balance deficit with poor standing tolerance time for self care and functional mobility, decreased activity tolerance and increased pain  Visual deficits with involuntary eye lid closing and frequent episodes of double vision  Patient to benefit from continued Occupational Therapy treatment while in the hospital to address deficits as defined above and maximize level of functional independence with ADLs and functional mobility  Occupational Performance areas to address include: bathing/ shower, dressing, toilet hygiene, transfer to all surfaces, functional ambulation, functional mobility, emergency response, medication routine/ management, IADLs: safety procedures, IADLs: meal prep/ clean up and Leisure Participation  From OT standpoint, recommendation at time of d/c would be Home with family support and Outpatient OT  Plan   Treatment Interventions ADL retraining;UE strengthening/ROM; Endurance training;Energy conservation;Visual perceptual retraining;Functional transfer training;Patient/family training;Cognitive reorientation; Compensatory technique education; Activityengagement;Continued evaluation; Fine motor coordination activities   Goal Expiration Date 10/25/22   OT Frequency 2-3x/wk Recommendation   Recommendation Speech consult   OT Discharge Recommendation Home with outpatient rehabilitation   AM-PAC Daily Activity Inpatient   Lower Body Dressing 3   Bathing 3   Toileting 3   Upper Body Dressing 3   Grooming 4   Eating 4   Daily Activity Raw Score 20   Daily Activity Standardized Score (Calc for Raw Score >=11) 42 03   AM-PAC Applied Cognition Inpatient   Following a Speech/Presentation 4   Understanding Ordinary Conversation 4   Taking Medications 4   Remembering Where Things Are Placed or Put Away 3   Remembering List of 4-5 Errands 4   Taking Care of Complicated Tasks 4   Applied Cognition Raw Score 23   Applied Cognition Standardized Score 53 08   Barthel Index   Feeding 10   Bathing 0   Grooming Score 5   Dressing Score 10   Bladder Score 10   Bowels Score 10   Toilet Use Score 10   Transfers (Bed/Chair) Score 10   Mobility (Level Surface) Score 10   Stairs Score 5   Barthel Index Score 80             Patient will engage in ongoing visual perceptual assessments, screens and activities to r/o visual perceptual impairments affecting functional performance  Patient  will engage in activity configuration activity with good participation and mod I to increase time management skills and improve participation in a structured routine to improve overall quality of life  Patient will verbalize and demonstrate use of energy conservation/ deep breathing technique and work simplification skills during functional activity with no verbal cues  Patient will verbalize and demonstrate good body mechanics and joint protection techniques during  ADLs/ IADLs with no verbal cues        Patient/ Family  will demonstrate competency with UE Home Exercise Program

## 2022-10-11 NOTE — PLAN OF CARE
Problem: OCCUPATIONAL THERAPY ADULT  Goal: Performs self-care activities at highest level of function for planned discharge setting  See evaluation for individualized goals  Description: Treatment Interventions: ADL retraining, UE strengthening/ROM, Endurance training, Energy conservation, Visual perceptual retraining, Functional transfer training, Patient/family training, Cognitive reorientation, Compensatory technique education, Activityengagement, Continued evaluation, Fine motor coordination activities          See flowsheet documentation for full assessment, interventions and recommendations  Note: Limitation: Decreased UE ROM, Decreased UE strength, Decreased endurance, Decreased high-level ADLs, Decreased fine motor control  Prognosis: Good  Assessment: Patient is a 60 y o  male seen for OT evaluation s/p admit to [address] [facility] on 10/10/2022 w/Myasthenia gravis in crisis [facility])  Commorbidities affecting patient's functional performance at time of assessment include:  CAD, morbid obesity, bulbar myasthenia gravis, and hyperlipidemia  Orders placed for OT evaluation and treatment  Performed at least two patient identifiers during session including name and wristband  Prior to admission, Pt lives in one story house with son and 1 CHARLIE  Pt reports having a walk-in shower with a shower chair but no grab bars around shower or toilet  Pt reports having AD at home but not needing to use them  Pt reports independence with ADLs/needs assistance with transportation  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  Personal factors affecting patient at time of initial evaluation include: limited caregiver support, difficulty performing ADLs and difficulty performing IADLs  Upon evaluation, patient requires supervision assist for UB ADLs, minimal  assist for LB ADLs, transfers and functional ambulation in room and bathroom with supervision and contact guard assist, with no AD  Occupational performance is affected by the following deficits: in hand manipulation deficit with impaired reach, grasp and coordination, limited active ROM, decreased muscle strength, degenerative arthritic joint changes, impaired fine motor coordination, dynamic sit/ stand balance deficit with poor standing tolerance time for self care and functional mobility, decreased activity tolerance and increased pain  Visual deficits with involuntary eye lid closing and frequent episodes of double vision  Patient to benefit from continued Occupational Therapy treatment while in the hospital to address deficits as defined above and maximize level of functional independence with ADLs and functional mobility  Occupational Performance areas to address include: bathing/ shower, dressing, toilet hygiene, transfer to all surfaces, functional ambulation, functional mobility, emergency response, medication routine/ management, IADLs: safety procedures, IADLs: meal prep/ clean up and Leisure Participation  From OT standpoint, recommendation at time of d/c would be Home with family support and Outpatient OT    Recommendation: Speech consult  OT Discharge Recommendation: Home with outpatient rehabilitation

## 2022-10-11 NOTE — UTILIZATION REVIEW
Initial Clinical Review    Admission: Date/Time/Statement:   Admission Orders (From admission, onward)     Ordered        10/10/22 2005  INPATIENT ADMISSION  Once                      Orders Placed This Encounter   Procedures   • INPATIENT ADMISSION     Standing Status:   Standing     Number of Occurrences:   1     Order Specific Question:   Level of Care     Answer:   Med Surg [16]     Order Specific Question:   Estimated length of stay     Answer:   More than 2 Midnights     Order Specific Question:   Certification     Answer:   I certify that inpatient services are medically necessary for this patient for a duration of greater than two midnights  See H&P and MD Progress Notes for additional information about the patient's course of treatment  ED Arrival Information     Expected   10/10/2022     Arrival   10/10/2022 18:15    Acuity   Emergent            Means of arrival   Walk-In    Escorted by   Family Member    Service   Hospitalist    Admission type   Emergency            Arrival complaint   shortness of breath           Chief Complaint   Patient presents with   • Shortness of Breath     Patient c/o shortness of breath that started two days ago  Patient on 2 liters nasal cannula baseline  Patient also c/o neck pain, and bilateral shoulder pain and heaviness  Patient oxygen saturation 93% room air  Initial Presentation: 60 y o  male to the ED from home with complaints of shortness of breath  On 2LNC baseline  Admitted to inpatient for myasthenia gravis crisis  H/O myasthenia gravis, has missed several doses of mestinon as he ran out and had difficulty affording them  Arrives appearing ill, weakness in bilateral upper extremities, bilateral ptosis, able to hold head up but with a lot of difficulty  Given a dose of methylprednisolone in the ED  Neurology consult  Usually gets OP iVIG treatment     Date: 10/11   Day 2:       ED Triage Vitals   Temperature Pulse Respirations Blood Pressure SpO2 10/10/22 1826 10/10/22 1826 10/10/22 1826 10/10/22 1826 10/10/22 1826   98 5 °F (36 9 °C) 82 21 (!) 173/92 93 %      Temp Source Heart Rate Source Patient Position - Orthostatic VS BP Location FiO2 (%)   10/10/22 2156 10/10/22 1930 10/10/22 2156 10/10/22 2156 --   Oral Monitor Lying Right arm       Pain Score       10/10/22 2250       No Pain          Wt Readings from Last 1 Encounters:   07/15/22 (!) 153 kg (337 lb)     Additional Vital Signs:   Time Temp Pulse Resp BP MAP (mmHg) SpO2 Calculated FIO2 (%) - Nasal Cannula Nasal Cannula O2 Flow Rate (L/min) O2 Device Patient Position - Orthostatic VS   10/11/22 0601 98 2 °F (36 8 °C) 74 17 138/70 96 96 % 28 2 L/min Nasal cannula Sitting   10/10/22 23:09:58 98 6 °F (37 °C) 66 17 141/70 94 95 % 28 2 L/min Nasal cannula Lying   10/10/22 21:56:26 98 4 °F (36 9 °C) 62 24 Abnormal  145/82 103 98 % -- -- -- Lying   10/10/22 2130 -- 71 20 165/87 119 99 % -- -- Nasal cannula --   10/10/22 1930 -- 64 21 150/76 102 97 % 28 2 L/min Nasal cannula --   10/10/22 1845 -- -- -- -- -- -- -- -- Nasal cannula --   10/10/22 1828 -- -- -- -- -- -- -- -- None (Room air) --   10/10/22 1826 98 5 °F (36 9 °C) 82 21 173/92 Abnormal  -- 93 % -- -- None (Room air) --       Pertinent Labs/Diagnostic Test Results:   XR chest 1 view portable   Final Result by Edu Jackson MD (10/11 0558)      No acute cardiopulmonary disease                    Workstation performed: [id number]           Results from last 7 days   Lab Units 10/10/22  1958   SARS-COV-2  Negative     Results from last 7 days   Lab Units 10/11/22  0419 10/10/22  1902   WBC Thousand/uL 5 17 7 61   HEMOGLOBIN g/dL 15 9 15 3   HEMATOCRIT % 48 9 48 1   PLATELETS Thousands/uL 234 238   NEUTROS ABS Thousands/µL 4 34 5 04         Results from last 7 days   Lab Units 10/11/22  0419 10/10/22  1902   SODIUM mmol/L 136 138   POTASSIUM mmol/L 4 3 4 2   CHLORIDE mmol/L 102 104   CO2 mmol/L 26 30   ANION GAP mmol/L 8 4   BUN mg/dL 13 15 CREATININE mg/dL 1 06 1 21   EGFR ml/min/1 73sq m 74 63   CALCIUM mg/dL 9 5 9 1     Results from last 7 days   Lab Units 10/10/22  1902   AST U/L 24   ALT U/L 32   ALK PHOS U/L 104   TOTAL PROTEIN g/dL 7 4   ALBUMIN g/dL 3 3*   TOTAL BILIRUBIN mg/dL 0 33         Results from last 7 days   Lab Units 10/11/22  0419 10/10/22  1902   GLUCOSE RANDOM mg/dL 174* 107         Results from last 7 days   Lab Units 10/10/22  1902   PROTIME seconds 13 2   INR  1 02   PTT seconds 28         Results from last 7 days   Lab Units 10/10/22  1958   INFLUENZA A PCR  Negative   INFLUENZA B PCR  Negative   RSV PCR  Negative           ED Treatment:   Medication Administration from 10/10/2022 1618 to 10/10/2022 2150       Date/Time Order Dose Route Action     10/10/2022 2054 methylPREDNISolone sodium succinate (Solu-MEDROL) 1,000 mg in sodium chloride 0 9 % 250 mL IVPB 1,000 mg Intravenous New Bag        Past Medical History:   Diagnosis Date   • Coronary artery disease    • Heart attack ([facility] )    • Hyperlipidemia    • Myasthenia gravis ([facility] )      Admitting Diagnosis: Myasthenic crisis ([facility] ) [G70 01]  Myasthenia gravis, bulbar ([facility] ) [G70 00]  Age/Sex: 60 y o  male  Admission Orders:  Scheduled Medications:  aspirin, 81 mg, Oral, Daily  enoxaparin, 40 mg, Subcutaneous, Daily  methylPREDNISolone sodium succinate, 1,000 mg, Intravenous, Daily  pantoprazole, 40 mg, Oral, Early Morning  pyridostigmine, 60 mg, Oral, 4x Daily      Continuous IV Infusions:     PRN Meds:  acetaminophen, 650 mg, Oral, Q6H PRN  glycerin-hypromellose-, 1 drop, Both Eyes, Q4H PRN        IP CONSULT TO NEUROLOGY  IP CONSULT TO CASE MANAGEMENT  IP CONSULT TO CASE MANAGEMENT    Network Utilization Review Department  ATTENTION: Please call with any questions or concerns to [phone] and carefully listen to the prompts so that you are directed to the right person   All voicemails are confidential   Jess Doing all requests for admission clinical reviews, approved or denied determinations and any other requests to dedicated fax number below belonging to the campus where the patient is receiving treatment   List of dedicated fax numbers for the Facilities:  [address] [place] [address] DENIALS (Administrative/Medical Necessity) [phone]   [address] (Maternity/NICU/Pediatrics) [phone] [address] [phone]   [facility] [address] [phone]   [address] [facility] [address] [address] [facility] 28 [phone]   [facility] [phone]

## 2022-10-11 NOTE — RESPIRATORY THERAPY NOTE
10/11/22 0915   Additional Assessments   $ Vital Capacity Mech/Peak Flow Yes   Position Sitting   Vital Capacity 2 L   NIF -45 cm H2O

## 2022-10-11 NOTE — PLAN OF CARE
Problem: PHYSICAL THERAPY ADULT  Goal: Performs mobility at highest level of function for planned discharge setting  See evaluation for individualized goals  Description: Treatment/Interventions: Elevations, Functional transfer training, Endurance training, Patient/family training, Bed mobility, Gait training, Spoke to nursing, OT          See flowsheet documentation for full assessment, interventions and recommendations  Note: Prognosis: Good  Problem List: Decreased endurance, Impaired balance, Decreased mobility, Obesity  Assessment: Pt is 60year old male seen for PT evaluation s/p admit to [facility] on 10/10/2022 with Myasthenia gravis in crisis [facility])  PT consulted to assess pt's functional mobility and d/c needs  Order placed for PT eval and tx, with up with assist order  Comorbidities affecting pt's physical performance at time of assessment include CAD, morbid obesity, bulbar myasthenia gravis, and hyperlipidemia  PTA, pt was independent with all functional mobility without an AD  Pt ambulates household and community distances on all terrain and elevations  Pt resides with his son in a one level house with one step to enter  Personal factors affecting pt at time of IE include step to enter home, inability to navigate level surfaces without external assistance, and inability to perform IADLs  Please find objective findings from PT assessment regarding body systems outlined above with impairments and limitations including impaired balance, decreased endurance, pain, decreased activity tolerance, decreased functional mobility tolerance, and fall risk  The following objective measures performed on IE also reveal limitations: Barthel Index: 80/100, Modified [place]: 3 (moderate disability) and AM-PAC 6-Clicks: 86/74   Pt's clinical presentation is currently evolving seen in pt's presentation of need for ongoing medical management/monitroing, pt is a fall risk, and pt requires cues and assist for safety with functional mobility  Pt to benefit from continued PT tx to address deficits as defined above and maximize level of functional independent mobility and consistency  From PT/mobility standpoint, recommendation at time of d/c would be home with family support and outpatient PT pending progress in order to facilitate return to PLOF  Barriers to Discharge: None     PT Discharge Recommendation: Home with outpatient rehabilitation    See flowsheet documentation for full assessment

## 2022-10-11 NOTE — H&P
[address] 1960, 60 y o  male MRN: [id number]  Unit/Bed#: ED 21 Encounter: [id number]  Primary Care Provider: No primary care provider on file  Date and time admitted to hospital: 10/10/2022  6:21 PM    * Myasthenia gravis in crisis [facility])  Assessment & Plan  Reports worsening BL upper extremity weakness and prosis since Saturday  On Saturday he ran out of his Mestinon and reports it has been around 4 months since his last outpatient IVIG treatment (typically gets every 3 months)  He denies any respiratory complaint or SOB at this time  /76   Pulse 64   Temp 98 5 °F (36 9 °C)   Resp 21   SpO2 97%     · Typically gets outpatient IVIG [id number]; has been 4month since last  · Ran out of his mestanon Saturday   · Developed mild BL upper extremity weakness, right eye ptosis, and resulting double vision since Sat  · Denies any SOB or respiratory difficulty; not in respiratory distress  · NIF -50 on admission  · CXR wet read w/o acute abnormality  · Not hypoglycemic; no major electrolyte abnormalities   · Lower suspicion of acute infectious process at this time  · Given 1g IV solumedrol in ED; continue 1g IV solumedrol daily  · Continue outpatient mestinon 60mg qid   · Maintain close NIF/respiratory monitoring overnight   · Neurology consulted for IVIG consideration    Hyperlipidemia  Assessment & Plan  · Not currently on outpatient statin    Myasthenia gravis, bulbar ([facility] [address] )  Assessment & Plan  · In suspected crisis as above     Morbid obesity ([address] )  Assessment & Plan  · Recommend weight loss via healthy diet and exercise     CAD (coronary artery disease)  Assessment & Plan  · Denies chest pain  · Continue home daily ASA     VTE Pharmacologic Prophylaxis: VTE Score: 4 Moderate Risk (Score 3-4) - Pharmacological DVT Prophylaxis Ordered: enoxaparin (Lovenox)    Code Status: Level 1 - Full Code   Discussion with family: update in AM      Anticipated Length of Stay: Patient will be admitted on an inpatient basis with an anticipated length of stay of greater than 2 midnights secondary to Myasthenia crisis   Total Time for Visit, including Counseling / Coordination of Care: 45 minutes Greater than 50% of this total time spent on direct patient counseling and coordination of care  Chief Complaint: BL upper extremity weakness and double vision    History of Present Illness:  Loreto Elmore is a 60 y o  male with a PMH of Myasthenia gravis, CAD, HLP, and obesity who presents with BL upper extremity weakness and double vision  Reports worsening BL upper extremity weakness and prosis since Saturday  On Saturday he ran out of his Mestinon and reports it has been around 4 months since his last outpatient IVIG treatment (typically gets every 3 months)  He denies any respiratory complaint or SOB at this time  All questions answered at the bedside to the best of my ability  Review of Systems:  Review of Systems   Constitutional: Negative for activity change, appetite change, chills, diaphoresis, fatigue and fever  HENT: Negative for congestion, ear pain, nosebleeds and trouble swallowing  +Ptosis      Eyes: Positive for visual disturbance  Negative for pain  Respiratory: Negative for apnea, cough, chest tightness, shortness of breath and wheezing  Cardiovascular: Negative for chest pain, palpitations and leg swelling  Gastrointestinal: Negative for abdominal distention, abdominal pain, blood in stool, constipation, diarrhea, nausea and vomiting  Endocrine: Negative for cold intolerance, heat intolerance and polyuria  Genitourinary: Negative for difficulty urinating, dysuria, flank pain and hematuria  Musculoskeletal: Negative for arthralgias, neck pain and neck stiffness  Skin: Negative for color change, rash and wound  Neurological: Positive for weakness (BL upper extremity)   Negative for dizziness, tremors, syncope, light-headedness, numbness and headaches  Hematological: Negative for adenopathy  All other systems reviewed and are negative  Past Medical and Surgical History:   Past Medical History:   Diagnosis Date   • Coronary artery disease    • Heart attack ([facility] [address] )    • Hyperlipidemia    • Myasthenia gravis ([facility] [address] )        Past Surgical History:   Procedure Laterality Date   • CORONARY ANGIOPLASTY WITH STENT PLACEMENT         Meds/Allergies:  Prior to Admission medications    Medication Sig Start Date End Date Taking? Authorizing Provider   aspirin (ECOTRIN LOW STRENGTH) 81 mg EC tablet Take 1 tablet (81 mg total) by mouth daily 6/14/22   Tim Lara MD   pantoprazole (PROTONIX) 40 mg tablet Take 1 tablet (40 mg total) by mouth daily in the early morning 6/15/22   Tim Lara MD   polyvinyl alcohol (LIQUIFILM TEARS) 1 4 % ophthalmic solution Administer 1 drop to both eyes every 3 (three) hours as needed for dry eyes 6/14/22   Tim Lara MD   pyridostigmine (MESTINON) 60 mg tablet Take 1 tablet (60 mg total) by mouth 4 (four) times a day 7/15/22   Jens Klinefelter, MD     I have reviewed home medications with patient personally  Allergies: No Known Allergies    Social History:  Marital Status: /Civil Union   Occupation: N/A  Patient Pre-hospital Living Situation: Home  Patient Pre-hospital Level of Mobility: walks  Patient Pre-hospital Diet Restrictions: None  Substance Use History:   Social History     Substance and Sexual Activity   Alcohol Use Not Currently     Social History     Tobacco Use   Smoking Status Never Smoker   Smokeless Tobacco Never Used     Social History     Substance and Sexual Activity   Drug Use Not Currently       Family History:  History reviewed  No pertinent family history      Physical Exam:     Vitals:   Blood Pressure: 150/76 (10/10/22 1930)  Pulse: 64 (10/10/22 1930)  Temperature: 98 5 °F (36 9 °C) (10/10/22 1826)  Respirations: 21 (10/10/22 1930)  SpO2: 97 % (10/10/22 1930)    Physical Exam  Vitals and nursing note reviewed  Constitutional:       General: He is not in acute distress  Appearance: Normal appearance  HENT:      Head: Normocephalic and atraumatic  Right Ear: External ear normal       Left Ear: External ear normal       Nose: Nose normal       Mouth/Throat:      Mouth: Mucous membranes are moist    Eyes:      Pupils: Pupils are equal, round, and reactive to light  Comments: +mild right ptosis      Cardiovascular:      Rate and Rhythm: Normal rate and regular rhythm  Pulses: Normal pulses  Heart sounds: Normal heart sounds  No murmur heard  Pulmonary:      Effort: Pulmonary effort is normal  No respiratory distress  Breath sounds: Normal breath sounds  No wheezing or rales  Chest:      Chest wall: No tenderness  Abdominal:      General: Bowel sounds are normal  There is no distension  Palpations: Abdomen is soft  There is no mass  Tenderness: There is no abdominal tenderness  There is no guarding  Musculoskeletal:         General: No swelling or tenderness  Cervical back: Normal range of motion and neck supple  No rigidity or tenderness  Right lower leg: No edema  Left lower leg: No edema  Skin:     General: Skin is warm and dry  Capillary Refill: Capillary refill takes less than 2 seconds  Findings: No lesion or rash  Neurological:      General: No focal deficit present  Mental Status: He is alert and oriented to person, place, and time     Psychiatric:         Mood and Affect: Mood normal           Additional Data:     Lab Results:  Results from last 7 days   Lab Units 10/10/22  1902   WBC Thousand/uL 7 61   HEMOGLOBIN g/dL 15 3   HEMATOCRIT % 48 1   PLATELETS Thousands/uL 238   NEUTROS PCT % 67   LYMPHS PCT % 20   MONOS PCT % 9   EOS PCT % 4     Results from last 7 days   Lab Units 10/10/22  1902   SODIUM mmol/L 138   POTASSIUM mmol/L 4 2   CHLORIDE mmol/L 104   CO2 mmol/L 30   BUN mg/dL 15   CREATININE mg/dL 1 21   ANION GAP mmol/L 4   CALCIUM mg/dL 9 1   ALBUMIN g/dL 3 3*   TOTAL BILIRUBIN mg/dL 0 33   ALK PHOS U/L 104   ALT U/L 32   AST U/L 24   GLUCOSE RANDOM mg/dL 107     Results from last 7 days   Lab Units 10/10/22  1902   INR  1 02                   Imaging: Personally reviewed the following imaging: chest xray  XR chest 1 view portable    (Results Pending)       EKG and Other Studies Reviewed on Admission:   · EKG: NSR  HR 78     ** Please Note: This note has been constructed using a voice recognition system   **

## 2022-10-11 NOTE — PLAN OF CARE
Problem: Potential for Falls  Goal: Patient will remain free of falls  Description: INTERVENTIONS:  - Educate patient/family on patient safety including physical limitations  - Instruct patient to call for assistance with activity   - Consult OT/PT to assist with strengthening/mobility   - Keep Call bell within reach  - Keep bed low and locked with side rails adjusted as appropriate  - Keep care items and personal belongings within reach  - Initiate and maintain comfort rounds  - Make Fall Risk Sign visible to staff  - Offer Toileting every  Hours, in advance of need  - Initiate/Maintain alarm  - Obtain necessary fall risk management equipment:   - Apply yellow socks and bracelet for high fall risk patients  - Consider moving patient to room near nurses station  Outcome: Progressing     Problem: PAIN - ADULT  Goal: Verbalizes/displays adequate comfort level or baseline comfort level  Description: Interventions:  - Encourage patient to monitor pain and request assistance  - Assess pain using appropriate pain scale  - Administer analgesics based on type and severity of pain and evaluate response  - Implement non-pharmacological measures as appropriate and evaluate response  - Consider cultural and social influences on pain and pain management  - Notify physician/advanced practitioner if interventions unsuccessful or patient reports new pain  Outcome: Progressing     Problem: INFECTION - ADULT  Goal: Absence or prevention of progression during hospitalization  Description: INTERVENTIONS:  - Assess and monitor for signs and symptoms of infection  - Monitor lab/diagnostic results  - Monitor all insertion sites, i e  indwelling lines, tubes, and drains  - Monitor endotracheal if appropriate and nasal secretions for changes in amount and color  - [place] appropriate cooling/warming therapies per order  - Administer medications as ordered  - Instruct and encourage patient and family to use good hand hygiene technique  - Identify and instruct in appropriate isolation precautions for identified infection/condition  Outcome: Progressing  Goal: Absence of fever/infection during neutropenic period  Description: INTERVENTIONS:  - Monitor WBC    Outcome: Progressing     Problem: SAFETY ADULT  Goal: Patient will remain free of falls  Description: INTERVENTIONS:  - Educate patient/family on patient safety including physical limitations  - Instruct patient to call for assistance with activity   - Consult OT/PT to assist with strengthening/mobility   - Keep Call bell within reach  - Keep bed low and locked with side rails adjusted as appropriate  - Keep care items and personal belongings within reach  - Initiate and maintain comfort rounds  - Make Fall Risk Sign visible to staff  - Offer Toileting every  Hours, in advance of need  - Initiate/Maintain alarm  - Obtain necessary fall risk management equipment:   - Apply yellow socks and bracelet for high fall risk patients  - Consider moving patient to room near nurses station  Outcome: Progressing  Goal: Maintain or return to baseline ADL function  Description: INTERVENTIONS:  -  Assess patient's ability to carry out ADLs; assess patient's baseline for ADL function and identify physical deficits which impact ability to perform ADLs (bathing, care of mouth/teeth, toileting, grooming, dressing, etc )  - Assess/evaluate cause of self-care deficits   - Assess range of motion  - Assess patient's mobility; develop plan if impaired  - Assess patient's need for assistive devices and provide as appropriate  - Encourage maximum independence but intervene and supervise when necessary  - Involve family in performance of ADLs  - Assess for home care needs following discharge   - Consider OT consult to assist with ADL evaluation and planning for discharge  - Provide patient education as appropriate  Outcome: Progressing  Goal: Maintains/Returns to pre admission functional level  Description: INTERVENTIONS:  - Perform BMAT or MOVE assessment daily    - Set and communicate daily mobility goal to care team and patient/family/caregiver  - Collaborate with rehabilitation services on mobility goals if consulted  - Perform Range of Motion  times a day  - Reposition patient every hours  - Dangle patient  times a day  - Stand patient times a day  - Ambulate patient  times a day  - Out of bed to chair  times a day   - Out of bed for meals  times a day  - Out of bed for toileting  - Record patient progress and toleration of activity level   Outcome: Progressing     Problem: DISCHARGE PLANNING  Goal: Discharge to home or other facility with appropriate resources  Description: INTERVENTIONS:  - Identify barriers to discharge w/patient and caregiver  - Arrange for needed discharge resources and transportation as appropriate  - Identify discharge learning needs (meds, wound care, etc )  - Arrange for interpretive services to assist at discharge as needed  - Refer to Case Management Department for coordinating discharge planning if the patient needs post-hospital services based on physician/advanced practitioner order or complex needs related to functional status, cognitive ability, or social support system  Outcome: Progressing     Problem: Knowledge Deficit  Goal: Patient/family/caregiver demonstrates understanding of disease process, treatment plan, medications, and discharge instructions  Description: Complete learning assessment and assess knowledge base  Interventions:  - Provide teaching at level of understanding  - Provide teaching via preferred learning methods  Outcome: Progressing     Problem: Neurological Deficit  Goal: Neurological status is stable or improving  Description: Interventions:  - Monitor and assess patient's level of consciousness, motor function, sensory function, and level of assistance needed for ADLs  - Monitor and report changes from baseline   Collaborate with interdisciplinary team to initiate plan and implement interventions as ordered  - Provide and maintain a safe environment  - Consider seizure precautions  - Consider fall precautions  - Consider aspiration precautions  - Consider bleeding precautions  Outcome: Progressing     Problem: Activity Intolerance/Impaired Mobility  Goal: Mobility/activity is maintained at optimum level for patient  Description: Interventions:  - Assess and monitor patient  barriers to mobility and need for assistive/adaptive devices  - Assess patient's emotional response to limitations  - Collaborate with interdisciplinary team and initiate plans and interventions as ordered  - Encourage independent activity per ability   - Maintain proper body alignment  - Perform active/passive rom as tolerated/ordered  - Plan activities to conserve energy   - Turn patient as appropriate  Outcome: Progressing     Problem: Communication Impairment  Goal: Ability to express needs and understand communication  Description: Assess patient's communication skills and ability to understand information  Patient will demonstrate use of effective communication techniques, alternative methods of communication and understanding even if not able to speak  - Encourage communication and provide alternate methods of communication as needed  - Collaborate with case management/ for discharge needs  - Include patient/family/caregiver in decisions related to communication  Outcome: Progressing     Problem: Potential for Aspiration  Goal: Non-ventilated patient's risk of aspiration is minimized  Description: Assess and monitor vital signs, respiratory status, and labs (WBC)  Monitor for signs of aspiration (tachypnea, cough, rales, wheezing, cyanosis, fever)  - Assess and monitor patient's ability to swallow  - Place patient up in chair to eat if possible    - HOB up at 90 degrees to eat if unable to get patient up into chair   - Supervise patient during oral intake  - Instruct patient/ family to take small bites  - Instruct patient/ family to take small single sips when taking liquids  - Follow patient-specific strategies generated by speech pathologist   Outcome: Progressing  Goal: Ventilated patient's risk of aspiration is minimized  Description: Assess and monitor vital signs, respiratory status, airway cuff pressure, and labs (WBC)  Monitor for signs of aspiration (tachypnea, cough, rales, wheezing, cyanosis, fever)  - Elevate head of bed 30 degrees if patient has tube feeding   - Monitor tube feeding  Outcome: Progressing     Problem: Nutrition  Goal: Nutrition/Hydration status is improving  Description: Monitor and assess patient's nutrition/hydration status for malnutrition (ex- brittle hair, bruises, dry skin, pale skin and conjunctiva, muscle wasting, smooth red tongue, and disorientation)  Collaborate with interdisciplinary team and initiate plan and interventions as ordered  Monitor patient's weight and dietary intake as ordered or per policy  Utilize nutrition screening tool and intervene per policy  Determine patient's food preferences and provide high-protein, high-caloric foods as appropriate  - Assist patient with eating   - Allow adequate time for meals   - Encourage patient to take dietary supplement as ordered  - Collaborate with clinical nutritionist   - Include patient/family/caregiver in decisions related to nutrition  Outcome: Progressing     Problem: Nutrition/Hydration-ADULT  Goal: Nutrient/Hydration intake appropriate for improving, restoring or maintaining nutritional needs  Description: Monitor and assess patient's nutrition/hydration status for malnutrition  Collaborate with interdisciplinary team and initiate plan and interventions as ordered  Monitor patient's weight and dietary intake as ordered or per policy  Utilize nutrition screening tool and intervene as necessary   Determine patient's food preferences and provide high-protein, high-caloric foods as appropriate  INTERVENTIONS:  - Monitor oral intake, urinary output, labs, and treatment plans  - Assess nutrition and hydration status and recommend course of action  - Evaluate amount of meals eaten  - Assist patient with eating if necessary   - Allow adequate time for meals  - Recommend/ encourage appropriate diets, oral nutritional supplements, and vitamin/mineral supplements  - Order, calculate, and assess calorie counts as needed  - Recommend, monitor, and adjust tube feedings and TPN/PPN based on assessed needs  - Assess need for intravenous fluids  - Provide specific nutrition/hydration education as appropriate  - Include patient/family/caregiver in decisions related to nutrition  Outcome: Progressing     Problem: MOBILITY - ADULT  Goal: Maintain or return to baseline ADL function  Description: INTERVENTIONS:  -  Assess patient's ability to carry out ADLs; assess patient's baseline for ADL function and identify physical deficits which impact ability to perform ADLs (bathing, care of mouth/teeth, toileting, grooming, dressing, etc )  - Assess/evaluate cause of self-care deficits   - Assess range of motion  - Assess patient's mobility; develop plan if impaired  - Assess patient's need for assistive devices and provide as appropriate  - Encourage maximum independence but intervene and supervise when necessary  - Involve family in performance of ADLs  - Assess for home care needs following discharge   - Consider OT consult to assist with ADL evaluation and planning for discharge  - Provide patient education as appropriate  Outcome: Progressing  Goal: Maintains/Returns to pre admission functional level  Description: INTERVENTIONS:  - Perform BMAT or MOVE assessment daily    - Set and communicate daily mobility goal to care team and patient/family/caregiver     - Collaborate with rehabilitation services on mobility goals if consulted  - Perform Range of Motion  times a day   - Reposition patient every  hours    - Dangle patient  times a day  - Stand patient  times a day  - Ambulate patient  times a day  - Out of bed to chair  times a day   - Out of bed for meals  times a day  - Out of bed for toileting  - Record patient progress and toleration of activity level   Outcome: Progressing

## 2022-10-12 LAB
ANION GAP SERPL CALCULATED.3IONS-SCNC: 4 MMOL/L (ref 4–13)
BUN SERPL-MCNC: 18 MG/DL (ref 5–25)
CALCIUM SERPL-MCNC: 9.2 MG/DL (ref 8.3–10.1)
CHLORIDE SERPL-SCNC: 106 MMOL/L (ref 96–108)
CO2 SERPL-SCNC: 29 MMOL/L (ref 21–32)
CREAT SERPL-MCNC: 0.98 MG/DL (ref 0.6–1.3)
ERYTHROCYTE [DISTWIDTH] IN BLOOD BY AUTOMATED COUNT: 13.8 % (ref 11.6–15.1)
GFR SERPL CREATININE-BSD FRML MDRD: 81 ML/MIN/1.73SQ M
GLUCOSE SERPL-MCNC: 133 MG/DL (ref 65–140)
HCT VFR BLD AUTO: 43.9 % (ref 36.5–49.3)
HGB BLD-MCNC: 14.2 G/DL (ref 12–17)
MCH RBC QN AUTO: 28.1 PG (ref 26.8–34.3)
MCHC RBC AUTO-ENTMCNC: 32.3 G/DL (ref 31.4–37.4)
MCV RBC AUTO: 87 FL (ref 82–98)
PLATELET # BLD AUTO: 227 THOUSANDS/UL (ref 149–390)
PMV BLD AUTO: 10.6 FL (ref 8.9–12.7)
POTASSIUM SERPL-SCNC: 4.4 MMOL/L (ref 3.5–5.3)
RBC # BLD AUTO: 5.06 MILLION/UL (ref 3.88–5.62)
SODIUM SERPL-SCNC: 139 MMOL/L (ref 135–147)
WBC # BLD AUTO: 18.05 THOUSAND/UL (ref 4.31–10.16)

## 2022-10-12 PROCEDURE — 92526 ORAL FUNCTION THERAPY: CPT

## 2022-10-12 PROCEDURE — 85027 COMPLETE CBC AUTOMATED: CPT | Performed by: INTERNAL MEDICINE

## 2022-10-12 PROCEDURE — 80048 BASIC METABOLIC PNL TOTAL CA: CPT | Performed by: INTERNAL MEDICINE

## 2022-10-12 PROCEDURE — 99233 SBSQ HOSP IP/OBS HIGH 50: CPT | Performed by: INTERNAL MEDICINE

## 2022-10-12 PROCEDURE — 99232 SBSQ HOSP IP/OBS MODERATE 35: CPT | Performed by: PSYCHIATRY & NEUROLOGY

## 2022-10-12 PROCEDURE — 94150 VITAL CAPACITY TEST: CPT

## 2022-10-12 RX ORDER — ECHINACEA PURPUREA EXTRACT 125 MG
1 TABLET ORAL
Status: DISCONTINUED | OUTPATIENT
Start: 2022-10-12 | End: 2022-10-17 | Stop reason: HOSPADM

## 2022-10-12 RX ADMIN — ASPIRIN 81 MG: 81 TABLET, COATED ORAL at 08:28

## 2022-10-12 RX ADMIN — GLYCERIN 1 DROP: .002; .002; .01 SOLUTION/ DROPS OPHTHALMIC at 16:31

## 2022-10-12 RX ADMIN — PYRIDOSTIGMINE BROMIDE 60 MG: 60 TABLET ORAL at 16:41

## 2022-10-12 RX ADMIN — ENOXAPARIN SODIUM 40 MG: 40 INJECTION SUBCUTANEOUS at 08:28

## 2022-10-12 RX ADMIN — PYRIDOSTIGMINE BROMIDE 60 MG: 60 TABLET ORAL at 11:15

## 2022-10-12 RX ADMIN — PYRIDOSTIGMINE BROMIDE 60 MG: 60 TABLET ORAL at 23:47

## 2022-10-12 RX ADMIN — PANTOPRAZOLE SODIUM 40 MG: 40 TABLET, DELAYED RELEASE ORAL at 04:48

## 2022-10-12 RX ADMIN — Medication 42.5 G: at 16:44

## 2022-10-12 RX ADMIN — PYRIDOSTIGMINE BROMIDE 60 MG: 60 TABLET ORAL at 08:27

## 2022-10-12 NOTE — PROGRESS NOTES
Progress Note - Neurology   Rc Bora 60 y o  male [id number]  Unit/Bed#: /-01    Assessment:    Myasthenia gravis exacerbation, bulbar ([address] )  Assessment & Plan  60 y o   male with myasthenia gravis maintained on mestinon, CAD, HLD and obesity who presented to the ED 10/10/2022 with BUE (L>R) and neck weakness, eye ptosis with blurred/ double vision, and difficulty swallowing since Friday  He ran out of his Mestinon on Saturday and missed several doses  As a result, his symptoms got progressively worse over the weekend and he presented to the ED for ongoing management  NIF -45/VC 2L  MG history:  -He was initially diagnosed with myesthenia gravis 6/2021 following 5 days of visual disturbance with new onset headache  He was treated with ivig x5 days and discharged on prednisone and mestinon 60mg tid for 1 month  He followed up with outpatient neurology 10/21 and was restarted on mestinon    -He was hospitalized 12/2021 with ocular bulbar myesthenia gravis and completed ivig x5 12/6/2021  He was discharged on mestinon 60mg qid and prednisone with bilateral lid weakness and outpatient neurology follow-up   -Last hospital admission for myasthenia exacerbation was 6/9/2022 when he presented with shortness of breath and weakness with some difficulty swallowing and bilateral eye ptosis in the setting of medication noncompliance  Initial NIF was -40  He was treated with steroid taper and 5 days of IVIG    -He was last seen by outpatient neurology 7/15/2022 and advised to continue mestinon 60mg qid and also was advised to see a neuromuscular specialist as patient may need to go on immunosuppression    - Imaging- 10/10/2022 CXR: No acute cardiopulmonary disease   - Pertinent labs: Covid/flu/rsv negative  - Relevant outpatient meds: Mestinon 60mg QID    Plan:  - Continue mestinon 60mg QID  - S/P solumedrol 1gm iv x1  - Continue IVIG at 400mg/kg x 5 days; today is day 2/5  - Monitor NIF/VC- NIF -60, VC 1 5 today  - PT/OT/ST  - Aspiration precautions  - Consider immunomodulatory therapy-defer to neuromuscular specialist   - Monitor neuro exam; notify with any changes  - Medical management and supportive care per primary team  Correction of any metabolic or infectious disturbances  Loreto Elmore will need follow up in in 4 weeks with neuromuscular attending/AP  He will not require outpatient neurological testing  Case and treatment plan reviewed with attending neurologist, Dr Yue Richards  Subjective:   Patient sitting in chair  He states that he is still having some trouble swallowing at times and feels that it may be related to the different schedule that he is taking his Mestinon compared to when he does at home  He continues to have UE (L>R) and neck weakness but notes it does seem somewhat improved compared to prior  He feels that his ptosis and blurred vision remain unchanged  He has double vision intermittently  Past Medical History:   Diagnosis Date   • Coronary artery disease    • Heart attack ([facility] [address] )    • Hyperlipidemia    • Myasthenia gravis ([facility] [address] )      Past Surgical History:   Procedure Laterality Date   • CORONARY ANGIOPLASTY WITH STENT PLACEMENT       History reviewed  No pertinent family history    Social History     Socioeconomic History   • Marital status: /Civil Union     Spouse name: None   • Number of children: None   • Years of education: None   • Highest education level: None   Occupational History   • None   Tobacco Use   • Smoking status: Never Smoker   • Smokeless tobacco: Never Used   Vaping Use   • Vaping Use: Never used   Substance and Sexual Activity   • Alcohol use: Not Currently   • Drug use: Not Currently   • Sexual activity: None   Other Topics Concern   • None   Social History Narrative   • None     Social Determinants of Health     Financial Resource Strain: Not on file   Food Insecurity: No Food Insecurity   • Worried About Running Out of Food in the Last Year: Never true   • Ran Out of Food in the Last Year: Never true   Transportation Needs: No Transportation Needs   • Lack of Transportation (Medical): No   • Lack of Transportation (Non-Medical): No   Physical Activity: Not on file   Stress: Not on file   Social Connections: Not on file   Intimate Partner Violence: Not on file   Housing Stability: Unknown   • Unable to Pay for Housing in the Last Year: Patient refused   • Number of Places Lived in the Last Year: 2   • Unstable Housing in the Last Year: No         Medications: All current active meds have been reviewed and current meds:  Scheduled Meds:  Current Facility-Administered Medications   Medication Dose Route Frequency Provider Last Rate   • acetaminophen  650 mg Oral Q6H PRN Dayton Kuhn PA-C     • aspirin  81 mg Oral Daily [facility]     • enoxaparin  40 mg Subcutaneous Daily [place], [place]     • glycerin-hypromellose-  1 drop Both Eyes Q4H PRN Dayton Kuhn PA-C     • immune globulin, human  400 mg/kg (Adjusted) Intravenous Q24H MARCELLA Stone     • pantoprazole  40 mg Oral Early Morning Dayton Kuhn PA-C     • pyridostigmine  60 mg Oral 4x Daily Dayton Kuhn PA-C     • sodium chloride  1 spray Each Nare Q1H PRN Armani Hu DO       Continuous Infusions:   PRN Meds: •  acetaminophen  •  glycerin-hypromellose-  •  sodium chloride       ROS:   Review of Systems   Constitutional: Negative for fever  HENT: Positive for trouble swallowing  Eyes: Positive for visual disturbance  Negative for photophobia  Respiratory: Negative for shortness of breath  Cardiovascular: Negative for chest pain  Gastrointestinal: Negative for abdominal pain, nausea and vomiting  Musculoskeletal: Negative for back pain  Skin: Negative for rash  Neurological: Positive for weakness   Negative for dizziness, tremors, seizures, syncope, facial asymmetry, speech difficulty, light-headedness, numbness and headaches  Psychiatric/Behavioral: Negative for confusion  All other systems reviewed and are negative  Vitals:   /64   Pulse 62   Temp 98 4 °F (36 9 °C)   Resp 18   Ht 6' 1" (1 854 m)   SpO2 95%   BMI 44 46 kg/m²       Physical Exam:   Physical Exam  Vitals and nursing note reviewed  Constitutional:       General: He is not in acute distress  Appearance: Normal appearance  He is not ill-appearing  HENT:      Head: Normocephalic  Mouth/Throat:      Mouth: Mucous membranes are moist       Pharynx: Oropharynx is clear  Eyes:      General: No scleral icterus  Right eye: No discharge  Left eye: No discharge  Conjunctiva/sclera: Conjunctivae normal    Cardiovascular:      Rate and Rhythm: Normal rate  Pulmonary:      Effort: Pulmonary effort is normal  No respiratory distress  Comments: Nasal cannula in place  Musculoskeletal:         General: Normal range of motion  Cervical back: Normal range of motion  Skin:     General: Skin is warm and dry  Coloration: Skin is not jaundiced or pale  Neurological:      Mental Status: He is alert and oriented to person, place, and time  Coordination: Finger-Nose-Finger Test normal    Psychiatric:         Mood and Affect: Mood normal          Behavior: Behavior normal        Neurologic Exam     Mental Status   Oriented to person, place, and time  Level of consciousness: alert  Able to follow commands appropriately  No dysarthria noted  Cranial Nerves     CN II   Right visual field deficit: none  Left visual field deficit: none     CN V   Facial sensation intact       CN VIII   Hearing: intact    CN IX, X   Palate: symmetric    CN XI   CN XI normal      CN XII   CN XII normal    Decreased EOM with bilateral adduction/abduction and upgaze  Bilateral ptosis (R>L)     Motor Exam   Muscle bulk: normal  Bilateral UE strength 5/5 deltoids, biceps, triceps, hand   Bilateral LE strength 5/5 hip flexion, knee flexion, knee extension  5/5 head flexion, head extension     Sensory Exam   Light touch normal      Gait, Coordination, and Reflexes     Coordination   Finger to nose coordination: normal    Tremor   Resting tremor: absent  Intention tremor: absent          Labs: I have personally reviewed pertinent reports  Recent Results (from the past 24 hour(s))   Basic metabolic panel    Collection Time: 10/12/22  3:52 AM   Result Value Ref Range    Sodium 139 135 - 147 mmol/L    Potassium 4 4 3 5 - 5 3 mmol/L    Chloride 106 96 - 108 mmol/L    CO2 29 21 - 32 mmol/L    ANION GAP 4 4 - 13 mmol/L    BUN 18 5 - 25 mg/dL    Creatinine 0 98 0 60 - 1 30 mg/dL    Glucose 133 65 - 140 mg/dL    Calcium 9 2 8 3 - 10 1 mg/dL    eGFR 81 ml/min/1 73sq m   CBC    Collection Time: 10/12/22  3:52 AM   Result Value Ref Range    WBC 18 05 (H) 4 31 - 10 16 Thousand/uL    RBC 5 06 3 88 - 5 62 Million/uL    Hemoglobin 14 2 12 0 - 17 0 g/dL    Hematocrit 43 9 36 5 - 49 3 %    MCV 87 82 - 98 fL    MCH 28 1 26 8 - 34 3 pg    MCHC 32 3 31 4 - 37 4 g/dL    RDW 13 8 11 6 - 15 1 %    Platelets 738 640 - 603 Thousands/uL    MPV 10 6 8 9 - 12 7 fL       Imaging: I have personally reviewed pertinent imaging in PACS, and I have personally reviewed PACS reports  EKG, Pathology, and Other Studies: I have personally reviewed pertinent reports  VTE Prophylaxis: Sequential compression device (Venodyne)  and Enoxaparin (Lovenox)        Total time spent today 23 minutes  Greater than 50% of total time was spent with the patient and / or family counseling and / or coordination of care  A description of the counseling / coordination of care: Patient seen and evaluated  Case reviewed with attending  Chart thoroughly reviewed including labs and medications  Discussion of labs, medications, and follow up with patient

## 2022-10-12 NOTE — SPEECH THERAPY NOTE
Speech Language/Pathology     Speech/Language Pathology Progress Note     Patient Name: Deena YEUNG'S Date: 10/12/2022     Problem List  Principal Problem:    Myasthenia gravis in crisis [facility])  Active Problems:    CAD (coronary artery disease)    Morbid obesity ([facility] [address] )    Myasthenia gravis exacerbation, bulbar ([facility] [address] )    Hyperlipidemia     Subjective:  Patient encountered x 2 dysphagia tx session  Initial attempt patient stating his Mestinon is late and he is unable to eat breakfast  RN notified and provided meds; pt requests return at [address] awake, stating he feels the meds are not work  Appears slightly agitated  Previous/current diet: dys3/thin    Objective:  Patient takes sips of thin liquids and puree solids with delayed prolonged throat clear (of note, patient took meds crushed in puree w/ no reported difficulties)  Upon questioning re: swallow function; has it improved/remained same following dose of Mestonin? Pt became increasingly agitated stating he does not want to eat or to have this diet  Requesting salad  Explained in detail patient would need to trial these textures in order to advance diet, and pt was c/o difficulty with present diet the day before  Pt throws breakfast plate into garbage, refuses additional speech services  Education provided re: safety of oral intake and aspiration risk while in Myasthenia crisis  Patient continues to become increasingly agitated stating he wants regular food and no longer wishes to receive services from this department  Assessment:  Oropharyngeal swallow function appears same, if not worse from previous encounter  Cannot r/o worsening swallow function vs  timing of med administration and breakfast/SLP follow-up (note, follow up timing was based on patient's instruction)  Patient does not wish to be on modified diet regardless and is unwilling to trial various textures    No longer wishes to receive services from this department  Education offered by this writer though patient remained uninterested  RN present, d/w MD via TT       Plan:  See MD notes for nutritional plan of care  No further follow-up from this department as per pt request         Sarai Miller [address], Bong Rojas  Speech-Language Pathologist  [place] #[id number]  [place] #[id number]

## 2022-10-12 NOTE — PLAN OF CARE
Problem: Potential for Falls  Goal: Patient will remain free of falls  Description: INTERVENTIONS:  - Educate patient/family on patient safety including physical limitations  - Instruct patient to call for assistance with activity   - Consult OT/PT to assist with strengthening/mobility   - Keep Call bell within reach  - Keep bed low and locked with side rails adjusted as appropriate  - Keep care items and personal belongings within reach  - Initiate and maintain comfort rounds  - Make Fall Risk Sign visible to staff  - Offer Toileting every 2 Hours, in advance of need  - Initiate/Maintain bed alarm  - Obtain necessary fall risk management equipment: call bell within reach  - Apply yellow socks and bracelet for high fall risk patients  - Consider moving patient to room near nurses station  Outcome: Progressing     Problem: PAIN - ADULT  Goal: Verbalizes/displays adequate comfort level or baseline comfort level  Description: Interventions:  - Encourage patient to monitor pain and request assistance  - Assess pain using appropriate pain scale  - Administer analgesics based on type and severity of pain and evaluate response  - Implement non-pharmacological measures as appropriate and evaluate response  - Consider cultural and social influences on pain and pain management  - Notify physician/advanced practitioner if interventions unsuccessful or patient reports new pain  Outcome: Progressing     Problem: INFECTION - ADULT  Goal: Absence or prevention of progression during hospitalization  Description: INTERVENTIONS:  - Assess and monitor for signs and symptoms of infection  - Monitor lab/diagnostic results  - Monitor all insertion sites, i e  indwelling lines, tubes, and drains  - Monitor endotracheal if appropriate and nasal secretions for changes in amount and color  - [place] appropriate cooling/warming therapies per order  - Administer medications as ordered  - Instruct and encourage patient and family to use good hand hygiene technique  - Identify and instruct in appropriate isolation precautions for identified infection/condition  Outcome: Progressing  Goal: Absence of fever/infection during neutropenic period  Description: INTERVENTIONS:  - Monitor WBC    Outcome: Progressing     Problem: SAFETY ADULT  Goal: Patient will remain free of falls  Description: INTERVENTIONS:  - Educate patient/family on patient safety including physical limitations  - Instruct patient to call for assistance with activity   - Consult OT/PT to assist with strengthening/mobility   - Keep Call bell within reach  - Keep bed low and locked with side rails adjusted as appropriate  - Keep care items and personal belongings within reach  - Initiate and maintain comfort rounds  - Make Fall Risk Sign visible to staff  - Offer Toileting every 2 Hours, in advance of need  - Initiate/Maintain bed alarm  - Obtain necessary fall risk management equipment: call bell within reach  - Apply yellow socks and bracelet for high fall risk patients  - Consider moving patient to room near nurses station  Outcome: Progressing  Goal: Maintain or return to baseline ADL function  Description: INTERVENTIONS:  -  Assess patient's ability to carry out ADLs; assess patient's baseline for ADL function and identify physical deficits which impact ability to perform ADLs (bathing, care of mouth/teeth, toileting, grooming, dressing, etc )  - Assess/evaluate cause of self-care deficits   - Assess range of motion  - Assess patient's mobility; develop plan if impaired  - Assess patient's need for assistive devices and provide as appropriate  - Encourage maximum independence but intervene and supervise when necessary  - Involve family in performance of ADLs  - Assess for home care needs following discharge   - Consider OT consult to assist with ADL evaluation and planning for discharge  - Provide patient education as appropriate  Outcome: Progressing  Goal: Maintains/Returns to pre admission functional level  Description: INTERVENTIONS:  - Perform BMAT or MOVE assessment daily    - Set and communicate daily mobility goal to care team and patient/family/caregiver  - Collaborate with rehabilitation services on mobility goals if consulted  - Perform Range of Motion 3 times a day  - Reposition patient every 2 hours  - Dangle patient 3 times a day  - Stand patient 3 times a day  - Ambulate patient 3 times a day  - Out of bed to chair 3 times a day   - Out of bed for meals 3 times a day  - Out of bed for toileting  - Record patient progress and toleration of activity level   Outcome: Progressing     Problem: DISCHARGE PLANNING  Goal: Discharge to home or other facility with appropriate resources  Description: INTERVENTIONS:  - Identify barriers to discharge w/patient and caregiver  - Arrange for needed discharge resources and transportation as appropriate  - Identify discharge learning needs (meds, wound care, etc )  - Arrange for interpretive services to assist at discharge as needed  - Refer to Case Management Department for coordinating discharge planning if the patient needs post-hospital services based on physician/advanced practitioner order or complex needs related to functional status, cognitive ability, or social support system  Outcome: Progressing     Problem: Knowledge Deficit  Goal: Patient/family/caregiver demonstrates understanding of disease process, treatment plan, medications, and discharge instructions  Description: Complete learning assessment and assess knowledge base  Interventions:  - Provide teaching at level of understanding  - Provide teaching via preferred learning methods  Outcome: Progressing     Problem: Neurological Deficit  Goal: Neurological status is stable or improving  Description: Interventions:  - Monitor and assess patient's level of consciousness, motor function, sensory function, and level of assistance needed for ADLs     - Monitor and report changes from baseline  Collaborate with interdisciplinary team to initiate plan and implement interventions as ordered  - Provide and maintain a safe environment  - Consider seizure precautions  - Consider fall precautions  - Consider aspiration precautions  - Consider bleeding precautions  Outcome: Progressing     Problem: Activity Intolerance/Impaired Mobility  Goal: Mobility/activity is maintained at optimum level for patient  Description: Interventions:  - Assess and monitor patient  barriers to mobility and need for assistive/adaptive devices  - Assess patient's emotional response to limitations  - Collaborate with interdisciplinary team and initiate plans and interventions as ordered  - Encourage independent activity per ability   - Maintain proper body alignment  - Perform active/passive rom as tolerated/ordered  - Plan activities to conserve energy   - Turn patient as appropriate  Outcome: Progressing     Problem: Communication Impairment  Goal: Ability to express needs and understand communication  Description: Assess patient's communication skills and ability to understand information  Patient will demonstrate use of effective communication techniques, alternative methods of communication and understanding even if not able to speak  - Encourage communication and provide alternate methods of communication as needed  - Collaborate with case management/ for discharge needs  - Include patient/family/caregiver in decisions related to communication  Outcome: Progressing     Problem: Potential for Aspiration  Goal: Non-ventilated patient's risk of aspiration is minimized  Description: Assess and monitor vital signs, respiratory status, and labs (WBC)  Monitor for signs of aspiration (tachypnea, cough, rales, wheezing, cyanosis, fever)  - Assess and monitor patient's ability to swallow  - Place patient up in chair to eat if possible    - HOB up at 90 degrees to eat if unable to get patient up into chair   - Supervise patient during oral intake  - Instruct patient/ family to take small bites  - Instruct patient/ family to take small single sips when taking liquids  - Follow patient-specific strategies generated by speech pathologist   Outcome: Progressing  Goal: Ventilated patient's risk of aspiration is minimized  Description: Assess and monitor vital signs, respiratory status, airway cuff pressure, and labs (WBC)  Monitor for signs of aspiration (tachypnea, cough, rales, wheezing, cyanosis, fever)  - Elevate head of bed 30 degrees if patient has tube feeding   - Monitor tube feeding  Outcome: Progressing     Problem: Nutrition  Goal: Nutrition/Hydration status is improving  Description: Monitor and assess patient's nutrition/hydration status for malnutrition (ex- brittle hair, bruises, dry skin, pale skin and conjunctiva, muscle wasting, smooth red tongue, and disorientation)  Collaborate with interdisciplinary team and initiate plan and interventions as ordered  Monitor patient's weight and dietary intake as ordered or per policy  Utilize nutrition screening tool and intervene per policy  Determine patient's food preferences and provide high-protein, high-caloric foods as appropriate  - Assist patient with eating   - Allow adequate time for meals   - Encourage patient to take dietary supplement as ordered  - Collaborate with clinical nutritionist   - Include patient/family/caregiver in decisions related to nutrition  Outcome: Progressing     Problem: Nutrition/Hydration-ADULT  Goal: Nutrient/Hydration intake appropriate for improving, restoring or maintaining nutritional needs  Description: Monitor and assess patient's nutrition/hydration status for malnutrition  Collaborate with interdisciplinary team and initiate plan and interventions as ordered  Monitor patient's weight and dietary intake as ordered or per policy   Utilize nutrition screening tool and intervene as necessary  Determine patient's food preferences and provide high-protein, high-caloric foods as appropriate  INTERVENTIONS:  - Monitor oral intake, urinary output, labs, and treatment plans  - Assess nutrition and hydration status and recommend course of action  - Evaluate amount of meals eaten  - Assist patient with eating if necessary   - Allow adequate time for meals  - Recommend/ encourage appropriate diets, oral nutritional supplements, and vitamin/mineral supplements  - Order, calculate, and assess calorie counts as needed  - Recommend, monitor, and adjust tube feedings and TPN/PPN based on assessed needs  - Assess need for intravenous fluids  - Provide specific nutrition/hydration education as appropriate  - Include patient/family/caregiver in decisions related to nutrition  Outcome: Progressing     Problem: MOBILITY - ADULT  Goal: Maintain or return to baseline ADL function  Description: INTERVENTIONS:  -  Assess patient's ability to carry out ADLs; assess patient's baseline for ADL function and identify physical deficits which impact ability to perform ADLs (bathing, care of mouth/teeth, toileting, grooming, dressing, etc )  - Assess/evaluate cause of self-care deficits   - Assess range of motion  - Assess patient's mobility; develop plan if impaired  - Assess patient's need for assistive devices and provide as appropriate  - Encourage maximum independence but intervene and supervise when necessary  - Involve family in performance of ADLs  - Assess for home care needs following discharge   - Consider OT consult to assist with ADL evaluation and planning for discharge  - Provide patient education as appropriate  Outcome: Progressing  Goal: Maintains/Returns to pre admission functional level  Description: INTERVENTIONS:  - Perform BMAT or MOVE assessment daily    - Set and communicate daily mobility goal to care team and patient/family/caregiver     - Collaborate with rehabilitation services on mobility goals if consulted  - Perform Range of Motion 3 times a day  - Reposition patient every 2 hours    - Dangle patient 3 times a day  - Stand patient 3 times a day  - Ambulate patient 3 times a day  - Out of bed to chair 3 times a day   - Out of bed for meals 3 times a day  - Out of bed for toileting  - Record patient progress and toleration of activity level   Outcome: Progressing

## 2022-10-12 NOTE — PLAN OF CARE
Problem: Potential for Falls  Goal: Patient will remain free of falls  Description: INTERVENTIONS:  - Educate patient/family on patient safety including physical limitations  - Instruct patient to call for assistance with activity   - Consult OT/PT to assist with strengthening/mobility   - Keep Call bell within reach  - Keep bed low and locked with side rails adjusted as appropriate  - Keep care items and personal belongings within reach  - Initiate and maintain comfort rounds  - Make Fall Risk Sign visible to staff  - Offer Toileting every  Hours, in advance of need  - Initiate/Maintain alarm  - Obtain necessary fall risk management equipment:   - Apply yellow socks and bracelet for high fall risk patients  - Consider moving patient to room near nurses station  Outcome: Progressing     Problem: PAIN - ADULT  Goal: Verbalizes/displays adequate comfort level or baseline comfort level  Description: Interventions:  - Encourage patient to monitor pain and request assistance  - Assess pain using appropriate pain scale  - Administer analgesics based on type and severity of pain and evaluate response  - Implement non-pharmacological measures as appropriate and evaluate response  - Consider cultural and social influences on pain and pain management  - Notify physician/advanced practitioner if interventions unsuccessful or patient reports new pain  Outcome: Progressing     Problem: INFECTION - ADULT  Goal: Absence or prevention of progression during hospitalization  Description: INTERVENTIONS:  - Assess and monitor for signs and symptoms of infection  - Monitor lab/diagnostic results  - Monitor all insertion sites, i e  indwelling lines, tubes, and drains  - Monitor endotracheal if appropriate and nasal secretions for changes in amount and color  - [place] appropriate cooling/warming therapies per order  - Administer medications as ordered  - Instruct and encourage patient and family to use good hand hygiene technique  - Identify and instruct in appropriate isolation precautions for identified infection/condition  Outcome: Progressing  Goal: Absence of fever/infection during neutropenic period  Description: INTERVENTIONS:  - Monitor WBC    Outcome: Progressing     Problem: SAFETY ADULT  Goal: Patient will remain free of falls  Description: INTERVENTIONS:  - Educate patient/family on patient safety including physical limitations  - Instruct patient to call for assistance with activity   - Consult OT/PT to assist with strengthening/mobility   - Keep Call bell within reach  - Keep bed low and locked with side rails adjusted as appropriate  - Keep care items and personal belongings within reach  - Initiate and maintain comfort rounds  - Make Fall Risk Sign visible to staff  - Offer Toileting every  Hours, in advance of need  - Initiate/Maintain alarm  - Obtain necessary fall risk management equipment:   - Apply yellow socks and bracelet for high fall risk patients  - Consider moving patient to room near nurses station  Outcome: Progressing  Goal: Maintain or return to baseline ADL function  Description: INTERVENTIONS:  -  Assess patient's ability to carry out ADLs; assess patient's baseline for ADL function and identify physical deficits which impact ability to perform ADLs (bathing, care of mouth/teeth, toileting, grooming, dressing, etc )  - Assess/evaluate cause of self-care deficits   - Assess range of motion  - Assess patient's mobility; develop plan if impaired  - Assess patient's need for assistive devices and provide as appropriate  - Encourage maximum independence but intervene and supervise when necessary  - Involve family in performance of ADLs  - Assess for home care needs following discharge   - Consider OT consult to assist with ADL evaluation and planning for discharge  - Provide patient education as appropriate  Outcome: Progressing  Goal: Maintains/Returns to pre admission functional level  Description: INTERVENTIONS:  - Perform BMAT or MOVE assessment daily    - Set and communicate daily mobility goal to care team and patient/family/caregiver  - Collaborate with rehabilitation services on mobility goals if consulted  - Perform Range of Motion  times a day  - Reposition patient every  hours  - Dangle patient  times a day  - Stand patient  times a day  - Ambulate patient  times a day  - Out of bed to chair  times a day   - Out of bed for meals  times a day  - Out of bed for toileting  - Record patient progress and toleration of activity level   Outcome: Progressing     Problem: DISCHARGE PLANNING  Goal: Discharge to home or other facility with appropriate resources  Description: INTERVENTIONS:  - Identify barriers to discharge w/patient and caregiver  - Arrange for needed discharge resources and transportation as appropriate  - Identify discharge learning needs (meds, wound care, etc )  - Arrange for interpretive services to assist at discharge as needed  - Refer to Case Management Department for coordinating discharge planning if the patient needs post-hospital services based on physician/advanced practitioner order or complex needs related to functional status, cognitive ability, or social support system  Outcome: Progressing     Problem: Knowledge Deficit  Goal: Patient/family/caregiver demonstrates understanding of disease process, treatment plan, medications, and discharge instructions  Description: Complete learning assessment and assess knowledge base  Interventions:  - Provide teaching at level of understanding  - Provide teaching via preferred learning methods  Outcome: Progressing     Problem: Neurological Deficit  Goal: Neurological status is stable or improving  Description: Interventions:  - Monitor and assess patient's level of consciousness, motor function, sensory function, and level of assistance needed for ADLs  - Monitor and report changes from baseline   Collaborate with interdisciplinary team to initiate plan and implement interventions as ordered  - Provide and maintain a safe environment  - Consider seizure precautions  - Consider fall precautions  - Consider aspiration precautions  - Consider bleeding precautions  Outcome: Progressing     Problem: Activity Intolerance/Impaired Mobility  Goal: Mobility/activity is maintained at optimum level for patient  Description: Interventions:  - Assess and monitor patient  barriers to mobility and need for assistive/adaptive devices  - Assess patient's emotional response to limitations  - Collaborate with interdisciplinary team and initiate plans and interventions as ordered  - Encourage independent activity per ability   - Maintain proper body alignment  - Perform active/passive rom as tolerated/ordered  - Plan activities to conserve energy   - Turn patient as appropriate  Outcome: Progressing     Problem: Communication Impairment  Goal: Ability to express needs and understand communication  Description: Assess patient's communication skills and ability to understand information  Patient will demonstrate use of effective communication techniques, alternative methods of communication and understanding even if not able to speak  - Encourage communication and provide alternate methods of communication as needed  - Collaborate with case management/ for discharge needs  - Include patient/family/caregiver in decisions related to communication  Outcome: Progressing     Problem: Potential for Aspiration  Goal: Non-ventilated patient's risk of aspiration is minimized  Description: Assess and monitor vital signs, respiratory status, and labs (WBC)  Monitor for signs of aspiration (tachypnea, cough, rales, wheezing, cyanosis, fever)  - Assess and monitor patient's ability to swallow  - Place patient up in chair to eat if possible    - HOB up at 90 degrees to eat if unable to get patient up into chair   - Supervise patient during oral intake  - Instruct patient/ family to take small bites  - Instruct patient/ family to take small single sips when taking liquids  - Follow patient-specific strategies generated by speech pathologist   Outcome: Progressing  Goal: Ventilated patient's risk of aspiration is minimized  Description: Assess and monitor vital signs, respiratory status, airway cuff pressure, and labs (WBC)  Monitor for signs of aspiration (tachypnea, cough, rales, wheezing, cyanosis, fever)  - Elevate head of bed 30 degrees if patient has tube feeding   - Monitor tube feeding  Outcome: Progressing     Problem: Nutrition  Goal: Nutrition/Hydration status is improving  Description: Monitor and assess patient's nutrition/hydration status for malnutrition (ex- brittle hair, bruises, dry skin, pale skin and conjunctiva, muscle wasting, smooth red tongue, and disorientation)  Collaborate with interdisciplinary team and initiate plan and interventions as ordered  Monitor patient's weight and dietary intake as ordered or per policy  Utilize nutrition screening tool and intervene per policy  Determine patient's food preferences and provide high-protein, high-caloric foods as appropriate  - Assist patient with eating   - Allow adequate time for meals   - Encourage patient to take dietary supplement as ordered  - Collaborate with clinical nutritionist   - Include patient/family/caregiver in decisions related to nutrition  Outcome: Progressing     Problem: Nutrition/Hydration-ADULT  Goal: Nutrient/Hydration intake appropriate for improving, restoring or maintaining nutritional needs  Description: Monitor and assess patient's nutrition/hydration status for malnutrition  Collaborate with interdisciplinary team and initiate plan and interventions as ordered  Monitor patient's weight and dietary intake as ordered or per policy  Utilize nutrition screening tool and intervene as necessary   Determine patient's food preferences and provide high-protein, high-caloric foods as appropriate  INTERVENTIONS:  - Monitor oral intake, urinary output, labs, and treatment plans  - Assess nutrition and hydration status and recommend course of action  - Evaluate amount of meals eaten  - Assist patient with eating if necessary   - Allow adequate time for meals  - Recommend/ encourage appropriate diets, oral nutritional supplements, and vitamin/mineral supplements  - Order, calculate, and assess calorie counts as needed  - Recommend, monitor, and adjust tube feedings and TPN/PPN based on assessed needs  - Assess need for intravenous fluids  - Provide specific nutrition/hydration education as appropriate  - Include patient/family/caregiver in decisions related to nutrition  Outcome: Progressing     Problem: MOBILITY - ADULT  Goal: Maintain or return to baseline ADL function  Description: INTERVENTIONS:  -  Assess patient's ability to carry out ADLs; assess patient's baseline for ADL function and identify physical deficits which impact ability to perform ADLs (bathing, care of mouth/teeth, toileting, grooming, dressing, etc )  - Assess/evaluate cause of self-care deficits   - Assess range of motion  - Assess patient's mobility; develop plan if impaired  - Assess patient's need for assistive devices and provide as appropriate  - Encourage maximum independence but intervene and supervise when necessary  - Involve family in performance of ADLs  - Assess for home care needs following discharge   - Consider OT consult to assist with ADL evaluation and planning for discharge  - Provide patient education as appropriate  Outcome: Progressing  Goal: Maintains/Returns to pre admission functional level  Description: INTERVENTIONS:  - Perform BMAT or MOVE assessment daily    - Set and communicate daily mobility goal to care team and patient/family/caregiver     - Collaborate with rehabilitation services on mobility goals if consulted  - Perform Range of Motion times a day   - Reposition patient every  hours    - Dangle patient  times a day  - Stand patient  times a day  - Ambulate patient  times a day  - Out of bed to chair  times a day   - Out of bed for meals  times a day  - Out of bed for toileting  - Record patient progress and toleration of activity level   Outcome: Progressing

## 2022-10-12 NOTE — PLAN OF CARE
Problem: Potential for Falls  Goal: Patient will remain free of falls  Description: INTERVENTIONS:  - Educate patient/family on patient safety including physical limitations  - Instruct patient to call for assistance with activity   - Consult OT/PT to assist with strengthening/mobility   - Keep Call bell within reach  - Keep bed low and locked with side rails adjusted as appropriate  - Keep care items and personal belongings within reach  - Initiate and maintain comfort rounds  - Make Fall Risk Sign visible to staff  - Offer Toileting every 2 Hours, in advance of need  - Initiate/Maintain 2alarm  - Obtain necessary fall risk management equipment: 2  - Apply yellow socks and bracelet for high fall risk patients  - Consider moving patient to room near nurses station  Outcome: Progressing     Problem: PAIN - ADULT  Goal: Verbalizes/displays adequate comfort level or baseline comfort level  Description: Interventions:  - Encourage patient to monitor pain and request assistance  - Assess pain using appropriate pain scale  - Administer analgesics based on type and severity of pain and evaluate response  - Implement non-pharmacological measures as appropriate and evaluate response  - Consider cultural and social influences on pain and pain management  - Notify physician/advanced practitioner if interventions unsuccessful or patient reports new pain  Outcome: Progressing     Problem: INFECTION - ADULT  Goal: Absence or prevention of progression during hospitalization  Description: INTERVENTIONS:  - Assess and monitor for signs and symptoms of infection  - Monitor lab/diagnostic results  - Monitor all insertion sites, i e  indwelling lines, tubes, and drains  - Monitor endotracheal if appropriate and nasal secretions for changes in amount and color  - [place] appropriate cooling/warming therapies per order  - Administer medications as ordered  - Instruct and encourage patient and family to use good hand hygiene technique  - Identify and instruct in appropriate isolation precautions for identified infection/condition  Outcome: Progressing  Goal: Absence of fever/infection during neutropenic period  Description: INTERVENTIONS:  - Monitor WBC    Outcome: Progressing     Problem: SAFETY ADULT  Goal: Patient will remain free of falls  Description: INTERVENTIONS:  - Educate patient/family on patient safety including physical limitations  - Instruct patient to call for assistance with activity   - Consult OT/PT to assist with strengthening/mobility   - Keep Call bell within reach  - Keep bed low and locked with side rails adjusted as appropriate  - Keep care items and personal belongings within reach  - Initiate and maintain comfort rounds  - Make Fall Risk Sign visible to staff  - Offer Toileting every 2 Hours, in advance of need  - Initiate/Maintain 2alarm  - Obtain necessary fall risk management equipment: 2  - Apply yellow socks and bracelet for high fall risk patients  - Consider moving patient to room near nurses station  Outcome: Progressing  Goal: Maintain or return to baseline ADL function  Description: INTERVENTIONS:  -  Assess patient's ability to carry out ADLs; assess patient's baseline for ADL function and identify physical deficits which impact ability to perform ADLs (bathing, care of mouth/teeth, toileting, grooming, dressing, etc )  - Assess/evaluate cause of self-care deficits   - Assess range of motion  - Assess patient's mobility; develop plan if impaired  - Assess patient's need for assistive devices and provide as appropriate  - Encourage maximum independence but intervene and supervise when necessary  - Involve family in performance of ADLs  - Assess for home care needs following discharge   - Consider OT consult to assist with ADL evaluation and planning for discharge  - Provide patient education as appropriate  Outcome: Progressing  Goal: Maintains/Returns to pre admission functional level  Description: INTERVENTIONS:  - Perform BMAT or MOVE assessment daily    - Set and communicate daily mobility goal to care team and patient/family/caregiver  - Collaborate with rehabilitation services on mobility goals if consulted  - Perform Range of Motion 2 times a day  - Reposition patient every 2 hours  - Dangle patient 2 times a day  - Stand patient 2 times a day  - Ambulate patient 2 times a day  - Out of bed to chair 2 times a day   - Out of bed for meals 2 times a day  - Out of bed for toileting  - Record patient progress and toleration of activity level   Outcome: Progressing     Problem: DISCHARGE PLANNING  Goal: Discharge to home or other facility with appropriate resources  Description: INTERVENTIONS:  - Identify barriers to discharge w/patient and caregiver  - Arrange for needed discharge resources and transportation as appropriate  - Identify discharge learning needs (meds, wound care, etc )  - Arrange for interpretive services to assist at discharge as needed  - Refer to Case Management Department for coordinating discharge planning if the patient needs post-hospital services based on physician/advanced practitioner order or complex needs related to functional status, cognitive ability, or social support system  Outcome: Progressing     Problem: Knowledge Deficit  Goal: Patient/family/caregiver demonstrates understanding of disease process, treatment plan, medications, and discharge instructions  Description: Complete learning assessment and assess knowledge base  Interventions:  - Provide teaching at level of understanding  - Provide teaching via preferred learning methods  Outcome: Progressing     Problem: Neurological Deficit  Goal: Neurological status is stable or improving  Description: Interventions:  - Monitor and assess patient's level of consciousness, motor function, sensory function, and level of assistance needed for ADLs  - Monitor and report changes from baseline   Collaborate with interdisciplinary team to initiate plan and implement interventions as ordered  - Provide and maintain a safe environment  - Consider seizure precautions  - Consider fall precautions  - Consider aspiration precautions  - Consider bleeding precautions  Outcome: Progressing     Problem: Activity Intolerance/Impaired Mobility  Goal: Mobility/activity is maintained at optimum level for patient  Description: Interventions:  - Assess and monitor patient  barriers to mobility and need for assistive/adaptive devices  - Assess patient's emotional response to limitations  - Collaborate with interdisciplinary team and initiate plans and interventions as ordered  - Encourage independent activity per ability   - Maintain proper body alignment  - Perform active/passive rom as tolerated/ordered  - Plan activities to conserve energy   - Turn patient as appropriate  Outcome: Progressing     Problem: Communication Impairment  Goal: Ability to express needs and understand communication  Description: Assess patient's communication skills and ability to understand information  Patient will demonstrate use of effective communication techniques, alternative methods of communication and understanding even if not able to speak  - Encourage communication and provide alternate methods of communication as needed  - Collaborate with case management/ for discharge needs  - Include patient/family/caregiver in decisions related to communication  Outcome: Progressing     Problem: Potential for Aspiration  Goal: Non-ventilated patient's risk of aspiration is minimized  Description: Assess and monitor vital signs, respiratory status, and labs (WBC)  Monitor for signs of aspiration (tachypnea, cough, rales, wheezing, cyanosis, fever)  - Assess and monitor patient's ability to swallow  - Place patient up in chair to eat if possible    - HOB up at 90 degrees to eat if unable to get patient up into chair   - Supervise patient during oral intake  - Instruct patient/ family to take small bites  - Instruct patient/ family to take small single sips when taking liquids  - Follow patient-specific strategies generated by speech pathologist   Outcome: Progressing  Goal: Ventilated patient's risk of aspiration is minimized  Description: Assess and monitor vital signs, respiratory status, airway cuff pressure, and labs (WBC)  Monitor for signs of aspiration (tachypnea, cough, rales, wheezing, cyanosis, fever)  - Elevate head of bed 30 degrees if patient has tube feeding   - Monitor tube feeding  Outcome: Progressing     Problem: Nutrition  Goal: Nutrition/Hydration status is improving  Description: Monitor and assess patient's nutrition/hydration status for malnutrition (ex- brittle hair, bruises, dry skin, pale skin and conjunctiva, muscle wasting, smooth red tongue, and disorientation)  Collaborate with interdisciplinary team and initiate plan and interventions as ordered  Monitor patient's weight and dietary intake as ordered or per policy  Utilize nutrition screening tool and intervene per policy  Determine patient's food preferences and provide high-protein, high-caloric foods as appropriate  - Assist patient with eating   - Allow adequate time for meals   - Encourage patient to take dietary supplement as ordered  - Collaborate with clinical nutritionist   - Include patient/family/caregiver in decisions related to nutrition  Outcome: Progressing     Problem: Nutrition/Hydration-ADULT  Goal: Nutrient/Hydration intake appropriate for improving, restoring or maintaining nutritional needs  Description: Monitor and assess patient's nutrition/hydration status for malnutrition  Collaborate with interdisciplinary team and initiate plan and interventions as ordered  Monitor patient's weight and dietary intake as ordered or per policy  Utilize nutrition screening tool and intervene as necessary   Determine patient's food preferences and provide high-protein, high-caloric foods as appropriate  INTERVENTIONS:  - Monitor oral intake, urinary output, labs, and treatment plans  - Assess nutrition and hydration status and recommend course of action  - Evaluate amount of meals eaten  - Assist patient with eating if necessary   - Allow adequate time for meals  - Recommend/ encourage appropriate diets, oral nutritional supplements, and vitamin/mineral supplements  - Order, calculate, and assess calorie counts as needed  - Recommend, monitor, and adjust tube feedings and TPN/PPN based on assessed needs  - Assess need for intravenous fluids  - Provide specific nutrition/hydration education as appropriate  - Include patient/family/caregiver in decisions related to nutrition  Outcome: Progressing     Problem: MOBILITY - ADULT  Goal: Maintain or return to baseline ADL function  Description: INTERVENTIONS:  -  Assess patient's ability to carry out ADLs; assess patient's baseline for ADL function and identify physical deficits which impact ability to perform ADLs (bathing, care of mouth/teeth, toileting, grooming, dressing, etc )  - Assess/evaluate cause of self-care deficits   - Assess range of motion  - Assess patient's mobility; develop plan if impaired  - Assess patient's need for assistive devices and provide as appropriate  - Encourage maximum independence but intervene and supervise when necessary  - Involve family in performance of ADLs  - Assess for home care needs following discharge   - Consider OT consult to assist with ADL evaluation and planning for discharge  - Provide patient education as appropriate  Outcome: Progressing  Goal: Maintains/Returns to pre admission functional level  Description: INTERVENTIONS:  - Perform BMAT or MOVE assessment daily    - Set and communicate daily mobility goal to care team and patient/family/caregiver     - Collaborate with rehabilitation services on mobility goals if consulted  - Perform Range of Motion 2 times a day  - Reposition patient every 2 hours    - Dangle patient 2 times a day  - Stand patient 2 times a day  - Ambulate patient 2 times a day  - Out of bed to chair 2 times a day   - Out of bed for meals 2 times a day  - Out of bed for toileting  - Record patient progress and toleration of activity level   Outcome: Progressing

## 2022-10-13 PROCEDURE — 94150 VITAL CAPACITY TEST: CPT

## 2022-10-13 PROCEDURE — 99233 SBSQ HOSP IP/OBS HIGH 50: CPT | Performed by: INTERNAL MEDICINE

## 2022-10-13 PROCEDURE — 99232 SBSQ HOSP IP/OBS MODERATE 35: CPT | Performed by: PSYCHIATRY & NEUROLOGY

## 2022-10-13 RX ADMIN — PYRIDOSTIGMINE BROMIDE 60 MG: 60 TABLET ORAL at 16:15

## 2022-10-13 RX ADMIN — PYRIDOSTIGMINE BROMIDE 60 MG: 60 TABLET ORAL at 11:58

## 2022-10-13 RX ADMIN — ENOXAPARIN SODIUM 40 MG: 40 INJECTION SUBCUTANEOUS at 09:08

## 2022-10-13 RX ADMIN — Medication 42.5 G: at 16:36

## 2022-10-13 RX ADMIN — ASPIRIN 81 MG: 81 TABLET, COATED ORAL at 09:08

## 2022-10-13 RX ADMIN — PYRIDOSTIGMINE BROMIDE 60 MG: 60 TABLET ORAL at 05:18

## 2022-10-13 RX ADMIN — PANTOPRAZOLE SODIUM 40 MG: 40 TABLET, DELAYED RELEASE ORAL at 05:18

## 2022-10-13 NOTE — PLAN OF CARE
Problem: Potential for Falls  Goal: Patient will remain free of falls  Description: INTERVENTIONS:  - Educate patient/family on patient safety including physical limitations  - Instruct patient to call for assistance with activity   - Consult OT/PT to assist with strengthening/mobility   - Keep Call bell within reach  - Keep bed low and locked with side rails adjusted as appropriate  - Keep care items and personal belongings within reach  - Initiate and maintain comfort rounds  - Make Fall Risk Sign visible to staff  - Offer Toileting every Hours, in advance of need  - Initiate/Maintain alarm  - Obtain necessary fall risk management equipment:   - Apply yellow socks and bracelet for high fall risk patients  - Consider moving patient to room near nurses station  Outcome: Progressing     Problem: PAIN - ADULT  Goal: Verbalizes/displays adequate comfort level or baseline comfort level  Description: Interventions:  - Encourage patient to monitor pain and request assistance  - Assess pain using appropriate pain scale  - Administer analgesics based on type and severity of pain and evaluate response  - Implement non-pharmacological measures as appropriate and evaluate response  - Consider cultural and social influences on pain and pain management  - Notify physician/advanced practitioner if interventions unsuccessful or patient reports new pain  Outcome: Progressing     Problem: INFECTION - ADULT  Goal: Absence or prevention of progression during hospitalization  Description: INTERVENTIONS:  - Assess and monitor for signs and symptoms of infection  - Monitor lab/diagnostic results  - Monitor all insertion sites, i e  indwelling lines, tubes, and drains  - Monitor endotracheal if appropriate and nasal secretions for changes in amount and color  - [place] appropriate cooling/warming therapies per order  - Administer medications as ordered  - Instruct and encourage patient and family to use good hand hygiene technique  - Identify and instruct in appropriate isolation precautions for identified infection/condition  Outcome: Progressing  Goal: Absence of fever/infection during neutropenic period  Description: INTERVENTIONS:  - Monitor WBC    Outcome: Progressing     Problem: SAFETY ADULT  Goal: Patient will remain free of falls  Description: INTERVENTIONS:  - Educate patient/family on patient safety including physical limitations  - Instruct patient to call for assistance with activity   - Consult OT/PT to assist with strengthening/mobility   - Keep Call bell within reach  - Keep bed low and locked with side rails adjusted as appropriate  - Keep care items and personal belongings within reach  - Initiate and maintain comfort rounds  - Make Fall Risk Sign visible to staff  - Offer Toileting every  Hours, in advance of need  - Initiate/Maintain alarm  - Obtain necessary fall risk management equipment:  - Apply yellow socks and bracelet for high fall risk patients  - Consider moving patient to room near nurses station  Outcome: Progressing  Goal: Maintain or return to baseline ADL function  Description: INTERVENTIONS:  -  Assess patient's ability to carry out ADLs; assess patient's baseline for ADL function and identify physical deficits which impact ability to perform ADLs (bathing, care of mouth/teeth, toileting, grooming, dressing, etc )  - Assess/evaluate cause of self-care deficits   - Assess range of motion  - Assess patient's mobility; develop plan if impaired  - Assess patient's need for assistive devices and provide as appropriate  - Encourage maximum independence but intervene and supervise when necessary  - Involve family in performance of ADLs  - Assess for home care needs following discharge   - Consider OT consult to assist with ADL evaluation and planning for discharge  - Provide patient education as appropriate  Outcome: Progressing  Goal: Maintains/Returns to pre admission functional level  Description: INTERVENTIONS:  - Perform BMAT or MOVE assessment daily    - Set and communicate daily mobility goal to care team and patient/family/caregiver  - Collaborate with rehabilitation services on mobility goals if consulted  - Perform Range of Motion times a day  - Reposition patient every  hours  - Dangle patient times a day  - Stand patient  times a day  - Ambulate patient times a day  - Out of bed to chair  times a day   - Out of bed for meals  times a day  - Out of bed for toileting  - Record patient progress and toleration of activity level   Outcome: Progressing     Problem: DISCHARGE PLANNING  Goal: Discharge to home or other facility with appropriate resources  Description: INTERVENTIONS:  - Identify barriers to discharge w/patient and caregiver  - Arrange for needed discharge resources and transportation as appropriate  - Identify discharge learning needs (meds, wound care, etc )  - Arrange for interpretive services to assist at discharge as needed  - Refer to Case Management Department for coordinating discharge planning if the patient needs post-hospital services based on physician/advanced practitioner order or complex needs related to functional status, cognitive ability, or social support system  Outcome: Progressing     Problem: Knowledge Deficit  Goal: Patient/family/caregiver demonstrates understanding of disease process, treatment plan, medications, and discharge instructions  Description: Complete learning assessment and assess knowledge base  Interventions:  - Provide teaching at level of understanding  - Provide teaching via preferred learning methods  Outcome: Progressing     Problem: Neurological Deficit  Goal: Neurological status is stable or improving  Description: Interventions:  - Monitor and assess patient's level of consciousness, motor function, sensory function, and level of assistance needed for ADLs  - Monitor and report changes from baseline   Collaborate with interdisciplinary team to initiate plan and implement interventions as ordered  - Provide and maintain a safe environment  - Consider seizure precautions  - Consider fall precautions  - Consider aspiration precautions  - Consider bleeding precautions  Outcome: Progressing     Problem: Activity Intolerance/Impaired Mobility  Goal: Mobility/activity is maintained at optimum level for patient  Description: Interventions:  - Assess and monitor patient  barriers to mobility and need for assistive/adaptive devices  - Assess patient's emotional response to limitations  - Collaborate with interdisciplinary team and initiate plans and interventions as ordered  - Encourage independent activity per ability   - Maintain proper body alignment  - Perform active/passive rom as tolerated/ordered  - Plan activities to conserve energy   - Turn patient as appropriate  Outcome: Progressing     Problem: Communication Impairment  Goal: Ability to express needs and understand communication  Description: Assess patient's communication skills and ability to understand information  Patient will demonstrate use of effective communication techniques, alternative methods of communication and understanding even if not able to speak  - Encourage communication and provide alternate methods of communication as needed  - Collaborate with case management/ for discharge needs  - Include patient/family/caregiver in decisions related to communication  Outcome: Progressing     Problem: Potential for Aspiration  Goal: Non-ventilated patient's risk of aspiration is minimized  Description: Assess and monitor vital signs, respiratory status, and labs (WBC)  Monitor for signs of aspiration (tachypnea, cough, rales, wheezing, cyanosis, fever)  - Assess and monitor patient's ability to swallow  - Place patient up in chair to eat if possible  - HOB up at 90 degrees to eat if unable to get patient up into chair   - Supervise patient during oral intake     - Instruct patient/ family to take small bites  - Instruct patient/ family to take small single sips when taking liquids  - Follow patient-specific strategies generated by speech pathologist   Outcome: Progressing  Goal: Ventilated patient's risk of aspiration is minimized  Description: Assess and monitor vital signs, respiratory status, airway cuff pressure, and labs (WBC)  Monitor for signs of aspiration (tachypnea, cough, rales, wheezing, cyanosis, fever)  - Elevate head of bed 30 degrees if patient has tube feeding   - Monitor tube feeding  Outcome: Progressing     Problem: Nutrition  Goal: Nutrition/Hydration status is improving  Description: Monitor and assess patient's nutrition/hydration status for malnutrition (ex- brittle hair, bruises, dry skin, pale skin and conjunctiva, muscle wasting, smooth red tongue, and disorientation)  Collaborate with interdisciplinary team and initiate plan and interventions as ordered  Monitor patient's weight and dietary intake as ordered or per policy  Utilize nutrition screening tool and intervene per policy  Determine patient's food preferences and provide high-protein, high-caloric foods as appropriate  - Assist patient with eating   - Allow adequate time for meals   - Encourage patient to take dietary supplement as ordered  - Collaborate with clinical nutritionist   - Include patient/family/caregiver in decisions related to nutrition  Outcome: Progressing     Problem: Nutrition/Hydration-ADULT  Goal: Nutrient/Hydration intake appropriate for improving, restoring or maintaining nutritional needs  Description: Monitor and assess patient's nutrition/hydration status for malnutrition  Collaborate with interdisciplinary team and initiate plan and interventions as ordered  Monitor patient's weight and dietary intake as ordered or per policy  Utilize nutrition screening tool and intervene as necessary   Determine patient's food preferences and provide high-protein, high-caloric foods as appropriate  INTERVENTIONS:  - Monitor oral intake, urinary output, labs, and treatment plans  - Assess nutrition and hydration status and recommend course of action  - Evaluate amount of meals eaten  - Assist patient with eating if necessary   - Allow adequate time for meals  - Recommend/ encourage appropriate diets, oral nutritional supplements, and vitamin/mineral supplements  - Order, calculate, and assess calorie counts as needed  - Recommend, monitor, and adjust tube feedings and TPN/PPN based on assessed needs  - Assess need for intravenous fluids  - Provide specific nutrition/hydration education as appropriate  - Include patient/family/caregiver in decisions related to nutrition  Outcome: Progressing     Problem: MOBILITY - ADULT  Goal: Maintain or return to baseline ADL function  Description: INTERVENTIONS:  -  Assess patient's ability to carry out ADLs; assess patient's baseline for ADL function and identify physical deficits which impact ability to perform ADLs (bathing, care of mouth/teeth, toileting, grooming, dressing, etc )  - Assess/evaluate cause of self-care deficits   - Assess range of motion  - Assess patient's mobility; develop plan if impaired  - Assess patient's need for assistive devices and provide as appropriate  - Encourage maximum independence but intervene and supervise when necessary  - Involve family in performance of ADLs  - Assess for home care needs following discharge   - Consider OT consult to assist with ADL evaluation and planning for discharge  - Provide patient education as appropriate  Outcome: Progressing  Goal: Maintains/Returns to pre admission functional level  Description: INTERVENTIONS:  - Perform BMAT or MOVE assessment daily    - Set and communicate daily mobility goal to care team and patient/family/caregiver  - Collaborate with rehabilitation services on mobility goals if consulted  - Perform Range of Motion times a day    - Reposition patient every hours    - Dangle patient times a day  - Stand patient  times a day  - Ambulate patient  times a day  - Out of bed to chair times a day   - Out of bed for meals  times a day  - Out of bed for toileting  - Record patient progress and toleration of activity level   Outcome: Progressing

## 2022-10-13 NOTE — ASSESSMENT & PLAN NOTE
Body mass index is 42 17 kg/m²      • Recommend incorporating a more whole foods plant-predominant diet along with decreasing consumption of red meats and processed foods  • Per AHA guidelines, recommend moderate-vigorous intensity exercise for 30 minutes a day for 5 days a week or a total of 150 min/week

## 2022-10-13 NOTE — PLAN OF CARE
Problem: Potential for Falls  Goal: Patient will remain free of falls  Description: INTERVENTIONS:  - Educate patient/family on patient safety including physical limitations  - Instruct patient to call for assistance with activity   - Consult OT/PT to assist with strengthening/mobility   - Keep Call bell within reach  - Keep bed low and locked with side rails adjusted as appropriate  - Keep care items and personal belongings within reach  - Initiate and maintain comfort rounds  - Make Fall Risk Sign visible to staff  - Offer Toileting every 2 Hours, in advance of need  - Initiate/Maintain bed alarm  - Obtain necessary fall risk management equipment: call bell within reach  - Apply yellow socks and bracelet for high fall risk patients  - Consider moving patient to room near nurses station  10/13/2022 1933 by Belinda Richard RN  Outcome: Progressing  10/13/2022 1933 by Belinda Richard RN  Outcome: Progressing     Problem: PAIN - ADULT  Goal: Verbalizes/displays adequate comfort level or baseline comfort level  Description: Interventions:  - Encourage patient to monitor pain and request assistance  - Assess pain using appropriate pain scale  - Administer analgesics based on type and severity of pain and evaluate response  - Implement non-pharmacological measures as appropriate and evaluate response  - Consider cultural and social influences on pain and pain management  - Notify physician/advanced practitioner if interventions unsuccessful or patient reports new pain  10/13/2022 1933 by Belinda Richard RN  Outcome: Progressing  10/13/2022 1933 by Belinda Richard RN  Outcome: Progressing     Problem: INFECTION - ADULT  Goal: Absence or prevention of progression during hospitalization  Description: INTERVENTIONS:  - Assess and monitor for signs and symptoms of infection  - Monitor lab/diagnostic results  - Monitor all insertion sites, i e  indwelling lines, tubes, and drains  - Monitor endotracheal if appropriate and nasal secretions for changes in amount and color  - [place] appropriate cooling/warming therapies per order  - Administer medications as ordered  - Instruct and encourage patient and family to use good hand hygiene technique  - Identify and instruct in appropriate isolation precautions for identified infection/condition  10/13/2022 1933 by Karlee Jaimes RN  Outcome: Progressing  10/13/2022 1933 by Karlee Jaimes RN  Outcome: Progressing  Goal: Absence of fever/infection during neutropenic period  Description: INTERVENTIONS:  - Monitor WBC    10/13/2022 1933 by Karlee Jaimes RN  Outcome: Progressing  10/13/2022 1933 by Karlee Jaimes RN  Outcome: Progressing     Problem: SAFETY ADULT  Goal: Patient will remain free of falls  Description: INTERVENTIONS:  - Educate patient/family on patient safety including physical limitations  - Instruct patient to call for assistance with activity   - Consult OT/PT to assist with strengthening/mobility   - Keep Call bell within reach  - Keep bed low and locked with side rails adjusted as appropriate  - Keep care items and personal belongings within reach  - Initiate and maintain comfort rounds  - Make Fall Risk Sign visible to staff  - Offer Toileting every 2 Hours, in advance of need  - Initiate/Maintain bed alarm  - Obtain necessary fall risk management equipment: call bell within reach  - Apply yellow socks and bracelet for high fall risk patients  - Consider moving patient to room near nurses station  10/13/2022 1933 by Karlee Jaimes RN  Outcome: Progressing  10/13/2022 1933 by Karlee Jaimes RN  Outcome: Progressing  Goal: Maintain or return to baseline ADL function  Description: INTERVENTIONS:  -  Assess patient's ability to carry out ADLs; assess patient's baseline for ADL function and identify physical deficits which impact ability to perform ADLs (bathing, care of mouth/teeth, toileting, grooming, dressing, etc )  - Assess/evaluate cause of self-care deficits   - Assess range of motion  - Assess patient's mobility; develop plan if impaired  - Assess patient's need for assistive devices and provide as appropriate  - Encourage maximum independence but intervene and supervise when necessary  - Involve family in performance of ADLs  - Assess for home care needs following discharge   - Consider OT consult to assist with ADL evaluation and planning for discharge  - Provide patient education as appropriate  Outcome: Progressing  Goal: Maintains/Returns to pre admission functional level  Description: INTERVENTIONS:  - Perform BMAT or MOVE assessment daily    - Set and communicate daily mobility goal to care team and patient/family/caregiver  - Collaborate with rehabilitation services on mobility goals if consulted  - Perform Range of Motion 3 times a day  - Reposition patient every 2 hours    - Dangle patient 3 times a day  - Stand patient 3 times a day  - Ambulate patient 3 times a day  - Out of bed to chair 3 times a day   - Out of bed for meals 3 times a day  - Out of bed for toileting  - Record patient progress and toleration of activity level   Outcome: Progressing     Problem: DISCHARGE PLANNING  Goal: Discharge to home or other facility with appropriate resources  Description: INTERVENTIONS:  - Identify barriers to discharge w/patient and caregiver  - Arrange for needed discharge resources and transportation as appropriate  - Identify discharge learning needs (meds, wound care, etc )  - Arrange for interpretive services to assist at discharge as needed  - Refer to Case Management Department for coordinating discharge planning if the patient needs post-hospital services based on physician/advanced practitioner order or complex needs related to functional status, cognitive ability, or social support system  Outcome: Progressing     Problem: Knowledge Deficit  Goal: Patient/family/caregiver demonstrates understanding of disease process, treatment plan, medications, and discharge instructions  Description: Complete learning assessment and assess knowledge base  Interventions:  - Provide teaching at level of understanding  - Provide teaching via preferred learning methods  Outcome: Progressing     Problem: Neurological Deficit  Goal: Neurological status is stable or improving  Description: Interventions:  - Monitor and assess patient's level of consciousness, motor function, sensory function, and level of assistance needed for ADLs  - Monitor and report changes from baseline  Collaborate with interdisciplinary team to initiate plan and implement interventions as ordered  - Provide and maintain a safe environment  - Consider seizure precautions  - Consider fall precautions  - Consider aspiration precautions  - Consider bleeding precautions  Outcome: Progressing     Problem: Activity Intolerance/Impaired Mobility  Goal: Mobility/activity is maintained at optimum level for patient  Description: Interventions:  - Assess and monitor patient  barriers to mobility and need for assistive/adaptive devices  - Assess patient's emotional response to limitations  - Collaborate with interdisciplinary team and initiate plans and interventions as ordered  - Encourage independent activity per ability   - Maintain proper body alignment  - Perform active/passive rom as tolerated/ordered  - Plan activities to conserve energy   - Turn patient as appropriate  Outcome: Progressing     Problem: Communication Impairment  Goal: Ability to express needs and understand communication  Description: Assess patient's communication skills and ability to understand information  Patient will demonstrate use of effective communication techniques, alternative methods of communication and understanding even if not able to speak  - Encourage communication and provide alternate methods of communication as needed    - Collaborate with case management/social services for discharge needs  - Include patient/family/caregiver in decisions related to communication  Outcome: Progressing     Problem: Potential for Aspiration  Goal: Non-ventilated patient's risk of aspiration is minimized  Description: Assess and monitor vital signs, respiratory status, and labs (WBC)  Monitor for signs of aspiration (tachypnea, cough, rales, wheezing, cyanosis, fever)  - Assess and monitor patient's ability to swallow  - Place patient up in chair to eat if possible  - HOB up at 90 degrees to eat if unable to get patient up into chair   - Supervise patient during oral intake  - Instruct patient/ family to take small bites  - Instruct patient/ family to take small single sips when taking liquids  - Follow patient-specific strategies generated by speech pathologist   Outcome: Progressing  Goal: Ventilated patient's risk of aspiration is minimized  Description: Assess and monitor vital signs, respiratory status, airway cuff pressure, and labs (WBC)  Monitor for signs of aspiration (tachypnea, cough, rales, wheezing, cyanosis, fever)  - Elevate head of bed 30 degrees if patient has tube feeding   - Monitor tube feeding  Outcome: Progressing     Problem: Nutrition  Goal: Nutrition/Hydration status is improving  Description: Monitor and assess patient's nutrition/hydration status for malnutrition (ex- brittle hair, bruises, dry skin, pale skin and conjunctiva, muscle wasting, smooth red tongue, and disorientation)  Collaborate with interdisciplinary team and initiate plan and interventions as ordered  Monitor patient's weight and dietary intake as ordered or per policy  Utilize nutrition screening tool and intervene per policy  Determine patient's food preferences and provide high-protein, high-caloric foods as appropriate  - Assist patient with eating   - Allow adequate time for meals   - Encourage patient to take dietary supplement as ordered    - Collaborate with clinical nutritionist   - Include patient/family/caregiver in decisions related to nutrition  Outcome: Progressing     Problem: Nutrition/Hydration-ADULT  Goal: Nutrient/Hydration intake appropriate for improving, restoring or maintaining nutritional needs  Description: Monitor and assess patient's nutrition/hydration status for malnutrition  Collaborate with interdisciplinary team and initiate plan and interventions as ordered  Monitor patient's weight and dietary intake as ordered or per policy  Utilize nutrition screening tool and intervene as necessary  Determine patient's food preferences and provide high-protein, high-caloric foods as appropriate       INTERVENTIONS:  - Monitor oral intake, urinary output, labs, and treatment plans  - Assess nutrition and hydration status and recommend course of action  - Evaluate amount of meals eaten  - Assist patient with eating if necessary   - Allow adequate time for meals  - Recommend/ encourage appropriate diets, oral nutritional supplements, and vitamin/mineral supplements  - Order, calculate, and assess calorie counts as needed  - Recommend, monitor, and adjust tube feedings and TPN/PPN based on assessed needs  - Assess need for intravenous fluids  - Provide specific nutrition/hydration education as appropriate  - Include patient/family/caregiver in decisions related to nutrition  Outcome: Progressing     Problem: MOBILITY - ADULT  Goal: Maintain or return to baseline ADL function  Description: INTERVENTIONS:  -  Assess patient's ability to carry out ADLs; assess patient's baseline for ADL function and identify physical deficits which impact ability to perform ADLs (bathing, care of mouth/teeth, toileting, grooming, dressing, etc )  - Assess/evaluate cause of self-care deficits   - Assess range of motion  - Assess patient's mobility; develop plan if impaired  - Assess patient's need for assistive devices and provide as appropriate  - Encourage maximum independence but intervene and supervise when necessary  - Involve family in performance of ADLs  - Assess for home care needs following discharge   - Consider OT consult to assist with ADL evaluation and planning for discharge  - Provide patient education as appropriate  Outcome: Progressing  Goal: Maintains/Returns to pre admission functional level  Description: INTERVENTIONS:  - Perform BMAT or MOVE assessment daily    - Set and communicate daily mobility goal to care team and patient/family/caregiver  - Collaborate with rehabilitation services on mobility goals if consulted  - Perform Range of Motion 3 times a day  - Reposition patient every 2 hours    - Dangle patient 3 times a day  - Stand patient 3 times a day  - Ambulate patient 3 times a day  - Out of bed to chair 3 times a day   - Out of bed for meals 3 times a day  - Out of bed for toileting  - Record patient progress and toleration of activity level   Outcome: Progressing

## 2022-10-13 NOTE — PROGRESS NOTES
Progress Note - Neurology   Deena Yanes 60 y o  male [id number]  Unit/Bed#: /-01    Assessment/Plan:  Deena Yanes is a 60 y o  male    Myasthenia gravis exacerbation, bulbar ([address] )  Assessment & Plan  60 y o   male with myasthenia gravis maintained on mestinon, CAD, HLD and obesity who presented to the ED 10/10/2022 with BUE (L>R) and neck weakness, eye ptosis with blurred/ double vision, and difficulty swallowing since Friday  He ran out of his Mestinon on Saturday and missed several doses  As a result, his symptoms got progressively worse over the weekend and he presented to the ED for ongoing management  NIF -45/VC 2L  MG history:  -He was initially diagnosed with myesthenia gravis 6/2021 following 5 days of visual disturbance with new onset headache  He was treated with ivig x5 days and discharged on prednisone and mestinon 60mg tid for 1 month  He followed up with outpatient neurology 10/21 and was restarted on mestinon    -He was hospitalized 12/2021 with ocular bulbar myesthenia gravis and completed ivig x5 12/6/2021  He was discharged on mestinon 60mg qid and prednisone with bilateral lid weakness and outpatient neurology follow-up   -Last hospital admission for myasthenia exacerbation was 6/9/2022 when he presented with shortness of breath and weakness with some difficulty swallowing and bilateral eye ptosis in the setting of medication noncompliance  Initial NIF was -40  He was treated with steroid taper and 5 days of IVIG    -He was last seen by outpatient neurology 7/15/2022 and advised to continue mestinon 60mg qid and also was advised to see a neuromuscular specialist as patient may need to go on immunosuppression    - Imaging- 10/10/2022 CXR: No acute cardiopulmonary disease   - Pertinent labs: Covid/flu/rsv negative  - Relevant outpatient meds: Mestinon 60mg QID    Plan:  - Continue mestinon 60mg QID  - S/P solumedrol 1gm iv x1  - Continue IVIG at 400mg/kg x 5 days; today is day 3/5  - Monitor NIF/VC:  NIF -40, VC 3 5 this morning (-60/1 5 yesterday)  - will plan for prednisone taper at time of discharge  - PT/OT/ST  - Aspiration precautions  - Monitor neuro exam; notify with any changes  - Medical management and supportive care per primary team  Correction of any metabolic or infectious disturbances  - appreciate case management assistance with insurance/medication/transportation issues  - Discussed immunosuppressive therapy with [facility] including risks and benefits as well as frequent labs monitoring  He is willing to consider this as an option in addition to his mestinon once he has insurance and transportation for required lab work  James Bosch will need follow up in in 4 weeks with neuromuscular attending/AP  He will not require outpatient neurological testing  Subjective:   Feels about the same as yesterday  Continues with some difficulty swallowing, bilateral eye ptosis, and neck "heaviness"    Past Medical History:   Diagnosis Date   • Coronary artery disease    • Heart attack ([facility] [address] )    • Hyperlipidemia    • [facility] )      Past Surgical History:   Procedure Laterality Date   • CORONARY ANGIOPLASTY WITH STENT PLACEMENT       History reviewed  No pertinent family history    Social History     Socioeconomic History   • Marital status: /Civil Union     Spouse name: None   • Number of children: None   • Years of education: None   • Highest education level: None   Occupational History   • None   Tobacco Use   • Smoking status: Never Smoker   • Smokeless tobacco: Never Used   Vaping Use   • Vaping Use: Never used   Substance and Sexual Activity   • Alcohol use: Not Currently   • Drug use: Not Currently   • Sexual activity: None   Other Topics Concern   • None   Social History Narrative   • None     Social Determinants of Health     Financial Resource Strain: Not on file   Food Insecurity: No Food Insecurity   • Worried About Running Out of Food in the Last Year: Never true   • Ran Out of Food in the Last Year: Never true   Transportation Needs: No Transportation Needs   • Lack of Transportation (Medical): No   • Lack of Transportation (Non-Medical): No   Physical Activity: Not on file   Stress: Not on file   Social Connections: Not on file   Intimate Partner Violence: Not on file   Housing Stability: Unknown   • Unable to Pay for Housing in the Last Year: Patient refused   • Number of Places Lived in the Last Year: 2   • Unstable Housing in the Last Year: No     E-Cigarette/Vaping   • E-Cigarette Use Never User      E-Cigarette/Vaping Substances   • Nicotine No    • THC No    • CBD No    • Flavoring No    • Other No    • Unknown No      Medications: All current active meds have been reviewed and current meds:  Scheduled Meds:  Current Facility-Administered Medications   Medication Dose Route Frequency Provider Last Rate   • acetaminophen  650 mg Oral Q6H PRN Tiffany Fajardo PA-C     • aspirin  81 mg Oral Daily Georgene Curling [place]     • enoxaparin  40 mg Subcutaneous Daily Georgene Curling [place], [place]     • glycerin-hypromellose-  1 drop Both Eyes Q4H PRN Tiffany Fajardo PA-C     • immune globulin, human  400 mg/kg (Adjusted) Intravenous Q24H MARCELLA Youssef Stopped (10/12/22 1928)   • pantoprazole  40 mg Oral Early Morning Tiffany Fajardo PA-C     • pyridostigmine  60 mg Oral 4x Daily Tiffany Fajardo PA-C     • sodium chloride  1 spray Each Nare Q1H PRN Armani Hu DO       Continuous Infusions:   PRN Meds: •  acetaminophen  •  glycerin-hypromellose-  •  sodium chloride       ROS:   Review of Systems   HENT: Positive for trouble swallowing  Neurological: Positive for weakness  Vitals:   /52   Pulse 68   Temp 98 3 °F (36 8 °C)   Resp 18   Ht 6' 1" (1 854 m)   Wt (!) 145 kg (319 lb 10 7 oz)   SpO2 95%   BMI 42 17 kg/m²     Physical Exam:   Physical Exam  Vitals reviewed     Constitutional:       General: He is not in acute distress  HENT:      Head: Normocephalic and atraumatic  Eyes:      Pupils: Pupils are equal, round, and reactive to light  Pulmonary:      Effort: Pulmonary effort is normal    Skin:     General: Skin is warm and dry  Neurological:      Mental Status: He is alert and oriented to person, place, and time  Coordination: Finger-Nose-Finger Test normal       Deep Tendon Reflexes: Strength normal    Psychiatric:         Speech: Speech normal        Neurologic Exam     Mental Status   Oriented to person, place, and time  Speech: speech is normal   Level of consciousness: alert    Cranial Nerves     CN II   Right visual field deficit: none  Left visual field deficit: none     CN III, IV, VI   Pupils are equal, round, and reactive to light  Nystagmus: none     CN V   Right facial sensation deficit: none  Left facial sensation deficit: none    CN VII   Right facial weakness: none  Left facial weakness: none    CN VIII   Hearing: intact    CN IX, X   Palate: symmetric    CN XI   Right sternocleidomastoid strength: normal  Right trapezius strength: normal    CN XII   Tongue deviation: none  Limited eom L>R with decreased adbuction  Bilateral ptosis appears to be improving     Motor Exam   Right arm pronator drift: absent  Left arm pronator drift: absent    Strength   Strength 5/5 throughout  Sensory Exam   Light touch normal      Gait, Coordination, and Reflexes     Coordination   Finger to nose coordination: normal    Tremor   Resting tremor: absent      Labs: I have personally reviewed pertinent reports  No results found for this or any previous visit (from the past 24 hour(s))  Imaging: I have personally reviewed pertinent imaging in PACS and I have personally reviewed PACS reports  EKG, Pathology, and Other Studies: I have personally reviewed pertinent reports  Counseling / Coordination of Care  Total time spent today 28 minutes    Greater than 50% of total time was spent with the patient and/or family counseling and/or coordination of care  Patient was seen and evaluated  Discussed with attending neurologist, Dr Dwayne Trevino  Chart reviewed thoroughly including laboratory and imaging studies    Plan of care discussed with patient and primary team

## 2022-10-13 NOTE — PROGRESS NOTES
[facility]  Progress Note Vijaya Brooks 1960, 60 y o  male MRN: [id number]  Unit/Bed#: -01 Encounter: [id number]  Primary Care Provider: No primary care provider on file  Date and time admitted to hospital: 10/10/2022  6:21 PM    * Myasthenia gravis in crisis [facility])  Assessment & Plan  Reports worsening BL upper extremity weakness and ptosis since Saturday  On Saturday he ran out of his Mestinon and reports it has been around 4 months since his last outpatient IVIG treatment (typically gets every 3 months)  He denies any respiratory complaint or SOB at this time  /52   Pulse 68   Temp 98 3 °F (36 8 °C)   Resp 18   Ht 6' 1" (1 854 m)   Wt (!) 145 kg (319 lb 10 7 oz)   SpO2 95%   BMI 42 17 kg/m²     · Typically gets outpatient IVIG [id number]; has been 4month since last  · Reports running out of his mestanon Saturday   · Developed mild BL upper extremity weakness, right eye ptosis, and resulting double vision since Sat  · Denies any SOB or respiratory difficulty; not in respiratory distress    NIF: -35 cm H2O    · CXR negative  · Neuro on board, appreciate reccs; IVIG for 3/5 days  · Continue mestinon 60mg qid with plans for prednisone taper  · Maintain close NIF/respiratory monitoring     Hyperlipidemia  Assessment & Plan  Lab Results   Component Value Date    CHOLESTEROL 198 12/02/2021    TRIG 118 12/02/2021    HDL 46 12/02/2021    [address] (H) 12/02/2021       · Not currently on outpatient statin    Myasthenia gravis exacerbation, bulbar ([address] )  Assessment & Plan  · In suspected crisis as above     Morbid obesity ([address] )  Assessment & Plan  Body mass index is 42 17 kg/m²      • Recommend incorporating a more whole foods plant-predominant diet along with decreasing consumption of red meats and processed foods  • Per AHA guidelines, recommend moderate-vigorous intensity exercise for 30 minutes a day for 5 days a week or a total of 150 min/week      CAD (coronary artery disease)  Assessment & Plan  · Denies chest pain  · Continue home daily ASA         VTE Pharmacologic Prophylaxis: VTE Score: 4 Moderate Risk (Score 3-4) - Pharmacological DVT Prophylaxis Ordered: enoxaparin (Lovenox)  Patient Centered Rounds: I performed bedside rounds with nursing staff today  Discussions with Specialists or Other Care Team Provider:  Neurology    Education and Discussions with Family / Patient: Patient declined call to   Time Spent for Care: 70 minutes  More than 50% of total time spent on counseling and coordination of care as described above  Current Length of Stay: 3 day(s)  Current Patient Status: Inpatient   Certification Statement: The patient will continue to require additional inpatient hospital stay due to IVIG bernarda 3/5  Discharge Plan: Anticipate discharge in 48 hrs to home with home services  Code Status: Level 1 - Full Code    Subjective: Today, the patient states he feels eslightly better  he notes that still has mild discomfort with swallowing food  Discussed importance of alternating between sips and bites and small  All questions answered    Objective:     Vitals:   Temp (24hrs), Av 1 °F (36 7 °C), Min:97 6 °F (36 4 °C), Max:98 3 °F (36 8 °C)    Temp:  [97 6 °F (36 4 °C)-98 3 °F (36 8 °C)] 98 3 °F (36 8 °C)  HR:  [60-68] 68  Resp:  [18] 18  BP: (112-124)/(52-63) 115/52  SpO2:  [94 %-100 %] 95 %  Body mass index is 42 17 kg/m²  Input and Output Summary (last 24 hours): Intake/Output Summary (Last 24 hours) at 10/13/2022 1838  Last data filed at 10/13/2022 1455  Gross per 24 hour   Intake 240 ml   Output 1975 ml   Net -1735 ml       Physical Exam:   Physical Exam  Vitals and nursing note reviewed  Constitutional:       General: He is not in acute distress  Appearance: Normal appearance  HENT:      Head: Normocephalic and atraumatic        Right Ear: External ear normal       Left Ear: External ear normal       Nose: Nose normal       Mouth/Throat:      Mouth: Mucous membranes are moist    Eyes:      Pupils: Pupils are equal, round, and reactive to light  Comments: Ptosis is noted, but improving; he has tape to keep eyelids open   Cardiovascular:      Rate and Rhythm: Normal rate and regular rhythm  Pulses: Normal pulses  Heart sounds: Normal heart sounds  No murmur heard  Pulmonary:      Effort: Pulmonary effort is normal  No respiratory distress  Breath sounds: Normal breath sounds  No wheezing or rales  Chest:      Chest wall: No tenderness  Abdominal:      General: Bowel sounds are normal  There is no distension  Palpations: Abdomen is soft  There is no mass  Tenderness: There is no abdominal tenderness  There is no guarding  Musculoskeletal:         General: No swelling or tenderness  Cervical back: Normal range of motion and neck supple  No rigidity or tenderness  Right lower leg: No edema  Left lower leg: No edema  Skin:     General: Skin is warm and dry  Capillary Refill: Capillary refill takes less than 2 seconds  Findings: No lesion or rash  Neurological:      General: No focal deficit present  Mental Status: He is alert and oriented to person, place, and time  Psychiatric:         Mood and Affect: Mood normal          Behavior: Behavior normal          Thought Content:  Thought content normal          Judgment: Judgment normal           Additional Data:     Labs:  Results from last 7 days   Lab Units 10/12/22  0352 10/11/22  0419   WBC Thousand/uL 18 05* 5 17   HEMOGLOBIN g/dL 14 2 15 9   HEMATOCRIT % 43 9 48 9   PLATELETS Thousands/uL 227 234   NEUTROS PCT %  --  84*   LYMPHS PCT %  --  15   MONOS PCT %  --  1*   EOS PCT %  --  0     Results from last 7 days   Lab Units 10/12/22  0352 10/11/22  0419 10/10/22  1902   SODIUM mmol/L 139   < > 138   POTASSIUM mmol/L 4 4   < > 4 2   CHLORIDE mmol/L 106   < > 104   CO2 mmol/L 29   < > 30   BUN mg/dL 18   < > 15   CREATININE mg/dL 0 98   < > 1 21   ANION GAP mmol/L 4   < > 4   CALCIUM mg/dL 9 2   < > 9 1   ALBUMIN g/dL  --   --  3 3*   TOTAL BILIRUBIN mg/dL  --   --  0 33   ALK PHOS U/L  --   --  104   ALT U/L  --   --  32   AST U/L  --   --  24   GLUCOSE RANDOM mg/dL 133   < > 107    < > = values in this interval not displayed  Results from last 7 days   Lab Units 10/10/22  1902   INR  1 02                   Lines/Drains:  Invasive Devices  Report    Peripheral Intravenous Line  Duration           Peripheral IV 10/10/22 Left Antecubital 2 days                      Imaging: Reviewed radiology reports from this admission including: procedure reports    Recent Cultures (last 7 days):         Last 24 Hours Medication List:   Current Facility-Administered Medications   Medication Dose Route Frequency Provider Last Rate   • acetaminophen  650 mg Oral Q6H PRN Lexine Bryce, PA-C     • aspirin  81 mg Oral Daily Lexine Bryce, PA-C     • enoxaparin  40 mg Subcutaneous Daily Lexine Bryce, PA-C     • glycerin-hypromellose-  1 drop Both Eyes Q4H PRN Lexine Bryce, PA-C     • immune globulin, human  400 mg/kg (Adjusted) Intravenous Q24H MARCELLA Lamb 196 2 mL/hr at 10/13/22 1742   • pantoprazole  40 mg Oral Early Morning Lexine Bryce, PA-C     • pyridostigmine  60 mg Oral 4x Daily Lexine Bryce, PA-C     • sodium chloride  1 spray Each Nare Q1H PRN Phyllis Nichols DO          Today, Patient Was Seen By: Phyllis Nichols    **Please Note: This note may have been constructed using a voice recognition system  **

## 2022-10-13 NOTE — ASSESSMENT & PLAN NOTE
Reports worsening BL upper extremity weakness and ptosis since Saturday  On Saturday he ran out of his Mestinon and reports it has been around 4 months since his last outpatient IVIG treatment (typically gets every 3 months)  He denies any respiratory complaint or SOB at this time  /59   Pulse 68   Temp 98 2 °F (36 8 °C)   Resp 18   Ht 6' 1" (1 854 m)   SpO2 96%   BMI 44 46 kg/m²     · Typically gets outpatient IVIG [id number]; has been 4month since last  · Reports running out of his mestanon Saturday   · Developed mild BL upper extremity weakness, right eye ptosis, and resulting double vision since Sat    · Denies any SOB or respiratory difficulty; not in respiratory distress    NIF: -30 cm H2O    · CXR negative  · Neuro on board, appreciate reccs; IVIG for 1/5 days  · Continue mestinon 60mg qid   · Maintain close NIF/respiratory monitoring

## 2022-10-13 NOTE — PROGRESS NOTES
Patient alert and oriented x 4  Pleasant to staff  Able to verbalize needs and obey commands during shift  Patient tolerated medication  Slept for shot period of time during shift  Patient continues on oxygen therapy via nasal cannula  No deviation in assessment finding noted when compared to previous documentation  Will continue to monitor

## 2022-10-13 NOTE — PROGRESS NOTES
[address] [facility]  Progress Note Marcus Briones 1960, 60 y o  male MRN: [id number]  Unit/Bed#: -01 Encounter: [id number]  Primary Care Provider: No primary care provider on file  Date and time admitted to hospital: 10/10/2022  6:21 PM    * Myasthenia gravis in crisis [facility])  Assessment & Plan  Reports worsening BL upper extremity weakness and ptosis since Saturday  On Saturday he ran out of his Mestinon and reports it has been around 4 months since his last outpatient IVIG treatment (typically gets every 3 months)  He denies any respiratory complaint or SOB at this time  /59   Pulse 68   Temp 98 2 °F (36 8 °C)   Resp 18   Ht 6' 1" (1 854 m)   SpO2 96%   BMI 44 46 kg/m²     · Typically gets outpatient IVIG [id number]; has been 4month since last  · Reports running out of his mestanon Saturday   · Developed mild BL upper extremity weakness, right eye ptosis, and resulting double vision since Sat  · Denies any SOB or respiratory difficulty; not in respiratory distress    NIF: -30 cm H2O    · CXR negative  · Neuro on board, appreciate reccs; IVIG for 1/5 days  · Continue mestinon 60mg qid   · Maintain close NIF/respiratory monitoring     Hyperlipidemia  Assessment & Plan  Lab Results   Component Value Date    CHOLESTEROL 198 12/02/2021    TRIG 118 12/02/2021    HDL 46 12/02/2021    [address] (H) 12/02/2021       · Not currently on outpatient statin    Myasthenia gravis exacerbation, bulbar ([facility] [address] )  Assessment & Plan  · In suspected crisis as above     Morbid obesity ([facility] [address] )  Assessment & Plan  Body mass index is 44 46 kg/m²      • Recommend incorporating a more whole foods plant-predominant diet along with decreasing consumption of red meats and processed foods  • Per AHA guidelines, recommend moderate-vigorous intensity exercise for 30 minutes a day for 5 days a week or a total of 150 min/week      CAD (coronary artery disease)  Assessment & Plan  · Denies chest pain  · Continue home daily ASA     VTE Pharmacologic Prophylaxis: VTE Score: 4 Moderate Risk (Score 3-4) - Pharmacological DVT Prophylaxis Ordered: enoxaparin (Lovenox)  Patient Centered Rounds: I performed bedside rounds with nursing staff today  Discussions with Specialists or Other Care Team Provider:  Neurology    Education and Discussions with Family / Patient: Patient declined call to   Time Spent for Care: 90 minutes  More than 50% of total time spent on counseling and coordination of care as described above  Current Length of Stay: 2 day(s)  Current Patient Status: Inpatient   Certification Statement: The patient will continue to require additional inpatient hospital stay due to IVIG/neurology workup  Discharge Plan: Anticipate discharge in >72 hrs to home with home services  Code Status: Level 1 - Full Code    Subjective:   Patient expressed frustration regarding diet dysphagia change  Changed back to normal diet  Discussed alternating with sips    Objective:     Vitals:   Temp (24hrs), Av °F (36 7 °C), Min:97 5 °F (36 4 °C), Max:98 4 °F (36 9 °C)    Temp:  [97 5 °F (36 4 °C)-98 4 °F (36 9 °C)] 98 2 °F (36 8 °C)  HR:  [52-71] 68  Resp:  [18-20] 18  BP: (112-136)/(58-64) 112/59  SpO2:  [94 %-100 %] 96 %  Body mass index is 44 46 kg/m²  Input and Output Summary (last 24 hours): Intake/Output Summary (Last 24 hours) at 10/12/2022 2118  Last data filed at 10/12/2022 1801  Gross per 24 hour   Intake 1860 ml   Output 775 ml   Net 1085 ml       Physical Exam:   Physical Exam  Vitals and nursing note reviewed  Constitutional:       General: He is not in acute distress  Appearance: Normal appearance  HENT:      Head: Normocephalic and atraumatic  Right Ear: External ear normal       Left Ear: External ear normal       Nose: Nose normal       Mouth/Throat:      Mouth: Mucous membranes are moist    Eyes:      Pupils: Pupils are equal, round, and reactive to light        Comments: Ptosis is noted   Cardiovascular:      Rate and Rhythm: Normal rate and regular rhythm  Pulses: Normal pulses  Heart sounds: Normal heart sounds  No murmur heard  Pulmonary:      Effort: Pulmonary effort is normal  No respiratory distress  Breath sounds: Normal breath sounds  No wheezing or rales  Chest:      Chest wall: No tenderness  Abdominal:      General: Bowel sounds are normal  There is no distension  Palpations: Abdomen is soft  There is no mass  Tenderness: There is no abdominal tenderness  There is no guarding  Musculoskeletal:         General: No swelling or tenderness  Cervical back: Normal range of motion and neck supple  No rigidity or tenderness  Right lower leg: No edema  Left lower leg: No edema  Skin:     General: Skin is warm and dry  Capillary Refill: Capillary refill takes less than 2 seconds  Findings: No lesion or rash  Neurological:      General: No focal deficit present  Mental Status: He is alert and oriented to person, place, and time  Psychiatric:         Mood and Affect: Mood normal          Behavior: Behavior normal          Thought Content:  Thought content normal          Judgment: Judgment normal           Additional Data:     Labs:  Results from last 7 days   Lab Units 10/12/22  0352 10/11/22  0419   WBC Thousand/uL 18 05* 5 17   HEMOGLOBIN g/dL 14 2 15 9   HEMATOCRIT % 43 9 48 9   PLATELETS Thousands/uL 227 234   NEUTROS PCT %  --  84*   LYMPHS PCT %  --  15   MONOS PCT %  --  1*   EOS PCT %  --  0     Results from last 7 days   Lab Units 10/12/22  0352 10/11/22  0419 10/10/22  1902   SODIUM mmol/L 139   < > 138   POTASSIUM mmol/L 4 4   < > 4 2   CHLORIDE mmol/L 106   < > 104   CO2 mmol/L 29   < > 30   BUN mg/dL 18   < > 15   CREATININE mg/dL 0 98   < > 1 21   ANION GAP mmol/L 4   < > 4   CALCIUM mg/dL 9 2   < > 9 1   ALBUMIN g/dL  --   --  3 3*   TOTAL BILIRUBIN mg/dL  --   --  0 33   ALK PHOS U/L  --   --  104 ALT U/L  --   --  32   AST U/L  --   --  24   GLUCOSE RANDOM mg/dL 133   < > 107    < > = values in this interval not displayed  Results from last 7 days   Lab Units 10/10/22  1902   INR  1 02                   Lines/Drains:  Invasive Devices  Report    Peripheral Intravenous Line  Duration           Peripheral IV 10/10/22 Left Antecubital 2 days                      Imaging: Reviewed radiology reports from this admission including: procedure reports    Recent Cultures (last 7 days):         Last 24 Hours Medication List:   Current Facility-Administered Medications   Medication Dose Route Frequency Provider Last Rate   • acetaminophen  650 mg Oral Q6H PRN Lalitha Hernandez PA-C     • aspirin  81 mg Oral Daily Lalitha Hernandez PA-C     • enoxaparin  40 mg Subcutaneous Daily Lalitha Hernandez PA-C     • glycerin-hypromellose-  1 drop Both Eyes Q4H PRN Lalitha Hernandez PA-C     • immune globulin, human  400 mg/kg (Adjusted) Intravenous Q24H MARCELLA Stein Stopped (10/12/22 1928)   • pantoprazole  40 mg Oral Early Morning Lalitha Hernandez PA-C     • pyridostigmine  60 mg Oral 4x Daily Lalitha Hernandez PA-C     • sodium chloride  1 spray Each Nare Q1H PRN Juan Jose Ruby DO          Today, Patient Was Seen By: Juan Jose Ruby    **Please Note: This note may have been constructed using a voice recognition system  **

## 2022-10-13 NOTE — ASSESSMENT & PLAN NOTE
Reports worsening BL upper extremity weakness and ptosis since Saturday  On Saturday he ran out of his Mestinon and reports it has been around 4 months since his last outpatient IVIG treatment (typically gets every 3 months)  He denies any respiratory complaint or SOB at this time  /52   Pulse 68   Temp 98 3 °F (36 8 °C)   Resp 18   Ht 6' 1" (1 854 m)   Wt (!) 145 kg (319 lb 10 7 oz)   SpO2 95%   BMI 42 17 kg/m²     · Typically gets outpatient IVIG [id number]; has been 4month since last  · Reports running out of his mestanon Saturday   · Developed mild BL upper extremity weakness, right eye ptosis, and resulting double vision since Sat    · Denies any SOB or respiratory difficulty; not in respiratory distress    NIF: -35 cm H2O    · CXR negative  · Neuro on board, appreciate reccs; IVIG for 3/5 days  · Continue mestinon 60mg qid with plans for prednisone taper  · Maintain close NIF/respiratory monitoring

## 2022-10-13 NOTE — PROGRESS NOTES
Spiritual Care Progress Note    10/13/2022  Patient: Law Kim : 1960  Admission Date & Time: 10/10/2022 1821  MRN: [id number] [facility]: [id number]       attempted to visit patient per CM referral  Pt sleeping during attempted visits   did not wake patient  Spiritual care will remain available       10/13/22 1600   Clinical Encounter Type   Visited With Patient not available   Routine Visit Introduction   Referral From

## 2022-10-14 PROBLEM — H02.403 PTOSIS, BILATERAL: Status: ACTIVE | Noted: 2021-06-07

## 2022-10-14 LAB
ERYTHROCYTE [DISTWIDTH] IN BLOOD BY AUTOMATED COUNT: 13.9 % (ref 11.6–15.1)
HCT VFR BLD AUTO: 46.9 % (ref 36.5–49.3)
HGB BLD-MCNC: 14.7 G/DL (ref 12–17)
MCH RBC QN AUTO: 27.5 PG (ref 26.8–34.3)
MCHC RBC AUTO-ENTMCNC: 31.3 G/DL (ref 31.4–37.4)
MCV RBC AUTO: 88 FL (ref 82–98)
PLATELET # BLD AUTO: 222 THOUSANDS/UL (ref 149–390)
PMV BLD AUTO: 10.3 FL (ref 8.9–12.7)
RBC # BLD AUTO: 5.34 MILLION/UL (ref 3.88–5.62)
WBC # BLD AUTO: 6.1 THOUSAND/UL (ref 4.31–10.16)

## 2022-10-14 PROCEDURE — 99233 SBSQ HOSP IP/OBS HIGH 50: CPT | Performed by: INTERNAL MEDICINE

## 2022-10-14 PROCEDURE — 85027 COMPLETE CBC AUTOMATED: CPT | Performed by: INTERNAL MEDICINE

## 2022-10-14 PROCEDURE — 94150 VITAL CAPACITY TEST: CPT

## 2022-10-14 RX ORDER — PREDNISONE 10 MG/1
TABLET ORAL
Qty: 68 TABLET | Refills: 0 | Status: SHIPPED | OUTPATIENT
Start: 2022-10-14 | End: 2022-11-03

## 2022-10-14 RX ORDER — PYRIDOSTIGMINE BROMIDE 60 MG/1
60 TABLET ORAL 4 TIMES DAILY
Qty: 360 TABLET | Refills: 0 | Status: SHIPPED | OUTPATIENT
Start: 2022-10-14 | End: 2022-10-14

## 2022-10-14 RX ORDER — PYRIDOSTIGMINE BROMIDE 60 MG/1
60 TABLET ORAL 4 TIMES DAILY
Qty: 360 TABLET | Refills: 0 | Status: SHIPPED | OUTPATIENT
Start: 2022-10-14 | End: 2023-01-12

## 2022-10-14 RX ADMIN — ENOXAPARIN SODIUM 40 MG: 40 INJECTION SUBCUTANEOUS at 09:54

## 2022-10-14 RX ADMIN — PYRIDOSTIGMINE BROMIDE 60 MG: 60 TABLET ORAL at 11:28

## 2022-10-14 RX ADMIN — PYRIDOSTIGMINE BROMIDE 60 MG: 60 TABLET ORAL at 16:39

## 2022-10-14 RX ADMIN — PYRIDOSTIGMINE BROMIDE 60 MG: 60 TABLET ORAL at 00:01

## 2022-10-14 RX ADMIN — ASPIRIN 81 MG: 81 TABLET, COATED ORAL at 09:54

## 2022-10-14 RX ADMIN — PANTOPRAZOLE SODIUM 40 MG: 40 TABLET, DELAYED RELEASE ORAL at 06:06

## 2022-10-14 RX ADMIN — PYRIDOSTIGMINE BROMIDE 60 MG: 60 TABLET ORAL at 06:06

## 2022-10-14 RX ADMIN — Medication 42.5 G: at 16:39

## 2022-10-14 NOTE — PLAN OF CARE
Problem: Potential for Falls  Goal: Patient will remain free of falls  Description: INTERVENTIONS:  - Educate patient/family on patient safety including physical limitations  - Instruct patient to call for assistance with activity   - Consult OT/PT to assist with strengthening/mobility   - Keep Call bell within reach  - Keep bed low and locked with side rails adjusted as appropriate  - Keep care items and personal belongings within reach  - Initiate and maintain comfort rounds  - Make Fall Risk Sign visible to staff  - Offer Toileting every  Hours, in advance of need  - Initiate/Maintain alarm  - Obtain necessary fall risk management equipment:   - Apply yellow socks and bracelet for high fall risk patients  - Consider moving patient to room near nurses station  Outcome: Progressing     Problem: PAIN - ADULT  Goal: Verbalizes/displays adequate comfort level or baseline comfort level  Description: Interventions:  - Encourage patient to monitor pain and request assistance  - Assess pain using appropriate pain scale  - Administer analgesics based on type and severity of pain and evaluate response  - Implement non-pharmacological measures as appropriate and evaluate response  - Consider cultural and social influences on pain and pain management  - Notify physician/advanced practitioner if interventions unsuccessful or patient reports new pain  Outcome: Progressing     Problem: INFECTION - ADULT  Goal: Absence or prevention of progression during hospitalization  Description: INTERVENTIONS:  - Assess and monitor for signs and symptoms of infection  - Monitor lab/diagnostic results  - Monitor all insertion sites, i e  indwelling lines, tubes, and drains  - Monitor endotracheal if appropriate and nasal secretions for changes in amount and color  - [place] appropriate cooling/warming therapies per order  - Administer medications as ordered  - Instruct and encourage patient and family to use good hand hygiene technique  - Identify and instruct in appropriate isolation precautions for identified infection/condition  Outcome: Progressing  Goal: Absence of fever/infection during neutropenic period  Description: INTERVENTIONS:  - Monitor WBC    Outcome: Progressing     Problem: SAFETY ADULT  Goal: Patient will remain free of falls  Description: INTERVENTIONS:  - Educate patient/family on patient safety including physical limitations  - Instruct patient to call for assistance with activity   - Consult OT/PT to assist with strengthening/mobility   - Keep Call bell within reach  - Keep bed low and locked with side rails adjusted as appropriate  - Keep care items and personal belongings within reach  - Initiate and maintain comfort rounds  - Make Fall Risk Sign visible to staff  - Offer Toileting every  Hours, in advance of need  - Initiate/Maintain alarm  - Obtain necessary fall risk management equipment:   - Apply yellow socks and bracelet for high fall risk patients  - Consider moving patient to room near nurses station  Outcome: Progressing  Goal: Maintain or return to baseline ADL function  Description: INTERVENTIONS:  -  Assess patient's ability to carry out ADLs; assess patient's baseline for ADL function and identify physical deficits which impact ability to perform ADLs (bathing, care of mouth/teeth, toileting, grooming, dressing, etc )  - Assess/evaluate cause of self-care deficits   - Assess range of motion  - Assess patient's mobility; develop plan if impaired  - Assess patient's need for assistive devices and provide as appropriate  - Encourage maximum independence but intervene and supervise when necessary  - Involve family in performance of ADLs  - Assess for home care needs following discharge   - Consider OT consult to assist with ADL evaluation and planning for discharge  - Provide patient education as appropriate  Outcome: Progressing  Goal: Maintains/Returns to pre admission functional level  Description: INTERVENTIONS:  - Perform BMAT or MOVE assessment daily    - Set and communicate daily mobility goal to care team and patient/family/caregiver  - Collaborate with rehabilitation services on mobility goals if consulted  - Perform Range of Motion  times a day  - Reposition patient every  hours  - Dangle patient  times a day  - Stand patient  times a day  - Ambulate patient  times a day  - Out of bed to chair  times a day   - Out of bed for meals  times a day  - Out of bed for toileting  - Record patient progress and toleration of activity level   Outcome: Progressing     Problem: DISCHARGE PLANNING  Goal: Discharge to home or other facility with appropriate resources  Description: INTERVENTIONS:  - Identify barriers to discharge w/patient and caregiver  - Arrange for needed discharge resources and transportation as appropriate  - Identify discharge learning needs (meds, wound care, etc )  - Arrange for interpretive services to assist at discharge as needed  - Refer to Case Management Department for coordinating discharge planning if the patient needs post-hospital services based on physician/advanced practitioner order or complex needs related to functional status, cognitive ability, or social support system  Outcome: Progressing     Problem: Knowledge Deficit  Goal: Patient/family/caregiver demonstrates understanding of disease process, treatment plan, medications, and discharge instructions  Description: Complete learning assessment and assess knowledge base  Interventions:  - Provide teaching at level of understanding  - Provide teaching via preferred learning methods  Outcome: Progressing     Problem: Neurological Deficit  Goal: Neurological status is stable or improving  Description: Interventions:  - Monitor and assess patient's level of consciousness, motor function, sensory function, and level of assistance needed for ADLs  - Monitor and report changes from baseline   Collaborate with interdisciplinary team to initiate plan and implement interventions as ordered  - Provide and maintain a safe environment  - Consider seizure precautions  - Consider fall precautions  - Consider aspiration precautions  - Consider bleeding precautions  Outcome: Progressing     Problem: Activity Intolerance/Impaired Mobility  Goal: Mobility/activity is maintained at optimum level for patient  Description: Interventions:  - Assess and monitor patient  barriers to mobility and need for assistive/adaptive devices  - Assess patient's emotional response to limitations  - Collaborate with interdisciplinary team and initiate plans and interventions as ordered  - Encourage independent activity per ability   - Maintain proper body alignment  - Perform active/passive rom as tolerated/ordered  - Plan activities to conserve energy   - Turn patient as appropriate  Outcome: Progressing     Problem: Communication Impairment  Goal: Ability to express needs and understand communication  Description: Assess patient's communication skills and ability to understand information  Patient will demonstrate use of effective communication techniques, alternative methods of communication and understanding even if not able to speak  - Encourage communication and provide alternate methods of communication as needed  - Collaborate with case management/ for discharge needs  - Include patient/family/caregiver in decisions related to communication  Outcome: Progressing     Problem: Potential for Aspiration  Goal: Non-ventilated patient's risk of aspiration is minimized  Description: Assess and monitor vital signs, respiratory status, and labs (WBC)  Monitor for signs of aspiration (tachypnea, cough, rales, wheezing, cyanosis, fever)  - Assess and monitor patient's ability to swallow  - Place patient up in chair to eat if possible    - HOB up at 90 degrees to eat if unable to get patient up into chair   - Supervise patient during oral intake  - Instruct patient/ family to take small bites  - Instruct patient/ family to take small single sips when taking liquids  - Follow patient-specific strategies generated by speech pathologist   Outcome: Progressing  Goal: Ventilated patient's risk of aspiration is minimized  Description: Assess and monitor vital signs, respiratory status, airway cuff pressure, and labs (WBC)  Monitor for signs of aspiration (tachypnea, cough, rales, wheezing, cyanosis, fever)  - Elevate head of bed 30 degrees if patient has tube feeding   - Monitor tube feeding  Outcome: Progressing     Problem: Nutrition  Goal: Nutrition/Hydration status is improving  Description: Monitor and assess patient's nutrition/hydration status for malnutrition (ex- brittle hair, bruises, dry skin, pale skin and conjunctiva, muscle wasting, smooth red tongue, and disorientation)  Collaborate with interdisciplinary team and initiate plan and interventions as ordered  Monitor patient's weight and dietary intake as ordered or per policy  Utilize nutrition screening tool and intervene per policy  Determine patient's food preferences and provide high-protein, high-caloric foods as appropriate  - Assist patient with eating   - Allow adequate time for meals   - Encourage patient to take dietary supplement as ordered  - Collaborate with clinical nutritionist   - Include patient/family/caregiver in decisions related to nutrition  Outcome: Progressing     Problem: Nutrition/Hydration-ADULT  Goal: Nutrient/Hydration intake appropriate for improving, restoring or maintaining nutritional needs  Description: Monitor and assess patient's nutrition/hydration status for malnutrition  Collaborate with interdisciplinary team and initiate plan and interventions as ordered  Monitor patient's weight and dietary intake as ordered or per policy  Utilize nutrition screening tool and intervene as necessary   Determine patient's food preferences and provide high-protein, high-caloric foods as appropriate  INTERVENTIONS:  - Monitor oral intake, urinary output, labs, and treatment plans  - Assess nutrition and hydration status and recommend course of action  - Evaluate amount of meals eaten  - Assist patient with eating if necessary   - Allow adequate time for meals  - Recommend/ encourage appropriate diets, oral nutritional supplements, and vitamin/mineral supplements  - Order, calculate, and assess calorie counts as needed  - Recommend, monitor, and adjust tube feedings and TPN/PPN based on assessed needs  - Assess need for intravenous fluids  - Provide specific nutrition/hydration education as appropriate  - Include patient/family/caregiver in decisions related to nutrition  Outcome: Progressing     Problem: MOBILITY - ADULT  Goal: Maintain or return to baseline ADL function  Description: INTERVENTIONS:  -  Assess patient's ability to carry out ADLs; assess patient's baseline for ADL function and identify physical deficits which impact ability to perform ADLs (bathing, care of mouth/teeth, toileting, grooming, dressing, etc )  - Assess/evaluate cause of self-care deficits   - Assess range of motion  - Assess patient's mobility; develop plan if impaired  - Assess patient's need for assistive devices and provide as appropriate  - Encourage maximum independence but intervene and supervise when necessary  - Involve family in performance of ADLs  - Assess for home care needs following discharge   - Consider OT consult to assist with ADL evaluation and planning for discharge  - Provide patient education as appropriate  Outcome: Progressing  Goal: Maintains/Returns to pre admission functional level  Description: INTERVENTIONS:  - Perform BMAT or MOVE assessment daily    - Set and communicate daily mobility goal to care team and patient/family/caregiver     - Collaborate with rehabilitation services on mobility goals if consulted  - Perform Range of Motion  times a day   - Reposition patient every  hours    - Dangle patient  times a day  - Stand patient  times a day  - Ambulate patient  times a day  - Out of bed to chair  times a day   - Out of bed for meals times a day  - Out of bed for toileting  - Record patient progress and toleration of activity level   Outcome: Progressing

## 2022-10-14 NOTE — CASE MANAGEMENT
Case Management Progress Note    Patient name Maverick Seaman  Location /-76 MRN [id number]  : 1960 Date 10/14/2022       LOS (days): 4  Geometric Mean LOS (GMLOS) (days):   Days to [address]:        OBJECTIVE:        Current admission status: Inpatient  Preferred Pharmacy:   [facility] [address]  [place], [place] - [address]  [address] [address] [facility]  [address]  Phone: [phone] Fax: [phone]    [address] [place], [address] [facility]  Phone: [phone] Fax: [phone]    Primary Care Provider: No primary care provider on file  Primary Insurance:   Secondary Insurance:     PROGRESS NOTE:  CM contacted the [facility] ([phone]) and [facility] ([phone]) requesting assistance with prescription cost   Messages left with request for return call  CM reviewed online rx savings options such as [employer] and GoodRX, none of which bring patient below his current cost of $35 x month  CM submitted application for financial assistance via [facility]   (fax: [phone])  Per pharmacy staff, there is no weekend delivery of meds  Medication would need to be picked up at [facility]  Saturday hours are 9a-3p  CM contacted son to inform of pick-up information  Patient will be able to obtain 1 month of Mestinon medication at no cost       CM to establish PCP appt for patient so he can begin OP CM services  Per family, patient is likely to be non-compliant  CM to provide setup/resources  Will continue to follow

## 2022-10-14 NOTE — RESPIRATORY THERAPY NOTE
RT Protocol Note  Pillo Hall 60 y o  male MRN: [id number]  Unit/Bed#: -01 Encounter: [id number]    Assessment    Principal Problem:    Myasthenia gravis in crisis [facility])  Active Problems:    CAD (coronary artery disease)    Morbid obesity ([facility] [address] )    Myasthenia gravis exacerbation, bulbar ([facility] )    Hyperlipidemia      Home Pulmonary Medications:     10/13/22 2154   Respiratory Assessment   Resp Comments Pt says he was tired of talking to family all day  States he is short of breath  VC not completed at this time   Additional Assessments   Pulse 84   Respirations 18   SpO2 96 %   Position Sitting   NIF -25 cm H2O          Past Medical History:   Diagnosis Date    Coronary artery disease     Heart attack ([facility] )     Hyperlipidemia     Myasthenia gravis ([facility] )      Social History     Socioeconomic History    Marital status: /Civil Union     Spouse name: None    Number of children: None    Years of education: None    Highest education level: None   Occupational History    None   Tobacco Use    Smoking status: Never Smoker    Smokeless tobacco: Never Used   Vaping Use    Vaping Use: Never used   Substance and Sexual Activity    Alcohol use: Not Currently    Drug use: Not Currently    Sexual activity: None   Other Topics Concern    None   Social History Narrative    None     Social Determinants of Health     Financial Resource Strain: Not on file   Food Insecurity: No Food Insecurity    Worried About Running Out of Food in the Last Year: Never true    Ran Out of Food in the Last Year: Never true   Transportation Needs: No Transportation Needs    Lack of Transportation (Medical): No    Lack of Transportation (Non-Medical):  No   Physical Activity: Not on file   Stress: Not on file   Social Connections: Not on file   Intimate Partner Violence: Not on file   Housing Stability: Unknown    Unable to Pay for Housing in the Last Year: Patient refused    Number of Places Lived in the Last Year: 2    Unstable Housing in the Last Year: No       Subjective         Objective    Physical Exam:        Vitals:  Blood pressure 139/70, pulse 84, temperature 97 7 °F (36 5 °C), resp  rate 18, height 6' 1" (1 854 m), weight (!) 145 kg (319 lb 10 7 oz), SpO2 96 %  Imaging and other studies: I have personally reviewed pertinent reports  Plan             Resp Comments: Pt says he was tired of talking to family all day  States he is short of breath   VC not completed at this time

## 2022-10-14 NOTE — PROGRESS NOTES
Patient alert and oriented x 4  Pleasant to staff  Patient able to verbalize needs and obey commands with limitations  Patient tolerated medication during shift  Patient slept during shift  No deviation in assessment finding noted when compared to previous documentation  Will continue to monitor

## 2022-10-14 NOTE — ASSESSMENT & PLAN NOTE
Reports worsening BL upper extremity weakness and ptosis since Saturday  On Saturday he ran out of his Mestinon and reports it has been around 4 months since his last outpatient IVIG treatment (typically gets every 3 months)  He denies any respiratory complaint or SOB at this time  /84   Pulse 78   Temp 98 1 °F (36 7 °C)   Resp 18   Ht 6' 1" (1 854 m)   Wt (!) 145 kg (319 lb 10 7 oz)   SpO2 97%   BMI 42 17 kg/m²     · Typically gets outpatient IVIG [id number]; has been 4month since last  · Reports running out of his mestanon Saturday   · Developed mild BL upper extremity weakness, right eye ptosis, and resulting double vision since Sat    · Denies any SOB or respiratory difficulty; not in respiratory distress    NIF: -30 cm H2O    · CXR negative  · Neuro on board, appreciate reccs; IVIG for 4/5 days  · Continue mestinon 60mg qid with plans for prednisone taper  · Will need to discuss with CM to see if we could provide assistance with having him obtain his medications as he has difficulty paying for it  · Maintain close NIF/respiratory monitoring

## 2022-10-14 NOTE — PROGRESS NOTES
[address] [facility]  Progress Note Virginia Alu 1960, 60 y o  male MRN: [id number]  Unit/Bed#: -01 Encounter: [id number]  Primary Care Provider: No primary care provider on file  Date and time admitted to hospital: 10/10/2022  6:21 PM    * Myasthenia gravis in crisis [facility])  Assessment & Plan  Reports worsening BL upper extremity weakness and ptosis since Saturday  On Saturday he ran out of his Mestinon and reports it has been around 4 months since his last outpatient IVIG treatment (typically gets every 3 months)  He denies any respiratory complaint or SOB at this time  /84   Pulse 78   Temp 98 1 °F (36 7 °C)   Resp 18   Ht 6' 1" (1 854 m)   Wt (!) 145 kg (319 lb 10 7 oz)   SpO2 97%   BMI 42 17 kg/m²     · Typically gets outpatient IVIG [id number]; has been 4month since last  · Reports running out of his mestanon Saturday   · Developed mild BL upper extremity weakness, right eye ptosis, and resulting double vision since Sat  · Denies any SOB or respiratory difficulty; not in respiratory distress    NIF: -30 cm H2O    · CXR negative  · Neuro on board, appreciate reccs; IVIG for 4/5 days  · Continue mestinon 60mg qid with plans for prednisone taper  · Will need to discuss with CM to see if we could provide assistance with having him obtain his medications as he has difficulty paying for it  · Maintain close NIF/respiratory monitoring     Hyperlipidemia  Assessment & Plan  Lab Results   Component Value Date    CHOLESTEROL 198 12/02/2021    TRIG 118 12/02/2021    HDL 46 12/02/2021    [address] 128 (H) 12/02/2021       · Not currently on outpatient statin    Myasthenia gravis exacerbation, bulbar ([address] )  Assessment & Plan  · In suspected crisis as above     Morbid obesity ([address] )  Assessment & Plan  Body mass index is 42 17 kg/m²      • Recommend incorporating a more whole foods plant-predominant diet along with decreasing consumption of red meats and processed foods  • Per AHA guidelines, recommend moderate-vigorous intensity exercise for 30 minutes a day for 5 days a week or a total of 150 min/week      CAD (coronary artery disease)  Assessment & Plan  · Denies chest pain  · Continue home daily ASA         VTE Pharmacologic Prophylaxis: VTE Score: 4 Moderate Risk (Score 3-4) - Pharmacological DVT Prophylaxis Ordered: enoxaparin (Lovenox)  Patient Centered Rounds: I performed bedside rounds with nursing staff today  Discussions with Specialists or Other Care Team Provider: Neuro, Case management    Education and Discussions with Family / Patient: Patient declined call to   Time Spent for Care: 90 minutes  More than 50% of total time spent on counseling and coordination of care as described above  Current Length of Stay: 4 day(s)  Current Patient Status: Inpatient   Certification Statement: The patient will continue to require additional inpatient hospital stay due to IVIG one more day  Discharge Plan: Anticipate discharge in 24-48 hrs to home  Code Status: Level 1 - Full Code    Subjective: Today, patient offers no new complaints  Still has mild difficulty swallowing food  Maintains need for tape to open eye lids  Was requesting possibilty of getting medications for 3 monthd prior to discharge as he has difficulty paying for it  Will discuss with CM    Objective:     Vitals:   Temp (24hrs), Av 9 °F (36 6 °C), Min:97 6 °F (36 4 °C), Max:98 2 °F (36 8 °C)    Temp:  [97 6 °F (36 4 °C)-98 2 °F (36 8 °C)] 98 1 °F (36 7 °C)  HR:  [61-84] 78  Resp:  [18] 18  BP: (118-139)/(62-84) 122/84  SpO2:  [95 %-97 %] 97 %  Body mass index is 42 17 kg/m²  Input and Output Summary (last 24 hours): Intake/Output Summary (Last 24 hours) at 10/14/2022 1517  Last data filed at 10/14/2022 0708  Gross per 24 hour   Intake --   Output 3050 ml   Net -3050 ml       Physical Exam:   Physical Exam  Vitals and nursing note reviewed     Constitutional:       General: He is not in acute distress  Appearance: Normal appearance  HENT:      Head: Normocephalic and atraumatic  Right Ear: External ear normal       Left Ear: External ear normal       Nose: Nose normal       Mouth/Throat:      Mouth: Mucous membranes are moist    Eyes:      Pupils: Pupils are equal, round, and reactive to light  Comments: Ptosis is noted, similar to yesterday; Tape keeping eyelids open   Cardiovascular:      Rate and Rhythm: Normal rate and regular rhythm  Pulses: Normal pulses  Heart sounds: Normal heart sounds  No murmur heard  Pulmonary:      Effort: Pulmonary effort is normal  No respiratory distress  Breath sounds: Normal breath sounds  No wheezing or rales  Chest:      Chest wall: No tenderness  Abdominal:      General: Bowel sounds are normal  There is no distension  Palpations: Abdomen is soft  There is no mass  Tenderness: There is no abdominal tenderness  There is no guarding  Musculoskeletal:         General: No swelling or tenderness  Cervical back: Normal range of motion and neck supple  No rigidity or tenderness  Right lower leg: No edema  Left lower leg: No edema  Skin:     General: Skin is warm and dry  Capillary Refill: Capillary refill takes less than 2 seconds  Findings: No lesion or rash  Neurological:      General: No focal deficit present  Mental Status: He is alert and oriented to person, place, and time  Psychiatric:         Mood and Affect: Mood normal          Behavior: Behavior normal          Thought Content:  Thought content normal          Judgment: Judgment normal           Additional Data:     Labs:  Results from last 7 days   Lab Units 10/14/22  0440 10/12/22  0352 10/11/22  0419   WBC Thousand/uL 6 10   < > 5 17   HEMOGLOBIN g/dL 14 7   < > 15 9   HEMATOCRIT % 46 9   < > 48 9   PLATELETS Thousands/uL 222   < > 234   NEUTROS PCT %  --   --  84*   LYMPHS PCT %  --   --  15   MONOS PCT %  --   --  1* EOS PCT %  --   --  0    < > = values in this interval not displayed  Results from last 7 days   Lab Units 10/12/22  0352 10/11/22  0419 10/10/22  1902   SODIUM mmol/L 139   < > 138   POTASSIUM mmol/L 4 4   < > 4 2   CHLORIDE mmol/L 106   < > 104   CO2 mmol/L 29   < > 30   BUN mg/dL 18   < > 15   CREATININE mg/dL 0 98   < > 1 21   ANION GAP mmol/L 4   < > 4   CALCIUM mg/dL 9 2   < > 9 1   ALBUMIN g/dL  --   --  3 3*   TOTAL BILIRUBIN mg/dL  --   --  0 33   ALK PHOS U/L  --   --  104   ALT U/L  --   --  32   AST U/L  --   --  24   GLUCOSE RANDOM mg/dL 133   < > 107    < > = values in this interval not displayed  Results from last 7 days   Lab Units 10/10/22  1902   INR  1 02                   Lines/Drains:  Invasive Devices  Report    Peripheral Intravenous Line  Duration           Peripheral IV 10/10/22 Left Antecubital 3 days                      Imaging: No pertinent imaging reviewed  Recent Cultures (last 7 days):         Last 24 Hours Medication List:   Current Facility-Administered Medications   Medication Dose Route Frequency Provider Last Rate   • acetaminophen  650 mg Oral Q6H PRN Carson Antonia, PA-C     • aspirin  81 mg Oral Daily Jahaira Prudent WheatcroftJOLANTA     • enoxaparin  40 mg Subcutaneous Daily Jahaira Prudent [place], [place]     • glycerin-hypromellose-  1 drop Both Eyes Q4H PRN Carson Antonia, PA-C     • immune globulin, human  400 mg/kg (Adjusted) Intravenous Q24H MARCELLA Walker Stopped (10/13/22 1935)   • pantoprazole  40 mg Oral Early Morning Carson Mention, PALavelleC     • pyridostigmine  60 mg Oral 4x Daily Carson Mention, PALavelleC     • sodium chloride  1 spray Each Nare Q1H PRN Malathi Grace DO          Today, Patient Was Seen By: Malathi Grace    **Please Note: This note may have been constructed using a voice recognition system  **

## 2022-10-15 PROCEDURE — 99233 SBSQ HOSP IP/OBS HIGH 50: CPT | Performed by: INTERNAL MEDICINE

## 2022-10-15 PROCEDURE — 94150 VITAL CAPACITY TEST: CPT

## 2022-10-15 RX ORDER — PREDNISONE 20 MG/1
60 TABLET ORAL DAILY
Status: DISCONTINUED | OUTPATIENT
Start: 2022-10-15 | End: 2022-10-17 | Stop reason: HOSPADM

## 2022-10-15 RX ADMIN — PYRIDOSTIGMINE BROMIDE 60 MG: 60 TABLET ORAL at 17:19

## 2022-10-15 RX ADMIN — ENOXAPARIN SODIUM 40 MG: 40 INJECTION SUBCUTANEOUS at 08:11

## 2022-10-15 RX ADMIN — ASPIRIN 81 MG: 81 TABLET, COATED ORAL at 08:11

## 2022-10-15 RX ADMIN — PREDNISONE 60 MG: 20 TABLET ORAL at 15:00

## 2022-10-15 RX ADMIN — PYRIDOSTIGMINE BROMIDE 60 MG: 60 TABLET ORAL at 23:43

## 2022-10-15 RX ADMIN — Medication 42.5 G: at 16:30

## 2022-10-15 RX ADMIN — PYRIDOSTIGMINE BROMIDE 60 MG: 60 TABLET ORAL at 00:00

## 2022-10-15 RX ADMIN — PANTOPRAZOLE SODIUM 40 MG: 40 TABLET, DELAYED RELEASE ORAL at 05:35

## 2022-10-15 RX ADMIN — PYRIDOSTIGMINE BROMIDE 60 MG: 60 TABLET ORAL at 05:35

## 2022-10-15 RX ADMIN — PYRIDOSTIGMINE BROMIDE 60 MG: 60 TABLET ORAL at 11:58

## 2022-10-15 NOTE — RESPIRATORY THERAPY NOTE
10/15/22 1000   Additional Assessments   $ Vital Capacity Mech/Peak Flow Yes   Position Sitting   NIF -45 cm H2O       Vital capacity not performed at this time to due respirometer failure

## 2022-10-15 NOTE — PLAN OF CARE
Problem: INFECTION - ADULT  Goal: Absence or prevention of progression during hospitalization  Description: INTERVENTIONS:  - Assess and monitor for signs and symptoms of infection  - Monitor lab/diagnostic results  - Monitor all insertion sites, i e  indwelling lines, tubes, and drains  - Monitor endotracheal if appropriate and nasal secretions for changes in amount and color  - [place] appropriate cooling/warming therapies per order  - Administer medications as ordered  - Instruct and encourage patient and family to use good hand hygiene technique  - Identify and instruct in appropriate isolation precautions for identified infection/condition  Outcome: Progressing  Goal: Absence of fever/infection during neutropenic period  Description: INTERVENTIONS:  - Monitor WBC    Outcome: Progressing     Problem: PAIN - ADULT  Goal: Verbalizes/displays adequate comfort level or baseline comfort level  Description: Interventions:  - Encourage patient to monitor pain and request assistance  - Assess pain using appropriate pain scale  - Administer analgesics based on type and severity of pain and evaluate response  - Implement non-pharmacological measures as appropriate and evaluate response  - Consider cultural and social influences on pain and pain management  - Notify physician/advanced practitioner if interventions unsuccessful or patient reports new pain  Outcome: Progressing     Problem: DISCHARGE PLANNING  Goal: Discharge to home or other facility with appropriate resources  Description: INTERVENTIONS:  - Identify barriers to discharge w/patient and caregiver  - Arrange for needed discharge resources and transportation as appropriate  - Identify discharge learning needs (meds, wound care, etc )  - Arrange for interpretive services to assist at discharge as needed  - Refer to Case Management Department for coordinating discharge planning if the patient needs post-hospital services based on physician/advanced practitioner order or complex needs related to functional status, cognitive ability, or social support system  Outcome: Progressing

## 2022-10-15 NOTE — PROGRESS NOTES
[address] [facility]  Progress Note Jessie Blood 1960, 60 y o  male MRN: [id number]  Unit/Bed#: -01 Encounter: [id number]  Primary Care Provider: No primary care provider on file  Date and time admitted to hospital: 10/10/2022  6:21 PM    * Myasthenia gravis in crisis [facility])  Assessment & Plan  Reports worsening BL upper extremity weakness and ptosis since Saturday  On Saturday he ran out of his Mestinon and reports it has been around 4 months since his last outpatient IVIG treatment (typically gets every 3 months)  He denies any respiratory complaint or SOB at this time  /82   Pulse 72   Temp 98 9 °F (37 2 °C)   Resp 18   Ht 6' 1" (1 854 m)   Wt (!) 145 kg (319 lb 10 7 oz)   SpO2 96%   BMI 42 17 kg/m²     · Typically gets outpatient IVIG [id number]; has been 4month since last  · Reports running out of his mestanon Saturday   · Developed mild BL upper extremity weakness, right eye ptosis, and resulting double vision since Sat    · Denies any SOB or respiratory difficulty; not in respiratory distress    NIF: -45 cm H2O    · CXR negative  · Neuro reccs; IVIG day 5/5 today evening  · After extensive discussion with patient regarding obtaining Mestinon, patient is not very comfortable taking the lyft to \[facility]\"" pharmacy, picking up the med and then coming back  · Continue mestinon 60mg qid with prednisone 60mg daily with plans for taper  · Will need to discuss with CM to see if we could provide assistance with having him obtain his medications as he has difficulty paying for it  · Maintain close NIF/respiratory monitoring     Hyperlipidemia  Assessment & Plan  Lab Results   Component Value Date    CHOLESTEROL 198 12/02/2021    TRIG 118 12/02/2021    HDL 46 12/02/2021    [facility] 128 (H) 12/02/2021       · Not currently on outpatient statin    Myasthenia gravis exacerbation, bulbar ([address] )  Assessment & Plan  · In suspected crisis as above     Morbid obesity ([facility] [address] )  Assessment & Plan  Body mass index is 42 17 kg/m²  • Recommend incorporating a more whole foods plant-predominant diet along with decreasing consumption of red meats and processed foods  • Per AHA guidelines, recommend moderate-vigorous intensity exercise for 30 minutes a day for 5 days a week or a total of 150 min/week      CAD (coronary artery disease)  Assessment & Plan  · Denies chest pain  · Continue home daily ASA     Ptosis, bilateral  Assessment & Plan  Treat underlying myasthenia  Patient keeping on the dopamine with tape        VTE Pharmacologic Prophylaxis: VTE Score: 4 Moderate Risk (Score 3-4) - Pharmacological DVT Prophylaxis Ordered: enoxaparin (Lovenox)  Patient Centered Rounds: I performed bedside rounds with nursing staff today  Discussions with Specialists or Other Care Team Provider:  Neurology    Education and Discussions with Family / Patient: Patient declined call to   Time Spent for Care: 70 minutes  More than 50% of total time spent on counseling and coordination of care as described above  Current Length of Stay: 5 day(s)  Current Patient Status: Inpatient   Certification Statement: The patient will continue to require additional inpatient hospital stay due to Requiring medications for continuation after discharge  Discharge Plan: Anticipate discharge in 24-48 hrs to home  Code Status: Level 1 - Full Code    Subjective:   Discussed with patient regarding plans for discharge  Patient understanding that you would be stable for discharge soon after the IVIG completion  However, patient's concern is that he would not have access to Mestinon at home    We discussed our plan with case management regarding having medication ready for the patient by the time he gets discharged    Objective:     Vitals:   Temp (24hrs), Av 4 °F (36 9 °C), Min:98 1 °F (36 7 °C), Max:98 9 °F (37 2 °C)    Temp:  [98 1 °F (36 7 °C)-98 9 °F (37 2 °C)] 98 9 °F (37 2 °C)  HR:  [71-78] 72  BP: (122-131)/(82-84) 131/82  SpO2:  [91 %-97 %] 96 %  Body mass index is 42 17 kg/m²  Input and Output Summary (last 24 hours): Intake/Output Summary (Last 24 hours) at 10/15/2022 1453  Last data filed at 10/15/2022 0900  Gross per 24 hour   Intake --   Output 1600 ml   Net -1600 ml       Physical Exam:   Physical Exam  Vitals and nursing note reviewed  Constitutional:       General: He is not in acute distress  Appearance: Normal appearance  HENT:      Head: Normocephalic and atraumatic  Right Ear: External ear normal       Left Ear: External ear normal       Nose: Nose normal       Mouth/Throat:      Mouth: Mucous membranes are moist    Eyes:      Pupils: Pupils are equal, round, and reactive to light  Comments: Ptosis is noted, similar to yesterday; Tape keeping eyelids open   Cardiovascular:      Rate and Rhythm: Normal rate and regular rhythm  Pulses: Normal pulses  Heart sounds: Normal heart sounds  No murmur heard  Pulmonary:      Effort: Pulmonary effort is normal  No respiratory distress  Breath sounds: Normal breath sounds  No wheezing or rales  Chest:      Chest wall: No tenderness  Abdominal:      General: Bowel sounds are normal  There is no distension  Palpations: Abdomen is soft  There is no mass  Tenderness: There is no abdominal tenderness  There is no guarding  Musculoskeletal:         General: No swelling or tenderness  Cervical back: Normal range of motion and neck supple  No rigidity or tenderness  Right lower leg: No edema  Left lower leg: No edema  Skin:     General: Skin is warm and dry  Capillary Refill: Capillary refill takes less than 2 seconds  Findings: No lesion or rash  Neurological:      General: No focal deficit present  Mental Status: He is alert and oriented to person, place, and time     Psychiatric:         Mood and Affect: Mood normal          Behavior: Behavior normal          Thought Content: Thought content normal          Judgment: Judgment normal           Additional Data:     Labs:  Results from last 7 days   Lab Units 10/14/22  0440 10/12/22  0352 10/11/22  0419   WBC Thousand/uL 6 10   < > 5 17   HEMOGLOBIN g/dL 14 7   < > 15 9   HEMATOCRIT % 46 9   < > 48 9   PLATELETS Thousands/uL 222   < > 234   NEUTROS PCT %  --   --  84*   LYMPHS PCT %  --   --  15   MONOS PCT %  --   --  1*   EOS PCT %  --   --  0    < > = values in this interval not displayed  Results from last 7 days   Lab Units 10/12/22  0352 10/11/22  0419 10/10/22  1902   SODIUM mmol/L 139   < > 138   POTASSIUM mmol/L 4 4   < > 4 2   CHLORIDE mmol/L 106   < > 104   CO2 mmol/L 29   < > 30   BUN mg/dL 18   < > 15   CREATININE mg/dL 0 98   < > 1 21   ANION GAP mmol/L 4   < > 4   CALCIUM mg/dL 9 2   < > 9 1   ALBUMIN g/dL  --   --  3 3*   TOTAL BILIRUBIN mg/dL  --   --  0 33   ALK PHOS U/L  --   --  104   ALT U/L  --   --  32   AST U/L  --   --  24   GLUCOSE RANDOM mg/dL 133   < > 107    < > = values in this interval not displayed  Results from last 7 days   Lab Units 10/10/22  1902   INR  1 02                   Lines/Drains:  Invasive Devices  Report    Peripheral Intravenous Line  Duration           Peripheral IV 10/14/22 Right Antecubital <1 day                      Imaging: No pertinent imaging reviewed      Recent Cultures (last 7 days):         Last 24 Hours Medication List:   Current Facility-Administered Medications   Medication Dose Route Frequency Provider Last Rate   • acetaminophen  650 mg Oral Q6H PRN Clearance JOLANTA Stanton     • aspirin  81 mg Oral Daily [facility]     • enoxaparin  40 mg Subcutaneous Daily [place], [place]     • glycerin-hypromellose-  1 drop Both Eyes Q4H PRN Clearance JOLANTA Stanton     • immune globulin, human  400 mg/kg (Adjusted) Intravenous Q24H MARCELLA Nj 0 g (10/13/22 1935)   • pantoprazole  40 mg Oral Early Morning Clearance JOLANTA Stanton     • predniSONE  60 mg Oral Daily Armani Hu DO     • pyridostigmine  60 mg Oral 4x Daily Gustavo Cary PA-C     • sodium chloride  1 spray Each Nare Q1H PRN Adriana Luque DO          Today, Patient Was Seen By: Adriana Luque    **Please Note: This note may have been constructed using a voice recognition system  **

## 2022-10-15 NOTE — ASSESSMENT & PLAN NOTE
Reports worsening BL upper extremity weakness and ptosis since Saturday  On Saturday he ran out of his Mestinon and reports it has been around 4 months since his last outpatient IVIG treatment (typically gets every 3 months)  He denies any respiratory complaint or SOB at this time  /82   Pulse 72   Temp 98 9 °F (37 2 °C)   Resp 18   Ht 6' 1" (1 854 m)   Wt (!) 145 kg (319 lb 10 7 oz)   SpO2 96%   BMI 42 17 kg/m²     · Typically gets outpatient IVIG [id number]; has been 4month since last  · Reports running out of his mestanon Saturday   · Developed mild BL upper extremity weakness, right eye ptosis, and resulting double vision since Sat    · Denies any SOB or respiratory difficulty; not in respiratory distress    NIF: -45 cm H2O    · CXR negative  · Neuro reccs; IVIG day 5/5 today evening  · After extensive discussion with patient regarding obtaining Mestinon, patient is not very comfortable taking the lyft to [facility] pharmacy, picking up the med and then coming back  · Continue mestinon 60mg qid with prednisone 60mg daily with plans for taper  · Will need to discuss with CM to see if we could provide assistance with having him obtain his medications as he has difficulty paying for it  · Maintain close NIF/respiratory monitoring

## 2022-10-15 NOTE — PLAN OF CARE
Problem: Potential for Falls  Goal: Patient will remain free of falls  Description: INTERVENTIONS:  - Educate patient/family on patient safety including physical limitations  - Instruct patient to call for assistance with activity   - Consult OT/PT to assist with strengthening/mobility   - Keep Call bell within reach  - Keep bed low and locked with side rails adjusted as appropriate  - Keep care items and personal belongings within reach  - Initiate and maintain comfort rounds  - Make Fall Risk Sign visible to staff  - Offer Toileting every  Hours, in advance of need  - Initiate/Maintain alarm  - Obtain necessary fall risk management equipment:   - Apply yellow socks and bracelet for high fall risk patients  - Consider moving patient to room near nurses station  Outcome: Progressing     Problem: PAIN - ADULT  Goal: Verbalizes/displays adequate comfort level or baseline comfort level  Description: Interventions:  - Encourage patient to monitor pain and request assistance  - Assess pain using appropriate pain scale  - Administer analgesics based on type and severity of pain and evaluate response  - Implement non-pharmacological measures as appropriate and evaluate response  - Consider cultural and social influences on pain and pain management  - Notify physician/advanced practitioner if interventions unsuccessful or patient reports new pain  Outcome: Progressing     Problem: INFECTION - ADULT  Goal: Absence or prevention of progression during hospitalization  Description: INTERVENTIONS:  - Assess and monitor for signs and symptoms of infection  - Monitor lab/diagnostic results  - Monitor all insertion sites, i e  indwelling lines, tubes, and drains  - Monitor endotracheal if appropriate and nasal secretions for changes in amount and color  - [place] appropriate cooling/warming therapies per order  - Administer medications as ordered  - Instruct and encourage patient and family to use good hand hygiene technique  - Identify and instruct in appropriate isolation precautions for identified infection/condition  Outcome: Progressing  Goal: Absence of fever/infection during neutropenic period  Description: INTERVENTIONS:  - Monitor WBC    Outcome: Progressing     Problem: SAFETY ADULT  Goal: Patient will remain free of falls  Description: INTERVENTIONS:  - Educate patient/family on patient safety including physical limitations  - Instruct patient to call for assistance with activity   - Consult OT/PT to assist with strengthening/mobility   - Keep Call bell within reach  - Keep bed low and locked with side rails adjusted as appropriate  - Keep care items and personal belongings within reach  - Initiate and maintain comfort rounds  - Make Fall Risk Sign visible to staff  - Offer Toileting every  Hours, in advance of need  - Initiate/Maintain alarm  - Obtain necessary fall risk management equipment:   - Apply yellow socks and bracelet for high fall risk patients  - Consider moving patient to room near nurses station  Outcome: Progressing  Goal: Maintain or return to baseline ADL function  Description: INTERVENTIONS:  -  Assess patient's ability to carry out ADLs; assess patient's baseline for ADL function and identify physical deficits which impact ability to perform ADLs (bathing, care of mouth/teeth, toileting, grooming, dressing, etc )  - Assess/evaluate cause of self-care deficits   - Assess range of motion  - Assess patient's mobility; develop plan if impaired  - Assess patient's need for assistive devices and provide as appropriate  - Encourage maximum independence but intervene and supervise when necessary  - Involve family in performance of ADLs  - Assess for home care needs following discharge   - Consider OT consult to assist with ADL evaluation and planning for discharge  - Provide patient education as appropriate  Outcome: Progressing  Goal: Maintains/Returns to pre admission functional level  Description: INTERVENTIONS:  - Perform BMAT or MOVE assessment daily    - Set and communicate daily mobility goal to care team and patient/family/caregiver  - Collaborate with rehabilitation services on mobility goals if consulted  - Perform Range of Motion  times a day  - Reposition patient every  hours  - Dangle patient  times a day  - Stand patient  times a day  - Ambulate patient  times a day  - Out of bed to chair  times a day   - Out of bed for meals  times a day  - Out of bed for toileting  - Record patient progress and toleration of activity level   Outcome: Progressing     Problem: DISCHARGE PLANNING  Goal: Discharge to home or other facility with appropriate resources  Description: INTERVENTIONS:  - Identify barriers to discharge w/patient and caregiver  - Arrange for needed discharge resources and transportation as appropriate  - Identify discharge learning needs (meds, wound care, etc )  - Arrange for interpretive services to assist at discharge as needed  - Refer to Case Management Department for coordinating discharge planning if the patient needs post-hospital services based on physician/advanced practitioner order or complex needs related to functional status, cognitive ability, or social support system  Outcome: Progressing     Problem: Knowledge Deficit  Goal: Patient/family/caregiver demonstrates understanding of disease process, treatment plan, medications, and discharge instructions  Description: Complete learning assessment and assess knowledge base  Interventions:  - Provide teaching at level of understanding  - Provide teaching via preferred learning methods  Outcome: Progressing     Problem: Neurological Deficit  Goal: Neurological status is stable or improving  Description: Interventions:  - Monitor and assess patient's level of consciousness, motor function, sensory function, and level of assistance needed for ADLs  - Monitor and report changes from baseline   Collaborate with interdisciplinary team to initiate plan and implement interventions as ordered  - Provide and maintain a safe environment  - Consider seizure precautions  - Consider fall precautions  - Consider aspiration precautions  - Consider bleeding precautions  Outcome: Progressing     Problem: Activity Intolerance/Impaired Mobility  Goal: Mobility/activity is maintained at optimum level for patient  Description: Interventions:  - Assess and monitor patient  barriers to mobility and need for assistive/adaptive devices  - Assess patient's emotional response to limitations  - Collaborate with interdisciplinary team and initiate plans and interventions as ordered  - Encourage independent activity per ability   - Maintain proper body alignment  - Perform active/passive rom as tolerated/ordered  - Plan activities to conserve energy   - Turn patient as appropriate  Outcome: Progressing     Problem: Communication Impairment  Goal: Ability to express needs and understand communication  Description: Assess patient's communication skills and ability to understand information  Patient will demonstrate use of effective communication techniques, alternative methods of communication and understanding even if not able to speak  - Encourage communication and provide alternate methods of communication as needed  - Collaborate with case management/ for discharge needs  - Include patient/family/caregiver in decisions related to communication  Outcome: Progressing     Problem: Potential for Aspiration  Goal: Non-ventilated patient's risk of aspiration is minimized  Description: Assess and monitor vital signs, respiratory status, and labs (WBC)  Monitor for signs of aspiration (tachypnea, cough, rales, wheezing, cyanosis, fever)  - Assess and monitor patient's ability to swallow  - Place patient up in chair to eat if possible    - HOB up at 90 degrees to eat if unable to get patient up into chair   - Supervise patient during oral intake  - Instruct patient/ family to take small bites  - Instruct patient/ family to take small single sips when taking liquids  - Follow patient-specific strategies generated by speech pathologist   Outcome: Progressing  Goal: Ventilated patient's risk of aspiration is minimized  Description: Assess and monitor vital signs, respiratory status, airway cuff pressure, and labs (WBC)  Monitor for signs of aspiration (tachypnea, cough, rales, wheezing, cyanosis, fever)  - Elevate head of bed 30 degrees if patient has tube feeding   - Monitor tube feeding  Outcome: Progressing     Problem: Nutrition  Goal: Nutrition/Hydration status is improving  Description: Monitor and assess patient's nutrition/hydration status for malnutrition (ex- brittle hair, bruises, dry skin, pale skin and conjunctiva, muscle wasting, smooth red tongue, and disorientation)  Collaborate with interdisciplinary team and initiate plan and interventions as ordered  Monitor patient's weight and dietary intake as ordered or per policy  Utilize nutrition screening tool and intervene per policy  Determine patient's food preferences and provide high-protein, high-caloric foods as appropriate  - Assist patient with eating   - Allow adequate time for meals   - Encourage patient to take dietary supplement as ordered  - Collaborate with clinical nutritionist   - Include patient/family/caregiver in decisions related to nutrition  Outcome: Progressing     Problem: Nutrition/Hydration-ADULT  Goal: Nutrient/Hydration intake appropriate for improving, restoring or maintaining nutritional needs  Description: Monitor and assess patient's nutrition/hydration status for malnutrition  Collaborate with interdisciplinary team and initiate plan and interventions as ordered  Monitor patient's weight and dietary intake as ordered or per policy  Utilize nutrition screening tool and intervene as necessary   Determine patient's food preferences and provide high-protein, high-caloric foods as appropriate  INTERVENTIONS:  - Monitor oral intake, urinary output, labs, and treatment plans  - Assess nutrition and hydration status and recommend course of action  - Evaluate amount of meals eaten  - Assist patient with eating if necessary   - Allow adequate time for meals  - Recommend/ encourage appropriate diets, oral nutritional supplements, and vitamin/mineral supplements  - Order, calculate, and assess calorie counts as needed  - Recommend, monitor, and adjust tube feedings and TPN/PPN based on assessed needs  - Assess need for intravenous fluids  - Provide specific nutrition/hydration education as appropriate  - Include patient/family/caregiver in decisions related to nutrition  Outcome: Progressing     Problem: MOBILITY - ADULT  Goal: Maintain or return to baseline ADL function  Description: INTERVENTIONS:  -  Assess patient's ability to carry out ADLs; assess patient's baseline for ADL function and identify physical deficits which impact ability to perform ADLs (bathing, care of mouth/teeth, toileting, grooming, dressing, etc )  - Assess/evaluate cause of self-care deficits   - Assess range of motion  - Assess patient's mobility; develop plan if impaired  - Assess patient's need for assistive devices and provide as appropriate  - Encourage maximum independence but intervene and supervise when necessary  - Involve family in performance of ADLs  - Assess for home care needs following discharge   - Consider OT consult to assist with ADL evaluation and planning for discharge  - Provide patient education as appropriate  Outcome: Progressing  Goal: Maintains/Returns to pre admission functional level  Description: INTERVENTIONS:  - Perform BMAT or MOVE assessment daily    - Set and communicate daily mobility goal to care team and patient/family/caregiver     - Collaborate with rehabilitation services on mobility goals if consulted  - Perform Range of Motion  times a day   - Reposition patient every  hours    - Dangle patient  times a day  - Stand patient  times a day  - Ambulate patient  times a day  - Out of bed to chair  times a day   - Out of bed for meals  times a day  - Out of bed for toileting  - Record patient progress and toleration of activity level   Outcome: Progressing

## 2022-10-15 NOTE — CASE MANAGEMENT
Case Management Discharge Planning Note    Patient name Jose Gutierrez  Location /-44 MRN [id number]  : 1960 Date 10/15/2022       Current Admission Date: 10/10/2022  Current Admission Diagnosis:Myasthenia gravis in crisis [facility])   Patient Active Problem List    Diagnosis Date Noted   • Hyperlipidemia    • Myasthenia gravis in crisis [facility])    • Myasthenia gravis exacerbation, bulbar ([facility] [address] ) 10/01/2021   • Morbid obesity ([facility] [address] ) 06/10/2021   • Ptosis, bilateral 2021   • CAD (coronary artery disease) 2021      LOS (days): 5  Geometric Mean LOS (GMLOS) (days):   Days to GMLOS:     OBJECTIVE:  Risk of Unplanned Readmission Score: 11 55      Current admission status: Inpatient   Preferred Pharmacy:   [address] [facility] 70  [facility] 66 [facility] 57 [facility] [place] [address]  [facility]  N  Phone: [phone] Fax: [phone]    [address] [place] [address],[address], [address] [facility]  Phone: [phone] Fax: [phone]    Primary Care Provider: No primary care provider on file  Primary Insurance:   Secondary Insurance:     DISCHARGE DETAILS:    Discharge planning discussed with[de-identified] Oracio Valderrama Jennifer Setters of Choice: Yes  Comments - Freedom of Choice: Freedom of choice reviewed as it relates to d/c planning process  Family agreeable to any service referrals and resources available for patient    CM contacted family/caregiver?: Yes  Were Treatment Team discharge recommendations reviewed with patient/caregiver?: Yes  Did patient/caregiver verbalize understanding of patient care needs?: Yes  Were patient/caregiver advised of the risks associated with not following Treatment Team discharge recommendations?: Yes    Contacts  Patient Contacts: Tiffany Avendanoer  Relationship to Patient[de-identified] Family  Contact Method: Phone  Phone Number: [phone]  Reason/Outcome: Continuity of Care, Discharge [address]         Is the patient interested in [facility] at discharge?: No    DME Referral Provided  Referral made for DME?: No    Other Referral/Resources/Interventions Provided:  Interventions: Outpatient , PCP, Prescription Price Check, Transportation  Referral Comments: CM contacted [place] [address] [facility] in [facility] ([phone]) to establish PCP appt  CM to make contact with OP CM via In Basket for referral as well  CM contacted community resources ([address]) to request assistance with cost of rx medications  Meds to beds ordered for 60 days supply of patient medications at no cost to patient   (Will arrive Monday )  Patient will need Lyft ride home  Would you like to participate in our [address] service program?  : Yes (Meds to beds utilized for d/c )    Treatment Team Recommendation: Home, Other (Home w/ OP therapy )  Discharge Destination Plan[de-identified] Home  Transport at Discharge : Ride Share  Dispatcher Contacted: No       CM in communication with son, Raman Valdes last evening regarding d/c plans  Per Raman Valdes, he has no access to a vehicle to  medications in [place] until Monday, 10/17  Patient will need a Lyft home as there is no one available to drive him  CM working with [facility] for safe d/c planning

## 2022-10-16 PROCEDURE — 99233 SBSQ HOSP IP/OBS HIGH 50: CPT | Performed by: INTERNAL MEDICINE

## 2022-10-16 RX ADMIN — PREDNISONE 60 MG: 20 TABLET ORAL at 08:19

## 2022-10-16 RX ADMIN — PYRIDOSTIGMINE BROMIDE 60 MG: 60 TABLET ORAL at 05:40

## 2022-10-16 RX ADMIN — ASPIRIN 81 MG: 81 TABLET, COATED ORAL at 08:19

## 2022-10-16 RX ADMIN — PANTOPRAZOLE SODIUM 40 MG: 40 TABLET, DELAYED RELEASE ORAL at 05:40

## 2022-10-16 RX ADMIN — ENOXAPARIN SODIUM 40 MG: 40 INJECTION SUBCUTANEOUS at 08:19

## 2022-10-16 RX ADMIN — PYRIDOSTIGMINE BROMIDE 60 MG: 60 TABLET ORAL at 11:26

## 2022-10-16 RX ADMIN — PYRIDOSTIGMINE BROMIDE 60 MG: 60 TABLET ORAL at 23:50

## 2022-10-16 RX ADMIN — PYRIDOSTIGMINE BROMIDE 60 MG: 60 TABLET ORAL at 16:36

## 2022-10-16 NOTE — ASSESSMENT & PLAN NOTE
Reports worsening BL upper extremity weakness and ptosis since Saturday  On Saturday he ran out of his Mestinon and reports it has been around 4 months since his last outpatient IVIG treatment (typically gets every 3 months)  He denies any respiratory complaint or SOB at this time  /79   Pulse 74   Temp 98 7 °F (37 1 °C)   Resp 18   Ht 6' 1" (1 854 m)   Wt (!) 145 kg (319 lb 10 7 oz)   SpO2 95%   BMI 42 17 kg/m²     · Typically gets outpatient IVIG [id number]; has been 4month since last  · Reports running out of his mestanon Saturday   · Developed mild BL upper extremity weakness, right eye ptosis, and resulting double vision since Sat    · Denies any SOB or respiratory difficulty; not in respiratory distress    NIF: -45 cm H2O    · CXR negative  · Neuro reccs; IVIG day 5/5 on 10/15  · After extensive discussion with patient regarding obtaining Mestinon, patient is not very comfortable taking the lyft to [facility] pharmacy, picking up the med and then coming back  · Continue mestinon 60mg qid with prednisone 60mg daily with plans for taper  · Will need to discuss with CM to see if we could provide assistance with having him obtain his medications as he has difficulty paying for it  · Maintain close NIF/respiratory monitoring

## 2022-10-16 NOTE — CASE MANAGEMENT
Case Management Assessment & Discharge Planning Note    Patient name Law Kim  Location /-30 MRN [id number]  : 1960 Date 10/16/2022       Current Admission Date: 10/10/2022  Current Admission Diagnosis:Myasthenia gravis in crisis [facility])   Patient Active Problem List    Diagnosis Date Noted   • Hyperlipidemia    • Myasthenia gravis in crisis [facility])    • Myasthenia gravis exacerbation, bulbar ([facility] [address] ) 10/01/2021   • Morbid obesity ([facility] [address] ) 06/10/2021   • Ptosis, bilateral 2021   • CAD (coronary artery disease) 2021      LOS (days): 6  Geometric Mean LOS (GMLOS) (days):   Days to GMLOS:     OBJECTIVE:    Risk of Unplanned Readmission Score: 11 86         Current admission status: Inpatient       Preferred Pharmacy:   [facility] [address] 70  [address] [facility] 66 [facility] 57 [facility]  N  Phone: [phone] Fax: [phone]    [address] [place],[address], [address] [facility]  Phone: [phone] Fax: [phone]    Primary Care Provider: No primary care provider on file  Primary Insurance:   Secondary Insurance:     ASSESSMENT:  Active Health Care Proxies    There are no active Health Care Proxies on file  Readmission Root Cause  30 Day Readmission: No    Patient Information  Admitted from[de-identified] Home  Mental Status: Alert  During Assessment patient was accompanied by: Not accompanied during assessment  Assessment information provided by[de-identified] Patient  Primary Caregiver: Self  Support Systems: Son  [place] of Residence: [place] [address] do you live in?: [address] entry access options   Select all that apply : No steps to enter home  Type of Current Residence: Apartment  Upon entering residence, is there a bedroom on the main floor (no further steps)?: Yes  Upon entering residence, is there a bathroom on the main floor (no further steps)?: Yes  In the last 12 months, was there a time when you were not able to pay the mortgage or rent on time?: Yes  In the last 12 months, how many places have you lived?: 2  In the last 12 months, was there a time when you did not have a steady place to sleep or slept in a shelter (including now)?: No  Homeless/housing insecurity resource given?: N/A  Living Arrangements: Lives w/ Son  Is patient a ?: No    Activities of Daily Living Prior to Admission  Functional Status: Independent  Completes ADLs independently?: Yes  Ambulates independently?: Yes  Does patient use assisted devices?: No  Does patient currently own DME?: No  Does patient have a history of Outpatient Therapy (PT/OT)?: No  Does the patient have a history of Short-Term Rehab?: No  Does patient have a history of HHC?: No  [facility]?: No         Patient Information Continued  Income Source: Unemployed  Does patient have prescription coverage?: No  Within the past 12 months, you worried that your food would run out before you got the money to buy more : Sometimes true  Within the past 12 months, the food you bought just didn't last and you didn't have money to get more : Sometimes true  Food insecurity resource given?: Yes  Does patient receive dialysis treatments?: No  Does patient have a history of substance abuse?: No  Does patient have a history of Mental Health Diagnosis?: No         Means of Transportation  Means of Transport to [facility][de-identified] [facility]  In the past 12 months, has lack of transportation kept you from medical appointments or from getting medications?: No (Pt does not have a PCP)  In the past 12 months, has lack of transportation kept you from meetings, work, or from getting things needed for daily living?: No  Was application for public transport provided?: N/A        DISCHARGE DETAILS:    Discharge planning discussed with[de-identified] Pt bedside  Freedom of Choice: Yes  Comments - Freedom of Choice: Freedom of choice reviewed as it relates to d/c planning process  Family agreeable to any service referrals and resources available for patient  CM contacted family/caregiver?: Yes  Were Treatment Team discharge recommendations reviewed with patient/caregiver?: Yes  Did patient/caregiver verbalize understanding of patient care needs?: Yes  Were patient/caregiver advised of the risks associated with not following Treatment Team discharge recommendations?: Yes    Contacts  Patient Contacts: Sergio Richardsonar  Relationship to Patient[de-identified]  (son)  Contact Method: Phone  Phone Number: [phone]  Reason/Outcome: Continuity of Care, Discharge [address]         Is the patient interested in [facility] AT [facility] at discharge?: No    DME Referral Provided  Referral made for DME?: No    Other Referral/Resources/Interventions Provided:  Financial Resources Provided: Indigent Medication  Interventions: Outpatient , Prescription Price Check, Transportation  Referral Comments: CM contacted [place] [address] Internal Medicine in [facility] ([phone]) to establish PCP appt  CM to make contact with OP CM via In Basket for referral as well  CM contacted community resources ([address]) to request assistance with cost of rx medications  Meds to beds ordered for 60 days supply of patient medications at no cost to patient  (Will arrive Monday ) Patient will need Lyft ride home  Would you like to participate in our [address] service program?  : Yes    Treatment Team Recommendation: Home, Other (OP therapy)  Discharge Destination Plan[de-identified] Home  Transport at Discharge : Free Local Transportation  Dispatcher Contacted:  No

## 2022-10-16 NOTE — PROGRESS NOTES
[facility]  Progress Note Marcus Briones 1960, 60 y o  male MRN: [id number]  Unit/Bed#: -01 Encounter: [id number]  Primary Care Provider: No primary care provider on file  Date and time admitted to hospital: 10/10/2022  6:21 PM    * Myasthenia gravis in crisis [facility])  Assessment & Plan  Reports worsening BL upper extremity weakness and ptosis since Saturday  On Saturday he ran out of his Mestinon and reports it has been around 4 months since his last outpatient IVIG treatment (typically gets every 3 months)  He denies any respiratory complaint or SOB at this time  /79   Pulse 74   Temp 98 7 °F (37 1 °C)   Resp 18   Ht 6' 1" (1 854 m)   Wt (!) 145 kg (319 lb 10 7 oz)   SpO2 95%   BMI 42 17 kg/m²     Typically gets outpatient IVIG [id number]; has been 4month since last  Reports running out of his mestanon Saturday   Developed mild BL upper extremity weakness, right eye ptosis, and resulting double vision since Sat    Denies any SOB or respiratory difficulty; not in respiratory distress    NIF: -45 cm H2O    CXR negative  Neuro reccs; IVIG day 5/5 on 10/15  After extensive discussion with patient regarding obtaining Mestinon, patient is not very comfortable taking the lyft to [facility] pharmacy, picking up the med and then coming back  Continue mestinon 60mg qid with prednisone 60mg daily with plans for taper  Will need to discuss with CM to see if we could provide assistance with having him obtain his medications as he has difficulty paying for it  Maintain close NIF/respiratory monitoring     Hyperlipidemia  Assessment & Plan  Lab Results   Component Value Date    CHOLESTEROL 198 12/02/2021    TRIG 118 12/02/2021    HDL 46 12/02/2021    [facility] 128 (H) 12/02/2021     Not currently on outpatient statin    Myasthenia gravis exacerbation, bulbar ([address] )  Assessment & Plan  In suspected crisis as above     Morbid obesity ([facility] [address] )  Assessment & Plan  Body mass index is 42 17 kg/m²  Recommend incorporating a more whole foods plant-predominant diet along with decreasing consumption of red meats and processed foods  Per AHA guidelines, recommend moderate-vigorous intensity exercise for 30 minutes a day for 5 days a week or a total of 150 min/week    CAD (coronary artery disease)  Assessment & Plan  Denies chest pain  Continue home daily ASA     Ptosis, bilateral  Assessment & Plan  Treat underlying myasthenia  Patient keeping on the dopamine with tape        VTE Pharmacologic Prophylaxis: VTE Score: 4 Moderate Risk (Score 3-4) - Pharmacological DVT Prophylaxis Ordered: enoxaparin (Lovenox)  Patient Centered Rounds: I performed bedside rounds with nursing staff today  Discussions with Specialists or Other Care Team Provider: NILSA    Education and Discussions with Family / Patient: Patient declined call to   Time Spent for Care: 70 minutes  More than 50% of total time spent on counseling and coordination of care as described above  Current Length of Stay: 5 day(s)  Current Patient Status: Inpatient   Certification Statement: The patient will continue to require additional inpatient hospital stay due to obtaining Mestinon for discharge at home  Discharge Plan: Anticipate discharge tomorrow to home  Code Status: Level 1 - Full Code    Subjective: Today, patient offers no new complaints  Discussed plan for discharge home with options provided by Case Management to have transportation to and from [facility] so that patient could  medication from the home start pharmacy at [facility], but patient was apprehensive about having transport the process of obtaining medications from the pharmacy on his own    As a result, patient is opting for discharge to home tomorrow 10/17 so that by that time, patient could have medications delivered to his home    Objective:     Vitals:   Temp (24hrs), Av 6 °F (37 °C), Min:98 3 °F (36 8 °C), Max:98 9 °F (37 2 °C)    Temp: [98 3 °F (36 8 °C)-98 9 °F (37 2 °C)] 98 7 °F (37 1 °C)  HR:  [71-74] 74  BP: (127-131)/(79-83) 127/79  SpO2:  [91 %-96 %] 95 %  Body mass index is 42 17 kg/m²  Input and Output Summary (last 24 hours): Intake/Output Summary (Last 24 hours) at 10/15/2022 2040  Last data filed at 10/15/2022 0900  Gross per 24 hour   Intake --   Output 800 ml   Net -800 ml       Physical Exam:   Physical Exam  Vitals and nursing note reviewed  Constitutional:       General: He is not in acute distress  Appearance: Normal appearance  HENT:      Head: Normocephalic and atraumatic  Right Ear: External ear normal       Left Ear: External ear normal       Nose: Nose normal       Mouth/Throat:      Mouth: Mucous membranes are moist    Eyes:      Pupils: Pupils are equal, round, and reactive to light  Comments: Ptosis remains; Tape on eyelids to keep open   Cardiovascular:      Rate and Rhythm: Normal rate and regular rhythm  Pulses: Normal pulses  Heart sounds: Normal heart sounds  No murmur heard  Pulmonary:      Effort: Pulmonary effort is normal  No respiratory distress  Breath sounds: Normal breath sounds  No wheezing or rales  Chest:      Chest wall: No tenderness  Abdominal:      General: Bowel sounds are normal  There is no distension  Palpations: Abdomen is soft  There is no mass  Tenderness: There is no abdominal tenderness  There is no guarding  Musculoskeletal:         General: No swelling or tenderness  Cervical back: Normal range of motion and neck supple  No rigidity or tenderness  Right lower leg: No edema  Left lower leg: No edema  Skin:     General: Skin is warm and dry  Capillary Refill: Capillary refill takes less than 2 seconds  Findings: No lesion or rash  Neurological:      General: No focal deficit present  Mental Status: He is alert and oriented to person, place, and time     Psychiatric:         Mood and Affect: Mood normal  Behavior: Behavior normal          Thought Content: Thought content normal          Judgment: Judgment normal         Additional Data:     Labs:  Results from last 7 days   Lab Units 10/14/22  0440 10/12/22  0352 10/11/22  0419   WBC Thousand/uL 6 10   < > 5 17   HEMOGLOBIN g/dL 14 7   < > 15 9   HEMATOCRIT % 46 9   < > 48 9   PLATELETS Thousands/uL 222   < > 234   NEUTROS PCT %  --   --  84*   LYMPHS PCT %  --   --  15   MONOS PCT %  --   --  1*   EOS PCT %  --   --  0    < > = values in this interval not displayed  Results from last 7 days   Lab Units 10/12/22  0352 10/11/22  0419 10/10/22  1902   SODIUM mmol/L 139   < > 138   POTASSIUM mmol/L 4 4   < > 4 2   CHLORIDE mmol/L 106   < > 104   CO2 mmol/L 29   < > 30   BUN mg/dL 18   < > 15   CREATININE mg/dL 0 98   < > 1 21   ANION GAP mmol/L 4   < > 4   CALCIUM mg/dL 9 2   < > 9 1   ALBUMIN g/dL  --   --  3 3*   TOTAL BILIRUBIN mg/dL  --   --  0 33   ALK PHOS U/L  --   --  104   ALT U/L  --   --  32   AST U/L  --   --  24   GLUCOSE RANDOM mg/dL 133   < > 107    < > = values in this interval not displayed       Results from last 7 days   Lab Units 10/10/22  1902   INR  1 02                   Lines/Drains:  Invasive Devices  Report      Peripheral Intravenous Line  Duration             Peripheral IV 10/14/22 Right Antecubital 1 day                          Imaging: Reviewed radiology reports from this admission including: procedure reports    Recent Cultures (last 7 days):         Last 24 Hours Medication List:   Current Facility-Administered Medications   Medication Dose Route Frequency Provider Last Rate    acetaminophen  650 mg Oral Q6H PRN Antionette Preciado PA-C      aspirin  81 mg Oral Daily Antionette Preciado PA-C      enoxaparin  40 mg Subcutaneous Daily Flakito Ann      glycerin-hypromellose-  1 drop Both Eyes Q4H PRN Antionette Preciado PA-C      immune globulin, human  400 mg/kg (Adjusted) Intravenous Q24H MARCELLA Kenny 130 8 mL/hr at 10/15/22 2003    pantoprazole  40 mg Oral Early Morning Marco A Victor      predniSONE  60 mg Oral Daily Armani Hu DO      pyridostigmine  60 mg Oral 4x Daily Julien Parker PA-C      sodium chloride  1 spray Each Nare Q1H PRN Jania Calvillo DO          Today, Patient Was Seen By: Jania Calvillo    **Please Note: This note may have been constructed using a voice recognition system  **

## 2022-10-17 VITALS
OXYGEN SATURATION: 93 % | TEMPERATURE: 98.1 F | DIASTOLIC BLOOD PRESSURE: 55 MMHG | SYSTOLIC BLOOD PRESSURE: 104 MMHG | WEIGHT: 315 LBS | HEIGHT: 73 IN | BODY MASS INDEX: 41.75 KG/M2 | RESPIRATION RATE: 18 BRPM | HEART RATE: 56 BPM

## 2022-10-17 PROCEDURE — 99239 HOSP IP/OBS DSCHRG MGMT >30: CPT | Performed by: INTERNAL MEDICINE

## 2022-10-17 RX ADMIN — ASPIRIN 81 MG: 81 TABLET, COATED ORAL at 08:17

## 2022-10-17 RX ADMIN — PANTOPRAZOLE SODIUM 40 MG: 40 TABLET, DELAYED RELEASE ORAL at 05:11

## 2022-10-17 RX ADMIN — ENOXAPARIN SODIUM 40 MG: 40 INJECTION SUBCUTANEOUS at 08:17

## 2022-10-17 RX ADMIN — PREDNISONE 60 MG: 20 TABLET ORAL at 08:17

## 2022-10-17 RX ADMIN — PYRIDOSTIGMINE BROMIDE 60 MG: 60 TABLET ORAL at 11:33

## 2022-10-17 RX ADMIN — PYRIDOSTIGMINE BROMIDE 60 MG: 60 TABLET ORAL at 05:11

## 2022-10-17 RX ADMIN — PYRIDOSTIGMINE BROMIDE 60 MG: 60 TABLET ORAL at 16:06

## 2022-10-17 NOTE — CASE MANAGEMENT
Case Management Discharge Planning Note    Patient name Evelia Chun  Location /-66 MRN [id number]  : 1960 Date 10/17/2022       Current Admission Date: 10/10/2022  Current Admission Diagnosis:Myasthenia gravis in crisis [facility])   Patient Active Problem List    Diagnosis Date Noted   • Hyperlipidemia    • Myasthenia gravis in crisis [facility])    • Myasthenia gravis exacerbation, bulbar ([facility] [address] ) 10/01/2021   • Morbid obesity ([facility] [address] ) 06/10/2021   • Ptosis, bilateral 2021   • CAD (coronary artery disease) 2021      LOS (days): 7  Geometric Mean LOS (GMLOS) (days):   Days to GMLOS:     OBJECTIVE:  Risk of Unplanned Readmission Score: 11 91         Current admission status: Inpatient   Preferred Pharmacy:   [facility] [address]  [facility] 66 [facility] 57 [facility] A [address]  [address]  Phone: [phone] Fax: [phone]    [address] [place] [address],[address], [address] [facility]  Phone: [phone] Fax: [phone]    Primary Care Provider: No primary care provider on file  Primary Insurance:   Secondary Insurance:     DISCHARGE DETAILS:    Additional Comments: CM notified [place] [address] pharmacy to follow up on the patient discharge medication  The patients family was not able to  the medication on Friday  Per Amarilys Epperson at the pharmacy the patients discharge medications will be delivered to [facility] by 12 pm today  CM will continue to follow

## 2022-10-17 NOTE — ASSESSMENT & PLAN NOTE
Reports worsening BL upper extremity weakness and ptosis since Saturday  On Saturday he ran out of his Mestinon and reports it has been around 4 months since his last outpatient IVIG treatment (typically gets every 3 months)  He denies any respiratory complaint or SOB at this time  /55   Pulse 56   Temp 98 1 °F (36 7 °C)   Resp 18   Ht 6' 1" (1 854 m)   Wt (!) 145 kg (319 lb 10 7 oz)   SpO2 93%   BMI 42 17 kg/m²     · Typically gets outpatient IVIG [id number]; has been 4month since last  · Reports running out of his mestanon Saturday   · Developed mild BL upper extremity weakness, right eye ptosis, and resulting double vision since Sat    · Denies any SOB or respiratory difficulty; not in respiratory distress  · CXR negative    NIF: -45 cm H2O    · Neuro reccs; IVIG day 5/5 on 10/15  · Home medications were provided from pharmacy and patient cleared for discharge today  · Continue mestinon 60mg qid with prednisone 60mg daily with plans for slight prolonged taper by 10 mg every 4 days

## 2022-10-17 NOTE — PLAN OF CARE
Problem: INFECTION - ADULT  Goal: Absence or prevention of progression during hospitalization  Description: INTERVENTIONS:  - Assess and monitor for signs and symptoms of infection  - Monitor lab/diagnostic results  - Monitor all insertion sites, i e  indwelling lines, tubes, and drains  - Monitor endotracheal if appropriate and nasal secretions for changes in amount and color  - Union appropriate cooling/warming therapies per order  - Administer medications as ordered  - Instruct and encourage patient and family to use good hand hygiene technique  - Identify and instruct in appropriate isolation precautions for identified infection/condition  Outcome: Progressing  Goal: Absence of fever/infection during neutropenic period  Description: INTERVENTIONS:  - Monitor WBC    Outcome: Progressing

## 2022-10-17 NOTE — CASE MANAGEMENT
Case Management Discharge Planning Note    Patient name Yi Thorne  Location /-24 MRN [id number]  : 1960 Date 10/17/2022       Current Admission Date: 10/10/2022  Current Admission Diagnosis:Myasthenia gravis in [facility])   Patient Active Problem List    Diagnosis Date Noted   • Hyperlipidemia    • Myasthenia gravis in crisis [facility])    • Myasthenia gravis exacerbation, bulbar ([address] ) 10/01/2021   • Morbid obesity ([address] ) 06/10/2021   • Ptosis, bilateral 2021   • CAD (coronary artery disease) 2021      LOS (days): 7  Geometric Mean LOS (GMLOS) (days):   Days to GMLOS:     OBJECTIVE:  Risk of Unplanned Readmission Score: 11 97         Current admission status: Inpatient   Preferred Pharmacy:   [address]  [facility] 66 [facility] 57 [facility] A [address]  [facility]  Phone: [phone] Fax: [phone]    [address] [place],[address], [address] [facility] [address] [address] [facility]  Phone: [phone] Fax: [phone]    Primary Care Provider: No primary care provider on file  Primary Insurance:   Secondary Insurance:     DISCHARGE DETAILS:                                          Other Referral/Resources/Interventions Provided:  Referral Comments: Lyft transport requested for  at main entrance;  arrangements made gera Mendiola at [facility]  MS2 charge nurse number provided as contact  TT message to primary nurse Beverly Peterson

## 2022-10-17 NOTE — CASE MANAGEMENT
Case Management Discharge Planning Note    Patient name Paco [facility]  Location /-93 MRN [id number]  : 1960 Date 10/17/2022       Current Admission Date: 10/10/2022  Current Admission Diagnosis:Myasthenia gravis in crisis [facility])   Patient Active Problem List    Diagnosis Date Noted   • Hyperlipidemia    • Myasthenia gravis in crisis [facility])    • Myasthenia gravis exacerbation, bulbar ([facility] [address] ) 10/01/2021   • Morbid obesity ([facility] [address] ) 06/10/2021   • Ptosis, bilateral 2021   • CAD (coronary artery disease) 2021      LOS (days): 7  Geometric Mean LOS (GMLOS) (days):   Days to GMLOS:     OBJECTIVE:  Risk of Unplanned Readmission Score: 11 94      Current admission status: Inpatient   Preferred Pharmacy:   [facility] [address]  [place], [facility] [facility] [address] [facility] [address]  [facility]  N  Phone: [phone] Fax: [phone]    [address] [place] [address],9[address], [address] [facility] [address] [address] [facility]  Phone: [phone] Fax: [phone]    Primary Care Provider: No primary care provider on file  Primary Insurance:   Secondary Insurance:     DISCHARGE DETAILS:    Discharge planning discussed with[de-identified] Son via phone  Freedom of Choice: Yes  Comments - Freedom of Choice: Freedom of choice reviewed as it relates to d/c planning  Family agreeable to service referrals and resources for patient  CM offered to schedule a PCP appt for patient, son requested info so patient can schedule himself    CM added information to AVS   CM contacted family/caregiver?: Yes  Were Treatment Team discharge recommendations reviewed with patient/caregiver?: Yes  Did patient/caregiver verbalize understanding of patient care needs?: Yes  Were patient/caregiver advised of the risks associated with not following Treatment Team discharge recommendations?: Yes    Contacts  Patient Contacts: Dakotah Guillen  Relationship to Patient[de-identified] Family (son)  Contact Method: Phone  Phone Number: [phone]  Reason/Outcome: Discharge [address]         Is the patient interested in [facility] at discharge?: No    DME Referral Provided  Referral made for DME?: No    Other Referral/Resources/Interventions Provided:  Interventions: Outpatient , Prescription Price Check, Transportation, Other (Specify) (Medication access assistance - [facility] to [facility])  Referral Comments: Patient provided with contact info for [facility] office ([phone]) to establish PCP  In Basket message sent for OP CM referral   60-day supply of medications delivered today (no cost to patient) from [facility] to [facility]  CM contacted [address] [employer] as [email or web address] supports for assistance with future rx costs  CM provided contact information for patient to follow up as well  CM to schedule Lyft ride home      Would you like to participate in our [address] service program?  : Yes    Treatment Team Recommendation: Home (Home w/ OP therapy)  Discharge Destination Plan[de-identified] Home  Transport at Discharge : Ride Share [facility])

## 2022-10-17 NOTE — ASSESSMENT & PLAN NOTE
Lab Results   Component Value Date    CHOLESTEROL 198 12/02/2021    TRIG 118 12/02/2021    HDL 46 12/02/2021    LDLCALC 128 (H) 12/02/2021     · Not currently on outpatient statin  · Follow-up PCP

## 2022-10-17 NOTE — ASSESSMENT & PLAN NOTE
Blepharitis instruction sheet given. Treat underlying myasthenia  Patient keeping on the dopamine with tape

## 2022-10-17 NOTE — DISCHARGE SUMMARY
[address]  Discharge- Gisel Gandara 1960, 60 y o  male MRN: [id number]  Unit/Bed#: -01 Encounter: [id number]  Primary Care Provider: No primary care provider on file  Date and time admitted to hospital: 10/10/2022  6:21 PM    * Myasthenia gravis in crisis [facility])  Assessment & Plan  Reports worsening BL upper extremity weakness and ptosis since Saturday  On Saturday he ran out of his Mestinon and reports it has been around 4 months since his last outpatient IVIG treatment (typically gets every 3 months)  He denies any respiratory complaint or SOB at this time  /55   Pulse 56   Temp 98 1 °F (36 7 °C)   Resp 18   Ht 6' 1" (1 854 m)   Wt (!) 145 kg (319 lb 10 7 oz)   SpO2 93%   BMI 42 17 kg/m²     · Typically gets outpatient IVIG [id number]; has been 4month since last  · Reports running out of his mestanon Saturday   · Developed mild BL upper extremity weakness, right eye ptosis, and resulting double vision since Sat  · Denies any SOB or respiratory difficulty; not in respiratory distress  · CXR negative    NIF: -45 cm H2O    · Neuro reccs; IVIG day 5/5 on 10/15  · Home medications were provided from pharmacy and patient cleared for discharge today  · Continue mestinon 60mg qid with prednisone 60mg daily with plans for slight prolonged taper by 10 mg every 4 days    Hyperlipidemia  Assessment & Plan  Lab Results   Component Value Date    CHOLESTEROL 198 12/02/2021    TRIG 118 12/02/2021    HDL 46 12/02/2021    [address] (H) 12/02/2021     · Not currently on outpatient statin  · Follow-up PCP    Myasthenia gravis exacerbation, bulbar ([address] )  Assessment & Plan  · Continue management with Mestinon as noted above    Morbid obesity ([address] )  Assessment & Plan  Body mass index is 42 17 kg/m²      • Recommend incorporating a more whole foods plant-predominant diet along with decreasing consumption of red meats and processed foods  • Per AHA guidelines, recommend moderate-vigorous intensity exercise for 30 minutes a day for 5 days a week or a total of 150 min/week    CAD (coronary artery disease)  Assessment & Plan  · Denies chest pain  · Continue home daily ASA     Ptosis, bilateral  Assessment & Plan  Treat underlying myasthenia  Patient keeping on the dopamine with tape      Medical Problems             Resolved Problems  Date Reviewed: 10/17/2022   None               Discharging Physician / Practitioner: Jania Calvillo  PCP: No primary care provider on file  Admission Date:   Admission Orders (From admission, onward)     Ordered        10/10/22 2005  INPATIENT ADMISSION  Once                      Discharge Date: 10/17/22    Consultations During Hospital Stay:  · Neurology    Procedures Performed:   · None    Significant Findings / Test Results:   XR chest 1 view portable    Result Date: 10/11/2022  Impression: No acute cardiopulmonary disease  Workstation performed: [id number]       No Chest XR results available for this patient  No results found for this or any previous visit  No results for input(s): Jimena Betters, GRAMSTAIN, URINECX, WOUNDCULT, BODYFLUIDCUL, MRSACULTURE, INFLUAPCR, INFLUBPCR, RSVPCR, LEGIONELLAUR, STPU, CDIFFTOXINB in the last 72 hours  Incidental Findings:   · None     Test Results Pending at Discharge (will require follow up): · None     Outpatient Tests Requested:  · None    Complications:  None    Reason for Admission:   Chief Complaint   Patient presents with   • Shortness of Breath     Patient c/o shortness of breath that started two days ago  Patient on 2 liters nasal cannula baseline  Patient also c/o neck pain, and bilateral shoulder pain and heaviness  Patient oxygen saturation 93% room air           Hospital Course:   Derril Saint is a 60 y o  male patient who originally presented to the hospital on 10/10/2022 due to upper extremity weakness and double vision  PMHx significant for myasthenia gravis poorly-controlled, CT, HLD, morbid obesity    Had bilateral upper extremity weakness along with double vision  Patient had had prior admissions as well known to the Neurology team for similar complaints  Patient unfortunately ran out of Mestinon that was previously prescribed and that it had been 3 months since his last IVIG treatment  Neurology was brought on board and patient received 5 days of IVIG along with started on a prednisone taper and was mildly prolonged  Admission was also prolonged due to patient having difficulty obtaining the medications due to financial constraints  As a result, medications had to be brought to the patient at bedside on day of discharge  Patient appeared significantly improved compared to day of admission  All questions answered  Patient instructed to follow up with outpatient PCP and Neurology within 1-2 weeks after discharge      Please see above list of diagnoses and related plan for additional information  Condition at Discharge: stable    Discharge Day Visit / Exam:   Subjective:  He could go home today  Appreciative of the efforts on obtaining meds for him  All questions answered  Vitals: Blood Pressure: 104/55 (10/17/22 0717)  Pulse: 56 (10/17/22 0717)  Temperature: 98 1 °F (36 7 °C) (10/17/22 0717)  Temp Source: Oral (10/13/22 1927)  Respirations: 18 (10/17/22 0717)  Height: 6' 1" (185 4 cm) (10/10/22 2309)  Weight - Scale: (!) 145 kg (319 lb 10 7 oz) (10/13/22 0600)  SpO2: 93 % (10/17/22 0717)  Exam:   Physical Exam  Vitals and nursing note reviewed  Constitutional:       General: He is not in acute distress  Appearance: Normal appearance  HENT:      Head: Normocephalic and atraumatic  Right Ear: External ear normal       Left Ear: External ear normal       Nose: Nose normal       Mouth/Throat:      Mouth: Mucous membranes are moist    Eyes:      Pupils: Pupils are equal, round, and reactive to light        Comments: Ptosis remains; no longer using tape to keep eyelids open, improved   Cardiovascular:      Rate and Rhythm: Normal rate and regular rhythm  Pulses: Normal pulses  Heart sounds: Normal heart sounds  No murmur heard  Pulmonary:      Effort: Pulmonary effort is normal  No respiratory distress  Breath sounds: Normal breath sounds  No wheezing or rales  Chest:      Chest wall: No tenderness  Abdominal:      General: Bowel sounds are normal  There is no distension  Palpations: Abdomen is soft  There is no mass  Tenderness: There is no abdominal tenderness  There is no guarding  Musculoskeletal:         General: No swelling or tenderness  Cervical back: Normal range of motion and neck supple  No rigidity or tenderness  Right lower leg: No edema  Left lower leg: No edema  Skin:     General: Skin is warm and dry  Capillary Refill: Capillary refill takes less than 2 seconds  Findings: No lesion or rash  Neurological:      General: No focal deficit present  Mental Status: He is alert and oriented to person, place, and time  Psychiatric:         Mood and Affect: Mood normal          Behavior: Behavior normal          Thought Content: Thought content normal          Judgment: Judgment normal           Discussion with Family: Patient declined call to   Discharge instructions/Information to patient and family:   See after visit summary for information provided to patient and family  Provisions for Follow-Up Care:  See after visit summary for information related to follow-up care and any pertinent home health orders  Disposition:   Home    Planned Readmission:  None     Discharge Statement:  I spent 46 minutes discharging the patient  This time was spent on the day of discharge  I had direct contact with the patient on the day of discharge   Greater than 50% of the total time was spent examining patient, answering all patient questions, arranging and discussing plan of care with patient as well as directly providing post-discharge instructions  Additional time then spent on discharge activities  Discharge Medications:  See after visit summary for reconciled discharge medications provided to patient and/or family        **Please Note: This note may have been constructed using a voice recognition system**

## 2022-10-25 NOTE — TELEPHONE ENCOUNTER
Gunner Riojas - Could you please look into Dr Dano Arrington availability and assist with scheduling this pt? Thank you!

## 2022-11-08 ENCOUNTER — TELEPHONE (OUTPATIENT)
Dept: NEUROLOGY | Facility: CLINIC | Age: 62
End: 2022-11-08

## 2022-11-08 NOTE — TELEPHONE ENCOUNTER
I have called the pt per George hdz a sooner appointment on 11/16/2022, called both phone numbers on file and no answer   If pt calls back and appointment is still available we can schedule if not pt will stay on the waiting list      Thank you,     Jose Martínez

## 2022-11-08 NOTE — TELEPHONE ENCOUNTER
SLMO/MG EXACERBATION, BULBAR          NOTES:   Joni Iglesias will need follow up in in 4 weeks with neuromuscular attending/AP    He will not require outpatient neurological         SCHEDULED: 11/30/22 @ 10AM W/DR Naya Maurer  Called patient and was not able to accept call & could not leave a message  Will send an appointment card/letter  Will try calling again as well

## 2022-11-10 ENCOUNTER — TELEPHONE (OUTPATIENT)
Dept: NEUROLOGY | Facility: CLINIC | Age: 62
End: 2022-11-10

## 2022-11-10 NOTE — TELEPHONE ENCOUNTER
Called pt to schedule earlier NP appointment with Dr Janes Rolle  No answer, left voicemail to call back

## 2022-11-14 ENCOUNTER — TELEPHONE (OUTPATIENT)
Dept: NEUROLOGY | Facility: CLINIC | Age: 62
End: 2022-11-14

## 2022-11-14 NOTE — TELEPHONE ENCOUNTER
[7:54 AM] Celeste Epley  good morning  please try [facility] 1960 [facility] at 2[facility]  today   thanks       DR Dottie Yi has offered pt a sooner HFU ,but pt has declined due to not being able to drive and his transport can only take him to the [facility] office  I have mailed an appt card for pt to have all the appt  Information for 11/30/22 with Sharee Malone at [place] [address], [facility] office        Thank you,     Eunice Gallardo

## 2022-11-16 NOTE — ASSESSMENT & PLAN NOTE
60 y o  male with myesthenia gravis on Mestinon, CAD, and HLD, who presented to [facility] ED on 6/9/2022 with SOB and weakness with BLE muscle aches  He also reported difficulty swallowing and bilateral eye ptosis  He admits that he may have missed some of his mestinon doses  Initial NIF -40  Suspect most likely MG exacerbation in the setting of medication noncompliance  Patient reports improvement of symptoms with treatment noted below throughout admission  Plan:  - Symptoms improved s/p solu medrol 1gm IV x1   - Continue to monitor NIF/VC daily  · 6/12 NIF -45 VC of 2 4L  · 6/13 NIF -55 VC 2 6L  - Continue Mestinon 60mg 4 times daily   - Prednisone 60 mg daily started 06/12/2022; plan to taper by 20mg weekly  - Started IVIG on 06/10/2022 at 400 mg/kg, today is day 5/5  - PT/OT  - Monitor neuro exam; notify with any changes  - Medical management and supportive care per primary team  Correction of any metabolic or infectious disturbances    - Patient will need to follow up with neuromuscular specialist outpatient  Consider immunomodulatory therapy- defer to neuromuscular specialist     Results:  - CT head:  1  No acute intracranial abnormality  2  Unchanged chronic right insular cortex infarct      - CXR: No acute cardiopulmonary disease   - COVID/FLU/RSV negative, IgA WNL Goal Outcome Evaluation:  Plan of Care Reviewed With: patient        Progress: improving  Outcome Evaluation: VSS on RA. R arm in sling and ice in place. OT/PT has assessed and DCd patient. Teaching completed. DC home with wife.

## 2022-12-14 ENCOUNTER — TELEPHONE (OUTPATIENT)
Dept: NEUROLOGY | Facility: CLINIC | Age: 62
End: 2022-12-14

## 2022-12-14 NOTE — TELEPHONE ENCOUNTER
Spoke with pt and he says his phone is not in service at the moment  Says for future please call his son's mobile number to reach him  Pt states that he was only given 230 tablets in October  Says he is on his last day  Spoke with [facility] and pharmacist says pt was given less than prescribed due to medication being on back order  She informs that they can order from a different  but it won't be available until after 12 tomorrow  She says she can dispense remaining supply of 130 tablets  In the meantime, will send request to Dr Estefania Castellanos tomorrow after 12pm     Spoke with pt and he is agreeable to receiving remaining supply  Pt to call [facility] pharmacy to address his location to ask if they can send to nearby hospital or mail to him

## 2022-12-14 NOTE — TELEPHONE ENCOUNTER
Pt called in regarding his medication  Pt stated that due to his appt being pushed back to April he was unable to speak with Dr Estefania Castellanos about more refills  He is currently out of medication  Can someone please reach out to pt regarding this issue? Call back number [phone]    Thank you

## 2023-01-16 DIAGNOSIS — G70.00 MYASTHENIA GRAVIS, BULBAR (HCC): ICD-10-CM

## 2023-01-16 DIAGNOSIS — G70.01 MYASTHENIA GRAVIS IN CRISIS (HCC): ICD-10-CM

## 2023-01-16 DIAGNOSIS — H02.403 PTOSIS, BILATERAL: ICD-10-CM

## 2023-01-16 DIAGNOSIS — G70.00 MYASTHENIA GRAVIS (HCC): ICD-10-CM

## 2023-01-16 DIAGNOSIS — G70.01 MYASTHENIC CRISIS (HCC): ICD-10-CM

## 2023-01-16 RX ORDER — PYRIDOSTIGMINE BROMIDE 60 MG/1
60 TABLET ORAL 4 TIMES DAILY
Qty: 360 TABLET | Refills: 3 | Status: SHIPPED | OUTPATIENT
Start: 2023-01-16 | End: 2023-04-16

## 2023-01-16 NOTE — TELEPHONE ENCOUNTER
Pt left vm-requesting refill of his Myasthenia gravis med to [id number] [facility] that [facility] gave him doses for 2 days and today is the last day  [phone]    Called pt, confirmed med, dose and pharm      Refill entered, please sign off

## 2023-02-20 ENCOUNTER — TELEPHONE (OUTPATIENT)
Dept: NEUROLOGY | Facility: CLINIC | Age: 63
End: 2023-02-20

## 2023-02-20 NOTE — TELEPHONE ENCOUNTER
Left message informing that appointment on 4/13 with Dr Shae Davenport needs to be reschedule  Provider is not going to be in the office

## 2023-04-26 ENCOUNTER — HOSPITAL ENCOUNTER (EMERGENCY)
Facility: HOSPITAL | Age: 63
Discharge: HOME | End: 2023-04-26
Attending: EMERGENCY MEDICINE

## 2023-04-26 VITALS
RESPIRATION RATE: 14 BRPM | SYSTOLIC BLOOD PRESSURE: 153 MMHG | OXYGEN SATURATION: 96 % | HEART RATE: 62 BPM | DIASTOLIC BLOOD PRESSURE: 75 MMHG | TEMPERATURE: 97.4 F

## 2023-04-26 DIAGNOSIS — G70.01 MYASTHENIA GRAVIS WITH ACUTE EXACERBATION (HCC): Primary | ICD-10-CM

## 2023-04-26 LAB
ANION GAP SERPL CALCULATED.3IONS-SCNC: 4 MMOL/L (ref 4–13)
BASOPHILS # BLD AUTO: 0.03 THOUSANDS/ΜL (ref 0–0.1)
BASOPHILS NFR BLD AUTO: 0 % (ref 0–1)
BUN SERPL-MCNC: 14 MG/DL (ref 5–25)
CALCIUM SERPL-MCNC: 9.2 MG/DL (ref 8.4–10.2)
CHLORIDE SERPL-SCNC: 102 MMOL/L (ref 96–108)
CO2 SERPL-SCNC: 31 MMOL/L (ref 21–32)
CREAT SERPL-MCNC: 0.85 MG/DL (ref 0.6–1.3)
EOSINOPHIL # BLD AUTO: 0.4 THOUSAND/ΜL (ref 0–0.61)
EOSINOPHIL NFR BLD AUTO: 5 % (ref 0–6)
ERYTHROCYTE [DISTWIDTH] IN BLOOD BY AUTOMATED COUNT: 13.9 % (ref 11.6–15.1)
GFR SERPL CREATININE-BSD FRML MDRD: 92 ML/MIN/1.73SQ M
GLUCOSE SERPL-MCNC: 103 MG/DL (ref 65–140)
HCT VFR BLD AUTO: 45.8 % (ref 36.5–49.3)
HGB BLD-MCNC: 14.4 G/DL (ref 12–17)
IMM GRANULOCYTES # BLD AUTO: 0.02 THOUSAND/UL (ref 0–0.2)
IMM GRANULOCYTES NFR BLD AUTO: 0 % (ref 0–2)
LYMPHOCYTES # BLD AUTO: 1.67 THOUSANDS/ΜL (ref 0.6–4.47)
LYMPHOCYTES NFR BLD AUTO: 20 % (ref 14–44)
MCH RBC QN AUTO: 26.3 PG (ref 26.8–34.3)
MCHC RBC AUTO-ENTMCNC: 31.4 G/DL (ref 31.4–37.4)
MCV RBC AUTO: 84 FL (ref 82–98)
MONOCYTES # BLD AUTO: 0.78 THOUSAND/ΜL (ref 0.17–1.22)
MONOCYTES NFR BLD AUTO: 9 % (ref 4–12)
NEUTROPHILS # BLD AUTO: 5.68 THOUSANDS/ΜL (ref 1.85–7.62)
NEUTS SEG NFR BLD AUTO: 66 % (ref 43–75)
NRBC BLD AUTO-RTO: 0 /100 WBCS
PLATELET # BLD AUTO: 241 THOUSANDS/UL (ref 149–390)
PMV BLD AUTO: 10.2 FL (ref 8.9–12.7)
POTASSIUM SERPL-SCNC: 4 MMOL/L (ref 3.5–5.3)
RBC # BLD AUTO: 5.47 MILLION/UL (ref 3.88–5.62)
SODIUM SERPL-SCNC: 137 MMOL/L (ref 135–147)
WBC # BLD AUTO: 8.58 THOUSAND/UL (ref 4.31–10.16)

## 2023-04-26 RX ORDER — PREDNISONE 20 MG/1
60 TABLET ORAL DAILY
Qty: 90 TABLET | Refills: 0 | Status: SHIPPED | OUTPATIENT
Start: 2023-04-26 | End: 2023-05-26

## 2023-04-26 RX ADMIN — SODIUM CHLORIDE 1000 MG: 0.9 INJECTION, SOLUTION INTRAVENOUS at 13:21

## 2023-04-26 NOTE — DISCHARGE INSTRUCTIONS
Please follow up PCP and neurology  Recommend tylenol 650 mg and ibuprofen 600 mg every 6 hours as needed for pain  Please return for severe chest pain, significant shortness of breath, severely worsening symptoms, or any other concerning signs or symptoms  Please refer to the following documents for additional instructions and return precautions

## 2023-04-26 NOTE — ED PROVIDER NOTES
History  Chief Complaint   Patient presents with    Edema     Myasthenia gravis pt, reports x24 hrs swelling to face and throat, pt states that he is having weakness in his hands  Pt states he needs IVIG Q3 months but it has been 6 months since his last infusion      13-year-old male history of myasthenia and CAD on aspirin presenting with myasthenia exacerbation  Patient reports that normally gets IVIG every 3 months and its been about 6 months since he last got IVIG  Patient reports that he takes pyridostigmine 60 mg 4 times daily and has been compliant with that  Patient reports over the last several days he feels like he is more weak than usual and has occasional difficulty with breathing and swallowing and sometimes has difficulty even swallowing his saliva  Denies any choking  Denies any significant breathing distress  Denies abdominal pain nausea vomiting diarrhea  Ambulatory at baseline  Denies any other complaints  Chart reviewed  Past Medical History:  No date: Coronary artery disease  No date: Heart attack ([place] [address] )  No date: Hyperlipidemia  No date: Myasthenia gravis ([place] [address] )  Family History: non-contributory  Social History            Prior to Admission Medications   Prescriptions Last Dose Informant Patient Reported?  Taking?   aspirin (ECOTRIN LOW STRENGTH) 81 mg EC tablet   No No   Sig: Take 1 tablet (81 mg total) by mouth daily   pantoprazole (PROTONIX) 40 mg tablet   No No   Sig: Take 1 tablet (40 mg total) by mouth daily in the early morning   polyvinyl alcohol (LIQUIFILM TEARS) 1 4 % ophthalmic solution   No No   Sig: Administer 1 drop to both eyes every 3 (three) hours as needed for dry eyes   pyridostigmine (Mestinon) 60 mg tablet   No No   Sig: Take 1 tablet (60 mg total) by mouth 4 (four) times a day      Facility-Administered Medications: None       Past Medical History:   Diagnosis Date    Coronary artery disease     Heart attack ([place] [address] )     Hyperlipidemia     Myasthenia gravis ([place] [address] ) Past Surgical History:   Procedure Laterality Date    CORONARY ANGIOPLASTY WITH STENT PLACEMENT         History reviewed  No pertinent family history  I have reviewed and agree with the history as documented  E-Cigarette/Vaping    E-Cigarette Use Never User      E-Cigarette/Vaping Substances    Nicotine No     THC No     CBD No     Flavoring No     Other No     Unknown No      Social History     Tobacco Use    Smoking status: Never    Smokeless tobacco: Never   Vaping Use    Vaping Use: Never used   Substance Use Topics    Alcohol use: Not Currently    Drug use: Not Currently       Review of Systems   Constitutional: Negative for appetite change, chills, diaphoresis, fever and unexpected weight change  HENT: Positive for trouble swallowing  Negative for congestion and rhinorrhea  Eyes: Negative for photophobia and visual disturbance  Respiratory: Positive for shortness of breath  Negative for cough and chest tightness  Cardiovascular: Negative for chest pain, palpitations and leg swelling  Gastrointestinal: Negative for abdominal distention, abdominal pain, blood in stool, constipation, diarrhea, nausea and vomiting  Genitourinary: Negative for dysuria and hematuria  Musculoskeletal: Negative for back pain, joint swelling, neck pain and neck stiffness  Skin: Negative for color change, pallor, rash and wound  Neurological: Positive for weakness  Negative for dizziness, syncope, light-headedness and headaches  Psychiatric/Behavioral: Negative for agitation  All other systems reviewed and are negative  Physical Exam  Physical Exam  Vitals and nursing note reviewed  Constitutional:       General: He is not in acute distress  Appearance: Normal appearance  He is well-developed  He is not ill-appearing, toxic-appearing or diaphoretic  HENT:      Head: Normocephalic and atraumatic  Nose: Nose normal  No congestion or rhinorrhea        Mouth/Throat: Mouth: Mucous membranes are moist       Pharynx: Oropharynx is clear  No oropharyngeal exudate or posterior oropharyngeal erythema  Comments: Managing secretions without difficulty  Eyes:      General: No scleral icterus  Right eye: No discharge  Left eye: No discharge  Extraocular Movements: Extraocular movements intact  Conjunctiva/sclera: Conjunctivae normal       Pupils: Pupils are equal, round, and reactive to light  Neck:      Vascular: No JVD  Trachea: No tracheal deviation  Comments: Supple  Normal range of motion  Cardiovascular:      Rate and Rhythm: Normal rate and regular rhythm  Heart sounds: Normal heart sounds  No murmur heard  No friction rub  No gallop  Comments: Normal rate and regular rhythm  Pulmonary:      Effort: Pulmonary effort is normal  No respiratory distress  Breath sounds: Normal breath sounds  No stridor  No wheezing or rales  Comments: Clear to auscultation bilaterally  No accessory muscle use  Chest:      Chest wall: No tenderness  Abdominal:      General: Bowel sounds are normal  There is no distension  Palpations: Abdomen is soft  Tenderness: There is no abdominal tenderness  There is no right CVA tenderness, left CVA tenderness, guarding or rebound  Comments: Soft, nontender, nondistended  Normal bowel sounds throughout   Musculoskeletal:         General: No swelling, tenderness, deformity or signs of injury  Normal range of motion  Cervical back: Normal range of motion and neck supple  No rigidity  No muscular tenderness  Right lower leg: No edema  Left lower leg: No edema  Lymphadenopathy:      Cervical: No cervical adenopathy  Skin:     General: Skin is warm and dry  Coloration: Skin is not pale  Findings: No erythema or rash  Neurological:      General: No focal deficit present  Mental Status: He is alert  Mental status is at baseline        Sensory: No sensory deficit  Motor: No weakness or abnormal muscle tone  Coordination: Coordination normal       Gait: Gait normal       Comments: Alert  Strength and sensation grossly intact  Ambulatory without difficulty at baseline  Psychiatric:         Behavior: Behavior normal          Thought Content:  Thought content normal          Vital Signs  ED Triage Vitals [04/26/23 1128]   Temperature Pulse Respirations Blood Pressure SpO2   (!) 97 4 °F (36 3 °C) 74 18 (!) 184/85 94 %      Temp Source Heart Rate Source Patient Position - Orthostatic VS BP Location FiO2 (%)   Tympanic Monitor Sitting Left arm --      Pain Score       --           Vitals:    04/26/23 1200 04/26/23 1300 04/26/23 1400 04/26/23 1500   BP: 135/71 137/73 142/76 153/75   Pulse: 67 63 59 62   Patient Position - Orthostatic VS: Lying Lying Sitting Sitting         Visual Acuity  Visual Acuity    Flowsheet Row Most Recent Value   L Pupil Size (mm) 3   R Pupil Size (mm) 3          ED Medications  Medications   methylPREDNISolone sodium succ 1000 mg/250 mL sodium chloride IVPB (0 mg Intravenous Stopped 4/26/23 1421)       Diagnostic Studies  Results Reviewed     Procedure Component Value Units Date/Time    Basic metabolic panel [[id number]] Collected: 04/26/23 1205    Lab Status: Final result Specimen: Blood from Arm, Left Updated: 04/26/23 1227     Sodium 137 mmol/L      Potassium 4 0 mmol/L      Chloride 102 mmol/L      CO2 31 mmol/L      ANION GAP 4 mmol/L      BUN 14 mg/dL      Creatinine 0 85 mg/dL      Glucose 103 mg/dL      Calcium 9 2 mg/dL      eGFR 92 ml/min/1 73sq m     Narrative:      [place] guidelines for Chronic Kidney Disease (CKD):     Stage 1 with normal or high GFR (GFR > 90 mL/min/1 73 square meters)    Stage 2 Mild CKD (GFR = 60-89 mL/min/1 73 square meters)    Stage 3A Moderate CKD (GFR = 45-59 mL/min/1 73 square meters)    Stage 3B Moderate CKD (GFR = 30-44 mL/min/1 73 square meters)    Stage 4 Severe CKD (GFR = 15-29 mL/min/1 73 square meters)    Stage 5 End Stage CKD (GFR <15 mL/min/1 73 square meters)  Note: GFR calculation is accurate only with a steady state creatinine    CBC and differential [[id number]]  (Abnormal) Collected: 04/26/23 1205    Lab Status: Final result Specimen: Blood from Arm, Left Updated: 04/26/23 1212     WBC 8 58 Thousand/uL      RBC 5 47 Million/uL      Hemoglobin 14 4 g/dL      Hematocrit 45 8 %      MCV 84 fL      MCH 26 3 pg      MCHC 31 4 g/dL      RDW 13 9 %      MPV 10 2 fL      Platelets 556 Thousands/uL      nRBC 0 /100 WBCs      Neutrophils Relative 66 %      Immat GRANS % 0 %      Lymphocytes Relative 20 %      Monocytes Relative 9 %      Eosinophils Relative 5 %      Basophils Relative 0 %      Neutrophils Absolute 5 68 Thousands/µL      Immature Grans Absolute 0 02 Thousand/uL      Lymphocytes Absolute 1 67 Thousands/µL      Monocytes Absolute 0 78 Thousand/µL      Eosinophils Absolute 0 40 Thousand/µL      Basophils Absolute 0 03 Thousands/µL                  No orders to display              Procedures  Procedures         ED Course                               SBIRT 22yo+    Flowsheet Row Most Recent Value   Initial Alcohol Screen: US AUDIT-C     1  How often do you have a drink containing alcohol? 0 Filed at: 04/26/2023 1244   2  How many drinks containing alcohol do you have on a typical day you are drinking? 0 Filed at: 04/26/2023 1244   3a  Male UNDER 65: How often do you have five or more drinks on one occasion? 0 Filed at: 04/26/2023 1244   Audit-C Score 0 Filed at: 04/26/2023 1244   REAL: How many times in the past year have you    Used an illegal drug or used a prescription medication for non-medical reasons? Never Filed at: 04/26/2023 1244                    Medical Decision Making  70-year-old male history of myasthenia and CAD on aspirin presenting with myasthenia exacerbation  Myasthenia exacerbation likely due to prolonged time since last IVIG  Will discuss recommendations with neurology  Plan for IV access and basic labs plus EKG  Close monitoring  Discussed measuring vital capacity with respiratory therapy  Frequent reassessment  EKG normal sinus rhythm with nonspecific ST abnormality  Discussed case with neurology recommending 1 g Solu-Medrol  Ordered  Normal NIF -50 and VC 4  Labs interpreted by me and no acute process  Symptoms improving after steroids  Neurology recommending steroid prescription  Sent to pharmacy  Neurology referral  Discussed results and recommendations  Advised follow up PCP and neurology  Medication recommendations  Given instructions and return precautions  Patient/family at bedside acknowledged understanding of all written and verbal instructions and return precautions  Discharged  Amount and/or Complexity of Data Reviewed  Labs: ordered  Risk  Prescription drug management  Disposition  Final diagnoses:   Myasthenia gravis with acute exacerbation ([address] )     Time reflects when diagnosis was documented in both MDM as applicable and the Disposition within this note     Time User Action Codes Description Comment    4/26/2023 12:03 PM Zuleyka Sena Add [G70 01] Myasthenia gravis with acute exacerbation [facility])       ED Disposition     ED Disposition   Discharge    Condition   Stable    Date/Time   Wed Apr 26, 2023  4:01 PM    [address] discharge to home/self care                 Follow-up Information     Follow up With Specialties Details Why Contact Info Additional Information    Infolink  Call  for PCP [address] [facility],[address] [facility] Schedule an appointment as soon as possible for a visit in 1 week  [address] [facility] [phone]  [address] [facility] [address] [facility], [address], [place], [place], [address]          Patient's Medications   Discharge Prescriptions PREDNISONE 20 MG TABLET    Take 3 tablets (60 mg total) by mouth daily       Start Date: 4/26/2023 End Date: 5/26/2023       Order Dose: 60 mg       Quantity: 90 tablet    Refills: 0           PDMP Review       Value Time User    PDMP Reviewed  Yes 6/14/2022 11:36 AM Danielito Dominguez MD          ED Provider  Electronically Signed by           Irene Chanel MD  04/26/23 6938

## 2023-04-28 ENCOUNTER — TELEPHONE (OUTPATIENT)
Dept: OTHER | Facility: OTHER | Age: 63
End: 2023-04-28

## 2023-04-28 ENCOUNTER — HOSPITAL ENCOUNTER (EMERGENCY)
Facility: HOSPITAL | Age: 63
Discharge: HOME | End: 2023-04-28
Attending: EMERGENCY MEDICINE

## 2023-04-28 ENCOUNTER — APPOINTMENT (EMERGENCY)
Dept: RADIOLOGY | Facility: HOSPITAL | Age: 63
End: 2023-04-28

## 2023-04-28 VITALS
SYSTOLIC BLOOD PRESSURE: 133 MMHG | TEMPERATURE: 97.1 F | RESPIRATION RATE: 20 BRPM | DIASTOLIC BLOOD PRESSURE: 84 MMHG | HEART RATE: 54 BPM | OXYGEN SATURATION: 100 %

## 2023-04-28 DIAGNOSIS — G70.01 MYASTHENIC CRISIS (HCC): Primary | ICD-10-CM

## 2023-04-28 LAB
2HR DELTA HS TROPONIN: 0 NG/L
ALBUMIN SERPL BCP-MCNC: 3.9 G/DL (ref 3.5–5)
ALP SERPL-CCNC: 81 U/L (ref 34–104)
ALT SERPL W P-5'-P-CCNC: 17 U/L (ref 7–52)
ANION GAP SERPL CALCULATED.3IONS-SCNC: 4 MMOL/L (ref 4–13)
AST SERPL W P-5'-P-CCNC: 18 U/L (ref 13–39)
ATRIAL RATE: 68 BPM
BASOPHILS # BLD AUTO: 0.03 THOUSANDS/ΜL (ref 0–0.1)
BASOPHILS NFR BLD AUTO: 0 % (ref 0–1)
BILIRUB SERPL-MCNC: 0.26 MG/DL (ref 0.2–1)
BUN SERPL-MCNC: 22 MG/DL (ref 5–25)
CALCIUM SERPL-MCNC: 9.5 MG/DL (ref 8.4–10.2)
CARDIAC TROPONIN I PNL SERPL HS: 10 NG/L
CARDIAC TROPONIN I PNL SERPL HS: 10 NG/L
CHLORIDE SERPL-SCNC: 106 MMOL/L (ref 96–108)
CO2 SERPL-SCNC: 32 MMOL/L (ref 21–32)
CREAT SERPL-MCNC: 1.05 MG/DL (ref 0.6–1.3)
EOSINOPHIL # BLD AUTO: 0.13 THOUSAND/ΜL (ref 0–0.61)
EOSINOPHIL NFR BLD AUTO: 1 % (ref 0–6)
ERYTHROCYTE [DISTWIDTH] IN BLOOD BY AUTOMATED COUNT: 14.5 % (ref 11.6–15.1)
FLUAV RNA RESP QL NAA+PROBE: NEGATIVE
FLUBV RNA RESP QL NAA+PROBE: NEGATIVE
GFR SERPL CREATININE-BSD FRML MDRD: 74 ML/MIN/1.73SQ M
GLUCOSE SERPL-MCNC: 131 MG/DL (ref 65–140)
HCT VFR BLD AUTO: 48.4 % (ref 36.5–49.3)
HGB BLD-MCNC: 15 G/DL (ref 12–17)
IMM GRANULOCYTES # BLD AUTO: 0.05 THOUSAND/UL (ref 0–0.2)
IMM GRANULOCYTES NFR BLD AUTO: 0 % (ref 0–2)
LYMPHOCYTES # BLD AUTO: 3 THOUSANDS/ΜL (ref 0.6–4.47)
LYMPHOCYTES NFR BLD AUTO: 20 % (ref 14–44)
MCH RBC QN AUTO: 26.5 PG (ref 26.8–34.3)
MCHC RBC AUTO-ENTMCNC: 31 G/DL (ref 31.4–37.4)
MCV RBC AUTO: 86 FL (ref 82–98)
MONOCYTES # BLD AUTO: 0.87 THOUSAND/ΜL (ref 0.17–1.22)
MONOCYTES NFR BLD AUTO: 6 % (ref 4–12)
NEUTROPHILS # BLD AUTO: 10.83 THOUSANDS/ΜL (ref 1.85–7.62)
NEUTS SEG NFR BLD AUTO: 73 % (ref 43–75)
NRBC BLD AUTO-RTO: 0 /100 WBCS
P AXIS: 61 DEGREES
PLATELET # BLD AUTO: 273 THOUSANDS/UL (ref 149–390)
PMV BLD AUTO: 9.9 FL (ref 8.9–12.7)
POTASSIUM SERPL-SCNC: 4.3 MMOL/L (ref 3.5–5.3)
PR INTERVAL: 146 MS
PROT SERPL-MCNC: 7.3 G/DL (ref 6.4–8.4)
QRS AXIS: -59 DEGREES
QRSD INTERVAL: 114 MS
QT INTERVAL: 434 MS
QTC INTERVAL: 461 MS
RBC # BLD AUTO: 5.65 MILLION/UL (ref 3.88–5.62)
RSV RNA RESP QL NAA+PROBE: NEGATIVE
SARS-COV-2 RNA RESP QL NAA+PROBE: NEGATIVE
SODIUM SERPL-SCNC: 142 MMOL/L (ref 135–147)
T WAVE AXIS: 113 DEGREES
VENTRICULAR RATE: 68 BPM
WBC # BLD AUTO: 14.91 THOUSAND/UL (ref 4.31–10.16)

## 2023-04-28 RX ORDER — PREDNISONE 20 MG/1
60 TABLET ORAL ONCE
Status: COMPLETED | OUTPATIENT
Start: 2023-04-28 | End: 2023-04-28

## 2023-04-28 RX ORDER — PYRIDOSTIGMINE BROMIDE 60 MG/1
60 TABLET ORAL ONCE
Status: COMPLETED | OUTPATIENT
Start: 2023-04-28 | End: 2023-04-28

## 2023-04-28 RX ADMIN — PREDNISONE 60 MG: 20 TABLET ORAL at 18:46

## 2023-04-28 RX ADMIN — PREDNISONE 60 MG: 20 TABLET ORAL at 16:51

## 2023-04-28 RX ADMIN — PYRIDOSTIGMINE BROMIDE 60 MG: 60 TABLET ORAL at 17:22

## 2023-04-28 RX ADMIN — SODIUM CHLORIDE 1000 MG: 0.9 INJECTION, SOLUTION INTRAVENOUS at 17:49

## 2023-04-28 NOTE — TELEPHONE ENCOUNTER
Called back re: below and left a detailed VM with call back #  Also attempted son's # but was unable to leave VM as VMB was not set up yet  Inez French MD  to Mariano Gastelum • Neurology [facility] Clinical Team 1      9:51 AM  Please advise him to go back to the hospital, as he has issues with myasthenia gravis and he needs to follow-up with the neuromuscular specialist and we have told him in the past about that, let me know if anything else I can do       Thank you

## 2023-04-28 NOTE — TELEPHONE ENCOUNTER
Patient called and stated that he called earlier in regards to having difficulty with bowel issues  Informed patient that Dr Linda Mcgee would like him to report to the hospital for immediate evaluation  Patient stated he cannot get there until tomorrow due to transportation  He would like confirmation if that would be okay to wait until tomorrow?     Patient can be reached at [phone]

## 2023-04-28 NOTE — TELEPHONE ENCOUNTER
Pt called back I express that Dr Wong Ramírez recommended calling [phone] and going to the ED asap

## 2023-04-28 NOTE — TELEPHONE ENCOUNTER
I spoke with Dr Blair Motley - I called the patient and left a voicemail letting him know he can call 911 for an ambulance since he is having transportation issue

## 2023-04-28 NOTE — TELEPHONE ENCOUNTER
Pt would like a call back from the office  He was in the hospital a couple of days ago and was discharge  He has not been able to move his bowels and he is urinating a little  He also has weak muscles  He would like to know what to do  Please call

## 2023-04-28 NOTE — ED PROVIDER NOTES
History  Chief Complaint   Patient presents with   • Shortness of Breath     Pt c/o worsening sob that started 30 minutes ago      Patient is a 50-year-old male past medical history of CAD, hyperlipidemia, myasthenia gravis presenting with shortness of breath  Patient notes exertional shortness of breath that started 30 minutes ago and is mildly improved with use of his to 3 to 3 L of oxygen  He notes cramping in his feet over the last 2 days and states he was seen here 2 days ago for myasthenic crisis, chart review reveals he was given steroids and given outpatient prescription which he states he has been unable to   He denies any cough, fevers, chest pain, abdominal pain, nausea or vomiting  Notes constipation for last 2 days but states he was able to have bowel movement before he came in  Denies any leg swelling that he is aware of  States that he has been drinking lots of fluid but has had small urination  Dates that he has been compliant with his pyridostigmine  Patient is chronically noncompliant with his IVIG infusions  Prior to Admission Medications   Prescriptions Last Dose Informant Patient Reported?  Taking?   aspirin (ECOTRIN LOW STRENGTH) 81 mg EC tablet   No No   Sig: Take 1 tablet (81 mg total) by mouth daily   pantoprazole (PROTONIX) 40 mg tablet   No No   Sig: Take 1 tablet (40 mg total) by mouth daily in the early morning   polyvinyl alcohol (LIQUIFILM TEARS) 1 4 % ophthalmic solution   No No   Sig: Administer 1 drop to both eyes every 3 (three) hours as needed for dry eyes   predniSONE 20 mg tablet   No No   Sig: Take 3 tablets (60 mg total) by mouth daily   pyridostigmine (Mestinon) 60 mg tablet   No No   Sig: Take 1 tablet (60 mg total) by mouth 4 (four) times a day      Facility-Administered Medications: None       Past Medical History:   Diagnosis Date   • Coronary artery disease    • [facility] attack ([facility] )    • Hyperlipidemia    • [facility] )        Past Surgical History:   Procedure Laterality Date   • CORONARY ANGIOPLASTY WITH STENT PLACEMENT         History reviewed  No pertinent family history  I have reviewed and agree with the history as documented  E-Cigarette/Vaping   • E-Cigarette Use Never User      E-Cigarette/Vaping Substances   • Nicotine No    • THC No    • CBD No    • Flavoring No    • Other No    • Unknown No      Social History     Tobacco Use   • Smoking status: Never   • Smokeless tobacco: Never   Vaping Use   • Vaping Use: Never used   Substance Use Topics   • Alcohol use: Not Currently   • Drug use: Not Currently       Review of Systems   All other systems reviewed and are negative  Physical Exam  Physical Exam  Vitals reviewed  Constitutional:       General: He is not in acute distress  Appearance: Normal appearance  He is not ill-appearing  HENT:      Mouth/Throat:      Mouth: Mucous membranes are moist    Eyes:      Conjunctiva/sclera: Conjunctivae normal       Comments: Normal conjunctiva   Cardiovascular:      Rate and Rhythm: Normal rate and regular rhythm  Pulses: Normal pulses  Heart sounds: Normal heart sounds  Pulmonary:      Effort: Pulmonary effort is normal       Breath sounds: Normal breath sounds  Chest:      Chest wall: No tenderness  Abdominal:      General: Abdomen is flat  Palpations: Abdomen is soft  Tenderness: There is no abdominal tenderness  Musculoskeletal:         General: No swelling  Normal range of motion  Cervical back: Neck supple  Right lower leg: No tenderness  No edema  Left lower leg: No tenderness  No edema  Skin:     General: Skin is warm and dry  Neurological:      General: No focal deficit present  Mental Status: He is alert     Psychiatric:         Mood and Affect: Mood normal          Vital Signs  ED Triage Vitals [04/28/23 1527]   Temperature Pulse Respirations Blood Pressure SpO2   (!) 97 1 °F (36 2 °C) 82 22 (!) 190/86 96 %      Temp src Heart Rate Source Patient Position - Orthostatic VS BP Location FiO2 (%)   -- Monitor Sitting Left arm --      Pain Score       --           Vitals:    04/28/23 1527   BP: (!) 190/86   Pulse: 82   Patient Position - Orthostatic VS: Sitting         Visual Acuity      ED Medications  Medications   predniSONE tablet 60 mg (has no administration in time range)       Diagnostic Studies  Results Reviewed     Procedure Component Value Units Date/Time    Comprehensive metabolic panel [[id number]]     Lab Status: No result Specimen: Blood     CBC and differential [[id number]]     Lab Status: No result Specimen: Blood     HS Troponin 0hr (reflex protocol) [[id number]]     Lab Status: No result Specimen: Blood     FLU/RSV/COVID - if FLU/RSV clinically relevant [[id number]]     Lab Status: No result Specimen: Nares from Nose                  XR chest 2 views    (Results Pending)              Procedures  Procedures         ED Course  ED Course as of 04/28/23 1919 Fri Apr 28, 2023 1728 Have spoken with neurology who recommends a gram of Solu-Medrol today and continues to recommend discharge on 60 mg daily, will place outpatient care management consult to facilitate medication management as an outpatient  1811 Will provide one-time dose of 60 mg of prednisone for patient to take tomorrow morning  Medical Decision Making  Patient is a 68-year-old male past medical history of CAD, hyperlipidemia, myasthenia gravis presenting with shortness of breath    Patient is currently hypertensive but with lungs clear to auscultation, no significant lower extreme edema, mentating appropriately and answering all questions with no conversational dyspnea, accessory muscle use or retractions, patient is noting difficulty swallowing and difficulty breathing, will obtain vital capacity, negative inspiratory force, patient saturating appropriately on 2 to 3 L of oxygen, will obtain cardiac work-up and chest x-ray to rule out other causes such as pneumonia, pneumothorax, electrolyte abnormality, ACS and discussed with neurology  Amount and/or Complexity of Data Reviewed  Labs: ordered  Radiology: ordered  Risk  Prescription drug management  Disposition  Final diagnoses:   None     ED Disposition     None      Follow-up Information    None         Patient's Medications   Discharge Prescriptions    No medications on file       No discharge procedures on file      PDMP Review       Value Time User    PDMP Reviewed  Yes 6/14/2022 11:36 AM Musa Harkins MD          ED Provider  Electronically Signed by           Mckenna OfficerDO  04/28/23 8325

## 2023-04-29 LAB
ATRIAL RATE: 59 BPM
P AXIS: 36 DEGREES
PR INTERVAL: 132 MS
QRS AXIS: -51 DEGREES
QRSD INTERVAL: 110 MS
QT INTERVAL: 428 MS
QTC INTERVAL: 423 MS
T WAVE AXIS: 199 DEGREES
VENTRICULAR RATE: 59 BPM

## 2023-05-11 DIAGNOSIS — Z71.89 COMPLEX CARE COORDINATION: Primary | ICD-10-CM

## 2023-05-11 NOTE — PROGRESS NOTES
Patient identified as rising utilizer  Per chart, he is without PCP and insurance  Patient is referred to complex care management as a Rising Utilizer

## 2023-05-12 ENCOUNTER — PATIENT OUTREACH (OUTPATIENT)
Dept: CASE MANAGEMENT | Facility: OTHER | Age: 63
End: 2023-05-12

## 2023-05-12 NOTE — PROGRESS NOTES
Patient identified as Rising Utilizer  Outside records through Amber requested and refreshed  Chart review completed  No PCP or insurance on file  Patient has past medical history of CAD, hyperlipidemia, myasthenia gravis and bilateral ptosis  See problem list for complete list of medical diagnosis  Utilization  (2) ED visits in April of this year for myasthenia gravis  Patient noted to be noncompliant with IVIG infusions  Future appointments:  9/28/2023 11:00 AM Appointment with Roseline Hernandez MD at Neurology [facility] ([phone])     -----------------------------------------------    Unsuccessful attempt at reaching patient on preferred number ending [phone] for Complex Care management introductory call  Recorded message states call cannot be completed at this time  RN CM unable to locate communication consent in chart  Another outreach attempt will be made within 1 week of today

## 2023-05-16 ENCOUNTER — PATIENT OUTREACH (OUTPATIENT)
Dept: CASE MANAGEMENT | Facility: OTHER | Age: 63
End: 2023-05-16

## 2023-05-16 NOTE — LETTER
Date: 05/16/23    Dear Joao Russell,   My name is Lucho Rosen; I am a registered nurse care manager working with [facility] and [employer]  I have not been able to reach you and would like to set a time that I can talk with you over the phone  My work is to help patients that have complex medical conditions get the care they need to medical or community resources  This includes patients who may have been in the hospital or emergency room  If interested in establishing with a family doctorStephan is located within [facility] at [address] [place] [address]  The office number is [phone]  Please kindly call me at the number below if you require any assistance or have any questions  I look forward to hearing from you    Sincerely,    Lucho Rosen, RN  Outpatient Care Management  [phone]

## 2023-05-16 NOTE — PROGRESS NOTES
2nd unsuccessful attempt at reaching patient on preferred number ending [phone] for Complex Care management introductory call  Recorded message states call cannot be completed at this time  Unable to reach letter drafted today; included nearest [facility] PCP office location       In basket sent to Department  with request to mail letter to patient to address on file:  [place] [phone]    RN CM will close Complex / Rising Utilizer episode 2 weeks from today if no response to VM or UTR letter

## 2023-05-26 ENCOUNTER — HOSPITAL ENCOUNTER (INPATIENT)
Facility: HOSPITAL | Age: 63
LOS: 7 days | Discharge: HOME | DRG: 241 | End: 2023-06-02
Attending: EMERGENCY MEDICINE | Admitting: FAMILY MEDICINE
Payer: COMMERCIAL

## 2023-05-26 ENCOUNTER — APPOINTMENT (OUTPATIENT)
Dept: PERIOP | Facility: HOSPITAL | Age: 63
DRG: 241 | End: 2023-05-26
Payer: COMMERCIAL

## 2023-05-26 ENCOUNTER — ANESTHESIA EVENT (INPATIENT)
Dept: PERIOP | Facility: HOSPITAL | Age: 63
DRG: 241 | End: 2023-05-26
Payer: COMMERCIAL

## 2023-05-26 ENCOUNTER — APPOINTMENT (EMERGENCY)
Dept: CT IMAGING | Facility: HOSPITAL | Age: 63
DRG: 241 | End: 2023-05-26
Payer: COMMERCIAL

## 2023-05-26 ENCOUNTER — ANESTHESIA (INPATIENT)
Dept: PERIOP | Facility: HOSPITAL | Age: 63
DRG: 241 | End: 2023-05-26
Payer: COMMERCIAL

## 2023-05-26 DIAGNOSIS — K92.2 UPPER GI BLEED: ICD-10-CM

## 2023-05-26 DIAGNOSIS — K92.2 GI BLEED: Primary | ICD-10-CM

## 2023-05-26 DIAGNOSIS — D62 ACUTE BLOOD LOSS ANEMIA: ICD-10-CM

## 2023-05-26 DIAGNOSIS — G70.00 MYASTHENIA GRAVIS, BULBAR (HCC): ICD-10-CM

## 2023-05-26 DIAGNOSIS — D64.9 SYMPTOMATIC ANEMIA: ICD-10-CM

## 2023-05-26 LAB
2HR DELTA HS TROPONIN: 20 NG/L
4HR DELTA HS TROPONIN: 19 NG/L
ABO GROUP BLD: NORMAL
ABO GROUP BLD: NORMAL
ALBUMIN SERPL BCP-MCNC: 2.8 G/DL (ref 3.5–5)
ALP SERPL-CCNC: 46 U/L (ref 34–104)
ALT SERPL W P-5'-P-CCNC: 28 U/L (ref 7–52)
ANION GAP SERPL CALCULATED.3IONS-SCNC: 3 MMOL/L (ref 4–13)
APTT PPP: 20 SECONDS (ref 23–37)
AST SERPL W P-5'-P-CCNC: 14 U/L (ref 13–39)
ATRIAL RATE: 92 BPM
BASOPHILS # BLD AUTO: 0.01 THOUSANDS/ÂΜL (ref 0–0.1)
BASOPHILS NFR BLD AUTO: 0 % (ref 0–1)
BILIRUB SERPL-MCNC: 0.21 MG/DL (ref 0.2–1)
BLD GP AB SCN SERPL QL: NEGATIVE
BUN SERPL-MCNC: 49 MG/DL (ref 5–25)
CALCIUM ALBUM COR SERPL-MCNC: 9.1 MG/DL (ref 8.3–10.1)
CALCIUM SERPL-MCNC: 8.1 MG/DL (ref 8.4–10.2)
CARDIAC TROPONIN I PNL SERPL HS: 22 NG/L
CARDIAC TROPONIN I PNL SERPL HS: 41 NG/L
CARDIAC TROPONIN I PNL SERPL HS: 42 NG/L
CHLORIDE SERPL-SCNC: 107 MMOL/L (ref 96–108)
CO2 SERPL-SCNC: 28 MMOL/L (ref 21–32)
CREAT SERPL-MCNC: 0.86 MG/DL (ref 0.6–1.3)
EOSINOPHIL # BLD AUTO: 0.05 THOUSAND/ÂΜL (ref 0–0.61)
EOSINOPHIL NFR BLD AUTO: 0 % (ref 0–6)
ERYTHROCYTE [DISTWIDTH] IN BLOOD BY AUTOMATED COUNT: 17.1 % (ref 11.6–15.1)
GFR SERPL CREATININE-BSD FRML MDRD: 91 ML/MIN/1.73SQ M
GLUCOSE SERPL-MCNC: 212 MG/DL (ref 65–140)
HCT VFR BLD AUTO: 28.1 % (ref 36.5–49.3)
HCT VFR BLD AUTO: 30.3 % (ref 36.5–49.3)
HCT VFR BLD AUTO: 31.4 % (ref 36.5–49.3)
HGB BLD-MCNC: 10 G/DL (ref 12–17)
HGB BLD-MCNC: 8.9 G/DL (ref 12–17)
HGB BLD-MCNC: 9.7 G/DL (ref 12–17)
IMM GRANULOCYTES # BLD AUTO: 0.14 THOUSAND/UL (ref 0–0.2)
IMM GRANULOCYTES NFR BLD AUTO: 1 % (ref 0–2)
INR PPP: 1.13 (ref 0.84–1.19)
LACTATE SERPL-SCNC: 2 MMOL/L (ref 0.5–2)
LACTATE SERPL-SCNC: 2.4 MMOL/L (ref 0.5–2)
LIPASE SERPL-CCNC: 39 U/L (ref 11–82)
LYMPHOCYTES # BLD AUTO: 1.7 THOUSANDS/ÂΜL (ref 0.6–4.47)
LYMPHOCYTES NFR BLD AUTO: 11 % (ref 14–44)
MCH RBC QN AUTO: 27.8 PG (ref 26.8–34.3)
MCHC RBC AUTO-ENTMCNC: 31.7 G/DL (ref 31.4–37.4)
MCV RBC AUTO: 88 FL (ref 82–98)
MONOCYTES # BLD AUTO: 1.28 THOUSAND/ÂΜL (ref 0.17–1.22)
MONOCYTES NFR BLD AUTO: 8 % (ref 4–12)
NEUTROPHILS # BLD AUTO: 12.69 THOUSANDS/ÂΜL (ref 1.85–7.62)
NEUTS SEG NFR BLD AUTO: 80 % (ref 43–75)
NRBC BLD AUTO-RTO: 0 /100 WBCS
P AXIS: 41 DEGREES
PLATELET # BLD AUTO: 140 THOUSANDS/UL (ref 149–390)
PMV BLD AUTO: 10.6 FL (ref 8.9–12.7)
POTASSIUM SERPL-SCNC: 4 MMOL/L (ref 3.5–5.3)
PR INTERVAL: 126 MS
PROT SERPL-MCNC: 4.5 G/DL (ref 6.4–8.4)
PROTHROMBIN TIME: 14.3 SECONDS (ref 11.6–14.5)
QRS AXIS: -1 DEGREES
QRSD INTERVAL: 100 MS
QT INTERVAL: 372 MS
QTC INTERVAL: 460 MS
RBC # BLD AUTO: 3.2 MILLION/UL (ref 3.88–5.62)
RH BLD: POSITIVE
RH BLD: POSITIVE
SODIUM SERPL-SCNC: 138 MMOL/L (ref 135–147)
SPECIMEN EXPIRATION DATE: NORMAL
T WAVE AXIS: 81 DEGREES
VENTRICULAR RATE: 92 BPM
WBC # BLD AUTO: 15.87 THOUSAND/UL (ref 4.31–10.16)

## 2023-05-26 PROCEDURE — 83605 ASSAY OF LACTIC ACID: CPT | Performed by: PHYSICIAN ASSISTANT

## 2023-05-26 PROCEDURE — 83690 ASSAY OF LIPASE: CPT | Performed by: PHYSICIAN ASSISTANT

## 2023-05-26 PROCEDURE — 93005 ELECTROCARDIOGRAM TRACING: CPT

## 2023-05-26 PROCEDURE — 99223 1ST HOSP IP/OBS HIGH 75: CPT | Performed by: FAMILY MEDICINE

## 2023-05-26 PROCEDURE — 85018 HEMOGLOBIN: CPT | Performed by: FAMILY MEDICINE

## 2023-05-26 PROCEDURE — P9040 RBC LEUKOREDUCED IRRADIATED: HCPCS

## 2023-05-26 PROCEDURE — 86850 RBC ANTIBODY SCREEN: CPT | Performed by: PHYSICIAN ASSISTANT

## 2023-05-26 PROCEDURE — 36415 COLL VENOUS BLD VENIPUNCTURE: CPT | Performed by: PHYSICIAN ASSISTANT

## 2023-05-26 PROCEDURE — 36430 TRANSFUSION BLD/BLD COMPNT: CPT

## 2023-05-26 PROCEDURE — 99285 EMERGENCY DEPT VISIT HI MDM: CPT

## 2023-05-26 PROCEDURE — 85014 HEMATOCRIT: CPT | Performed by: FAMILY MEDICINE

## 2023-05-26 PROCEDURE — C9113 INJ PANTOPRAZOLE SODIUM, VIA: HCPCS | Performed by: INTERNAL MEDICINE

## 2023-05-26 PROCEDURE — 86920 COMPATIBILITY TEST SPIN: CPT

## 2023-05-26 PROCEDURE — 74178 CT ABD&PLV WO CNTR FLWD CNTR: CPT

## 2023-05-26 PROCEDURE — 84484 ASSAY OF TROPONIN QUANT: CPT | Performed by: PHYSICIAN ASSISTANT

## 2023-05-26 PROCEDURE — 85014 HEMATOCRIT: CPT | Performed by: INTERNAL MEDICINE

## 2023-05-26 PROCEDURE — 88305 TISSUE EXAM BY PATHOLOGIST: CPT | Performed by: PATHOLOGY

## 2023-05-26 PROCEDURE — C9113 INJ PANTOPRAZOLE SODIUM, VIA: HCPCS | Performed by: FAMILY MEDICINE

## 2023-05-26 PROCEDURE — 93010 ELECTROCARDIOGRAM REPORT: CPT | Performed by: INTERNAL MEDICINE

## 2023-05-26 PROCEDURE — 86901 BLOOD TYPING SEROLOGIC RH(D): CPT | Performed by: PHYSICIAN ASSISTANT

## 2023-05-26 PROCEDURE — C9113 INJ PANTOPRAZOLE SODIUM, VIA: HCPCS | Performed by: PHYSICIAN ASSISTANT

## 2023-05-26 PROCEDURE — 99254 IP/OBS CNSLTJ NEW/EST MOD 60: CPT | Performed by: PSYCHIATRY & NEUROLOGY

## 2023-05-26 PROCEDURE — 85610 PROTHROMBIN TIME: CPT | Performed by: PHYSICIAN ASSISTANT

## 2023-05-26 PROCEDURE — 85025 COMPLETE CBC W/AUTO DIFF WBC: CPT | Performed by: PHYSICIAN ASSISTANT

## 2023-05-26 PROCEDURE — 84484 ASSAY OF TROPONIN QUANT: CPT | Performed by: FAMILY MEDICINE

## 2023-05-26 PROCEDURE — 80053 COMPREHEN METABOLIC PANEL: CPT | Performed by: PHYSICIAN ASSISTANT

## 2023-05-26 PROCEDURE — 86900 BLOOD TYPING SEROLOGIC ABO: CPT | Performed by: PHYSICIAN ASSISTANT

## 2023-05-26 PROCEDURE — 85730 THROMBOPLASTIN TIME PARTIAL: CPT | Performed by: PHYSICIAN ASSISTANT

## 2023-05-26 PROCEDURE — 96365 THER/PROPH/DIAG IV INF INIT: CPT

## 2023-05-26 PROCEDURE — 85018 HEMOGLOBIN: CPT | Performed by: INTERNAL MEDICINE

## 2023-05-26 RX ORDER — FENTANYL CITRATE 50 UG/ML
INJECTION, SOLUTION INTRAMUSCULAR; INTRAVENOUS AS NEEDED
Status: DISCONTINUED | OUTPATIENT
Start: 2023-05-26 | End: 2023-05-26

## 2023-05-26 RX ORDER — PROPOFOL 10 MG/ML
INJECTION, EMULSION INTRAVENOUS AS NEEDED
Status: DISCONTINUED | OUTPATIENT
Start: 2023-05-26 | End: 2023-05-26

## 2023-05-26 RX ORDER — PYRIDOSTIGMINE BROMIDE 60 MG/1
60 TABLET ORAL 4 TIMES DAILY
Status: DISCONTINUED | OUTPATIENT
Start: 2023-05-26 | End: 2023-05-29

## 2023-05-26 RX ORDER — ONDANSETRON 2 MG/ML
4 INJECTION INTRAMUSCULAR; INTRAVENOUS EVERY 6 HOURS PRN
Status: DISCONTINUED | OUTPATIENT
Start: 2023-05-26 | End: 2023-06-02 | Stop reason: HOSPADM

## 2023-05-26 RX ORDER — PANTOPRAZOLE SODIUM 40 MG/10ML
40 INJECTION, POWDER, LYOPHILIZED, FOR SOLUTION INTRAVENOUS EVERY 12 HOURS
Status: DISCONTINUED | OUTPATIENT
Start: 2023-05-26 | End: 2023-06-02

## 2023-05-26 RX ORDER — SODIUM CHLORIDE, SODIUM LACTATE, POTASSIUM CHLORIDE, CALCIUM CHLORIDE 600; 310; 30; 20 MG/100ML; MG/100ML; MG/100ML; MG/100ML
INJECTION, SOLUTION INTRAVENOUS CONTINUOUS PRN
Status: DISCONTINUED | OUTPATIENT
Start: 2023-05-26 | End: 2023-05-26

## 2023-05-26 RX ORDER — PYRIDOSTIGMINE BROMIDE 60 MG/1
60 TABLET ORAL 4 TIMES DAILY
COMMUNITY
End: 2023-06-13 | Stop reason: SDUPTHER

## 2023-05-26 RX ORDER — PHENYLEPHRINE HCL IN 0.9% NACL 1 MG/10 ML
SYRINGE (ML) INTRAVENOUS AS NEEDED
Status: DISCONTINUED | OUTPATIENT
Start: 2023-05-26 | End: 2023-05-26

## 2023-05-26 RX ORDER — LIDOCAINE HYDROCHLORIDE 20 MG/ML
INJECTION, SOLUTION EPIDURAL; INFILTRATION; INTRACAUDAL; PERINEURAL AS NEEDED
Status: DISCONTINUED | OUTPATIENT
Start: 2023-05-26 | End: 2023-05-26

## 2023-05-26 RX ORDER — SODIUM CHLORIDE 9 MG/ML
50 INJECTION, SOLUTION INTRAVENOUS CONTINUOUS
Status: DISPENSED | OUTPATIENT
Start: 2023-05-26 | End: 2023-05-28

## 2023-05-26 RX ORDER — ACETAMINOPHEN 325 MG/1
650 TABLET ORAL EVERY 6 HOURS PRN
Status: DISCONTINUED | OUTPATIENT
Start: 2023-05-26 | End: 2023-06-02 | Stop reason: HOSPADM

## 2023-05-26 RX ORDER — METOCLOPRAMIDE HYDROCHLORIDE 5 MG/ML
10 INJECTION INTRAMUSCULAR; INTRAVENOUS ONCE
Status: COMPLETED | OUTPATIENT
Start: 2023-05-26 | End: 2023-05-26

## 2023-05-26 RX ORDER — ONDANSETRON 2 MG/ML
1 INJECTION INTRAMUSCULAR; INTRAVENOUS ONCE
Status: COMPLETED | OUTPATIENT
Start: 2023-05-26 | End: 2023-05-26

## 2023-05-26 RX ADMIN — SODIUM CHLORIDE 8 MG/HR: 9 INJECTION, SOLUTION INTRAVENOUS at 11:56

## 2023-05-26 RX ADMIN — PROPOFOL 200 MG: 10 INJECTION, EMULSION INTRAVENOUS at 13:18

## 2023-05-26 RX ADMIN — GLYCERIN 1 DROP: .002; .002; .01 SOLUTION/ DROPS OPHTHALMIC at 21:52

## 2023-05-26 RX ADMIN — METOCLOPRAMIDE 10 MG: 5 INJECTION, SOLUTION INTRAMUSCULAR; INTRAVENOUS at 09:19

## 2023-05-26 RX ADMIN — SODIUM CHLORIDE, SODIUM LACTATE, POTASSIUM CHLORIDE, AND CALCIUM CHLORIDE: .6; .31; .03; .02 INJECTION, SOLUTION INTRAVENOUS at 12:18

## 2023-05-26 RX ADMIN — FENTANYL CITRATE 50 MCG: 50 INJECTION, SOLUTION INTRAMUSCULAR; INTRAVENOUS at 13:22

## 2023-05-26 RX ADMIN — PYRIDOSTIGMINE BROMIDE 60 MG: 60 TABLET ORAL at 17:54

## 2023-05-26 RX ADMIN — IOHEXOL 100 ML: 350 INJECTION, SOLUTION INTRAVENOUS at 07:56

## 2023-05-26 RX ADMIN — PYRIDOSTIGMINE BROMIDE 60 MG: 60 TABLET ORAL at 21:52

## 2023-05-26 RX ADMIN — Medication 200 MCG: at 13:18

## 2023-05-26 RX ADMIN — SODIUM CHLORIDE 1000 ML: 0.9 INJECTION, SOLUTION INTRAVENOUS at 07:15

## 2023-05-26 RX ADMIN — LIDOCAINE HYDROCHLORIDE 100 MG: 20 INJECTION, SOLUTION EPIDURAL; INFILTRATION; INTRACAUDAL; PERINEURAL at 13:18

## 2023-05-26 RX ADMIN — SODIUM CHLORIDE 80 MG: 9 INJECTION, SOLUTION INTRAVENOUS at 07:29

## 2023-05-26 RX ADMIN — PANTOPRAZOLE SODIUM 40 MG: 40 INJECTION, POWDER, FOR SOLUTION INTRAVENOUS at 17:54

## 2023-05-26 RX ADMIN — SODIUM CHLORIDE 100 ML/HR: 0.9 INJECTION, SOLUTION INTRAVENOUS at 11:56

## 2023-05-26 NOTE — ED NOTES
Patient transported to [facility] Jean Pierre Turcios RN  05/26/23 8211

## 2023-05-26 NOTE — ASSESSMENT & PLAN NOTE
58 y o  male with myasthenia gravis maintained on Mestinon, CAD s/p stents, and HLD who presented to [facility] on 5/26/2023 due to hematochezia and hematemesis with black stool x2 days and syncopal episode at home  Upon arrival to the ED, /61, but lowest SBP 83  CT abdomen pelvis revealed slow active bleeding from a gastric ulcer in the pyloric antrum, but no evidence of perforation  Also noted cholelithiasis without acute cholecystitis  Labs revealed hemoglobin 8 9, lactic acid 2 4, WBC 15 87  Patient received 1 unit packed RBCs and underwent EGD  Neurology was consulted regarding questions about the patient's prednisone given active GI bleed  Per chart review, patient received short course of prednisone in April 2023 due to MG exacerbation (of note, patient had missed a few doses of his Mestinon at that time due to running out of the prescription)  His last dose of prednisone was either today or yesterday      Plan:  - No signs of acute MG exacerbation at this time  - No need to continue prednisone  - Continue home Mestinon 60 mg 4x daily  - No further inpatient Neuro recommendations

## 2023-05-26 NOTE — ASSESSMENT & PLAN NOTE
· Patient was on Mestinon  · Hold the prednisone right now given the upper GI bleed  We will consult neurology

## 2023-05-26 NOTE — PLAN OF CARE
Problem: Potential for Falls  Goal: Patient will remain free of falls  Description: INTERVENTIONS:  - Educate patient/family on patient safety including physical limitations  - Instruct patient to call for assistance with activity   - Consult OT/PT to assist with strengthening/mobility   - Keep Call bell within reach  - Keep bed low and locked with side rails adjusted as appropriate  - Keep care items and personal belongings within reach  - Initiate and maintain comfort rounds  - Make Fall Risk Sign visible to staff  - Offer Toileting every 2 Hours, in advance of need  - Initiate/Maintain chair alarm  - Apply yellow socks and bracelet for high fall risk patients  - Consider moving patient to room near nurses station  Outcome: Progressing     Problem: PAIN - ADULT  Goal: Verbalizes/displays adequate comfort level or baseline comfort level  Description: Interventions:  - Encourage patient to monitor pain and request assistance  - Assess pain using appropriate pain scale  - Administer analgesics based on type and severity of pain and evaluate response  - Implement non-pharmacological measures as appropriate and evaluate response  - Consider cultural and social influences on pain and pain management  - Notify physician/advanced practitioner if interventions unsuccessful or patient reports new pain  Outcome: Progressing     Problem: INFECTION - ADULT  Goal: Absence or prevention of progression during hospitalization  Description: INTERVENTIONS:  - Assess and monitor for signs and symptoms of infection  - Monitor lab/diagnostic results  - Monitor all insertion sites, i e  indwelling lines, tubes, and drains  - Monitor endotracheal if appropriate and nasal secretions for changes in amount and color  - [place] appropriate cooling/warming therapies per order  - Administer medications as ordered  - Instruct and encourage patient and family to use good hand hygiene technique  - Identify and instruct in appropriate isolation precautions for identified infection/condition  Outcome: Progressing     Problem: SAFETY ADULT  Goal: Patient will remain free of falls  Description: INTERVENTIONS:  - Educate patient/family on patient safety including physical limitations  - Instruct patient to call for assistance with activity   - Consult OT/PT to assist with strengthening/mobility   - Keep Call bell within reach  - Keep bed low and locked with side rails adjusted as appropriate  - Keep care items and personal belongings within reach  - Initiate and maintain comfort rounds  - Make Fall Risk Sign visible to staff  - Offer Toileting every 2 Hours, in advance of need  - Initiate/Maintain chair alarm  - Apply yellow socks and bracelet for high fall risk patients  - Consider moving patient to room near nurses station  Outcome: Progressing  Goal: Maintain or return to baseline ADL function  Description: INTERVENTIONS:  -  Assess patient's ability to carry out ADLs; assess patient's baseline for ADL function and identify physical deficits which impact ability to perform ADLs (bathing, care of mouth/teeth, toileting, grooming, dressing, etc )  - Assess/evaluate cause of self-care deficits   - Assess range of motion  - Assess patient's mobility; develop plan if impaired  - Assess patient's need for assistive devices and provide as appropriate  - Encourage maximum independence but intervene and supervise when necessary  - Involve family in performance of ADLs  - Assess for home care needs following discharge   - Consider OT consult to assist with ADL evaluation and planning for discharge  - Provide patient education as appropriate  Outcome: Progressing  Goal: Maintains/Returns to pre admission functional level  Description: INTERVENTIONS:  - Perform BMAT or MOVE assessment daily    - Set and communicate daily mobility goal to care team and patient/family/caregiver     - Collaborate with rehabilitation services on mobility goals if consulted  - Perform Range of Motion 3 times a day  - Reposition patient every 2 hours  - Dangle patient 3 times a day  - Stand patient 3 times a day  - Ambulate patient 3 times a day  - Out of bed to chair 3 times a day   - Out of bed for meals 3 times a day  - Out of bed for toileting  - Record patient progress and toleration of activity level   Outcome: Progressing     Problem: DISCHARGE PLANNING  Goal: Discharge to home or other facility with appropriate resources  Description: INTERVENTIONS:  - Identify barriers to discharge w/patient and caregiver  - Arrange for needed discharge resources and transportation as appropriate  - Identify discharge learning needs (meds, wound care, etc )  - Arrange for interpretive services to assist at discharge as needed  - Refer to Case Management Department for coordinating discharge planning if the patient needs post-hospital services based on physician/advanced practitioner order or complex needs related to functional status, cognitive ability, or social support system  Outcome: Progressing     Problem: Knowledge Deficit  Goal: Patient/family/caregiver demonstrates understanding of disease process, treatment plan, medications, and discharge instructions  Description: Complete learning assessment and assess knowledge base  Interventions:  - Provide teaching at level of understanding  - Provide teaching via preferred learning methods  Outcome: Progressing     Problem: Nutrition/Hydration-ADULT  Goal: Nutrient/Hydration intake appropriate for improving, restoring or maintaining nutritional needs  Description: Monitor and assess patient's nutrition/hydration status for malnutrition  Collaborate with interdisciplinary team and initiate plan and interventions as ordered  Monitor patient's weight and dietary intake as ordered or per policy  Utilize nutrition screening tool and intervene as necessary   Determine patient's food preferences and provide high-protein, high-caloric foods as appropriate  INTERVENTIONS:  - Monitor oral intake, urinary output, labs, and treatment plans  - Assess nutrition and hydration status and recommend course of action  - Evaluate amount of meals eaten  - Assist patient with eating if necessary   - Allow adequate time for meals  - Recommend/ encourage appropriate diets, oral nutritional supplements, and vitamin/mineral supplements  - Order, calculate, and assess calorie counts as needed  - Recommend, monitor, and adjust tube feedings and TPN/PPN based on assessed needs  - Assess need for intravenous fluids  - Provide specific nutrition/hydration education as appropriate  - Include patient/family/caregiver in decisions related to nutrition  Outcome: Progressing     Problem: MOBILITY - ADULT  Goal: Maintain or return to baseline ADL function  Description: INTERVENTIONS:  -  Assess patient's ability to carry out ADLs; assess patient's baseline for ADL function and identify physical deficits which impact ability to perform ADLs (bathing, care of mouth/teeth, toileting, grooming, dressing, etc )  - Assess/evaluate cause of self-care deficits   - Assess range of motion  - Assess patient's mobility; develop plan if impaired  - Assess patient's need for assistive devices and provide as appropriate  - Encourage maximum independence but intervene and supervise when necessary  - Involve family in performance of ADLs  - Assess for home care needs following discharge   - Consider OT consult to assist with ADL evaluation and planning for discharge  - Provide patient education as appropriate  Outcome: Progressing  Goal: Maintains/Returns to pre admission functional level  Description: INTERVENTIONS:  - Perform BMAT or MOVE assessment daily    - Set and communicate daily mobility goal to care team and patient/family/caregiver  - Collaborate with rehabilitation services on mobility goals if consulted  - Perform Range of Motion 3 times a day    - Reposition patient every 2 hours   - Dangle patient 3 times a day  - Stand patient 3 times a day  - Ambulate patient 3 times a day  - Out of bed to chair 3 times a day   - Out of bed for meals 3 times a day  - Out of bed for toileting  - Record patient progress and toleration of activity level   Outcome: Progressing

## 2023-05-26 NOTE — CONSULTS
"Consultation - Neurology   Ayden Cantrell 58 y o  male MRN: [id number]  Unit/Bed#:  Encounter: [id number]      Assessment/Plan     Myasthenia gravis [facility])  Assessment & Plan  58 y o  male with myasthenia gravis maintained on Mestinon, CAD s/p stents, and HLD who presented to [facility] on 5/26/2023 due to hematochezia and hematemesis with black stool x2 days and syncopal episode at home  Upon arrival to the ED, /61, but lowest SBP 83  CT abdomen pelvis revealed slow active bleeding from a gastric ulcer in the pyloric antrum, but no evidence of perforation  Also noted cholelithiasis without acute cholecystitis  Labs revealed hemoglobin 8 9, lactic acid 2 4, WBC 15 87  Patient received 1 unit packed RBCs and underwent EGD  Neurology was consulted regarding questions about the patient's prednisone given active GI bleed  Per chart review, patient received short course of prednisone in April 2023 due to MG exacerbation (of note, patient had missed a few doses of his Mestinon at that time due to running out of the prescription)  His last dose of prednisone was either today or yesterday  Plan:  - No signs of acute MG exacerbation at this time  - No need to continue prednisone  - Continue home Mestinon 60 mg 4x daily  - No further inpatient Neuro recommendations    * Upper GI bleed  Assessment & Plan  - CT abdomen pelvis revealed slow active bleeding from a gastric ulcer in the pyloric antrum, but no evidence of perforation  - GI following; appreciate recommendations  - S/p EGD on 5/26: \"LA grade B reflux esophagitis, 1 cm hiatal hernia, antral angiectasia s/p APC, 1 large 3 cm antral ulcer with nonbleeding visible vessel status post epinephrine injection and gold probe cautery\"          Ayden Cantrell will need follow up in at the next regular appointment with neuromuscular attending  He will not require outpatient neurological testing    Patient has an appointment scheduled with Dr Denise Santos on " 9/28/2023  Patient should keep this appointment  He should ideally follow-up with a neuromuscular specialist at some point in the future  He will need assistance with transportation to get to this appointment  History of Present Illness     Reason for Consult / Principal Problem: MG and prednisone   Hx and PE limited by: N/A  HPI: Arnold Whitney is a 58 y o  male with myasthenia gravis maintained on Mestinon, CAD s/p stents, and HLD who presented to [facility] on 5/26/2023 due to hematochezia and hematemesis with black stool x2 days and syncopal episode at home  This past Tuesday, patient started noticing black stools  This morning patient passed out and woke up surrounded with bright red blood  He states that he continued to have bloody emesis  Upon arrival to the ED, /61, but lowest SBP 83  CT abdomen pelvis revealed slow active bleeding from a gastric ulcer in the pyloric antrum, but no evidence of perforation  Also noted cholelithiasis without acute cholecystitis  Labs revealed hemoglobin 8 9, lactic acid 2 4, WBC 15 87  Patient received 1 unit packed RBCs and placed NPO for GI consult  Neurology was consulted for the patient's myasthenia gravis and questions regarding prednisone  The prednisone was prescribed by the ED team approximate 1 month ago when patient came in with shortness of breath and difficulty swallowing  Patient states that he had missed a few doses of his Mestinon due to running out of the prescription  At that time, patient was restarted on the Mestinon and given steroids  Patient's last dose of steroids were yesterday or today  He has been compliant with his Mestinon  Denies any acute exacerbation  Denies any worsening ptosis, dysphagia, shortness of breath, difficulty breathing  Prior Neuro history:  Patient follows with Dr Chaitanya Brown in the outpatient neurology office with last appointment on 7/15/2022    Per prior notes, patient was diagnosed with myasthenia gravis in 6/2021 following 5 days of visual disturbance with new onset headache  He was treated with IVIG x5 days and discharged on prednisone and Mestinon 60 mg TID for 1 month  He followed up with the outpatient neurology team in 10/2021 and was restarted on Mestinon  He was hospitalized in December 2021 with ocular bulbar myasthenia gravis and once again completed 5 days of IVIG  He was discharged on Mestinon 60 mg QID and prednisone  At that time, patient had bilateral eyelid weakness  In June 2022, patient was hospitalized for myasthenia gravis exacerbation with shortness of breath and weakness with some difficulty swallowing and bilateral eyelid ptosis  Patient was noncompliant with his medications  Patient was treated with steroid taper and 5 days of IVIG  At his last appointment on 7/15/2022, it was recommended that patient continue Mestinon 60 mg QID  Patient was rehospitalized in October 2022 due to neck weakness, bilateral upper extremity weakness, eyelid ptosis with blurred/double vision, and dysphagia  He ran out of his Mestinon and missed several doses  Patient received IV Solu-Medrol once and 5 days of IVIG  He was given steroid taper on discharge and it was recommended that patient continue Mestinon 60 mg QID  The neurology team also discussed starting CellCept with the patient, for which he was willing to consider once his insurance and transportation was set up  On 4/26/2023, he presented to the ED due to shortness of breath and difficulty swallowing  Patient received 1 dose of IV Solu-Medrol and then given steroid taper, with last dose either today or yesterday  Of note, patient states he had run our of his Mestinon, which caused the exacerbation  Inpatient consult to Neurology  Consult performed by: Stephanie Gomez PA-C  Consult ordered by: Deb Monahan MD          Review of Systems   Eyes: Negative for visual disturbance     Gastrointestinal: Positive for abdominal pain and vomiting (blood)  Neurological: Negative for headaches  Left eyelid ptosis   All other systems reviewed and are negative  Historical Information   Past Medical History:   Diagnosis Date   • Coronary artery disease    • Heart attack ([facility] [address] )    • Hyperlipidemia    • Myasthenia gravis ([facility] [address] )      Past Surgical History:   Procedure Laterality Date   • CORONARY ANGIOPLASTY WITH STENT PLACEMENT       Social History   Social History     Substance and Sexual Activity   Alcohol Use Not Currently     Social History     Substance and Sexual Activity   Drug Use Not Currently     E-Cigarette/Vaping   • E-Cigarette Use Never User      E-Cigarette/Vaping Substances   • Nicotine No    • THC No    • CBD No    • Flavoring No    • Other No    • Unknown No      Social History     Tobacco Use   Smoking Status Never   Smokeless Tobacco Never     Family History: History reviewed  No pertinent family history  Review of previous medical records was completed  Reviewed prior notes, labs, CT abdomen/plevis    Meds/Allergies   all current active meds have been reviewed, current meds:   Current Facility-Administered Medications   Medication Dose Route Frequency   • acetaminophen (TYLENOL) tablet 650 mg  650 mg Oral Q6H PRN   • ondansetron (ZOFRAN) injection 4 mg  4 mg Intravenous Q6H PRN   • pantoprazole (PROTONIX) injection 40 mg  40 mg Intravenous Q12H   • pyridostigmine (MESTINON) tablet 60 mg  60 mg Oral 4x Daily   • sodium chloride 0 9 % infusion  100 mL/hr Intravenous Continuous    and PTA meds:   Prior to Admission Medications   Prescriptions Last Dose Informant Patient Reported?  Taking?   aspirin (ECOTRIN LOW STRENGTH) 81 mg EC tablet 5/25/2023  No Yes   Sig: Take 1 tablet (81 mg total) by mouth daily   pantoprazole (PROTONIX) 40 mg tablet More than a month  No No   Sig: Take 1 tablet (40 mg total) by mouth daily in the early morning   polyvinyl alcohol (LIQUIFILM TEARS) 1 4 % ophthalmic solution 5/25/2023  No Yes Sig: Administer 1 drop to both eyes every 3 (three) hours as needed for dry eyes   predniSONE 20 mg tablet 5/25/2023  No Yes   Sig: Take 3 tablets (60 mg total) by mouth daily      Facility-Administered Medications: None       No Known Allergies    Objective   Vitals:Blood pressure 95/57, pulse 74, temperature 97 8 °F (36 6 °C), temperature source Oral, resp  rate (!) 11, height 6' (1 829 m), weight (!) 147 kg (324 lb 1 2 oz), SpO2 99 %  ,Body mass index is 43 95 kg/m²  Intake/Output Summary (Last 24 hours) at 5/26/2023 1211  Last data filed at 5/26/2023 1030  Gross per 24 hour   Intake 350 ml   Output --   Net 350 ml       Invasive Devices: Invasive Devices     Peripheral Intravenous Line  Duration           Peripheral IV 04/26/23 Distal;Left;Upper;Ventral (anterior) Arm 30 days    Peripheral IV 04/28/23 Distal;Right;Upper;Ventral (anterior) Arm 27 days    Peripheral IV 05/26/23 Left Antecubital <1 day    Peripheral IV 05/26/23 Right Antecubital <1 day                Physical Exam  Vitals and nursing note reviewed  Constitutional:       Appearance: Normal appearance  He is obese  Comments: Patient sitting comfortably in bedside chair in no acute distress  BMI 43 95   HENT:      Head: Normocephalic and atraumatic  Mouth/Throat:      Mouth: Mucous membranes are moist       Pharynx: Oropharynx is clear  Eyes:      Extraocular Movements: Extraocular movements intact  Conjunctiva/sclera: Conjunctivae normal       Pupils: Pupils are equal, round, and reactive to light  Comments: Left eyelid ptosis   Pulmonary:      Effort: Pulmonary effort is normal    Musculoskeletal:         General: Normal range of motion  Skin:     General: Skin is warm and dry  Neurological:      Mental Status: He is alert and oriented to person, place, and time  Comments: See full neuro exam below       Neurologic Exam     Mental Status   Oriented to person, place, and time     Patient awake and "alert  Oriented to person, place, month, and year  Mild dysarthria and speech is occasionally hard to understand due to poor enunciation  No aphasia  Able to follow simple midline and appendicular commands  Cranial Nerves     CN III, IV, VI   Pupils are equal, round, and reactive to light  Left eyelid ptosis  Eyelid can be overcome with resistance  Pupils 3 mm, round, reactive to light bilaterally  EOMs intact without nystagmus  No visual field deficits  Facial sensation to light touch intact throughout  Facial expressions full and symmetric  Tongue midline  Motor Exam   Muscle bulk: normal  Overall muscle tone: normal  Right arm pronator drift: absent  Left arm pronator drift: absent  Normal neck flexion strength  5/5 strength in bilateral upper and lower extremities  Sensory Exam   Sensation to light touch intact throughout  Gait, Coordination, and Reflexes     Gait  Gait: (deferred for patient's safety)  No ataxia with finger to nose bilaterally  No resting or action tremor  No ankle clonus bilaterally       Lab Results:   I have personally reviewed pertinent reports    , CBC:   Results from last 7 days   Lab Units 05/26/23  1157 05/26/23  0713   HEMATOCRIT % 31 4* 28 1*   HEMOGLOBIN g/dL 10 0* 8 9*   MCV fL  --  88   PLATELETS Thousands/uL  --  140*   RBC Million/uL  --  3 20*   WBC Thousand/uL  --  15 87*   , BMP/CMP:   Results from last 7 days   Lab Units 05/26/23 0713   ALK PHOS U/L 46   ALT U/L 28   AST U/L 14   BUN mg/dL 49*   CALCIUM mg/dL 8 1*   CHLORIDE mmol/L 107   CO2 mmol/L 28   CREATININE mg/dL 0 86   EGFR ml/min/1 73sq m 91   POTASSIUM mmol/L 4 0   SODIUM mmol/L 138   , Vitamin B12:   , HgBA1C:   , TSH:   , Coagulation:   Results from last 7 days   Lab Units 05/26/23  0713   INR  1 13   , Lipid Profile:   , Ammonia:   , Urinalysis:       Invalid input(s): \"URIBILINOGEN\", Drug Screen:   , Medication Drug Levels:       Invalid input(s): \"CARBAMAZEPINE\", \"LACOSAMIDE\", " "\"OXCARBAZEPINE\"  Imaging Studies: I have personally reviewed pertinent reports  and I have personally reviewed pertinent films in PACS  EKG, Pathology, and Other Studies: I have personally reviewed pertinent reports  and I have personally reviewed pertinent films in PACS  VTE Prophylaxis: Sequential compression device (Venodyne)  and Reason for no pharmacologic prophylaxis GI bleed    Code Status: Level 1 - Full Code  Advance Directive and Living Will:      Power of :    POLST:      I have spent a total time of 52 minutes on 05/26/23 in caring for this patient including Diagnostic results, Prognosis, Risks and benefits of tx options, Instructions for management, Patient and family education, Importance of tx compliance, Risk factor reductions, Impressions, Counseling / Coordination of care, Documenting in the medical record, Reviewing / ordering tests, medicine, procedures  , Obtaining or reviewing history   and Communicating with other healthcare professionals         "

## 2023-05-26 NOTE — ASSESSMENT & PLAN NOTE
· Patient with upper GI bleed  · High-volume CT scan noted with active bleed  · N p o   · EGD today  · Protonix  · Seen by GI  · Monitor H&H  · Transfuse 1 unit for now  · Hold aspirin  Patient is also on prednisone as well

## 2023-05-26 NOTE — CONSULTS
Consultation - [facility]  Shanice Armando 58 y o  male MRN: [id number]  Unit/Bed#: FT 02 Encounter: [id number]      [address]     Reason for Consult / Principal Problem: Melena/hematemesis    HPI: Shanice Armando is a 58y o  year old male with a PMH of myasthenia gravis, coronary artery disease, hyperlipidemia who presented to the ED after a syncopal episode and hematemesis  Patient reports that he began to experience black stools earlier in the week around Tuesday  He then reports that early this morning he woke up and felt dizzy and weak  He reports he had a syncopal episode and then woke up to find that he had had a episode of bright red hematemesis with clots  He was brought in by EMS EMS and was hypotensive with a blood pressure in the 80 over 50s  His hemodynamics/blood pressure responded well to IV fluid resuscitation  He denies any abdominal pain  He denies any bright red rectal bleeding  He denies any prior history of a GI bleed  He has been taking high-dose prednisone 60 mg daily  He denies any NSAIDs  No alcohol or smoking  He reports he has never had an EGD or colonoscopy in the past   He reports a brother and sister with colon cancer  REVIEW OF SYSTEMS:     CONSTITUTIONAL: Denies any fever, chills, or rigors  HEENT: No earache or tinnitus  Denies hearing loss or visual disturbances  CARDIOVASCULAR: No chest pain or palpitations  RESPIRATORY: Denies any cough, hemoptysis, shortness of breath or dyspnea on exertion  GASTROINTESTINAL: As noted in the History of Present Illness  GENITOURINARY: No problems with urination  Denies any hematuria or dysuria  NEUROLOGIC: No dizziness or vertigo, denies headaches  MUSCULOSKELETAL: Denies any muscle or joint pain  SKIN: Denies skin rashes or itching  ENDOCRINE: Denies excessive thirst  Denies intolerance to heat or cold  PSYCHOSOCIAL: Denies depression or anxiety  Denies any recent memory loss       Historical Information   Past Medical History:   Diagnosis Date   • Coronary artery disease    • Heart attack ([facility] [address] )    • Hyperlipidemia    • Myasthenia gravis ([facility] [address] )      Past Surgical History:   Procedure Laterality Date   • CORONARY ANGIOPLASTY WITH STENT PLACEMENT       Social History   Social History     Substance and Sexual Activity   Alcohol Use Not Currently     Social History     Substance and Sexual Activity   Drug Use Not Currently     Social History     Tobacco Use   Smoking Status Never   Smokeless Tobacco Never     History reviewed  No pertinent family history  Meds/Allergies     (Not in a hospital admission)    Current Facility-Administered Medications   Medication Dose Route Frequency   • metoclopramide (REGLAN) injection 10 mg  10 mg Intravenous Once       No Known Allergies    Objective   Blood pressure 100/58, pulse 76, temperature (!) 97 4 °F (36 3 °C), resp  rate 18, weight (!) 147 kg (324 lb 4 8 oz), SpO2 100 %  No intake or output data in the 24 hours ending 05/26/23 0914      PHYSICAL EXAM:      General Appearance:   Alert, cooperative, appears pale, appears stated age    HEENT:   Normocephalic, atraumatic, anicteric     Neck:  Supple, symmetrical, trachea midline, no adenopathy;    thyroid: no enlargement/tenderness/nodules; no carotid  bruit or JVD    Lungs:   Clear to auscultation bilaterally; no rales, rhonchi or wheezing; respirations unlabored    Heart[de-identified]   S1 and S2 normal; regular rate and rhythm; no murmur, rub, or gallop     Abdomen:   Soft, non-tender, non-distended; normal bowel sounds; no masses, no organomegaly    Genitalia:   Deferred    Rectal:   Deferred    Extremities:  No cyanosis, clubbing   Pulses:  2+ and symmetric all extremities    Skin:  Skin color, texture, turgor normal, no rashes or lesions    Lymph nodes:  No palpable cervical, axillary or inguinal lymphadenopathy        Lab Results:   Admission on 05/26/2023   Component Date Value   • Ventricular Rate 05/26/2023 92    • Atrial Rate 05/26/2023 92    • DC Interval 05/26/2023 126    • QRSD Interval 05/26/2023 100    • QT Interval 05/26/2023 372    • QTC Interval 05/26/2023 460    • P Axis 05/26/2023 41    • QRS Axis 05/26/2023 -1    • T Wave Axis 05/26/2023 81    • WBC 05/26/2023 15 87 (H)    • RBC 05/26/2023 3 20 (L)    • Hemoglobin 05/26/2023 8 9 (L)    • Hematocrit 05/26/2023 28 1 (L)    • MCV 05/26/2023 88    • MCH 05/26/2023 27 8    • MCHC 05/26/2023 31 7    • RDW 05/26/2023 17 1 (H)    • MPV 05/26/2023 10 6    • Platelets 52/70/5874 140 (L)    • nRBC 05/26/2023 0    • Neutrophils Relative 05/26/2023 80 (H)    • Immat GRANS % 05/26/2023 1    • Lymphocytes Relative 05/26/2023 11 (L)    • Monocytes Relative 05/26/2023 8    • Eosinophils Relative 05/26/2023 0    • Basophils Relative 05/26/2023 0    • Neutrophils Absolute 05/26/2023 12 69 (H)    • Immature Grans Absolute 05/26/2023 0 14    • Lymphocytes Absolute 05/26/2023 1 70    • Monocytes Absolute 05/26/2023 1 28 (H)    • Eosinophils Absolute 05/26/2023 0 05    • Basophils Absolute 05/26/2023 0 01    • Protime 05/26/2023 14 3    • INR 05/26/2023 1 13    • PTT 05/26/2023 20 (L)    • Sodium 05/26/2023 138    • Potassium 05/26/2023 4 0    • Chloride 05/26/2023 107    • CO2 05/26/2023 28    • ANION GAP 05/26/2023 3 (L)    • BUN 05/26/2023 49 (H)    • Creatinine 05/26/2023 0 86    • Glucose 05/26/2023 212 (H)    • Calcium 05/26/2023 8 1 (L)    • Corrected Calcium 05/26/2023 9 1    • AST 05/26/2023 14    • ALT 05/26/2023 28    • Alkaline Phosphatase 05/26/2023 46    • Total Protein 05/26/2023 4 5 (L)    • Albumin 05/26/2023 2 8 (L)    • Total Bilirubin 05/26/2023 0 21    • eGFR 05/26/2023 91    • Lipase 05/26/2023 39    • hs TnI 0hr 05/26/2023 22    • ABO Grouping 05/26/2023 O    • Rh Factor 05/26/2023 Positive    • Antibody Screen 05/26/2023 Negative    • Specimen Expiration Date 05/26/2023 [id number]    • LACTIC ACID 05/26/2023 2 4 (HH)    • ABO Grouping 05/26/2023 O    • Rh Factor 05/26/2023 Positive    • Unit Product Code 05/26/2023 [id number]    • Unit Number 05/26/2023 [id number]    • Unit ABO 05/26/2023 O    • Unit [facility] 05/26/2023 POS    • Crossmatch 05/26/2023 Compatible    • Unit Dispense Status 05/26/2023 Issued    • Unit Product Volume 05/26/2023 350        Imaging Studies: I have personally reviewed pertinent imaging studies  ASSESSMENT & PLAN:    Hematemesis/melena  CT suggestive of a gastric ulcer with active slow bleeding noted on the arterial phase  Acute blood loss anemia    - Patient presented with with complaints of melena x 3 days and a syncopal episode this am which was associated with an episode of bright red hematemesis at about 5am   - He was hypotensive on presentation which responded to fluid resuscitation   - He has been on high dose Prednisone and CT Scan is suggestive of a gastric ulcer with active slow bleeding noted on the arterial phase per discussion with the ED provider   - HGB on presentation is 8 7 and his baseline is 14-15 per review of chart   - Continue transfusion of PRBCs and IVF resuscitation   - Monitor H&H serially and patient's vitals  - Protonix gtt  - Reglan 10mg IV now  - Will plan for EGD today to investigate and for any potential endoscopic intervention that may be needed  NPO for the procedure  Thank you for this consultation  The patient will be seen and evaluated by Dr Nico Raphael PA-C  5/26/2023,9:14 AM

## 2023-05-26 NOTE — PLAN OF CARE
Problem: Potential for Falls  Goal: Patient will remain free of falls  Description: INTERVENTIONS:  - Educate patient/family on patient safety including physical limitations  - Instruct patient to call for assistance with activity   - Consult OT/PT to assist with strengthening/mobility   - Keep Call bell within reach  - Keep bed low and locked with side rails adjusted as appropriate  - Keep care items and personal belongings within reach  - Initiate and maintain comfort rounds  - Make Fall Risk Sign visible to staff  - Offer Toileting every 2 Hours, in advance of need  - Initiate/Maintain bed alarm  - Apply yellow socks and bracelet for high fall risk patients  - Consider moving patient to room near nurses station  Outcome: Progressing     Problem: PAIN - ADULT  Goal: Verbalizes/displays adequate comfort level or baseline comfort level  Description: Interventions:  - Encourage patient to monitor pain and request assistance  - Assess pain using appropriate pain scale  - Administer analgesics based on type and severity of pain and evaluate response  - Implement non-pharmacological measures as appropriate and evaluate response  - Consider cultural and social influences on pain and pain management  - Notify physician/advanced practitioner if interventions unsuccessful or patient reports new pain  Outcome: Progressing     Problem: SAFETY ADULT  Goal: Patient will remain free of falls  Description: INTERVENTIONS:  - Educate patient/family on patient safety including physical limitations  - Instruct patient to call for assistance with activity   - Consult OT/PT to assist with strengthening/mobility   - Keep Call bell within reach  - Keep bed low and locked with side rails adjusted as appropriate  - Keep care items and personal belongings within reach  - Initiate and maintain comfort rounds  - Make Fall Risk Sign visible to staff  - Offer Toileting every 2 Hours, in advance of need  - Initiate/Maintain bed alarm  - Apply yellow socks and bracelet for high fall risk patients  - Consider moving patient to room near nurses station  Outcome: Progressing  Goal: Maintain or return to baseline ADL function  Description: INTERVENTIONS:  -  Assess patient's ability to carry out ADLs; assess patient's baseline for ADL function and identify physical deficits which impact ability to perform ADLs (bathing, care of mouth/teeth, toileting, grooming, dressing, etc )  - Assess/evaluate cause of self-care deficits   - Assess range of motion  - Assess patient's mobility; develop plan if impaired  - Assess patient's need for assistive devices and provide as appropriate  - Encourage maximum independence but intervene and supervise when necessary  - Involve family in performance of ADLs  - Assess for home care needs following discharge   - Consider OT consult to assist with ADL evaluation and planning for discharge  - Provide patient education as appropriate  Outcome: Progressing  Goal: Maintains/Returns to pre admission functional level  Description: INTERVENTIONS:  - Perform BMAT or MOVE assessment daily    - Set and communicate daily mobility goal to care team and patient/family/caregiver  - Collaborate with rehabilitation services on mobility goals if consulted  - Perform Range of Motion 3 times a day  - Reposition patient every 2 hours    - Dangle patient 3 times a day  - Stand patient 3 times a day  - Ambulate patient 3 times a day  - Out of bed to chair 3 times a day   - Out of bed for meals 3 times a day  - Out of bed for toileting  - Record patient progress and toleration of activity level   Outcome: Progressing

## 2023-05-26 NOTE — ANESTHESIA POSTPROCEDURE EVALUATION
Post-Op Assessment Note    CV Status:  Stable  Pain Score: 0    Pain management: adequate     Mental Status:  Sleepy and arousable   Hydration Status:  Euvolemic   PONV Controlled:  Controlled   Airway Patency:  Patent      Post Op Vitals Reviewed: Yes      Staff: Anesthesiologist, CRNA         No notable events documented      /59 (05/26/23 1342)    Temp 97 8 °F (36 6 °C) (05/26/23 1342)    Pulse 61 (05/26/23 1342)   Resp 12 (05/26/23 1342)    SpO2 100 % (05/26/23 1342)

## 2023-05-26 NOTE — ASSESSMENT & PLAN NOTE
"- CT abdomen pelvis revealed slow active bleeding from a gastric ulcer in the pyloric antrum, but no evidence of perforation    - GI following; appreciate recommendations  - S/p EGD on 5/26: \"LA grade B reflux esophagitis, 1 cm hiatal hernia, antral angiectasia s/p APC, 1 large 3 cm antral ulcer with nonbleeding visible vessel status post epinephrine injection and gold probe cautery\"  "

## 2023-05-26 NOTE — ED PROVIDER NOTES
History  Chief Complaint   Patient presents with   • Vomiting Blood     Pt came in EMS with c/o of vomiting blood this morning and 2 days of blood in stool  55yo male with a history of coronary artery disease, myasthenia gravis, and hyperlipidemia presenting via EMS for evaluation of hematochezia and hematemesis  Patient has been having black stools for the past 2 days  He woke up this morning around 5am and noticed some abdominal cramping  He took his morning medications and then started to feel weak  He walked to the bathroom and fell due to weakness  He is unsure if he lost consciousness  Patient then got up and went to the bathroom and started to have bright red rectal bleeding and hematemesis  On EMS arrival, he was found to be cool and diaphoretic with a blood pressure of 80/50  He was given 500cc IV fluids prior to arrival and states he is feeling better but continues to feel lightheaded  He denies any chest pain or shortness of breath  Of note, he has been on prednisone 60mg for the past month  History provided by:  Patient, EMS personnel and medical records   used: No    Black or Bloody Stool  Quality:  Bright red  Amount:  Copious  Timing:  Constant  Chronicity:  New  Similar prior episodes: no    Relieved by:  Nothing  Worsened by:  Nothing  Associated symptoms: abdominal pain, dizziness, hematemesis, loss of consciousness and vomiting    Associated symptoms: no fever        Prior to Admission Medications   Prescriptions Last Dose Informant Patient Reported?  Taking?   aspirin (ECOTRIN LOW STRENGTH) 81 mg EC tablet   No No   Sig: Take 1 tablet (81 mg total) by mouth daily   pantoprazole (PROTONIX) 40 mg tablet   No No   Sig: Take 1 tablet (40 mg total) by mouth daily in the early morning   polyvinyl alcohol (LIQUIFILM TEARS) 1 4 % ophthalmic solution   No No   Sig: Administer 1 drop to both eyes every 3 (three) hours as needed for dry eyes   predniSONE 20 mg tablet   No No Sig: Take 3 tablets (60 mg total) by mouth daily   pyridostigmine (Mestinon) 60 mg tablet   No No   Sig: Take 1 tablet (60 mg total) by mouth 4 (four) times a day      Facility-Administered Medications: None       Past Medical History:   Diagnosis Date   • Coronary artery disease    • [facility] )    • Hyperlipidemia    • Myasthenia gravis ([facility] )        Past Surgical History:   Procedure Laterality Date   • CORONARY ANGIOPLASTY WITH STENT PLACEMENT         History reviewed  No pertinent family history  I have reviewed and agree with the history as documented  E-Cigarette/Vaping   • E-Cigarette Use Never User      E-Cigarette/Vaping Substances   • Nicotine No    • THC No    • CBD No    • Flavoring No    • Other No    • Unknown No      Social History     Tobacco Use   • Smoking status: Never   • Smokeless tobacco: Never   Vaping Use   • Vaping Use: Never used   Substance Use Topics   • Alcohol use: Not Currently   • Drug use: Not Currently       Review of Systems   Constitutional: Negative for chills and fever  HENT: Negative for drooling and voice change  Eyes: Negative for discharge and redness  Respiratory: Negative for shortness of breath and stridor  Cardiovascular: Negative for chest pain and leg swelling  Gastrointestinal: Positive for abdominal pain, blood in stool, hematemesis and vomiting  Musculoskeletal: Negative for neck pain and neck stiffness  Skin: Negative for color change and rash  Neurological: Positive for dizziness and loss of consciousness  Negative for seizures and syncope  Psychiatric/Behavioral: Negative for confusion  The patient is not nervous/anxious  All other systems reviewed and are negative  Physical Exam  Physical Exam  Vitals and nursing note reviewed  Constitutional:       General: He is in acute distress  Appearance: He is well-developed  He is ill-appearing  He is not diaphoretic  HENT:      Head: Normocephalic and atraumatic  Right Ear: External ear normal       Left Ear: External ear normal    Eyes:      General: No scleral icterus  Right eye: No discharge  Left eye: No discharge  Conjunctiva/sclera: Conjunctivae normal    Cardiovascular:      Rate and Rhythm: Normal rate and regular rhythm  Heart sounds: Normal heart sounds  No murmur heard  Pulmonary:      Effort: Pulmonary effort is normal  No respiratory distress  Breath sounds: Normal breath sounds  No stridor  No wheezing or rales  Abdominal:      General: Bowel sounds are normal  There is no distension  Palpations: Abdomen is soft  Tenderness: There is no abdominal tenderness  There is no guarding  Genitourinary:     Rectum: Guaiac result positive  Comments: Stool dark brown  No gross blood  Hemoccult positive  Musculoskeletal:         General: No deformity  Normal range of motion  Cervical back: Normal range of motion and neck supple  Skin:     General: Skin is warm and dry  Neurological:      Mental Status: He is alert  He is not disoriented  GCS: GCS eye subscore is 4  GCS verbal subscore is 5  GCS motor subscore is 6     Psychiatric:         Behavior: Behavior normal          Vital Signs  ED Triage Vitals [05/26/23 0708]   Temperature Pulse Respirations Blood Pressure SpO2   (!) 97 4 °F (36 3 °C) 87 18 134/61 97 %      Temp src Heart Rate Source Patient Position - Orthostatic VS BP Location FiO2 (%)   -- Monitor -- -- --      Pain Score       --           Vitals:    05/26/23 0815 05/26/23 0830 05/26/23 0845 05/26/23 0855   BP: 105/52 98/56 100/58 100/58   Pulse: 65 69 74 76         Visual Acuity      ED Medications  Medications   metoclopramide (REGLAN) injection 10 mg (has no administration in time range)   ondansetron (FOR EMS ONLY) (ZOFRAN) 4 mg/2 mL injection 4 mg (0 mg Does not apply Given to EMS 5/26/23 0711)   sodium chloride 0 9 % bolus 1,000 mL (1,000 mL Intravenous New Bag 5/26/23 0715)   pantoprazole (PROTONIX) 80 mg in sodium chloride 0 9 % 100 mL IVPB (0 mg Intravenous Stopped 5/26/23 0751)   iohexol (OMNIPAQUE) 350 MG/ML injection (SINGLE-DOSE) 100 mL (100 mL Intravenous Given 5/26/23 0756)       Diagnostic Studies  Results Reviewed     Procedure Component Value Units Date/Time    HS Troponin I 2hr [[id number]] Collected: 05/26/23 0915    Lab Status: No result Specimen: Blood from Hand, Right     Lactic acid 2 Hours [[id number]] Collected: 05/26/23 0915    Lab Status: No result Specimen: Blood from Hand, Right     HS Troponin I 4hr [[id number]]     Lab Status: No result Specimen: Blood     Lactic acid, plasma (w/reflex if result > 2 0) [[id number]]  (Abnormal) Collected: 05/26/23 0713    Lab Status: Final result Specimen: Blood from Arm, Left Updated: 05/26/23 0757     LACTIC ACID 2 4 mmol/L     Narrative:      Result may be elevated if tourniquet was used during collection      HS Troponin 0hr (reflex protocol) [[id number]]  (Normal) Collected: 05/26/23 0713    Lab Status: Final result Specimen: Blood from Arm, Left Updated: 05/26/23 0751     hs TnI 0hr 22 ng/L     Comprehensive metabolic panel [[id number]]  (Abnormal) Collected: 05/26/23 0713    Lab Status: Final result Specimen: Blood from Arm, Left Updated: 05/26/23 0745     Sodium 138 mmol/L      Potassium 4 0 mmol/L      Chloride 107 mmol/L      CO2 28 mmol/L      ANION GAP 3 mmol/L      BUN 49 mg/dL      Creatinine 0 86 mg/dL      Glucose 212 mg/dL      Calcium 8 1 mg/dL      Corrected Calcium 9 1 mg/dL      AST 14 U/L      ALT 28 U/L      Alkaline Phosphatase 46 U/L      Total Protein 4 5 g/dL      Albumin 2 8 g/dL      Total Bilirubin 0 21 mg/dL      eGFR 91 ml/min/1 73sq m     Narrative:      Jelena guidelines for Chronic Kidney Disease (CKD):   •  Stage 1 with normal or high GFR (GFR > 90 mL/min/1 73 square meters)  •  Stage 2 Mild CKD (GFR = 60-89 mL/min/1 73 square meters)  •  Stage 3A Moderate CKD (GFR = 45-59 mL/min/1 73 square meters)  •  Stage 3B Moderate CKD (GFR = 30-44 mL/min/1 73 square meters)  •  Stage 4 Severe CKD (GFR = 15-29 mL/min/1 73 square meters)  •  Stage 5 End Stage CKD (GFR <15 mL/min/1 73 square meters)  Note: GFR calculation is accurate only with a steady state creatinine    Lipase [[id number]]  (Normal) Collected: 05/26/23 0713    Lab Status: Final result Specimen: Blood from Arm, Left Updated: 05/26/23 0745     Lipase 39 u/L     Protime-INR [[id number]]  (Normal) Collected: 05/26/23 0713    Lab Status: Final result Specimen: Blood from Arm, Left Updated: 05/26/23 0742     Protime 14 3 seconds      INR 1 13    APTT [[id number]]  (Abnormal) Collected: 05/26/23 0713    Lab Status: Final result Specimen: Blood from Arm, Left Updated: 05/26/23 0742     PTT 20 seconds     CBC and differential [[id number]]  (Abnormal) Collected: 05/26/23 0713    Lab Status: Final result Specimen: Blood from Arm, Left Updated: 05/26/23 0726     WBC 15 87 Thousand/uL      RBC 3 20 Million/uL      Hemoglobin 8 9 g/dL      Hematocrit 28 1 %      MCV 88 fL      MCH 27 8 pg      MCHC 31 7 g/dL      RDW 17 1 %      MPV 10 6 fL      Platelets 744 Thousands/uL      nRBC 0 /100 WBCs      Neutrophils Relative 80 %      Immat GRANS % 1 %      Lymphocytes Relative 11 %      Monocytes Relative 8 %      Eosinophils Relative 0 %      Basophils Relative 0 %      Neutrophils Absolute 12 69 Thousands/µL      Immature Grans Absolute 0 14 Thousand/uL      Lymphocytes Absolute 1 70 Thousands/µL      Monocytes Absolute 1 28 Thousand/µL      Eosinophils Absolute 0 05 Thousand/µL      Basophils Absolute 0 01 Thousands/µL                  CT high volume bleeding scan abdomen pelvis   Final Result by Janusz Guzman MD (05/26 0932)      1  Slow active bleeding from a gastric ulcer in the pyloric antrum  No evidence of perforation  2   Nondistended colon limiting evaluation for wall thickening   A colitis would not be excluded in the appropriate clinical context  3   Cholelithiasis without acute cholecystitis  I personally discussed this study with Belem Cornejo on 5/26/2023 9:11 AM at 5/26/2023 9:11 AM                Workstation performed: [id number]                    Procedures  ECG 12 Lead Documentation Only    Date/Time: 5/26/2023 2:17 PM    Performed by: Rupali Agosto PA-C  Authorized by: Rupali Agosto PA-C    Indications / Diagnosis:  Syncope  ECG reviewed by me, the ED Provider: yes    Patient location:  ED  Rate:     ECG rate:  92    ECG rate assessment: normal    Rhythm:     Rhythm: sinus rhythm    Ectopy:     Ectopy: none    QRS:     QRS axis:  Normal  Conduction:     Conduction: normal    ST segments:     ST segments:  Non-specific  T waves:     T waves: normal    CriticalCare Time    Date/Time: 5/26/2023 2:17 PM    Performed by: Rupali Agosto PA-C  Authorized by: Rupali Agosto PA-C    Critical care provider statement:     Critical care time (minutes):  45    Critical care was necessary to treat or prevent imminent or life-threatening deterioration of the following conditions:  Shock and circulatory failure    Critical care was time spent personally by me on the following activities:  Blood draw for specimens, obtaining history from patient or surrogate, ordering and performing treatments and interventions, development of treatment plan with patient or surrogate, ordering and review of laboratory studies, discussions with consultants, ordering and review of radiographic studies, re-evaluation of patient's condition, evaluation of patient's response to treatment, review of old charts and examination of patient  Comments:      GI bleed with hypotension             ED Course  ED Course as of 05/26/23 0919   Fri May 26, 2023   0729 Hemoglobin(!): 8 9  Hemoglobin 15 one month ago  0748 BUN(!): 49   0808 GI paged  [id number] Kathy Marte, GI AP, notified of patient's case and consult order   GI to see patient first thing this morning  4760 GI evaluated patient at bedside  Plan for EGD today  3514 D/w radiology  Patient appears to have a stomach ulcer in the posterior antrum with a little bit of slow bleeding on the arterial phase  Blood products seen in the stomach  SBIRT 22yo+    Flowsheet Row Most Recent Value   Initial Alcohol Screen: US AUDIT-C     1  How often do you have a drink containing alcohol? 0 Filed at: 05/26/2023 0711   2  How many drinks containing alcohol do you have on a typical day you are drinking? 0 Filed at: 05/26/2023 0711   3a  Male UNDER 65: How often do you have five or more drinks on one occasion? 0 Filed at: 05/26/2023 0711   3b  FEMALE Any Age, or MALE 65+: How often do you have 4 or more drinks on one occassion? 0 Filed at: 05/26/2023 0711   Audit-C Score 0 Filed at: 05/26/2023 9638   REAL: How many times in the past year have you    Used an illegal drug or used a prescription medication for non-medical reasons? Never Filed at: 05/26/2023 908 [facility]  55XVT presenting for a GI bleed  Black stools x 2 days  Started with bright red rectal bleeding and hematemesis this morning  Had a syncopal vs near syncopal event at home  Hypotensive for EMS at 80s/50s  Currently on prednisone for 1 month  Blood pressure is 134/61 on arrival and remainder of vitals are stable  He is ill appearing on exam  No abdominal tenderness present  Hemoccult positive  Initial ED plan: Check abdominal labs, lactate, coags, type and screen, and CT high volume bleeding scan  IV Protonix and fluid bolus  Final assessment: Hemoglobin is 8 9, down from 15 last month  BUN elevated at 49 and lactate 2 4  CT shows evidence of bleeding gastric ulcer  Patient became hypotensive again during ED stay and 1 unit PRBC ordered for transfusion  Case was discussed with gastroenterology team and plan is for EGD today   Patient was admitted to the internal medicine service for further management  Blood pressure stable at time of admission  GI bleed: acute illness or injury  Symptomatic anemia: acute illness or injury  Amount and/or Complexity of Data Reviewed  Labs: ordered  Decision-making details documented in ED Course  Radiology: ordered  ECG/medicine tests: ordered and independent interpretation performed  Discussion of management or test interpretation with external provider(s): GI, SLIM    Risk  Prescription drug management  Decision regarding hospitalization  Disposition  Final diagnoses:   GI bleed   Symptomatic anemia     Time reflects when diagnosis was documented in both MDM as applicable and the Disposition within this note     Time User Action Codes Description Comment    5/26/2023  8:07 AM Elda Avelar [K92 2] GI bleed     5/26/2023  9:18 AM Monster Avelar [D64 9] Symptomatic anemia       ED Disposition     ED Disposition   Admit    Condition   Stable    Date/Time   Fri May 26, 2023  9:18 AM    Comment   Case was discussed with Dr Garrett Amador and the patient's admission status was agreed to be Admission Status: inpatient status to the service of Dr Garrett Amador   Follow-up Information    None         Patient's Medications   Discharge Prescriptions    No medications on file       No discharge procedures on file      PDMP Review       Value Time User    PDMP Reviewed  Yes 6/14/2022 11:36 AM Renzo Martinez MD          ED Provider  Electronically Signed by           Toni Montoya PA-C  05/26/23 5710

## 2023-05-26 NOTE — H&P
[address] [place] [address]  H&P  Name: Viktoria Quach 58 y o  male I MRN: [id number]  Unit/Bed#:  I Date of Admission: 5/26/2023   Date of Service: 5/26/2023 I Hospital Day: 0      Assessment/Plan   * Upper GI bleed  Assessment & Plan  · Patient with upper GI bleed  · High-volume CT scan noted with active bleed  · N p o   · EGD today  · Protonix  · Seen by GI  · Monitor H&H  · Transfuse 1 unit for now  · Hold aspirin  Patient is also on prednisone as well  · Could be likely secondary to GI bleed and hypotension    Myasthenia gravis exacerbation, bulbar ([address] )  Assessment & Plan  · Patient was on Mestinon  · Hold the prednisone right now given the upper GI bleed  We will consult neurology  Acute blood loss anemia  Assessment & Plan  · Monitor H&H transfuse as needed    CAD (coronary artery disease)  Assessment & Plan  · Hold aspirin         VTE Prophylaxis: Pharmacologic VTE Prophylaxis contraindicated due to GI bleed  / sequential compression device   Code Status: Full code  POLST: POLST is not applicable to this patient  Discussion with family: Patient    Anticipated Length of Stay:  Patient will be admitted on an Inpatient basis with an anticipated length of stay of greater than 2 midnights  Justification for Hospital Stay: Patient is can require greater than 2 midnights given active GI bleed in need of monitoring of hemoglobin and GI intervention    Total Time for Visit, including Counseling / Coordination of Care: 60 minutes  Greater than 50% of this total time spent on direct patient counseling and coordination of care  Chief Complaint:   Nearly passing out    History of Present Illness:    Viktoria Quach is a 58 y o  male who presents with nearly passing out  Patient came in with near syncope  He just states he passed out and woke up and found bright red blood which he vomited with clots  The patient was hypotensive when he came in    Patient states that he started noticing some "black stools around Tuesday and just this morning things got worse where he did have an episode where he dropped to the ground and he was covered in blood  He denies any chest pain or shortness of breath states he feels slightly better since he has been here  He states after he was discharged last time he was put on some prednisone but not on a education for \"blood clots\"     Currently the patient is being transfused and he states he is starting to feel better  Patient has been taking prednisone 60 mg daily  Review of Systems:    Review of Systems   Constitutional: Positive for activity change  Respiratory: Negative for chest tightness and shortness of breath  Gastrointestinal: Positive for abdominal pain, blood in stool and vomiting  Neurological: Positive for dizziness, syncope and weakness  All other systems reviewed and are negative  Past Medical and Surgical History:     Past Medical History:   Diagnosis Date   • Coronary artery disease    • [facility]    • Hyperlipidemia    • Myasthenia gravis ([facility] )        Past Surgical History:   Procedure Laterality Date   • CORONARY ANGIOPLASTY WITH STENT PLACEMENT         Meds/Allergies:    Prior to Admission medications    Medication Sig Start Date End Date Taking?  Authorizing Provider   aspirin (ECOTRIN LOW STRENGTH) 81 mg EC tablet Take 1 tablet (81 mg total) by mouth daily 6/14/22   Michael Almonte MD   pantoprazole (PROTONIX) 40 mg tablet Take 1 tablet (40 mg total) by mouth daily in the early morning 6/15/22   Michael Almonte MD   polyvinyl alcohol (LIQUIFILM TEARS) 1 4 % ophthalmic solution Administer 1 drop to both eyes every 3 (three) hours as needed for dry eyes 6/14/22   Michael Almonte MD   predniSONE 20 mg tablet Take 3 tablets (60 mg total) by mouth daily 4/26/23 5/26/23  Cleo Ham MD   pyridostigmine (Mestinon) 60 mg tablet Take 1 tablet (60 mg total) by mouth 4 (four) times a day 1/16/23 5/26/23  Neymar Matias MD     I have reviewed " home medications with a medical source (PCP, Pharmacy, other)  Allergies: No Known Allergies    Social History:     Marital Status: /Civil Union     Patient Pre-hospital Living Situation: Independent  Patient Pre-hospital Level of Mobility: Independent  Patient Pre-hospital Diet Restrictions: None  Substance Use History:   Social History     Substance and Sexual Activity   Alcohol Use Not Currently     Social History     Tobacco Use   Smoking Status Never   Smokeless Tobacco Never     Social History     Substance and Sexual Activity   Drug Use Not Currently       Family History:    History reviewed  No pertinent family history  Physical Exam:     Vitals:   Blood Pressure: 102/56 (05/26/23 1015)  Pulse: 76 (05/26/23 1015)  Temperature: 97 8 °F (36 6 °C) (05/26/23 1015)  Temp Source: Oral (05/26/23 1015)  Respirations: 17 (05/26/23 1015)  Height: 6' (182 9 cm) (05/26/23 1015)  Weight - Scale: (!) 147 kg (324 lb 1 2 oz) (05/26/23 1015)  SpO2: 100 % (05/26/23 1015)    Physical Exam    (   General Appearance:    Alert, cooperative, no distress, appears stated age                               Lungs:     Clear to auscultation bilaterally, respirations unlabored       Heart:    Regular rate and rhythm, S1 and S2 normal, no murmur, rub    or gallop   Abdomen:     Soft, non-tender, bowel sounds active all four quadrants,     no masses, no organomegaly           Extremities:   Extremities normal, atraumatic, no cyanosis or edema       Additional Data:     Lab Results: I have personally reviewed pertinent reports        Results from last 7 days   Lab Units 05/26/23  0713   EOS PCT % 0   HEMATOCRIT % 28 1*   HEMOGLOBIN g/dL 8 9*   LYMPHS PCT % 11*   MONOS PCT % 8   NEUTROS PCT % 80*   PLATELETS Thousands/uL 140*   WBC Thousand/uL 15 87*     Results from last 7 days   Lab Units 05/26/23  0713   ANION GAP mmol/L 3*   ALBUMIN g/dL 2 8*   ALK PHOS U/L 46   ALT U/L 28   AST U/L 14   BUN mg/dL 49*   CALCIUM mg/dL 8 1* CHLORIDE mmol/L 107   CO2 mmol/L 28   CREATININE mg/dL 0 86   GLUCOSE RANDOM mg/dL 212*   POTASSIUM mmol/L 4 0   SODIUM mmol/L 138   TOTAL BILIRUBIN mg/dL 0 21     Results from last 7 days   Lab Units 05/26/23  0713   INR  1 13             Results from last 7 days   Lab Units 05/26/23  0915 05/26/23  0713   LACTIC ACID mmol/L 2 0 2 4*       Imaging: I have personally reviewed pertinent reports  CT high volume bleeding scan abdomen pelvis   Final Result by Yecenia Iraheta MD (05/26 0932)      1  Slow active bleeding from a gastric ulcer in the pyloric antrum  No evidence of perforation  2   Nondistended colon limiting evaluation for wall thickening  A colitis would not be excluded in the appropriate clinical context  3   Cholelithiasis without acute cholecystitis  I personally discussed this study with Sergio Rao on 5/26/2023 9:11 AM at 5/26/2023 9:11 AM                Workstation performed: [id number]           ·     [email or web address] All Def Digital Records Reviewed: Yes     ** Please Note: This note has been constructed using a voice recognition system   **

## 2023-05-27 LAB
ABO GROUP BLD BPU: NORMAL
ANION GAP SERPL CALCULATED.3IONS-SCNC: 4 MMOL/L (ref 4–13)
BPU ID: NORMAL
BUN SERPL-MCNC: 31 MG/DL (ref 5–25)
CALCIUM SERPL-MCNC: 8.2 MG/DL (ref 8.4–10.2)
CHLORIDE SERPL-SCNC: 107 MMOL/L (ref 96–108)
CO2 SERPL-SCNC: 26 MMOL/L (ref 21–32)
CREAT SERPL-MCNC: 0.84 MG/DL (ref 0.6–1.3)
CROSSMATCH: NORMAL
GFR SERPL CREATININE-BSD FRML MDRD: 92 ML/MIN/1.73SQ M
GLUCOSE SERPL-MCNC: 162 MG/DL (ref 65–140)
HCT VFR BLD AUTO: 23.2 % (ref 36.5–49.3)
HCT VFR BLD AUTO: 25.6 % (ref 36.5–49.3)
HCT VFR BLD AUTO: 26.3 % (ref 36.5–49.3)
HCT VFR BLD AUTO: 27.3 % (ref 36.5–49.3)
HCT VFR BLD AUTO: 28.8 % (ref 36.5–49.3)
HGB BLD-MCNC: 7.5 G/DL (ref 12–17)
HGB BLD-MCNC: 8.2 G/DL (ref 12–17)
HGB BLD-MCNC: 8.4 G/DL (ref 12–17)
HGB BLD-MCNC: 8.8 G/DL (ref 12–17)
HGB BLD-MCNC: 9.1 G/DL (ref 12–17)
POTASSIUM SERPL-SCNC: 4.4 MMOL/L (ref 3.5–5.3)
SODIUM SERPL-SCNC: 137 MMOL/L (ref 135–147)
UNIT DISPENSE STATUS: NORMAL
UNIT PRODUCT CODE: NORMAL
UNIT PRODUCT VOLUME: 350 ML
UNIT RH: NORMAL

## 2023-05-27 PROCEDURE — C9113 INJ PANTOPRAZOLE SODIUM, VIA: HCPCS | Performed by: INTERNAL MEDICINE

## 2023-05-27 PROCEDURE — 99233 SBSQ HOSP IP/OBS HIGH 50: CPT | Performed by: INTERNAL MEDICINE

## 2023-05-27 PROCEDURE — 80048 BASIC METABOLIC PNL TOTAL CA: CPT | Performed by: INTERNAL MEDICINE

## 2023-05-27 PROCEDURE — 85018 HEMOGLOBIN: CPT | Performed by: INTERNAL MEDICINE

## 2023-05-27 PROCEDURE — 85014 HEMATOCRIT: CPT | Performed by: INTERNAL MEDICINE

## 2023-05-27 RX ADMIN — PANTOPRAZOLE SODIUM 40 MG: 40 INJECTION, POWDER, FOR SOLUTION INTRAVENOUS at 17:31

## 2023-05-27 RX ADMIN — GLYCERIN 1 DROP: .002; .002; .01 SOLUTION/ DROPS OPHTHALMIC at 08:57

## 2023-05-27 RX ADMIN — PYRIDOSTIGMINE BROMIDE 60 MG: 60 TABLET ORAL at 21:23

## 2023-05-27 RX ADMIN — IRON SUCROSE 100 MG: 20 INJECTION, SOLUTION INTRAVENOUS at 11:48

## 2023-05-27 RX ADMIN — PYRIDOSTIGMINE BROMIDE 60 MG: 60 TABLET ORAL at 11:49

## 2023-05-27 RX ADMIN — PYRIDOSTIGMINE BROMIDE 60 MG: 60 TABLET ORAL at 17:31

## 2023-05-27 RX ADMIN — GLYCERIN 1 DROP: .002; .002; .01 SOLUTION/ DROPS OPHTHALMIC at 17:34

## 2023-05-27 RX ADMIN — SODIUM CHLORIDE 100 ML/HR: 0.9 INJECTION, SOLUTION INTRAVENOUS at 08:44

## 2023-05-27 RX ADMIN — PYRIDOSTIGMINE BROMIDE 60 MG: 60 TABLET ORAL at 08:21

## 2023-05-27 RX ADMIN — PANTOPRAZOLE SODIUM 40 MG: 40 INJECTION, POWDER, FOR SOLUTION INTRAVENOUS at 05:40

## 2023-05-27 NOTE — PROGRESS NOTES
Progress Note - Cassie John 58 y o  male MRN: [id number]    Unit/Bed#:  Encounter: [id number]    Subjective:     Patient is feeling better  Tolerating liquid diet  Bowel movement overnight with apparent old blood  Objective:     Vitals: Blood pressure 113/56, pulse 65, temperature 97 6 °F (36 4 °C), temperature source Oral, resp  rate 16, height 6' (1 829 m), weight (!) 147 kg (324 lb 1 2 oz), SpO2 97 %  ,Body mass index is 43 95 kg/m²  Intake/Output Summary (Last 24 hours) at 5/27/2023 1030  Last data filed at 5/27/2023 6714  Gross per 24 hour   Intake 2480 49 ml   Output 950 ml   Net 1530 49 ml       Physical Exam:     General Appearance: Alert, appears stated age and cooperative  Lungs: Clear to auscultation bilaterally, no rales or rhonchi, no labored breathing/accessory muscle use  Heart: Regular rate and rhythm, S1, S2 normal, no murmur, click, rub or gallop  Abdomen: Soft, non-tender, non-distended; bowel sounds normal; no masses or no organomegaly  Extremities: No cyanosis, edema    Invasive Devices     Peripheral Intravenous Line  Duration           Peripheral IV 05/26/23 Left Antecubital 1 day                Lab Results:  Results from last 7 days   Lab Units 05/27/23  0554 05/26/23  1157 05/26/23  0713   EOS PCT %  --   --  0   HEMATOCRIT % 26 3*   < > 28 1*   HEMOGLOBIN g/dL 8 4*   < > 8 9*   LYMPHS PCT %  --   --  11*   MONOS PCT %  --   --  8   NEUTROS PCT %  --   --  80*   PLATELETS Thousands/uL  --   --  140*   WBC Thousand/uL  --   --  15 87*    < > = values in this interval not displayed       Results from last 7 days   Lab Units 05/27/23  0554 05/26/23  0713   ALK PHOS U/L  --  46   ALT U/L  --  28   AST U/L  --  14   BUN mg/dL 31* 49*   CALCIUM mg/dL 8 2* 8 1*   CHLORIDE mmol/L 107 107   CO2 mmol/L 26 28   CREATININE mg/dL 0 84 0 86   POTASSIUM mmol/L 4 4 4 0     Results from last 7 days   Lab Units 05/26/23  0713   INR  1 13     Results from last 7 days   Lab Units 05/26/23  0713   LIPASE u/L 39       Imaging Studies: I have personally reviewed pertinent imaging studies  EGD    Result Date: 5/26/2023  Impression: LA grade B reflux esophagitis 1 cm hiatal hernia Antral angiectasia status post APC 1 large 3 cm antral ulcer with nonbleeding visible vessel status post epinephrine injection and gold probe cautery RECOMMENDATION: Pantoprazole 40 mg IV every 12 hours Clear liquid diet Venofer infusion Monitor CBC Await pathology EGD in 3 months to document ulcer healing Caution with NSAIDs   Wilfred Jean III, MD     CT high volume bleeding scan abdomen pelvis    Result Date: 5/26/2023  Impression: 1  Slow active bleeding from a gastric ulcer in the pyloric antrum  No evidence of perforation  2   Nondistended colon limiting evaluation for wall thickening  A colitis would not be excluded in the appropriate clinical context  3   Cholelithiasis without acute cholecystitis  I personally discussed this study with Angela Yao on 5/26/2023 9:11 AM at 5/26/2023 9:11 AM  Workstation performed: [id number]       Assessment and Plan:     UGI bleed  Large antral ulceration  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  Esophagitis   - Patient presented with melena x 3 days and a syncopal episode   - Hgb on admission 8 9 stable today at 8 4   - No GI bleeding overnight per nursing staff   - EGD 5/26/23 LA grade B reflux esophagitis, 1 cm hiatal hernia, antral angiectasia status post APC, 1 large 3 cm antral ulceration with nonbleeding visible vessel status post epinephrine injection and gold probe cautery   - PPI BID   - F/U biopsy for H Pylori and treat is positive   - Repeat EGD in 3 months to ensure ulcer healing    - Patient does not have insurance    He will use [employer] to obtain outpatient medications   - Continue liquid diet today and if tolerating, no acute events will advance to regular   - Advised no NSAIDs    Patient will be seen by Dr Shantel Billy

## 2023-05-27 NOTE — PLAN OF CARE
Problem: Potential for Falls  Goal: Patient will remain free of falls  Description: INTERVENTIONS:  - Educate patient/family on patient safety including physical limitations  - Instruct patient to call for assistance with activity   - Consult OT/PT to assist with strengthening/mobility   - Keep Call bell within reach  - Keep bed low and locked with side rails adjusted as appropriate  - Keep care items and personal belongings within reach  - Initiate and maintain comfort rounds  - Make Fall Risk Sign visible to staff  - Offer Toileting every 3 Hours, in advance of need  - Initiate/Maintain bed/chair alarm  - Obtain necessary fall risk management equipment:   - Apply yellow socks and bracelet for high fall risk patients  - Consider moving patient to room near nurses station  Outcome: Progressing     Problem: PAIN - ADULT  Goal: Verbalizes/displays adequate comfort level or baseline comfort level  Description: Interventions:  - Encourage patient to monitor pain and request assistance  - Assess pain using appropriate pain scale  - Administer analgesics based on type and severity of pain and evaluate response  - Implement non-pharmacological measures as appropriate and evaluate response  - Consider cultural and social influences on pain and pain management  - Notify physician/advanced practitioner if interventions unsuccessful or patient reports new pain  Outcome: Progressing     Problem: INFECTION - ADULT  Goal: Absence or prevention of progression during hospitalization  Description: INTERVENTIONS:  - Assess and monitor for signs and symptoms of infection  - Monitor lab/diagnostic results  - Monitor all insertion sites, i e  indwelling lines, tubes, and drains  - Monitor endotracheal if appropriate and nasal secretions for changes in amount and color  - [place] appropriate cooling/warming therapies per order  - Administer medications as ordered  - Instruct and encourage patient and family to use good hand hygiene technique  - Identify and instruct in appropriate isolation precautions for identified infection/condition  Outcome: Progressing     Problem: SAFETY ADULT  Goal: Patient will remain free of falls  Description: INTERVENTIONS:  - Educate patient/family on patient safety including physical limitations  - Instruct patient to call for assistance with activity   - Consult OT/PT to assist with strengthening/mobility   - Keep Call bell within reach  - Keep bed low and locked with side rails adjusted as appropriate  - Keep care items and personal belongings within reach  - Initiate and maintain comfort rounds  - Make Fall Risk Sign visible to staff  - Offer Toileting every 3 Hours, in advance of need  - Initiate/Maintain bed/chair alarm  - Obtain necessary fall risk management equipment:   - Apply yellow socks and bracelet for high fall risk patients  - Consider moving patient to room near nurses station  Outcome: Progressing  Goal: Maintain or return to baseline ADL function  Description: INTERVENTIONS:  -  Assess patient's ability to carry out ADLs; assess patient's baseline for ADL function and identify physical deficits which impact ability to perform ADLs (bathing, care of mouth/teeth, toileting, grooming, dressing, etc )  - Assess/evaluate cause of self-care deficits   - Assess range of motion  - Assess patient's mobility; develop plan if impaired  - Assess patient's need for assistive devices and provide as appropriate  - Encourage maximum independence but intervene and supervise when necessary  - Involve family in performance of ADLs  - Assess for home care needs following discharge   - Consider OT consult to assist with ADL evaluation and planning for discharge  - Provide patient education as appropriate  Outcome: Progressing  Goal: Maintains/Returns to pre admission functional level  Description: INTERVENTIONS:  - Perform BMAT or MOVE assessment daily    - Set and communicate daily mobility goal to care team and patient/family/caregiver  - Collaborate with rehabilitation services on mobility goals if consulted  - Reposition patient every 2 hours  - Dangle patient 3 times a day  - Stand patient 3 times a day  - Ambulate patient 3 times a day  - Out of bed to chair 3 times a day   - Out of bed for meals 3 times a day  - Out of bed for toileting  - Record patient progress and toleration of activity level   Outcome: Progressing     Problem: DISCHARGE PLANNING  Goal: Discharge to home or other facility with appropriate resources  Description: INTERVENTIONS:  - Identify barriers to discharge w/patient and caregiver  - Arrange for needed discharge resources and transportation as appropriate  - Identify discharge learning needs (meds, wound care, etc )  - Arrange for interpretive services to assist at discharge as needed  - Refer to Case Management Department for coordinating discharge planning if the patient needs post-hospital services based on physician/advanced practitioner order or complex needs related to functional status, cognitive ability, or social support system  Outcome: Progressing     Problem: Knowledge Deficit  Goal: Patient/family/caregiver demonstrates understanding of disease process, treatment plan, medications, and discharge instructions  Description: Complete learning assessment and assess knowledge base  Interventions:  - Provide teaching at level of understanding  - Provide teaching via preferred learning methods  Outcome: Progressing     Problem: Nutrition/Hydration-ADULT  Goal: Nutrient/Hydration intake appropriate for improving, restoring or maintaining nutritional needs  Description: Monitor and assess patient's nutrition/hydration status for malnutrition  Collaborate with interdisciplinary team and initiate plan and interventions as ordered  Monitor patient's weight and dietary intake as ordered or per policy  Utilize nutrition screening tool and intervene as necessary   Determine patient's food preferences and provide high-protein, high-caloric foods as appropriate  INTERVENTIONS:  - Monitor oral intake, urinary output, labs, and treatment plans  - Assess nutrition and hydration status and recommend course of action  - Evaluate amount of meals eaten  - Assist patient with eating if necessary   - Allow adequate time for meals  - Recommend/ encourage appropriate diets, oral nutritional supplements, and vitamin/mineral supplements  - Order, calculate, and assess calorie counts as needed  - Recommend, monitor, and adjust tube feedings and TPN/PPN based on assessed needs  - Assess need for intravenous fluids  - Provide specific nutrition/hydration education as appropriate  - Include patient/family/caregiver in decisions related to nutrition  Outcome: Progressing     Problem: MOBILITY - ADULT  Goal: Maintain or return to baseline ADL function  Description: INTERVENTIONS:  -  Assess patient's ability to carry out ADLs; assess patient's baseline for ADL function and identify physical deficits which impact ability to perform ADLs (bathing, care of mouth/teeth, toileting, grooming, dressing, etc )  - Assess/evaluate cause of self-care deficits   - Assess range of motion  - Assess patient's mobility; develop plan if impaired  - Assess patient's need for assistive devices and provide as appropriate  - Encourage maximum independence but intervene and supervise when necessary  - Involve family in performance of ADLs  - Assess for home care needs following discharge   - Consider OT consult to assist with ADL evaluation and planning for discharge  - Provide patient education as appropriate  Outcome: Progressing  Goal: Maintains/Returns to pre admission functional level  Description: INTERVENTIONS:  - Perform BMAT or MOVE assessment daily    - Set and communicate daily mobility goal to care team and patient/family/caregiver     - Collaborate with rehabilitation services on mobility goals if consulted  - Reposition patient every 2 hours    - Dangle patient 3 times a day  - Stand patient 3 times a day  - Ambulate patient 3 times a day  - Out of bed to chair 3 times a day   - Out of bed for meals 3 times a day  - Out of bed for toileting  - Record patient progress and toleration of activity level   Outcome: Progressing

## 2023-05-27 NOTE — PROGRESS NOTES
[address] [place] [address]  Progress Note  Name: Josie Akins  MRN: [id number]  Unit/Bed#:  I Date of Admission: 2023   Date of Service: 2023 I Hospital Day: 1    Assessment/Plan   * Upper GI bleed  Assessment & Plan  · Patient with upper GI bleed  · High-volume CT scan noted with active bleed  · EGD withLA grade B reflux esophagitis  1 cm hiatal hernia  Antral angiectasia status post APC  1 large 3 cm antral ulcer with nonbleeding visible vessel status post epinephrine injection and gold probe cautery    · Diet advance to low residue  · Protonix 40 mg IV twice daily  · Venofer  · Seen by GI  · Monitor H&H  · Status post 1 unit PRBC  · Hold aspirin  Acute blood loss anemia  Assessment & Plan  · Monitor H&H transfuse as needed    Myasthenia gravis ([address] )  Assessment & Plan  · Patient was on Mestinon  · Hold the prednisone right now given the upper GI bleed  · Neurology consulted    CAD (coronary artery disease)  Assessment & Plan  · Hold aspirin               VTE Pharmacologic Prophylaxis:   VTE Score: 3 Low Risk (Score 0-2) - Encourage Ambulation  Mechanical VTE Prophylaxis in Place: No    Patient Centered Rounds: I have performed bedside rounds with nursing staff today  Discussions with Specialists or Other Care Team Provider: GI    Current Length of Stay: 1 day(s)    Current Patient Status: Inpatient     Discharge Plan / Estimated Discharge Date: Anticipate discharge in 48 hrs to home  Code Status: Level 1 - Full Code      Subjective:   Patient was seen and examined by me at bedside  Patient was pleasant and cooperative  No particular overnight event reported  He continues to report some blood in bowel movement, however reports significantly improved  Denies any abdominal pain  Denies any nausea or vomiting  Has no new complaints or concerns        Objective:     Vitals:   Temp (24hrs), Av 8 °F (36 6 °C), Min:97 6 °F (36 4 °C), Max:98 1 °F (36 7 °C)    Temp: [97 6 °F (36 4 °C)-98 1 °F (36 7 °C)] 97 6 °F (36 4 °C)  HR:  [61-77] 65  Resp:  [12-18] 16  BP: ()/(55-70) 113/56  SpO2:  [93 %-100 %] 97 %  Body mass index is 43 95 kg/m²  Input and Output Summary (last 24 hours): Intake/Output Summary (Last 24 hours) at 5/27/2023 1045  Last data filed at 5/27/2023 9460  Gross per 24 hour   Intake 2480 49 ml   Output 950 ml   Net 1530 49 ml       Physical Exam:     Physical Exam  Constitutional:       General: He is not in acute distress  Appearance: Normal appearance  He is obese  He is not ill-appearing  HENT:      Head: Normocephalic and atraumatic  Eyes:      General: No scleral icterus  Right eye: No discharge  Left eye: No discharge  Cardiovascular:      Rate and Rhythm: Normal rate  Pulses: Normal pulses  Pulmonary:      Effort: Pulmonary effort is normal  No respiratory distress  Abdominal:      General: There is no distension  Palpations: Abdomen is soft  Musculoskeletal:         General: No swelling, tenderness or signs of injury  Normal range of motion  Cervical back: Normal range of motion  Skin:     General: Skin is warm and dry  Neurological:      Mental Status: He is alert  Mental status is at baseline  Psychiatric:         Mood and Affect: Mood normal          Behavior: Behavior normal           Additional Data:     Labs:  Results from last 7 days   Lab Units 05/27/23  0554 05/26/23  1157 05/26/23  0713   EOS PCT %  --   --  0   HEMATOCRIT % 26 3*   < > 28 1*   HEMOGLOBIN g/dL 8 4*   < > 8 9*   LYMPHS PCT %  --   --  11*   MONOS PCT %  --   --  8   NEUTROS PCT %  --   --  80*   PLATELETS Thousands/uL  --   --  140*   WBC Thousand/uL  --   --  15 87*    < > = values in this interval not displayed       Results from last 7 days   Lab Units 05/27/23  0554 05/26/23  0713   ANION GAP mmol/L 4 3*   ALBUMIN g/dL  --  2 8*   ALK PHOS U/L  --  46   ALT U/L  --  28   AST U/L  --  14   BUN mg/dL 31* 49* CALCIUM mg/dL 8 2* 8 1*   CHLORIDE mmol/L 107 107   CO2 mmol/L 26 28   CREATININE mg/dL 0 84 0 86   GLUCOSE RANDOM mg/dL 162* 212*   POTASSIUM mmol/L 4 4 4 0   SODIUM mmol/L 137 138   TOTAL BILIRUBIN mg/dL  --  0 21     Results from last 7 days   Lab Units 05/26/23  0713   INR  1 13             Results from last 7 days   Lab Units 05/26/23  0915 05/26/23  0713   LACTIC ACID mmol/L 2 0 2 4*       Imaging: No pertinent imaging reviewed  Recent Cultures (last 7 days):           Lines/Drains:  Invasive Devices     Peripheral Intravenous Line  Duration           Peripheral IV 05/26/23 Left Antecubital 1 day                Telemetry:        Last 24 Hours Medication List:   Current Facility-Administered Medications   Medication Dose Route Frequency Provider Last Rate   • acetaminophen  650 mg Oral Q6H PRN Denilson Ledesma III, MD     • glycerin-hypromellose-  1 drop Both Eyes Q6H PRN Luis Foster PA-C     • iron sucrose  100 mg Intravenous Daily Lily Pinedo MD     • ondansetron  4 mg Intravenous Q6H PRN Nicolas Tenorio MD     • pantoprazole  40 mg Intravenous Q12H Denilson Ledesma III, MD     • pyridostigmine  60 mg Oral 4x Daily Kerry Chun PA-C     • sodium chloride  50 mL/hr Intravenous Continuous Lily Pinedo MD 50 mL/hr (05/27/23 1037)        Today, Patient Was Seen By: Lily Pinedo MD    ** Please Note: This note has been constructed using a voice recognition system   **

## 2023-05-27 NOTE — UTILIZATION REVIEW
Initial Clinical Review    Admission: Date/Time/Statement:   Admission Orders (From admission, onward)     Ordered        05/26/23 0918  INPATIENT ADMISSION  Once                      Orders Placed This Encounter   Procedures   • INPATIENT ADMISSION     Standing Status:   Standing     Number of Occurrences:   1     Order Specific Question:   Level of Care     Answer:   Med Surg [16]     Order Specific Question:   Estimated length of stay     Answer:   More than 2 Midnights     Order Specific Question:   Certification     Answer:   I certify that inpatient services are medically necessary for this patient for a duration of greater than two midnights  See H&P and MD Progress Notes for additional information about the patient's course of treatment  ED Arrival Information     Expected   -    Arrival   5/26/2023 07:01    Acuity   Emergent            Means of arrival   Ambulance    Escorted by   [place] [id number]   Hospitalist    Admission type   Emergency            Arrival complaint   Bloody Stool            Chief Complaint   Patient presents with   • Vomiting Blood     Pt came in EMS with c/o of vomiting blood this morning and 2 days of blood in stool  Initial Presentation: 58 y o  male with PMH of CAD, myasthenia gravis and HL who presents to the ED from home for evaluation of hematemesis  Patient states he passed out and woke up and found bright red blood which he vomited with clots  Also reports he noticed some black stools on Tuesday  Patient takes prednisone 60 mg daily  Patient became hypotensive in the ED  CT revealed slow active bleeding from a gastric ulcer in the pyloric antrum  PE:  Alert, lungs CTA, abdomen soft, non-tender  5/26 Inpatient admission for evaluation and treatment of upper GI bleed, acute blood loss anemia:  NPO, IV Protonix, transfuse one unit PRBCs, monitor H&H, hold aspirin and prednisone, consult Gastroenterology       5/26 Gastroenterology consult: Melena/hematemesis  CT scan of the abdomen and pelvis showed a slow active bleeding from gastric ulcer in the pyloric region of the stomach  It also showed cholelithiasis without cholecystitis  His hemoglobin was 8 9 hematocrit of 20 1 WBC of 15 87 platelets of 604  INR was normal his BUN is elevated at 49  Plan: EGD today  NPO  Protonix infusion  Transfusion parameters per IM service  Venofer course  Monitor CBC  Further management decisions will be based upon endoscopic results  Neurology consult:  No signs of acute MG exacerbation at this time  No need to continue prednisone  Continue home Mestinon 60 mg 4x daily  5/27 Gastroenterology: Upper GI bleed secondary to a large antral ulcer with nonbleeding visible vessel status post epinephrine injection and gold probe cautery treatment  He also had an angiectasia treated with APC  His hemoglobin is stable at 8 4  Protonix 40 mg IV every 12 hours, monitor CBC  Advance diet to regular  Await pathology for H  Pylori  Venofer infusion   Caution with NSAIDs          ED Triage Vitals   Temperature Pulse Respirations Blood Pressure SpO2   05/26/23 0708 05/26/23 0708 05/26/23 0708 05/26/23 0708 05/26/23 0708   (!) 97 4 °F (36 3 °C) 87 18 134/61 97 %      Temp Source Heart Rate Source Patient Position - Orthostatic VS BP Location FiO2 (%)   05/26/23 1000 05/26/23 0708 05/26/23 1015 05/26/23 1015 --   Oral Monitor Sitting Right arm       Pain Score       05/26/23 1015       No Pain          Wt Readings from Last 1 Encounters:   05/26/23 (!) 147 kg (324 lb 1 2 oz)     Additional Vital Signs:     Date/Time Temp Pulse Resp BP MAP (mmHg) SpO2 Calculated FIO2 (%) - Nasal Cannula O2 Flow Rate (L/min) Nasal Cannula O2 Flow Rate (L/min) O2 Device Patient Position - Orthostatic VS   05/27/23 1503 97 8 °F (36 6 °C) 63 15 108/56 79 100 % -- -- -- None (Room air) Sitting   05/27/23 1102 97 8 °F (36 6 °C) 60 15 106/54 75 100 % -- -- -- None (Room air) Sitting   05/27/23 0716 97 6 °F (36 4 °C) 65 16 113/56 80 97 % -- -- -- None (Room air) Sitting   05/26/23 2300 97 9 °F (36 6 °C) 62 18 131/70 95 100 % -- -- -- None (Room air) Sitting   05/26/23 1500 97 8 °F (36 6 °C) 69 18 101/67 -- 93 % -- -- -- None (Room air) --   05/26/23 1400 -- 65 16 92/55 -- 100 % -- 2 L/min -- Nasal cannula --   05/26/23 1345 97 8 °F (36 6 °C) 61 16 106/56 -- 100 % -- 4 L/min -- Simple mask --   05/26/23 1342 97 8 °F (36 6 °C) 61 12 113/59 -- 100 % -- 4 L/min -- Simple mask --   05/26/23 1213 98 1 °F (36 7 °C) 77 18 100/60 -- 99 % -- -- -- None (Room air) --   05/26/23 1030 -- 74 11 Abnormal  95/57 71 99 % -- -- -- -- --   05/26/23 1015 97 8 °F (36 6 °C) 76 17 102/56 77 100 % -- -- -- None (Room air) Sitting   05/26/23 1000 97 8 °F (36 6 °C) 85 42 Abnormal  83/52 Abnormal  55 Abnormal  89 % Abnormal  -- -- -- -- --   05/26/23 0940 97 6 °F (36 4 °C) 69 16 104/57 -- -- -- -- -- -- --   05/26/23 0930 97 4 °F (36 3 °C) Abnormal  68 16 105/55 73 100 % -- -- -- -- --   05/26/23 0910 97 6 °F (36 4 °C) 72 18 85/50 Abnormal  -- -- -- -- -- -- --   05/26/23 0855 97 4 °F (36 3 °C) Abnormal  76 18 100/58 -- -- -- -- -- -- --   05/26/23 0845 97 6 °F (36 4 °C) 74 18 100/58 -- -- -- -- -- -- --   05/26/23 0835 97 6 °F (36 4 °C) -- -- -- -- -- -- -- -- -- --   05/26/23 0830 -- 69 18 98/56 70 100 % -- -- -- -- --   05/26/23 0815 -- 65 18 105/52 74 100 % -- -- -- -- --   05/26/23 0745 -- 87 20 89/52 Abnormal  65 100 % -- -- -- -- --       Pertinent Labs/Diagnostic Test Results:     5/26 EGD:    FINDINGS:  • The upper third of the esophagus, middle third of the esophagus and lower third of the esophagus appeared normal   • Grade B esophagitis with mucosal breaks measuring 5 mm or more not continuous between folds, covering less than 75% of the circumference in the GE junction  • 1 cm sliding hiatal hernia (type I hiatal hernia) without Sis Bharathi lesions present  • Single large angioectasia in the antrum; no bleeding was identified; induced coagulation and hemostasis achieved with with argon plasma coagulation  • Single large, cratered, irregular, benign-appearing ulcer in the antrum with nonbleeding visible vessel (Demar IIA); tissue ablated completely with heater probe; injected 2 mL of epinephrine to address bleeding; hemostasis achieved  • The duodenal bulb, 1st part of the duodenum and 2nd part of the duodenum appeared normal      IMPRESSION:  LA grade B reflux esophagitis  1 cm hiatal hernia  Antral angiectasia status post APC  1 large 3 cm antral ulcer with nonbleeding visible vessel status post epinephrine injection and gold probe cautery        RECOMMENDATION:  Pantoprazole 40 mg IV every 12 hours  Clear liquid diet  Venofer infusion  Monitor CBC  Await pathology  EGD in 3 months to document ulcer healing  Caution with NSAIDs         CT high volume bleeding scan abdomen pelvis   Final Result by Myron Painter MD (05/26 0932)      1  Slow active bleeding from a gastric ulcer in the pyloric antrum  No evidence of perforation  2   Nondistended colon limiting evaluation for wall thickening  A colitis would not be excluded in the appropriate clinical context  3   Cholelithiasis without acute cholecystitis  5/26 EKG:    Normal sinus rhythm  Septal infarct (cited on or before 07-JUN-2021)  Abnormal ECG  When compared with ECG of 28-APR-2023 15:33,  Vent   rate has increased BY  33 BPM  Left anterior fascicular block is no longer Present        Results from last 7 days   Lab Units 05/27/23  0554 05/26/23  2351 05/26/23  1759 05/26/23  1157 05/26/23  0713   HEMATOCRIT % 26 3* 28 8* 30 3* 31 4* 28 1*   HEMOGLOBIN g/dL 8 4* 9 1* 9 7* 10 0* 8 9*   NEUTROS ABS Thousands/µL  --   --   --   --  12 69*   PLATELETS Thousands/uL  --   --   --   --  140*   WBC Thousand/uL  --   --   --   --  15 87*     Results from last 7 days   Lab Units 05/27/23  0410   CROSSMATCH  Compatible   UNITABO  O   UNIT DISPENSE STATUS  Presumed Trans   UNIT NUMBER  [id number]   UNIT PRODUCT CODE  [id number]   UNIT PRODUCT VOL mL 350   UNITRH  POS           Results from last 7 days   Lab Units 05/27/23  0554 05/26/23  0713   ANION GAP mmol/L 4 3*   BUN mg/dL 31* 49*   CALCIUM mg/dL 8 2* 8 1*   CHLORIDE mmol/L 107 107   CO2 mmol/L 26 28   CREATININE mg/dL 0 84 0 86   EGFR ml/min/1 73sq m 92 91   POTASSIUM mmol/L 4 4 4 0   SODIUM mmol/L 137 138     Results from last 7 days   Lab Units 05/26/23  0713   ALBUMIN g/dL 2 8*   ALK PHOS U/L 46   ALT U/L 28   AST U/L 14   TOTAL BILIRUBIN mg/dL 0 21   TOTAL PROTEIN g/dL 4 5*         Results from last 7 days   Lab Units 05/27/23  0554 05/26/23  0713   GLUCOSE RANDOM mg/dL 162* 212*             Results from last 7 days   Lab Units 05/26/23  1157 05/26/23  0915 05/26/23  0713   HS TNI 0HR ng/L  --   --  22   HS TNI 2HR ng/L  --  42  --    HS TNI 4HR ng/L 41  --   --    HSTNI D2 ng/L  --  20*  --    HSTNI D4 ng/L 19  --   --          Results from last 7 days   Lab Units 05/26/23  0713   INR  1 13   PROTIME seconds 14 3   PTT seconds 20*             Results from last 7 days   Lab Units 05/26/23  0915 05/26/23  0713   LACTIC ACID mmol/L 2 0 2 4*                 Results from last 7 days   Lab Units 05/26/23  0713   LIPASE u/L 39               ED Treatment:   Medication Administration from 05/26/2023 0701 to 05/26/2023 1005       Date/Time Order Dose Route Action     05/26/2023 0711 EDT ondansetron (FOR EMS ONLY) (ZOFRAN) 4 mg/2 mL injection 4 mg 0 mg Does not apply Given to EMS     05/26/2023 0815 EDT sodium chloride 0 9 % bolus 1,000 mL 0 mL Intravenous Stopped     05/26/2023 0715 EDT sodium chloride 0 9 % bolus 1,000 mL 1,000 mL Intravenous New Bag     05/26/2023 0751 EDT pantoprazole (PROTONIX) 80 mg in sodium chloride 0 9 % 100 mL IVPB 0 mg Intravenous Stopped     05/26/2023 0729 EDT pantoprazole (PROTONIX) 80 mg in sodium chloride 0 9 % 100 mL IVPB 80 mg Intravenous New Bag     05/26/2023 0756 EDT iohexol (OMNIPAQUE) 350 MG/ML injection (SINGLE-DOSE) 100 mL 100 mL Intravenous Given     05/26/2023 0919 EDT metoclopramide (REGLAN) injection 10 mg 10 mg Intravenous Given        Past Medical History:   Diagnosis Date   • Coronary artery disease    • Heart attack ([facility] )    • Hyperlipidemia    • Myasthenia gravis ([facility] )      Present on Admission:  • Upper GI bleed  • Myasthenia gravis ([facility] )  • CAD (coronary artery disease)  • Acute blood loss anemia      Admitting Diagnosis: Bloody stool [K92 1]  GI bleed [K92 2]  Symptomatic anemia [D64 9]  Age/Sex: 58 y o  male       Admission Orders: NPO, H&H Q6H,       Scheduled Medications:    iron sucrose, 100 mg, Intravenous, Daily  pantoprazole, 40 mg, Intravenous, Q12H  pyridostigmine, 60 mg, Oral, 4x Daily      Continuous IV Infusions:    sodium chloride, 50 mL/hr, Intravenous, Continuous      pantoprazole (PROTONIX) 80 mg in sodium chloride 0 9 % 100 mL infusion  Rate: 10 mL/hr Dose: 8 mg/hr  Freq: Continuous Route: IV  Last Dose: Stopped (05/26/23 1419)  Start: 05/26/23 1130 End: 05/26/23 1419      PRN Meds: None  IP CONSULT TO GASTROENTEROLOGY  IP CONSULT TO NEUROLOGY    Network Utilization Review Department  ATTENTION: Please call with any questions or concerns to [phone] and carefully listen to the prompts so that you are directed to the right person  All voicemails are confidential   Flavia Shields all requests for admission clinical reviews, approved or denied determinations and any other requests to dedicated fax number below belonging to the campus where the patient is receiving treatment   List of dedicated fax numbers for the Facilities:  [address] DENIALS (Administrative/Medical Necessity) [phone]   [address] [place] [address] (Maternity/NICU/Pediatrics) [phone]   [facility] [phone]   [facility] [phone]   [facility] [address] [place] [place] [address] Olivier Leung [facility] [address] [facility] 134 992 [facility] [phone]

## 2023-05-27 NOTE — ASSESSMENT & PLAN NOTE
· Patient was on Mestinon  · Hold the prednisone right now given the upper GI bleed    · Neurology consulted

## 2023-05-27 NOTE — ASSESSMENT & PLAN NOTE
· Patient with upper GI bleed  · High-volume CT scan noted with active bleed  · EGD withLA grade B reflux esophagitis  1 cm hiatal hernia  Antral angiectasia status post APC  1 large 3 cm antral ulcer with nonbleeding visible vessel status post epinephrine injection and gold probe cautery    · Diet advance to low residue  · Protonix 40 mg IV twice daily  · Venofer  · Seen by GI  · Monitor H&H  · Status post 1 unit PRBC  · Hold aspirin

## 2023-05-28 LAB
ANION GAP SERPL CALCULATED.3IONS-SCNC: 2 MMOL/L (ref 4–13)
BUN SERPL-MCNC: 25 MG/DL (ref 5–25)
CALCIUM SERPL-MCNC: 7.8 MG/DL (ref 8.4–10.2)
CHLORIDE SERPL-SCNC: 106 MMOL/L (ref 96–108)
CO2 SERPL-SCNC: 28 MMOL/L (ref 21–32)
CREAT SERPL-MCNC: 0.78 MG/DL (ref 0.6–1.3)
GFR SERPL CREATININE-BSD FRML MDRD: 95 ML/MIN/1.73SQ M
GLUCOSE SERPL-MCNC: 113 MG/DL (ref 65–140)
HCT VFR BLD AUTO: 22.1 % (ref 36.5–49.3)
HCT VFR BLD AUTO: 23.9 % (ref 36.5–49.3)
HGB BLD-MCNC: 7 G/DL (ref 12–17)
HGB BLD-MCNC: 7.7 G/DL (ref 12–17)
POTASSIUM SERPL-SCNC: 3.7 MMOL/L (ref 3.5–5.3)
SODIUM SERPL-SCNC: 136 MMOL/L (ref 135–147)

## 2023-05-28 PROCEDURE — 85018 HEMOGLOBIN: CPT | Performed by: INTERNAL MEDICINE

## 2023-05-28 PROCEDURE — 99233 SBSQ HOSP IP/OBS HIGH 50: CPT | Performed by: INTERNAL MEDICINE

## 2023-05-28 PROCEDURE — 85014 HEMATOCRIT: CPT | Performed by: INTERNAL MEDICINE

## 2023-05-28 PROCEDURE — 80048 BASIC METABOLIC PNL TOTAL CA: CPT | Performed by: INTERNAL MEDICINE

## 2023-05-28 PROCEDURE — C9113 INJ PANTOPRAZOLE SODIUM, VIA: HCPCS | Performed by: INTERNAL MEDICINE

## 2023-05-28 RX ADMIN — ACETAMINOPHEN 650 MG: 325 TABLET, FILM COATED ORAL at 23:27

## 2023-05-28 RX ADMIN — ACETAMINOPHEN 650 MG: 325 TABLET, FILM COATED ORAL at 07:12

## 2023-05-28 RX ADMIN — PYRIDOSTIGMINE BROMIDE 60 MG: 60 TABLET ORAL at 21:16

## 2023-05-28 RX ADMIN — GLYCERIN 1 DROP: .002; .002; .01 SOLUTION/ DROPS OPHTHALMIC at 05:54

## 2023-05-28 RX ADMIN — IRON SUCROSE 100 MG: 20 INJECTION, SOLUTION INTRAVENOUS at 11:09

## 2023-05-28 RX ADMIN — DICLOFENAC SODIUM TOPICAL GEL, 1%, 2 G: 10 GEL TOPICAL at 07:12

## 2023-05-28 RX ADMIN — PYRIDOSTIGMINE BROMIDE 60 MG: 60 TABLET ORAL at 16:32

## 2023-05-28 RX ADMIN — PANTOPRAZOLE SODIUM 40 MG: 40 INJECTION, POWDER, FOR SOLUTION INTRAVENOUS at 19:11

## 2023-05-28 RX ADMIN — PANTOPRAZOLE SODIUM 40 MG: 40 INJECTION, POWDER, FOR SOLUTION INTRAVENOUS at 05:01

## 2023-05-28 RX ADMIN — GLYCERIN 1 DROP: .002; .002; .01 SOLUTION/ DROPS OPHTHALMIC at 11:09

## 2023-05-28 RX ADMIN — PYRIDOSTIGMINE BROMIDE 60 MG: 60 TABLET ORAL at 19:11

## 2023-05-28 RX ADMIN — PYRIDOSTIGMINE BROMIDE 60 MG: 60 TABLET ORAL at 08:03

## 2023-05-28 NOTE — PROGRESS NOTES
[facility]  Progress Note  Name: Lorena Whitaker  MRN: [id number]  Unit/Bed#:  I Date of Admission: 5/26/2023   Date of Service: 5/28/2023 I Hospital Day: 2    Assessment/Plan   Acute blood loss anemia  Assessment & Plan  · Patient presenting with history of hematemesis, and is status post EGD, with epinephrine and cautery to centimeter antral ulcer, with nonbleeding visible vessel, in addition to APC for antral angioectasia  Patient has a history of myasthenia gravis, for which he has been on chronic prednisone therapy, in addition to recent administration of high doses of aspirin, personal review  Patient is currently hemodynamically stable, however noted for reduction in H&H after undergoing endoscopic procedure [currently 7 0 from 8 2 postprocedure]  Received 1 unit of packed red blood cells on 5/26 and 100 mg of Venofer yesterday  Reports 1 episode of loose bowel movement yesterday which was dark in color, no recent bowel movements  Denies hematemesis  Tolerating diet, abdominal examination unremarkable  -  Continue to monitor H&H, hemodynamics and type & cross and transfuse for hemoglobin less than 7   -  Follow-up with GI for further recommendations  Myasthenia gravis ([address] )  Assessment & Plan  · Slight drooping of left eyelid noted on today's to assessment; patient states that his MG is stable  · Patient was on Mestinon  · Hold the prednisone right now given the upper GI bleed  · Neurology consulted-agree with holding prednisone and continuation of Mestinon  Reommend for routine follow-up at discharge for management  CAD (coronary artery disease)  Assessment & Plan  · Hold aspirin    * Upper GI bleed  Assessment & Plan  · Patient with upper GI bleed  · High-volume CT scan noted with active bleed  · EGD withLA grade B reflux esophagitis  1 cm hiatal hernia  Antral angiectasia status post APC    1 large 3 cm antral ulcer with nonbleeding visible vessel status post epinephrine injection and gold probe cautery    · Diet advance to low residue  · Protonix 40 mg IV twice daily  · Venofer  · Seen by GI  · Monitor H&H-repeat H&H at 1300 hrs  · Status post 1 unit PRBC  · Hold aspirin  VTE Pharmacologic Prophylaxis:   Pharmacologic: Deferred in the setting of GI bleeding  Mechanical VTE Prophylaxis in Place: Yes    Patient Centered Rounds: I have performed bedside rounds with nursing staff today  Discussions with Specialists or Other Care Team Provider: We will follow-up with gastroenterology recommendations  Education and Discussions with Family / Patient: Patient's drop in hemoglobin, and necessity for continual surveillance discussed at the bedside  Time Spent for Care: 45 minutes  More than 50% of total time spent on counseling and coordination of care as described above  Current Length of Stay: 2 day(s)    Current Patient Status: Inpatient   Certification Statement: The patient will continue to require additional inpatient hospital stay due to Surveillance for acute blood loss anemia  Discharge Plan / Estimated Discharge Date: We will continue to monitor H&H and hemodynamics, and transfuse for hemoglobin less than 7 or symptomatic  Follow-up with any further gastroenterology recommendations  Code Status: Level 1 - Full Code      Subjective:   Patient interviewed at the bedside  Denies any headaches, dizziness, chest pain, palpitations  Patient also denies any recurrent hematemesis, however does state that he had a large bowel movement yesterday which was dark in color  Has been tolerating diet, denies any abdominal pain  Objective:     Vitals:   Temp (24hrs), Av 9 °F (36 6 °C), Min:97 8 °F (36 6 °C), Max:97 9 °F (36 6 °C)    Temp:  [97 8 °F (36 6 °C)-97 9 °F (36 6 °C)] 97 9 °F (36 6 °C)  HR:  [] 102  Resp:  [15-24] 24  BP: (104-117)/(54-61) 117/61  SpO2:  [94 %-100 %] 94 %  Body mass index is 43 95 kg/m²       Input and Output Summary (last 24 hours): Intake/Output Summary (Last 24 hours) at 5/28/2023 1046  Last data filed at 5/28/2023 0601  Gross per 24 hour   Intake 1648 97 ml   Output 1475 ml   Net 173 97 ml       Physical Exam:     Physical Exam  Constitutional:       Appearance: Normal appearance  He is obese  HENT:      Head: Normocephalic and atraumatic  Eyes:      Extraocular Movements: Extraocular movements intact  Cardiovascular:      Rate and Rhythm: Normal rate and regular rhythm  Pulmonary:      Effort: Pulmonary effort is normal       Breath sounds: Normal breath sounds  Abdominal:      Tenderness: There is no abdominal tenderness  There is no guarding or rebound  Musculoskeletal:      Right lower leg: No edema  Left lower leg: No edema  Skin:     General: Skin is warm and dry  Neurological:      Mental Status: He is alert and oriented to person, place, and time  Psychiatric:         Mood and Affect: Mood normal          Behavior: Behavior normal          Additional Data:     Labs:    Results from last 7 days   Lab Units 05/28/23  0501 05/26/23  1157 05/26/23  0713   EOS PCT %  --   --  0   HEMATOCRIT % 22 1*   < > 28 1*   HEMOGLOBIN g/dL 7 0*   < > 8 9*   LYMPHS PCT %  --   --  11*   MONOS PCT %  --   --  8   NEUTROS PCT %  --   --  80*   PLATELETS Thousands/uL  --   --  140*   WBC Thousand/uL  --   --  15 87*    < > = values in this interval not displayed  Results from last 7 days   Lab Units 05/28/23  0501 05/27/23  0554 05/26/23  0713   ALK PHOS U/L  --   --  46   ALT U/L  --   --  28   AST U/L  --   --  14   BUN mg/dL 25   < > 49*   CALCIUM mg/dL 7 8*   < > 8 1*   CHLORIDE mmol/L 106   < > 107   CO2 mmol/L 28   < > 28   CREATININE mg/dL 0 78   < > 0 86   POTASSIUM mmol/L 3 7   < > 4 0    < > = values in this interval not displayed  Results from last 7 days   Lab Units 05/26/23  0713   INR  1 13       * I Have Reviewed All Lab Data Listed Above    * Additional Pertinent Lab Tests Reviewed: All Labs Within Last 24 Hours Reviewed    Imaging:    Imaging Reports Reviewed Today Include: None  Imaging Personally Reviewed by Myself Includes: None  Recent Cultures (last 7 days):           Last 24 Hours Medication List:   Current Facility-Administered Medications   Medication Dose Route Frequency Provider Last Rate   • acetaminophen  650 mg Oral Q6H PRN Abbey Vargas III, MD     • Diclofenac Sodium  2 g Topical BID PRN Elisabeth Johnson PA-C     • glycerin-hypromellose-  1 drop Both Eyes Q6H PRN Elisabeth Johnson PA-C     • iron sucrose  100 mg Intravenous Daily Catalino Schaefer MD     • ondansetron  4 mg Intravenous Q6H PRN Tomasz Hi MD     • pantoprazole  40 mg Intravenous Q12H Tomasz Hi MD     • pyridostigmine  60 mg Oral 4x Daily Jeremi Burgos PA-C          Today, Patient Was Seen By: Nasir Mack MD    ** Please Note: Dragon 360 Dictation voice to text software may have been used in the creation of this document   **

## 2023-05-28 NOTE — QUICK NOTE
An attempt made to contact patient's son, Tata Reddy, so as to provide an updated hospitalization course for the patient, was unsuccessful

## 2023-05-28 NOTE — ASSESSMENT & PLAN NOTE
· Patient presenting with history of hematemesis, and is status post EGD, with epinephrine and cautery to centimeter antral ulcer, with nonbleeding visible vessel, in addition to APC for antral angioectasia  Patient has a history of myasthenia gravis, for which he has been on chronic prednisone therapy, in addition to recent administration of high doses of aspirin, personal review  Patient is currently hemodynamically stable, however noted for reduction in H&H after undergoing endoscopic procedure [currently 7 0 from 8 2 postprocedure]  Received 1 unit of packed red blood cells on 5/26 and 100 mg of Venofer yesterday  Reports 1 episode of loose bowel movement yesterday which was dark in color, no recent bowel movements  Denies hematemesis  Tolerating diet, abdominal examination unremarkable  -  Continue to monitor H&H, hemodynamics and type & cross and transfuse for hemoglobin less than 7   -  Follow-up with GI for further recommendations

## 2023-05-28 NOTE — PLAN OF CARE
Problem: Potential for Falls  Goal: Patient will remain free of falls  Description: INTERVENTIONS:  - Educate patient/family on patient safety including physical limitations  - Instruct patient to call for assistance with activity   - Consult OT/PT to assist with strengthening/mobility   - Keep Call bell within reach  - Keep bed low and locked with side rails adjusted as appropriate  - Keep care items and personal belongings within reach  - Initiate and maintain comfort rounds  - Make Fall Risk Sign visible to staff  - Offer Toileting every 2 Hours, in advance of need  - Initiate/Maintain chair alarm  - Apply yellow socks and bracelet for high fall risk patients  - Consider moving patient to room near nurses station  Outcome: Progressing     Problem: PAIN - ADULT  Goal: Verbalizes/displays adequate comfort level or baseline comfort level  Description: Interventions:  - Encourage patient to monitor pain and request assistance  - Assess pain using appropriate pain scale  - Administer analgesics based on type and severity of pain and evaluate response  - Implement non-pharmacological measures as appropriate and evaluate response  - Consider cultural and social influences on pain and pain management  - Notify physician/advanced practitioner if interventions unsuccessful or patient reports new pain  Outcome: Progressing     Problem: INFECTION - ADULT  Goal: Absence or prevention of progression during hospitalization  Description: INTERVENTIONS:  - Assess and monitor for signs and symptoms of infection  - Monitor lab/diagnostic results  - Monitor all insertion sites, i e  indwelling lines, tubes, and drains  - Monitor endotracheal if appropriate and nasal secretions for changes in amount and color  - [place] appropriate cooling/warming therapies per order  - Administer medications as ordered  - Instruct and encourage patient and family to use good hand hygiene technique  - Identify and instruct in appropriate isolation precautions for identified infection/condition  Outcome: Progressing     Problem: SAFETY ADULT  Goal: Patient will remain free of falls  Description: INTERVENTIONS:  - Educate patient/family on patient safety including physical limitations  - Instruct patient to call for assistance with activity   - Consult OT/PT to assist with strengthening/mobility   - Keep Call bell within reach  - Keep bed low and locked with side rails adjusted as appropriate  - Keep care items and personal belongings within reach  - Initiate and maintain comfort rounds  - Make Fall Risk Sign visible to staff  - Offer Toileting every 2 Hours, in advance of need  - Initiate/Maintain chair alarm  - Apply yellow socks and bracelet for high fall risk patients  - Consider moving patient to room near nurses station  Outcome: Progressing  Goal: Maintain or return to baseline ADL function  Description: INTERVENTIONS:  -  Assess patient's ability to carry out ADLs; assess patient's baseline for ADL function and identify physical deficits which impact ability to perform ADLs (bathing, care of mouth/teeth, toileting, grooming, dressing, etc )  - Assess/evaluate cause of self-care deficits   - Assess range of motion  - Assess patient's mobility; develop plan if impaired  - Assess patient's need for assistive devices and provide as appropriate  - Encourage maximum independence but intervene and supervise when necessary  - Involve family in performance of ADLs  - Assess for home care needs following discharge   - Consider OT consult to assist with ADL evaluation and planning for discharge  - Provide patient education as appropriate  Outcome: Progressing  Goal: Maintains/Returns to pre admission functional level  Description: INTERVENTIONS:  - Perform BMAT or MOVE assessment daily    - Set and communicate daily mobility goal to care team and patient/family/caregiver     - Collaborate with rehabilitation services on mobility goals if consulted  - Perform Range of Motion 3 times a day  - Reposition patient every 2 hours  - Dangle patient 3 times a day  - Stand patient 3 times a day  - Ambulate patient 3 times a day  - Out of bed to chair 3 times a day   - Out of bed for meals 3 times a day  - Out of bed for toileting  - Record patient progress and toleration of activity level   Outcome: Progressing     Problem: DISCHARGE PLANNING  Goal: Discharge to home or other facility with appropriate resources  Description: INTERVENTIONS:  - Identify barriers to discharge w/patient and caregiver  - Arrange for needed discharge resources and transportation as appropriate  - Identify discharge learning needs (meds, wound care, etc )  - Arrange for interpretive services to assist at discharge as needed  - Refer to Case Management Department for coordinating discharge planning if the patient needs post-hospital services based on physician/advanced practitioner order or complex needs related to functional status, cognitive ability, or social support system  Outcome: Progressing     Problem: Knowledge Deficit  Goal: Patient/family/caregiver demonstrates understanding of disease process, treatment plan, medications, and discharge instructions  Description: Complete learning assessment and assess knowledge base  Interventions:  - Provide teaching at level of understanding  - Provide teaching via preferred learning methods  Outcome: Progressing     Problem: Nutrition/Hydration-ADULT  Goal: Nutrient/Hydration intake appropriate for improving, restoring or maintaining nutritional needs  Description: Monitor and assess patient's nutrition/hydration status for malnutrition  Collaborate with interdisciplinary team and initiate plan and interventions as ordered  Monitor patient's weight and dietary intake as ordered or per policy  Utilize nutrition screening tool and intervene as necessary   Determine patient's food preferences and provide high-protein, high-caloric foods as appropriate  INTERVENTIONS:  - Monitor oral intake, urinary output, labs, and treatment plans  - Assess nutrition and hydration status and recommend course of action  - Evaluate amount of meals eaten  - Assist patient with eating if necessary   - Allow adequate time for meals  - Recommend/ encourage appropriate diets, oral nutritional supplements, and vitamin/mineral supplements  - Order, calculate, and assess calorie counts as needed  - Recommend, monitor, and adjust tube feedings and TPN/PPN based on assessed needs  - Assess need for intravenous fluids  - Provide specific nutrition/hydration education as appropriate  - Include patient/family/caregiver in decisions related to nutrition  Outcome: Progressing     Problem: MOBILITY - ADULT  Goal: Maintain or return to baseline ADL function  Description: INTERVENTIONS:  -  Assess patient's ability to carry out ADLs; assess patient's baseline for ADL function and identify physical deficits which impact ability to perform ADLs (bathing, care of mouth/teeth, toileting, grooming, dressing, etc )  - Assess/evaluate cause of self-care deficits   - Assess range of motion  - Assess patient's mobility; develop plan if impaired  - Assess patient's need for assistive devices and provide as appropriate  - Encourage maximum independence but intervene and supervise when necessary  - Involve family in performance of ADLs  - Assess for home care needs following discharge   - Consider OT consult to assist with ADL evaluation and planning for discharge  - Provide patient education as appropriate  Outcome: Progressing  Goal: Maintains/Returns to pre admission functional level  Description: INTERVENTIONS:  - Perform BMAT or MOVE assessment daily    - Set and communicate daily mobility goal to care team and patient/family/caregiver  - Collaborate with rehabilitation services on mobility goals if consulted  - Perform Range of Motion 3 times a day    - Reposition patient every 2 hours   - Dangle patient 3 times a day  - Stand patient 3 times a day  - Ambulate patient 3 times a day  - Out of bed to chair 3 times a day   - Out of bed for meals 3 times a day  - Out of bed for toileting  - Record patient progress and toleration of activity level   Outcome: Progressing

## 2023-05-28 NOTE — PROGRESS NOTES
Progress Note  Arben Aguilar 58 y o  male MRN: [id number]  Unit/Bed#:  Encounter: [id number]    Subjective:  He had no melena or bleeding overnight  He had 2 smear black stools yesterday during the day  He is eating a diet  He has no fevers chills  He has no nausea or vomiting  He has no abdominal pain  He reports that his knees hurt  Objective:     Vitals:   Vitals:    05/28/23 0658   BP: 114/54   Pulse: 69   Resp: 22   Temp: 97 9 °F (36 6 °C)   SpO2: 100%   ,Body mass index is 43 95 kg/m²        Intake/Output Summary (Last 24 hours) at 5/28/2023 0728  Last data filed at 5/28/2023 0601  Gross per 24 hour   Intake 2908 97 ml   Output 1825 ml   Net 1083 97 ml       Physical Exam:     General Appearance: Alert, appears stated age and cooperative  Lungs: Clear to auscultation bilaterally, no rales or rhonchi, no labored breathing/accessory muscle use  Heart: Regular rate and rhythm, S1, S2 normal, no murmur, click, rub or gallop  Abdomen: Soft, non-tender, non-distended; bowel sounds normal; no masses or no organomegaly  Extremities: No cyanosis, edema    Invasive Devices     Peripheral Intravenous Line  Duration           Peripheral IV 05/27/23 Right Antecubital <1 day                Lab Results:  Admission on 05/26/2023   Component Date Value   • Ventricular Rate 05/26/2023 92    • Atrial Rate 05/26/2023 92    • RI Interval 05/26/2023 126    • QRSD Interval 05/26/2023 100    • QT Interval 05/26/2023 372    • QTC Interval 05/26/2023 460    • P Axis 05/26/2023 41    • QRS Axis 05/26/2023 -1    • T Wave Axis 05/26/2023 81    • WBC 05/26/2023 15 87 (H)    • RBC 05/26/2023 3 20 (L)    • Hemoglobin 05/26/2023 8 9 (L)    • Hematocrit 05/26/2023 28 1 (L)    • MCV 05/26/2023 88    • MCH 05/26/2023 27 8    • MCHC 05/26/2023 31 7    • RDW 05/26/2023 17 1 (H)    • MPV 05/26/2023 10 6    • Platelets 04/23/9094 140 (L)    • nRBC 05/26/2023 0    • Neutrophils Relative 05/26/2023 80 (H)    • Immat GRANS % 05/26/2023 1    • Lymphocytes Relative 05/26/2023 11 (L)    • Monocytes Relative 05/26/2023 8    • Eosinophils Relative 05/26/2023 0    • Basophils Relative 05/26/2023 0    • Neutrophils Absolute 05/26/2023 12 69 (H)    • Immature Grans Absolute 05/26/2023 0 14    • Lymphocytes Absolute 05/26/2023 1 70    • Monocytes Absolute 05/26/2023 1 28 (H)    • Eosinophils Absolute 05/26/2023 0 05    • Basophils Absolute 05/26/2023 0 01    • Protime 05/26/2023 14 3    • INR 05/26/2023 1 13    • PTT 05/26/2023 20 (L)    • Sodium 05/26/2023 138    • Potassium 05/26/2023 4 0    • Chloride 05/26/2023 107    • CO2 05/26/2023 28    • ANION GAP 05/26/2023 3 (L)    • BUN 05/26/2023 49 (H)    • Creatinine 05/26/2023 0 86    • Glucose 05/26/2023 212 (H)    • Calcium 05/26/2023 8 1 (L)    • Corrected Calcium 05/26/2023 9 1    • AST 05/26/2023 14    • ALT 05/26/2023 28    • Alkaline Phosphatase 05/26/2023 46    • Total Protein 05/26/2023 4 5 (L)    • Albumin 05/26/2023 2 8 (L)    • Total Bilirubin 05/26/2023 0 21    • eGFR 05/26/2023 91    • Lipase 05/26/2023 39    • hs TnI 0hr 05/26/2023 22    • ABO Grouping 05/26/2023 O    • Rh Factor 05/26/2023 Positive    • Antibody Screen 05/26/2023 Negative    • Specimen Expiration Date 05/26/2023 [id number]    • LACTIC ACID 05/26/2023 2 4 (HH)    • ABO Grouping 05/26/2023 O    • Rh Factor 05/26/2023 Positive    • Unit Product Code 05/27/2023 [id number]    • Unit Number 05/27/2023 [id number]    • Unit ABO 05/27/2023 O    • Unit [facility] 05/27/2023 POS    • Crossmatch 05/27/2023 Compatible    • Unit Dispense Status 05/27/2023 Presumed Trans    • Unit Product Volume 05/27/2023 350    • hs TnI 2hr 05/26/2023 42    • Delta 2hr hsTnI 05/26/2023 20 (H)    • LACTIC ACID 05/26/2023 2 0    • hs TnI 4hr 05/26/2023 41    • Delta 4hr hsTnI 05/26/2023 19    • Hemoglobin 05/26/2023 10 0 (L)    • Hematocrit 05/26/2023 31 4 (L)    • Hemoglobin 05/26/2023 9 7 (L)    • Hematocrit 05/26/2023 30 3 (L)    • Hemoglobin 05/26/2023 9 1 (L)    • Hematocrit 05/26/2023 28 8 (L)    • Hemoglobin 05/27/2023 8 4 (L)    • Hematocrit 05/27/2023 26 3 (L)    • Sodium 05/27/2023 137    • Potassium 05/27/2023 4 4    • Chloride 05/27/2023 107    • CO2 05/27/2023 26    • ANION GAP 05/27/2023 4    • BUN 05/27/2023 31 (H)    • Creatinine 05/27/2023 0 84    • Glucose 05/27/2023 162 (H)    • Calcium 05/27/2023 8 2 (L)    • eGFR 05/27/2023 92    • Hemoglobin 05/27/2023 8 8 (L)    • Hematocrit 05/27/2023 27 3 (L)    • Hemoglobin 05/27/2023 8 2 (L)    • Hematocrit 05/27/2023 25 6 (L)    • Hemoglobin 05/27/2023 7 5 (L)    • Hematocrit 05/27/2023 23 2 (L)    • Hemoglobin 05/28/2023 7 0 (L)    • Hematocrit 05/28/2023 22 1 (L)    • Sodium 05/28/2023 136    • Potassium 05/28/2023 3 7    • Chloride 05/28/2023 106    • CO2 05/28/2023 28    • ANION GAP 05/28/2023 2 (L)    • BUN 05/28/2023 25    • Creatinine 05/28/2023 0 78    • Glucose 05/28/2023 113    • Calcium 05/28/2023 7 8 (L)    • eGFR 05/28/2023 95        Imaging Studies:   I have personally reviewed pertinent imaging studies  EGD    Result Date: 5/26/2023  Narrative: Table formatting from the original result was not included  [address] [place] [address] Operating Room [address] [place] [address] [phone] DATE OF SERVICE: 5/26/23 PHYSICIAN(S): Attending: Gracie Chase MD Fellow: No Staff Documented INDICATION: GI bleed Melena POST-OP DIAGNOSIS: See the impression below  PREPROCEDURE: Informed consent was obtained for the procedure, including sedation  Risks of perforation, hemorrhage, adverse drug reaction and aspiration were discussed  The patient was placed in the left lateral decubitus position  Patient was explained about the risks and benefits of the procedure  Risks including but not limited to bleeding, infection, and perforation were explained in detail  Also explained about less than 100% sensitivity with the exam and other alternatives   PROCEDURE: EGD DETAILS OF PROCEDURE: Patient was taken to the procedure room where a time out was performed to confirm correct patient and correct procedure  The patient underwent monitored anesthesia care, which was administered by an anesthesia professional  The patient's blood pressure, heart rate, level of consciousness, respirations and oxygen were monitored throughout the procedure  The scope was advanced to the second part of the duodenum  Retroflexion was performed in the fundus  The patient experienced no blood loss  The procedure was not difficult  The patient tolerated the procedure well  There were no apparent adverse events  ANESTHESIA INFORMATION: ASA: IV Anesthesia Type: Anesthesia type not filed in the log   MEDICATIONS: No administrations occurring from 1324 to 1333 on 05/26/23 FINDINGS: The upper third of the esophagus, middle third of the esophagus and lower third of the esophagus appeared normal  Grade B esophagitis with mucosal breaks measuring 5 mm or more not continuous between folds, covering less than 75% of the circumference in the GE junction 1 cm sliding hiatal hernia (type I hiatal hernia) without Floyd Bumpers lesions present Single large angioectasia in the antrum; no bleeding was identified; induced coagulation and hemostasis achieved with with argon plasma coagulation Single large, cratered, irregular, benign-appearing ulcer in the antrum with nonbleeding visible vessel (Demar IIA); tissue ablated completely with heater probe; injected 2 mL of epinephrine to address bleeding; hemostasis achieved The duodenal bulb, 1st part of the duodenum and 2nd part of the duodenum appeared normal  SPECIMENS: ID Type Source Tests Collected by Time Destination 1 :  Tissue Stomach TISSUE EXAM Tolu Cortes MD 5/26/2023  1:32 PM      Impression: LA grade B reflux esophagitis 1 cm hiatal hernia Antral angiectasia status post APC 1 large 3 cm antral ulcer with nonbleeding visible vessel status post epinephrine injection and gold probe cautery RECOMMENDATION: Pantoprazole 40 mg IV every 12 hours Clear liquid diet Venofer infusion Monitor CBC Await pathology EGD in 3 months to document ulcer healing Caution with NSAIDs   Norris Zapata III, MD     CT high volume bleeding scan abdomen pelvis    Result Date: 5/26/2023  Narrative: CT ABDOMEN AND PELVIS - WITHOUT AND WITH IV CONTRAST INDICATION:   Vomiting blood, 2 days of blood in stool  COMPARISON: None TECHNIQUE:  CT examination of the abdomen and pelvis was performed both prior to and after the administration of intravenous contrast  Axial, sagittal, and coronal 2D reformatted images were created from the source data and submitted for interpretation  Radiation dose length product (DLP) for this visit:   This examination, like all CT scans performed in the [facility], was performed utilizing techniques to minimize radiation dose exposure, including the use of iterative reconstruction  and automated exposure control  IV Contrast: Enteric Contrast: None  FINDINGS: ABDOMEN BOWEL: The stomach is mildly distended with fluid, and shows layering hyperdensity in the fundus (2/38) on precontrast phase consistent with blood products given the clinical history  There is relative hyperemia of the distal stomach on arterial phase, with more intense enhancement seen in the posterior pyloric antrum where there is wall thickening and a small discontinuity of the mucosal enhancement (series 5/51, 604/147); new intraluminal hyperdensity originates from this location on arterial phase (5/46-53; 603/53) and redistributes on delayed phase  Findings consistent with actively bleeding gastric ulcer  No evidence of perforation  Relative hyperdensity is seen in the proximal small bowel and the proximal colon on all phases suggesting ingested  blood products, but no other areas of potential active extravasation are seen  The small bowel is normal in thickness and caliber   The colon is diffusely collapsed limiting evaluation of wall thickness, but a colitis would not be excluded in the appropriate clinical context  There is mild colonic diverticulosis without acute diverticulitis  No pneumatosis  LOWER CHEST: No clinically significant abnormality is identified in the visualized lower chest  No consolidation or effusion  LIVER: Elegy  No suspicious lesions  BILIARY: No intrahepatic biliary ductal dilatation  Normal caliber common bile duct  GALLBLADDER: Several calcified gallstones are noted  Normal wall thickness  The gallbladder is nondistended  No pericholecystic inflammation  SPLEEN: Within normal limits  No suspicious lesion  Normal spleen size  PANCREAS: Pancreatic parenchyma is within normal limits  No main pancreatic ductal dilatation  No peripancreatic inflammation  ADRENAL GLANDS: Normal right adrenal gland  Subcentimeter left adrenal adenoma showing low attenuation on precontrast phase  KIDNEYS/URETERS: Normal kidney size and position with symmetric enhancement  Subcentimeter simple cyst in the posterior interpolar left kidney  No suspicious lesions  No calculi  No hydronephrosis  Normal ureters  APPENDIX: Presumed appendectomy  ABDOMINOPELVIC CAVITY: Scattered mildly enlarged common and external iliac lymph nodes which show normal fatty sofie, presumably reactive  No ascites  No intraperitoneal free air  No retroperitoneal hematoma  VESSELS: Normal caliber abdominal aorta with no detectable atherosclerotic plaque  The celiac, SMA, and BREE are patent, without detectable atherosclerotic plaque at the origins or in the major branches  Replaced right hepatic artery arising from the SMA  The renal arteries are patent  The bilateral common iliac, internal iliac, external iliac, common femoral, and visualized femoral and deep femoral arteries are patent without significant plaque  The SMV and splenic vein are patent  The portal veins are patent  The hepatic veins are patent  PELVIS REPRODUCTIVE ORGANS: Enlarged prostate  Symmetric seminal vesicles  URINARY BLADDER: Within normal limits  No calculi  ABDOMINAL WALL/INGUINAL REGIONS: Tiny fat-containing umbilical hernia  Tiny left-sided fat-containing indirect inguinal hernia  BONES: Moderate multilevel degenerative changes in the spine  Vertebral body height is maintained  No acute fracture or destructive osseous lesion  Impression: 1  Slow active bleeding from a gastric ulcer in the pyloric antrum  No evidence of perforation  2   Nondistended colon limiting evaluation for wall thickening  A colitis would not be excluded in the appropriate clinical context  3   Cholelithiasis without acute cholecystitis  I personally discussed this study with Mikal Martin on 5/26/2023 9:11 AM at 5/26/2023 9:11 AM  Workstation performed: [id number]     XR chest 2 views    Result Date: 4/29/2023  Narrative: CHEST INDICATION:   Shortness of breath  COMPARISON: 10/10/2022, CT chest 6/10/2021 EXAM PERFORMED/VIEWS:  XR CHEST PA & LATERAL FINDINGS: Cardiomediastinal silhouette appears unremarkable  The lungs are clear  No pneumothorax or pleural effusion  Paravertebral ossifications thoracic spine  Impression: No acute cardiopulmonary disease  Workstation performed: [id number]         Assessment & Plan  Principal Problem:    Upper GI bleed  Active Problems:    CAD (coronary artery disease)    Myasthenia gravis (HCC)    Acute blood loss anemia      He is a 71-year-old male with upper GI blood loss melena secondary to large antral ulcer with nonbleeding visible vessel status post epinephrine injection and gold probe cautery  He also had an angiectasia that was treated with APC  He was taking large amounts of aspirin and he may have H  pylori gastritis  His biopsies are pending  I do not think he is still bleeding    I think his hemoglobins are still re-equibrilating  I have ordered a hemoglobin for 1 PM today  Continue regular diet  Monitor CBC; transfusion parameters per [facility] service  Await H  pylori pathology  Venofer infusion  He may be able to go home tomorrow  We will arrange for an outpatient EGD in 3 months to document ulcer healing    Trena Sarkar MD  5/28/2023,7:28 AM

## 2023-05-28 NOTE — QUICK NOTE
Pt c/o LUE pain above the [facility] where his IV was removed yesterday  On physical exam, there is bruising in the antecubital region and some swelling appreciated above it along with TTP  No superficial erythema appreciated   Patient reports clear drainage from prior IV site however this was not appreciated on my exam      · Will obtain LUE venous duplex to r/o dvt or phlebitis   · PRN Tylenol for pain  · Ice and elevate extremity

## 2023-05-28 NOTE — ASSESSMENT & PLAN NOTE
· Slight drooping of left eyelid noted on today's to assessment; patient states that his MG is stable  · Patient was on Mestinon  · Hold the prednisone right now given the upper GI bleed  · Neurology consulted-agree with holding prednisone and continuation of Mestinon  Reommend for routine follow-up at discharge for management

## 2023-05-28 NOTE — ASSESSMENT & PLAN NOTE
· Patient with upper GI bleed  · High-volume CT scan noted with active bleed  · EGD withLA grade B reflux esophagitis  1 cm hiatal hernia  Antral angiectasia status post APC  1 large 3 cm antral ulcer with nonbleeding visible vessel status post epinephrine injection and gold probe cautery    · Diet advance to low residue  · Protonix 40 mg IV twice daily  · Venofer  · Seen by GI  · Monitor H&H-repeat H&H at 1300 hrs  · Status post 1 unit PRBC  · Hold aspirin

## 2023-05-29 ENCOUNTER — APPOINTMENT (INPATIENT)
Dept: VASCULAR ULTRASOUND | Facility: HOSPITAL | Age: 63
DRG: 241 | End: 2023-05-29
Payer: COMMERCIAL

## 2023-05-29 PROBLEM — T14.8XXA BRUISE: Status: ACTIVE | Noted: 2023-05-29

## 2023-05-29 LAB
ANION GAP SERPL CALCULATED.3IONS-SCNC: 2 MMOL/L (ref 4–13)
BASOPHILS # BLD AUTO: 0.01 THOUSANDS/ÂΜL (ref 0–0.1)
BASOPHILS NFR BLD AUTO: 0 % (ref 0–1)
BUN SERPL-MCNC: 15 MG/DL (ref 5–25)
CALCIUM SERPL-MCNC: 7.9 MG/DL (ref 8.4–10.2)
CHLORIDE SERPL-SCNC: 104 MMOL/L (ref 96–108)
CO2 SERPL-SCNC: 29 MMOL/L (ref 21–32)
CREAT SERPL-MCNC: 0.82 MG/DL (ref 0.6–1.3)
EOSINOPHIL # BLD AUTO: 0.03 THOUSAND/ÂΜL (ref 0–0.61)
EOSINOPHIL NFR BLD AUTO: 0 % (ref 0–6)
ERYTHROCYTE [DISTWIDTH] IN BLOOD BY AUTOMATED COUNT: 18.4 % (ref 11.6–15.1)
GFR SERPL CREATININE-BSD FRML MDRD: 93 ML/MIN/1.73SQ M
GLUCOSE SERPL-MCNC: 135 MG/DL (ref 65–140)
HCT VFR BLD AUTO: 21.6 % (ref 36.5–49.3)
HCT VFR BLD AUTO: 27.1 % (ref 36.5–49.3)
HGB BLD-MCNC: 6.9 G/DL (ref 12–17)
HGB BLD-MCNC: 8.9 G/DL (ref 12–17)
IMM GRANULOCYTES # BLD AUTO: 0.11 THOUSAND/UL (ref 0–0.2)
IMM GRANULOCYTES NFR BLD AUTO: 1 % (ref 0–2)
LYMPHOCYTES # BLD AUTO: 1.89 THOUSANDS/ÂΜL (ref 0.6–4.47)
LYMPHOCYTES NFR BLD AUTO: 21 % (ref 14–44)
MCH RBC QN AUTO: 27.9 PG (ref 26.8–34.3)
MCHC RBC AUTO-ENTMCNC: 31.9 G/DL (ref 31.4–37.4)
MCV RBC AUTO: 87 FL (ref 82–98)
MONOCYTES # BLD AUTO: 0.79 THOUSAND/ÂΜL (ref 0.17–1.22)
MONOCYTES NFR BLD AUTO: 9 % (ref 4–12)
NEUTROPHILS # BLD AUTO: 6.37 THOUSANDS/ÂΜL (ref 1.85–7.62)
NEUTS SEG NFR BLD AUTO: 69 % (ref 43–75)
NRBC BLD AUTO-RTO: 1 /100 WBCS
PLATELET # BLD AUTO: 128 THOUSANDS/UL (ref 149–390)
PMV BLD AUTO: 10.2 FL (ref 8.9–12.7)
POTASSIUM SERPL-SCNC: 3.7 MMOL/L (ref 3.5–5.3)
RBC # BLD AUTO: 2.47 MILLION/UL (ref 3.88–5.62)
SODIUM SERPL-SCNC: 135 MMOL/L (ref 135–147)
WBC # BLD AUTO: 9.2 THOUSAND/UL (ref 4.31–10.16)

## 2023-05-29 PROCEDURE — P9016 RBC LEUKOCYTES REDUCED: HCPCS

## 2023-05-29 PROCEDURE — 85018 HEMOGLOBIN: CPT | Performed by: INTERNAL MEDICINE

## 2023-05-29 PROCEDURE — 85025 COMPLETE CBC W/AUTO DIFF WBC: CPT | Performed by: INTERNAL MEDICINE

## 2023-05-29 PROCEDURE — 80048 BASIC METABOLIC PNL TOTAL CA: CPT | Performed by: INTERNAL MEDICINE

## 2023-05-29 PROCEDURE — 93971 EXTREMITY STUDY: CPT

## 2023-05-29 PROCEDURE — C9113 INJ PANTOPRAZOLE SODIUM, VIA: HCPCS | Performed by: INTERNAL MEDICINE

## 2023-05-29 PROCEDURE — 85014 HEMATOCRIT: CPT | Performed by: INTERNAL MEDICINE

## 2023-05-29 RX ORDER — PYRIDOSTIGMINE BROMIDE 60 MG/1
60 TABLET ORAL EVERY 6 HOURS
Status: DISCONTINUED | OUTPATIENT
Start: 2023-05-29 | End: 2023-06-02 | Stop reason: HOSPADM

## 2023-05-29 RX ORDER — OXYCODONE HYDROCHLORIDE 5 MG/1
5 TABLET ORAL ONCE
Status: COMPLETED | OUTPATIENT
Start: 2023-05-29 | End: 2023-05-29

## 2023-05-29 RX ADMIN — PANTOPRAZOLE SODIUM 40 MG: 40 INJECTION, POWDER, FOR SOLUTION INTRAVENOUS at 18:27

## 2023-05-29 RX ADMIN — PYRIDOSTIGMINE BROMIDE 60 MG: 60 TABLET ORAL at 08:32

## 2023-05-29 RX ADMIN — OXYCODONE HYDROCHLORIDE 5 MG: 5 TABLET ORAL at 19:50

## 2023-05-29 RX ADMIN — PYRIDOSTIGMINE BROMIDE 60 MG: 60 TABLET ORAL at 23:55

## 2023-05-29 RX ADMIN — OXYCODONE HYDROCHLORIDE 5 MG: 5 TABLET ORAL at 03:56

## 2023-05-29 RX ADMIN — DICLOFENAC SODIUM TOPICAL GEL, 1%, 2 G: 10 GEL TOPICAL at 01:07

## 2023-05-29 RX ADMIN — PANTOPRAZOLE SODIUM 40 MG: 40 INJECTION, POWDER, FOR SOLUTION INTRAVENOUS at 05:02

## 2023-05-29 RX ADMIN — PYRIDOSTIGMINE BROMIDE 60 MG: 60 TABLET ORAL at 12:18

## 2023-05-29 RX ADMIN — PYRIDOSTIGMINE BROMIDE 60 MG: 60 TABLET ORAL at 18:15

## 2023-05-29 NOTE — PROGRESS NOTES
Patient requesting that his Mestinon medication schedule be changed to exactly q6h because that is how he takes it at home  Reminded patient to speak to the day team physician about his medication concerns   Overnight SLIM provider advised of request

## 2023-05-29 NOTE — ASSESSMENT & PLAN NOTE
· Overall stable  · Patient was on Mestinon  · Hold the prednisone right now given the upper GI bleed    · Continue Mestinon every 6 hours

## 2023-05-29 NOTE — PLAN OF CARE
Problem: Potential for Falls  Goal: Patient will remain free of falls  Description: INTERVENTIONS:  - Educate patient/family on patient safety including physical limitations  - Instruct patient to call for assistance with activity   - Consult OT/PT to assist with strengthening/mobility   - Keep Call bell within reach  - Keep bed low and locked with side rails adjusted as appropriate  - Keep care items and personal belongings within reach  - Initiate and maintain comfort rounds  - Make Fall Risk Sign visible to staff  - Offer Toileting every 2 Hours, in advance of need  - Initiate/Maintain chair alarm  - Apply yellow socks and bracelet for high fall risk patients  - Consider moving patient to room near nurses station  Outcome: Progressing     Problem: PAIN - ADULT  Goal: Verbalizes/displays adequate comfort level or baseline comfort level  Description: Interventions:  - Encourage patient to monitor pain and request assistance  - Assess pain using appropriate pain scale  - Administer analgesics based on type and severity of pain and evaluate response  - Implement non-pharmacological measures as appropriate and evaluate response  - Consider cultural and social influences on pain and pain management  - Notify physician/advanced practitioner if interventions unsuccessful or patient reports new pain  Outcome: Progressing     Problem: INFECTION - ADULT  Goal: Absence or prevention of progression during hospitalization  Description: INTERVENTIONS:  - Assess and monitor for signs and symptoms of infection  - Monitor lab/diagnostic results  - Monitor all insertion sites, i e  indwelling lines, tubes, and drains  - Monitor endotracheal if appropriate and nasal secretions for changes in amount and color  - [place] appropriate cooling/warming therapies per order  - Administer medications as ordered  - Instruct and encourage patient and family to use good hand hygiene technique  - Identify and instruct in appropriate isolation precautions for identified infection/condition  Outcome: Progressing     Problem: SAFETY ADULT  Goal: Patient will remain free of falls  Description: INTERVENTIONS:  - Educate patient/family on patient safety including physical limitations  - Instruct patient to call for assistance with activity   - Consult OT/PT to assist with strengthening/mobility   - Keep Call bell within reach  - Keep bed low and locked with side rails adjusted as appropriate  - Keep care items and personal belongings within reach  - Initiate and maintain comfort rounds  - Make Fall Risk Sign visible to staff  - Offer Toileting every 2 Hours, in advance of need  - Initiate/Maintain chair alarm  - Apply yellow socks and bracelet for high fall risk patients  - Consider moving patient to room near nurses station  Outcome: Progressing  Goal: Maintain or return to baseline ADL function  Description: INTERVENTIONS:  -  Assess patient's ability to carry out ADLs; assess patient's baseline for ADL function and identify physical deficits which impact ability to perform ADLs (bathing, care of mouth/teeth, toileting, grooming, dressing, etc )  - Assess/evaluate cause of self-care deficits   - Assess range of motion  - Assess patient's mobility; develop plan if impaired  - Assess patient's need for assistive devices and provide as appropriate  - Encourage maximum independence but intervene and supervise when necessary  - Involve family in performance of ADLs  - Assess for home care needs following discharge   - Consider OT consult to assist with ADL evaluation and planning for discharge  - Provide patient education as appropriate  Outcome: Progressing  Goal: Maintains/Returns to pre admission functional level  Description: INTERVENTIONS:  - Perform BMAT or MOVE assessment daily    - Set and communicate daily mobility goal to care team and patient/family/caregiver     - Collaborate with rehabilitation services on mobility goals if consulted  - Perform Range of Motion 3 times a day  - Reposition patient every 2 hours  - Dangle patient 3 times a day  - Stand patient 3 times a day  - Ambulate patient 3 times a day  - Out of bed to chair 3 times a day   - Out of bed for meals 3 times a day  - Out of bed for toileting  - Record patient progress and toleration of activity level   Outcome: Progressing     Problem: DISCHARGE PLANNING  Goal: Discharge to home or other facility with appropriate resources  Description: INTERVENTIONS:  - Identify barriers to discharge w/patient and caregiver  - Arrange for needed discharge resources and transportation as appropriate  - Identify discharge learning needs (meds, wound care, etc )  - Arrange for interpretive services to assist at discharge as needed  - Refer to Case Management Department for coordinating discharge planning if the patient needs post-hospital services based on physician/advanced practitioner order or complex needs related to functional status, cognitive ability, or social support system  Outcome: Progressing     Problem: Knowledge Deficit  Goal: Patient/family/caregiver demonstrates understanding of disease process, treatment plan, medications, and discharge instructions  Description: Complete learning assessment and assess knowledge base  Interventions:  - Provide teaching at level of understanding  - Provide teaching via preferred learning methods  Outcome: Progressing     Problem: Nutrition/Hydration-ADULT  Goal: Nutrient/Hydration intake appropriate for improving, restoring or maintaining nutritional needs  Description: Monitor and assess patient's nutrition/hydration status for malnutrition  Collaborate with interdisciplinary team and initiate plan and interventions as ordered  Monitor patient's weight and dietary intake as ordered or per policy  Utilize nutrition screening tool and intervene as necessary   Determine patient's food preferences and provide high-protein, high-caloric foods as appropriate  INTERVENTIONS:  - Monitor oral intake, urinary output, labs, and treatment plans  - Assess nutrition and hydration status and recommend course of action  - Evaluate amount of meals eaten  - Assist patient with eating if necessary   - Allow adequate time for meals  - Recommend/ encourage appropriate diets, oral nutritional supplements, and vitamin/mineral supplements  - Order, calculate, and assess calorie counts as needed  - Recommend, monitor, and adjust tube feedings and TPN/PPN based on assessed needs  - Assess need for intravenous fluids  - Provide specific nutrition/hydration education as appropriate  - Include patient/family/caregiver in decisions related to nutrition  Outcome: Progressing     Problem: MOBILITY - ADULT  Goal: Maintain or return to baseline ADL function  Description: INTERVENTIONS:  -  Assess patient's ability to carry out ADLs; assess patient's baseline for ADL function and identify physical deficits which impact ability to perform ADLs (bathing, care of mouth/teeth, toileting, grooming, dressing, etc )  - Assess/evaluate cause of self-care deficits   - Assess range of motion  - Assess patient's mobility; develop plan if impaired  - Assess patient's need for assistive devices and provide as appropriate  - Encourage maximum independence but intervene and supervise when necessary  - Involve family in performance of ADLs  - Assess for home care needs following discharge   - Consider OT consult to assist with ADL evaluation and planning for discharge  - Provide patient education as appropriate  Outcome: Progressing  Goal: Maintains/Returns to pre admission functional level  Description: INTERVENTIONS:  - Perform BMAT or MOVE assessment daily    - Set and communicate daily mobility goal to care team and patient/family/caregiver  - Collaborate with rehabilitation services on mobility goals if consulted  - Perform Range of Motion 3 times a day    - Reposition patient every 2 hours   - Dangle patient 3 times a day  - Stand patient 3 times a day  - Ambulate patient 3 times a day  - Out of bed to chair 3 times a day   - Out of bed for meals 3 times a day  - Out of bed for toileting  - Record patient progress and toleration of activity level   Outcome: Progressing

## 2023-05-29 NOTE — ASSESSMENT & PLAN NOTE
· Echinoses noted on LUE on previous IV site  · Doubt any DVT but US has been ordered  · Patient does have some exudate which is serous in nature, likely related to extracellular fluid coming out of the IV site  I do believe that this will get better once the previous IV site heals

## 2023-05-29 NOTE — PROGRESS NOTES
[facility]  Progress Note  Name: Mariaa Morales  MRN: [id number]  Unit/Bed#:  I Date of Admission: 5/26/2023   Date of Service: 5/29/2023 I Hospital Day: 3    Assessment/Plan   Bruise  Assessment & Plan  · Echinoses noted on LUE on previous IV site  · Doubt any DVT but US has been ordered  · Patient does have some exudate which is serous in nature, likely related to extracellular fluid coming out of the IV site  I do believe that this will get better once the previous IV site heals  Acute blood loss anemia  Assessment & Plan  · Patient presenting with history of hematemesis, and is status post EGD, with epinephrine and cautery to centimeter antral ulcer, with nonbleeding visible vessel, in addition to APC for antral angioectasia  Patient has a history of myasthenia gravis, for which he has been on chronic prednisone therapy, in addition to recent administration of high doses of aspirin, personal review  Patient is currently hemodynamically stable, however noted for reduction in H&H after undergoing endoscopic procedure [currently 7 0 from 8 2 postprocedure]  Received 1 unit of packed red blood cells on 5/26 and 100 mg of Venofer  Reports 1 episode of loose bowel movement yesterday which was dark in color, no recent bowel movements  Denies hematemesis  Tolerating diet, abdominal examination unremarkable  · Hb 6 9 this AM - will receive 1U PRBC - monitor Hb subsequently  · Patient was seen by gastroenterology as well  · Continue PPI  Myasthenia gravis ([address] )  Assessment & Plan  · Overall stable  · Patient was on Mestinon  · Hold the prednisone right now given the upper GI bleed    · Continue Mestinon every 6 hours    CAD (coronary artery disease)  Assessment & Plan  · Continue to hold aspirin until hemoglobin stability    * Upper GI bleed  Assessment & Plan  · Patient with upper GI bleed  · High-volume CT scan noted with active bleed  · EGD withLA grade B reflux esophagitis  1 cm hiatal hernia  Antral angiectasia status post APC  1 large 3 cm antral ulcer with nonbleeding visible vessel status post epinephrine injection and gold probe cautery      · Continue twice daily PPI  · Venofer  · Seen by GI  · Monitor H&H  · Status post PRBC transfusion -monitor hemoglobin  · Continue to hold aspirin  · Patient will need outpatient colonoscopy and GI follow-up in regards to H  pylori results         VTE Pharmacologic Prophylaxis:   Pharmacologic: Pharmacologic VTE Prophylaxis contraindicated due to Anemia  Mechanical VTE Prophylaxis in Place: Yes    Patient Centered Rounds: I have performed bedside rounds with nursing staff today  Discussions with Specialists or Other Care Team Provider: Discussed with care management team    Education and Discussions with Family / Patient: Patient himself, he did not request test to talk to anyone    Total Time for Medical Decision Making including Record Review,  Physical Encounter and Physical Exam, Elaboration of Assessment and Plan,  Counseling / Coordination of Care: 64 minutes    Current Length of Stay: 3 day(s)    Current Patient Status: Inpatient   Certification Statement: The patient will continue to require additional inpatient hospital stay due to Need for improvement in hemoglobin    Discharge Plan: Once stable, hopefully 24 hours    Code Status: Level 1 - Full Code      Subjective:     Patient valuated this morning  He does mention some fatigue and generalized weakness but otherwise is ambulatory  He states that he thinks that he will be Mestinon every 6h will be better than 4 times daily  Otherwise he denies any chest pain nausea vomiting  Hemoglobin 6 9 this morning  He denies any hematemesis  He does mention some clear exudate coming from his left upper extremity      Objective:     Vitals:   Temp (24hrs), Av 5 °F (36 9 °C), Min:97 7 °F (36 5 °C), Max:100 8 °F (38 2 °C)    Temp:  [97 7 °F (36 5 °C)-100 8 °F (38 2 °C)] 98 1 °F (36 7 °C)  HR:  [] 69  Resp:  [18-22] 18  BP: ()/(46-59) 105/59  SpO2:  [94 %-98 %] 96 %  Body mass index is 43 95 kg/m²  Input and Output Summary (last 24 hours): Intake/Output Summary (Last 24 hours) at 5/29/2023 1310  Last data filed at 5/29/2023 0811  Gross per 24 hour   Intake 350 ml   Output 1300 ml   Net -950 ml       Physical Exam:     Physical Exam  Vitals and nursing note reviewed  Constitutional:       Appearance: Normal appearance  He is obese  Comments: Male patient in bed, awake   HENT:      Head: Normocephalic and atraumatic  Right Ear: External ear normal       Left Ear: External ear normal       Nose: Nose normal  No congestion or rhinorrhea  Mouth/Throat:      Mouth: Mucous membranes are moist       Pharynx: Oropharynx is clear  No oropharyngeal exudate or posterior oropharyngeal erythema  Eyes:      General: No scleral icterus  Right eye: No discharge  Left eye: No discharge  Pupils: Pupils are equal, round, and reactive to light  Neck:      Vascular: No carotid bruit  Cardiovascular:      Rate and Rhythm: Normal rate and regular rhythm  Pulses: Normal pulses  Heart sounds: No murmur heard  No friction rub  No gallop  Pulmonary:      Effort: Pulmonary effort is normal  No respiratory distress  Breath sounds: Normal breath sounds  No stridor  No wheezing, rhonchi or rales  Abdominal:      General: Abdomen is flat  Bowel sounds are normal  There is no distension  Palpations: Abdomen is soft  There is no mass  Tenderness: There is no abdominal tenderness  There is no guarding or rebound  Hernia: No hernia is present  Musculoskeletal:         General: No swelling, tenderness, deformity or signs of injury  Normal range of motion  Cervical back: Normal range of motion  No rigidity  No muscular tenderness        Comments: Ecchymosis and left lower extremity   Lymphadenopathy: Cervical: No cervical adenopathy  Skin:     General: Skin is warm and dry  Capillary Refill: Capillary refill takes less than 2 seconds  Coloration: Skin is not jaundiced or pale  Findings: No bruising or erythema  Neurological:      General: No focal deficit present  Mental Status: He is alert and oriented to person, place, and time  Mental status is at baseline  Cranial Nerves: No cranial nerve deficit  Sensory: No sensory deficit  Motor: No weakness  Coordination: Coordination normal       Deep Tendon Reflexes: Reflexes normal    Psychiatric:         Mood and Affect: Mood normal          Behavior: Behavior normal          Thought Content: Thought content normal          Judgment: Judgment normal            Additional Data:     Labs:    Results from last 7 days   Lab Units 05/29/23  0515   EOS PCT % 0   HEMATOCRIT % 21 6*   HEMOGLOBIN g/dL 6 9*   LYMPHS PCT % 21   MONOS PCT % 9   NEUTROS PCT % 69   PLATELETS Thousands/uL 128*   WBC Thousand/uL 9 20     Results from last 7 days   Lab Units 05/29/23  0515 05/27/23  0554 05/26/23  0713   ANION GAP mmol/L 2*   < > 3*   ALBUMIN g/dL  --   --  2 8*   ALK PHOS U/L  --   --  46   ALT U/L  --   --  28   AST U/L  --   --  14   BUN mg/dL 15   < > 49*   CALCIUM mg/dL 7 9*   < > 8 1*   CHLORIDE mmol/L 104   < > 107   CO2 mmol/L 29   < > 28   CREATININE mg/dL 0 82   < > 0 86   GLUCOSE RANDOM mg/dL 135   < > 212*   POTASSIUM mmol/L 3 7   < > 4 0   SODIUM mmol/L 135   < > 138   TOTAL BILIRUBIN mg/dL  --   --  0 21    < > = values in this interval not displayed  Results from last 7 days   Lab Units 05/26/23  0713   INR  1 13             Results from last 7 days   Lab Units 05/26/23  0915 05/26/23  0713   LACTIC ACID mmol/L 2 0 2 4*           * I Have Reviewed All Lab Data Listed Above  * Additional Pertinent Lab Tests Reviewed:  [facility]        Recent Cultures (last 7 days):           Last 24 Hours Medication List:   Current Facility-Administered Medications   Medication Dose Route Frequency Provider Last Rate   • acetaminophen  650 mg Oral Q6H PRN Abbey Vargas III, MD     • Diclofenac Sodium  2 g Topical BID PRN Elisabeth Johnson PA-C     • glycerin-hypromellose-  1 drop Both Eyes Q6H PRN Elisabeth Johnson PA-C     • ondansetron  4 mg Intravenous Q6H PRN Tomasz Hi MD     • pantoprazole  40 mg Intravenous Q12H Tomasz Hi MD     • pyridostigmine  60 mg Oral Q6H Magdaleno العلي MD          Today, Patient Was Seen By: Magdaleno العلي MD    ** Please Note: Dictation voice to text software may have been used in the creation of this document   **

## 2023-05-29 NOTE — ASSESSMENT & PLAN NOTE
· Patient with upper GI bleed  · High-volume CT scan noted with active bleed  · EGD withLA grade B reflux esophagitis  1 cm hiatal hernia  Antral angiectasia status post APC  1 large 3 cm antral ulcer with nonbleeding visible vessel status post epinephrine injection and gold probe cautery      · Continue twice daily PPI  · Venofer  · Seen by GI  · Monitor H&H  · Status post PRBC transfusion -monitor hemoglobin  · Continue to hold aspirin      · Patient will need outpatient colonoscopy and GI follow-up in regards to H  pylori results

## 2023-05-29 NOTE — PROGRESS NOTES
Progress Note  Luca Stock 58 y o  male MRN: [id number]  Unit/Bed#:  Encounter: [id number]    Subjective:  He had a dark stool overnight but it was partially brown  He is eating okay  His blood level was stable yesterday  He has no abdominal pain heartburn regurgitation  He has no nausea vomiting  Objective:     Vitals:   Vitals:    05/29/23 0733   BP: 105/59   Pulse: 69   Resp: 18   Temp: 98 1 °F (36 7 °C)   SpO2:    ,Body mass index is 43 95 kg/m²        Intake/Output Summary (Last 24 hours) at 5/29/2023 0851  Last data filed at 5/29/2023 0811  Gross per 24 hour   Intake 350 ml   Output 1300 ml   Net -950 ml       Physical Exam:     General Appearance: Alert, appears stated age and cooperative  Lungs: Clear to auscultation bilaterally, no rales or rhonchi, no labored breathing/accessory muscle use  Heart: Regular rate and rhythm, S1, S2 normal, no murmur, click, rub or gallop  Abdomen: Soft, non-tender, non-distended; bowel sounds normal; no masses or no organomegaly  Extremities: No cyanosis, edema    Invasive Devices     Peripheral Intravenous Line  Duration           Peripheral IV 05/27/23 Right Antecubital 1 day                Lab Results:  Admission on 05/26/2023   Component Date Value   • Ventricular Rate 05/26/2023 92    • Atrial Rate 05/26/2023 92    • CA Interval 05/26/2023 126    • QRSD Interval 05/26/2023 100    • QT Interval 05/26/2023 372    • QTC Interval 05/26/2023 460    • P Axis 05/26/2023 41    • QRS Axis 05/26/2023 -1    • T Wave Axis 05/26/2023 81    • WBC 05/26/2023 15 87 (H)    • RBC 05/26/2023 3 20 (L)    • Hemoglobin 05/26/2023 8 9 (L)    • Hematocrit 05/26/2023 28 1 (L)    • MCV 05/26/2023 88    • MCH 05/26/2023 27 8    • MCHC 05/26/2023 31 7    • RDW 05/26/2023 17 1 (H)    • MPV 05/26/2023 10 6    • Platelets 49/06/7628 140 (L)    • nRBC 05/26/2023 0    • Neutrophils Relative 05/26/2023 80 (H)    • Immat GRANS % 05/26/2023 1    • Lymphocytes Relative 05/26/2023 11 (L) • Monocytes Relative 05/26/2023 8    • Eosinophils Relative 05/26/2023 0    • Basophils Relative 05/26/2023 0    • Neutrophils Absolute 05/26/2023 12 69 (H)    • Immature Grans Absolute 05/26/2023 0 14    • Lymphocytes Absolute 05/26/2023 1 70    • Monocytes Absolute 05/26/2023 1 28 (H)    • Eosinophils Absolute 05/26/2023 0 05    • Basophils Absolute 05/26/2023 0 01    • Protime 05/26/2023 14 3    • INR 05/26/2023 1 13    • PTT 05/26/2023 20 (L)    • Sodium 05/26/2023 138    • Potassium 05/26/2023 4 0    • Chloride 05/26/2023 107    • CO2 05/26/2023 28    • ANION GAP 05/26/2023 3 (L)    • BUN 05/26/2023 49 (H)    • Creatinine 05/26/2023 0 86    • Glucose 05/26/2023 212 (H)    • Calcium 05/26/2023 8 1 (L)    • Corrected Calcium 05/26/2023 9 1    • AST 05/26/2023 14    • ALT 05/26/2023 28    • Alkaline Phosphatase 05/26/2023 46    • Total Protein 05/26/2023 4 5 (L)    • Albumin 05/26/2023 2 8 (L)    • Total Bilirubin 05/26/2023 0 21    • eGFR 05/26/2023 91    • Lipase 05/26/2023 39    • hs TnI 0hr 05/26/2023 22    • ABO Grouping 05/26/2023 O    • Rh Factor 05/26/2023 Positive    • Antibody Screen 05/26/2023 Negative    • Specimen Expiration Date 05/26/2023 [id number]    • LACTIC ACID 05/26/2023 2 4 (HH)    • ABO Grouping 05/26/2023 O    • Rh Factor 05/26/2023 Positive    • Unit Product Code 05/27/2023 L9743Q91    • Unit Number 05/27/2023 [id number]    • Unit ABO 05/27/2023 O    • Unit [facility] 05/27/2023 POS    • Crossmatch 05/27/2023 Compatible    • Unit Dispense Status 05/27/2023 Presumed Trans    • Unit Product Volume 05/27/2023 350    • hs TnI 2hr 05/26/2023 42    • Delta 2hr hsTnI 05/26/2023 20 (H)    • LACTIC ACID 05/26/2023 2 0    • hs TnI 4hr 05/26/2023 41    • Delta 4hr hsTnI 05/26/2023 19    • Hemoglobin 05/26/2023 10 0 (L)    • Hematocrit 05/26/2023 31 4 (L)    • Hemoglobin 05/26/2023 9 7 (L)    • Hematocrit 05/26/2023 30 3 (L)    • Hemoglobin 05/26/2023 9 1 (L)    • Hematocrit 05/26/2023 28 8 (L)    • Hemoglobin 05/27/2023 8 4 (L)    • Hematocrit 05/27/2023 26 3 (L)    • Sodium 05/27/2023 137    • Potassium 05/27/2023 4 4    • Chloride 05/27/2023 107    • CO2 05/27/2023 26    • ANION GAP 05/27/2023 4    • BUN 05/27/2023 31 (H)    • Creatinine 05/27/2023 0 84    • Glucose 05/27/2023 162 (H)    • Calcium 05/27/2023 8 2 (L)    • eGFR 05/27/2023 92    • Hemoglobin 05/27/2023 8 8 (L)    • Hematocrit 05/27/2023 27 3 (L)    • Hemoglobin 05/27/2023 8 2 (L)    • Hematocrit 05/27/2023 25 6 (L)    • Hemoglobin 05/27/2023 7 5 (L)    • Hematocrit 05/27/2023 23 2 (L)    • Hemoglobin 05/28/2023 7 0 (L)    • Hematocrit 05/28/2023 22 1 (L)    • Sodium 05/28/2023 136    • Potassium 05/28/2023 3 7    • Chloride 05/28/2023 106    • CO2 05/28/2023 28    • ANION GAP 05/28/2023 2 (L)    • BUN 05/28/2023 25    • Creatinine 05/28/2023 0 78    • Glucose 05/28/2023 113    • Calcium 05/28/2023 7 8 (L)    • eGFR 05/28/2023 95    • Hemoglobin 05/28/2023 7 7 (L)    • Hematocrit 05/28/2023 23 9 (L)    • WBC 05/29/2023 9 20    • RBC 05/29/2023 2 47 (L)    • Hemoglobin 05/29/2023 6 9 (LL)    • Hematocrit 05/29/2023 21 6 (L)    • MCV 05/29/2023 87    • MCH 05/29/2023 27 9    • MCHC 05/29/2023 31 9    • RDW 05/29/2023 18 4 (H)    • MPV 05/29/2023 10 2    • Platelets 43/52/2642 128 (L)    • nRBC 05/29/2023 1    • Neutrophils Relative 05/29/2023 69    • Immat GRANS % 05/29/2023 1    • Lymphocytes Relative 05/29/2023 21    • Monocytes Relative 05/29/2023 9    • Eosinophils Relative 05/29/2023 0    • Basophils Relative 05/29/2023 0    • Neutrophils Absolute 05/29/2023 6 37    • Immature Grans Absolute 05/29/2023 0 11    • Lymphocytes Absolute 05/29/2023 1 89    • Monocytes Absolute 05/29/2023 0 79    • Eosinophils Absolute 05/29/2023 0 03    • Basophils Absolute 05/29/2023 0 01    • Sodium 05/29/2023 135    • Potassium 05/29/2023 3 7    • Chloride 05/29/2023 104    • CO2 05/29/2023 29    • ANION GAP 05/29/2023 2 (L)    • BUN 05/29/2023 15    • Creatinine 05/29/2023 0 82    • Glucose 05/29/2023 135    • Calcium 05/29/2023 7 9 (L)    • eGFR 05/29/2023 93    • Unit Product Code 05/29/2023 [id number]    • Unit Number 05/29/2023 [id number]    • Unit ABO 05/29/2023 O    • Unit [facility] 05/29/2023 NEG    • Crossmatch 05/29/2023 Compatible    • Unit Dispense Status 05/29/2023 Issued    • Unit Product Volume 05/29/2023 350        Imaging Studies:   I have personally reviewed pertinent imaging studies  EGD    Result Date: 5/26/2023  Narrative: Table formatting from the original result was not included  [facility] Operating Room [address] [facility]ly named [facility] [phone] DATE OF SERVICE: 5/26/23 PHYSICIAN(S): Attending: Yulissa Belle MD Fellow: No Staff Documented INDICATION: GI bleed Melena POST-OP DIAGNOSIS: See the impression below  PREPROCEDURE: Informed consent was obtained for the procedure, including sedation  Risks of perforation, hemorrhage, adverse drug reaction and aspiration were discussed  The patient was placed in the left lateral decubitus position  Patient was explained about the risks and benefits of the procedure  Risks including but not limited to bleeding, infection, and perforation were explained in detail  Also explained about less than 100% sensitivity with the exam and other alternatives  PROCEDURE: EGD DETAILS OF PROCEDURE: Patient was taken to the procedure room where a time out was performed to confirm correct patient and correct procedure  The patient underwent monitored anesthesia care, which was administered by an anesthesia professional  The patient's blood pressure, heart rate, level of consciousness, respirations and oxygen were monitored throughout the procedure  The scope was advanced to the second part of the duodenum  Retroflexion was performed in the fundus  The patient experienced no blood loss  The procedure was not difficult  The patient tolerated the procedure well   There were no apparent adverse events  ANESTHESIA INFORMATION: ASA: IV Anesthesia Type: Anesthesia type not filed in the log  MEDICATIONS: No administrations occurring from 1324 to 1333 on 05/26/23 FINDINGS: The upper third of the esophagus, middle third of the esophagus and lower third of the esophagus appeared normal  Grade B esophagitis with mucosal breaks measuring 5 mm or more not continuous between folds, covering less than 75% of the circumference in the GE junction 1 cm sliding hiatal hernia (type I hiatal hernia) without Ree Dibbles lesions present Single large angioectasia in the antrum; no bleeding was identified; induced coagulation and hemostasis achieved with with argon plasma coagulation Single large, cratered, irregular, benign-appearing ulcer in the antrum with nonbleeding visible vessel (Demar IIA); tissue ablated completely with heater probe; injected 2 mL of epinephrine to address bleeding; hemostasis achieved The duodenal bulb, 1st part of the duodenum and 2nd part of the duodenum appeared normal  SPECIMENS: ID Type Source Tests Collected by Time Destination 1 :  Tissue Stomach TISSUE EXAM Severino Salcido MD 5/26/2023  1:32 PM      Impression: LA grade B reflux esophagitis 1 cm hiatal hernia Antral angiectasia status post APC 1 large 3 cm antral ulcer with nonbleeding visible vessel status post epinephrine injection and gold probe cautery RECOMMENDATION: Pantoprazole 40 mg IV every 12 hours Clear liquid diet Venofer infusion Monitor CBC Await pathology EGD in 3 months to document ulcer healing Caution with NSAIDs   Norris Zapata III, MD     CT high volume bleeding scan abdomen pelvis    Result Date: 5/26/2023  Narrative: CT ABDOMEN AND PELVIS - WITHOUT AND WITH IV CONTRAST INDICATION:   Vomiting blood, 2 days of blood in stool   COMPARISON: None TECHNIQUE:  CT examination of the abdomen and pelvis was performed both prior to and after the administration of intravenous contrast  Axial, sagittal, and coronal 2D reformatted images were created from the source data and submitted for interpretation  Radiation dose length product (DLP) for this visit:   This examination, like all CT scans performed in the [facility], was performed utilizing techniques to minimize radiation dose exposure, including the use of iterative reconstruction  and automated exposure control  IV Contrast: Enteric Contrast: None  FINDINGS: ABDOMEN BOWEL: The stomach is mildly distended with fluid, and shows layering hyperdensity in the fundus (2/38) on precontrast phase consistent with blood products given the clinical history  There is relative hyperemia of the distal stomach on arterial phase, with more intense enhancement seen in the posterior pyloric antrum where there is wall thickening and a small discontinuity of the mucosal enhancement (series 5/51, 604/147); new intraluminal hyperdensity originates from this location on arterial phase (5/46-53; 603/53) and redistributes on delayed phase  Findings consistent with actively bleeding gastric ulcer  No evidence of perforation  Relative hyperdensity is seen in the proximal small bowel and the proximal colon on all phases suggesting ingested  blood products, but no other areas of potential active extravasation are seen  The small bowel is normal in thickness and caliber  The colon is diffusely collapsed limiting evaluation of wall thickness, but a colitis would not be excluded in the appropriate clinical context  There is mild colonic diverticulosis without acute diverticulitis  No pneumatosis  LOWER CHEST: No clinically significant abnormality is identified in the visualized lower chest  No consolidation or effusion  LIVER: Elegy  No suspicious lesions  BILIARY: No intrahepatic biliary ductal dilatation  Normal caliber common bile duct  GALLBLADDER: Several calcified gallstones are noted  Normal wall thickness  The gallbladder is nondistended  No pericholecystic inflammation   SPLEEN: Within normal limits  No suspicious lesion  Normal spleen size  PANCREAS: Pancreatic parenchyma is within normal limits  No main pancreatic ductal dilatation  No peripancreatic inflammation  ADRENAL GLANDS: Normal right adrenal gland  Subcentimeter left adrenal adenoma showing low attenuation on precontrast phase  KIDNEYS/URETERS: Normal kidney size and position with symmetric enhancement  Subcentimeter simple cyst in the posterior interpolar left kidney  No suspicious lesions  No calculi  No hydronephrosis  Normal ureters  APPENDIX: Presumed appendectomy  ABDOMINOPELVIC CAVITY: Scattered mildly enlarged common and external iliac lymph nodes which show normal fatty sofie, presumably reactive  No ascites  No intraperitoneal free air  No retroperitoneal hematoma  VESSELS: Normal caliber abdominal aorta with no detectable atherosclerotic plaque  The celiac, SMA, and BREE are patent, without detectable atherosclerotic plaque at the origins or in the major branches  Replaced right hepatic artery arising from the SMA  The renal arteries are patent  The bilateral common iliac, internal iliac, external iliac, common femoral, and visualized femoral and deep femoral arteries are patent without significant plaque  The SMV and splenic vein are patent  The portal veins are patent  The hepatic veins are patent  PELVIS REPRODUCTIVE ORGANS: Enlarged prostate  Symmetric seminal vesicles  URINARY BLADDER: Within normal limits  No calculi  ABDOMINAL WALL/INGUINAL REGIONS: Tiny fat-containing umbilical hernia  Tiny left-sided fat-containing indirect inguinal hernia  BONES: Moderate multilevel degenerative changes in the spine  Vertebral body height is maintained  No acute fracture or destructive osseous lesion  Impression: 1  Slow active bleeding from a gastric ulcer in the pyloric antrum  No evidence of perforation  2   Nondistended colon limiting evaluation for wall thickening   A colitis would not be excluded in the appropriate clinical context  3   Cholelithiasis without acute cholecystitis  I personally discussed this study with Mikal Veronica on 5/26/2023 9:11 AM at 5/26/2023 9:11 AM  Workstation performed: [id number]         Assessment & Plan  Principal Problem:    Upper GI bleed  Active Problems:    CAD (coronary artery disease)    Myasthenia gravis ([address] )    Acute blood loss anemia      He is a 59-year-old male with upper GI bleed secondary to peptic ulcer disease with an antral ulcer with nonbleeding visible vessel status post epinephrine injection and gold probe cautery  He also had an angiectasia that was treated with APC      He is status post Venofer infusion  Diet as tolerated  Transfusion parameters per [facility] service  H  pylori pathology is pending-I will call him with this result  We will call him for an outpatient endoscopy in 3 months to document ulcer healing  Management of myasthenia gravis per [facility] service    At the present time I will sign off please reconsult as needed    Brian Smith MD  5/29/2023,8:51 AM

## 2023-05-29 NOTE — ASSESSMENT & PLAN NOTE
· Patient presenting with history of hematemesis, and is status post EGD, with epinephrine and cautery to centimeter antral ulcer, with nonbleeding visible vessel, in addition to APC for antral angioectasia  Patient has a history of myasthenia gravis, for which he has been on chronic prednisone therapy, in addition to recent administration of high doses of aspirin, personal review  Patient is currently hemodynamically stable, however noted for reduction in H&H after undergoing endoscopic procedure [currently 7 0 from 8 2 postprocedure]  Received 1 unit of packed red blood cells on 5/26 and 100 mg of Venofer  Reports 1 episode of loose bowel movement yesterday which was dark in color, no recent bowel movements  Denies hematemesis  Tolerating diet, abdominal examination unremarkable  · Hb 6 9 this AM - will receive 1U PRBC - monitor Hb subsequently  · Patient was seen by gastroenterology as well  · Continue PPI

## 2023-05-30 ENCOUNTER — PATIENT OUTREACH (OUTPATIENT)
Dept: CASE MANAGEMENT | Facility: OTHER | Age: 63
End: 2023-05-30

## 2023-05-30 PROBLEM — I50.9 CHF (CONGESTIVE HEART FAILURE) (HCC): Status: ACTIVE | Noted: 2023-05-30

## 2023-05-30 LAB
ABO GROUP BLD BPU: NORMAL
ANION GAP SERPL CALCULATED.3IONS-SCNC: 2 MMOL/L (ref 4–13)
ANION GAP SERPL CALCULATED.3IONS-SCNC: 2 MMOL/L (ref 4–13)
BASOPHILS # BLD AUTO: 0.01 THOUSANDS/ÂΜL (ref 0–0.1)
BASOPHILS NFR BLD AUTO: 0 % (ref 0–1)
BPU ID: NORMAL
BUN SERPL-MCNC: 11 MG/DL (ref 5–25)
BUN SERPL-MCNC: 11 MG/DL (ref 5–25)
CALCIUM SERPL-MCNC: 8.2 MG/DL (ref 8.4–10.2)
CALCIUM SERPL-MCNC: 8.3 MG/DL (ref 8.4–10.2)
CHLORIDE SERPL-SCNC: 101 MMOL/L (ref 96–108)
CHLORIDE SERPL-SCNC: 98 MMOL/L (ref 96–108)
CO2 SERPL-SCNC: 31 MMOL/L (ref 21–32)
CO2 SERPL-SCNC: 34 MMOL/L (ref 21–32)
CREAT SERPL-MCNC: 0.84 MG/DL (ref 0.6–1.3)
CREAT SERPL-MCNC: 0.95 MG/DL (ref 0.6–1.3)
CROSSMATCH: NORMAL
EOSINOPHIL # BLD AUTO: 0.04 THOUSAND/ÂΜL (ref 0–0.61)
EOSINOPHIL NFR BLD AUTO: 0 % (ref 0–6)
ERYTHROCYTE [DISTWIDTH] IN BLOOD BY AUTOMATED COUNT: 18.3 % (ref 11.6–15.1)
GFR SERPL CREATININE-BSD FRML MDRD: 84 ML/MIN/1.73SQ M
GFR SERPL CREATININE-BSD FRML MDRD: 92 ML/MIN/1.73SQ M
GLUCOSE SERPL-MCNC: 103 MG/DL (ref 65–140)
GLUCOSE SERPL-MCNC: 121 MG/DL (ref 65–140)
HCT VFR BLD AUTO: 25.1 % (ref 36.5–49.3)
HCT VFR BLD AUTO: 27.4 % (ref 36.5–49.3)
HGB BLD-MCNC: 8.1 G/DL (ref 12–17)
HGB BLD-MCNC: 8.9 G/DL (ref 12–17)
IMM GRANULOCYTES # BLD AUTO: 0.11 THOUSAND/UL (ref 0–0.2)
IMM GRANULOCYTES NFR BLD AUTO: 1 % (ref 0–2)
LYMPHOCYTES # BLD AUTO: 1.62 THOUSANDS/ÂΜL (ref 0.6–4.47)
LYMPHOCYTES NFR BLD AUTO: 18 % (ref 14–44)
MCH RBC QN AUTO: 28.1 PG (ref 26.8–34.3)
MCHC RBC AUTO-ENTMCNC: 32.3 G/DL (ref 31.4–37.4)
MCV RBC AUTO: 87 FL (ref 82–98)
MONOCYTES # BLD AUTO: 0.88 THOUSAND/ÂΜL (ref 0.17–1.22)
MONOCYTES NFR BLD AUTO: 10 % (ref 4–12)
NEUTROPHILS # BLD AUTO: 6.26 THOUSANDS/ÂΜL (ref 1.85–7.62)
NEUTS SEG NFR BLD AUTO: 71 % (ref 43–75)
NRBC BLD AUTO-RTO: 0 /100 WBCS
PLATELET # BLD AUTO: 141 THOUSANDS/UL (ref 149–390)
PMV BLD AUTO: 10.3 FL (ref 8.9–12.7)
POTASSIUM SERPL-SCNC: 3.4 MMOL/L (ref 3.5–5.3)
POTASSIUM SERPL-SCNC: 3.8 MMOL/L (ref 3.5–5.3)
RBC # BLD AUTO: 2.88 MILLION/UL (ref 3.88–5.62)
SODIUM SERPL-SCNC: 134 MMOL/L (ref 135–147)
SODIUM SERPL-SCNC: 134 MMOL/L (ref 135–147)
UNIT DISPENSE STATUS: NORMAL
UNIT PRODUCT CODE: NORMAL
UNIT PRODUCT VOLUME: 350 ML
UNIT RH: NORMAL
WBC # BLD AUTO: 8.92 THOUSAND/UL (ref 4.31–10.16)

## 2023-05-30 PROCEDURE — 80048 BASIC METABOLIC PNL TOTAL CA: CPT | Performed by: STUDENT IN AN ORGANIZED HEALTH CARE EDUCATION/TRAINING PROGRAM

## 2023-05-30 PROCEDURE — 85018 HEMOGLOBIN: CPT | Performed by: INTERNAL MEDICINE

## 2023-05-30 PROCEDURE — 85025 COMPLETE CBC W/AUTO DIFF WBC: CPT | Performed by: INTERNAL MEDICINE

## 2023-05-30 PROCEDURE — 93971 EXTREMITY STUDY: CPT | Performed by: SURGERY

## 2023-05-30 PROCEDURE — 80048 BASIC METABOLIC PNL TOTAL CA: CPT | Performed by: INTERNAL MEDICINE

## 2023-05-30 PROCEDURE — C9113 INJ PANTOPRAZOLE SODIUM, VIA: HCPCS | Performed by: INTERNAL MEDICINE

## 2023-05-30 PROCEDURE — 99233 SBSQ HOSP IP/OBS HIGH 50: CPT | Performed by: STUDENT IN AN ORGANIZED HEALTH CARE EDUCATION/TRAINING PROGRAM

## 2023-05-30 PROCEDURE — 85014 HEMATOCRIT: CPT | Performed by: INTERNAL MEDICINE

## 2023-05-30 RX ORDER — FUROSEMIDE 10 MG/ML
40 INJECTION INTRAMUSCULAR; INTRAVENOUS
Status: DISCONTINUED | OUTPATIENT
Start: 2023-05-30 | End: 2023-05-31

## 2023-05-30 RX ORDER — TIZANIDINE 2 MG/1
4 TABLET ORAL EVERY 8 HOURS PRN
Status: DISCONTINUED | OUTPATIENT
Start: 2023-05-30 | End: 2023-06-02 | Stop reason: HOSPADM

## 2023-05-30 RX ORDER — FUROSEMIDE 10 MG/ML
40 INJECTION INTRAMUSCULAR; INTRAVENOUS
Status: DISCONTINUED | OUTPATIENT
Start: 2023-05-30 | End: 2023-05-30

## 2023-05-30 RX ADMIN — DICLOFENAC SODIUM TOPICAL GEL, 1%, 2 G: 10 GEL TOPICAL at 19:51

## 2023-05-30 RX ADMIN — PYRIDOSTIGMINE BROMIDE 60 MG: 60 TABLET ORAL at 12:28

## 2023-05-30 RX ADMIN — PYRIDOSTIGMINE BROMIDE 60 MG: 60 TABLET ORAL at 05:49

## 2023-05-30 RX ADMIN — PANTOPRAZOLE SODIUM 40 MG: 40 INJECTION, POWDER, FOR SOLUTION INTRAVENOUS at 05:32

## 2023-05-30 RX ADMIN — PANTOPRAZOLE SODIUM 40 MG: 40 INJECTION, POWDER, FOR SOLUTION INTRAVENOUS at 18:07

## 2023-05-30 RX ADMIN — PYRIDOSTIGMINE BROMIDE 60 MG: 60 TABLET ORAL at 18:07

## 2023-05-30 RX ADMIN — TIZANIDINE 4 MG: 2 TABLET ORAL at 19:51

## 2023-05-30 RX ADMIN — FUROSEMIDE 40 MG: 10 INJECTION, SOLUTION INTRAVENOUS at 16:58

## 2023-05-30 RX ADMIN — FUROSEMIDE 40 MG: 10 INJECTION, SOLUTION INTRAVENOUS at 10:37

## 2023-05-30 NOTE — ASSESSMENT & PLAN NOTE
Wt Readings from Last 3 Encounters:   05/26/23 (!) 147 kg (324 lb 1 2 oz)   10/13/22 (!) 145 kg (319 lb 10 7 oz)   07/15/22 (!) 153 kg (337 lb)     · With prior history of CHF per chart  · Prior echo from 2021 shows LVEF 40 % to 45 %      · Denies any shortness of breath, however does report increasing bilateral lower extremity edema in context of IV fluids and PRBC previously received  · will repeat echo  · will start diuresing with IV Lasix 40 twice daily

## 2023-05-30 NOTE — CASE MANAGEMENT
Case Management Discharge Planning Note    Patient name Ileana Pérez  Location / MRN [id number]  : 1960 Date 2023       Current Admission Date: 2023  Current Admission Diagnosis:Upper GI bleed   Patient Active Problem List    Diagnosis Date Noted   • [facility] ) 2023   • Bruise 2023   • Upper GI bleed 2023   • Acute blood loss anemia 2023   • Hyperlipidemia    • Myasthenia gravis in crisis [facility])    • Myasthenia gravis ([facility] ) 10/01/2021   • Morbid obesity ([place] [address] ) 06/10/2021   • Ptosis, bilateral 2021   • CAD (coronary artery disease) 2021      LOS (days): 4  Geometric Mean LOS (GMLOS) (days):   Days to GMLOS:     OBJECTIVE:  Risk of Unplanned Readmission Score: 14 13         Current admission status: Inpatient   Preferred Pharmacy:   [facility] [address]  [facility] [address] [address] [facility]  [address]  Phone: [phone] Fax: [phone]    [address] [place], [place] [address]D, [address] [facility]  Phone: [phone] Fax: [phone]    Primary Care Provider: No primary care provider on file  Primary Insurance:   Secondary Insurance:     DISCHARGE DETAILS:    Other Referral/Resources/Interventions Provided:  Financial Resources Provided: Financial Counselor (CM sent an email to the hospital financial counselor requesting a PATHS referral due to patient having no insurance )    Additional Comments: Per rounding with [facility], patient is anticipated to be discharged home in 24-48 hours  No CM needs anticipated at this time  CM department will continue to follow patient through discharge

## 2023-05-30 NOTE — PROGRESS NOTES
Progress Note - Viktoria Favorite 58 y o  male MRN: [id number]    Unit/Bed#:  Encounter: [id number]        Subjective:     GI was asked by our internal medicine to re-evaluate patient after drop in blood count last night  Patient denies heart palpitations, shortness of breath, abdominal pain or further episodes of melena  Patient reports that he had a green bowel movement this morning  Patient's main complaint is that he has heaviness in his lower extremities secondary to development of edema  ROS: As noted in the HPI, otherwise all others negative  Objective:     Vitals: Blood pressure 116/57, pulse 80, temperature 98 8 °F (37 1 °C), temperature source Oral, resp  rate 18, height 6' (1 829 m), weight (!) 147 kg (324 lb 1 2 oz), SpO2 97 %  ,Body mass index is 43 95 kg/m²  Intake/Output Summary (Last 24 hours) at 5/30/2023 1054  Last data filed at 5/30/2023 0800  Gross per 24 hour   Intake 240 ml   Output 2750 ml   Net -2510 ml       Physical Exam:     General Appearance: Alert and oriented x 3   In no respiratory distress  Lungs: Clear to auscultation bilaterally, no rales or rhonchi  Heart: Regular rate and rhythm, S1, S2 normal, no murmur, click, rub or gallop  Abdomen: Soft, non-tender, non-distended; bowel sounds normal; no masses or no organomegaly  Extremities: Positive pitting edema bilaterally in the lower extremities    Invasive Devices     Peripheral Intravenous Line  Duration           Peripheral IV 05/27/23 Right Antecubital 2 days                Lab Results:  Results from last 7 days   Lab Units 05/30/23  0542   EOS PCT % 0   HEMATOCRIT % 25 1*   HEMOGLOBIN g/dL 8 1*   LYMPHS PCT % 18   MONOS PCT % 10   NEUTROS PCT % 71   PLATELETS Thousands/uL 141*   WBC Thousand/uL 8 92     Results from last 7 days   Lab Units 05/30/23  0542 05/27/23  0554 05/26/23  0713   ALK PHOS U/L  --   --  46   ALT U/L  --   --  28   AST U/L  --   --  14   BUN mg/dL 11   < > 49*   CALCIUM mg/dL 8 2*   < > 8 1* CHLORIDE mmol/L 101   < > 107   CO2 mmol/L 31   < > 28   CREATININE mg/dL 0 84   < > 0 86   POTASSIUM mmol/L 3 8   < > 4 0    < > = values in this interval not displayed  Results from last 7 days   Lab Units 05/26/23  0713   INR  1 13     Results from last 7 days   Lab Units 05/26/23  0713   LIPASE u/L 39       Imaging Studies: I have personally reviewed pertinent imaging studies  EGD    Result Date: 5/26/2023  Impression: LA grade B reflux esophagitis 1 cm hiatal hernia Antral angiectasia status post APC 1 large 3 cm antral ulcer with nonbleeding visible vessel status post epinephrine injection and gold probe cautery RECOMMENDATION: Pantoprazole 40 mg IV every 12 hours Clear liquid diet Venofer infusion Monitor CBC Await pathology EGD in 3 months to document ulcer healing Caution with NSAIDs   Emilee Kevin III, MD     CT high volume bleeding scan abdomen pelvis    Result Date: 5/26/2023  Impression: 1  Slow active bleeding from a gastric ulcer in the pyloric antrum  No evidence of perforation  2   Nondistended colon limiting evaluation for wall thickening  A colitis would not be excluded in the appropriate clinical context  3   Cholelithiasis without acute cholecystitis  I personally discussed this study with Sonam Marcum on 5/26/2023 9:11 AM at 5/26/2023 9:11 AM  Workstation performed: [id number]         Assessment and Plan:     1) Acute blood loss anemia secondary to gastric ulcer with nonbleeding visible vessel - Patient with typically normal baseline hemoglobin between 14 and 15  Down to 8 9 on admission presenting with black stools  Patient is status post endoscopy on 526 for acute blood loss anemia  Exam noting LA class B esophagitis, a 1 cm hiatal hernia, and a large 3 cm antral ulcer with a nonbleeding visible vessel  This was treated with epinephrine and gold probe cautery    Hemoglobin down to 6 9 last night, the patient was given 1 unit of packed red blood cells, and his hemoglobin has been between 8 9 and 8 1  BUN has normalized  His drop in hemoglobin may be dilutional given significant lower extreme edema on exam   It is also reassuring that his BUN continues to be within normal limits at 11 today with a peak of 49 on admission     - I explained to the patient that if he has another drop in hemoglobin or has recurrence of melena, would recommend a second look endoscopy while admitted - Otherwise, he will have an EGD in 3 months to document healing of the gastric ulcer  - He will remain on PPI twice daily until then  Discussed with Dr Sarah Cooper on the primary team  - No NSAIDs  - Continue to monitor hemoglobin closely transfuse as necessary to goal hemoglobin above 7-8  - Agree with IV Iron       GI will sign off, please call with questions  Thank you!

## 2023-05-30 NOTE — ASSESSMENT & PLAN NOTE
· Patient presenting with history of hematemesis, and is status post EGD, with epinephrine and cautery to centimeter antral ulcer, with nonbleeding visible vessel, in addition to APC for antral angioectasia  Patient has a history of myasthenia gravis, for which he has been on chronic prednisone therapy, in addition to recent administration of high doses of aspirin, personal review  Patient is currently hemodynamically stable, however noted for reduction in H&H after undergoing endoscopic procedure [currently 7 0 from 8 2 postprocedure]  Received 1 unit of packed red blood cells on 5/26 and 100 mg of Venofer  · Hb 6 9 on 5/29, status post 1 unit PRBC-hemoglobin has improved to 8 9, again trending down to 8 1  · Gastroenterology has previously signed off, however will see again today due to decreasing HGB  · Continue PPI

## 2023-05-30 NOTE — PROGRESS NOTES
ADT notification received 5/26/23 for patient admission to [facility]   Patient presented with c/o vomiting blood and 2 days of blood in stool  To date, there has been no response to VM's left by this CM or unable to reach letter mailed to patient  Tiger Text sent to inpatient CM requesting good call back number for patient  Inpatient CM replied via text indicating she would try to obtain good phone number for patient  Will continue to monitor via chart review throughout hospitalization

## 2023-05-30 NOTE — QUICK NOTE
Date Examined- 5/30  Time Examined- Morning    Assessment  · Acute blood loss anemia  · Decompensated heart failure    Plan  · Required 1 unit PRBC transfusion yesterday  Hemoglobin stable today  GI to follow as an outpatient  · Last ECHO with EF 40-45% in 2021  3+ pitting lower extremity edema concerning for decompensated heart failure  Check ECHO, start IV lasix

## 2023-05-30 NOTE — PLAN OF CARE
Problem: Potential for Falls  Goal: Patient will remain free of falls  Description: INTERVENTIONS:  - Educate patient/family on patient safety including physical limitations  - Instruct patient to call for assistance with activity   - Consult OT/PT to assist with strengthening/mobility   - Keep Call bell within reach  - Keep bed low and locked with side rails adjusted as appropriate  - Keep care items and personal belongings within reach  - Initiate and maintain comfort rounds  - Make Fall Risk Sign visible to staff  - Offer Toileting every 2 Hours, in advance of need  - Initiate/Maintain bed alarm  - Obtain necessary fall risk management equipment:    - Apply yellow socks and bracelet for high fall risk patients  - Consider moving patient to room near nurses station  Outcome: Progressing     Problem: PAIN - ADULT  Goal: Verbalizes/displays adequate comfort level or baseline comfort level  Description: Interventions:  - Encourage patient to monitor pain and request assistance  - Assess pain using appropriate pain scale  - Administer analgesics based on type and severity of pain and evaluate response  - Implement non-pharmacological measures as appropriate and evaluate response  - Consider cultural and social influences on pain and pain management  - Notify physician/advanced practitioner if interventions unsuccessful or patient reports new pain  Outcome: Progressing     Problem: INFECTION - ADULT  Goal: Absence or prevention of progression during hospitalization  Description: INTERVENTIONS:  - Assess and monitor for signs and symptoms of infection  - Monitor lab/diagnostic results  - Monitor all insertion sites, i e  indwelling lines, tubes, and drains  - Monitor endotracheal if appropriate and nasal secretions for changes in amount and color  - [place] appropriate cooling/warming therapies per order  - Administer medications as ordered  - Instruct and encourage patient and family to use good hand hygiene technique  - Identify and instruct in appropriate isolation precautions for identified infection/condition  Outcome: Progressing     Problem: SAFETY ADULT  Goal: Patient will remain free of falls  Description: INTERVENTIONS:  - Educate patient/family on patient safety including physical limitations  - Instruct patient to call for assistance with activity   - Consult OT/PT to assist with strengthening/mobility   - Keep Call bell within reach  - Keep bed low and locked with side rails adjusted as appropriate  - Keep care items and personal belongings within reach  - Initiate and maintain comfort rounds  - Make Fall Risk Sign visible to staff  - Offer Toileting every 2 Hours, in advance of need  - Initiate/Maintain bed alarm  - Obtain necessary fall risk management equipment:    - Apply yellow socks and bracelet for high fall risk patients  - Consider moving patient to room near nurses station  Outcome: Progressing  Goal: Maintain or return to baseline ADL function  Description: INTERVENTIONS:  -  Assess patient's ability to carry out ADLs; assess patient's baseline for ADL function and identify physical deficits which impact ability to perform ADLs (bathing, care of mouth/teeth, toileting, grooming, dressing, etc )  - Assess/evaluate cause of self-care deficits   - Assess range of motion  - Assess patient's mobility; develop plan if impaired  - Assess patient's need for assistive devices and provide as appropriate  - Encourage maximum independence but intervene and supervise when necessary  - Involve family in performance of ADLs  - Assess for home care needs following discharge   - Consider OT consult to assist with ADL evaluation and planning for discharge  - Provide patient education as appropriate  Outcome: Progressing  Goal: Maintains/Returns to pre admission functional level  Description: INTERVENTIONS:  - Perform BMAT or MOVE assessment daily    - Set and communicate daily mobility goal to care team and patient/family/caregiver  - Collaborate with rehabilitation services on mobility goals if consulted  - Perform Range of Motion 3 times a day  - Reposition patient every 2hours  - Dangle patient 3 times a day  - Stand patient 3 times a day  - Ambulate patient 3 times a day  - Out of bed to chair 3 times a day   - Out of bed for meals 3 times a day  - Out of bed for toileting  - Record patient progress and toleration of activity level   Outcome: Progressing     Problem: DISCHARGE PLANNING  Goal: Discharge to home or other facility with appropriate resources  Description: INTERVENTIONS:  - Identify barriers to discharge w/patient and caregiver  - Arrange for needed discharge resources and transportation as appropriate  - Identify discharge learning needs (meds, wound care, etc )  - Arrange for interpretive services to assist at discharge as needed  - Refer to Case Management Department for coordinating discharge planning if the patient needs post-hospital services based on physician/advanced practitioner order or complex needs related to functional status, cognitive ability, or social support system  Outcome: Progressing     Problem: Knowledge Deficit  Goal: Patient/family/caregiver demonstrates understanding of disease process, treatment plan, medications, and discharge instructions  Description: Complete learning assessment and assess knowledge base  Interventions:  - Provide teaching at level of understanding  - Provide teaching via preferred learning methods  Outcome: Progressing     Problem: Nutrition/Hydration-ADULT  Goal: Nutrient/Hydration intake appropriate for improving, restoring or maintaining nutritional needs  Description: Monitor and assess patient's nutrition/hydration status for malnutrition  Collaborate with interdisciplinary team and initiate plan and interventions as ordered  Monitor patient's weight and dietary intake as ordered or per policy   Utilize nutrition screening tool and intervene as necessary  Determine patient's food preferences and provide high-protein, high-caloric foods as appropriate  INTERVENTIONS:  - Monitor oral intake, urinary output, labs, and treatment plans  - Assess nutrition and hydration status and recommend course of action  - Evaluate amount of meals eaten  - Assist patient with eating if necessary   - Allow adequate time for meals  - Recommend/ encourage appropriate diets, oral nutritional supplements, and vitamin/mineral supplements  - Order, calculate, and assess calorie counts as needed  - Recommend, monitor, and adjust tube feedings and TPN/PPN based on assessed needs  - Assess need for intravenous fluids  - Provide specific nutrition/hydration education as appropriate  - Include patient/family/caregiver in decisions related to nutrition  Outcome: Progressing     Problem: MOBILITY - ADULT  Goal: Maintain or return to baseline ADL function  Description: INTERVENTIONS:  -  Assess patient's ability to carry out ADLs; assess patient's baseline for ADL function and identify physical deficits which impact ability to perform ADLs (bathing, care of mouth/teeth, toileting, grooming, dressing, etc )  - Assess/evaluate cause of self-care deficits   - Assess range of motion  - Assess patient's mobility; develop plan if impaired  - Assess patient's need for assistive devices and provide as appropriate  - Encourage maximum independence but intervene and supervise when necessary  - Involve family in performance of ADLs  - Assess for home care needs following discharge   - Consider OT consult to assist with ADL evaluation and planning for discharge  - Provide patient education as appropriate  Outcome: Progressing  Goal: Maintains/Returns to pre admission functional level  Description: INTERVENTIONS:  - Perform BMAT or MOVE assessment daily    - Set and communicate daily mobility goal to care team and patient/family/caregiver     - Collaborate with rehabilitation services on mobility goals if consulted  - Perform Range of Motion 3 times a day  - Reposition patient every 2 hours    - Dangle patient 3 times a day  - Stand patient 3 times a day  - Ambulate patient 3 times a day  - Out of bed to chair 3 times a day   - Out of bed for meals 3 times a day  - Out of bed for toileting  - Record patient progress and toleration of activity level   Outcome: Progressing

## 2023-05-30 NOTE — PROGRESS NOTES
[address]  Progress Note  Name: Mariaa Morales  MRN: [id number]  Unit/Bed#:  I Date of Admission: 5/26/2023   Date of Service: 5/30/2023 I Hospital Day: 4    Assessment/Plan   * Upper GI bleed  Assessment & Plan  · Patient with upper GI bleed  · High-volume CT scan noted with active bleed  · EGD withLA grade B reflux esophagitis  1 cm hiatal hernia  Antral angiectasia status post APC  1 large 3 cm antral ulcer with nonbleeding visible vessel status post epinephrine injection and gold probe cautery      · Continue twice daily PPI  · Venofer  · Seen by GI  · Monitor H&H  · Status post PRBC transfusion -monitor hemoglobin  · Continue to hold aspirin  · Patient will need outpatient colonoscopy and GI follow-up in regards to H  pylori results    CHF (congestive heart failure) ([facility] [address] )  Assessment & Plan  Wt Readings from Last 3 Encounters:   05/26/23 (!) 147 kg (324 lb 1 2 oz)   10/13/22 (!) 145 kg (319 lb 10 7 oz)   07/15/22 (!) 153 kg (337 lb)     · With prior history of CHF per chart  · Prior echo from 2021 shows LVEF 40 % to 45 %  · Denies any shortness of breath, however does report increasing bilateral lower extremity edema in context of IV fluids and PRBC previously received  · will repeat echo  · will start diuresing with IV Lasix 40 twice daily        Bruise  Assessment & Plan  · Ecchymosis noted on LUE on previous IV site  · No evidence of DVT on ultrasound      Acute blood loss anemia  Assessment & Plan  · Patient presenting with history of hematemesis, and is status post EGD, with epinephrine and cautery to centimeter antral ulcer, with nonbleeding visible vessel, in addition to APC for antral angioectasia  Patient has a history of myasthenia gravis, for which he has been on chronic prednisone therapy, in addition to recent administration of high doses of aspirin, personal review    Patient is currently hemodynamically stable, however noted for reduction in H&H after undergoing endoscopic procedure [currently 7 0 from 8 2 postprocedure]  Received 1 unit of packed red blood cells on  and 100 mg of Venofer  · Hb 6 9 on , status post 1 unit PRBC-hemoglobin has improved to 8 9, again trending down to 8 1  · Gastroenterology has previously signed off, however will see again today due to decreasing HGB  · Continue PPI  Myasthenia gravis ([facility] [address] )  Assessment & Plan  · Overall stable  · Patient was on Mestinon  · Hold the prednisone right now given the upper GI bleed  · Continue Mestinon every 6 hours    CAD (coronary artery disease)  Assessment & Plan  · Continue to hold aspirin until hemoglobin stability               VTE Pharmacologic Prophylaxis:   VTE Score: 3 Moderate Risk (Score 3-4) - Pharmacological DVT Prophylaxis Contraindicated  Sequential Compression Devices Ordered  Mechanical VTE Prophylaxis in Place: No    Patient Centered Rounds: I have performed bedside rounds with nursing staff today  Discussions with Specialists or Other Care Team Provider: GI    Current Length of Stay: 4 day(s)    Current Patient Status: Inpatient     Discharge Plan / Estimated Discharge Date: 24 to 48 hours, pending echo and  continued monitoring of hemoglobin in context of GI bleed    Code Status: Level 1 - Full Code      Subjective:   Patient was seen and examined by me at bedside  Patient was pleasant and cooperative  No particular overnight event reported  Patient states he has had 2 dark bowel movements yesterday, reports that stool appeared more formed and less liquidy  Denies any nausea or vomiting  Reporting increasing swelling in bilateral lower extremities  Denies any shortness of breath or chest pain  Otherwise, patient has no new complaints or concerns        Objective:     Vitals:   Temp (24hrs), Av 3 °F (36 8 °C), Min:97 9 °F (36 6 °C), Max:98 8 °F (37 1 °C)    Temp:  [97 9 °F (36 6 °C)-98 8 °F (37 1 °C)] 98 8 °F (37 1 °C)  HR:  [80-94] 80  Resp: [18-19] 18  BP: ()/(47-58) 85/47  SpO2:  [95 %-97 %] 97 %  Body mass index is 43 95 kg/m²  Input and Output Summary (last 24 hours): Intake/Output Summary (Last 24 hours) at 5/30/2023 1035  Last data filed at 5/30/2023 0800  Gross per 24 hour   Intake 240 ml   Output 2750 ml   Net -2510 ml       Physical Exam:     Physical Exam  Constitutional:       General: He is not in acute distress  Appearance: Normal appearance  He is obese  He is not ill-appearing or toxic-appearing  HENT:      Head: Normocephalic and atraumatic  Eyes:      General: No scleral icterus  Right eye: No discharge  Left eye: No discharge  Cardiovascular:      Rate and Rhythm: Normal rate  Pulses: Normal pulses  Pulmonary:      Effort: Pulmonary effort is normal  No respiratory distress  Abdominal:      General: There is no distension  Musculoskeletal:         General: No swelling, tenderness or signs of injury  Normal range of motion  Cervical back: Normal range of motion  Right lower leg: Edema present  Left lower leg: Edema present  Skin:     General: Skin is warm and dry  Neurological:      Mental Status: He is alert  Mental status is at baseline        Comments: Ptosis of left lid   Psychiatric:         Mood and Affect: Mood normal          Behavior: Behavior normal           Additional Data:     Labs:  Results from last 7 days   Lab Units 05/30/23  0542   EOS PCT % 0   HEMATOCRIT % 25 1*   HEMOGLOBIN g/dL 8 1*   LYMPHS PCT % 18   MONOS PCT % 10   NEUTROS PCT % 71   PLATELETS Thousands/uL 141*   WBC Thousand/uL 8 92     Results from last 7 days   Lab Units 05/30/23  0542 05/27/23  0554 05/26/23  0713   ANION GAP mmol/L 2*   < > 3*   ALBUMIN g/dL  --   --  2 8*   ALK PHOS U/L  --   --  46   ALT U/L  --   --  28   AST U/L  --   --  14   BUN mg/dL 11   < > 49*   CALCIUM mg/dL 8 2*   < > 8 1*   CHLORIDE mmol/L 101   < > 107   CO2 mmol/L 31   < > 28   CREATININE mg/dL 0 84   < > 0  86   GLUCOSE RANDOM mg/dL 121   < > 212*   POTASSIUM mmol/L 3 8   < > 4 0   SODIUM mmol/L 134*   < > 138   TOTAL BILIRUBIN mg/dL  --   --  0 21    < > = values in this interval not displayed  Results from last 7 days   Lab Units 05/26/23  0713   INR  1 13             Results from last 7 days   Lab Units 05/26/23  0915 05/26/23  0713   LACTIC ACID mmol/L 2 0 2 4*       Imaging: No pertinent imaging reviewed  Recent Cultures (last 7 days):           Lines/Drains:  Invasive Devices     Peripheral Intravenous Line  Duration           Peripheral IV 05/27/23 Right Antecubital 2 days                Telemetry:        Last 24 Hours Medication List:   Current Facility-Administered Medications   Medication Dose Route Frequency Provider Last Rate   • acetaminophen  650 mg Oral Q6H PRN Jesu Unger III, MD     • Diclofenac Sodium  2 g Topical BID PRN Verneta Paget, PA-C     • furosemide  40 mg Intravenous BID (diuretic) Tomás Young MD     • glycerin-hypromellose-  1 drop Both Eyes Q6H PRN Verneta Paget, PA-C     • ondansetron  4 mg Intravenous Q6H PRN Bakari Arredondo MD     • pantoprazole  40 mg Intravenous Q12H aBkari Arredondo MD     • pyridostigmine  60 mg Oral Q6H Raciel Kendrick MD          Today, Patient Was Seen By: Colonel Prabhu MD    ** Please Note: This note has been constructed using a voice recognition system   **

## 2023-05-31 ENCOUNTER — ANESTHESIA EVENT (INPATIENT)
Dept: GASTROENTEROLOGY | Facility: HOSPITAL | Age: 63
End: 2023-05-31

## 2023-05-31 ENCOUNTER — APPOINTMENT (INPATIENT)
Dept: GASTROENTEROLOGY | Facility: HOSPITAL | Age: 63
DRG: 241 | End: 2023-05-31
Attending: INTERNAL MEDICINE
Payer: COMMERCIAL

## 2023-05-31 ENCOUNTER — ANESTHESIA (INPATIENT)
Dept: GASTROENTEROLOGY | Facility: HOSPITAL | Age: 63
End: 2023-05-31

## 2023-05-31 ENCOUNTER — APPOINTMENT (INPATIENT)
Dept: NON INVASIVE DIAGNOSTICS | Facility: HOSPITAL | Age: 63
DRG: 241 | End: 2023-05-31
Payer: COMMERCIAL

## 2023-05-31 LAB
ANION GAP SERPL CALCULATED.3IONS-SCNC: 4 MMOL/L (ref 4–13)
AORTIC ROOT: 3.8 CM
APICAL FOUR CHAMBER EJECTION FRACTION: 62 %
ASCENDING AORTA: 4 CM
BASOPHILS # BLD AUTO: 0.02 THOUSANDS/ÂΜL (ref 0–0.1)
BASOPHILS NFR BLD AUTO: 0 % (ref 0–1)
BUN SERPL-MCNC: 13 MG/DL (ref 5–25)
CALCIUM SERPL-MCNC: 8 MG/DL (ref 8.4–10.2)
CARDIAC TROPONIN I PNL SERPL HS: 16 NG/L (ref 8–18)
CHLORIDE SERPL-SCNC: 98 MMOL/L (ref 96–108)
CO2 SERPL-SCNC: 32 MMOL/L (ref 21–32)
CREAT SERPL-MCNC: 0.97 MG/DL (ref 0.6–1.3)
E WAVE DECELERATION TIME: 200 MS
EOSINOPHIL # BLD AUTO: 0.04 THOUSAND/ÂΜL (ref 0–0.61)
EOSINOPHIL NFR BLD AUTO: 0 % (ref 0–6)
ERYTHROCYTE [DISTWIDTH] IN BLOOD BY AUTOMATED COUNT: 18.1 % (ref 11.6–15.1)
FRACTIONAL SHORTENING: 35 % (ref 28–44)
GFR SERPL CREATININE-BSD FRML MDRD: 82 ML/MIN/1.73SQ M
GLUCOSE SERPL-MCNC: 130 MG/DL (ref 65–140)
HCT VFR BLD AUTO: 25.1 % (ref 36.5–49.3)
HGB BLD-MCNC: 7.9 G/DL (ref 12–17)
IMM GRANULOCYTES # BLD AUTO: 0.09 THOUSAND/UL (ref 0–0.2)
IMM GRANULOCYTES NFR BLD AUTO: 1 % (ref 0–2)
INTERVENTRICULAR SEPTUM IN DIASTOLE (PARASTERNAL SHORT AXIS VIEW): 1.1 CM
INTERVENTRICULAR SEPTUM: 1.1 CM (ref 0.6–1.1)
LAAS-AP2: 23.3 CM2
LAAS-AP4: 23 CM2
LEFT ATRIUM AREA SYSTOLE SINGLE PLANE A4C: 22.2 CM2
LEFT ATRIUM SIZE: 4.6 CM
LEFT INTERNAL DIMENSION IN SYSTOLE: 4.5 CM (ref 2.1–4)
LEFT VENTRICULAR INTERNAL DIMENSION IN DIASTOLE: 6.9 CM (ref 3.5–6)
LEFT VENTRICULAR POSTERIOR WALL IN END DIASTOLE: 1.1 CM
LEFT VENTRICULAR STROKE VOLUME: 150 ML
LVSV (TEICH): 150 ML
LYMPHOCYTES # BLD AUTO: 1.65 THOUSANDS/ÂΜL (ref 0.6–4.47)
LYMPHOCYTES NFR BLD AUTO: 18 % (ref 14–44)
MCH RBC QN AUTO: 27.7 PG (ref 26.8–34.3)
MCHC RBC AUTO-ENTMCNC: 31.5 G/DL (ref 31.4–37.4)
MCV RBC AUTO: 88 FL (ref 82–98)
MONOCYTES # BLD AUTO: 1.08 THOUSAND/ÂΜL (ref 0.17–1.22)
MONOCYTES NFR BLD AUTO: 12 % (ref 4–12)
MV E'TISSUE VEL-SEP: 8 CM/S
MV PEAK A VEL: 1.1 M/S
MV PEAK E VEL: 74 CM/S
MV STENOSIS PRESSURE HALF TIME: 58 MS
MV VALVE AREA P 1/2 METHOD: 3.79 CM2
NEUTROPHILS # BLD AUTO: 6.47 THOUSANDS/ÂΜL (ref 1.85–7.62)
NEUTS SEG NFR BLD AUTO: 69 % (ref 43–75)
NRBC BLD AUTO-RTO: 0 /100 WBCS
PLATELET # BLD AUTO: 144 THOUSANDS/UL (ref 149–390)
PMV BLD AUTO: 10.5 FL (ref 8.9–12.7)
POTASSIUM SERPL-SCNC: 3.7 MMOL/L (ref 3.5–5.3)
RBC # BLD AUTO: 2.85 MILLION/UL (ref 3.88–5.62)
RIGHT ATRIUM AREA SYSTOLE A4C: 21.6 CM2
RIGHT VENTRICLE ID DIMENSION: 4.2 CM
SL CV LEFT ATRIUM LENGTH A2C: 5.5 CM
SL CV LV EF: 40
SL CV PED ECHO LEFT VENTRICLE DIASTOLIC VOLUME (MOD BIPLANE) 2D: 245 ML
SL CV PED ECHO LEFT VENTRICLE SYSTOLIC VOLUME (MOD BIPLANE) 2D: 94 ML
SODIUM SERPL-SCNC: 134 MMOL/L (ref 135–147)
TRICUSPID ANNULAR PLANE SYSTOLIC EXCURSION: 2.7 CM
WBC # BLD AUTO: 9.35 THOUSAND/UL (ref 4.31–10.16)

## 2023-05-31 PROCEDURE — 0DJ08ZZ INSPECTION OF UPPER INTESTINAL TRACT, VIA NATURAL OR ARTIFICIAL OPENING ENDOSCOPIC: ICD-10-PCS | Performed by: INTERNAL MEDICINE

## 2023-05-31 PROCEDURE — 93306 TTE W/DOPPLER COMPLETE: CPT | Performed by: INTERNAL MEDICINE

## 2023-05-31 PROCEDURE — C9113 INJ PANTOPRAZOLE SODIUM, VIA: HCPCS | Performed by: INTERNAL MEDICINE

## 2023-05-31 PROCEDURE — 84484 ASSAY OF TROPONIN QUANT: CPT

## 2023-05-31 PROCEDURE — 80048 BASIC METABOLIC PNL TOTAL CA: CPT | Performed by: INTERNAL MEDICINE

## 2023-05-31 PROCEDURE — 3E0G8GC INTRODUCTION OF OTHER THERAPEUTIC SUBSTANCE INTO UPPER GI, VIA NATURAL OR ARTIFICIAL OPENING ENDOSCOPIC: ICD-10-PCS | Performed by: INTERNAL MEDICINE

## 2023-05-31 PROCEDURE — 93306 TTE W/DOPPLER COMPLETE: CPT

## 2023-05-31 PROCEDURE — 85025 COMPLETE CBC W/AUTO DIFF WBC: CPT | Performed by: INTERNAL MEDICINE

## 2023-05-31 PROCEDURE — 0D568ZZ DESTRUCTION OF STOMACH, VIA NATURAL OR ARTIFICIAL OPENING ENDOSCOPIC: ICD-10-PCS | Performed by: INTERNAL MEDICINE

## 2023-05-31 PROCEDURE — 93005 ELECTROCARDIOGRAM TRACING: CPT

## 2023-05-31 RX ORDER — PROPOFOL 10 MG/ML
INJECTION, EMULSION INTRAVENOUS AS NEEDED
Status: DISCONTINUED | OUTPATIENT
Start: 2023-05-31 | End: 2023-05-31

## 2023-05-31 RX ORDER — LIDOCAINE HYDROCHLORIDE 20 MG/ML
INJECTION, SOLUTION EPIDURAL; INFILTRATION; INTRACAUDAL; PERINEURAL AS NEEDED
Status: DISCONTINUED | OUTPATIENT
Start: 2023-05-31 | End: 2023-05-31

## 2023-05-31 RX ORDER — SODIUM CHLORIDE, SODIUM LACTATE, POTASSIUM CHLORIDE, CALCIUM CHLORIDE 600; 310; 30; 20 MG/100ML; MG/100ML; MG/100ML; MG/100ML
INJECTION, SOLUTION INTRAVENOUS CONTINUOUS PRN
Status: DISCONTINUED | OUTPATIENT
Start: 2023-05-31 | End: 2023-05-31

## 2023-05-31 RX ORDER — FUROSEMIDE 10 MG/ML
40 INJECTION INTRAMUSCULAR; INTRAVENOUS
Status: DISCONTINUED | OUTPATIENT
Start: 2023-05-31 | End: 2023-06-02

## 2023-05-31 RX ORDER — NITROGLYCERIN 0.4 MG/1
0.4 TABLET SUBLINGUAL
Status: DISCONTINUED | OUTPATIENT
Start: 2023-05-31 | End: 2023-06-02 | Stop reason: HOSPADM

## 2023-05-31 RX ADMIN — PANTOPRAZOLE SODIUM 40 MG: 40 INJECTION, POWDER, FOR SOLUTION INTRAVENOUS at 17:38

## 2023-05-31 RX ADMIN — PROPOFOL 30 MG: 10 INJECTION, EMULSION INTRAVENOUS at 15:25

## 2023-05-31 RX ADMIN — PROPOFOL 30 MG: 10 INJECTION, EMULSION INTRAVENOUS at 15:29

## 2023-05-31 RX ADMIN — PROPOFOL 100 MG: 10 INJECTION, EMULSION INTRAVENOUS at 15:23

## 2023-05-31 RX ADMIN — LIDOCAINE HYDROCHLORIDE 40 MG: 20 INJECTION, SOLUTION EPIDURAL; INFILTRATION; INTRACAUDAL; PERINEURAL at 15:23

## 2023-05-31 RX ADMIN — SODIUM CHLORIDE, SODIUM LACTATE, POTASSIUM CHLORIDE, AND CALCIUM CHLORIDE: .6; .31; .03; .02 INJECTION, SOLUTION INTRAVENOUS at 15:00

## 2023-05-31 RX ADMIN — PYRIDOSTIGMINE BROMIDE 60 MG: 60 TABLET ORAL at 12:10

## 2023-05-31 RX ADMIN — TIZANIDINE 4 MG: 2 TABLET ORAL at 21:08

## 2023-05-31 RX ADMIN — PYRIDOSTIGMINE BROMIDE 60 MG: 60 TABLET ORAL at 00:01

## 2023-05-31 RX ADMIN — PYRIDOSTIGMINE BROMIDE 60 MG: 60 TABLET ORAL at 17:38

## 2023-05-31 RX ADMIN — PANTOPRAZOLE SODIUM 40 MG: 40 INJECTION, POWDER, FOR SOLUTION INTRAVENOUS at 06:06

## 2023-05-31 RX ADMIN — TIZANIDINE 4 MG: 2 TABLET ORAL at 04:02

## 2023-05-31 RX ADMIN — PERFLUTREN 0.8 ML/MIN: 6.52 INJECTION, SUSPENSION INTRAVENOUS at 14:44

## 2023-05-31 RX ADMIN — LIDOCAINE HYDROCHLORIDE 60 MG: 20 INJECTION, SOLUTION EPIDURAL; INFILTRATION; INTRACAUDAL; PERINEURAL at 15:20

## 2023-05-31 RX ADMIN — PROPOFOL 20 MG: 10 INJECTION, EMULSION INTRAVENOUS at 15:27

## 2023-05-31 RX ADMIN — PYRIDOSTIGMINE BROMIDE 60 MG: 60 TABLET ORAL at 06:06

## 2023-05-31 NOTE — PROGRESS NOTES
[facility]  Progress Note  Name: Lilly Garcia  MRN: [id number]  Unit/Bed#:  I Date of Admission: 5/26/2023   Date of Service: 5/31/2023 I Hospital Day: 5    Assessment/Plan   * Upper GI bleed  Assessment & Plan  · Patient with upper GI bleed  · High-volume CT scan noted with active bleed  · EGD withLA grade B reflux esophagitis  1 cm hiatal hernia  Antral angiectasia status post APC  1 large 3 cm antral ulcer with nonbleeding visible vessel status post epinephrine injection and gold probe cautery      · Continue twice daily PPI  · Venofer  · Seen by GI  · Monitor H&H  · Status post PRBC transfusion -monitor hemoglobin  · Continue to hold aspirin  · Patient will need outpatient colonoscopy and GI follow-up in regards to H  pylori results    [facility]  Assessment & Plan  Wt Readings from Last 3 Encounters:   05/26/23 (!) 147 kg (324 lb 1 2 oz)   10/13/22 (!) 145 kg (319 lb 10 7 oz)   07/15/22 (!) 153 kg (337 lb)     · With prior history of CHF per chart  · Prior echo from 2021 shows LVEF 40 % to 45 %  · Denies any shortness of breath, however does report increasing bilateral lower extremity edema in context of IV fluids and PRBC previously received  · will repeat echo  · will start diuresing with IV Lasix 40 twice daily        Bruise  Assessment & Plan  · Ecchymosis noted on LUE on previous IV site  · No evidence of DVT on ultrasound      Acute blood loss anemia  Assessment & Plan  · Patient presenting with history of hematemesis, and is status post EGD, with epinephrine and cautery to centimeter antral ulcer, with nonbleeding visible vessel, in addition to APC for antral angioectasia  Patient has a history of myasthenia gravis, for which he has been on chronic prednisone therapy, in addition to recent administration of high doses of aspirin, personal review    Patient is currently hemodynamically stable, however noted for reduction in H&H after undergoing endoscopic procedure [currently 7 0 from 8 2 postprocedure]  Received 1 unit of packed red blood cells on  and 100 mg of Venofer  · Hb 6 9 on , status post 1 unit PRBC-hemoglobin has improved to 8 91    · Gastroenterology following patient plan for EGD on p m  of 2023  · Continue PPI  Myasthenia gravis ([address] )  Assessment & Plan  · Overall stable  · Patient was on Mestinon  · Hold the prednisone right now given the upper GI bleed  · Continue Mestinon every 6 hours    CAD (coronary artery disease)  Assessment & Plan  · Continue to hold aspirin until hemoglobin stability           VTE Pharmacologic Prophylaxis:   VTE Score: 3 Moderate Risk (Score 3-4) - Pharmacological DVT Prophylaxis Contraindicated  Sequential Compression Devices Ordered  Mechanical VTE Prophylaxis in Place: No    Patient Centered Rounds: I have performed bedside rounds with nursing staff today  Discussions with Specialists or Other Care Team Provider: Gastroenterology    Current Length of Stay: 5 day(s)    Current Patient Status: Inpatient     Discharge Plan / Estimated Discharge Date: Anticipate discharge tomorrow to home ;  Pending repeat EGD    Code Status: Level 1 - Full Code      Subjective:   Patient was seen and examined by me at bedside  Patient was pleasant and cooperative  Patient reports mild intermittent chest pain, reports subjective shortness of breath, however lungs clear to auscultation and O2 saturation appropriate on room air  EKG with no acute changes, troponin WNL  We will follow-up repeat troponins  Otherwise, patient has no new complaints or concerns  Objective:     Vitals:   Temp (24hrs), Av 7 °F (37 1 °C), Min:98 3 °F (36 8 °C), Max:99 °F (37 2 °C)    Temp:  [98 3 °F (36 8 °C)-99 °F (37 2 °C)] 99 °F (37 2 °C)  HR:  [59-83] 67  Resp:  [18-28] 28  BP: ()/(50-60) 92/50  SpO2:  [94 %-100 %] 97 %  Body mass index is 43 95 kg/m²       Input and Output Summary (last  hours): Intake/Output Summary (Last 24 hours) at 5/31/2023 1034  Last data filed at 5/31/2023 0800  Gross per 24 hour   Intake 120 ml   Output 4050 ml   Net -3930 ml       Physical Exam:     Physical Exam  Constitutional:       General: He is not in acute distress  Appearance: Normal appearance  He is not ill-appearing or toxic-appearing  HENT:      Head: Normocephalic and atraumatic  Eyes:      General: No scleral icterus  Right eye: No discharge  Left eye: No discharge  Cardiovascular:      Rate and Rhythm: Normal rate and regular rhythm  Pulses: Normal pulses  Pulmonary:      Effort: Pulmonary effort is normal  No respiratory distress  Abdominal:      General: There is no distension  Palpations: Abdomen is soft  Musculoskeletal:         General: No swelling, tenderness or signs of injury  Normal range of motion  Cervical back: Normal range of motion  Skin:     General: Skin is warm and dry  Neurological:      Mental Status: He is alert  Mental status is at baseline        Comments: Ptosis of left eye   Psychiatric:         Mood and Affect: Mood normal          Behavior: Behavior normal           Additional Data:     Labs:  Results from last 7 days   Lab Units 05/31/23  0442   EOS PCT % 0   HEMATOCRIT % 25 1*   HEMOGLOBIN g/dL 7 9*   LYMPHS PCT % 18   MONOS PCT % 12   NEUTROS PCT % 69   PLATELETS Thousands/uL 144*   WBC Thousand/uL 9 35     Results from last 7 days   Lab Units 05/31/23  0442 05/27/23  0554 05/26/23  0713   ANION GAP mmol/L 4   < > 3*   ALBUMIN g/dL  --   --  2 8*   ALK PHOS U/L  --   --  46   ALT U/L  --   --  28   AST U/L  --   --  14   BUN mg/dL 13   < > 49*   CALCIUM mg/dL 8 0*   < > 8 1*   CHLORIDE mmol/L 98   < > 107   CO2 mmol/L 32   < > 28   CREATININE mg/dL 0 97   < > 0 86   GLUCOSE RANDOM mg/dL 130   < > 212*   POTASSIUM mmol/L 3 7   < > 4 0   SODIUM mmol/L 134*   < > 138   TOTAL BILIRUBIN mg/dL  --   --  0 21    < > = values in this interval not displayed  Results from last 7 days   Lab Units 05/26/23  0713   INR  1 13             Results from last 7 days   Lab Units 05/26/23  0915 05/26/23  0713   LACTIC ACID mmol/L 2 0 2 4*       Imaging: No pertinent imaging reviewed  Recent Cultures (last 7 days):           Lines/Drains:  Invasive Devices     Peripheral Intravenous Line  Duration           Peripheral IV 05/27/23 Right Antecubital 3 days                Telemetry:        Last 24 Hours Medication List:   Current Facility-Administered Medications   Medication Dose Route Frequency Provider Last Rate   • acetaminophen  650 mg Oral Q6H PRN Leonardo Gupta III, MD     • Diclofenac Sodium  2 g Topical BID PRN Alicia Tran PA-C     • furosemide  40 mg Intravenous BID (diuretic) Karie Salazar MD     • glycerin-hypromellose-  1 drop Both Eyes Q6H PRN Alicia Tran PA-C     • nitroglycerin  0 4 mg Sublingual Q5 Min PRN Alivia Lora DO     • ondansetron  4 mg Intravenous Q6H PRN Nitin Chavez MD     • pantoprazole  40 mg Intravenous Q12H Nitin Chavez MD     • pyridostigmine  60 mg Oral Q6H Sera Rojas MD     • tiZANidine  4 mg Oral Q8H PRN Karie Salazar MD          Today, Patient Was Seen By: Al Palmer MD    ** Please Note: This note has been constructed using a voice recognition system   **

## 2023-05-31 NOTE — ANESTHESIA PREPROCEDURE EVALUATION
Procedure:  EGD    Relevant Problems   ANESTHESIA (within normal limits)   (-) History of anesthesia complications      CARDIO   (+) CAD (coronary artery disease)   (+) CHF (congestive heart failure) (HCC)   (+) Hyperlipidemia      GI/HEPATIC  NPO, no N/V      HEMATOLOGY   (+) Acute blood loss anemia      MUSCULOSKELETAL   (+) Myasthenia gravis ([facility] [address] )      Other   (+) [facility]      TTE 2021:  LEFT VENTRICLE:  Systolic function was moderately reduced  Ejection fraction was estimated in the range of 40 % to 45 %  There was moderate hypokinesis of the apical wall(s)  Wall thickness was at the upper limits of normal   Doppler parameters were consistent with abnormal left ventricular relaxation (grade 1 diastolic dysfunction)      RIGHT VENTRICLE:  The size was normal   Systolic function was normal      LEFT ATRIUM:  The atrium was mildly dilated      TRICUSPID VALVE:  There was trace regurgitation  Physical Exam    Airway    Mallampati score: II  TM Distance: <3 FB       Dental       Cardiovascular      Pulmonary      Other Findings        Anesthesia Plan  ASA Score- 3     Anesthesia Type- IV sedation with anesthesia with ASA Monitors  Additional Monitors:   Airway Plan:           Plan Factors-Exercise tolerance (METS): >4 METS  Chart reviewed  EKG reviewed  Existing labs reviewed  Patient summary reviewed  Induction- intravenous  Postoperative Plan-     Informed Consent- Anesthetic plan and risks discussed with patient  I personally reviewed this patient with the CRNA  Discussed and agreed on the Anesthesia Plan with the CRNA  Bert Crawford

## 2023-05-31 NOTE — ASSESSMENT & PLAN NOTE
· Patient presenting with history of hematemesis, and is status post EGD, with epinephrine and cautery to centimeter antral ulcer, with nonbleeding visible vessel, in addition to APC for antral angioectasia  Patient has a history of myasthenia gravis, for which he has been on chronic prednisone therapy, in addition to recent administration of high doses of aspirin, personal review  Patient is currently hemodynamically stable, however noted for reduction in H&H after undergoing endoscopic procedure [currently 7 0 from 8 2 postprocedure]  Received 1 unit of packed red blood cells on 5/26 and 100 mg of Venofer  · Hb 6 9 on 5/29, status post 1 unit PRBC-hemoglobin has improved to 8 91    · Gastroenterology following patient plan for EGD on p m  of 5/31/2023  · Continue PPI

## 2023-05-31 NOTE — PLAN OF CARE
Problem: Potential for Falls  Goal: Patient will remain free of falls  Description: INTERVENTIONS:  - Educate patient/family on patient safety including physical limitations  - Instruct patient to call for assistance with activity   - Consult OT/PT to assist with strengthening/mobility   - Keep Call bell within reach  - Keep bed low and locked with side rails adjusted as appropriate  - Keep care items and personal belongings within reach  - Initiate and maintain comfort rounds  - Make Fall Risk Sign visible to staff  - Offer Toileting every 2 Hours, in advance of need  - Initiate/Maintain bed alarm  - Obtain necessary fall risk management equipment: yes  - Apply yellow socks and bracelet for high fall risk patients  - Consider moving patient to room near nurses station  Outcome: Progressing     Problem: PAIN - ADULT  Goal: Verbalizes/displays adequate comfort level or baseline comfort level  Description: Interventions:  - Encourage patient to monitor pain and request assistance  - Assess pain using appropriate pain scale  - Administer analgesics based on type and severity of pain and evaluate response  - Implement non-pharmacological measures as appropriate and evaluate response  - Consider cultural and social influences on pain and pain management  - Notify physician/advanced practitioner if interventions unsuccessful or patient reports new pain  Outcome: Progressing     Problem: INFECTION - ADULT  Goal: Absence or prevention of progression during hospitalization  Description: INTERVENTIONS:  - Assess and monitor for signs and symptoms of infection  - Monitor lab/diagnostic results  - Monitor all insertion sites, i e  indwelling lines, tubes, and drains  - Monitor endotracheal if appropriate and nasal secretions for changes in amount and color  - [place] appropriate cooling/warming therapies per order  - Administer medications as ordered  - Instruct and encourage patient and family to use good hand hygiene technique  - Identify and instruct in appropriate isolation precautions for identified infection/condition  Outcome: Progressing     Problem: SAFETY ADULT  Goal: Patient will remain free of falls  Description: INTERVENTIONS:  - Educate patient/family on patient safety including physical limitations  - Instruct patient to call for assistance with activity   - Consult OT/PT to assist with strengthening/mobility   - Keep Call bell within reach  - Keep bed low and locked with side rails adjusted as appropriate  - Keep care items and personal belongings within reach  - Initiate and maintain comfort rounds  - Make Fall Risk Sign visible to staff  - Offer Toileting every 2 Hours, in advance of need  - Initiate/Maintain bed alarm  - Obtain necessary fall risk management equipment: yes   - Apply yellow socks and bracelet for high fall risk patients  - Consider moving patient to room near nurses station  Outcome: Progressing     Problem: DISCHARGE PLANNING  Goal: Discharge to home or other facility with appropriate resources  Description: INTERVENTIONS:  - Identify barriers to discharge w/patient and caregiver  - Arrange for needed discharge resources and transportation as appropriate  - Identify discharge learning needs (meds, wound care, etc )  - Arrange for interpretive services to assist at discharge as needed  - Refer to Case Management Department for coordinating discharge planning if the patient needs post-hospital services based on physician/advanced practitioner order or complex needs related to functional status, cognitive ability, or social support system  Outcome: Progressing     Problem: Knowledge Deficit  Goal: Patient/family/caregiver demonstrates understanding of disease process, treatment plan, medications, and discharge instructions  Description: Complete learning assessment and assess knowledge base    Interventions:  - Provide teaching at level of understanding  - Provide teaching via preferred learning methods  Outcome: Progressing     Problem: Nutrition/Hydration-ADULT  Goal: Nutrient/Hydration intake appropriate for improving, restoring or maintaining nutritional needs  Description: Monitor and assess patient's nutrition/hydration status for malnutrition  Collaborate with interdisciplinary team and initiate plan and interventions as ordered  Monitor patient's weight and dietary intake as ordered or per policy  Utilize nutrition screening tool and intervene as necessary  Determine patient's food preferences and provide high-protein, high-caloric foods as appropriate       INTERVENTIONS:  - Monitor oral intake, urinary output, labs, and treatment plans  - Assess nutrition and hydration status and recommend course of action  - Evaluate amount of meals eaten  - Assist patient with eating if necessary   - Allow adequate time for meals  - Recommend/ encourage appropriate diets, oral nutritional supplements, and vitamin/mineral supplements  - Order, calculate, and assess calorie counts as needed  - Recommend, monitor, and adjust tube feedings and TPN/PPN based on assessed needs  - Assess need for intravenous fluids  - Provide specific nutrition/hydration education as appropriate  - Include patient/family/caregiver in decisions related to nutrition  Outcome: Progressing     Problem: MOBILITY - ADULT  Goal: Maintain or return to baseline ADL function  Description: INTERVENTIONS:  -  Assess patient's ability to carry out ADLs; assess patient's baseline for ADL function and identify physical deficits which impact ability to perform ADLs (bathing, care of mouth/teeth, toileting, grooming, dressing, etc )  - Assess/evaluate cause of self-care deficits   - Assess range of motion  - Assess patient's mobility; develop plan if impaired  - Assess patient's need for assistive devices and provide as appropriate  - Encourage maximum independence but intervene and supervise when necessary  - Involve family in performance of ADLs  - Assess for home care needs following discharge   - Consider OT consult to assist with ADL evaluation and planning for discharge  - Provide patient education as appropriate  Outcome: Progressing

## 2023-05-31 NOTE — ANESTHESIA POSTPROCEDURE EVALUATION
Post-Op Assessment Note    CV Status:  Stable  Pain Score: 0    Pain management: adequate     Mental Status:  Arousable and sleepy   Hydration Status:  Euvolemic   PONV Controlled:  Controlled   Airway Patency:  Patent      Post Op Vitals Reviewed: Yes      Staff: CRNA         No notable events documented      BP   113/51   Temp      Pulse 74   Resp 18   SpO2 98% RA

## 2023-06-01 LAB
ANION GAP SERPL CALCULATED.3IONS-SCNC: 5 MMOL/L (ref 4–13)
ATRIAL RATE: 67 BPM
BASOPHILS # BLD AUTO: 0.01 THOUSANDS/ÂΜL (ref 0–0.1)
BASOPHILS NFR BLD AUTO: 0 % (ref 0–1)
BUN SERPL-MCNC: 15 MG/DL (ref 5–25)
CALCIUM SERPL-MCNC: 8.1 MG/DL (ref 8.4–10.2)
CHLORIDE SERPL-SCNC: 99 MMOL/L (ref 96–108)
CO2 SERPL-SCNC: 29 MMOL/L (ref 21–32)
CREAT SERPL-MCNC: 0.94 MG/DL (ref 0.6–1.3)
EOSINOPHIL # BLD AUTO: 0.06 THOUSAND/ÂΜL (ref 0–0.61)
EOSINOPHIL NFR BLD AUTO: 1 % (ref 0–6)
ERYTHROCYTE [DISTWIDTH] IN BLOOD BY AUTOMATED COUNT: 17.4 % (ref 11.6–15.1)
GFR SERPL CREATININE-BSD FRML MDRD: 85 ML/MIN/1.73SQ M
GLUCOSE SERPL-MCNC: 114 MG/DL (ref 65–140)
HCT VFR BLD AUTO: 24.8 % (ref 36.5–49.3)
HGB BLD-MCNC: 8 G/DL (ref 12–17)
IMM GRANULOCYTES # BLD AUTO: 0.06 THOUSAND/UL (ref 0–0.2)
IMM GRANULOCYTES NFR BLD AUTO: 1 % (ref 0–2)
LYMPHOCYTES # BLD AUTO: 1.18 THOUSANDS/ÂΜL (ref 0.6–4.47)
LYMPHOCYTES NFR BLD AUTO: 16 % (ref 14–44)
MCH RBC QN AUTO: 28.2 PG (ref 26.8–34.3)
MCHC RBC AUTO-ENTMCNC: 32.3 G/DL (ref 31.4–37.4)
MCV RBC AUTO: 87 FL (ref 82–98)
MONOCYTES # BLD AUTO: 0.99 THOUSAND/ÂΜL (ref 0.17–1.22)
MONOCYTES NFR BLD AUTO: 13 % (ref 4–12)
NEUTROPHILS # BLD AUTO: 5.09 THOUSANDS/ÂΜL (ref 1.85–7.62)
NEUTS SEG NFR BLD AUTO: 69 % (ref 43–75)
NRBC BLD AUTO-RTO: 0 /100 WBCS
P AXIS: 28 DEGREES
PLATELET # BLD AUTO: 144 THOUSANDS/UL (ref 149–390)
PMV BLD AUTO: 10.1 FL (ref 8.9–12.7)
POTASSIUM SERPL-SCNC: 3.7 MMOL/L (ref 3.5–5.3)
PR INTERVAL: 144 MS
QRS AXIS: -10 DEGREES
QRSD INTERVAL: 106 MS
QT INTERVAL: 398 MS
QTC INTERVAL: 420 MS
RBC # BLD AUTO: 2.84 MILLION/UL (ref 3.88–5.62)
SODIUM SERPL-SCNC: 133 MMOL/L (ref 135–147)
T WAVE AXIS: 103 DEGREES
VENTRICULAR RATE: 67 BPM
WBC # BLD AUTO: 7.39 THOUSAND/UL (ref 4.31–10.16)

## 2023-06-01 PROCEDURE — 93010 ELECTROCARDIOGRAM REPORT: CPT | Performed by: INTERNAL MEDICINE

## 2023-06-01 PROCEDURE — 85025 COMPLETE CBC W/AUTO DIFF WBC: CPT | Performed by: INTERNAL MEDICINE

## 2023-06-01 PROCEDURE — C9113 INJ PANTOPRAZOLE SODIUM, VIA: HCPCS | Performed by: INTERNAL MEDICINE

## 2023-06-01 PROCEDURE — 80048 BASIC METABOLIC PNL TOTAL CA: CPT | Performed by: INTERNAL MEDICINE

## 2023-06-01 PROCEDURE — 99232 SBSQ HOSP IP/OBS MODERATE 35: CPT | Performed by: INTERNAL MEDICINE

## 2023-06-01 RX ADMIN — PYRIDOSTIGMINE BROMIDE 60 MG: 60 TABLET ORAL at 12:16

## 2023-06-01 RX ADMIN — TIZANIDINE 4 MG: 2 TABLET ORAL at 20:56

## 2023-06-01 RX ADMIN — PANTOPRAZOLE SODIUM 40 MG: 40 INJECTION, POWDER, FOR SOLUTION INTRAVENOUS at 05:24

## 2023-06-01 RX ADMIN — PYRIDOSTIGMINE BROMIDE 60 MG: 60 TABLET ORAL at 00:02

## 2023-06-01 RX ADMIN — PYRIDOSTIGMINE BROMIDE 60 MG: 60 TABLET ORAL at 05:15

## 2023-06-01 RX ADMIN — TIZANIDINE 4 MG: 2 TABLET ORAL at 05:15

## 2023-06-01 RX ADMIN — PANTOPRAZOLE SODIUM 40 MG: 40 INJECTION, POWDER, FOR SOLUTION INTRAVENOUS at 17:37

## 2023-06-01 RX ADMIN — PYRIDOSTIGMINE BROMIDE 60 MG: 60 TABLET ORAL at 17:37

## 2023-06-01 NOTE — PROGRESS NOTES
[address] [place] [address]  Progress Note  Name: Bisi Fletcher  MRN: [id number]  Unit/Bed#:  I Date of Admission: 5/26/2023   Date of Service: 6/1/2023 I Hospital Day: 6    Assessment/Plan   Myasthenia gravis ([place] [id number] )  Assessment & Plan  · Overall stable, Patient was on Mestinon  · Hold the prednisone right now given the upper GI bleed  · Continue Mestinon every 6 hours    * Upper GI bleed  Assessment & Plan  · Patient with upper GI bleed  · High-volume CT scan noted with active bleed  · EGD withLA grade B reflux esophagitis  1 cm hiatal hernia  Antral angiectasia status post APC  1 large 3 cm antral ulcer with nonbleeding visible vessel status post epinephrine injection and gold probe cautery  · Repeat EGD sure that the ulcers are healing and improving  · Continue twice daily PPI  · Venofer given, Seen by GI-input appreciated  · H&H stable  · Status post PRBC transfusion -monitor hemoglobin  · Continue to hold aspirin  · Patient will need outpatient colonoscopy and GI follow-up in regards to H  pylori results    CHF (congestive heart failure) ([place] [id number] )  Assessment & Plan  Wt Readings from Last 3 Encounters:   05/31/23 (!) 147 kg (324 lb)   10/13/22 (!) 145 kg (319 lb 10 7 oz)   07/15/22 (!) 153 kg (337 lb)     · With prior history of CHF per chart  · Prior echo from 2021 shows LVEF 40 % to 45 %  · Denies any shortness of breath, however does report increasing bilateral lower extremity edema in context of IV fluids and PRBC previously received  · Continue diuresing with IV Lasix 40 twice daily        Bruise  Assessment & Plan  · Ecchymosis noted on LUE on previous IV site  · No evidence of DVT on ultrasound  Acute blood loss anemia  Assessment & Plan  · Patient presenting with history of hematemesis, and is status post EGD, with epinephrine and cautery to centimeter antral ulcer, with nonbleeding visible vessel, in addition to APC for antral angioectasia    Patient has a history of myasthenia gravis, for which he has been on chronic prednisone therapy, in addition to recent administration of high doses of aspirin, personal review  Patient is currently hemodynamically stable, however noted for reduction in H&H after undergoing endoscopic procedure [currently 7 0 from 8 2 postprocedure]  Received 1 unit of packed red blood cells on 5/26 and 100 mg of Venofer  · Hb 6 9 on 5/29, status post 1 unit PRBC-hemoglobin has improved to 8 91  · Gastroenterology following-repeat EGD stable ulcers  · Continue PPI  Hemoglobin stabilized          CAD (coronary artery disease)  Assessment & Plan  · Continue to hold aspirin until cleared by GI  · Plan to start back on aspirin on 6/4/2023           TE Pharmacologic Prophylaxis: Pharmacologic VTE Prophylaxis contraindicated due to GI bleed    Patient Centered Rounds: I have performed bedside rounds with nursing staff today  Discussions with Specialists or Other Care Team Provider: GI  Education and Discussions with Family / Patient: Patient    Current Length of Stay: 6 day(s)  Current Patient Status: Inpatient     Certification Statement: The patient will continue to require additional inpatient hospital stay due to Upper GI bleed  Discharge Plan / Estimated Discharge Date: 1 to 2 days    Code Status: Level 1 - Full Code  ______________________________________________________________________________    Subjective:   Patient seen and examined by me  No further episodes of bleeding  Patient does have swelling in the legs that is improving  No shortness of breath  She no nausea or vomiting  Appetite improving  Weakness also improving    Objective:   Vitals: Blood pressure (!) 88/49, pulse 65, temperature 98 5 °F (36 9 °C), temperature source Oral, resp  rate 20, height 6' (1 829 m), weight (!) 147 kg (324 lb), SpO2 99 %      Physical Exam:   General appearance: alert, appears stated age and cooperative  Constitutional- looks a little weak  HEENT - atraumatic and normocephalic  Neck- supple  Skin - no fresh rash  Extremities no fresh focal deformities  Cardiovascular- S1-S2 heard  Respiratory- bilateral air entry present, no crackles or rhonchi  Skin - no fresh rash  Abdomen - normal bowel sounds present, no rebound tenderness  CNS- No fresh focal deficits  Psych- no acute psychosis     Additional Data:   Labs:  Results from last 7 days   Lab Units 06/01/23 0523 05/31/23 0442 05/30/23  1939 05/30/23  0542 05/29/23  1353 05/29/23  0515 05/28/23  1331 05/28/23  0501 05/27/23  2347 05/27/23  1808 05/26/23  1157 05/26/23  0713   HEMATOCRIT % 24 8* 25 1* 27 4* 25 1* 27 1* 21 6* 23 9* 22 1* 23 2* 25 6*   < > 28 1*   HEMOGLOBIN g/dL 8 0* 7 9* 8 9* 8 1* 8 9* 6 9* 7 7* 7 0* 7 5* 8 2*   < > 8 9*   INR   --   --   --   --   --   --   --   --   --   --   --  1 13   MCV fL 87 88  --  87  --  87  --   --   --   --   --  88   PLATELETS Thousands/uL 144* 144*  --  141*  --  128*  --   --   --   --   --  140*   WBC Thousand/uL 7 39 9 35  --  8 92  --  9 20  --   --   --   --   --  15 87*    < > = values in this interval not displayed       Results from last 7 days   Lab Units 06/01/23 0523 05/31/23 0442 05/30/23  1625 05/30/23  0542 05/29/23  0515 05/28/23  0501 05/27/23  0554 05/26/23  0713   ANION GAP mmol/L 5 4 2* 2* 2* 2* 4 3*   ALBUMIN g/dL  --   --   --   --   --   --   --  2 8*   ALK PHOS U/L  --   --   --   --   --   --   --  46   ALT U/L  --   --   --   --   --   --   --  28   AST U/L  --   --   --   --   --   --   --  14   BUN mg/dL 15 13 11 11 15 25 31* 49*   CALCIUM mg/dL 8 1* 8 0* 8 3* 8 2* 7 9* 7 8* 8 2* 8 1*   CHLORIDE mmol/L 99 98 98 101 104 106 107 107   CO2 mmol/L 29 32 34* 31 29 28 26 28   CREATININE mg/dL 0 94 0 97 0 95 0 84 0 82 0 78 0 84 0 86   EGFR ml/min/1 73sq m 85 82 84 92 93 95 92 91   GLUCOSE RANDOM mg/dL 114 130 103 121 135 113 162* 212*   POTASSIUM mmol/L 3 7 3 7 3 4* 3 8 3 7 3 7 4 4 4 0   SODIUM mmol/L 133* 134* 134* 134* 135 136 137 138 TOTAL BILIRUBIN mg/dL  --   --   --   --   --   --   --  0 21                  Results from last 7 days   Lab Units 05/26/23  0915 05/26/23  0713   LACTIC ACID mmol/L 2 0 2 4*                 * I Have Reviewed All Lab Data Listed Above  Cultures:               Imaging:  Imaging Reports Reviewed Today Include:   EGD    Result Date: 5/31/2023  Impression: The cricopharynx, upper third of the esophagus, middle third of the esophagus, lower third of the esophagus, GE junction and Z-line appeared normal  The cardia, fundus of the stomach, body of the stomach and pylorus appeared normal  Single small, cratered, round ulcer in the antrum with clean base (Demar III) Single large, cratered, irregular ulcer in the prepyloric region with clean base (Demar III) The duodenal bulb and 2nd part of the duodenum appeared normal  RECOMMENDATION: 1  Continue pantoprazole 40 mg IV every 12 hours 2  Resume diet 3  Continue to monitor hemoglobin daily   Roselyn Hernandez DO [facility] [place], [place]     EGD    Result Date: 5/26/2023  Impression: LA grade B reflux esophagitis 1 cm hiatal hernia Antral angiectasia status post APC 1 large 3 cm antral ulcer with nonbleeding visible vessel status post epinephrine injection and gold probe cautery RECOMMENDATION: Pantoprazole 40 mg IV every 12 hours Clear liquid diet Venofer infusion Monitor CBC Await pathology EGD in 3 months to document ulcer healing Caution with NSAIDs   Pratik Tesfaye III, MD     CT high volume bleeding scan abdomen pelvis    Result Date: 5/26/2023  Impression: 1  Slow active bleeding from a gastric ulcer in the pyloric antrum  No evidence of perforation  2   Nondistended colon limiting evaluation for wall thickening  A colitis would not be excluded in the appropriate clinical context  3   Cholelithiasis without acute cholecystitis   I personally discussed this study with Eleni Silva on 5/26/2023 9:11 AM at 5/26/2023 9:11 AM  Workstation performed: [id number] Scheduled Meds:  Current Facility-Administered Medications   Medication Dose Route Frequency Provider Last Rate   • acetaminophen  650 mg Oral Q6H PRN Marisel Dieter, DO     • Diclofenac Sodium  2 g Topical BID PRN Marisel [place], DO     • furosemide  40 mg Intravenous BID (diuretic) Marisel Dieter, DO     • glycerin-hypromellose-  1 drop Both Eyes Q6H PRN Marisel [place], DO     • nitroglycerin  0 4 mg Sublingual Q5 Min PRN Marisel Dieter, DO     • ondansetron  4 mg Intravenous Q6H PRN Marisel Dieter, DO     • pantoprazole  40 mg Intravenous Q12H Marisel [place], DO     • pyridostigmine  60 mg Oral Q6H Marisel Dieter, DO     • tiZANidine  4 mg Oral Q8H PRN Marisel [place], DO         Ashley Mendoza MD  [place] [id number] Internal Medicine    ** Please Note: This note has been constructed using a voice recognition system   **

## 2023-06-01 NOTE — PLAN OF CARE
Problem: Potential for Falls  Goal: Patient will remain free of falls  Description: INTERVENTIONS:  - Educate patient/family on patient safety including physical limitations  - Instruct patient to call for assistance with activity   - Consult OT/PT to assist with strengthening/mobility   - Keep Call bell within reach  - Keep bed low and locked with side rails adjusted as appropriate  - Keep care items and personal belongings within reach  - Initiate and maintain comfort rounds  - Make Fall Risk Sign visible to staff  - Offer Toileting every 2 Hours, in advance of need  - Initiate/Maintain bed alarm  - Obtain necessary fall risk management equipment: yes   - Apply yellow socks and bracelet for high fall risk patients  - Consider moving patient to room near nurses station  Outcome: Progressing     Problem: PAIN - ADULT  Goal: Verbalizes/displays adequate comfort level or baseline comfort level  Description: Interventions:  - Encourage patient to monitor pain and request assistance  - Assess pain using appropriate pain scale  - Administer analgesics based on type and severity of pain and evaluate response  - Implement non-pharmacological measures as appropriate and evaluate response  - Consider cultural and social influences on pain and pain management  - Notify physician/advanced practitioner if interventions unsuccessful or patient reports new pain  Outcome: Progressing     Problem: INFECTION - ADULT  Goal: Absence or prevention of progression during hospitalization  Description: INTERVENTIONS:  - Assess and monitor for signs and symptoms of infection  - Monitor lab/diagnostic results  - Monitor all insertion sites, i e  indwelling lines, tubes, and drains  - Monitor endotracheal if appropriate and nasal secretions for changes in amount and color  - [place] appropriate cooling/warming therapies per order  - Administer medications as ordered  - Instruct and encourage patient and family to use good hand hygiene technique  - Identify and instruct in appropriate isolation precautions for identified infection/condition  Outcome: Progressing     Problem: SAFETY ADULT  Goal: Patient will remain free of falls  Description: INTERVENTIONS:  - Educate patient/family on patient safety including physical limitations  - Instruct patient to call for assistance with activity   - Consult OT/PT to assist with strengthening/mobility   - Keep Call bell within reach  - Keep bed low and locked with side rails adjusted as appropriate  - Keep care items and personal belongings within reach  - Initiate and maintain comfort rounds  - Make Fall Risk Sign visible to staff  - Offer Toileting every 2 Hours, in advance of need  - Initiate/Maintain bed alarm  - Obtain necessary fall risk management equipment: yes   - Apply yellow socks and bracelet for high fall risk patients  - Consider moving patient to room near nurses station  Outcome: Progressing     Problem: DISCHARGE PLANNING  Goal: Discharge to home or other facility with appropriate resources  Description: INTERVENTIONS:  - Identify barriers to discharge w/patient and caregiver  - Arrange for needed discharge resources and transportation as appropriate  - Identify discharge learning needs (meds, wound care, etc )  - Arrange for interpretive services to assist at discharge as needed  - Refer to Case Management Department for coordinating discharge planning if the patient needs post-hospital services based on physician/advanced practitioner order or complex needs related to functional status, cognitive ability, or social support system  Outcome: Progressing     Problem: Knowledge Deficit  Goal: Patient/family/caregiver demonstrates understanding of disease process, treatment plan, medications, and discharge instructions  Description: Complete learning assessment and assess knowledge base    Interventions:  - Provide teaching at level of understanding  - Provide teaching via preferred learning methods  Outcome: Progressing     Problem: Nutrition/Hydration-ADULT  Goal: Nutrient/Hydration intake appropriate for improving, restoring or maintaining nutritional needs  Description: Monitor and assess patient's nutrition/hydration status for malnutrition  Collaborate with interdisciplinary team and initiate plan and interventions as ordered  Monitor patient's weight and dietary intake as ordered or per policy  Utilize nutrition screening tool and intervene as necessary  Determine patient's food preferences and provide high-protein, high-caloric foods as appropriate       INTERVENTIONS:  - Monitor oral intake, urinary output, labs, and treatment plans  - Assess nutrition and hydration status and recommend course of action  - Evaluate amount of meals eaten  - Assist patient with eating if necessary   - Allow adequate time for meals  - Recommend/ encourage appropriate diets, oral nutritional supplements, and vitamin/mineral supplements  - Order, calculate, and assess calorie counts as needed  - Recommend, monitor, and adjust tube feedings and TPN/PPN based on assessed needs  - Assess need for intravenous fluids  - Provide specific nutrition/hydration education as appropriate  - Include patient/family/caregiver in decisions related to nutrition  Outcome: Progressing     Problem: MOBILITY - ADULT  Goal: Maintain or return to baseline ADL function  Description: INTERVENTIONS:  -  Assess patient's ability to carry out ADLs; assess patient's baseline for ADL function and identify physical deficits which impact ability to perform ADLs (bathing, care of mouth/teeth, toileting, grooming, dressing, etc )  - Assess/evaluate cause of self-care deficits   - Assess range of motion  - Assess patient's mobility; develop plan if impaired  - Assess patient's need for assistive devices and provide as appropriate  - Encourage maximum independence but intervene and supervise when necessary  - Involve family in performance of ADLs  - Assess for home care needs following discharge   - Consider OT consult to assist with ADL evaluation and planning for discharge  - Provide patient education as appropriate  Outcome: Progressing

## 2023-06-01 NOTE — CASE MANAGEMENT
Case Management Progress Note    Patient name Arben Aguilar  Location / MRN [id number]  : 1960 Date 2023       LOS (days): 6  Geometric Mean LOS (GMLOS) (days):   Days to 85 [facility]:        OBJECTIVE:        Current admission status: Inpatient  Preferred Pharmacy:   [facility] [address]  [place], [place] - [address]   [address] [address] [facility]  [address]  Phone: [phone] Fax: [phone]    [address] [place] [address], [address] [facility]  Phone: [phone] Fax: [phone]    Primary Care Provider: No primary care provider on file  Primary Insurance: SADIE ONEAL PENDING  Secondary Insurance:     PROGRESS NOTE:    Per rounding with SLIM, patient is getting an EGD done, and GI is following  Patient is on IV Protonix, and his ulcers are smaller  Patient still has leg swelling  He is anticipated to be discharged in 24 hours  No CM needs identified at this time  CM department will continue to follow patient through discharge

## 2023-06-01 NOTE — ASSESSMENT & PLAN NOTE
· Patient presenting with history of hematemesis, and is status post EGD, with epinephrine and cautery to centimeter antral ulcer, with nonbleeding visible vessel, in addition to APC for antral angioectasia  Patient has a history of myasthenia gravis, for which he has been on chronic prednisone therapy, in addition to recent administration of high doses of aspirin, personal review  Patient is currently hemodynamically stable, however noted for reduction in H&H after undergoing endoscopic procedure [currently 7 0 from 8 2 postprocedure]  Received 1 unit of packed red blood cells on 5/26 and 100 mg of Venofer  · Hb 6 9 on 5/29, status post 1 unit PRBC-hemoglobin has improved to 8 91  · Gastroenterology following-repeat EGD stable ulcers  · Continue PPI    Hemoglobin stabilized

## 2023-06-01 NOTE — ASSESSMENT & PLAN NOTE
Wt Readings from Last 3 Encounters:   05/31/23 (!) 147 kg (324 lb)   10/13/22 (!) 145 kg (319 lb 10 7 oz)   07/15/22 (!) 153 kg (337 lb)     · With prior history of CHF per chart  · Prior echo from 2021 shows LVEF 40 % to 45 %    · Denies any shortness of breath, however does report increasing bilateral lower extremity edema in context of IV fluids and PRBC previously received  · Continue diuresing with IV Lasix 40 twice daily

## 2023-06-01 NOTE — ASSESSMENT & PLAN NOTE
· Patient with upper GI bleed  · High-volume CT scan noted with active bleed  · EGD withLA grade B reflux esophagitis  1 cm hiatal hernia  Antral angiectasia status post APC  1 large 3 cm antral ulcer with nonbleeding visible vessel status post epinephrine injection and gold probe cautery  · Repeat EGD sure that the ulcers are healing and improving  · Continue twice daily PPI  · Venofer given, Seen by GI-input appreciated  · H&H stable  · Status post PRBC transfusion -monitor hemoglobin  · Continue to hold aspirin      · Patient will need outpatient colonoscopy and GI follow-up in regards to H  pylori results

## 2023-06-01 NOTE — PROGRESS NOTES
Progress Note - Ruby [place] 58 y o  male MRN: [id number]    Unit/Bed#:  Encounter: [id number]        Subjective:     Patient has no complaints  We went over EGD results from yesterday  ROS: As noted in the HPI, otherwise all others negative  Objective:     Vitals: Blood pressure (!) 88/49, pulse 65, temperature 98 5 °F (36 9 °C), temperature source Oral, resp  rate 20, height 6' (1 829 m), weight (!) 147 kg (324 lb), SpO2 99 %  ,Body mass index is 43 94 kg/m²  Intake/Output Summary (Last 24 hours) at 6/1/2023 1549  Last data filed at 6/1/2023 0600  Gross per 24 hour   Intake 240 ml   Output 1125 ml   Net -885 ml       Physical Exam:     General Appearance: Alert and oriented x 3  In no respiratory distress  Lungs: Clear to auscultation bilaterally, no rales or rhonchi  Heart: Regular rate and rhythm, S1, S2 normal, no murmur, click, rub or gallop  Abdomen: Soft, non-tender, non-distended; bowel sounds normal; no masses or no organomegaly  Extremities: No cyanosis, edema    Invasive Devices     Peripheral Intravenous Line  Duration           Peripheral IV 05/31/23 Left Forearm 1 day                Lab Results:  Results from last 7 days   Lab Units 06/01/23  0523   EOS PCT % 1   HEMATOCRIT % 24 8*   HEMOGLOBIN g/dL 8 0*   LYMPHS PCT % 16   MONOS PCT % 13*   NEUTROS PCT % 69   PLATELETS Thousands/uL 144*   WBC Thousand/uL 7 39     Results from last 7 days   Lab Units 06/01/23  0523 05/27/23  0554 05/26/23  0713   ALK PHOS U/L  --   --  46   ALT U/L  --   --  28   AST U/L  --   --  14   BUN mg/dL 15   < > 49*   CALCIUM mg/dL 8 1*   < > 8 1*   CHLORIDE mmol/L 99   < > 107   CO2 mmol/L 29   < > 28   CREATININE mg/dL 0 94   < > 0 86   POTASSIUM mmol/L 3 7   < > 4 0    < > = values in this interval not displayed       Results from last 7 days   Lab Units 05/26/23  0713   INR  1 13     Results from last 7 days   Lab Units 05/26/23  0713   LIPASE u/L 39       Imaging Studies: I have personally reviewed pertinent imaging studies  EGD    Result Date: 5/31/2023  Impression: The cricopharynx, upper third of the esophagus, middle third of the esophagus, lower third of the esophagus, GE junction and Z-line appeared normal  The cardia, fundus of the stomach, body of the stomach and pylorus appeared normal  Single small, cratered, round ulcer in the antrum with clean base (Demar III) Single large, cratered, irregular ulcer in the prepyloric region with clean base (Demar III) The duodenal bulb and 2nd part of the duodenum appeared normal  RECOMMENDATION: 1  Continue pantoprazole 40 mg IV every 12 hours 2  Resume diet 3  Continue to monitor hemoglobin daily   Rosy Lyn DO [place], [place]     EGD    Result Date: 5/26/2023  Impression: LA grade B reflux esophagitis 1 cm hiatal hernia Antral angiectasia status post APC 1 large 3 cm antral ulcer with nonbleeding visible vessel status post epinephrine injection and gold probe cautery RECOMMENDATION: Pantoprazole 40 mg IV every 12 hours Clear liquid diet Venofer infusion Monitor CBC Await pathology EGD in 3 months to document ulcer healing Caution with NSAIDs   Elois Claude III, MD     CT high volume bleeding scan abdomen pelvis    Result Date: 5/26/2023  Impression: 1  Slow active bleeding from a gastric ulcer in the pyloric antrum  No evidence of perforation  2   Nondistended colon limiting evaluation for wall thickening  A colitis would not be excluded in the appropriate clinical context  3   Cholelithiasis without acute cholecystitis  I personally discussed this study with Claudia Pelayo on 5/26/2023 9:11 AM at 5/26/2023 9:11 AM  Workstation performed: [id number]         Assessment and Plan:        1) Acute blood loss anemia secondary to gastric ulcer with nonbleeding visible vessel - Patient with typically normal baseline hemoglobin between 14 and 15  Down to 8 9 on admission presenting with black stools    Patient is status post endoscopy on 526 for acute blood loss anemia  Exam noting LA class B esophagitis, a 1 cm hiatal hernia, and a large 3 cm antral ulcer with a nonbleeding visible vessel  This was treated with epinephrine and gold probe cautery  Repeat EGD performed yesterday revealing stable appearance of ulcerations without active bleeding  Hemoglobin in the 8s now after receiving blood earlier this week  May have been dilutional given fluid overload on exam from iatrogenic fluid resuscitation    - PPI BID x 3 months  - We went over signs and symptoms that would warrant patient to return to the hospital   - No NSAIDs  - Continue to monitor hemoglobin closely transfuse as necessary to goal hemoglobin above 7-8  - Agree with IV Iron   - Outpatient EGD in 3 months, he is self pay   - CM consult for insurance options     GI will sign off   Thank you

## 2023-06-01 NOTE — ASSESSMENT & PLAN NOTE
· Overall stable, Patient was on Mestinon  · Hold the prednisone right now given the upper GI bleed    · Continue Mestinon every 6 hours

## 2023-06-02 VITALS
RESPIRATION RATE: 15 BRPM | BODY MASS INDEX: 42.66 KG/M2 | TEMPERATURE: 98.7 F | SYSTOLIC BLOOD PRESSURE: 103 MMHG | HEIGHT: 72 IN | WEIGHT: 315 LBS | DIASTOLIC BLOOD PRESSURE: 51 MMHG | OXYGEN SATURATION: 94 % | HEART RATE: 74 BPM

## 2023-06-02 PROBLEM — K92.2 UPPER GI BLEED: Status: RESOLVED | Noted: 2023-05-26 | Resolved: 2023-06-02

## 2023-06-02 LAB
ANION GAP SERPL CALCULATED.3IONS-SCNC: 4 MMOL/L (ref 4–13)
BUN SERPL-MCNC: 14 MG/DL (ref 5–25)
CALCIUM SERPL-MCNC: 8 MG/DL (ref 8.4–10.2)
CHLORIDE SERPL-SCNC: 101 MMOL/L (ref 96–108)
CO2 SERPL-SCNC: 28 MMOL/L (ref 21–32)
CREAT SERPL-MCNC: 0.87 MG/DL (ref 0.6–1.3)
ERYTHROCYTE [DISTWIDTH] IN BLOOD BY AUTOMATED COUNT: 17.2 % (ref 11.6–15.1)
GFR SERPL CREATININE-BSD FRML MDRD: 91 ML/MIN/1.73SQ M
GLUCOSE SERPL-MCNC: 146 MG/DL (ref 65–140)
HCT VFR BLD AUTO: 25.6 % (ref 36.5–49.3)
HGB BLD-MCNC: 8.2 G/DL (ref 12–17)
MCH RBC QN AUTO: 28 PG (ref 26.8–34.3)
MCHC RBC AUTO-ENTMCNC: 32 G/DL (ref 31.4–37.4)
MCV RBC AUTO: 87 FL (ref 82–98)
PLATELET # BLD AUTO: 163 THOUSANDS/UL (ref 149–390)
PMV BLD AUTO: 10.1 FL (ref 8.9–12.7)
POTASSIUM SERPL-SCNC: 3.7 MMOL/L (ref 3.5–5.3)
RBC # BLD AUTO: 2.93 MILLION/UL (ref 3.88–5.62)
SODIUM SERPL-SCNC: 133 MMOL/L (ref 135–147)
WBC # BLD AUTO: 6.89 THOUSAND/UL (ref 4.31–10.16)

## 2023-06-02 PROCEDURE — 80048 BASIC METABOLIC PNL TOTAL CA: CPT | Performed by: INTERNAL MEDICINE

## 2023-06-02 PROCEDURE — 85027 COMPLETE CBC AUTOMATED: CPT | Performed by: INTERNAL MEDICINE

## 2023-06-02 PROCEDURE — 88305 TISSUE EXAM BY PATHOLOGIST: CPT | Performed by: PATHOLOGY

## 2023-06-02 PROCEDURE — 99239 HOSP IP/OBS DSCHRG MGMT >30: CPT | Performed by: INTERNAL MEDICINE

## 2023-06-02 PROCEDURE — C9113 INJ PANTOPRAZOLE SODIUM, VIA: HCPCS | Performed by: INTERNAL MEDICINE

## 2023-06-02 RX ORDER — PANTOPRAZOLE SODIUM 40 MG/1
40 TABLET, DELAYED RELEASE ORAL
Qty: 60 TABLET | Refills: 0 | Status: SHIPPED | OUTPATIENT
Start: 2023-06-02

## 2023-06-02 RX ORDER — PANTOPRAZOLE SODIUM 40 MG/1
40 TABLET, DELAYED RELEASE ORAL
Status: DISCONTINUED | OUTPATIENT
Start: 2023-06-02 | End: 2023-06-02 | Stop reason: HOSPADM

## 2023-06-02 RX ORDER — FERROUS SULFATE TAB EC 324 MG (65 MG FE EQUIVALENT) 324 (65 FE) MG
324 TABLET DELAYED RESPONSE ORAL
Qty: 20 TABLET | Refills: 0 | Status: SHIPPED | OUTPATIENT
Start: 2023-06-02 | End: 2023-06-12

## 2023-06-02 RX ORDER — PANTOPRAZOLE SODIUM 40 MG/1
40 TABLET, DELAYED RELEASE ORAL 2 TIMES DAILY
Qty: 20 TABLET | Refills: 0 | Status: SHIPPED | OUTPATIENT
Start: 2023-06-02

## 2023-06-02 RX ORDER — PANTOPRAZOLE SODIUM 40 MG/1
40 TABLET, DELAYED RELEASE ORAL 2 TIMES DAILY
Qty: 60 TABLET | Refills: 0 | Status: CANCELLED | OUTPATIENT
Start: 2023-06-02

## 2023-06-02 RX ORDER — FERROUS SULFATE TAB EC 324 MG (65 MG FE EQUIVALENT) 324 (65 FE) MG
324 TABLET DELAYED RESPONSE ORAL
Qty: 30 TABLET | Refills: 0 | Status: CANCELLED | OUTPATIENT
Start: 2023-06-02

## 2023-06-02 RX ADMIN — PYRIDOSTIGMINE BROMIDE 60 MG: 60 TABLET ORAL at 00:32

## 2023-06-02 RX ADMIN — PYRIDOSTIGMINE BROMIDE 60 MG: 60 TABLET ORAL at 12:03

## 2023-06-02 RX ADMIN — PYRIDOSTIGMINE BROMIDE 60 MG: 60 TABLET ORAL at 05:46

## 2023-06-02 RX ADMIN — PANTOPRAZOLE SODIUM 40 MG: 40 INJECTION, POWDER, FOR SOLUTION INTRAVENOUS at 05:46

## 2023-06-02 NOTE — ASSESSMENT & PLAN NOTE
· Overall stable, Patient was on Mestinon  · Prednisone discontinued, patient advised to follow-up with neurologist  · Continue Mestinon

## 2023-06-02 NOTE — CASE MANAGEMENT
Case Management Discharge Planning Note    Patient name Deloris Carvalho  Location / MRN [id number]  : 1960 Date 2023       Current Admission Date: 2023  Current Admission Diagnosis:Myasthenia gravis [facility])   Patient Active Problem List    Diagnosis Date Noted   • CHF (congestive heart failure) ([facility] [address] ) 2023   • Bruise 2023   • Acute blood loss anemia 2023   • Hyperlipidemia    • Myasthenia gravis in crisis [facility])    • Myasthenia gravis ([facility] [address] ) 10/01/2021   • Morbid [facility] ([facility] ) 06/10/2021   • Ptosis, bilateral 2021   • CAD (coronary artery disease) 2021      LOS (days): 7  Geometric Mean LOS (GMLOS) (days):   Days to GMLOS:     OBJECTIVE:  Risk of Unplanned Readmission Score: 15 64         Current admission status: Inpatient   Preferred Pharmacy:   [facility] [address]  [place] [address] [facility] [address] [address] [facility]  [address]  Phone: [phone] Fax: [phone]    [address] [place], [place] [address], [address] [facility] [address] [facility]  Phone: [phone] Fax: [phone]    Primary Care Provider: No primary care provider on file  Primary Insurance: SADIE ONEAL PENDING  Secondary Insurance:     DISCHARGE DETAILS:    Other Referral/Resources/Interventions Provided:  Financial Resources Provided: Indigent Medication (Pantoprazole and ferrous sulfate scripts [facility]  Indigent Medications form completed and faxed to [facility] at [phone] )  Interventions: Prescription Price Check  Referral Comments: CM called [address] at [phone] and spoke to Josh Agosto reported that she received both scripts and the total came to $14 69  She will process them now and hopefully have them on the meds to beds run that leaves at 10:30  If not, they will be on the 2:45 run later today

## 2023-06-02 NOTE — ASSESSMENT & PLAN NOTE
· Patient with upper GI bleed  · High-volume CT scan noted with active bleed  · EGD withLA grade B reflux esophagitis  1 cm hiatal hernia  Antral angiectasia status post APC  1 large 3 cm antral ulcer with nonbleeding visible vessel status post epinephrine injection and gold probe cautery  · Repeat EGD sure that the ulcers are healing and improving    · Continue twice daily PPI  · Venofer given, continue oral iron supplementation  · H&H stable posttransfusion  · Continue to hold aspirin      · Patient will need outpatient colonoscopy and GI follow-up in regards to H  pylori results

## 2023-06-02 NOTE — ASSESSMENT & PLAN NOTE
Wt Readings from Last 3 Encounters:   05/31/23 (!) 147 kg (324 lb)   10/13/22 (!) 145 kg (319 lb 10 7 oz)   07/15/22 (!) 153 kg (337 lb)     · With prior history of CHF per chart  · Prior echo from 2021 shows LVEF 40 % to 45 %    · Denies any shortness of breath, however does report increasing bilateral lower extremity edema in context of IV fluids and PRBC previously received  · Bilateral lower extremity edema improving with IV diuresis  · Does not have cardiologist established as outpatient, outpatient referral to cardiology provided

## 2023-06-02 NOTE — PLAN OF CARE
Problem: Potential for Falls  Goal: Patient will remain free of falls  Description: INTERVENTIONS:  - Educate patient/family on patient safety including physical limitations  - Instruct patient to call for assistance with activity   - Consult OT/PT to assist with strengthening/mobility   - Keep Call bell within reach  - Keep bed low and locked with side rails adjusted as appropriate  - Keep care items and personal belongings within reach  - Initiate and maintain comfort rounds  - Make Fall Risk Sign visible to staff  - Offer Toileting every 2 Hours, in advance of need  - Initiate/Maintain bed alarm  - Obtain necessary fall risk management equipment: yes   - Apply yellow socks and bracelet for high fall risk patients  - Consider moving patient to room near nurses station  Outcome: Progressing     Problem: PAIN - ADULT  Goal: Verbalizes/displays adequate comfort level or baseline comfort level  Description: Interventions:  - Encourage patient to monitor pain and request assistance  - Assess pain using appropriate pain scale  - Administer analgesics based on type and severity of pain and evaluate response  - Implement non-pharmacological measures as appropriate and evaluate response  - Consider cultural and social influences on pain and pain management  - Notify physician/advanced practitioner if interventions unsuccessful or patient reports new pain  Outcome: Progressing     Problem: INFECTION - ADULT  Goal: Absence or prevention of progression during hospitalization  Description: INTERVENTIONS:  - Assess and monitor for signs and symptoms of infection  - Monitor lab/diagnostic results  - Monitor all insertion sites, i e  indwelling lines, tubes, and drains  - Monitor endotracheal if appropriate and nasal secretions for changes in amount and color  - [place] appropriate cooling/warming therapies per order  - Administer medications as ordered  - Instruct and encourage patient and family to use good hand hygiene technique  - Identify and instruct in appropriate isolation precautions for identified infection/condition  Outcome: Progressing     Problem: SAFETY ADULT  Goal: Patient will remain free of falls  Description: INTERVENTIONS:  - Educate patient/family on patient safety including physical limitations  - Instruct patient to call for assistance with activity   - Consult OT/PT to assist with strengthening/mobility   - Keep Call bell within reach  - Keep bed low and locked with side rails adjusted as appropriate  - Keep care items and personal belongings within reach  - Initiate and maintain comfort rounds  - Make Fall Risk Sign visible to staff  - Offer Toileting every 2 Hours, in advance of need  - Initiate/Maintain bed alarm  - Obtain necessary fall risk management equipment: yes   - Apply yellow socks and bracelet for high fall risk patients  - Consider moving patient to room near nurses station  Outcome: Progressing     Problem: DISCHARGE PLANNING  Goal: Discharge to home or other facility with appropriate resources  Description: INTERVENTIONS:  - Identify barriers to discharge w/patient and caregiver  - Arrange for needed discharge resources and transportation as appropriate  - Identify discharge learning needs (meds, wound care, etc )  - Arrange for interpretive services to assist at discharge as needed  - Refer to Case Management Department for coordinating discharge planning if the patient needs post-hospital services based on physician/advanced practitioner order or complex needs related to functional status, cognitive ability, or social support system  Outcome: Progressing     Problem: Knowledge Deficit  Goal: Patient/family/caregiver demonstrates understanding of disease process, treatment plan, medications, and discharge instructions  Description: Complete learning assessment and assess knowledge base    Interventions:  - Provide teaching at level of understanding  - Provide teaching via preferred learning methods  Outcome: Progressing     Problem: Nutrition/Hydration-ADULT  Goal: Nutrient/Hydration intake appropriate for improving, restoring or maintaining nutritional needs  Description: Monitor and assess patient's nutrition/hydration status for malnutrition  Collaborate with interdisciplinary team and initiate plan and interventions as ordered  Monitor patient's weight and dietary intake as ordered or per policy  Utilize nutrition screening tool and intervene as necessary  Determine patient's food preferences and provide high-protein, high-caloric foods as appropriate       INTERVENTIONS:  - Monitor oral intake, urinary output, labs, and treatment plans  - Assess nutrition and hydration status and recommend course of action  - Evaluate amount of meals eaten  - Assist patient with eating if necessary   - Allow adequate time for meals  - Recommend/ encourage appropriate diets, oral nutritional supplements, and vitamin/mineral supplements  - Order, calculate, and assess calorie counts as needed  - Recommend, monitor, and adjust tube feedings and TPN/PPN based on assessed needs  - Assess need for intravenous fluids  - Provide specific nutrition/hydration education as appropriate  - Include patient/family/caregiver in decisions related to nutrition  Outcome: Progressing

## 2023-06-02 NOTE — ASSESSMENT & PLAN NOTE
· Patient presenting with history of hematemesis, and is status post EGD, with epinephrine and cautery to centimeter antral ulcer, with nonbleeding visible vessel, in addition to APC for antral angioectasia  Patient has a history of myasthenia gravis, for which he has been on chronic prednisone therapy, in addition to recent administration of high doses of aspirin, personal review  Patient is currently hemodynamically stable, however noted for reduction in H&H after undergoing endoscopic procedure [currently 7 0 from 8 2 postprocedure]  Received 1 unit of packed red blood cells on 5/26 and 100 mg of Venofer    · Status post 2 PRBC during this admission  · Gastroenterology following-repeat EGD Stable ulcers  · Hemoglobin stable  · Continue PPI X twice daily, 10-day course provided with homestar to discharge

## 2023-06-02 NOTE — CASE MANAGEMENT
Case Management Discharge Planning Note    Patient name Nolan Gaines  Location / MRN [id number]  : 1960 Date 2023       Current Admission Date: 2023  Current Admission Diagnosis:Myasthenia gravis [facility])   Patient Active Problem List    Diagnosis Date Noted   • CHF (congestive heart failure) ([facility] [address] [facility] ) 2023   • Bruise 2023   • Acute blood loss anemia 2023   • Hyperlipidemia    • Myasthenia gravis in crisis [facility])    • Myasthenia gravis ([facility] ) 10/01/2021   • Morbid obesity ([facility] ) 06/10/2021   • Ptosis, bilateral 2021   • CAD (coronary artery disease) 2021      LOS (days): 7  Geometric Mean LOS (GMLOS) (days):   Days to GMLOS:     OBJECTIVE:  Risk of Unplanned Readmission Score: 16 18         Current admission status: Inpatient   Preferred Pharmacy:   [facility] [address] MINDY Richard [facility]  [facility]  [facility]  N  Phone: [phone] Fax: [phone]    [address] [place], [place] [address]D, [address] [facility] [address] [facility]  Phone: [phone] Fax: [phone]    Primary Care Provider: No primary care provider on file  Primary Insurance: SADIE ONEAL PENDING  Secondary Insurance:     DISCHARGE DETAILS:    Would you like to participate in our [address] service program?  : Yes (CM called [address] and spoke to [facility] confirmed that the meds were sent on the 10:30 run this morning and should arrive to [facility] shortly )

## 2023-06-02 NOTE — DISCHARGE SUMMARY
[address] [facility]  Discharge- Arnold Chelo 1960, 58 y o  male MRN: [id number]  Unit/Bed#:  Encounter: [id number]  Primary Care Provider: No primary care provider on file  Date and time admitted to hospital: 5/26/2023  7:01 AM    * Upper GI bleed-resolved as of 6/2/2023  Assessment & Plan  · Patient with upper GI bleed  · High-volume CT scan noted with active bleed  · EGD withLA grade B reflux esophagitis  1 cm hiatal hernia  Antral angiectasia status post APC  1 large 3 cm antral ulcer with nonbleeding visible vessel status post epinephrine injection and gold probe cautery  · Repeat EGD sure that the ulcers are healing and improving    · Continue twice daily PPI  · Venofer given, continue oral iron supplementation  · H&H stable posttransfusion  · Continue to hold aspirin  · Patient will need outpatient colonoscopy and GI follow-up in regards to H  pylori results      [facility]est[facility] failure[facility] ([facility] )  Assessment & Plan  Wt Readings from Last 3 Encounters:   05/31/23 (!) 147 kg (324 lb)   10/13/22 (!) 145 kg (319 lb 10 7 oz)   07/15/22 (!) 153 kg (337 lb)     · With prior history of CHF per chart  · Prior echo from 2021 shows LVEF 40 % to 45 %  · Denies any shortness of breath, however does report increasing bilateral lower extremity edema in context of IV fluids and PRBC previously received  · Bilateral lower extremity edema improving with IV diuresis  · Does not have cardiologist established as outpatient, outpatient referral to cardiology provided          [address] [place] [address]  · Ecchymosis noted on LUE on previous IV site  · No evidence of DVT on ultrasound  Acute blood loss anemia  Assessment & Plan  · Patient presenting with history of hematemesis, and is status post EGD, with epinephrine and cautery to centimeter antral ulcer, with nonbleeding visible vessel, in addition to APC for antral angioectasia    Patient has a history of myasthenia gravis, for which he has been on chronic prednisone therapy, in addition to recent administration of high doses of aspirin, personal review  Patient is currently hemodynamically stable, however noted for reduction in H&H after undergoing endoscopic procedure [currently 7 0 from 8 2 postprocedure]  Received 1 unit of packed red blood cells on 5/26 and 100 mg of Venofer  · Status post 2 PRBC during this admission  · Gastroenterology following-repeat EGD Stable ulcers  · Hemoglobin stable  · Continue PPI X twice daily, 10-day course provided with homestar to discharge          Myasthenia gravis ([facility] )  Assessment & Plan  · Overall stable, Patient was on Mestinon  · Prednisone discontinued, patient advised to follow-up with neurologist  · Continue Mestinon    CAD (coronary artery disease)  Assessment & Plan  · Continue to hold aspirin until cleared by GI  · Plan to start back on aspirin on 6/4/2023              Medical Problems     Resolved Problems  Date Reviewed: 6/1/2023          Resolved    * (Principal) Upper GI bleed 6/2/2023     Resolved by  Cely Soto MD          Admission Date:   Admission Orders (From admission, onward)     Ordered        05/26/23 0918  INPATIENT ADMISSION  Once                        Admitting Diagnosis: Bloody stool [K92 1]  GI bleed [K92 2]  Symptomatic anemia [D64 9]    HPI: Per admitting Jeannie Sanon is a 58 y o  male who presents with nearly passing out  Patient came in with near syncope  He just states he passed out and woke up and found bright red blood which he vomited with clots  The patient was hypotensive when he came in  Patient states that he started noticing some black stools around Tuesday and just this morning things got worse where he did have an episode where he dropped to the ground and he was covered in blood  He denies any chest pain or shortness of breath states he feels slightly better since he has been here    He states after he was discharged last time he "was put on some prednisone but not on a education for \"blood clots\"     Currently the patient is being transfused and he states he is starting to feel better  Patient has been taking prednisone 60 mg daily      Procedures Performed:   Orders Placed This Encounter   Procedures   • Critical Care   • ED ECG Documentation Only       Summary of Hospital Course: Post discharge, patient was evaluated by GI  Underwent EGD which showed ulcers  Patient did have to receive 2 units PRBC secondary to acute blood loss anemia  Hemoglobin has been stable posttransfusion  repeat EGD also showing stable, healing ulcers  Note, patient did report worsening bilateral lower extremity edema in the context of IV fluids and 2 units PRBC during this admission, he denied any shortness of breath  On chart review he does have history of heart failure however is not established with cardiologist   Lower extremity edema improved with diuresis received in hospital and at discharge patient being advised to establish with cardiology  Being provided referral to GI, advised to establish with PCP  He did request a 10-day course of new medications to be provided prior to discharge which is being set up by case management  Otherwise medically stable for discharge  See above assessment plan for more detail  Significant Findings, Care, Treatment and Services Provided: None    Complications: None    Condition at Discharge: good         Discharge instructions/Information to patient and family:   See after visit summary for information provided to patient and family  Provisions for Follow-Up Care:  See after visit summary for information related to follow-up care and any pertinent home health orders  PCP: No primary care provider on file  Disposition: Home    Planned Readmission: No    Discharge Statement   I spent 30 minutes discharging the patient  This time was spent on the day of discharge   I had direct contact with the " patient on the day of discharge  Additional documentation is required if more than 30 minutes were spent on discharge  Discharge Medications:  See after visit summary for reconciled discharge medications provided to patient and family

## 2023-06-02 NOTE — CASE MANAGEMENT
Case Management Discharge Planning Note    Patient name Ruby Hook  Location / MRN [id number]  : 1960 Date 2023       Current Admission Date: 2023  Current Admission Diagnosis:Myasthenia gravis [facility])   Patient Active Problem List    Diagnosis Date Noted   • CHF (congestive heart failure) ([facility] ) 2023   • Bruise 2023   • Acute blood loss anemia 2023   • Hyperlipidemia    • Myasthenia gravis in crisis [facility])    • Myasthenia gravis ([facility] ) 10/01/2021   • Mor[facility] ) 06/10/2021   • Ptosis, bilateral 2021   • CAD (coronary artery disease) 2021      LOS (days): 7  Geometric Mean LOS (GMLOS) (days):   Days to GMLOS:     OBJECTIVE:  Risk of Unplanned Readmission Score: 16 18         Current admission status: Inpatient   Preferred Pharmacy:   [facility] [address] MINDY [address]  [facility] [place] [address]  [facility]  [address]  Phone: [phone] Fax: [phone]    [address] [place], [place] [address]D, [address] [facility] [address] [facility]  Phone: [phone] Fax: [phone]    Primary Care Provider: No primary care provider on file  Primary Insurance: SADIE ONEAL PENDING  Secondary Insurance:     DISCHARGE DETAILS:    Discharge planning discussed with[de-identified] Patient  Freedom of Choice: Yes  Comments - Freedom of Choice: CM met with patient at bedside to confirm DC plan  Patient reported that he will need a Lyft ride home today and prefers sooner rather than later  CM reported that meds have been delivered to the pharmacy on site, and the RN will pick them up prior to DC transport  Patient confirmed address    CM contacted family/caregiver?: No- see comments (Patient declined )  Were Treatment Team discharge recommendations reviewed with patient/caregiver?: Yes  Did patient/caregiver verbalize understanding of patient care needs?: Yes  Were patient/caregiver advised of the risks associated with not following Treatment Team discharge recommendations?: Yes    [address]         Is the patient interested in [facility] at discharge?: No    DME Referral Provided  Referral made for DME?: No    Other Referral/Resources/Interventions Provided:  Interventions: Transportation  Referral Comments: Andrew waiver reviewed with patient at bedside  Patient reported understanding and signed the waiver  Waiver placed in medical records  Would you like to participate in our [address] service program?  : Yes (CM called [address] and spoke to Neeta Santacruz confirmed that the meds were sent on the 10:30 run this morning and should arrive to [facility] shortly )    Transport at Discharge : Other (Comment)  Dispatcher Contacted: Yes  Number/Name of Dispatcher: CM sent a referral via [employer] requesting a Lyft for 1:30 PM   Transported by [employer] and Unit #):  Lyj luis  ETA of Transport (Date): 06/02/23  ETA of Transport (Time): 1900

## 2023-06-05 ENCOUNTER — PATIENT OUTREACH (OUTPATIENT)
Dept: CASE MANAGEMENT | Facility: OTHER | Age: 63
End: 2023-06-05

## 2023-06-05 ENCOUNTER — TELEPHONE (OUTPATIENT)
Dept: GASTROENTEROLOGY | Facility: CLINIC | Age: 63
End: 2023-06-05

## 2023-06-05 NOTE — TELEPHONE ENCOUNTER
Called and LMOm with biopsy results  As per Dr Braden Mazariegos the biopsy of the stomach ulcer came back normal  To call the office with any questions

## 2023-06-05 NOTE — PROGRESS NOTES
"ADT notification received for patient discharge from hospital on 6/2  Patient hospitalized 7 days for GI Bleed  RN Care Manager contacted patient to introduce self, explain the role of the OP CM and purpose of this phone call is to assess patient's general wellbeing or for any assistance needed with follow-up care, medication costs or financial difficulty  Patient non trusting of this RN care managers offer to assist with medical needs stating he has no idea \"this lady is\" and refused to answer any assessment questions  He was encouraged to read the letter sent previously by this RN CM as it explained reason for outreach  Ilyaarpan Price reports being in the hospital for bleeding in his stomach  He reports he was sent home with free medicine for blood (iron tablets) and stomach (prontonix)  He also states being contacted by [facility] stating he could not receive because he has no paperwork  He declined offer to have  assist with documentation  He stated his only interest would be to obtain more free medication adding he will need to be on these two medications for about 3 months  RN CM explained that without paperwork it will be difficult to obtain free medicine  Wilfred Price states the hospital ordered blood tests for June 9th but patient does not believe he will go as he states he is without insurance and does not work  He states his Son help support him financially when he can  RN CM offered to assist patient with applying for medical assistance but but declined stating he has lived in the [address] [place] [address] for 37 years but does not have any paperwork nor is he interested in obtaining citizenship or SS number  He states \"I don't need a doctor and hardly go to the emergency room  Wilfred Price did inform this RN CM lives alone in [address] but son lives is nearby  RV has heat, water and electricity  He also reports he has plenty of food and is independent with caring for himself        Patient declined future outreach / " complex care management

## 2023-06-05 NOTE — TELEPHONE ENCOUNTER
----- Message from Yulissa Belle MD sent at 6/2/2023  3:48 PM EDT -----  Please tell him that the biopsy of his stomach ulcer was normal  14-Jun-2022

## 2023-06-12 ENCOUNTER — TELEPHONE (OUTPATIENT)
Dept: GASTROENTEROLOGY | Facility: CLINIC | Age: 63
End: 2023-06-12

## 2023-06-12 NOTE — TELEPHONE ENCOUNTER
Spoke with the patient and he does not want to schedule another EGD at this time as he has no insurance  He will call us if anything changes

## 2023-06-13 DIAGNOSIS — G70.00 MYASTHENIA GRAVIS (HCC): Primary | ICD-10-CM

## 2023-06-13 RX ORDER — PYRIDOSTIGMINE BROMIDE 60 MG/1
60 TABLET ORAL 4 TIMES DAILY
Qty: 120 TABLET | Refills: 3 | Status: SHIPPED | OUTPATIENT
Start: 2023-06-13

## 2023-06-13 NOTE — TELEPHONE ENCOUNTER
Patient called in looking for a refill on his Pyridostigmine 60 mg tablets takes 1 tablet QID  CB# [phone]    Patient states he will be out of medication and needs this refilled asap  LOV 7/15/22  Next appt is 9/28/23  Please refill   Thank you

## 2023-06-14 NOTE — TELEPHONE ENCOUNTER
Patient refused scheduling with neuromuscular specialist and wants to stay with Dr Kaley Horvath because he cannot drive to any other location

## 2023-07-15 DIAGNOSIS — K92.2 GI BLEED: ICD-10-CM

## 2023-07-15 DIAGNOSIS — D62 ACUTE BLOOD LOSS ANEMIA: ICD-10-CM

## 2023-07-17 RX ORDER — PANTOPRAZOLE SODIUM 40 MG/1
TABLET, DELAYED RELEASE ORAL
Qty: 60 TABLET | Refills: 0 | OUTPATIENT
Start: 2023-07-17

## 2023-07-27 ENCOUNTER — HOSPITAL ENCOUNTER (EMERGENCY)
Facility: HOSPITAL | Age: 63
Discharge: HOME | End: 2023-07-27
Attending: EMERGENCY MEDICINE | Admitting: EMERGENCY MEDICINE

## 2023-07-27 VITALS
HEART RATE: 72 BPM | TEMPERATURE: 98.5 F | SYSTOLIC BLOOD PRESSURE: 156 MMHG | RESPIRATION RATE: 16 BRPM | DIASTOLIC BLOOD PRESSURE: 68 MMHG | OXYGEN SATURATION: 100 %

## 2023-07-27 DIAGNOSIS — D62 ACUTE BLOOD LOSS ANEMIA: ICD-10-CM

## 2023-07-27 DIAGNOSIS — Z76.0 MEDICATION REFILL: Primary | ICD-10-CM

## 2023-07-27 DIAGNOSIS — K92.2 GI BLEED: ICD-10-CM

## 2023-07-27 PROCEDURE — 99283 EMERGENCY DEPT VISIT LOW MDM: CPT

## 2023-07-27 PROCEDURE — 99282 EMERGENCY DEPT VISIT SF MDM: CPT | Performed by: EMERGENCY MEDICINE

## 2023-07-27 RX ORDER — PANTOPRAZOLE SODIUM 40 MG/1
40 TABLET, DELAYED RELEASE ORAL
Qty: 60 TABLET | Refills: 0 | Status: SHIPPED | OUTPATIENT
Start: 2023-07-27

## 2023-07-27 NOTE — ED PROVIDER NOTES
History  Chief Complaint   Patient presents with   • Medication Refill     Pt here for refill on medication mestinon & protonix, has been without it for 8 days. Pt does not have a family dr, did explain to patient that he will need to find a PCP. HPI  Patient presents for refill of his Protonix. He states that he has been previously prescribed this and has not been able to take it in the last 8 days. He reports that he was prescribed his Mestinon by his neurologist.  Asymptomatic currently. Prior to Admission Medications   Prescriptions Last Dose Informant Patient Reported? Taking? Diclofenac Sodium (VOLTAREN) 1 %   No No   Sig: Apply 2 g topically 2 (two) times a day as needed (arthralgias)   ferrous sulfate 324 (65 Fe) mg   No No   Sig: Take 1 tablet (324 mg total) by mouth 2 (two) times a day before meals for 10 days   pantoprazole (PROTONIX) 40 mg tablet   No No   Sig: Take 1 tablet (40 mg total) by mouth 2 (two) times a day   pantoprazole (PROTONIX) 40 mg tablet   No No   Sig: Take 1 tablet (40 mg total) by mouth 2 (two) times a day before meals   pantoprazole (PROTONIX) 40 mg tablet   No Yes   Sig: Take 1 tablet (40 mg total) by mouth 2 (two) times a day before meals   polyvinyl alcohol (LIQUIFILM TEARS) 1.4 % ophthalmic solution   No No   Sig: Administer 1 drop to both eyes every 3 (three) hours as needed for dry eyes   pyridostigmine (MESTINON) 60 mg tablet   No No   Sig: Take 1 tablet (60 mg total) by mouth 4 (four) times a day      Facility-Administered Medications: None       Past Medical History:   Diagnosis Date   • Coronary artery disease    • Heart attack ([address] )    • Hyperlipidemia    • Myasthenia gravis ([address])        Past Surgical History:   Procedure Laterality Date   • APPENDECTOMY     • CORONARY ANGIOPLASTY WITH STENT PLACEMENT         History reviewed. No pertinent family history. I have reviewed and agree with the history as documented.     E-Cigarette/Vaping   • E-Cigarette Use Never User      E-Cigarette/Vaping Substances   • Nicotine No    • THC No    • CBD No    • Flavoring No    • Other No    • Unknown No      Social History     Tobacco Use   • Smoking status: Never   • Smokeless tobacco: Never   Vaping Use   • Vaping Use: Never used   Substance Use Topics   • Alcohol use: Not Currently   • Drug use: Not Currently       Review of Systems   Constitutional: Negative for chills and fever. HENT: Negative for dental problem and ear pain. Eyes: Negative for pain and redness. Respiratory: Negative for cough and shortness of breath. Cardiovascular: Negative for chest pain and palpitations. Gastrointestinal: Negative for abdominal pain and nausea. Endocrine: Negative for polydipsia and polyphagia. Genitourinary: Negative for dysuria and frequency. Musculoskeletal: Negative for arthralgias and joint swelling. Skin: Negative for color change and rash. Neurological: Negative for dizziness and headaches. Psychiatric/Behavioral: Negative for behavioral problems and confusion. All other systems reviewed and are negative. Physical Exam  Physical Exam  Vitals and nursing note reviewed. Constitutional:       General: He is not in acute distress. HENT:      Head: Normocephalic and atraumatic. Right Ear: External ear normal.      Left Ear: External ear normal.      Nose: Nose normal.   Eyes:      General: No scleral icterus. Cardiovascular:      Rate and Rhythm: Normal rate. Pulmonary:      Effort: Pulmonary effort is normal. No respiratory distress. Abdominal:      General: There is no distension. Musculoskeletal:         General: No deformity. Normal range of motion. Skin:     Findings: No rash. Neurological:      General: No focal deficit present. Mental Status: He is alert.       Gait: Gait normal.   Psychiatric:         Mood and Affect: Mood normal.         Vital Signs  ED Triage Vitals [07/27/23 1152]   Temperature Pulse Respirations Blood Pressure SpO2   98.5 °F (36.9 °C) 72 16 156/68 100 %      Temp src Heart Rate Source Patient Position - Orthostatic VS BP Location FiO2 (%)   -- Monitor Sitting Left arm --      Pain Score       --           Vitals:    07/27/23 1152   BP: 156/68   Pulse: 72   Patient Position - Orthostatic VS: Sitting         Visual Acuity      ED Medications  Medications - No data to display    Diagnostic Studies  Results Reviewed     None                 No orders to display              Procedures  Procedures         ED Course                                             MDM    Disposition  Final diagnoses:   Medication refill     Time reflects when diagnosis was documented in both MDM as applicable and the Disposition within this note     Time User Action Codes Description Comment    7/27/2023 12:01 PM Mandie Shahid [Z76.0] Medication refill     7/27/2023 12:02 PM Shree Nelson [D62] Acute blood loss anemia     7/27/2023 12:02 PM Shree Nelson [K92.2] GI bleed       ED Disposition     ED Disposition   Discharge    Condition   Stable    Date/Time   Thu Jul 27, 2023 12:01 PM    Comment   Rani Gonzalez discharge to home/self care. Follow-up Information     Follow up With Specialties Details Why Zamzam Moreno MD Internal Medicine Call  for primary care follow up [address] [address] [address] [facility]  [phone]            Discharge Medication List as of 7/27/2023 12:02 PM      CONTINUE these medications which have CHANGED    Details   ! ! pantoprazole (PROTONIX) 40 mg tablet Take 1 tablet (40 mg total) by mouth 2 (two) times a day before meals, Starting Thu 7/27/2023, Normal       !! - Potential duplicate medications found. Please discuss with provider.       CONTINUE these medications which have NOT CHANGED    Details   Diclofenac Sodium (VOLTAREN) 1 % Apply 2 g topically 2 (two) times a day as needed (arthralgias), Starting Fri 6/2/2023, Normal      ferrous sulfate 324 (65 Fe) mg Take 1 tablet (324 mg total) by mouth 2 (two) times a day before meals for 10 days, Starting Fri 6/2/2023, Until Mon 6/12/2023, Normal      !! pantoprazole (PROTONIX) 40 mg tablet Take 1 tablet (40 mg total) by mouth 2 (two) times a day, Starting Fri 6/2/2023, Normal      polyvinyl alcohol (LIQUIFILM TEARS) 1.4 % ophthalmic solution Administer 1 drop to both eyes every 3 (three) hours as needed for dry eyes, Starting Tue 6/14/2022, Print      pyridostigmine (MESTINON) 60 mg tablet Take 1 tablet (60 mg total) by mouth 4 (four) times a day, Starting Tue 6/13/2023, Normal       !! - Potential duplicate medications found. Please discuss with provider. No discharge procedures on file.     PDMP Review       Value Time User    PDMP Reviewed  Yes 6/14/2022 11:36 AM Karime Villalobos MD          ED Provider  Electronically Signed by           Molly Ptety MD  07/28/23 4432

## 2023-09-11 RX ORDER — PYRIDOSTIGMINE BROMIDE 60 MG/1
TABLET ORAL
Qty: 130 TABLET | Refills: 0 | OUTPATIENT
Start: 2023-09-11

## 2023-09-25 ENCOUNTER — TELEPHONE (OUTPATIENT)
Dept: NEUROLOGY | Facility: CLINIC | Age: 63
End: 2023-09-25

## 2023-09-25 NOTE — TELEPHONE ENCOUNTER
Attempted to reach patient or son to confirm upcoming appointment. Call could not be completed to the patient and patients son VM not set up unable to LVM.

## 2023-09-28 NOTE — TELEPHONE ENCOUNTER
9/27/23 at 2:09    Hi, my name is Lenny Benz,  1960. I got an appointment with Catrachita May tomorrow morning at 11. I call to confirm  because my phone is disconnected,my cell phone. And please call him by and make sure that I confirm the appointment for tomorrow. It is very important for me to go tomorrow. My name is Lenny Benz. March 2 1960. Dr Krishna Henry, [facility]. Tmrw at 6. Thank you. Cb [phone]    Called [phone], spoke to pt's son Moose Ahmadi to let him know that I received his message. States that pt's phone was out of service and he couldn't respond. He was trying to confirm pt's appt but nobody called him back. He would like to reschedule pt's appt. Zola Najjar,   I don't see open slot.  Can you assist?    thanks

## 2023-09-28 NOTE — TELEPHONE ENCOUNTER
Spoke with patient; he needs to reschedule upcoming appointment due to his transportation. He does not drive and his son takes him to his appointment. Patient requested a Monday or Thursday appointment. The soonest we currently have is 2/26/24 so appointment was rescheduled and also placed on wait list for much sooner appointment. Patient states he is feeling better mostly but most concerning is his vision loss. He gets very dizzy and loses his balance. He also stated his last refill of the Pyridostigmine he was only given 54 pills instead of 60 like he normally gets. He bought them because he had to and it cost more than when he gets 60, but he would like a new prescription with a total of 60 pills. I let patient know of Dr. Dana Land recommendation to a neuromuscular specialist with Dr Tucker Robison but patient can not travel that far. He does not drive and states his sons car does not drive that far so he declined transferring providers.

## 2023-10-09 DIAGNOSIS — G70.00 MYASTHENIA GRAVIS (HCC): ICD-10-CM

## 2023-10-09 RX ORDER — PYRIDOSTIGMINE BROMIDE 60 MG/1
60 TABLET ORAL 4 TIMES DAILY
Qty: 120 TABLET | Refills: 0 | Status: SHIPPED | OUTPATIENT
Start: 2023-10-09

## 2023-10-09 NOTE — TELEPHONE ENCOUNTER
Amparo Vernon MD  to Neurology [address] Team 1        10/9/23 10:18 AM  Patient needs to follow-up with neuromuscular specialist please tell him the importance of that

## 2023-10-09 NOTE — TELEPHONE ENCOUNTER
Clerical - please assist with neuromuscular provider appt as requested by Holland Bourgeois. Detail Level: Detailed Detail Level: Zone

## 2023-11-10 DIAGNOSIS — G70.00 MYASTHENIA GRAVIS (HCC): ICD-10-CM

## 2023-11-10 RX ORDER — PYRIDOSTIGMINE BROMIDE 60 MG/1
60 TABLET ORAL 4 TIMES DAILY
Qty: 120 TABLET | Refills: 0 | Status: SHIPPED | OUTPATIENT
Start: 2023-11-10

## 2023-12-03 DIAGNOSIS — G70.00 MYASTHENIA GRAVIS (HCC): ICD-10-CM

## 2023-12-04 RX ORDER — PYRIDOSTIGMINE BROMIDE 60 MG/1
60 TABLET ORAL 4 TIMES DAILY
Qty: 120 TABLET | Refills: 0 | Status: SHIPPED | OUTPATIENT
Start: 2023-12-04

## 2024-01-07 DIAGNOSIS — G70.00 MYASTHENIA GRAVIS (HCC): ICD-10-CM

## 2024-01-08 RX ORDER — PYRIDOSTIGMINE BROMIDE 60 MG/1
60 TABLET ORAL 4 TIMES DAILY
Qty: 120 TABLET | Refills: 0 | Status: SHIPPED | OUTPATIENT
Start: 2024-01-08

## 2024-01-30 ENCOUNTER — TELEPHONE (OUTPATIENT)
Dept: NEUROLOGY | Facility: CLINIC | Age: 64
End: 2024-01-30

## 2024-01-30 NOTE — TELEPHONE ENCOUNTER
1/29 10:20    Pt left a VM in [place]ijani.    Called back using  services.    Agent: Ajith  ID #: [id number]    When Ajith called pt's # there was a voicemail prompt in English. No need for  when calling back.

## 2024-01-31 ENCOUNTER — HOSPITAL ENCOUNTER (INPATIENT)
Facility: HOSPITAL | Age: 64
LOS: 7 days | Discharge: HOME | DRG: 042 | End: 2024-02-07
Attending: EMERGENCY MEDICINE | Admitting: FAMILY MEDICINE
Payer: COMMERCIAL

## 2024-01-31 ENCOUNTER — APPOINTMENT (EMERGENCY)
Dept: CT IMAGING | Facility: HOSPITAL | Age: 64
DRG: 042 | End: 2024-01-31
Payer: COMMERCIAL

## 2024-01-31 ENCOUNTER — APPOINTMENT (EMERGENCY)
Dept: RADIOLOGY | Facility: HOSPITAL | Age: 64
DRG: 042 | End: 2024-01-31
Payer: COMMERCIAL

## 2024-01-31 DIAGNOSIS — D50.9 MICROCYTIC ANEMIA: ICD-10-CM

## 2024-01-31 DIAGNOSIS — R53.1 WEAKNESS: Primary | ICD-10-CM

## 2024-01-31 DIAGNOSIS — G70.00 MYASTHENIA GRAVIS (HCC): ICD-10-CM

## 2024-01-31 DIAGNOSIS — K92.2 GI BLEED: ICD-10-CM

## 2024-01-31 DIAGNOSIS — G70.01 MYASTHENIA GRAVIS WITH EXACERBATION, ADULT FORM (HCC): ICD-10-CM

## 2024-01-31 DIAGNOSIS — D62 ACUTE BLOOD LOSS ANEMIA: ICD-10-CM

## 2024-01-31 PROBLEM — H54.7 POOR VISION: Status: ACTIVE | Noted: 2024-01-31

## 2024-01-31 PROBLEM — Z87.11 PERSONAL HISTORY OF PEPTIC ULCER DISEASE: Status: ACTIVE | Noted: 2024-01-31

## 2024-01-31 LAB
ANION GAP SERPL CALCULATED.3IONS-SCNC: 6 MMOL/L
ARTERIAL PATENCY WRIST A: YES
ATRIAL RATE: 64 BPM
BASE EXCESS BLDA CALC-SCNC: 2.3 MMOL/L
BASOPHILS # BLD AUTO: 0.02 THOUSANDS/ÂΜL (ref 0–0.1)
BASOPHILS NFR BLD AUTO: 0 % (ref 0–1)
BUN SERPL-MCNC: 13 MG/DL (ref 5–25)
CALCIUM SERPL-MCNC: 9.5 MG/DL (ref 8.4–10.2)
CARDIAC TROPONIN I PNL SERPL HS: 7 NG/L
CHLORIDE SERPL-SCNC: 101 MMOL/L (ref 96–108)
CHOLEST SERPL-MCNC: 165 MG/DL
CO2 SERPL-SCNC: 29 MMOL/L (ref 21–32)
CREAT SERPL-MCNC: 0.97 MG/DL (ref 0.6–1.3)
CRP SERPL QL: 19.3 MG/L
EOSINOPHIL # BLD AUTO: 0.26 THOUSAND/ÂΜL (ref 0–0.61)
EOSINOPHIL NFR BLD AUTO: 5 % (ref 0–6)
ERYTHROCYTE [DISTWIDTH] IN BLOOD BY AUTOMATED COUNT: 19.3 % (ref 11.6–15.1)
FLUAV RNA RESP QL NAA+PROBE: NEGATIVE
FLUBV RNA RESP QL NAA+PROBE: NEGATIVE
GFR SERPL CREATININE-BSD FRML MDRD: 82 ML/MIN/1.73SQ M
GLUCOSE SERPL-MCNC: 91 MG/DL (ref 65–140)
HCO3 BLDA-SCNC: 26.8 MMOL/L (ref 22–28)
HCT VFR BLD AUTO: 34 % (ref 36.5–49.3)
HDLC SERPL-MCNC: 53 MG/DL
HGB BLD-MCNC: 9.9 G/DL (ref 12–17)
IMM GRANULOCYTES # BLD AUTO: 0.01 THOUSAND/UL (ref 0–0.2)
IMM GRANULOCYTES NFR BLD AUTO: 0 % (ref 0–2)
INR PPP: 1.01 (ref 0.84–1.19)
LDLC SERPL CALC-MCNC: 90 MG/DL (ref 0–100)
LYMPHOCYTES # BLD AUTO: 1.34 THOUSANDS/ÂΜL (ref 0.6–4.47)
LYMPHOCYTES NFR BLD AUTO: 25 % (ref 14–44)
MCH RBC QN AUTO: 18.4 PG (ref 26.8–34.3)
MCHC RBC AUTO-ENTMCNC: 29.1 G/DL (ref 31.4–37.4)
MCV RBC AUTO: 63 FL (ref 82–98)
MONOCYTES # BLD AUTO: 0.44 THOUSAND/ÂΜL (ref 0.17–1.22)
MONOCYTES NFR BLD AUTO: 8 % (ref 4–12)
NEUTROPHILS # BLD AUTO: 3.39 THOUSANDS/ÂΜL (ref 1.85–7.62)
NEUTS SEG NFR BLD AUTO: 62 % (ref 43–75)
NON VENT ROOM AIR: 21 %
NONHDLC SERPL-MCNC: 112 MG/DL
NRBC BLD AUTO-RTO: 0 /100 WBCS
O2 CT BLDA-SCNC: 14.7 ML/DL (ref 16–23)
OXYHGB MFR BLDA: 92.8 % (ref 94–97)
P AXIS: 26 DEGREES
PCO2 BLDA: 41.4 MM HG (ref 36–44)
PH BLDA: 7.43 [PH] (ref 7.35–7.45)
PLATELET # BLD AUTO: 281 THOUSANDS/UL (ref 149–390)
PMV BLD AUTO: 9.5 FL (ref 8.9–12.7)
PO2 BLDA: 71.9 MM HG (ref 75–129)
POTASSIUM SERPL-SCNC: 4 MMOL/L (ref 3.5–5.3)
PR INTERVAL: 136 MS
PROCALCITONIN SERPL-MCNC: 0.06 NG/ML
PROTHROMBIN TIME: 13.9 SECONDS (ref 11.6–14.5)
QRS AXIS: -69 DEGREES
QRSD INTERVAL: 134 MS
QT INTERVAL: 434 MS
QTC INTERVAL: 447 MS
RBC # BLD AUTO: 5.37 MILLION/UL (ref 3.88–5.62)
RSV RNA RESP QL NAA+PROBE: NEGATIVE
SARS-COV-2 RNA RESP QL NAA+PROBE: NEGATIVE
SODIUM SERPL-SCNC: 136 MMOL/L (ref 135–147)
SPECIMEN SOURCE: ABNORMAL
T WAVE AXIS: 90 DEGREES
TRIGL SERPL-MCNC: 110 MG/DL
VENTRICULAR RATE: 64 BPM
WBC # BLD AUTO: 5.46 THOUSAND/UL (ref 4.31–10.16)

## 2024-01-31 PROCEDURE — 99223 1ST HOSP IP/OBS HIGH 75: CPT | Performed by: STUDENT IN AN ORGANIZED HEALTH CARE EDUCATION/TRAINING PROGRAM

## 2024-01-31 PROCEDURE — 84145 PROCALCITONIN (PCT): CPT | Performed by: STUDENT IN AN ORGANIZED HEALTH CARE EDUCATION/TRAINING PROGRAM

## 2024-01-31 PROCEDURE — 84484 ASSAY OF TROPONIN QUANT: CPT | Performed by: EMERGENCY MEDICINE

## 2024-01-31 PROCEDURE — 0241U HB NFCT DS VIR RESP RNA 4 TRGT: CPT | Performed by: EMERGENCY MEDICINE

## 2024-01-31 PROCEDURE — 36415 COLL VENOUS BLD VENIPUNCTURE: CPT | Performed by: EMERGENCY MEDICINE

## 2024-01-31 PROCEDURE — 99285 EMERGENCY DEPT VISIT HI MDM: CPT

## 2024-01-31 PROCEDURE — 82805 BLOOD GASES W/O2 SATURATION: CPT | Performed by: STUDENT IN AN ORGANIZED HEALTH CARE EDUCATION/TRAINING PROGRAM

## 2024-01-31 PROCEDURE — 71045 X-RAY EXAM CHEST 1 VIEW: CPT

## 2024-01-31 PROCEDURE — 70450 CT HEAD/BRAIN W/O DYE: CPT

## 2024-01-31 PROCEDURE — 86140 C-REACTIVE PROTEIN: CPT | Performed by: EMERGENCY MEDICINE

## 2024-01-31 PROCEDURE — 94150 VITAL CAPACITY TEST: CPT

## 2024-01-31 PROCEDURE — 80061 LIPID PANEL: CPT

## 2024-01-31 PROCEDURE — 85610 PROTHROMBIN TIME: CPT | Performed by: EMERGENCY MEDICINE

## 2024-01-31 PROCEDURE — 85025 COMPLETE CBC W/AUTO DIFF WBC: CPT | Performed by: STUDENT IN AN ORGANIZED HEALTH CARE EDUCATION/TRAINING PROGRAM

## 2024-01-31 PROCEDURE — 80048 BASIC METABOLIC PNL TOTAL CA: CPT | Performed by: EMERGENCY MEDICINE

## 2024-01-31 PROCEDURE — 93005 ELECTROCARDIOGRAM TRACING: CPT

## 2024-01-31 PROCEDURE — 99291 CRITICAL CARE FIRST HOUR: CPT | Performed by: PSYCHIATRY & NEUROLOGY

## 2024-01-31 PROCEDURE — 30233S1 TRANSFUSION OF NONAUTOLOGOUS GLOBULIN INTO PERIPHERAL VEIN, PERCUTANEOUS APPROACH: ICD-10-PCS | Performed by: STUDENT IN AN ORGANIZED HEALTH CARE EDUCATION/TRAINING PROGRAM

## 2024-01-31 PROCEDURE — G1004 CDSM NDSC: HCPCS

## 2024-01-31 PROCEDURE — 99285 EMERGENCY DEPT VISIT HI MDM: CPT | Performed by: EMERGENCY MEDICINE

## 2024-01-31 RX ORDER — CHLORHEXIDINE GLUCONATE ORAL RINSE 1.2 MG/ML
15 SOLUTION DENTAL EVERY 12 HOURS SCHEDULED
Status: DISCONTINUED | OUTPATIENT
Start: 2024-01-31 | End: 2024-02-07 | Stop reason: HOSPADM

## 2024-01-31 RX ORDER — PYRIDOSTIGMINE BROMIDE 60 MG/1
60 TABLET ORAL 4 TIMES DAILY
Status: DISCONTINUED | OUTPATIENT
Start: 2024-01-31 | End: 2024-02-01

## 2024-01-31 RX ORDER — HEPARIN SODIUM 5000 [USP'U]/ML
5000 INJECTION, SOLUTION INTRAVENOUS; SUBCUTANEOUS EVERY 8 HOURS SCHEDULED
Status: DISCONTINUED | OUTPATIENT
Start: 2024-01-31 | End: 2024-02-07 | Stop reason: HOSPADM

## 2024-01-31 RX ORDER — PANTOPRAZOLE SODIUM 40 MG/1
40 TABLET, DELAYED RELEASE ORAL 2 TIMES DAILY
Status: DISCONTINUED | OUTPATIENT
Start: 2024-01-31 | End: 2024-02-07 | Stop reason: HOSPADM

## 2024-01-31 RX ADMIN — CHLORHEXIDINE GLUCONATE 0.12% ORAL RINSE 15 ML: 1.2 LIQUID ORAL at 22:55

## 2024-01-31 RX ADMIN — HEPARIN SODIUM 5000 UNITS: 5000 INJECTION INTRAVENOUS; SUBCUTANEOUS at 13:24

## 2024-01-31 RX ADMIN — CHLORHEXIDINE GLUCONATE 0.12% ORAL RINSE 15 ML: 1.2 LIQUID ORAL at 13:24

## 2024-01-31 RX ADMIN — PANTOPRAZOLE SODIUM 40 MG: 40 TABLET, DELAYED RELEASE ORAL at 22:55

## 2024-01-31 RX ADMIN — PYRIDOSTIGMINE BROMIDE 60 MG: 60 TABLET ORAL at 17:47

## 2024-01-31 RX ADMIN — Medication 40 G: at 13:24

## 2024-01-31 RX ADMIN — PYRIDOSTIGMINE BROMIDE 60 MG: 60 TABLET ORAL at 22:55

## 2024-01-31 RX ADMIN — PANTOPRAZOLE SODIUM 40 MG: 40 TABLET, DELAYED RELEASE ORAL at 13:24

## 2024-01-31 RX ADMIN — HEPARIN SODIUM 5000 UNITS: 5000 INJECTION INTRAVENOUS; SUBCUTANEOUS at 22:55

## 2024-01-31 NOTE — ASSESSMENT & PLAN NOTE
12/2/21   Patient reports not on Lipitor because of financial difficulty    Plan  Lipid Panel ordered

## 2024-01-31 NOTE — ASSESSMENT & PLAN NOTE
GD 5/31/23 GI bleed from peptic ulcer  Outpatient regimen pantoprazole 40mg BID, patient reports has completed prescribed course by Gastroenterology  No current GI bleed symptoms or PUD symptoms    Plan  Continue pantoprazole 40mg BID while NPO  Will discontinue if diet resumed and patient tolerating diet without s/s gastritis

## 2024-01-31 NOTE — ASSESSMENT & PLAN NOTE
Patient takes at home Pyridostigmine 60mg QID  He does not follow-up with Neurology as instructed  1/31 Chest XR similar to previous 5/2023, no apparent acute abnormalities     Plan  Neurology consulted and following  Recommend IVIG x 5 days and continuation of home Pyridostigmine  Speech therapy consulted, will evaluate patient in AM 2/1  Respiratory evaluated TV, awaiting documentation of results  ABG completed: notable for pO2 low at 71.9  Will continue to monitor, O2 if needed, encourage incentive spirometry

## 2024-01-31 NOTE — ASSESSMENT & PLAN NOTE
Patient reports significantly reduced mobility and fitness due to vision changes, and reports was tole his poor vision due to myasthenia gravis  Patient reports feels safe at home, his son helps him get groceries and do things around the house when neeeded    Plan  PT/OT, Case Management consults in place

## 2024-01-31 NOTE — ASSESSMENT & PLAN NOTE
Wt Readings from Last 3 Encounters:   01/31/24 130 kg (286 lb 2.5 oz)   05/31/23 (!) 147 kg (324 lb)   10/13/22 (!) 145 kg (319 lb 10.7 oz)     ECHO 5/31/23: EF 40%  No outpatient medications addressing this condition    Plan  Continue to monitor UOP and s/s of fluid overload

## 2024-01-31 NOTE — ED PROVIDER NOTES
"History  Chief Complaint   Patient presents with    Medical Problem     \"I think I'm having a crisis\" hx of myasthenia gravis; c/o similar symptoms 3 yrs ago; these symptoms started 3 days ago; swelling and head heaviness      HPI patient is a 63-year-old male with history of myasthenia gravis.  Patient reports that he has had episodes in the past similar to what he is experiencing now.  Patient reports over the last 3 days he has had diffuse weakness.  He reports some difficulty with using his arms and legs and with standing for prolonged periods at times.  He reports that he is unable to open his eyes completely and apparently using tape at home to hold his eyes open.  Patient does see a local neurologist Dr. Vance.  Apparently he has had difficulty getting to his appointments and has been getting his prescriptions filled for his myasthenia gravis without being seen by neurology.  Patient was apparently referred to a neurologist who specializes in musculoskeletal myasthenia gravis but unable to get there due to transportation problems.  Past medical history of coronary disease heart attack hyperlipidemia myasthenia gravis  Family history noncontributory  Social history non-smoker no history of drug abuse    Prior to Admission Medications   Prescriptions Last Dose Informant Patient Reported? Taking?   Diclofenac Sodium (VOLTAREN) 1 %   No No   Sig: Apply 2 g topically 2 (two) times a day as needed (arthralgias)   ferrous sulfate 324 (65 Fe) mg   No No   Sig: Take 1 tablet (324 mg total) by mouth 2 (two) times a day before meals for 10 days   pantoprazole (PROTONIX) 40 mg tablet   No No   Sig: Take 1 tablet (40 mg total) by mouth 2 (two) times a day   pantoprazole (PROTONIX) 40 mg tablet   No No   Sig: Take 1 tablet (40 mg total) by mouth 2 (two) times a day before meals   polyvinyl alcohol (LIQUIFILM TEARS) 1.4 % ophthalmic solution   No No   Sig: Administer 1 drop to both eyes every 3 (three) hours as needed for " dry eyes   pyridostigmine (MESTINON) 60 mg tablet   No No   Sig: Take 1 tablet by mouth 4 times daily      Facility-Administered Medications: None       Past Medical History:   Diagnosis Date    Coronary artery disease     Heart attack (HCC)     Hyperlipidemia     Myasthenia gravis (HCC)        Past Surgical History:   Procedure Laterality Date    APPENDECTOMY      CORONARY ANGIOPLASTY WITH STENT PLACEMENT         No family history on file.  I have reviewed and agree with the history as documented.    E-Cigarette/Vaping    E-Cigarette Use Never User      E-Cigarette/Vaping Substances    Nicotine No     THC No     CBD No     Flavoring No     Other No     Unknown No      Social History     Tobacco Use    Smoking status: Never    Smokeless tobacco: Never   Vaping Use    Vaping status: Never Used   Substance Use Topics    Alcohol use: Not Currently    Drug use: Not Currently       Review of Systems   Constitutional:  Negative for fever.   HENT:  Negative for congestion.    Eyes:  Negative for pain and redness.   Respiratory:  Negative for cough and shortness of breath.    Cardiovascular:  Negative for chest pain.   Gastrointestinal:  Negative for abdominal pain and vomiting.   Neurological:  Positive for weakness.       Physical Exam  Physical Exam  Vitals and nursing note reviewed.   Constitutional:       Appearance: He is well-developed.   HENT:      Head: Normocephalic.      Right Ear: External ear normal.      Left Ear: External ear normal.      Nose: Nose normal.      Mouth/Throat:      Mouth: Mucous membranes are moist.      Pharynx: Oropharynx is clear.   Eyes:      General: Lids are normal.      Extraocular Movements: Extraocular movements intact.      Pupils: Pupils are equal, round, and reactive to light.      Comments: Unable to completely open his eyes   Cardiovascular:      Rate and Rhythm: Normal rate and regular rhythm.   Pulmonary:      Effort: Pulmonary effort is normal. No respiratory distress.       Breath sounds: Normal breath sounds.   Abdominal:      General: Abdomen is flat. Bowel sounds are normal.   Musculoskeletal:         General: No deformity. Normal range of motion.      Cervical back: Normal range of motion and neck supple.   Skin:     General: Skin is warm and dry.   Neurological:      General: No focal deficit present.      Mental Status: He is alert and oriented to person, place, and time.      GCS: GCS eye subscore is 4. GCS verbal subscore is 5. GCS motor subscore is 6.      Cranial Nerves: Cranial nerves 2-12 are intact.      Motor: Motor function is intact.      Comments: Reports generalized weakness, unable to completely open his eyes, no focal weakness of his extremities   Psychiatric:         Mood and Affect: Mood normal.         Vital Signs  ED Triage Vitals [01/31/24 0925]   Temperature Pulse Respirations Blood Pressure SpO2   98.2 °F (36.8 °C) 66 19 157/79 98 %      Temp Source Heart Rate Source Patient Position - Orthostatic VS BP Location FiO2 (%)   Oral Monitor Sitting Left arm --      Pain Score       --           Vitals:    01/31/24 0925 01/31/24 1000   BP: 157/79 132/74   Pulse: 66 61   Patient Position - Orthostatic VS: Sitting Sitting         Visual Acuity      ED Medications  Medications - No data to display    Diagnostic Studies  Results Reviewed       Procedure Component Value Units Date/Time    HS Troponin I 4hr [[id number]]     Lab Status: No result Specimen: Blood     Blood gas, arterial [[id number]]     Lab Status: No result Specimen: Blood, Arterial     FLU/RSV/COVID - if FLU/RSV clinically relevant [[id number]]  (Normal) Collected: 01/31/24 0958    Lab Status: Final result Specimen: Nares from Nose Updated: 01/31/24 1047     SARS-CoV-2 Negative     INFLUENZA A PCR Negative     INFLUENZA B PCR Negative     RSV PCR Negative    Narrative:      FOR PEDIATRIC PATIENTS - copy/paste COVID Guidelines URL to browser:  [email or web address]    SARS-CoV-2 assay is a Nucleic Acid Amplification assay intended for the  qualitative detection of nucleic acid from SARS-CoV-2 in nasopharyngeal  swabs. Results are for the presumptive identification of SARS-CoV-2 RNA.    Positive results are indicative of infection with SARS-CoV-2, the virus  causing COVID-19, but do not rule out bacterial infection or co-infection  with other viruses. Laboratories within the [place] and its  territories are required to report all positive results to the appropriate  public health authorities. Negative results do not preclude SARS-CoV-2  infection and should not be used as the sole basis for treatment or other  patient management decisions. Negative results must be combined with  clinical observations, patient history, and epidemiological information.  This test has not been FDA cleared or approved.    This test has been authorized by FDA under an Emergency Use Authorization  (EUA). This test is only authorized for the duration of time the  declaration that circumstances exist justifying the authorization of the  emergency use of an in vitro diagnostic tests for detection of SARS-CoV-2  virus and/or diagnosis of COVID-19 infection under section 564(b)(1) of  the Act, 21 U.S.C. 360bbb-3(b)(1), unless the authorization is terminated  or revoked sooner. The test has been validated but independent review by FDA  and CLIA is pending.    Test performed using [employer] GeneXpert: This RT-PCR assay targets N2,  a region unique to SARS-CoV-2. A conserved region in the E-gene was chosen  for pan-Sarbecovirus detection which includes SARS-CoV-2.    According to CMS-2020-01-R, this platform meets the definition of high-throughput technology.    HS Troponin 0hr (reflex protocol) [[id number]]  (Normal) Collected: 01/31/24 0958    Lab Status: Final result Specimen: Blood from Arm, Left Updated: 01/31/24 1039     hs  TnI 0hr 7 ng/L     HS Troponin I 2hr [[id number]]     Lab Status: No result Specimen: Blood     Basic metabolic panel [[id number]] Collected: 01/31/24 0958    Lab Status: Final result Specimen: Blood from Arm, Left Updated: 01/31/24 1032     Sodium 136 mmol/L      Potassium 4.0 mmol/L      Chloride 101 mmol/L      CO2 29 mmol/L      ANION GAP 6 mmol/L      BUN 13 mg/dL      Creatinine 0.97 mg/dL      Glucose 91 mg/dL      Calcium 9.5 mg/dL      eGFR 82 ml/min/1.73sq m     Narrative:      National Kidney Disease Foundation guidelines for Chronic Kidney Disease (CKD):     Stage 1 with normal or high GFR (GFR > 90 mL/min/1.73 square meters)    Stage 2 Mild CKD (GFR = 60-89 mL/min/1.73 square meters)    Stage 3A Moderate CKD (GFR = 45-59 mL/min/1.73 square meters)    Stage 3B Moderate CKD (GFR = 30-44 mL/min/1.73 square meters)    Stage 4 Severe CKD (GFR = 15-29 mL/min/1.73 square meters)    Stage 5 End Stage CKD (GFR <15 mL/min/1.73 square meters)  Note: GFR calculation is accurate only with a steady state creatinine    C-reactive protein [[id number]]  (Abnormal) Collected: 01/31/24 0958    Lab Status: Final result Specimen: Blood from Arm, Left Updated: 01/31/24 1032     CRP 19.3 mg/L     Protime-INR [[id number]]  (Normal) Collected: 01/31/24 0958    Lab Status: Final result Specimen: Blood from Arm, Left Updated: 01/31/24 1030     Protime 13.9 seconds      INR 1.01                   CT head without contrast   Final Result by Bharathi Muhammad MD (01/31 1059)      No acute intracranial abnormality.                  Workstation performed: [id number]         XR chest 1 view portable    (Results Pending)              Procedures  ECG 12 Lead Documentation Only    Date/Time: 1/31/2024 12:02 PM    Performed by: Demar Mina MD  Authorized by: Demar Mina MD    Indications / Diagnosis:  Weakness  ECG reviewed by me, the ED Provider: yes    Patient location:  ED  Previous ECG:     Previous ECG:  Compared to current     Comparison ECG info:  May 31, 2023    Similarity:  Changes noted  Interpretation:     Interpretation: non-specific    Rate:     ECG rate:  64  Comments:      Normal sinus rhythm left axis deviation, nonspecific ST-T wave changes no real change from prior EKG           ED Course         Spoke with respiratory will assess respiratory function  Spoke with neurology requested consult due to exacerbation myasthenia gravis    COVID RSV influenza testing was negative  Cardiac troponin was negative no sign of cardiac ischemia  Electrolytes are within normal limits.  CT scan of the brain was normal no acute pathology.  Chest x-ray showed no definite infiltrates, no pneumothorax, normal cardiac silhouette interpreted by me I was initial .                  SBIRT 20yo+      Flowsheet Row Most Recent Value   Initial Alcohol Screen: US AUDIT-C     1. How often do you have a drink containing alcohol? 0 Filed at: 01/31/2024 0926   2. How many drinks containing alcohol do you have on a typical day you are drinking?  0 Filed at: 01/31/2024 0926   3a. Male UNDER 65: How often do you have five or more drinks on one occasion? 0 Filed at: 01/31/2024 0926   3b. FEMALE Any Age, or MALE 65+: How often do you have 4 or more drinks on one occassion? 0 Filed at: 01/31/2024 0926   Audit-C Score 0 Filed at: 01/31/2024 0926   REAL: How many times in the past year have you...    Used an illegal drug or used a prescription medication for non-medical reasons? Never Filed at: 01/31/2024 0926                      Medical Decision Making  Medical decision making 63-year-old male presents emergency department reports diffuse weakness unable to completely open his eyes history of myasthenia gravis.  Patient is consistent with exacerbation of myasthenia gravis, myasthenic crisis.  Patient will require further evaluation.  Initially patient was admitted to medicine but there was concerned about his respiratory status progressing so patient was  placed on the ICU service.  Spoke with both the ICU service neurology and the medical service consultation with the patient.    Amount and/or Complexity of Data Reviewed  Labs: ordered.  Radiology: ordered.    Risk  Decision regarding hospitalization.             Disposition  Final diagnoses:   Weakness   Myasthenia gravis with exacerbation, adult form (HCC)     Time reflects when diagnosis was documented in both MDM as applicable and the Disposition within this note       Time User Action Codes Description Comment    1/31/2024 10:00 AM Demar Mina [R53.1] Weakness     1/31/2024 10:40 AM Demar Mina [G70.01] Myasthenia gravis with exacerbation, adult form (HCC)           ED Disposition       ED Disposition   Admit    Condition   Stable    Date/Time   Wed Jan 31, 2024 1131    Comment   Case was discussed with hospitalist service and the patient's admission status was agreed to be Admission Status: inpatient status to the service of Dr.Kelly Cordoba               Follow-up Information    None         Patient's Medications   Discharge Prescriptions    No medications on file       No discharge procedures on file.    PDMP Review         Value Time User    PDMP Reviewed  Yes 6/14/2022 11:36 AM Leslie Rowan MD            ED Provider  Electronically Signed by             Demar Mina MD  01/31/24 3621       Demar Mina MD  01/31/24 1206

## 2024-01-31 NOTE — H&P
[facility]  H&P  Name: Joni Iglesias 63 y.o. male I MRN: [id number]  Unit/Bed#: ICU 11 I Date of Admission: 1/31/2024   Date of Service: 1/31/2024 I Hospital Day: 0      Assessment/Plan   Myasthenia gravis in crisis (HCC)  Assessment & Plan  Patient takes at home Pyridostigmine 60mg QID  He does not follow-up with Neurology as instructed  1/31 Chest XR similar to previous 5/2023, no apparent acute abnormalities     Plan  Neurology consulted and following  Recommend IVIG x 5 days and continuation of home Pyridostigmine  Speech therapy consulted, will evaluate patient in AM 2/1  Respiratory evaluated TV, awaiting documentation of results  ABG completed: notable for pO2 low at 71.9  Will continue to monitor, O2 if needed, encourage incentive spirometry    Ptosis, bilateral  Assessment & Plan  Suspected secondary to myasthenia gravis crisis  See plan above    CHF (congestive heart failure) ([facility])  Assessment & Plan  Wt Readings from Last 3 Encounters:   01/31/24 130 kg (286 lb 2.5 oz)   05/31/23 (!) 147 kg (324 lb)   10/13/22 (!) 145 kg (319 lb 10.7 oz)     ECHO 5/31/23: EF 40%  No outpatient medications addressing this condition    Plan  Continue to monitor UOP and s/s of fluid overload        CAD (coronary artery disease)  Assessment & Plan  History of stent placement 2006  ECHO 5/31/23: EF 40%  Patient reports was on ASA 81mg daily, but since PUD with GI bleed, ASA discontinued  Patient reports being prescribed Lipitor in the past, but hasn't taken due to financial difficulties  No current GI bleed symptoms or PUD symptoms    Plan  Continue monitoring for s/s fluid overload  Lipid panel ordered    Personal history of peptic ulcer disease  Assessment & Plan  GD 5/31/23 GI bleed from peptic ulcer  Outpatient regimen pantoprazole 40mg BID, patient reports has completed prescribed course by Gastroenterology  No current GI bleed symptoms or PUD symptoms    Plan  Continue pantoprazole 40mg BID  while NPO  Will discontinue if diet resumed and patient tolerating diet without s/s gastritis    Hyperlipidemia  Assessment & Plan  12/2/21   Patient reports not on Lipitor because of financial difficulty    Plan  Lipid Panel ordered    Morbid obesity (HCC)  Assessment & Plan  Patient reports significantly reduced mobility and fitness due to vision changes, and reports was tole his poor vision due to myasthenia gravis  Patient reports feels safe at home, his son helps him get groceries and do things around the house when neeeded    Plan  PT/OT, Case Management consults in place           History of Present Illness     HPI: Joni Iglesias is a 63 y.o. with PMH CAD stent placed previously, diastolic and systolic CHF, myasthenia gravis, morbid obesity, hyperlipidemia, who presented to the ER today with complaint of generalized weakness x 3 days. Patient reports eyelid drooping, poor vision, weakness of arms and legs, and difficulty swallowing with associated choking/coughing with eating and drinking. Patient denies episodes of weakness to this extreme. Patient reports taking his pyridostigmine 60mg 4 x daily as prescribed, but recently feeling more weak after doses. Patient denies SOB, chest pain, CORONA, PND, fevers/chills, cough, congestion, abdominal pain, nausea/vomiting/diarrhea, recent bloody stools. He reports not taking Lipitor as recommended years ago due to financial issues, and reports not taking ASA 81mg due to h/o PUD and GI bleed. Surgical history notable for appendectomy and cardiac catheterization with placement of at least 1 stent. Patient has no known drug allergies, and denies alcohol intake or smoking for past 30 years. Patient denies illicit drug use.    History obtained from chart review and the patient.  Review of Systems   Constitutional:  Negative for chills, diaphoresis and fever.   HENT:  Positive for trouble swallowing (reports choking/coughing when trying to eat/drink). Negative for  congestion and sore throat.    Eyes:  Positive for visual disturbance (poor vision worsening for years).   Respiratory:  Negative for cough and shortness of breath.    Cardiovascular:  Negative for chest pain and palpitations.   Gastrointestinal:  Positive for constipation (reports straining with most Bms). Negative for abdominal pain, blood in stool, diarrhea, nausea and vomiting.   Neurological:  Positive for weakness (generalized). Negative for dizziness, syncope (denies falls), light-headedness and headaches.     Disposition: Critical care  Historical Information   Past Medical History:  No date: Coronary artery disease  No date: Heart attack (HCC)  No date: Hyperlipidemia  No date: Myasthenia gravis ([facility]) Past Surgical History:  No date: APPENDECTOMY  No date: CORONARY ANGIOPLASTY WITH STENT PLACEMENT   Current Outpatient Medications   Medication Instructions    Diclofenac Sodium (VOLTAREN) 2 g, Topical, 2 times daily PRN    ferrous sulfate 324 mg, Oral, 2 times daily before meals    pantoprazole (PROTONIX) 40 mg, Oral, 2 times daily    pantoprazole (PROTONIX) 40 mg, Oral, 2 times daily before meals    polyvinyl alcohol (LIQUIFILM TEARS) 1.4 % ophthalmic solution 1 drop, Both Eyes, Every 3 hours PRN    pyridostigmine (MESTINON) 60 mg, Oral, 4 times daily    No Known Allergies   Social History     Tobacco Use    Smoking status: Never    Smokeless tobacco: Never   Vaping Use    Vaping status: Never Used   Substance Use Topics    Alcohol use: Never    Drug use: Never    No family history on file.       Objective                            Vitals I/O      Most Recent Min/Max in 24hrs   Temp 98.7 °F (37.1 °C) Temp  Min: 98.2 °F (36.8 °C)  Max: 98.7 °F (37.1 °C)   Pulse 59 Pulse  Min: 58  Max: 66   Resp 21 Resp  Min: 15  Max: 26   /73 BP  Min: 125/73  Max: 157/79   O2 Sat 96 % SpO2  Min: 96 %  Max: 98 %      Intake/Output Summary (Last 24 hours) at 1/31/2024 1726  Last data filed at 1/31/2024 1601  Gross per 24  hour   Intake --   Output 600 ml   Net -600 ml       No diet orders on file    Invasive Monitoring           Physical Exam   Physical Exam  Eyes:      Pupils: Pupils are equal, round, and reactive to light.      Comments: EOMs limited   Skin:     General: Skin is warm, dry and not mottled extremities.      Coloration: Skin is not jaundiced or pale.   Cardiovascular:      Rate and Rhythm: Normal rate and regular rhythm.      Pulses: No decreased pulses.      Heart sounds: No murmur heard.     No friction rub. No gallop.   Musculoskeletal:      Right lower leg: Edema present.      Left lower leg: Edema present.      Comments: Nonpitting edema bilateral LE   Abdominal: General: Bowel sounds are normal.      Palpations: Abdomen is soft.      Tenderness: There is no abdominal tenderness.   Constitutional:       General: He is not in acute distress.     Appearance: He is well-developed and well-nourished. He is not ill-appearing or toxic-appearing.   Pulmonary:      Effort: Pulmonary effort is normal.      Breath sounds: Normal breath sounds. No wheezing, rhonchi or rales.   Psychiatric:         Speech: Speech is not no receptive aphasia or no expressive aphasia.   Neurological:      Mental Status: He is alert and oriented to person, place and time. He is calm.      Cranial Nerves: No dysarthria or facial asymmetry.      Sensory: No sensory deficit.      Motor: No motor deficit.            Diagnostic Studies      EKG: reveiwed  Imaging:  I have personally reviewed pertinent films in PACS     Medications:  Scheduled PRN   chlorhexidine, 15 mL, Q12H TOÑA  heparin (porcine), 5,000 Units, Q8H TOÑA  immune globulin, human, 400 mg/kg (Adjusted), Daily  pantoprazole, 40 mg, BID  pyridostigmine, 60 mg, 4x Daily          Continuous          Labs:    CBC    No recent results  BMP    Recent Labs     01/31/24  0958   SODIUM 136   K 4.0      CO2 29   AGAP 6   BUN 13   CREATININE 0.97   CALCIUM 9.5       Coags    Recent Labs      01/31/24  0958   INR 1.01        Additional Electrolytes  No recent results       Blood Gas    Recent Labs     01/31/24  1214   PHART 7.429   PZX6CFP 41.4   PO2ART 71.9*   [id number] 26.8   BEART 2.3   SOURCE Radial, Right     Recent Labs     01/31/24  1214   SOURCE Radial, Right    LFTs  No recent results    Infectious  No recent results  Glucose  Recent Labs     01/31/24  0958   GLUC 91               Anticipated Length of Stay is > 2 midnights  Missy Cuenca PA-C

## 2024-01-31 NOTE — Clinical Note
Case was discussed with hospitalist service and the patient's admission status was agreed to be Admission Status: inpatient status to the service of Dr.Kelly Cordoba

## 2024-01-31 NOTE — CONSULTS
"Consultation - Neurology   oJni Iglesias 63 y.o. male MRN: [id number]  Unit/Bed#: ICU 11 Encounter: [id number]      Assessment/Plan     Myasthenia gravis in crisis (HCC)  Assessment & Plan  63 y.o.  male with myasthenia gravis maintained on mestinon, CAD, HLD, and obesity,  who presents to [facility] on 1/31/24 with worsening myasthenia symptoms (difficulty swallowing food, worsening ptosis, worsening weakness).    Neurodiagnostics:   - CTH wo contrast 1/31/24:   \"No acute intracranial abnormality.\"    Myasthenic crisis, provoked by unknown cause.     Plan  Recommend admitting to ICU team in either SDU or ICU   Recommend IVIG x 5 days. Recommend monitoring heart function closely with EF 40%.   Recommend continuing home mestinon 60 mg QID  Recommend NIF/VC Q6H. If stable x 24 hours, can decrease to checking Qshift  Recommend infections workup   Recommend close monitoring of neurologic exam  Medications to avoid in MG:  Would avoid the use of neuromuscular blockers  Macrolides, fluoroquinolones, aminoglycosides, tetracycline, and chloroquine, D-penicillamine  Antidysrhythmic agents - Beta blockers, calcium channel blockers, quinidine, lidocaine, procainamide, and trimethaphan  Antipsychotics - Phenothiazines, sulpride, atypicals  Cardiovascular- Propanolol, quinidine, verapamil, bretylium, statins  Miscellaneous - Diphenylhydantoin, magnesium sulfate, lithium, chlorpromazine, muscle relaxants, levothyroxine, adrenocorticotropic hormone (ACTH). Glucocorticoids can also exacerbate MG.  Medical management and correction of metabolic and infectious disturbances per primary team   Avoid CNS altering medications if possible  Continue supportive care   Continue to monitor neurologic status. Notify neurology with any changes in exam.              Case and plan discussed with attending neurologist.  Please see attending attestation for any further recommendations and/or changes to plan.      Joni Iglesias will need follow up in " in 4 weeks with neuromuscular attending. He will not require outpatient neurological testing.    History of Present Illness     Reason for Consult / Principal Problem: Myasthenia gravis  Hx and PE limited by: N/A   HPI: Joni Iglesias is a 63 y.o.  male with myasthenia gravis maintained on mestinon, CAD, HLD, and obesity,  who presents to [facility] on 1/31/24 with worsening myasthenia symptoms (difficulty swallowing food, worsening ptosis, worsening weakness).    She reports over the past few days, he has noted worsening ptosis in his bilateral eyes.  Patient reports that he is able to swallow food up until about 11 AM and then he has difficulty swallowing any food for the rest of the day.  Patient reports worsening weakness as well over the past few days.  Patient denies shortness of breath at this time.  Patient reports his symptoms feel like they are worst about 30 minutes after taking Mestinon and then they slightly improved.  However, patient notes overall, worsening myasthenic symptoms as aforementioned.    Pt reports he has not been seen by a neuromuscular specialist. Pt reports he has issues getting transportation to see a neuromuscular specialist in the outpatient setting. Pt reports being compliant with mestinon as prescribed. Pt denies recent immunizations. Pt denies recent illnesses. Pt denies starting new medications recently. Pt denies missed dose(s) of mestinon.     Previous neurologic History:   Patient evaluated multiple times by inpatient neurology team.  Patient last seen in May 2023.  At that time, patient was evaluated in regards to questions surrounding pt's prednisone given active GI bleed. At that time, pt did not appear to be in myasthenic crisis and was recommended not to continue prednisone. Pt was recommended to continue on home mestinon 60 mg QID. Pt recommended to follow-up with neuromuscular attending at discharge.  Prior to this, pt seen by inpatient [facility] neurology team in October 2022. At  that time, pt evaluated for mysasthenic crisis. Pt had been coming in every 3-4 months for IVIG treatment due to worsening of his myasthenia gravis (ptosis, dysphagia, dysarthria). Pt received 5 days IVIG. Pt with improvement of MG symptoms with IVIG treatment. Pt recommended to undergo prednisone taper and continue with mestinon. Pt's myasthenic crisis at that time appears to have been in the setting of not taking mestinon. Pt recommended to follow-up with neuromuscular attending after discharge.     Consult to neurology  Consult performed by: Rocío Shelton PA-C  Consult ordered by: Demar Mina MD          Review of Systems   HENT:  Positive for trouble swallowing.    Eyes:         +ptosis   Neurological:  Positive for weakness.       Historical Information   Past Medical History:   Diagnosis Date    Coronary artery disease     Heart attack (HCC)     Hyperlipidemia     Myasthenia gravis (HCC)      Past Surgical History:   Procedure Laterality Date    APPENDECTOMY      CORONARY ANGIOPLASTY WITH STENT PLACEMENT       Social History   Social History     Substance and Sexual Activity   Alcohol Use Never     Social History     Substance and Sexual Activity   Drug Use Never     E-Cigarette/Vaping    E-Cigarette Use Never User      E-Cigarette/Vaping Substances    Nicotine No     THC No     CBD No     Flavoring No     Other No     Unknown No      Social History     Tobacco Use   Smoking Status Never   Smokeless Tobacco Never     Family History: No family history on file.    Review of previous medical records was  completed.     Meds/Allergies   all current active meds have been reviewed, current meds:   Current Facility-Administered Medications   Medication Dose Route Frequency    chlorhexidine (PERIDEX) 0.12 % oral rinse 15 mL  15 mL Mouth/Throat Q12H [facility]    heparin (porcine) subcutaneous injection 5,000 Units  5,000 Units Subcutaneous Q8H [facility]    immune globulin, human (GAMUNEX-C) 40 g 400 mL IV soln  400 mg/kg  (Adjusted) Intravenous Daily    pantoprazole (PROTONIX) EC tablet 40 mg  40 mg Oral BID    pyridostigmine (MESTINON) tablet 60 mg  60 mg Oral 4x Daily   , and PTA meds:   Prior to Admission Medications   Prescriptions Last Dose Informant Patient Reported? Taking?   Diclofenac Sodium (VOLTAREN) 1 %   No No   Sig: Apply 2 g topically 2 (two) times a day as needed (arthralgias)   ferrous sulfate 324 (65 Fe) mg   No No   Sig: Take 1 tablet (324 mg total) by mouth 2 (two) times a day before meals for 10 days   pantoprazole (PROTONIX) 40 mg tablet   No No   Sig: Take 1 tablet (40 mg total) by mouth 2 (two) times a day   pantoprazole (PROTONIX) 40 mg tablet   No No   Sig: Take 1 tablet (40 mg total) by mouth 2 (two) times a day before meals   polyvinyl alcohol (LIQUIFILM TEARS) 1.4 % ophthalmic solution   No No   Sig: Administer 1 drop to both eyes every 3 (three) hours as needed for dry eyes   pyridostigmine (MESTINON) 60 mg tablet   No No   Sig: Take 1 tablet by mouth 4 times daily      Facility-Administered Medications: None       No Known Allergies    Objective   Vitals:Blood pressure 143/96, pulse 59, temperature 98.2 °F (36.8 °C), temperature source Oral, resp. rate (!) 26, height 6' (1.829 m), weight 130 kg (286 lb 2.5 oz), SpO2 98%.,Body mass index is 38.81 kg/m².  No intake or output data in the 24 hours ending 01/31/24 1445    Invasive Devices:   Invasive Devices       Peripheral Intravenous Line  Duration             Peripheral IV 05/31/23 Left Forearm 245 days    Peripheral IV 01/31/24 Distal;Left;Upper;Ventral (anterior) Arm <1 day                    Physical Exam  Vitals and nursing note reviewed.   HENT:      Head: Normocephalic and atraumatic.      Nose: Nose normal.   Eyes:      General: No scleral icterus.        Right eye: No discharge.         Left eye: No discharge.      Conjunctiva/sclera: Conjunctivae normal.      Comments: +ptosis b/l    Cardiovascular:      Rate and Rhythm: Normal rate.  "  Pulmonary:      Effort: Pulmonary effort is normal.   Neurological:      Mental Status: He is alert.      Coordination: Finger-Nose-Finger Test normal.   Psychiatric:      Comments: Pleasant and cooperative during exam        Neurologic Exam     Mental Status   Follows 1 step commands.   Attention: appropriately attends to provider. Concentration: no redirection or reorientation required during exam.   Speech: (No dysarthria appreciated on exam. Speech fluent. )  Level of consciousness: alert  Able to name object (glove, glasses, watch). Able to repeat (\"no ifs, ands, or buts\").    -oriented to self   -oriented to month and year   -Oriented to place \"[facility]\"     Cranial Nerves     CN II   Visual acuity: (grossly intact)    CN III, IV, VI   Right pupil: Size: 3 mm. Shape: regular.   Left pupil: Size: 3 mm. Shape: regular.   Nystagmus: none   Conjugate gaze: present    CN V   Facial sensation intact.   Right facial sensation deficit: none  Left facial sensation deficit: none    CN VIII   Hearing impaired: audition intact to finger rub bilaterally.    CN XII   CN XII normal.   Tongue: not atrophic  Fasciculations: absent  Tongue deviation: none  - significant ptosis b/l   - EOM with restricted ROM horizontally (b/l, L>R) and with upward gaze     Motor Exam   Muscle bulk: normal  Overall muscle tone: normal   Strength to confrontation testing:  -Bilateral hand  5/5   -bilateral elbow flexion and extension 5/5   -R shoulder abduction 4+/5   - L shoulder abduction 4-/5  -Bilateral plantar flexion and dorsiflexion 5/5   -bilateral knee flexion and extension 5/5   -Bilateral hip flexion 5/5  - neck flexion and extension 5/5   - able to count to 6 with single breath (unsure if partially due to poor effort)     Sensory Exam   Light touch normal.   Right arm light touch: normal  Left arm light touch: normal  Right leg light touch: normal  Left leg light touch: normal   -extinction absent     Gait, " "Coordination, and Reflexes     Gait  Gait: (deferred for patient safety)    Coordination   Finger to nose coordination: normal      Lab Results: I have personally reviewed pertinent reports.  , CBC:   , BMP/CMP:   Results from last 7 days   Lab Units 01/31/24  0958   SODIUM mmol/L 136   POTASSIUM mmol/L 4.0   CHLORIDE mmol/L 101   CO2 mmol/L 29   BUN mg/dL 13   CREATININE mg/dL 0.97   CALCIUM mg/dL 9.5   EGFR ml/min/1.73sq m 82   , Vitamin B12:   , HgBA1C:   , TSH:   , Coagulation:   Results from last 7 days   Lab Units 01/31/24  0958   INR  1.01   , Lipid Profile:   , Ammonia:   , Urinalysis:       Invalid input(s): \"URIBILINOGEN\", Drug Screen:   , Medication Drug Levels:       Invalid input(s): \"CARBAMAZEPINE\", \"LACOSAMIDE\", \"OXCARBAZEPINE\"  Imaging Studies: I have personally reviewed pertinent reports.   and I have personally reviewed pertinent films in PACS CTH wo contrast 1/31/24  EKG, Pathology, and Other Studies: I have personally reviewed pertinent reports.    VTE Prophylaxis: Heparin    Code Status: Level 1 - Full Code  Advance Directive and Living Will:      Power of :    POLST:      Counseling / Coordination of Care  I have spent a total time of 60 minutes on 01/31/24 in caring for this patient including Diagnostic results, Prognosis, Risks and benefits of tx options, Instructions for management, Patient and family education, Importance of tx compliance, Risk factor reductions, Impressions, Counseling / Coordination of care, Documenting in the medical record, Reviewing / ordering tests, medicine, procedures  , Obtaining or reviewing history  , and Communicating with other healthcare professionals .    This note was completed in part utilizing Dragon Software.  Grammatical errors, random word insertions, spelling mistakes, and incomplete sentences may be an occasional consequence of this system secondary to software limitations, ambient noise, and hardware issues.  If you have any questions or " concerns about the content, text, or information contained within the body of this dictation, please contact the provider for clarification.

## 2024-01-31 NOTE — ASSESSMENT & PLAN NOTE
History of stent placement 2006  ECHO 5/31/23: EF 40%  Patient reports was on ASA 81mg daily, but since PUD with GI bleed, ASA discontinued  Patient reports being prescribed Lipitor in the past, but hasn't taken due to financial difficulties  No current GI bleed symptoms or PUD symptoms    Plan  Continue monitoring for s/s fluid overload  Lipid panel ordered

## 2024-01-31 NOTE — ASSESSMENT & PLAN NOTE
"2/2/2024: Seen today by neurology new to this neurology examiner is this right-hand-dominant 63 y.o.  male with previous history of myasthenia gravis maintained on mestinon.  He is also positive history for CAD, HLD, and obesity. He presents to [facility] on 1/31/24 with worsening myasthenia symptoms (difficulty swallowing food, worsening ptosis, worsening weakness).  Neurodiagnostics:   - CTH wo contrast 1/31/24:   \"No acute intracranial abnormality.\"  Myasthenic crisis is the likely cause at this point., provoked by unknown cause.   He was initially started and was on day 3 of his IVIG and we are asked to evaluate this patient for worsening status.  In particular the patient reports his head feels heavy his neck is tired he reports he is swallowing less well and he reports that his difficulties with sputum swallow etc. as well as his meals started last night.  Today he reports his left arm feels weaker.  He reports he is also tired today.  He reports the oxygen he now has had 2 L feels better.  He has isolated good power.  His NIF is currently -50, (he was -70 yesterday) but his vital capacity is still good compared with what it was yesterday.  Plan  We will get a repeat head CT today approximately 24 hours at 4 PM.  Our suspicion is that this is not vascular stroke but rather a continuing progression of his myasthenia.  Continue IVIG for total of 5 days (today is day #3/5). Recommend monitoring heart function closely with EF 40%.   Recommend continuing home mestinon 60 mg QID  Continue NIF/VC  Qshift  2/1/24 0842 AM: NIF 60 cm H2O, VC 2.7L  Continue infectious and metabolic workup   Continue close monitoring of neurologic exam  Medications to avoid in MG:  Would avoid the use of neuromuscular blockers  Macrolides, fluoroquinolones, aminoglycosides, tetracycline, and chloroquine, D-penicillamine  Antidysrhythmic agents - Beta blockers, calcium channel blockers, quinidine, lidocaine, procainamide, and " trimethaphan  Antipsychotics - Phenothiazines, sulpride, atypicals  Cardiovascular- Propanolol, quinidine, verapamil, bretylium, statins  Miscellaneous - Diphenylhydantoin, magnesium sulfate, lithium, chlorpromazine, muscle relaxants, levothyroxine, adrenocorticotropic hormone (ACTH). Glucocorticoids can also exacerbate MG.  Medical management and correction of metabolic and infectious disturbances per primary team   Avoid CNS altering medications if possible  Continue supportive care   Continue to monitor neurologic status. Notify neurology with any changes in exam.

## 2024-02-01 PROBLEM — D50.9 IRON DEFICIENCY ANEMIA: Status: ACTIVE | Noted: 2024-02-01

## 2024-02-01 PROBLEM — Z87.898 HISTORY OF PREDIABETES: Status: ACTIVE | Noted: 2024-02-01

## 2024-02-01 LAB
ALBUMIN SERPL BCP-MCNC: 3.7 G/DL (ref 3.5–5)
ALP SERPL-CCNC: 101 U/L (ref 34–104)
ALT SERPL W P-5'-P-CCNC: 9 U/L (ref 7–52)
ANION GAP SERPL CALCULATED.3IONS-SCNC: 5 MMOL/L
AST SERPL W P-5'-P-CCNC: 16 U/L (ref 13–39)
BASOPHILS # BLD AUTO: 0.03 THOUSANDS/ÂΜL (ref 0–0.1)
BASOPHILS NFR BLD AUTO: 1 % (ref 0–1)
BILIRUB SERPL-MCNC: 0.57 MG/DL (ref 0.2–1)
BUN SERPL-MCNC: 11 MG/DL (ref 5–25)
CA-I BLD-SCNC: 1.17 MMOL/L (ref 1.12–1.32)
CALCIUM SERPL-MCNC: 9.4 MG/DL (ref 8.4–10.2)
CHLORIDE SERPL-SCNC: 102 MMOL/L (ref 96–108)
CO2 SERPL-SCNC: 28 MMOL/L (ref 21–32)
CREAT SERPL-MCNC: 0.92 MG/DL (ref 0.6–1.3)
EOSINOPHIL # BLD AUTO: 0.29 THOUSAND/ÂΜL (ref 0–0.61)
EOSINOPHIL NFR BLD AUTO: 5 % (ref 0–6)
ERYTHROCYTE [DISTWIDTH] IN BLOOD BY AUTOMATED COUNT: 20.1 % (ref 11.6–15.1)
GFR SERPL CREATININE-BSD FRML MDRD: 87 ML/MIN/1.73SQ M
GLUCOSE SERPL-MCNC: 85 MG/DL (ref 65–140)
HCT VFR BLD AUTO: 38.7 % (ref 36.5–49.3)
HGB BLD-MCNC: 11.1 G/DL (ref 12–17)
IMM GRANULOCYTES # BLD AUTO: 0.01 THOUSAND/UL (ref 0–0.2)
IMM GRANULOCYTES NFR BLD AUTO: 0 % (ref 0–2)
LYMPHOCYTES # BLD AUTO: 1.5 THOUSANDS/ÂΜL (ref 0.6–4.47)
LYMPHOCYTES NFR BLD AUTO: 25 % (ref 14–44)
MAGNESIUM SERPL-MCNC: 2.2 MG/DL (ref 1.9–2.7)
MCH RBC QN AUTO: 18.4 PG (ref 26.8–34.3)
MCHC RBC AUTO-ENTMCNC: 28.7 G/DL (ref 31.4–37.4)
MCV RBC AUTO: 64 FL (ref 82–98)
MONOCYTES # BLD AUTO: 0.55 THOUSAND/ÂΜL (ref 0.17–1.22)
MONOCYTES NFR BLD AUTO: 9 % (ref 4–12)
NEUTROPHILS # BLD AUTO: 3.56 THOUSANDS/ÂΜL (ref 1.85–7.62)
NEUTS SEG NFR BLD AUTO: 60 % (ref 43–75)
NRBC BLD AUTO-RTO: 0 /100 WBCS
PHOSPHATE SERPL-MCNC: 4.1 MG/DL (ref 2.3–4.1)
PLATELET # BLD AUTO: 238 THOUSANDS/UL (ref 149–390)
PMV BLD AUTO: 9.7 FL (ref 8.9–12.7)
POTASSIUM SERPL-SCNC: 4.6 MMOL/L (ref 3.5–5.3)
PROCALCITONIN SERPL-MCNC: 0.07 NG/ML
PROT SERPL-MCNC: 7.9 G/DL (ref 6.4–8.4)
RBC # BLD AUTO: 6.02 MILLION/UL (ref 3.88–5.62)
SODIUM SERPL-SCNC: 135 MMOL/L (ref 135–147)
WBC # BLD AUTO: 5.94 THOUSAND/UL (ref 4.31–10.16)

## 2024-02-01 PROCEDURE — 97166 OT EVAL MOD COMPLEX 45 MIN: CPT

## 2024-02-01 PROCEDURE — 99233 SBSQ HOSP IP/OBS HIGH 50: CPT | Performed by: PSYCHIATRY & NEUROLOGY

## 2024-02-01 PROCEDURE — 84145 PROCALCITONIN (PCT): CPT | Performed by: NURSE PRACTITIONER

## 2024-02-01 PROCEDURE — 97163 PT EVAL HIGH COMPLEX 45 MIN: CPT

## 2024-02-01 PROCEDURE — 80053 COMPREHEN METABOLIC PANEL: CPT | Performed by: NURSE PRACTITIONER

## 2024-02-01 PROCEDURE — 99233 SBSQ HOSP IP/OBS HIGH 50: CPT | Performed by: STUDENT IN AN ORGANIZED HEALTH CARE EDUCATION/TRAINING PROGRAM

## 2024-02-01 PROCEDURE — 94150 VITAL CAPACITY TEST: CPT

## 2024-02-01 PROCEDURE — 85025 COMPLETE CBC W/AUTO DIFF WBC: CPT | Performed by: NURSE PRACTITIONER

## 2024-02-01 PROCEDURE — 82330 ASSAY OF CALCIUM: CPT | Performed by: NURSE PRACTITIONER

## 2024-02-01 PROCEDURE — 92610 EVALUATE SWALLOWING FUNCTION: CPT

## 2024-02-01 PROCEDURE — 83735 ASSAY OF MAGNESIUM: CPT

## 2024-02-01 PROCEDURE — 84100 ASSAY OF PHOSPHORUS: CPT

## 2024-02-01 RX ORDER — FERROUS SULFATE 325(65) MG
325 TABLET ORAL
Status: DISCONTINUED | OUTPATIENT
Start: 2024-02-01 | End: 2024-02-05

## 2024-02-01 RX ORDER — LANOLIN ALCOHOL/MO/W.PET/CERES
3 CREAM (GRAM) TOPICAL
Status: DISCONTINUED | OUTPATIENT
Start: 2024-02-01 | End: 2024-02-04

## 2024-02-01 RX ORDER — SENNOSIDES 8.6 MG
1 TABLET ORAL
Status: DISCONTINUED | OUTPATIENT
Start: 2024-02-01 | End: 2024-02-07 | Stop reason: HOSPADM

## 2024-02-01 RX ORDER — PYRIDOSTIGMINE BROMIDE 60 MG/1
60 TABLET ORAL 4 TIMES DAILY
Status: DISCONTINUED | OUTPATIENT
Start: 2024-02-01 | End: 2024-02-05

## 2024-02-01 RX ADMIN — PYRIDOSTIGMINE BROMIDE 60 MG: 60 TABLET ORAL at 08:26

## 2024-02-01 RX ADMIN — DEXTRAN 70, GLYCERIN, HYPROMELLOSE 2 DROP: 1; 2; 3 SOLUTION/ DROPS OPHTHALMIC at 19:21

## 2024-02-01 RX ADMIN — HEPARIN SODIUM 5000 UNITS: 5000 INJECTION INTRAVENOUS; SUBCUTANEOUS at 05:58

## 2024-02-01 RX ADMIN — HEPARIN SODIUM 5000 UNITS: 5000 INJECTION INTRAVENOUS; SUBCUTANEOUS at 17:26

## 2024-02-01 RX ADMIN — Medication 40 G: at 08:27

## 2024-02-01 RX ADMIN — PANTOPRAZOLE SODIUM 40 MG: 40 TABLET, DELAYED RELEASE ORAL at 08:26

## 2024-02-01 RX ADMIN — Medication 3 MG: at 22:14

## 2024-02-01 RX ADMIN — HEPARIN SODIUM 5000 UNITS: 5000 INJECTION INTRAVENOUS; SUBCUTANEOUS at 22:14

## 2024-02-01 RX ADMIN — SENNOSIDES 8.6 MG: 8.6 TABLET, FILM COATED ORAL at 22:14

## 2024-02-01 RX ADMIN — CHLORHEXIDINE GLUCONATE 0.12% ORAL RINSE 15 ML: 1.2 LIQUID ORAL at 22:18

## 2024-02-01 RX ADMIN — PYRIDOSTIGMINE BROMIDE 60 MG: 60 TABLET ORAL at 22:30

## 2024-02-01 RX ADMIN — PYRIDOSTIGMINE BROMIDE 60 MG: 60 TABLET ORAL at 11:26

## 2024-02-01 RX ADMIN — PYRIDOSTIGMINE BROMIDE 60 MG: 60 TABLET ORAL at 17:27

## 2024-02-01 RX ADMIN — CHLORHEXIDINE GLUCONATE 0.12% ORAL RINSE 15 ML: 1.2 LIQUID ORAL at 08:26

## 2024-02-01 RX ADMIN — FERROUS SULFATE TAB 325 MG (65 MG ELEMENTAL FE) 325 MG: 325 (65 FE) TAB at 08:26

## 2024-02-01 RX ADMIN — PANTOPRAZOLE SODIUM 40 MG: 40 TABLET, DELAYED RELEASE ORAL at 22:14

## 2024-02-01 NOTE — UTILIZATION REVIEW
" Initial Clinical Review    Admission: Date/Time/Statement:   Admission Orders (From admission, onward)       Ordered        01/31/24 1042  INPATIENT ADMISSION  Once                          Orders Placed This Encounter   Procedures    INPATIENT ADMISSION     Standing Status:   Standing     Number of Occurrences:   1     Order Specific Question:   Level of Care     Answer:   Med Surg [16]     Order Specific Question:   Estimated length of stay     Answer:   More than 2 Midnights     Order Specific Question:   Certification     Answer:   I certify that inpatient services are medically necessary for this patient for a duration of greater than two midnights. See H&P and MD Progress Notes for additional information about the patient's course of treatment.     ED Arrival Information       Expected   -    Arrival   1/31/2024 09:14    Acuity   Emergent              Means of arrival   Walk-In    Escorted by   Self    Service   Hospitalist    Admission type   Emergency              Arrival complaint   SWELLING             Chief Complaint   Patient presents with    Medical Problem     \"I think I'm having a crisis\" hx of myasthenia gravis; c/o similar symptoms 3 yrs ago; these symptoms started 3 days ago; swelling and head heaviness        Initial Presentation: 63 y.o. male to the ED from home with complaints of diffuse weakness, unable to open his eyes completely.  Admitted to inpatient for myasthenia gravis in crisis, ptosis bilaterally.  H/O Myasthenia gravis, CAD, and ischemic CMP.    Arrive with GCs 15.  CRp elevated. Placed in CRITICAL CARe step down for close airway monitoring.  PO2 71.9. Continue to monitor.  Speech consult.  REcommend IVIG x 5 days. NEurology consult. Left eyelind taped.  LUngs clear.  Keep NPO.     Neurology:  Started to have worse Ptosis over the past few days, then difficulty swallowing for a day.  Continue Mestinon.  Frequent neuro checks. Recommend NIF/VC Q6H.   Date: 2/1   Day 2:   TV 2.6 L " yesterday, 2.7 L today.  As per speech eval, recommending regular diet.  Continue IV IG. Complaints of straining with bowel movements, eye dryness, and intermittent sleep. Orders for Senakot, artificial tears, and melatonin were placed.   ED Triage Vitals   Temperature Pulse Respirations Blood Pressure SpO2   01/31/24 0925 01/31/24 0925 01/31/24 0925 01/31/24 0925 01/31/24 0925   98.2 °F (36.8 °C) 66 19 157/79 98 %      Temp Source Heart Rate Source Patient Position - Orthostatic VS BP Location FiO2 (%)   01/31/24 0925 01/31/24 0925 01/31/24 0925 01/31/24 0925 --   Oral Monitor Sitting Left arm       Pain Score       01/31/24 1200       No Pain          Wt Readings from Last 1 Encounters:   02/01/24 111 kg (243 lb 13.3 oz)     Additional Vital Signs: ital Signs (last 2 days)    Date/Time Temp Pulse Resp BP MAP (mmHg) SpO2 O2 Device Patient Position - Orthostatic VS   02/01/24 1500 98.2 °F (36.8 °C) 72 21 105/54 71 96 % None (Room air) Lying   02/01/24 1300 98.2 °F (36.8 °C) 72 16 105/54 71 96 % None (Room air) Lying   02/01/24 1120 97.9 °F (36.6 °C) 76 16 128/66 91 96 % None (Room air) Sitting   02/01/24 0842 -- 76 30 Abnormal  -- -- 95 % -- --   02/01/24 0600 -- 73 28 Abnormal  123/75 95 -- -- --   01/31/24 2300 98.2 °F (36.8 °C) 57 19 145/69 99 97 % -- --   01/31/24 2100 -- 55 23 Abnormal  137/64 92 98 % -- --   01/31/24 1900 -- 62 13 114/73 90 -- -- --   01/31/24 1600 -- 59 21 125/73 94 -- -- --   01/31/24 1500 98.7 °F (37.1 °C) 58 25 Abnormal  126/73 94 96 % -- Sitting   01/31/24 1400 -- 58 21 130/87 101 -- -- --   01/31/24 1240 98.6 °F (37 °C) 59 26 Abnormal  143/96 112 98 % -- --   01/31/24 1000 -- 61 15 132/74 98 97 % None (Room air) Sitting   01/31/24 0925 98.2 °F (36.8 °C) 66 19 157/79 -- 98 % None (Room air) Sitting     Pertinent Labs/Diagnostic Test Results:   CT head without contrast   Final Result by Bharathi Muhammad MD (01/31 1052)      No acute intracranial abnormality.                  Workstation  "performed: [id number]         XR chest 1 view portable   Final Result by Anju Jackson MD (01/31 2034)      No acute cardiopulmonary disease.               Workstation performed: [id number]           Results from last 7 days   Lab Units 01/31/24  0958   SARS-COV-2  Negative     Results from last 7 days   Lab Units 02/01/24  0459 01/31/24  2116   WBC Thousand/uL 5.94 5.46   HEMOGLOBIN g/dL 11.1* 9.9*   HEMATOCRIT % 38.7 34.0*   PLATELETS Thousands/uL 238 281   NEUTROS ABS Thousands/µL 3.56 3.39         Results from last 7 days   Lab Units 02/01/24  0459 01/31/24  0958   SODIUM mmol/L 135 136   POTASSIUM mmol/L 4.6 4.0   CHLORIDE mmol/L 102 101   CO2 mmol/L 28 29   ANION GAP mmol/L 5 6   BUN mg/dL 11 13   CREATININE mg/dL 0.92 0.97   EGFR ml/min/1.73sq m 87 82   CALCIUM mg/dL 9.4 9.5   CALCIUM, IONIZED mmol/L 1.17  --    MAGNESIUM mg/dL 2.2  --    PHOSPHORUS mg/dL 4.1  --      Results from last 7 days   Lab Units 02/01/24  0459   AST U/L 16   ALT U/L 9   ALK PHOS U/L 101   TOTAL PROTEIN g/dL 7.9   ALBUMIN g/dL 3.7   TOTAL BILIRUBIN mg/dL 0.57         Results from last 7 days   Lab Units 02/01/24  0459 01/31/24  0958   GLUCOSE RANDOM mg/dL 85 91             No results found for: \"BETA-HYDROXYBUTYRATE\"   Results from last 7 days   Lab Units 01/31/24  1214   PH ART  7.429   PCO2 ART mm Hg 41.4   PO2 ART mm Hg 71.9*   HCO3 ART mmol/L 26.8   BASE EXC ART mmol/L 2.3   O2 CONTENT ART mL/dL 14.7*   O2 HGB, ARTERIAL % 92.8*   ABG SOURCE  Radial, Right                 Results from last 7 days   Lab Units 01/31/24  0958   HS TNI 0HR ng/L 7         Results from last 7 days   Lab Units 01/31/24  0958   PROTIME seconds 13.9   INR  1.01         Results from last 7 days   Lab Units 02/01/24  0459 01/31/24  2116   PROCALCITONIN ng/ml 0.07 0.06       Results from last 7 days   Lab Units 01/31/24  0958   CRP mg/L 19.3*     Results from last 7 days   Lab Units 01/31/24  0958   INFLUENZA A PCR  Negative   INFLUENZA B PCR  Negative "   RSV PCR  Negative       Past Medical History:   Diagnosis Date    Coronary artery disease     Heart attack ([facility])     Hyperlipidemia     Myasthenia gravis ([facility])            Admitting Diagnosis: Weakness [R53.1]  Myasthenia gravis with exacerbation, adult form (HCC) [G70.01]  Known medical problems [Z78.9]  Age/Sex: 63 y.o. male  Admission Orders:  I/O   Neuro checks every 2 hours  SCDs    Scheduled Medications:  chlorhexidine, 15 mL, Mouth/Throat, Q12H TOÑA  ferrous sulfate, 325 mg, Oral, Daily With Breakfast  heparin (porcine), 5,000 Units, Subcutaneous, Q8H TOÑA  immune globulin, human, 400 mg/kg (Adjusted), Intravenous, Daily  melatonin, 3 mg, Oral, HS  pantoprazole, 40 mg, Oral, BID  pyridostigmine, 60 mg, Oral, 4x Daily  senna, 1 tablet, Oral, HS      Continuous IV Infusions:     PRN Meds:  Artificial Tears, 2 drop, Both Eyes, Q4H PRN        IP CONSULT TO NEUROLOGY  IP CONSULT TO CASE MANAGEMENT    Network Utilization Review Department  ATTENTION: Please call with any questions or concerns to [phone] and carefully listen to the prompts so that you are directed to the right person. All voicemails are confidential.   For Discharge needs, contact Care Management DC Support Team at [phone] opt. [phone]  Send all requests for admission clinical reviews, approved or denied determinations and any other requests to dedicated fax number below belonging to the campus where the patient is receiving treatment. List of dedicated fax numbers for the Facilities:  FACILITY NAME UR FAX NUMBER   ADMISSION DENIALS (Administrative/Medical Necessity) [phone]   DISCHARGE SUPPORT TEAM (NETWORK) [phone]   PARENT CHILD HEALTH (Maternity/NICU/Pediatrics) [phone]   [facility] [phone]   [facility] [phone]   [facility] [phone]   [facility] [phone]   [facility] [phone]    [facility] [phone]   [facility] [phone]   [facility] [phone]   [facility] [phone]   [facility] [phone]   [facility] [phone]   [facility] [phone]

## 2024-02-01 NOTE — PROGRESS NOTES
[facility]  Progress Note  Name: Joni Iglesias I  MRN: [id number]  Unit/Bed#: ICU 11 I Date of Admission: 1/31/2024   Date of Service: 2/1/2024  Hospital Day: 1    Assessment/Plan   Myasthenia gravis in crisis (Formerly [facility])  Assessment & Plan  Patient takes at home Pyridostigmine 60mg QID  He does not follow-up with Neurology as instructed  1/31 Chest XR similar to previous 5/2023, no apparent acute abnormalities   1/31 ABG completed: notable for pO2 low at 71.9    Plan  Neurology consulted and following  Recommend IVIG x 5 days and continuation of home Pyridostigmine  Speech therapy evaluated patient, so sign of dysphagia or distress, recommend regular diet  Respiratory evaluated TV  Yesterday 2.6L, Today 2.7L  Will continue to monitor, O2 if needed, encourage incentive spirometry    Ptosis, bilateral  Assessment & Plan  Suspected secondary to myasthenia gravis crisis  See plan above    CHF (congestive heart failure) (Formerly [facility])  Assessment & Plan  Wt Readings from Last 3 Encounters:   01/31/24 130 kg (286 lb 2.5 oz)   05/31/23 (!) 147 kg (324 lb)   10/13/22 (!) 145 kg (319 lb 10.7 oz)     ECHO 5/31/23: EF 40%  No outpatient medications addressing this condition    Plan  Continue to monitor UOP and s/s of fluid overload        CAD (coronary artery disease)  Assessment & Plan  History of stent placement 2006  ECHO 5/31/23: EF 40%  Patient reports was on ASA 81mg daily, but since PUD with GI bleed, ASA discontinued  Patient reports being prescribed Lipitor in the past, but hasn't taken due to financial difficulties  Lipid panel 1/31 results within normal limits    Plan  Continue monitoring for s/s fluid overload    Personal history of peptic ulcer disease  Assessment & Plan  GD 5/31/23 GI bleed from peptic ulcer  Outpatient regimen pantoprazole 40mg BID  Patient reports has completed prescribed course by Gastroenterology, but he has not followed up with Gastro as recommended  No current GI bleed  symptoms or PUD symptoms    Plan  Continue pantoprazole 40mg BID   Speech cleared patient for regular diet    Hyperlipidemia  Assessment & Plan  12/2/21   Patient reports not on Lipitor because of financial difficulty  1/31 Lipid panel results within normal limits    Plan  Lipid levels normal at this time, no actions necessary    Morbid obesity (HCC)  Assessment & Plan  Patient reports significantly reduced mobility and fitness due to vision changes, and reports was tole his poor vision due to myasthenia gravis  Patient reports feels safe at home, his son helps him get groceries and do things around the house when neeeded    Plan  PT/OT, Case Management consults in place    History of prediabetes  Assessment & Plan  Last A1C was 6.3 on 6/11/22   BMP yesterday and CMP this am revealed normal blood sugar    Plan  A1C not ordered as he currently doesn't have health insurance   Will continue monitoring blood sugar    Microcytic anemia  Assessment & Plan  2/1 Hbg 9.9 with mcv 63    Plan  Iron supplementation initiated    Poor vision  Assessment & Plan  EOMs limited on exam, no leftward movement of eyes bilaterally, patient reports relatively new problem  No ophthalmology visits in past due to inability to drive and financial difficulties  Patient reports significantly reduced mobility and fitness due to worsening vision over past few years, and reports was tole his poor vision due to myasthenia gravis  Patient reports feels safe at home, his son helps him get groceries and do things around the house when neeeded     Plan  PT/OT, Case Management consults in place             Disposition: Stepdown Level 1    ICU Core Measures     A: Assess, Prevent, and Manage Pain Has pain been assessed? Yes  Need for changes to pain regimen? No   B: Both SAT/SAT  N/A   C: Choice of Sedation RASS Goal: 0 Alert and Calm  Need for changes to sedation or analgesia regimen? No   D: Delirium CAM-ICU: Negative   E: Early Mobility  Plan  for early mobility? Yes   F: Family Engagement Plan for family engagement today? Yes         Prophylaxis:  VTE VTE covered by:  heparin (porcine), Subcutaneous, 5,000 Units at 02/01/24 0558       Stress Ulcer  covered bypantoprazole (PROTONIX) 40 mg tablet [[id number]] (Long-Term Med), pantoprazole (PROTONIX) 40 mg tablet [[id number]] (Long-Term Med), pantoprazole (PROTONIX) EC tablet 40 mg [[id number]]         Significant 24hr Events     24hr events: No acute events overnight. VS stable. Patient started on iron supplementation due to microcytic anemia Hg 11.1 and MCV 64. Ph, Mg, Ca, and Procalcitonin results normal. CMP wnl. Patient reports weakness generally improved since yesterday. He has complaints of straining with bowel movements, eye dryness, and intermittent sleep. Orders for Senakot, artificial tears, and melatonin were placed. Speech saw patient and cleared him for regular diet with thin liquids.    Patient appears stable and able to be downgraded from step down status.     Subjective   Review of Systems   Constitutional:  Negative for chills, diaphoresis and fever.   HENT:  Negative for trouble swallowing.    Eyes:  Positive for visual disturbance (eye sight poor, chronic).        Eye dryness   Respiratory:  Negative for cough and shortness of breath.    Cardiovascular:  Negative for chest pain and palpitations.   Gastrointestinal:  Positive for constipation (straining with BMs). Negative for abdominal pain, diarrhea, nausea and vomiting.   Genitourinary:  Negative for decreased urine volume and dysuria.   Musculoskeletal:  Negative for back pain.   Neurological:  Negative for dizziness, syncope, facial asymmetry, light-headedness and headaches.        Denies difficulty walking      Objective                            Vitals I/O      Most Recent Min/Max in 24hrs   Temp 97.9 °F (36.6 °C) Temp  Min: 97.9 °F (36.6 °C)  Max: 98.7 °F (37.1 °C)   Pulse 76 Pulse  Min: 55  Max: 76   Resp 16 Resp  Min: 13  Max: 30    /66 BP  Min: 114/73  Max: 145/69   O2 Sat 96 % SpO2  Min: 95 %  Max: 98 %      Intake/Output Summary (Last 24 hours) at 2/1/2024 1155  Last data filed at 2/1/2024 0800  Gross per 24 hour   Intake 660 ml   Output 3300 ml   Net -2640 ml       Diet Regular; Regular House    Invasive Monitoring           Physical Exam   Physical Exam  Eyes:      Pupils: Pupils are equal, round, and reactive to light.      Comments: Ptosis mildly improved since last evening   Skin:     General: Skin is warm, dry and not mottled extremities.      Capillary Refill: Capillary refill takes less than 2 seconds.      Coloration: Skin is not jaundiced or pale.   Cardiovascular:      Rate and Rhythm: Normal rate and regular rhythm.      Pulses: No decreased pulses.      Heart sounds: No murmur heard.     No friction rub. No gallop.   Musculoskeletal:      Right lower leg: No edema.      Left lower leg: No edema.   Abdominal: General: Bowel sounds are normal.      Palpations: Abdomen is soft.      Tenderness: There is no abdominal tenderness.   Constitutional:       General: He is not in acute distress.     Appearance: He is well-developed and well-nourished. He is not ill-appearing or toxic-appearing.   Pulmonary:      Effort: Pulmonary effort is normal.      Breath sounds: Normal breath sounds. No wheezing, rhonchi or rales.   Neurological:      Mental Status: He is alert and oriented to person, place and time. He is calm.            Diagnostic Studies      EKG: reviewed  Imaging:  I have personally reviewed pertinent films in PACS     Medications:  Scheduled PRN   chlorhexidine, 15 mL, Q12H TOÑA  ferrous sulfate, 325 mg, Daily With Breakfast  heparin (porcine), 5,000 Units, Q8H TOÑA  immune globulin, human, 400 mg/kg (Adjusted), Daily  melatonin, 3 mg, HS  pantoprazole, 40 mg, BID  pyridostigmine, 60 mg, 4x Daily  senna, 1 tablet, HS      Artificial Tears, 2 drop, Q4H PRN       Continuous          Labs:    CBC    Recent Labs      01/31/24 2116 02/01/24  0459   WBC 5.46 5.94   HGB 9.9* 11.1*   HCT 34.0* 38.7    238     BMP    Recent Labs     01/31/24 0958 02/01/24  0459   SODIUM 136 135   K 4.0 4.6    102   CO2 29 28   AGAP 6 5   BUN 13 11   CREATININE 0.97 0.92   CALCIUM 9.5 9.4       Coags    Recent Labs     01/31/24  0958   INR 1.01        Additional Electrolytes  Recent Labs     02/01/24  0459   MG 2.2   PHOS 4.1   CAIONIZED 1.17          Blood Gas    Recent Labs     01/31/24  1214   PHART 7.429   JVN0HED 41.4   PO2ART 71.9*   MFY0WGH 26.8   BEART 2.3   SOURCE Radial, Right     Recent Labs     01/31/24  1214   SOURCE Radial, Right    LFTs  Recent Labs     02/01/24  0459   ALT 9   AST 16   ALKPHOS 101   ALB 3.7   TBILI 0.57       Infectious  Recent Labs     01/31/24 2116 02/01/24  0459   PROCALCITONI 0.06 0.07     Glucose  Recent Labs     01/31/24 0958 02/01/24  0459   GLUC 91 85                   Missy Cuenca PA-C

## 2024-02-01 NOTE — RESPIRATORY THERAPY NOTE
02/01/24 0842   Additional Assessments   Pulse 76   Respirations (!) 30   SpO2 95 %   $ Vital Capacity Mech/Peak Flow Yes   Position Sitting   Vital Capacity 2.7 L   NIF 60 cm H2O

## 2024-02-01 NOTE — PHYSICAL THERAPY NOTE
PT Evaluation (13min)  (8:50-9:03)    Past Medical History:   Diagnosis Date    Coronary artery disease     Heart attack (HCC)     Hyperlipidemia     Myasthenia gravis (HCC)          02/01/24 0850   PT Last Visit   PT Visit Date 02/01/24   Note Type   Note type Evaluation   Pain Assessment   Pain Assessment Tool 0-10   Pain Score No Pain   Restrictions/Precautions   Weight Bearing Precautions Per Order No   Other Precautions Multiple lines;Telemetry;Fall Risk   Home Living   Type of Home House   Home Layout Two level;Performs ADLs on one level;Able to live on main level with bedroom/bathroom;Stairs to enter without rails  (1 CHARLIE)   Bathroom Shower/Tub Walk-in shower   Bathroom Toilet Standard   Bathroom Equipment Grab bars in shower;Built-in shower seat   Bathroom Accessibility Accessible   Home Equipment Walker;Cane   Prior Function   Level of [place] Independent with ADLs;Independent with functional mobility;Independent with IADLS   Lives With Son  (works)   Receives Help From Family   IADLs Family/Friend/Other provides meals;Independent with medication management;Family/Friend/Other provides transportation  (son (A) c transportation)   Falls in the last 6 months 0   Vocational Retired   General   Additional Pertinent History pt presents to [facility] c MG. PT consulted for mobility + d/c planning.   Family/Caregiver Present No   Cognition   Orientation Level Oriented X4   RUE Assessment   RUE Assessment WFL   LUE Assessment   LUE Assessment WFL   RLE Assessment   RLE Assessment WFL  (3/5)   LLE Assessment   LLE Assessment WFL  (4-/5)   Coordination   Sensation WFL   Bed Mobility   Supine to Sit Unable to assess  (pt OOB in chair upon arrival)   Transfers   Sit to Stand 6  Modified independent   Additional items Armrests   Stand to Sit 6  Modified independent   Additional items Armrests   Ambulation/Elevation   Gait pattern Inconsistent kelsie   Gait Assistance 7  Independent   Assistive Device None   Distance  "180'   Stair Management Assistance Not tested   Balance   Static Sitting Good   Dynamic Sitting Fair +   Static Standing Fair +   Dynamic Standing Fair   Ambulatory Fair   Endurance Deficit   Endurance Deficit Yes   Activity Tolerance   Activity Tolerance Patient limited by fatigue   Medical Staff Made Aware OT-[place] (co-eval 2* pt's multiple co-morbidities + mobility deficits requiring (A) to complete mobility tasks).   Nurse Made Aware ELINOR Vaca   Assessment   Prognosis Good   Problem List Decreased strength;Decreased endurance;Impaired balance;Decreased mobility   Assessment pt is a 62y/o m who presents to [facility] c MG. PT consulted for mobility + d/c planning. PMH significant for CAD, MI, + MG. at baseline, pt (I) c functional mobility tasks s AD. resides c son in ranch home c 1 CHARLIE. currently presents c deficits in strength, balance, gait quality, + activity tolerance noted in PT exam above. Barthel Index 80/100, Modified [place] 2. despite above impairments, pt able to complete mobility tasks mod (I)/(I). ambulated 180' (I) s overt loss of balance or need for AD. at this time, pt has no further skilled PT needs. AM-PAC raw score 23 indicating pt safe for d/c home, however please refer to PT d/c recommendation for safe d/c planning. PT eval of high complexity 2* unstable med status c pt requiring ongoing medical management 2* MG. pt remains in ICU c multiple lines. presents c multiple co-morbidities + mobility deficits.   Barriers to Discharge None   Goals   Patient Goals \"to go home\".   Plan   Treatment/Interventions Spoke to nursing;OT   Discharge Recommendation   Rehab Resource Intensity Level, PT No post-acute rehabilitation needs   AM-PAC Basic Mobility Inpatient   Turning in Flat Bed Without Bedrails 4   Lying on Back to Sitting on Edge of Flat Bed Without Bedrails 4   Moving Bed to Chair 4   Standing Up From Chair Using Arms 4   Walk in Room 4   Climb 3-5 Stairs With Railing 3   Basic Mobility Inpatient " Raw Score 23   Basic Mobility Standardized Score 50.88   Highest Level Of Mobility   -[facility] Goal 7: Walk 25 feet or more   -HL Achieved 7: Walk 25 feet or more   Modified [place] Scale   Modified Phill Scale 2   Barthel Index   Feeding 10   Bathing 0   Grooming Score 5   Dressing Score 5   Bladder Score 10   Bowels Score 10   Toilet Use Score 10   Transfers (Bed/Chair) Score 15   Mobility (Level Surface) Score 15   Stairs Score 0   Barthel Index Score 80   End of Consult   Patient Position at End of Consult Bedside chair;All needs within reach     KERA AcostaT

## 2024-02-01 NOTE — ASSESSMENT & PLAN NOTE
EOMs limited on exam, no leftward movement of eyes bilaterally, patient reports relatively new problem  No ophthalmology visits in past due to inability to drive and financial difficulties  Patient reports significantly reduced mobility and fitness due to worsening vision over past few years, and reports was tole his poor vision due to myasthenia gravis  Patient reports feels safe at home, his son helps him get groceries and do things around the house when neeeded     Plan  PT/OT, Case Management consults in place

## 2024-02-01 NOTE — ASSESSMENT & PLAN NOTE
GD 5/31/23 GI bleed from peptic ulcer  Outpatient regimen pantoprazole 40mg BID  Patient reports has completed prescribed course by Gastroenterology, but he has not followed up with Gastro as recommended  No current GI bleed symptoms or PUD symptoms    Plan  Continue pantoprazole 40mg BID   Speech cleared patient for regular diet

## 2024-02-01 NOTE — ASSESSMENT & PLAN NOTE
Patient takes at home Pyridostigmine 60mg QID  He does not follow-up with Neurology as instructed  1/31 Chest XR similar to previous 5/2023, no apparent acute abnormalities   1/31 ABG completed: notable for pO2 low at 71.9    Plan  Neurology consulted and following  Recommend IVIG x 5 days and continuation of home Pyridostigmine  Speech therapy evaluated patient, so sign of dysphagia or distress, recommend regular diet  Respiratory evaluated TV  Yesterday 2.6L, Today 2.7L  Will continue to monitor, O2 if needed, encourage incentive spirometry

## 2024-02-01 NOTE — ASSESSMENT & PLAN NOTE
History of stent placement 2006  ECHO 5/31/23: EF 40%  Patient reports was on ASA 81mg daily, but since PUD with GI bleed, ASA discontinued  Patient reports being prescribed Lipitor in the past, but hasn't taken due to financial difficulties  Lipid panel 1/31 results within normal limits    Plan  Continue monitoring for s/s fluid overload

## 2024-02-01 NOTE — PROGRESS NOTES
"Progress Note - Neurology   Joni Iglesias 63 y.o. male [id number]  Unit/Bed#: ICU 11/ICU 11    Assessment/Plan:      Myasthenia gravis in crisis (HCC)  Assessment & Plan  63 y.o.  male with myasthenia gravis maintained on mestinon, CAD, HLD, and obesity,  who presents to [facility] on 1/31/24 with worsening myasthenia symptoms (difficulty swallowing food, worsening ptosis, worsening weakness).    Neurodiagnostics:   - CTH wo contrast 1/31/24:   \"No acute intracranial abnormality.\"    Myasthenic crisis, provoked by unknown cause.     Plan  Recommend IVIG x 5 days (2/1 is day 2/5). Recommend monitoring heart function closely with EF 40%.   Recommend continuing home mestinon 60 mg QID  Recommend NIF/VC  Qshift  2/1/24 0842 AM: NIF 60 cm H2O, VC 2.7L  Recommend infections workup   Recommend close monitoring of neurologic exam  Medications to avoid in MG:  Would avoid the use of neuromuscular blockers  Macrolides, fluoroquinolones, aminoglycosides, tetracycline, and chloroquine, D-penicillamine  Antidysrhythmic agents - Beta blockers, calcium channel blockers, quinidine, lidocaine, procainamide, and trimethaphan  Antipsychotics - Phenothiazines, sulpride, atypicals  Cardiovascular- Propanolol, quinidine, verapamil, bretylium, statins  Miscellaneous - Diphenylhydantoin, magnesium sulfate, lithium, chlorpromazine, muscle relaxants, levothyroxine, adrenocorticotropic hormone (ACTH). Glucocorticoids can also exacerbate MG.  Medical management and correction of metabolic and infectious disturbances per primary team   Avoid CNS altering medications if possible  Continue supportive care   Continue to monitor neurologic status. Notify neurology with any changes in exam.                Joni Iglesias will need follow up in in 4 weeks with neuromuscular attending  .  He will not require outpatient neurological testing.     Case and plan discussed with attending neurologist.  Please see attending attestation for any further " recommendations and/or changes to plan.      Subjective:   Pt reports much improvement today. Pt reports being able to ambulate today in hallway. Pt reports better eye opening (especially in R eye) today. Pt denies SOB.         Past Medical History:   Diagnosis Date    Coronary artery disease     Heart attack (HCC)     Hyperlipidemia     Myasthenia gravis (HCC)      Past Surgical History:   Procedure Laterality Date    APPENDECTOMY      CORONARY ANGIOPLASTY WITH STENT PLACEMENT       No family history on file.  Social History     Socioeconomic History    Marital status: /Civil Union     Spouse name: Not on file    Number of children: Not on file    Years of education: Not on file    Highest education level: Not on file   Occupational History    Not on file   Tobacco Use    Smoking status: Never    Smokeless tobacco: Never   Vaping Use    Vaping status: Never Used   Substance and Sexual Activity    Alcohol use: Never    Drug use: Never    Sexual activity: Not on file   Other Topics Concern    Not on file   Social History Narrative    Not on file     Social Determinants of Health     Financial Resource Strain: Not on file   Food Insecurity: No Food Insecurity (1/31/2024)    Hunger Vital Sign     Worried About Running Out of Food in the Last Year: Never true     Ran Out of Food in the Last Year: Never true   Transportation Needs: Unmet Transportation Needs (1/31/2024)    PRAPARE - Transportation     Lack of Transportation (Medical): Yes     Lack of Transportation (Non-Medical): Yes   Physical Activity: Not on file   Stress: Not on file   Social Connections: Not on file   Intimate Partner Violence: Not on file   Housing Stability: Low Risk  (1/31/2024)    Housing Stability Vital Sign     Unable to Pay for Housing in the Last Year: No     Number of Places Lived in the Last Year: 1     Unstable Housing in the Last Year: No     E-Cigarette/Vaping    E-Cigarette Use Never User      E-Cigarette/Vaping Substances     Nicotine No     THC No     CBD No     Flavoring No     Other No     Unknown No          Medications:  All current active meds have been reviewed and current meds:  Scheduled Meds:  Current Facility-Administered Medications   Medication Dose Route Frequency Provider Last Rate    chlorhexidine  15 mL Mouth/Throat Q12H [facility] MARCELLA Art      ferrous sulfate  325 mg Oral Daily With Breakfast MARCELLA Ingram      heparin (porcine)  5,000 Units Subcutaneous Q8H TO[facility]A MARCELLA Art      immune globulin, human  400 mg/kg (Adjusted) Intravenous Daily MARCELLA Art 177.3 mL/hr at 02/01/24 1011    pantoprazole  40 mg Oral BID MARCELLA Art      pyridostigmine  60 mg Oral 4x Daily Arlene Cordoba MD       Continuous Infusions:   PRN Meds:.       ROS:   Review of Systems   Respiratory:  Negative for shortness of breath.    Neurological:  Positive for weakness ((improved)). Negative for numbness.             Vitals:   /75   Pulse 76   Temp 98.2 °F (36.8 °C)   Resp (!) 30   Ht 6' (1.829 m)   Wt 111 kg (243 lb 13.3 oz)   SpO2 95%   BMI 33.07 kg/m²     Physical Exam:   Physical Exam  Vitals and nursing note reviewed.   Constitutional:       General: He is not in acute distress.     Appearance: He is not diaphoretic.   HENT:      Head: Normocephalic and atraumatic.   Eyes:      General: No scleral icterus.        Right eye: No discharge.         Left eye: No discharge.      Conjunctiva/sclera: Conjunctivae normal.   Cardiovascular:      Rate and Rhythm: Normal rate.   Pulmonary:      Effort: Pulmonary effort is normal.   Neurological:      Mental Status: He is alert.      Coordination: Finger-Nose-Finger Test normal.   Psychiatric:      Comments: Pleasant and cooperative during exam        Neurologic Exam     Mental Status   Follows 1 step commands.   Attention: appropriately attends to provider. Concentration: no redirection required during exam.   Speech: (Speech fluent, no dysarthria  "appreciated on exam )  Level of consciousness: alert  Able to name object. Able to repeat (\"no ifs, ands, or buts\").     Cranial Nerves     CN II   Visual acuity: (grossly intact with eyelids lifted open)  Right visual field deficit: none  Left visual field deficit: none     CN III, IV, VI   Conjugate gaze: present    CN V   Facial sensation intact.   Right facial sensation deficit: none  Left facial sensation deficit: none    CN XII   CN XII normal.   Tongue: not atrophic  Fasciculations: absent  Tongue deviation: none  - b/l eyelid ptosis, L>R  - restricted EOMs      Motor Exam   Strength to confrontation testing:  - Bilateral handgrip 5/5  - Bilateral elbow flexion extension 5/5  -Left shoulder abduction 4+/5  - Right shoulder abduction 5/5  -Left hip flexion 4+/5  - Right hip flexion 5/5  -Bilateral knee flexion and extension 5/5  - Bilateral plantarflexion and dorsiflexion 5/5     Sensory Exam   Light touch normal.   Right arm light touch: normal  Left arm light touch: normal  Right leg light touch: normal  Left leg light touch: normal    Gait, Coordination, and Reflexes     Gait  Gait: (deferred for patient safety)    Coordination   Finger to nose coordination: normal          Labs: I have personally reviewed pertinent reports.   Recent Results (from the past 24 hour(s))   Blood gas, arterial    Collection Time: 01/31/24 12:14 PM   Result Value Ref Range    pH, Arterial 7.429 7.350 - 7.450    pCO2, Arterial 41.4 36.0 - 44.0 mm Hg    pO2, Arterial 71.9 (L) 75.0 - 129.0 mm Hg    HCO3, Arterial 26.8 22.0 - 28.0 mmol/L    Base Excess, Arterial 2.3 mmol/L    O2 Content, Arterial 14.7 (L) 16.0 - 23.0 mL/dL    O2 HGB,Arterial  92.8 (L) 94.0 - 97.0 %    SOURCE Radial, Right     ANITA TEST Yes     ROOM AIR FIO2 21 %   CBC and differential    Collection Time: 01/31/24  9:16 PM   Result Value Ref Range    WBC 5.46 4.31 - 10.16 Thousand/uL    RBC 5.37 3.88 - 5.62 Million/uL    Hemoglobin 9.9 (L) 12.0 - 17.0 g/dL    " Hematocrit 34.0 (L) 36.5 - 49.3 %    MCV 63 (L) 82 - 98 fL    MCH 18.4 (L) 26.8 - 34.3 pg    MCHC 29.1 (L) 31.4 - 37.4 g/dL    RDW 19.3 (H) 11.6 - 15.1 %    MPV 9.5 8.9 - 12.7 fL    Platelets 281 149 - 390 Thousands/uL    nRBC 0 /100 WBCs    Neutrophils Relative 62 43 - 75 %    Immat GRANS % 0 0 - 2 %    Lymphocytes Relative 25 14 - 44 %    Monocytes Relative 8 4 - 12 %    Eosinophils Relative 5 0 - 6 %    Basophils Relative 0 0 - 1 %    Neutrophils Absolute 3.39 1.85 - 7.62 Thousands/µL    Immature Grans Absolute 0.01 0.00 - 0.20 Thousand/uL    Lymphocytes Absolute 1.34 0.60 - 4.47 Thousands/µL    Monocytes Absolute 0.44 0.17 - 1.22 Thousand/µL    Eosinophils Absolute 0.26 0.00 - 0.61 Thousand/µL    Basophils Absolute 0.02 0.00 - 0.10 Thousands/µL   Procalcitonin    Collection Time: 01/31/24  9:16 PM   Result Value Ref Range    Procalcitonin 0.06 <=0.25 ng/ml   Comprehensive metabolic panel    Collection Time: 02/01/24  4:59 AM   Result Value Ref Range    Sodium 135 135 - 147 mmol/L    Potassium 4.6 3.5 - 5.3 mmol/L    Chloride 102 96 - 108 mmol/L    CO2 28 21 - 32 mmol/L    ANION GAP 5 mmol/L    BUN 11 5 - 25 mg/dL    Creatinine 0.92 0.60 - 1.30 mg/dL    Glucose 85 65 - 140 mg/dL    Calcium 9.4 8.4 - 10.2 mg/dL    AST 16 13 - 39 U/L    ALT 9 7 - 52 U/L    Alkaline Phosphatase 101 34 - 104 U/L    Total Protein 7.9 6.4 - 8.4 g/dL    Albumin 3.7 3.5 - 5.0 g/dL    Total Bilirubin 0.57 0.20 - 1.00 mg/dL    eGFR 87 ml/min/1.73sq m   CBC and differential    Collection Time: 02/01/24  4:59 AM   Result Value Ref Range    WBC 5.94 4.31 - 10.16 Thousand/uL    RBC 6.02 (H) 3.88 - 5.62 Million/uL    Hemoglobin 11.1 (L) 12.0 - 17.0 g/dL    Hematocrit 38.7 36.5 - 49.3 %    MCV 64 (L) 82 - 98 fL    MCH 18.4 (L) 26.8 - 34.3 pg    MCHC 28.7 (L) 31.4 - 37.4 g/dL    RDW 20.1 (H) 11.6 - 15.1 %    MPV 9.7 8.9 - 12.7 fL    Platelets 238 149 - 390 Thousands/uL    nRBC 0 /100 WBCs    Neutrophils Relative 60 43 - 75 %    Immat GRANS % 0 0  - 2 %    Lymphocytes Relative 25 14 - 44 %    Monocytes Relative 9 4 - 12 %    Eosinophils Relative 5 0 - 6 %    Basophils Relative 1 0 - 1 %    Neutrophils Absolute 3.56 1.85 - 7.62 Thousands/µL    Immature Grans Absolute 0.01 0.00 - 0.20 Thousand/uL    Lymphocytes Absolute 1.50 0.60 - 4.47 Thousands/µL    Monocytes Absolute 0.55 0.17 - 1.22 Thousand/µL    Eosinophils Absolute 0.29 0.00 - 0.61 Thousand/µL    Basophils Absolute 0.03 0.00 - 0.10 Thousands/µL   Calcium, ionized    Collection Time: 02/01/24  4:59 AM   Result Value Ref Range    Calcium, Ionized 1.17 1.12 - 1.32 mmol/L   Procalcitonin    Collection Time: 02/01/24  4:59 AM   Result Value Ref Range    Procalcitonin 0.07 <=0.25 ng/ml   Magnesium    Collection Time: 02/01/24  4:59 AM   Result Value Ref Range    Magnesium 2.2 1.9 - 2.7 mg/dL   Phosphorus    Collection Time: 02/01/24  4:59 AM   Result Value Ref Range    Phosphorus 4.1 2.3 - 4.1 mg/dL       Imaging: I have personally reviewed pertinent imaging in PACS, including CTH wo contrast 2/1/24,  and I have personally reviewed PACS reports.     EKG, Pathology, and Other Studies: I have personally reviewed pertinent reports.       VTE Prophylaxis: Heparin      This note was completed in part utilizing Dragon Software.  Grammatical errors, random word insertions, spelling mistakes, and incomplete sentences may be an occasional consequence of this system secondary to software limitations, ambient noise, and hardware issues.  If you have any questions or concerns about the content, text, or information contained within the body of this dictation, please contact the provider for clarification.

## 2024-02-01 NOTE — PLAN OF CARE
Problem: Potential for Falls  Goal: Patient will remain free of falls  Description: INTERVENTIONS:  - Educate patient/family on patient safety including physical limitations  - Instruct patient to call for assistance with activity   - Consult OT/PT to assist with strengthening/mobility   - Keep Call bell within reach  - Keep bed low and locked with side rails adjusted as appropriate  - Keep care items and personal belongings within reach  - Initiate and maintain comfort rounds  - Make Fall Risk Sign visible to staff  - Offer Toileting every 2 Hours, in advance of need  - Initiate/Maintain bed alarm  - Obtain necessary fall risk management equipment: yes   - Apply yellow socks and bracelet for high fall risk patients  - Consider moving patient to room near nurses station  Outcome: Progressing     Problem: PAIN - ADULT  Goal: Verbalizes/displays adequate comfort level or baseline comfort level  Description: Interventions:  - Encourage patient to monitor pain and request assistance  - Assess pain using appropriate pain scale  - Administer analgesics based on type and severity of pain and evaluate response  - Implement non-pharmacological measures as appropriate and evaluate response  - Consider cultural and social influences on pain and pain management  - Notify physician/advanced practitioner if interventions unsuccessful or patient reports new pain  Outcome: Progressing     Problem: INFECTION - ADULT  Goal: Absence or prevention of progression during hospitalization  Description: INTERVENTIONS:  - Assess and monitor for signs and symptoms of infection  - Monitor lab/diagnostic results  - Monitor all insertion sites, i.e. indwelling lines, tubes, and drains  - Monitor endotracheal if appropriate and nasal secretions for changes in amount and color  - [place] appropriate cooling/warming therapies per order  - Administer medications as ordered  - Instruct and encourage patient and family to use good hand hygiene  technique  - Identify and instruct in appropriate isolation precautions for identified infection/condition  Outcome: Progressing  Goal: Absence of fever/infection during neutropenic period  Description: INTERVENTIONS:  - Monitor WBC    Outcome: Progressing     Problem: SAFETY ADULT  Goal: Patient will remain free of falls  Description: INTERVENTIONS:  - Educate patient/family on patient safety including physical limitations  - Instruct patient to call for assistance with activity   - Consult OT/PT to assist with strengthening/mobility   - Keep Call bell within reach  - Keep bed low and locked with side rails adjusted as appropriate  - Keep care items and personal belongings within reach  - Initiate and maintain comfort rounds  - Make Fall Risk Sign visible to staff  - Offer Toileting every 2 Hours, in advance of need  - Initiate/Maintain bed alarm  - Obtain necessary fall risk management equipment: yes   - Apply yellow socks and bracelet for high fall risk patients  - Consider moving patient to room near nurses station  Outcome: Progressing     Problem: DISCHARGE PLANNING  Goal: Discharge to home or other facility with appropriate resources  Description: INTERVENTIONS:  - Identify barriers to discharge w/patient and caregiver  - Arrange for needed discharge resources and transportation as appropriate  - Identify discharge learning needs (meds, wound care, etc.)  - Arrange for interpretive services to assist at discharge as needed  - Refer to Case Management Department for coordinating discharge planning if the patient needs post-hospital services based on physician/advanced practitioner order or complex needs related to functional status, cognitive ability, or social support system  Outcome: Progressing     Problem: Knowledge Deficit  Goal: Patient/family/caregiver demonstrates understanding of disease process, treatment plan, medications, and discharge instructions  Description: Complete learning assessment and  assess knowledge base.  Interventions:  - Provide teaching at level of understanding  - Provide teaching via preferred learning methods  Outcome: Progressing     Problem: Nutrition/Hydration-ADULT  Goal: Nutrient/Hydration intake appropriate for improving, restoring or maintaining nutritional needs  Description: Monitor and assess patient's nutrition/hydration status for malnutrition. Collaborate with interdisciplinary team and initiate plan and interventions as ordered.  Monitor patient's weight and dietary intake as ordered or per policy. Utilize nutrition screening tool and intervene as necessary. Determine patient's food preferences and provide high-protein, high-caloric foods as appropriate.     INTERVENTIONS:  - Monitor oral intake, urinary output, labs, and treatment plans  - Assess nutrition and hydration status and recommend course of action  - Evaluate amount of meals eaten  - Assist patient with eating if necessary   - Allow adequate time for meals  - Recommend/ encourage appropriate diets, oral nutritional supplements, and vitamin/mineral supplements  - Order, calculate, and assess calorie counts as needed  - Recommend, monitor, and adjust tube feedings and TPN/PPN based on assessed needs  - Assess need for intravenous fluids  - Provide specific nutrition/hydration education as appropriate  - Include patient/family/caregiver in decisions related to nutrition  Outcome: Progressing     Problem: MUSCULOSKELETAL - ADULT  Goal: Maintain or return mobility to safest level of function  Description: INTERVENTIONS:  - Assess patient's ability to carry out ADLs; assess patient's baseline for ADL function and identify physical deficits which impact ability to perform ADLs (bathing, care of mouth/teeth, toileting, grooming, dressing, etc.)  - Assess/evaluate cause of self-care deficits   - Assess range of motion  - Assess patient's mobility  - Assess patient's need for assistive devices and provide as appropriate  -  Encourage maximum independence but intervene and supervise when necessary  - Involve family in performance of ADLs  - Assess for home care needs following discharge   - Consider OT consult to assist with ADL evaluation and planning for discharge  - Provide patient education as appropriate  Outcome: Progressing  Goal: Maintain proper alignment of affected body part  Description: INTERVENTIONS:  - Support, maintain and protect limb and body alignment  - Provide patient/ family with appropriate education  Outcome: Progressing

## 2024-02-01 NOTE — ASSESSMENT & PLAN NOTE
Last A1C was 6.3 on 6/11/22   BMP yesterday and CMP this am revealed normal blood sugar    Plan  A1C not ordered as he currently doesn't have health insurance   Will continue monitoring blood sugar

## 2024-02-01 NOTE — PLAN OF CARE
Recommendations:   Diet: regular diet and thin liquids   Meds: whole with liquid   Feeding assistance: tray set up   Frequent Oral care: 2-4x/day  Aspiration precautions and compensatory swallowing strategies: upright posture  Other Recommendations/ considerations: ST will follow-up x1-2 to ensure no changes/adequate mgmt of oral diet.

## 2024-02-01 NOTE — ASSESSMENT & PLAN NOTE
12/2/21   Patient reports not on Lipitor because of financial difficulty  1/31 Lipid panel results within normal limits    Plan  Lipid levels normal at this time, no actions necessary

## 2024-02-01 NOTE — SPEECH THERAPY NOTE
Speech-Language Pathology Bedside Swallow Evaluation        Patient Name: Joni Iglesias    Today's Date: 2/1/2024     Problem List  Active Problems:    Ptosis, bilateral    CAD (coronary artery disease)    Morbid obesity ([facility])    Hyperlipidemia    Myasthenia gravis in crisis ([facility])    CHF (congestive heart failure) ([facility])    Personal history of peptic ulcer disease    Poor vision    Microcytic anemia     Past Medical History  Past Medical History:   Diagnosis Date    Coronary artery disease     Heart attack (HCC)     Hyperlipidemia     Myasthenia gravis ([facility])        Past Surgical History  Past Surgical History:   Procedure Laterality Date    APPENDECTOMY      CORONARY ANGIOPLASTY WITH STENT PLACEMENT         Summary/Impressions:   Bedside observations support grossly intact oropharyngeal swallow function across all consistencies tested.  Self-fed solid and liquid trials with no s/s of dysphagia or distress.  Patient reports improvement in symptoms of MG exacerbation since initiation of IVIG, receiving Mestinon as well.  Does report some difficulty x 2 days prior while not receiving med's regularly.  Demonstrates good understanding of deficits.        Recommendations:   Diet: regular diet and thin liquids   Meds: whole with liquid   Feeding assistance: tray set up   Frequent Oral care: 2-4x/day  Aspiration precautions and compensatory swallowing strategies: upright posture  Other Recommendations/ considerations: ST will follow-up x1-2 to ensure no changes/adequate mgmt of oral diet.        Current Medical Status  Pt is a 63 y.o. male who presented to [facility] with myasthenia gravis maintained on mestinon, CAD, HLD, and obesity,  who presents to [facility] on 1/31/24 with worsening myasthenia symptoms (difficulty swallowing food, worsening ptosis, worsening weakness).  Myasthenic crisis, provoked by unknown cause.   PT known to this dept from prev MG crisis.      Past medical history:  Please see H&P for  details    Special Studies:  1/31/24 CT head wo contrast:  No acute intracranial abnormality.     1/31/24 Chest XR portable:  No acute cardiopulmonary disease.     Social/Education/Vocational Hx:  Pt lives alone    Swallow Information   Current Risks for Dysphagia & Aspiration: known history of dysphagia 2/2 MG   Current Symptoms/Concerns: coughing with po  Current Diet: NPO   Baseline Diet: regular diet and thin liquids    Baseline Assessment   Behavior/Cognition: alert  Speech/Language Status: able to participate in conversation  Patient Positioning: upright in chair    Swallow Mechanism Exam   Facial: symmetrical  Labial: WFL  Lingual: WFL  Velum: symmetrical  Mandible: adequate ROM  Dentition: adequate  Vocal quality:clear/adequate   Volitional Cough: strong/productive   Respiratory: RA    Consistencies Assessed and Performance   Consistencies Administered: thin liquids, puree, soft solids, and hard solids    Oral Stage: WFL. Patient presents with adequate bolus acceptance, containment and manipulation.  Mastication judged to be efficient and complete.  No oral residue.     Pharyngeal Stage: Appears functional.  Laryngeal rise noted upon palpation.  Swallow initiation appears timely.  No overt s/s of aspiration or distress.  Vocal quality remains clear and dry.     Esophageal Concerns: none reported     Plan  Will continue to follow 1-2x to ensure tolerance to oral diet.    Goals:  Pt will tolerate regular with thin across 1-3x diagnostic sessions with no overt s/s of aspiration or distress.      Results Reviewed with: patient, RN, MD, and MARCELLA       Speech Therapy Prognosis   Prognosis: good  Prognosis Considerations: cognitive status      Kaur Lay MS, CCC-SLP  Speech-Language Pathologist  [place] #[id number]  [place] #[id number]

## 2024-02-01 NOTE — OCCUPATIONAL THERAPY NOTE
Occupational Therapy Evaluation     Patient Name: Joni Iglesias  Today's Date: 2/1/2024  Problem List  Principal Problem:    Myasthenia gravis in crisis ([facility])  Active Problems:    Ptosis, bilateral    CAD (coronary artery disease)    Morbid obesity ([facility])    Hyperlipidemia    CHF (congestive heart failure) ([facility])    Personal history of peptic ulcer disease    Poor vision    Microcytic anemia    History of prediabetes    Past Medical History  Past Medical History:   Diagnosis Date    Coronary artery disease     Heart attack ([facility])     Hyperlipidemia     Myasthenia gravis ([facility])      Past Surgical History  Past Surgical History:   Procedure Laterality Date    APPENDECTOMY      CORONARY ANGIOPLASTY WITH STENT PLACEMENT             02/01/24 0903   OT Last Visit   OT Visit Date 02/01/24   Note Type   Note type Evaluation   Pain Assessment   Pain Assessment Tool 0-10   Pain Score No Pain   Restrictions/Precautions   Weight Bearing Precautions Per Order No   Other Precautions Multiple lines;Telemetry;Fall Risk   Home Living   Type of Home House   Home Layout Two level;Performs ADLs on one level;Able to live on main level with bedroom/bathroom;Stairs to enter without rails  (1 CHARLIE)   Bathroom Shower/Tub Walk-in shower   Bathroom Toilet Standard   Bathroom Equipment Grab bars in shower;Built-in shower seat   Bathroom Accessibility Accessible   Home Equipment Walker;Cane   Prior Function   Level of [place] Independent with ADLs;Independent with functional mobility;Independent with IADLS   Lives With Son   Receives Help From Family   IADLs Family/Friend/Other provides transportation;Family/Friend/Other provides meals;Independent with medication management   Falls in the last 6 months 0   Vocational Retired   General   Family/Caregiver Present No   Subjective   Subjective Pt agreeable to therapy evaluation   ADL   Where Assessed Other (Comment)  (Assist levels for some self care tasks are based on functional assessment of  performance skills and deficits observed during session.)   Eating Assistance 6  Modified independent   Eating Deficit Setup   Grooming Assistance 6  Modified Independent   Grooming Deficit Setup  (while seated, pt also demonstrated functional ability to perform at sink level)   LB Dressing Assistance 5  Supervision/Setup   LB Dressing Deficit Setup   Toileting Assistance  5  Supervision/Setup   Toileting Deficit Setup;Increased time to complete;Supervison/safety   Bed Mobility   Additional Comments pt received seated in bed side chair, pt remained in bed side chair at end of session   Transfers   Sit to Stand 6  Modified independent   Additional items Armrests   Stand to Sit 6  Modified independent   Additional items Armrests   Stand pivot 6  Modified independent   Additional items   (pusing IV pole)   Additional Comments pt performed functional ambulation in the room and hallway while pusing IV pole   Functional Mobility   Functional Mobility 6  Modified independent   Activity Tolerance   Activity Tolerance Patient tolerated treatment well   Medical Staff Made Aware Drew HART  (Pt seen for co-evaluation with Physical Therapist due to pt's medical complexity, functional limitations and limited activity tolerance.)   Nurse Made Aware Lio ALDRICH   RUE Assessment   RUE Assessment WFL   LUE Assessment   LUE Assessment WFL   Hand Function   Gross Motor Coordination Functional   Fine Motor Coordination Functional   Sensation   Light Touch No apparent deficits   Vision-Basic Assessment   Current Vision   (pt reports poor vision at times. He notes difficulty with focus/accommadation, and typically uses type to keep one eye open due to chronic ptosis)   Psychosocial   Psychosocial (WDL) WDL   Cognition   Overall Cognitive Status WFL   Arousal/Participation Alert;Cooperative   Attention Within functional limits   Orientation Level Oriented X4   Memory Within functional limits   Following Commands Follows all commands and  directions without difficulty   Assessment   Limitation Decreased high-level ADLs;Decreased endurance   Assessment Pt is a 63 y.o. male seen for OT evaluation s/p admit to [facility] on 1/31/2024 w/ Myasthenia gravis in crisis (HCC).  Comorbidities affecting pt's functional performance at time of assessment include: HTN, obesity, underlying neurological disorder, limited vision, previous surgery, and CHF. Personal factors affecting pt at time of IE include:steps to enter environment, difficulty performing IADLS , and health management . Prior to admission, pt was independent with ADLs and functional mobility without AD. Upon evaluation: Pt was modified independent during mobility while pushing IV pole, and supervision during some basic ADLs 2* the following deficits impacting occupational performance: decreased tolerance. Pt reports he is near his functional baseline, and will return to independence as he mobilizes in the room. Pt with no further acute OT needs at this time, will complete orders. From OT standpoint, recommendation at time of d/c would be no further OT needs.   Goals   Patient Goals to go home   Plan   OT Frequency Eval only   Discharge Recommendation   Rehab Resource Intensity Level, OT No post-acute rehabilitation needs   AM-PAC Daily Activity Inpatient   Lower Body Dressing 3   Bathing 3   Toileting 4   Upper Body Dressing 4   Grooming 4   Eating 4   Daily Activity Raw Score 22   Daily Activity Standardized Score (Calc for Raw Score >=11) 47.1   AM-PAC Applied Cognition Inpatient   Following a Speech/Presentation 4   Understanding Ordinary Conversation 4   Taking Medications 4   Remembering Where Things Are Placed or Put Away 4   Remembering List of 4-5 Errands 4   Taking Care of Complicated Tasks 4   Applied Cognition Raw Score 24   Applied Cognition Standardized Score 62.21       Brenna Hurtado OTR/L

## 2024-02-02 ENCOUNTER — APPOINTMENT (INPATIENT)
Dept: CT IMAGING | Facility: HOSPITAL | Age: 64
DRG: 042 | End: 2024-02-02
Payer: COMMERCIAL

## 2024-02-02 LAB
ANION GAP SERPL CALCULATED.3IONS-SCNC: 2 MMOL/L
BUN SERPL-MCNC: 16 MG/DL (ref 5–25)
CALCIUM SERPL-MCNC: 9 MG/DL (ref 8.4–10.2)
CHLORIDE SERPL-SCNC: 102 MMOL/L (ref 96–108)
CO2 SERPL-SCNC: 30 MMOL/L (ref 21–32)
CREAT SERPL-MCNC: 1 MG/DL (ref 0.6–1.3)
ERYTHROCYTE [DISTWIDTH] IN BLOOD BY AUTOMATED COUNT: 19.5 % (ref 11.6–15.1)
GFR SERPL CREATININE-BSD FRML MDRD: 79 ML/MIN/1.73SQ M
GLUCOSE SERPL-MCNC: 115 MG/DL (ref 65–140)
HCT VFR BLD AUTO: 34.9 % (ref 36.5–49.3)
HGB BLD-MCNC: 9.9 G/DL (ref 12–17)
MCH RBC QN AUTO: 18.2 PG (ref 26.8–34.3)
MCHC RBC AUTO-ENTMCNC: 28.4 G/DL (ref 31.4–37.4)
MCV RBC AUTO: 64 FL (ref 82–98)
PLATELET # BLD AUTO: 279 THOUSANDS/UL (ref 149–390)
PMV BLD AUTO: 9.2 FL (ref 8.9–12.7)
POTASSIUM SERPL-SCNC: 4 MMOL/L (ref 3.5–5.3)
RBC # BLD AUTO: 5.45 MILLION/UL (ref 3.88–5.62)
SODIUM SERPL-SCNC: 134 MMOL/L (ref 135–147)
WBC # BLD AUTO: 5.22 THOUSAND/UL (ref 4.31–10.16)

## 2024-02-02 PROCEDURE — 99233 SBSQ HOSP IP/OBS HIGH 50: CPT | Performed by: PSYCHIATRY & NEUROLOGY

## 2024-02-02 PROCEDURE — 85027 COMPLETE CBC AUTOMATED: CPT | Performed by: NURSE PRACTITIONER

## 2024-02-02 PROCEDURE — 92526 ORAL FUNCTION THERAPY: CPT | Performed by: SPEECH-LANGUAGE PATHOLOGIST

## 2024-02-02 PROCEDURE — G1004 CDSM NDSC: HCPCS

## 2024-02-02 PROCEDURE — 70450 CT HEAD/BRAIN W/O DYE: CPT

## 2024-02-02 PROCEDURE — 94150 VITAL CAPACITY TEST: CPT

## 2024-02-02 PROCEDURE — 80048 BASIC METABOLIC PNL TOTAL CA: CPT | Performed by: NURSE PRACTITIONER

## 2024-02-02 PROCEDURE — 99232 SBSQ HOSP IP/OBS MODERATE 35: CPT | Performed by: INTERNAL MEDICINE

## 2024-02-02 RX ADMIN — CHLORHEXIDINE GLUCONATE 0.12% ORAL RINSE 15 ML: 1.2 LIQUID ORAL at 21:50

## 2024-02-02 RX ADMIN — HEPARIN SODIUM 5000 UNITS: 5000 INJECTION INTRAVENOUS; SUBCUTANEOUS at 05:00

## 2024-02-02 RX ADMIN — PYRIDOSTIGMINE BROMIDE 60 MG: 60 TABLET ORAL at 05:00

## 2024-02-02 RX ADMIN — SENNOSIDES 8.6 MG: 8.6 TABLET, FILM COATED ORAL at 21:50

## 2024-02-02 RX ADMIN — PYRIDOSTIGMINE BROMIDE 60 MG: 60 TABLET ORAL at 23:09

## 2024-02-02 RX ADMIN — CHLORHEXIDINE GLUCONATE 0.12% ORAL RINSE 15 ML: 1.2 LIQUID ORAL at 08:53

## 2024-02-02 RX ADMIN — DEXTRAN 70, GLYCERIN, HYPROMELLOSE 2 DROP: 1; 2; 3 SOLUTION/ DROPS OPHTHALMIC at 16:43

## 2024-02-02 RX ADMIN — Medication 3 MG: at 21:50

## 2024-02-02 RX ADMIN — FERROUS SULFATE TAB 325 MG (65 MG ELEMENTAL FE) 325 MG: 325 (65 FE) TAB at 08:53

## 2024-02-02 RX ADMIN — HEPARIN SODIUM 5000 UNITS: 5000 INJECTION INTRAVENOUS; SUBCUTANEOUS at 21:50

## 2024-02-02 RX ADMIN — DEXTRAN 70, GLYCERIN, HYPROMELLOSE 2 DROP: 1; 2; 3 SOLUTION/ DROPS OPHTHALMIC at 05:36

## 2024-02-02 RX ADMIN — PANTOPRAZOLE SODIUM 40 MG: 40 TABLET, DELAYED RELEASE ORAL at 21:50

## 2024-02-02 RX ADMIN — Medication 40 G: at 10:07

## 2024-02-02 RX ADMIN — PYRIDOSTIGMINE BROMIDE 60 MG: 60 TABLET ORAL at 10:47

## 2024-02-02 RX ADMIN — PANTOPRAZOLE SODIUM 40 MG: 40 TABLET, DELAYED RELEASE ORAL at 08:53

## 2024-02-02 RX ADMIN — PYRIDOSTIGMINE BROMIDE 60 MG: 60 TABLET ORAL at 16:43

## 2024-02-02 NOTE — PROGRESS NOTES
"[facility]  Neurology progress Note - Name: Joni Iglesias I  MRN: [id number]/Bed#: -01 I   Date of Admission: 1/31/2024  Date of Service: 2/2/2024  Hospital Day: 2    Assessment/Plan   * Myasthenia gravis in crisis (HCC)  Assessment & Plan  2/2/2024: Seen today by neurology new to this neurology examiner is this right-hand-dominant 63 y.o.  male with previous history of myasthenia gravis maintained on mestinon.  He is also positive history for CAD, HLD, and obesity. He presents to [facility] on 1/31/24 with worsening myasthenia symptoms (difficulty swallowing food, worsening ptosis, worsening weakness).  Neurodiagnostics:   - CTH wo contrast 1/31/24:   \"No acute intracranial abnormality.\"  Myasthenic crisis is the likely cause at this point., provoked by unknown cause.   He was initially started and was on day 3 of his IVIG and we are asked to evaluate this patient for worsening status.  In particular the patient reports his head feels heavy his neck is tired he reports he is swallowing less well and he reports that his difficulties with sputum swallow etc. as well as his meals started last night.  Today he reports his left arm feels weaker.  He reports he is also tired today.  He reports the oxygen he now has had 2 L feels better.  He has isolated good power.  His NIF is currently -50, (he was -70 yesterday) but his vital capacity is still good compared with what it was yesterday.  Plan  We will get a repeat head CT today approximately 24 hours at 4 PM.  Our suspicion is that this is not vascular stroke but rather a continuing progression of his myasthenia.  Continue IVIG for total of 5 days (today is day #3/5). Recommend monitoring heart function closely with EF 40%.   Recommend continuing home mestinon 60 mg QID  Continue NIF/VC  Qshift  2/1/24 0842 AM: NIF 60 cm H2O, VC 2.7L  Continue infectious and metabolic workup   Continue close monitoring of neurologic exam  Medications to " avoid in MG:  Would avoid the use of neuromuscular blockers  Macrolides, fluoroquinolones, aminoglycosides, tetracycline, and chloroquine, D-penicillamine  Antidysrhythmic agents - Beta blockers, calcium channel blockers, quinidine, lidocaine, procainamide, and trimethaphan  Antipsychotics - Phenothiazines, sulpride, atypicals  Cardiovascular- Propanolol, quinidine, verapamil, bretylium, statins  Miscellaneous - Diphenylhydantoin, magnesium sulfate, lithium, chlorpromazine, muscle relaxants, levothyroxine, adrenocorticotropic hormone (ACTH). Glucocorticoids can also exacerbate MG.  Medical management and correction of metabolic and infectious disturbances per primary team   Avoid CNS altering medications if possible  Continue supportive care   Continue to monitor neurologic status. Notify neurology with any changes in exam.       This patient will need to be seen postdischarge in our outpatient neurology offices here in the [facility].  His meds are yet to be determined.    Subjective/Objective     Subjective: I am just more tired today my head feels heavy I cannot swallow and my arm is weak    ROS: The patient reports he had complaints as noted above.  He reports that he feels that they started yesterday evening possibly with the occurrence of dinner or sometimes afterwards.  He reports he did not have breakfast or is hard to have breakfast this morning he did not have lunch.  He reports that his neck feels heavy he is having to feel like he is holding up his head.  He also reports that he was feeling a subjective sense of shortness of breath.  It is relieved by 2 L of nasal oxygen.  Is a subjective sense of    Current Facility-Administered Medications   Medication Dose Route Frequency    Artificial Tears  2 drop Both Eyes Q4H PRN    chlorhexidine  15 mL Mouth/Throat Q12H TOÑA    ferrous sulfate  325 mg Oral Daily With Breakfast    heparin (porcine)  5,000 Units Subcutaneous Q8H TOÑA    immune globulin, human   400 mg/kg  Intravenous Daily    melatonin  3 mg Oral HS    pantoprazole  40 mg Oral BID    pyridostigmine  60 mg Oral 4x Daily    senna  1 tablet Oral HS       Artificial Tears    Vitals: Blood pressure 128/69, pulse 64, temperature 97.7 °F (36.5 °C), resp. rate 17, height 6' (1.829 m), weight 111 kg (243 lb 13.3 oz), SpO2 98%.,Body mass index is 33.07 kg/m².        Physical Exam:     Pat seen in: Out of bed in the chair.  There is no family present.  General appearance: alert,   Neck, Lungs, Heart, & abdomen: WNL  Extremities: atraumatic, no cyanosis or edema    Neurologic:   Mental status: Alert, oriented, thought content appropriate, he is able to report his history.  There is no aphasia.  I do not detect any dysarthria he is understandable completely as a gentleman whose second language is English.  He feels his speech is worse today he feels that it is sloppy and is if his tongue is having to move harder to work harder.  CN: exam EOM's I, Gaze conjugate. Non lateralizing sensory & motor exam, (PP not tested on face). Reminder CNVIII-XII normal.  No cranial nerve abnormalities appreciated today no diplopia.  His full breath count today was only able to reach 16 or so.  He reports yesterday he thinks his full breath count was approximately 30  Motor: full power age appropriate x 4 limbs with isolated resistance maneuvers.  He is having trouble elevating or maintaining an elevation of the left upper extremity.  His isolated handgrips are equal his leg elevation while seated in the chair all those measures are also equal he reports he feels like he is fatiguing easily.  Sensory: grossly intact  X 4 limbs, PP not tested on today's exam  Cerebellar: No gross cerebellar dysfunction he does have dysmetria from weakness more on the left upper extremity than the right.    Gait:   DTR's: Age appropriate,  Plantars: downgoing       Lab Results: I have personally reviewed pertinent reports.  , CBC:   Results from last 7  days   Lab Units 02/02/24  0556 02/01/24  0459 01/31/24  2116   WBC Thousand/uL 5.22 5.94 5.46   RBC Million/uL 5.45 6.02* 5.37   HEMOGLOBIN g/dL 9.9* 11.1* 9.9*   HEMATOCRIT % 34.9* 38.7 34.0*   MCV fL 64* 64* 63*   PLATELETS Thousands/uL 279 238 281   , BMP/CMP:   Results from last 7 days   Lab Units 02/02/24  0556 02/01/24  0459 01/31/24  0958   SODIUM mmol/L 134* 135 136   POTASSIUM mmol/L 4.0 4.6 4.0   CHLORIDE mmol/L 102 102 101   CO2 mmol/L 30 28 29   BUN mg/dL 16 11 13   CREATININE mg/dL 1.00 0.92 0.97   CALCIUM mg/dL 9.0 9.4 9.5   AST U/L  --  16  --    ALT U/L  --  9  --    ALK PHOS U/L  --  101  --    EGFR ml/min/1.73sq m 79 87 82   , Vitamin B12:   , HgBA1C:   , TSH:   , Coagulation:   Results from last 7 days   Lab Units 01/31/24  0958   INR  1.01   , Lipid Profile:   Results from last 7 days   Lab Units 01/31/24  0958   HDL mg/dL 53   LDL CALC mg/dL 90   TRIGLYCERIDES mg/dL 110        Imaging Studies:  No new neuroimaging.  We will recheck his a CT head today at approximately 4:00.      Counseling / Coordination of Care  Total time spent today approximately total of 45-50 minutes. Greater than 50% of total time was spent with the patient and / or family counseling and / or coordination of care. A description of the counseling / coordination of care: All of the above was discussed and reviewed with the patient.  It discussed with his nurse as well as internal medicine neuro attending as well as speech.  The patient had several questions I believe they were answered within the limits of the workup and his condition as to whether or not he still may have been deteriorating at this time.

## 2024-02-02 NOTE — ASSESSMENT & PLAN NOTE
history of stent placement 2006  ECHO 5/31/23: EF 40%  Patient reports was on ASA 81mg daily, but since PUD with GI bleed, ASA discontinued  Patient reports being prescribed Lipitor in the past, but hasn't taken due to financial difficulties  Lipid panel 1/31 results within normal limits    Plan  Continue monitoring for signs and symptoms of volume overload

## 2024-02-02 NOTE — SPEECH THERAPY NOTE
Speech Language/Pathology    Speech/Language Pathology Progress Note    Patient Name: Joni Iglesias  Today's Date: 2/2/2024     Subjective:  Received TT from Neurology requesting reassessment due to increased L weakness/numbness and patient c/o increased dysarthria and dysphagia since last night.   Patient reports globus sensation on L and increased dysarthria upon my arrival. He also reports choking episode x1 on liquid wash after globus sensation with onion.    Objective:  Patient seen upright in recliner. Min dysarthria noted/reported. Repeat oral [facility] exam revealed L eye ptosis ( currently being held open with tape). Lingual strength min reduced. Labial strength min reduced. Adequate labial spread but mildly reduced protrusion on the L.   He was seen upright with applesauce, sanjeev crackers, raisin bran with thin milk as well as thin liquids via single and consecutive sips via straw and cup. Straw suck, labial seal, bolus retrieval and containment were adequate. Mastication and oral processing appeared adequate. Mild globus sensation reported on the R across puree and solids. This resolved with use of L head turn.  No coughing, throat clearing, change in vocal quality or respiratory status noted today.     Assessment:  Patient present with suspected mild pharyngeal dysphagia- ? Pharyngeal retention/ L weakness which appears to be managed with use of L head turn. Ongoing neuro workup. Suspected to be MG exacerbation but pending r/o CVA imaging.    Plan/Recommendations:  Continue regular/thin  Use L head turn across po    Will f/u to determine if VBS is indicated    Kaylynn Garcia M.S., CCC-SLP  Speech Language Pathologist   Available via [employer]  [place] #[id number]  [place] #[id number]

## 2024-02-02 NOTE — ASSESSMENT & PLAN NOTE
12/2/21   Patient reports not on Lipitor because of financial difficulty  1/31 Lipid panel results within normal limits    Plan  Lipid levels normal at this time and no indication for statin

## 2024-02-02 NOTE — QUICK NOTE
Paged by nursing requesting clarification for patient being placed on stepdown level 2  After review of chart, it appears SD2 was recommended by neurology team so that patient could receive frequent neuro and vital checks  Review of order show neurochecks being measured every 4 hours  Confirmed with nursing staff that every 4 hours vital and neuro checks could be done on [facility] floor    Adjusted level of care to [facility]

## 2024-02-02 NOTE — RESPIRATORY THERAPY NOTE
02/02/24 1257   Additional Assessments   Pulse 64   Respirations 17   SpO2 98 %   $ Vital Capacity Mech/Peak Flow Yes   Vital Capacity 2.9 L   NIF -50 cm H2O

## 2024-02-02 NOTE — PROGRESS NOTES
PROGRESS NOTE  [facility]       PATIENT INFORMATION     Name: Joni Iglesias   Age & Sex: 63 y.o. male   MRN: [id number]  Hospital Stay Days: 2  Unit/Bed#: -01   Encounter: [id number]    ASSESSMENT/PLAN     Principal Problem:    Myasthenia gravis in crisis ([facility])  Active Problems:    Ptosis, bilateral    CAD (coronary artery disease)    Morbid obesity ([facility])    Hyperlipidemia    CHF (congestive heart failure) ([facility])    Personal history of peptic ulcer disease    Poor vision    Microcytic anemia    History of prediabetes      * Myasthenia gravis in crisis ([facility])  Assessment & Plan  Patient with past medical history of myasthenia gravis presented with worsening weakness for 3 days prior to presentation and ptosis concern for MG exacerbation.  Was managed in critical care initially.  Initiated on IVIG by neurology with significant improvement transfer to [facility] unit for further care.  Has residual generalized weakness with ptosis bilaterally however left more than right.  Left upper extremity and lower extremity strength around 4/5.  Today was noticed to have worsening of left-sided weakness however patient improved after only 1 dose of IVIG which is very unusual.  High suspicion for persistent myasthenia gravis crisis rather than other acute etiology.    Neurology following  Continue IVIG for total of 5 days through 2/4/2024  Update level of care to stepdown to for frequent neurochecks  Will need NIF/VC every shift  Continue pyridostigmine 60 mg 4 times daily  Speech therapy following  Respiratory therapy following  Incentive spirometry  Will check CT head without contrast as per neurology recommendation    History of prediabetes  Assessment & Plan  Last A1C was 6.3 on 6/11/22   BMP yesterday and CMP this am revealed normal blood sugar    Microcytic anemia  Assessment & Plan  2/1 Hbg 9.9 with mcv 63    Plan  Iron supplementation initiated    Poor vision  Assessment & Plan  EOMs limited  on exam, no leftward movement of eyes bilaterally, patient reports relatively new problem  No ophthalmology visits in past due to inability to drive and financial difficulties  Patient reports significantly reduced mobility and fitness due to worsening vision over past few years, and reports was tole his poor vision due to myasthenia gravis  Patient reports feels safe at home, his son helps him get groceries and do things around the house when neeeded     Plan  PT/OT, Case Management consults in place    Personal history of peptic ulcer disease  Assessment & Plan  GD 5/31/23 GI bleed from peptic ulcer  Outpatient regimen pantoprazole 40mg BID  Patient reports has completed prescribed course by Gastroenterology, but he has not followed up with Gastro as recommended  No current GI bleed symptoms or PUD symptoms    Plan  Continue pantoprazole 40mg BID   Speech cleared patient for regular diet    CHF (congestive heart failure) ([facility])  Assessment & Plan  ECHO 5/31/23: EF 40%  No outpatient medications addressing this condition    Continue to monitor UOP and s/s of fluid overload    Hyperlipidemia  Assessment & Plan  12/2/21   Patient reports not on Lipitor because of financial difficulty  1/31 Lipid panel results within normal limits    Plan  Lipid levels normal at this time, no actions necessary    Morbid obesity ([facility])  Assessment & Plan  Patient reports significantly reduced mobility and fitness due to vision changes, and reports was tole his poor vision due to myasthenia gravis  Patient reports feels safe at home, his son helps him get groceries and do things around the house when neeeded    Plan  PT/OT, Case Management consults in place    CAD (coronary artery disease)  Assessment & Plan  History of stent placement 2006  ECHO 5/31/23: EF 40%  Patient reports was on ASA 81mg daily, but since PUD with GI bleed, ASA discontinued  Patient reports being prescribed Lipitor in the past, but hasn't taken due to  financial difficulties  Lipid panel  results within normal limits    Plan  Continue monitoring for s/s fluid overload    Ptosis, bilateral  Assessment & Plan  Suspected secondary to myasthenia gravis crisis  See plan above        Disposition: Pending completion of IVIG treatment inpatient    SUBJECTIVE     Patient seen and examined. No acute events overnight.  States has some worsening of left-sided weakness but otherwise unremarkable.    OBJECTIVE     Vitals:    24 2227 24 0624 24 1257 24 1530   BP: 116/68 128/69  130/68   BP Location:       Pulse: 71  64 63   Resp:   17    Temp: 98.4 °F (36.9 °C) 97.7 °F (36.5 °C)  98.3 °F (36.8 °C)   TempSrc:       SpO2: 95%  98% 98%   Weight:       Height:          Temperature:   Temp (24hrs), Av.1 °F (36.7 °C), Min:97.7 °F (36.5 °C), Max:98.4 °F (36.9 °C)    Temperature: 98.3 °F (36.8 °C)  Intake & Output:  I/O          0701   0700  0701   0700  0701   0700    P.O. 180 480 540    Total Intake(mL/kg) 180 (1.6) 480 (4.3) 540 (4.9)    Urine (mL/kg/hr) 3300 1650 (0.6)     Total Output 3300 1650     Net -3120 -1170 +540                 Weights:   IBW (Ideal Body Weight): 77.6 kg    Body mass index is 33.07 kg/m².  Weight (last 2 days)       Date/Time Weight    24 0600 111 (243.83)    24 1240 130 (286.16)          Physical Exam  Vitals reviewed.   Constitutional:       General: He is not in acute distress.     Appearance: Normal appearance.   HENT:      Head: Normocephalic and atraumatic.   Eyes:      General: No scleral icterus.        Right eye: No discharge.         Left eye: No discharge.   Cardiovascular:      Rate and Rhythm: Normal rate and regular rhythm.      Pulses: Normal pulses.      Heart sounds: Normal heart sounds.   Pulmonary:      Effort: Pulmonary effort is normal. No respiratory distress.      Breath sounds: Normal breath sounds. No wheezing or rales.   Chest:      Chest wall: No tenderness.    Abdominal:      General: Abdomen is flat. Bowel sounds are normal. There is no distension.      Palpations: Abdomen is soft.      Tenderness: There is no abdominal tenderness.   Musculoskeletal:         General: No deformity. Normal range of motion.      Cervical back: Normal range of motion and neck supple.      Right lower leg: No edema.      Left lower leg: No edema.   Skin:     General: Skin is warm.      Coloration: Skin is not jaundiced or pale.      Findings: No rash.   Neurological:      General: No focal deficit present.      Mental Status: He is alert and oriented to person, place, and time. Mental status is at baseline.      Cranial Nerves: Cranial nerve deficit present.      Motor: Weakness present.      Comments: Bilateral ptosis, bilateral upper and lower extremity weakness with left around 4/5 and right 5/5   Psychiatric:         Mood and Affect: Mood normal.         Behavior: Behavior normal.       LABORATORY DATA     Labs: I have personally reviewed pertinent reports.  Results from last 7 days   Lab Units 02/02/24  0556 02/01/24  0459 01/31/24  2116   WBC Thousand/uL 5.22 5.94 5.46   HEMOGLOBIN g/dL 9.9* 11.1* 9.9*   HEMATOCRIT % 34.9* 38.7 34.0*   PLATELETS Thousands/uL 279 238 281   NEUTROS PCT %  --  60 62   MONOS PCT %  --  9 8   EOS PCT %  --  5 5      Results from last 7 days   Lab Units 02/02/24  0556 02/01/24  0459 01/31/24  0958   POTASSIUM mmol/L 4.0 4.6 4.0   CHLORIDE mmol/L 102 102 101   CO2 mmol/L 30 28 29   BUN mg/dL 16 11 13   CREATININE mg/dL 1.00 0.92 0.97   CALCIUM mg/dL 9.0 9.4 9.5   ALK PHOS U/L  --  101  --    ALT U/L  --  9  --    AST U/L  --  16  --      Results from last 7 days   Lab Units 02/01/24  0459   MAGNESIUM mg/dL 2.2     Results from last 7 days   Lab Units 02/01/24  0459   PHOSPHORUS mg/dL 4.1      Results from last 7 days   Lab Units 01/31/24  0958   INR  1.01               IMAGING & DIAGNOSTIC TESTING     Radiology Results: I have personally reviewed pertinent  "reports.  XR chest 1 view portable    Result Date: 1/31/2024  Impression: No acute cardiopulmonary disease. Workstation performed: [id number]     CT head without contrast    Result Date: 1/31/2024  Impression: No acute intracranial abnormality. Workstation performed: [id number]     Other Diagnostic Testing: I have personally reviewed pertinent reports.    ACTIVE MEDICATIONS     Current Facility-Administered Medications   Medication Dose Route Frequency    Artificial Tears ophthalmic solution 2 drop  2 drop Both Eyes Q4H PRN    chlorhexidine (PERIDEX) 0.12 % oral rinse 15 mL  15 mL Mouth/Throat Q12H TOÑA    ferrous sulfate tablet 325 mg  325 mg Oral Daily With Breakfast    heparin (porcine) subcutaneous injection 5,000 Units  5,000 Units Subcutaneous Q8H TOÑA    immune globulin, human (GAMUNEX-C) 40 g 400 mL IV soln  400 mg/kg (Adjusted) Intravenous Daily    melatonin tablet 3 mg  3 mg Oral HS    pantoprazole (PROTONIX) EC tablet 40 mg  40 mg Oral BID    pyridostigmine (MESTINON) tablet 60 mg  60 mg Oral 4x Daily    senna (SENOKOT) tablet 8.6 mg  1 tablet Oral HS       VTE Pharmacologic Prophylaxis: Heparin  VTE Mechanical Prophylaxis: sequential compression device and foot pump applied    Portions of the record may have been created with voice recognition software.  Occasional wrong word or \"sound a like\" substitutions may have occurred due to the inherent limitations of voice recognition software.  Read the chart carefully and recognize, using context, where substitutions have occurred.  ==     "

## 2024-02-02 NOTE — CASE MANAGEMENT
Case Management Progress Note    Patient name Joni Iglesias  Location /-01 MRN [id number]  : 1960 Date 2024       LOS (days): 2  Geometric Mean LOS (GMLOS) (days):   Days to GMLOS:        OBJECTIVE:        Current admission status: Inpatient  Preferred Pharmacy:   [facility] - [place] - [address]  [address]  [facility] [place] [address]  Phone: [phone] Fax: [phone]    [facility] - [place] - [address]  [address]  [place] [address]  Phone: [phone] Fax: [phone]    Primary Care Provider: No primary care provider on file.    Primary Insurance:   Secondary Insurance:     PROGRESS NOTE:  Called [employer] to inquire about resources for this patient. Left message to call back. [phone].

## 2024-02-02 NOTE — PLAN OF CARE
Problem: SAFETY ADULT  Goal: Patient will remain free of falls  Description: INTERVENTIONS:  - Educate patient/family on patient safety including physical limitations  - Instruct patient to call for assistance with activity   - Consult OT/PT to assist with strengthening/mobility   - Keep Call bell within reach  - Keep bed low and locked with side rails adjusted as appropriate  - Keep care items and personal belongings within reach  - Initiate and maintain comfort rounds  - Make Fall Risk Sign visible to staff  - Offer Toileting every 2 Hours, in advance of need  - Initiate/Maintain chair alarm  - Apply yellow socks and bracelet for high fall risk patients  - Consider moving patient to room near nurses station  Outcome: Progressing  Note: Patient communicates weakness that progresses throughout the day.

## 2024-02-02 NOTE — ASSESSMENT & PLAN NOTE
ECHO 5/31/23: EF 40%  No outpatient medications addressing this condition    Appears euvolemic  No PTA meds at this time

## 2024-02-02 NOTE — CASE MANAGEMENT
Case Management Assessment & Discharge Planning Note    Patient name Joni Iglesias  Location /-01 MRN [id number]  : 1960 Date 2024       Current Admission Date: 2024  Current Admission Diagnosis:Myasthenia gravis in crisis ([facility])   Patient Active Problem List    Diagnosis Date Noted    Microcytic anemia 2024    History of prediabetes 2024    Personal history of peptic ulcer disease 2024    Poor vision 2024    CHF (congestive heart failure) ([facility]) 2023    Bruise 2023    Acute blood loss anemia 2023    Hyperlipidemia     Myasthenia gravis in crisis ([facility])     Myasthenia gravis ([facility]) 10/01/2021    Morbid obesity ([facility]) 06/10/2021    Ptosis, bilateral 2021    CAD (coronary artery disease) 2021      LOS (days): 2  Geometric Mean LOS (GMLOS) (days):   Days to GMLOS:     OBJECTIVE:    Risk of Unplanned Readmission Score: 11.8         Current admission status: Inpatient  Referral Reason: Complex, Medication Assistance, Transport Set-up    Preferred Pharmacy:   [facility] - [facility] [place] - [address]  [address]  [facility] [place] [address]  Phone: [phone] Fax: [phone]    [facility]  [place] - [address]  [address]  [place] [address]  Phone: [phone] Fax: [phone]    Primary Care Provider: No primary care provider on file.    Primary Insurance:   Secondary Insurance:     ASSESSMENT:  Active Health Care Proxies    There are no active Health Care Proxies on file.                      Patient Information  Admitted from:: Home  Mental Status: Alert  During Assessment patient was accompanied by: Not accompanied during assessment  Assessment information provided by:: Patient  Primary Caregiver: Self  Support Systems: Son  County of Residence: [place]  What city do you live in?: [place]  Home entry access options. Select all that apply.: No steps to enter  home  Type of Current Residence: Trailer Home  Living Arrangements: Lives Alone (son lives next door)  Is patient a ?: No    Activities of Daily Living Prior to Admission  Functional Status: Independent  Completes ADLs independently?: No  Level of ADL dependence: Assistance  Ambulates independently?: Yes  Does patient use assisted devices?: No  Does patient currently own DME?: No  Does patient have a history of Outpatient Therapy (PT/OT)?: No  Does the patient have a history of Short-Term Rehab?: No  Does patient have a history of HHC?: No  Does patient currently have HHC?: No         Patient Information Continued  Income Source: Unemployed  Does patient have prescription coverage?: No  Does patient receive dialysis treatments?: No  Does patient have a history of substance abuse?: No  Does patient have a history of Mental Health Diagnosis?: No    PHQ 2/9 Screening   Reviewed PHQ 2/9 Depression Screening Score?: No    Means of Transportation  Means of Transport to Appts:: Family transport      Housing Stability: Low Risk  (2/2/2024)    Housing Stability Vital Sign     Unable to Pay for Housing in the Last Year: No     Number of Places Lived in the Last Year: 1     Unstable Housing in the Last Year: No   Food Insecurity: No Food Insecurity (2/2/2024)    Hunger Vital Sign     Worried About Running Out of Food in the Last Year: Never true     Ran Out of Food in the Last Year: Never true   Transportation Needs: Unmet Transportation Needs (2/2/2024)    PRAPARE - Transportation     Lack of Transportation (Medical): Yes     Lack of Transportation (Non-Medical): Yes   Utilities: At Risk (2/2/2024)    [facility] Utilities     Threatened with loss of utilities: Yes       DISCHARGE DETAILS:    Discharge planning discussed with:: Patient at the bedside  Freedom of Choice: Yes  Comments - Freedom of Choice: FOC maintained in discussion regarding discharge planning. Patient is alert oriented and competent to make decisions.  Introduced self and role, explained role of CM in arranging services such as DME, STR, HHC, and providing community resource information. Discussed current living situation and needs at discharge. Patient is mostly IPTA, his son helps with getting groceries, paying bills, home maintenance, and driving patient, but cannot do much as he can only work parttime and their donated car is not working well to travel far distances. CM offered HHC, STR, DME, PCP, OP CM, community resources. Patient interested in applications for [place], and rx discount cards. States the water bill and electricity bills are high, though he is not sure why as they do not have a water leak. States cannot get MA because he has no SSN although he has tried for decades. States cannot make it to most of his neuro appts. Cannot afford his medications. Does not have PCP because he cannot afford it. Does not use DME. Pt is aware and encouraged to seek CM for any questions or concerns. CM continues to follow.  CM contacted family/caregiver?: No- see comments (patient states he is working and cant )  Were Treatment Team discharge recommendations reviewed with patient/caregiver?: Yes  Did patient/caregiver verbalize understanding of patient care needs?: Yes  Were patient/caregiver advised of the risks associated with not following Treatment Team discharge recommendations?: Yes    Contacts  Patient Contacts: Joni  Relationship to Patient:: Family  Reason/Outcome: Discharge Planning    Requested Home Health Care         Is the patient interested in HHC at discharge?: No    DME Referral Provided  Referral made for DME?: No    Other Referral/Resources/Interventions Provided:  Financial Resources Provided: Prescription Discount Card  Interventions: Transportation to treatment, [facility].1.1, Transportation, PCP  Referral Comments: Gave patient rx discount cards for [employer], [employer], and [employer]. Also gave multiple applications for  different programs offered by [employer] for reduced or free fare for medical transport. Information provided for [facility].  will continue to follow for needs.    Would you like to participate in our [facility] service program?  : No - Declined       Discharge Destination Plan:: Home  Transport at Discharge : Family, Free Local Transportation ([employer]ft)

## 2024-02-02 NOTE — ASSESSMENT & PLAN NOTE
GD 5/31/23 GI bleed from peptic ulcer  Outpatient regimen pantoprazole 40mg BID  Patient reports has completed prescribed course by Gastroenterology, but he has not followed up with Gastro as recommended  No current GI bleed symptoms or PUD symptoms    Plan  Continue PPI  Appreciate speech support, continue regular diet

## 2024-02-02 NOTE — ASSESSMENT & PLAN NOTE
Patient with past medical history of myasthenia gravis presented with worsening weakness for 3 days prior to presentation and ptosis concern for MG exacerbation.  Was managed in critical care initially.  Initiated on IVIG by neurology with significant improvement transfer to [facility] unit for further care.  Has residual generalized weakness with ptosis bilaterally however left more than right.  Left upper extremity and lower extremity strength around 4/5.  Today was noticed to have worsening of left-sided weakness however patient improved after only 1 dose of IVIG which is very unusual.  High suspicion for persistent myasthenia gravis crisis rather than other acute etiology.    Appreciate neurology support   Continue IVIG day 4 of 5   Continue frequent neurochecks   Continue pyridostigmine 60 mg 4 times daily   Appreciate speech therapy support   Respiratory therapy following   Continue incentive spirometry   CAT scan head on remarkable Will check CT head without contrast as per neurology recommendation

## 2024-02-02 NOTE — NURSING NOTE
Pt is stating that his left arm is feeling more weakness than his right arm at this time. On call beni was notified of this change.

## 2024-02-03 LAB
ANION GAP SERPL CALCULATED.3IONS-SCNC: 4 MMOL/L
BASOPHILS # BLD AUTO: 0.03 THOUSANDS/ÂΜL (ref 0–0.1)
BASOPHILS NFR BLD AUTO: 1 % (ref 0–1)
BUN SERPL-MCNC: 13 MG/DL (ref 5–25)
CALCIUM SERPL-MCNC: 9.4 MG/DL (ref 8.4–10.2)
CHLORIDE SERPL-SCNC: 101 MMOL/L (ref 96–108)
CO2 SERPL-SCNC: 30 MMOL/L (ref 21–32)
CREAT SERPL-MCNC: 0.99 MG/DL (ref 0.6–1.3)
EOSINOPHIL # BLD AUTO: 0.36 THOUSAND/ÂΜL (ref 0–0.61)
EOSINOPHIL NFR BLD AUTO: 7 % (ref 0–6)
ERYTHROCYTE [DISTWIDTH] IN BLOOD BY AUTOMATED COUNT: 19.4 % (ref 11.6–15.1)
GFR SERPL CREATININE-BSD FRML MDRD: 80 ML/MIN/1.73SQ M
GLUCOSE SERPL-MCNC: 94 MG/DL (ref 65–140)
HCT VFR BLD AUTO: 31 % (ref 36.5–49.3)
HGB BLD-MCNC: 9 G/DL (ref 12–17)
IMM GRANULOCYTES # BLD AUTO: 0.01 THOUSAND/UL (ref 0–0.2)
IMM GRANULOCYTES NFR BLD AUTO: 0 % (ref 0–2)
LYMPHOCYTES # BLD AUTO: 1.64 THOUSANDS/ÂΜL (ref 0.6–4.47)
LYMPHOCYTES NFR BLD AUTO: 31 % (ref 14–44)
MCH RBC QN AUTO: 18.4 PG (ref 26.8–34.3)
MCHC RBC AUTO-ENTMCNC: 29 G/DL (ref 31.4–37.4)
MCV RBC AUTO: 63 FL (ref 82–98)
MONOCYTES # BLD AUTO: 0.73 THOUSAND/ÂΜL (ref 0.17–1.22)
MONOCYTES NFR BLD AUTO: 14 % (ref 4–12)
NEUTROPHILS # BLD AUTO: 2.5 THOUSANDS/ÂΜL (ref 1.85–7.62)
NEUTS SEG NFR BLD AUTO: 47 % (ref 43–75)
NRBC BLD AUTO-RTO: 0 /100 WBCS
PLATELET # BLD AUTO: 256 THOUSANDS/UL (ref 149–390)
PMV BLD AUTO: 9.5 FL (ref 8.9–12.7)
POTASSIUM SERPL-SCNC: 4.1 MMOL/L (ref 3.5–5.3)
RBC # BLD AUTO: 4.89 MILLION/UL (ref 3.88–5.62)
SODIUM SERPL-SCNC: 135 MMOL/L (ref 135–147)
WBC # BLD AUTO: 5.27 THOUSAND/UL (ref 4.31–10.16)

## 2024-02-03 PROCEDURE — 99232 SBSQ HOSP IP/OBS MODERATE 35: CPT | Performed by: PSYCHIATRY & NEUROLOGY

## 2024-02-03 PROCEDURE — 80048 BASIC METABOLIC PNL TOTAL CA: CPT | Performed by: INTERNAL MEDICINE

## 2024-02-03 PROCEDURE — 85025 COMPLETE CBC W/AUTO DIFF WBC: CPT | Performed by: INTERNAL MEDICINE

## 2024-02-03 PROCEDURE — 99232 SBSQ HOSP IP/OBS MODERATE 35: CPT | Performed by: INTERNAL MEDICINE

## 2024-02-03 PROCEDURE — 94150 VITAL CAPACITY TEST: CPT

## 2024-02-03 RX ADMIN — PYRIDOSTIGMINE BROMIDE 60 MG: 60 TABLET ORAL at 16:40

## 2024-02-03 RX ADMIN — CHLORHEXIDINE GLUCONATE 0.12% ORAL RINSE 15 ML: 1.2 LIQUID ORAL at 21:48

## 2024-02-03 RX ADMIN — HEPARIN SODIUM 5000 UNITS: 5000 INJECTION INTRAVENOUS; SUBCUTANEOUS at 13:19

## 2024-02-03 RX ADMIN — PYRIDOSTIGMINE BROMIDE 60 MG: 60 TABLET ORAL at 04:41

## 2024-02-03 RX ADMIN — PANTOPRAZOLE SODIUM 40 MG: 40 TABLET, DELAYED RELEASE ORAL at 21:48

## 2024-02-03 RX ADMIN — Medication 40 G: at 10:10

## 2024-02-03 RX ADMIN — HEPARIN SODIUM 5000 UNITS: 5000 INJECTION INTRAVENOUS; SUBCUTANEOUS at 21:48

## 2024-02-03 RX ADMIN — PYRIDOSTIGMINE BROMIDE 60 MG: 60 TABLET ORAL at 22:26

## 2024-02-03 RX ADMIN — DEXTRAN 70, GLYCERIN, HYPROMELLOSE 2 DROP: 1; 2; 3 SOLUTION/ DROPS OPHTHALMIC at 04:41

## 2024-02-03 RX ADMIN — SENNOSIDES 8.6 MG: 8.6 TABLET, FILM COATED ORAL at 21:48

## 2024-02-03 RX ADMIN — HEPARIN SODIUM 5000 UNITS: 5000 INJECTION INTRAVENOUS; SUBCUTANEOUS at 05:14

## 2024-02-03 RX ADMIN — PANTOPRAZOLE SODIUM 40 MG: 40 TABLET, DELAYED RELEASE ORAL at 09:54

## 2024-02-03 RX ADMIN — CHLORHEXIDINE GLUCONATE 0.12% ORAL RINSE 15 ML: 1.2 LIQUID ORAL at 09:54

## 2024-02-03 RX ADMIN — FERROUS SULFATE TAB 325 MG (65 MG ELEMENTAL FE) 325 MG: 325 (65 FE) TAB at 09:54

## 2024-02-03 RX ADMIN — Medication 3 MG: at 21:48

## 2024-02-03 RX ADMIN — PYRIDOSTIGMINE BROMIDE 60 MG: 60 TABLET ORAL at 10:50

## 2024-02-03 RX ADMIN — DEXTRAN 70, GLYCERIN, HYPROMELLOSE 2 DROP: 1; 2; 3 SOLUTION/ DROPS OPHTHALMIC at 10:04

## 2024-02-03 NOTE — PLAN OF CARE
Problem: Potential for Falls  Goal: Patient will remain free of falls  Description: INTERVENTIONS:  - Educate patient/family on patient safety including physical limitations  - Instruct patient to call for assistance with activity   - Consult OT/PT to assist with strengthening/mobility   - Keep Call bell within reach  - Keep bed low and locked with side rails adjusted as appropriate  - Keep care items and personal belongings within reach  - Initiate and maintain comfort rounds  - Make Fall Risk Sign visible to staff  - Apply yellow socks and bracelet for high fall risk patients  - Consider moving patient to room near nurses station  Outcome: Progressing     Problem: MUSCULOSKELETAL - ADULT  Goal: Maintain or return mobility to safest level of function  Description: INTERVENTIONS:  - Assess patient's ability to carry out ADLs; assess patient's baseline for ADL function and identify physical deficits which impact ability to perform ADLs (bathing, care of mouth/teeth, toileting, grooming, dressing, etc.)  - Assess/evaluate cause of self-care deficits   - Assess range of motion  - Assess patient's mobility  - Assess patient's need for assistive devices and provide as appropriate  - Encourage maximum independence but intervene and supervise when necessary  - Involve family in performance of ADLs  - Assess for home care needs following discharge   - Consider OT consult to assist with ADL evaluation and planning for discharge  - Provide patient education as appropriate  Outcome: Progressing

## 2024-02-03 NOTE — PROGRESS NOTES
[facility]  Progress Note  Name: Joni Iglesias I  MRN: [id number]  Unit/Bed#: -01 I Date of Admission: 1/31/2024   Date of Service: 2/3/2024  Hospital Day: 3    Assessment/Plan   * Myasthenia gravis in crisis (HCC)  Assessment & Plan  Patient with past medical history of myasthenia gravis presented with worsening weakness for 3 days prior to presentation and ptosis concern for MG exacerbation.  Was managed in critical care initially.  Initiated on IVIG by neurology with significant improvement transfer to [facility] unit for further care.  Has residual generalized weakness with ptosis bilaterally however left more than right.  Left upper extremity and lower extremity strength around 4/5.  Today was noticed to have worsening of left-sided weakness however patient improved after only 1 dose of IVIG which is very unusual.  High suspicion for persistent myasthenia gravis crisis rather than other acute etiology.    Appreciate neurology support   Continue IVIG day 4 of 5   Continue frequent neurochecks   Continue pyridostigmine 60 mg 4 times daily   Appreciate speech therapy support   Respiratory therapy following   Continue incentive spirometry   CAT scan head on remarkable Will check CT head without contrast as per neurology recommendation    Poor vision  Assessment & Plan  EOMs limited on exam, no leftward movement of eyes bilaterally, patient reports relatively new problem  No ophthalmology visits in past due to inability to drive and financial difficulties  Patient reports significantly reduced mobility and fitness due to worsening vision over past few years, and reports was tole his poor vision due to myasthenia gravis  Patient reports feels safe at home, his son helps him get groceries and do things around the house when neeeded     Plan  PT/OT, Case Management consults in place    Personal history of peptic ulcer disease  Assessment & Plan  GD 5/31/23 GI bleed from peptic  ulcer  Outpatient regimen pantoprazole 40mg BID  Patient reports has completed prescribed course by Gastroenterology, but he has not followed up with Gastro as recommended  No current GI bleed symptoms or PUD symptoms    Plan  Continue PPI  Appreciate speech support, continue regular diet    History of prediabetes  Assessment & Plan  Last A1C was 6.3 on 6/11/22       Microcytic anemia  Assessment & Plan  2/1 Hbg 9.9 with mcv 63    Plan  Iron supplementation initiated    CHF (congestive heart failure) ([facility])  Assessment & Plan  ECHO 5/31/23: EF 40%  No outpatient medications addressing this condition    Appears euvolemic  No PTA meds at this time    Hyperlipidemia  Assessment & Plan  12/2/21   Patient reports not on Lipitor because of financial difficulty  1/31 Lipid panel results within normal limits    Plan  Lipid levels normal at this time and no indication for statin    Morbid obesity ([facility])  Assessment & Plan  Patient reports significantly reduced mobility and fitness due to vision changes, and reports was tole his poor vision due to myasthenia gravis  Patient reports feels safe at home, his son helps him get groceries and do things around the house when neeeded    Plan  PT/OT, Case Management consults in place    CAD (coronary artery disease)  Assessment & Plan  history of stent placement 2006  ECHO 5/31/23: EF 40%  Patient reports was on ASA 81mg daily, but since PUD with GI bleed, ASA discontinued  Patient reports being prescribed Lipitor in the past, but hasn't taken due to financial difficulties  Lipid panel 1/31 results within normal limits    Plan  Continue monitoring for signs and symptoms of volume overload     Ptosis, bilateral  Assessment & Plan  Consulted secondary to myasthenia gravis crisis                VTE Pharmacologic Prophylaxis:   Moderate Risk (Score 3-4) - Pharmacological DVT Prophylaxis Ordered: heparin.    Mobility:   Basic Mobility Inpatient Raw Score: 23  -[facility] Goal: 7: Walk 25  feet or more  [facility]-HLM Achieved: 7: Walk 25 feet or more  HLM Goal achieved. Continue to encourage appropriate mobility.    Patient Centered Rounds: I performed bedside rounds with nursing staff today.   Discussions with Specialists or Other Care Team Provider: Neurology note reviewed    Education and Discussions with Family / Patient: Patient declined call to .     Total Time Spent on Date of Encounter in care of patient: 30 mins. This time was spent on one or more of the following: performing physical exam; counseling and coordination of care; obtaining or reviewing history; documenting in the medical record; reviewing/ordering tests, medications or procedures; communicating with other healthcare professionals and discussing with patient's family/caregivers.    Current Length of Stay: 3 day(s)  Current Patient Status: Inpatient   Certification Statement: The patient will continue to require additional inpatient hospital stay due to myasthenia gravis crisis  Discharge Plan: Anticipate discharge in 48-72 hrs to discharge location to be determined pending rehab evaluations.    Code Status: Level 1 - Full Code    Subjective:   Patient reports slow improvement however marked from admission.  He reported difficulty swallowing initially however was able to tolerate his regular diet today.  He still has notable ptosis.  He denies fevers or chills.  He has no chest pain or shortness of breath.  He notes improving muscle strength in his neck    Objective:     Vitals:   Temp (24hrs), Av.3 °F (36.8 °C), Min:97.3 °F (36.3 °C), Max:99.3 °F (37.4 °C)    Temp:  [97.3 °F (36.3 °C)-99.3 °F (37.4 °C)] 97.4 °F (36.3 °C)  HR:  [58-65] 65  Resp:  [16-18] 16  BP: (122-137)/(67-78) 128/71  SpO2:  [95 %-99 %] 95 %  Body mass index is 33.07 kg/m².     Input and Output Summary (last 24 hours):     Intake/Output Summary (Last 24 hours) at 2/3/2024 1150  Last data filed at 2/3/2024 0430  Gross per 24 hour   Intake 600 ml    Output 1800 ml   Net -1200 ml       Physical Exam:   Physical Exam  Vitals and nursing note reviewed.   Constitutional:       Appearance: Normal appearance. He is not ill-appearing or diaphoretic.   HENT:      Head: Normocephalic.      Nose: Nose normal. No congestion.   Eyes:      General: No scleral icterus.     Conjunctiva/sclera: Conjunctivae normal.      Comments: B/l ptosis noted   Pulmonary:      Effort: Pulmonary effort is normal. No respiratory distress.   Abdominal:      General: Bowel sounds are normal. There is no distension.      Palpations: Abdomen is soft.      Tenderness: There is no abdominal tenderness.   Skin:     General: Skin is warm.      Coloration: Skin is not jaundiced.   Neurological:      Mental Status: He is alert and oriented to person, place, and time.   Psychiatric:         Behavior: Behavior normal.          Additional Data:     Labs:  Results from last 7 days   Lab Units 02/03/24  0613   WBC Thousand/uL 5.27   HEMOGLOBIN g/dL 9.0*   HEMATOCRIT % 31.0*   PLATELETS Thousands/uL 256   NEUTROS PCT % 47   LYMPHS PCT % 31   MONOS PCT % 14*   EOS PCT % 7*     Results from last 7 days   Lab Units 02/03/24  0955 02/02/24  0556 02/01/24  0459   SODIUM mmol/L 135   < > 135   POTASSIUM mmol/L 4.1   < > 4.6   CHLORIDE mmol/L 101   < > 102   CO2 mmol/L 30   < > 28   BUN mg/dL 13   < > 11   CREATININE mg/dL 0.99   < > 0.92   ANION GAP mmol/L 4   < > 5   CALCIUM mg/dL 9.4   < > 9.4   ALBUMIN g/dL  --   --  3.7   TOTAL BILIRUBIN mg/dL  --   --  0.57   ALK PHOS U/L  --   --  101   ALT U/L  --   --  9   AST U/L  --   --  16   GLUCOSE RANDOM mg/dL 94   < > 85    < > = values in this interval not displayed.     Results from last 7 days   Lab Units 01/31/24  0958   INR  1.01             Results from last 7 days   Lab Units 02/01/24  0459 01/31/24  2116   PROCALCITONIN ng/ml 0.07 0.06       Lines/Drains:  Invasive Devices       Peripheral Intravenous Line  Duration             Peripheral IV 01/31/24  Distal;Left;Upper;Ventral (anterior) Arm 3 days    Peripheral IV 02/01/24 Left;Ventral (anterior) Forearm 2 days                          Imaging: No pertinent imaging reviewed.    Recent Cultures (last 7 days):         Last 24 Hours Medication List:   Current Facility-Administered Medications   Medication Dose Route Frequency Provider Last Rate    Artificial Tears  2 drop Both Eyes Q4H PRN MARCELLA Art      chlorhexidine  15 mL Mouth/Throat Q12H TOÑA MARCELLA Art      ferrous sulfate  325 mg Oral Daily With Breakfast MARCELLA Art      heparin (porcine)  5,000 Units Subcutaneous Q8H TOÑA MARCELLA Art      immune globulin, human  400 mg/kg (Adjusted) Intravenous Daily MARCELLA Art 118.2 mL/hr at 02/03/24 1050    melatonin  3 mg Oral HS MARCELLA Art      pantoprazole  40 mg Oral BID MARCELLA Art      pyridostigmine  60 mg Oral 4x Daily MARCELLA Art      senna  1 tablet Oral HS MARCELLA Art          Today, Patient Was Seen By: Nino Aponte MD    **Please Note: This note may have been constructed using a voice recognition system.**

## 2024-02-03 NOTE — PROGRESS NOTES
"[facility]  Neurology progress Note - Name: Joni Iglesias I  MRN: [id number] Unit/Bed#: -01 I   Date of Admission: 1/31/2024  Date of Service: 2/3/2024  Hospital Day: 3    Assessment/Plan   * Myasthenia gravis in crisis (HCC)  Assessment & Plan  2/3/2024: Seen again today by neurology is this right-hand-dominant 63 y.o.  male with previous history of myasthenia gravis maintained on mestinon.  He is also positive history for CAD, HLD, and obesity. He presents to [facility] on 1/31/24 with worsening myasthenia symptoms (difficulty swallowing food, worsening ptosis, worsening weakness).  Neurodiagnostics:   - CTH wo contrast 1/31/24:   \"No acute intracranial abnormality.\"  Myasthenic crisis is the likely cause at this point., provoked by unknown cause.   He was initially started and is on day 4 of his IVIG, yesterdaywe were asked to evaluate this patient for worsening status.  In particular the patient reports his head feels heavy his neck is tired he reports he is swallowing less well and he reports that his difficulties with sputum swallow etc. as well as his meals started last night,  And some left arm weakness.  In response we repeated his CAT scan yesterday afternoon that is stable there is no acute stroke appreciated.  On today's exam he reports that he feels his left arm which was a concern yesterday is better.  He reports he still has some of the dysphagia and the corrective techniques suggested by speech therapy and turning his head to the left he reports it works well but slows him down after he has been eating about 5 minutes.  He does not report the head heaviness in the neck weakness today that he felt he had yesterday as well.  He reports he has been walking freely in the dias without any generalized weakness and he reports his breathing also feels comfortable today and he is not using oxygen which she reports he sometimes needs at home premorbidly.  He has isolated good " power in his limbs they do not fatigue.  His NIF and vital capacity are consistent with yesterday's measures when done today.  Plan  Continue IVIG for total of 5 days (today is day #4/5). Recommend monitoring heart function closely with EF 40%.   Recommend continuing home mestinon 60 mg QID  Continue NIF/VC  Qshift  Continue infectious and metabolic workup   Continue close monitoring of neurologic exam  Medications to avoid in MG:  Would avoid the use of neuromuscular blockers  Macrolides, fluoroquinolones, aminoglycosides, tetracycline, and chloroquine, D-penicillamine  Antidysrhythmic agents - Beta blockers, calcium channel blockers, quinidine, lidocaine, procainamide, and trimethaphan  Antipsychotics - Phenothiazines, sulpride, atypicals  Cardiovascular- Propanolol, quinidine, verapamil, bretylium, statins  Miscellaneous - Diphenylhydantoin, magnesium sulfate, lithium, chlorpromazine, muscle relaxants, levothyroxine, adrenocorticotropic hormone (ACTH). Glucocorticoids can also exacerbate MG.  Medical management and correction of metabolic and infectious disturbances per primary team   Avoid CNS altering medications if possible  Continue supportive care   Continue to monitor neurologic status. Notify neurology with any changes in exam.           Subjective/Objective     Subjective: My arm does not weak today like it was I am still slow with eating and I have to turn my head like she showed me yesterday but my heads not heavy today.    ROS: As noted above the patient reports that in some respects he feels he is doing well his arm does not have the heaviness and the weakness that he feels that had yesterday he is able to walk freely and independently without shortness of breath.  He does still feel like he has to slow his eating as his swallow is slow.  The remainder of his ROS was negative.    current Facility-Administered Medications   Medication Dose Route Frequency    Artificial Tears  2 drop Both Eyes Q4H PRN     chlorhexidine  15 mL Mouth/Throat Q12H TOÑA    ferrous sulfate  325 mg Oral Daily With Breakfast    heparin (porcine)  5,000 Units Subcutaneous Q8H TOÑA    immune globulin, human  400 mg/kg  Intravenous Daily    melatonin  3 mg Oral HS    pantoprazole  40 mg Oral BID    pyridostigmine  60 mg Oral 4x Daily    senna  1 tablet Oral HS       Artificial Tears    Vitals: Blood pressure 112/62, pulse 62, temperature 98.3 °F (36.8 °C), resp. rate 18, height 6' (1.829 m), weight 111 kg (243 lb 13.3 oz), SpO2 96%.,Body mass index is 33.07 kg/m².        Physical Exam:     Pat seen in: Sitting again out of bed in the bedside chair  General appearance: alert,   Neck, Lungs, Heart, & abdomen: WNL  Extremities: atraumatic, no cyanosis or edema    Neurologic:   Mental status: Alert, oriented, thought content appropriate  CN: exam EOM's I, he remains with limited weakness needing to again take up his upper lids for better gaze.  His gaze is conjugate. Non lateralizing sensory & motor exam, (PP not tested on face).  His speech is understandable I feel clear he does not mention it being dysarthric today as he was concerned about yesterday.  Tongue is midline.  Motor: full power age appropriate x 4 limbs, including the right upper extremity.  The mild elevation asymmetry he has with the right side appears to be that of a rotator cuff limitation on the left.  Sensory: grossly intact  X 4 limbs, PP not tested  Cerebellar: no ataxia or past pointing w pronation from a modified Romberg position.   Gait: Fluid smooth, as he ambulates around the dias.  DTR's: Age appropriate,  Plantars: downgoing       Lab Results: I have personally reviewed pertinent reports.  , CBC:   Results from last 7 days   Lab Units 02/03/24  0613 02/02/24  0556 02/01/24  0459   WBC Thousand/uL 5.27 5.22 5.94   RBC Million/uL 4.89 5.45 6.02*   HEMOGLOBIN g/dL 9.0* 9.9* 11.1*   HEMATOCRIT % 31.0* 34.9* 38.7   MCV fL 63* 64* 64*   PLATELETS Thousands/uL 256 279 238   ,  BMP/CMP:   Results from last 7 days   Lab Units 02/03/24  0955 02/02/24  0556 02/01/24  0459   SODIUM mmol/L 135 134* 135   POTASSIUM mmol/L 4.1 4.0 4.6   CHLORIDE mmol/L 101 102 102   CO2 mmol/L 30 30 28   BUN mg/dL 13 16 11   CREATININE mg/dL 0.99 1.00 0.92   CALCIUM mg/dL 9.4 9.0 9.4   AST U/L  --   --  16   ALT U/L  --   --  9   ALK PHOS U/L  --   --  101   EGFR ml/min/1.73sq m 80 79 87   , Vitamin B12:   , HgBA1C:   , TSH:   , Coagulation:   Results from last 7 days   Lab Units 01/31/24  0958   INR  1.01   , Lipid Profile:   Results from last 7 days   Lab Units 01/31/24  0958   HDL mg/dL 53   LDL CALC mg/dL 90   TRIGLYCERIDES mg/dL 110        Imaging Studies:  His CAT scan done yesterday to rule out a stroke was negative for any acute pathology.  No new neuroimaging today.        Counseling / Coordination of Care  Total time spent today approximately 25-35 minutes. Greater than 50% of total time was spent with the patient and / or family counseling and / or coordination of care. A description of the counseling / coordination of care: All of the above was discussed and reviewed with this patient at the bedside today.  He had several questions follow-up concerning the yesterday's CAT scan etc. I believe they were all answered to his satisfaction at this time.

## 2024-02-03 NOTE — ASSESSMENT & PLAN NOTE
"2/3/2024: Seen again today by neurology is this right-hand-dominant 63 y.o.  male with previous history of myasthenia gravis maintained on mestinon.  He is also positive history for CAD, HLD, and obesity. He presents to [facility] on 1/31/24 with worsening myasthenia symptoms (difficulty swallowing food, worsening ptosis, worsening weakness).  Neurodiagnostics:   - CTH wo contrast 1/31/24:   \"No acute intracranial abnormality.\"  Myasthenic crisis is the likely cause at this point., provoked by unknown cause.   He was initially started and is on day 4 of his IVIG, yesterdaywe were asked to evaluate this patient for worsening status.  In particular the patient reports his head feels heavy his neck is tired he reports he is swallowing less well and he reports that his difficulties with sputum swallow etc. as well as his meals started last night,  And some left arm weakness.  In response we repeated his CAT scan yesterday afternoon that is stable there is no acute stroke appreciated.  On today's exam he reports that he feels his left arm which was a concern yesterday is better.  He reports he still has some of the dysphagia and the corrective techniques suggested by speech therapy and turning his head to the left he reports it works well but slows him down after he has been eating about 5 minutes.  He does not report the head heaviness in the neck weakness today that he felt he had yesterday as well.  He reports he has been walking freely in the da[place] without any generalized weakness and he reports his breathing also feels comfortable today and he is not using oxygen which she reports he sometimes needs at home premorbidly.  He has isolated good power in his limbs they do not fatigue.  His NIF and vital capacity are consistent with yesterday's measures when done today.  Plan  Continue IVIG for total of 5 days (today is day #4/5). Recommend monitoring heart function closely with EF 40%.   Recommend continuing home mestinon 60 " mg QID  Continue NIF/VC  Qshift  Continue infectious and metabolic workup   Continue close monitoring of neurologic exam  Medications to avoid in MG:  Would avoid the use of neuromuscular blockers  Macrolides, fluoroquinolones, aminoglycosides, tetracycline, and chloroquine, D-penicillamine  Antidysrhythmic agents - Beta blockers, calcium channel blockers, quinidine, lidocaine, procainamide, and trimethaphan  Antipsychotics - Phenothiazines, sulpride, atypicals  Cardiovascular- Propanolol, quinidine, verapamil, bretylium, statins  Miscellaneous - Diphenylhydantoin, magnesium sulfate, lithium, chlorpromazine, muscle relaxants, levothyroxine, adrenocorticotropic hormone (ACTH). Glucocorticoids can also exacerbate MG.  Medical management and correction of metabolic and infectious disturbances per primary team   Avoid CNS altering medications if possible  Continue supportive care   Continue to monitor neurologic status. Notify neurology with any changes in exam.

## 2024-02-04 ENCOUNTER — APPOINTMENT (INPATIENT)
Dept: RADIOLOGY | Facility: HOSPITAL | Age: 64
DRG: 042 | End: 2024-02-04
Payer: COMMERCIAL

## 2024-02-04 DIAGNOSIS — G70.00 MYASTHENIA GRAVIS (HCC): ICD-10-CM

## 2024-02-04 PROCEDURE — 92526 ORAL FUNCTION THERAPY: CPT | Performed by: SPEECH-LANGUAGE PATHOLOGIST

## 2024-02-04 PROCEDURE — 99232 SBSQ HOSP IP/OBS MODERATE 35: CPT | Performed by: PSYCHIATRY & NEUROLOGY

## 2024-02-04 PROCEDURE — 74230 X-RAY XM SWLNG FUNCJ C+: CPT

## 2024-02-04 PROCEDURE — 94150 VITAL CAPACITY TEST: CPT

## 2024-02-04 PROCEDURE — 99232 SBSQ HOSP IP/OBS MODERATE 35: CPT | Performed by: INTERNAL MEDICINE

## 2024-02-04 PROCEDURE — 92611 MOTION FLUOROSCOPY/SWALLOW: CPT | Performed by: SPEECH-LANGUAGE PATHOLOGIST

## 2024-02-04 RX ORDER — LANOLIN ALCOHOL/MO/W.PET/CERES
6 CREAM (GRAM) TOPICAL
Status: DISCONTINUED | OUTPATIENT
Start: 2024-02-04 | End: 2024-02-07 | Stop reason: HOSPADM

## 2024-02-04 RX ADMIN — HEPARIN SODIUM 5000 UNITS: 5000 INJECTION INTRAVENOUS; SUBCUTANEOUS at 13:55

## 2024-02-04 RX ADMIN — DEXTRAN 70, GLYCERIN, HYPROMELLOSE 2 DROP: 1; 2; 3 SOLUTION/ DROPS OPHTHALMIC at 15:09

## 2024-02-04 RX ADMIN — PANTOPRAZOLE SODIUM 40 MG: 40 TABLET, DELAYED RELEASE ORAL at 21:04

## 2024-02-04 RX ADMIN — PYRIDOSTIGMINE BROMIDE 60 MG: 60 TABLET ORAL at 23:25

## 2024-02-04 RX ADMIN — DEXTRAN 70, GLYCERIN, HYPROMELLOSE 2 DROP: 1; 2; 3 SOLUTION/ DROPS OPHTHALMIC at 05:04

## 2024-02-04 RX ADMIN — PYRIDOSTIGMINE BROMIDE 60 MG: 60 TABLET ORAL at 16:43

## 2024-02-04 RX ADMIN — Medication 40 G: at 09:43

## 2024-02-04 RX ADMIN — SENNOSIDES 8.6 MG: 8.6 TABLET, FILM COATED ORAL at 21:04

## 2024-02-04 RX ADMIN — CHLORHEXIDINE GLUCONATE 0.12% ORAL RINSE 15 ML: 1.2 LIQUID ORAL at 08:52

## 2024-02-04 RX ADMIN — FERROUS SULFATE TAB 325 MG (65 MG ELEMENTAL FE) 325 MG: 325 (65 FE) TAB at 08:00

## 2024-02-04 RX ADMIN — PYRIDOSTIGMINE BROMIDE 60 MG: 60 TABLET ORAL at 05:04

## 2024-02-04 RX ADMIN — CHLORHEXIDINE GLUCONATE 0.12% ORAL RINSE 15 ML: 1.2 LIQUID ORAL at 21:04

## 2024-02-04 RX ADMIN — PYRIDOSTIGMINE BROMIDE 60 MG: 60 TABLET ORAL at 10:30

## 2024-02-04 RX ADMIN — DEXTRAN 70, GLYCERIN, HYPROMELLOSE 2 DROP: 1; 2; 3 SOLUTION/ DROPS OPHTHALMIC at 23:48

## 2024-02-04 RX ADMIN — HEPARIN SODIUM 5000 UNITS: 5000 INJECTION INTRAVENOUS; SUBCUTANEOUS at 05:04

## 2024-02-04 RX ADMIN — HEPARIN SODIUM 5000 UNITS: 5000 INJECTION INTRAVENOUS; SUBCUTANEOUS at 21:04

## 2024-02-04 RX ADMIN — PANTOPRAZOLE SODIUM 40 MG: 40 TABLET, DELAYED RELEASE ORAL at 08:52

## 2024-02-04 RX ADMIN — Medication 6 MG: at 21:04

## 2024-02-04 NOTE — PLAN OF CARE
Problem: MUSCULOSKELETAL - ADULT  Goal: Maintain or return mobility to safest level of function  Description: INTERVENTIONS:  - Assess patient's ability to carry out ADLs; assess patient's baseline for ADL function and identify physical deficits which impact ability to perform ADLs (bathing, care of mouth/teeth, toileting, grooming, dressing, etc.)  - Assess/evaluate cause of self-care deficits   - Assess range of motion  - Assess patient's mobility  - Assess patient's need for assistive devices and provide as appropriate  - Encourage maximum independence but intervene and supervise when necessary  - Involve family in performance of ADLs  - Assess for home care needs following discharge   - Consider OT consult to assist with ADL evaluation and planning for discharge  - Provide patient education as appropriate  Outcome: Progressing  Goal: Maintain proper alignment of affected body part  Description: INTERVENTIONS:  - Support, maintain and protect limb and body alignment  - Provide patient/ family with appropriate education  Outcome: Progressing     Problem: Prexisting or High Potential for Compromised Skin Integrity  Goal: Skin integrity is maintained or improved  Description: INTERVENTIONS:  - Identify patients at risk for skin breakdown  - Assess and monitor skin integrity  - Assess and monitor nutrition and hydration status  - Monitor labs   - Assess for incontinence   - Turn and reposition patient  - Assist with mobility/ambulation  - Relieve pressure over bony prominences  - Avoid friction and shearing  - Provide appropriate hygiene as needed including keeping skin clean and dry  - Evaluate need for skin moisturizer/barrier cream  - Collaborate with interdisciplinary team   - Patient/family teaching  - Consider wound care consult   Outcome: Progressing

## 2024-02-04 NOTE — ASSESSMENT & PLAN NOTE
Patient reports significantly reduced mobility and fitness due to vision changes, and reports was tole his poor vision due to myasthenia gravis  Patient reports feels safe at home, his son helps him get groceries and do things around the house when neeeded  Patient was seen ambulating in hallway times.    Plan  PT/OT, Case Management consults in place

## 2024-02-04 NOTE — ASSESSMENT & PLAN NOTE
"2/4/2024: Seen again today by neurology is this right-hand-dominant 63 y.o.  male with previous history of myasthenia gravis maintained on mestinon.  He is also positive history for CAD, HLD, and obesity. He presents to [facility] on 1/31/24 with worsening myasthenia symptoms (difficulty swallowing food, worsening ptosis, worsening weakness).  Neurodiagnostics:   - CTH wo contrast 1/31/24:   \"No acute intracranial abnormality.\"  Myasthenic crisis is the likely cause at this point., provoked by unknown cause.   He was initially started and is on day 4 of his IVIG, yesterdaywe were asked to evaluate this patient for worsening status.  In particular the patient reports his head feels heavy his neck is tired he reports he is swallowing less well and he reports that his difficulties with sputum swallow etc. as well as his meals started last night,  And some left arm weakness.  In response we repeated his CT scan yesterday afternoon that is stable there is no acute stroke appreciated.  On today's exam he reports that he feels his left arm is back to being weak again.   He reports his dysphagia and the corrective techniques suggested by speech therapy and turning his head to the left have weakened further. He denies any resp distress, SOB, air hunger or other pulm complaints.  He continues to report the head and neck weakness today that he felt he had yesterday as well, and that both eyes need tape assistance for vision. He has not been able to walk in the dias today.  He has isolated good power in his limbs they do not fatigue. His neck extention is >> flexion.  His NIF and vital capacity are consistent with yesterday's measures when done today.  Overall, the patient reports that despite the IVIG he feels as if he has not been improving at all, and he reports that he feels his swallow and eyes particularly (bulbar features) and head & neck have deteriorated .  Plan  IVIG has finished. We will watch him overnight for a possible " delayed improvement from the IVIG   We will re-eval him tomorrow to consider steroids, although the risk of him deteriorating with the steroids needs to be d/w patient  Recommend continuing mestinon 60 mg QID, Although he may benefit from his mestinon during waking hours and ER at .  He is sleeping poorly, which is not helping. We will start him on a low dose of trazadone for sleep.   We steffanie ask [facility] to put him on a Level 2 step down and have resp monitor him more  We have changed his diet to help his fatigue w eating  Conversely the patient continues to report that in the hour or so after this mestinon dose, he always feels worse, before the mestinon kicks in and he feel some degree of improvement.  Continue NIF/VC  Qshift  Continue infectious and metabolic workup   Continue close monitoring of neurologic exam  Medications to avoid in MG:  Would avoid the use of neuromuscular blockers  Macrolides, fluoroquinolones, aminoglycosides, tetracycline, and chloroquine, D-penicillamine  Antidysrhythmic agents - Beta blockers, calcium channel blockers, quinidine, lidocaine, procainamide, and trimethaphan  Antipsychotics - Phenothiazines, sulpride, atypicals  Cardiovascular- Propanolol, quinidine, verapamil, bretylium, statins  Miscellaneous - Diphenylhydantoin, magnesium sulfate, lithium, chlorpromazine, muscle relaxants, levothyroxine, adrenocorticotropic hormone (ACTH). Glucocorticoids can also exacerbate MG.  Medical management and correction of metabolic and infectious disturbances per primary team   Avoid CNS altering medications if possible  Continue supportive care   Continue to monitor neurologic status. Notify neurology with any changes in exam.

## 2024-02-04 NOTE — PROGRESS NOTES
"[facility]  Progress Note  Name: Joni Iglesias I  MRN: [id number]  Unit/Bed#: -01 I Date of Admission: 1/31/2024   Date of Service: 2/4/2024  Hospital Day: 4    Assessment/Plan   * Myasthenia gravis in crisis (HCC)  Assessment & Plan  2/4/2024: Seen again today by neurology is this right-hand-dominant 63 y.o.  male with previous history of myasthenia gravis maintained on mestinon.  He is also positive history for CAD, HLD, and obesity. He presents to [facility] on 1/31/24 with worsening myasthenia symptoms (difficulty swallowing food, worsening ptosis, worsening weakness).  Neurodiagnostics:   - CTH wo contrast 1/31/24:   \"No acute intracranial abnormality.\"  Myasthenic crisis is the likely cause at this point., provoked by unknown cause.   He was initially started and is on day 4 of his IVIG, yesterdaywe were asked to evaluate this patient for worsening status.  In particular the patient reports his head feels heavy his neck is tired he reports he is swallowing less well and he reports that his difficulties with sputum swallow etc. as well as his meals started last night,  And some left arm weakness.  In response we repeated his CT scan yesterday afternoon that is stable there is no acute stroke appreciated.  On today's exam he reports that he feels his left arm is back to being weak again.   He reports his dysphagia and the corrective techniques suggested by speech therapy and turning his head to the left have weakened further. He denies any resp distress, SOB, air hunger or other pulm complaints.  He continues to report the head and neck weakness today that he felt he had yesterday as well, and that both eyes need tape assistance for vision. He has not been able to walk in the dias today.  He has isolated good power in his limbs they do not fatigue. His neck extention is >> flexion.  His NIF and vital capacity are consistent with yesterday's measures when done today.  Overall, the " patient reports that despite the IVIG he feels as if he has not been improving at all, and he reports that he feels his swallow and eyes particularly (bulbar features) and head & neck have deteriorated .  Plan  IVIG has finished. We will watch him overnight for a possible delayed improvement from the IVIG   We will re-eval him tomorrow to consider steroids, although the risk of him deteriorating with the steroids needs to be d/w patient  Recommend continuing mestinon 60 mg QID, Although he may benefit from his mestinon during waking hours and ER at .  He is sleeping poorly, which is not helping. We will start him on a low dose of trazadone for sleep.   We steffanie ask [facility] to put him on a Level 2 step down and have resp monitor him more  We have changed his diet to help his fatigue w eating  Conversely the patient continues to report that in the hour or so after this mestinon dose, he always feels worse, before the mestinon kicks in and he feel some degree of improvement.  Continue NIF/VC  Qshift  Continue infectious and metabolic workup   Continue close monitoring of neurologic exam  Medications to avoid in MG:  Would avoid the use of neuromuscular blockers  Macrolides, fluoroquinolones, aminoglycosides, tetracycline, and chloroquine, D-penicillamine  Antidysrhythmic agents - Beta blockers, calcium channel blockers, quinidine, lidocaine, procainamide, and trimethaphan  Antipsychotics - Phenothiazines, sulpride, atypicals  Cardiovascular- Propanolol, quinidine, verapamil, bretylium, statins  Miscellaneous - Diphenylhydantoin, magnesium sulfate, lithium, chlorpromazine, muscle relaxants, levothyroxine, adrenocorticotropic hormone (ACTH). Glucocorticoids can also exacerbate MG.  Medical management and correction of metabolic and infectious disturbances per primary team   Avoid CNS altering medications if possible  Continue supportive care   Continue to monitor neurologic status. Notify neurology with any changes in exam.             Subjective/Objective     Subjective:  As above     ROS: Inc bulbar complaints    Current Facility-Administered Medications   Medication Dose Route Frequency Provider Last Rate    Artificial Tears  2 drop Both Eyes Q4H PRN MARCELLA Art      barium sulfate  1 tablet Oral Once in imaging Meghan Cervantes MD      chlorhexidine  15 mL Mouth/Throat Q12H TOÑA MARCELLA Art      ferrous sulfate  325 mg Oral Daily With Breakfast MARCELLA Art      heparin (porcine)  5,000 Units Subcutaneous Q8H TOÑA MARCELLA Art      melatonin  3 mg Oral HS MARCELLA Art      pantoprazole  40 mg Oral BID MARCELLA Art      pyridostigmine  60 mg Oral 4x Daily MARCELLA Art      senna  1 tablet Oral HS MARCELLA Art         Artificial Tears    barium sulfate    Vitals: Blood pressure 129/69, pulse 55, temperature 97.6 °F (36.4 °C), temperature source Oral, resp. rate 18, height 6' (1.829 m), weight 111 kg (243 lb 13.3 oz), SpO2 100%.,Body mass index is 33.07 kg/m².        Physical Exam:     Pat seen in: OOB chair  General appearance: tired appearing,   Neck, Lungs, Heart, & abdomen: WNL  Extremities: atraumatic, no cyanosis or edema    Neurologic:   Mental status: Tired appearing, oriented, thought content appropriate  CN: exam lids taped B, Poor intake today w solid food.  Head supported   Motor: full power age appropriate x 4 limbs, L arm w c/o pain w repeated resistance maneuvers  Sensory: grossly intact  X 4 limbs, PP not tested  Cerebellar: no ataxia or past pointing w pronation from a modified Romberg position.   Gait: Fluid smooth, to from bathroom        Lab Results: Labs stable. No s/s of infection     Imaging Studies:  no new imaging

## 2024-02-04 NOTE — ASSESSMENT & PLAN NOTE
History of peptic ulcer disease.  Had EGD and 05/2023  Current hemoglobin 10.7  Iron panel noted with iron deficiency.    Plan  Continue with oral iron supplement twice daily.  Colace as needed.  Continue p.o. Protonix twice daily.

## 2024-02-04 NOTE — ASSESSMENT & PLAN NOTE
Patient with past medical history of myasthenia gravis presented with worsening weakness for 3 days prior to presentation and ptosis concern for MG exacerbation.  Was managed in critical care initially.  Initiated on IVIG by neurology with significant improvement transfer to [facility] unit for further care.  Has residual generalized weakness with ptosis bilaterally however left more than right.  Left upper extremity and lower extremity strength around 4/5.  Today was noticed to have worsening of left-sided weakness however patient improved after only 1 dose of IVIG which is very unusual.  High suspicion for persistent myasthenia gravis crisis rather than other acute etiology.  Continues to have left-sided weakness.  Today reported to have swallowing difficulty.  Status otherwise stable and saturating well on room air.      During a.m. rounds patient does report overall feeling better however still complaining of both eyelid drooping.    Appreciate neurology support   Completed 5 days of IVIG.  Currently on regular diet per speech recommendation.  Decreased pyridostigmine 30 mg 4 times daily per neurology.  Neurology recommended start on prednisone 60 mg daily  Respiratory therapy following   Continue incentive spirometry  Continue NIF/VC every shift  See neurology's recommendation for further reference.

## 2024-02-04 NOTE — PROCEDURES
Speech-Language Pathology Video Barium Swallow Study        Patient Name: Joni Iglesias    Today's Date: 2/4/2024     Problem List  Patient Active Problem List   Diagnosis    Ptosis, bilateral    CAD (coronary artery disease)    Morbid obesity (HCC)    Myasthenia gravis (HCC)    Hyperlipidemia    Myasthenia gravis in crisis (HCC)    Acute blood loss anemia    Bruise    CHF (congestive heart failure) ([facility])    Personal history of peptic ulcer disease    Poor vision    Microcytic anemia    History of prediabetes       Past Medical History  Past Medical History:   Diagnosis Date    Coronary artery disease     Heart attack (HCC)     Hyperlipidemia     Myasthenia gravis ([facility])        Past Surgical History  Past Surgical History:   Procedure Laterality Date    APPENDECTOMY      CORONARY ANGIOPLASTY WITH STENT PLACEMENT           General Information;  Pt is a 63 y.o. male who was seen at bedside with recommendations for VBS due to worsening dysphagia.    Previous VBS: none    Consistencies Assessed and Performance   Pt was seen in radiology for a Video Barium Swallow Study, seated in the upright position and viewed laterally with the following consistencies:     Administered: thin liquids, puree, soft solids, and hard solids  Specific materials administered included    Consistencies administered: Barium pudding, moistened sanjeev crackers, cookie, thin liquids, 13mm barium pill. Liquids were administered by cup and straw.    Results are as follows:     **Images are available for review on PACS    Oral stage:  Lip closure: adequate  Mastication: adequate  Bolus formation: adequate  Bolus control: adequate  Transfer: adequate + reduced tongue base retraction noted   Residue:-    Pharyngeal stage:  Swallow promptness: prompt- although difficulty with initiation of dry secondary and tertiary swallows  Spill to valleculae: -  Spill to pyriforms: -  Epiglottic inversion: reduced  Laryngeal  rise: reduced  Pharyngeal constriction: reduced  Vallecular retention:  min-mod + bolus redirected to oral cavity and expectorated with regular solids also brief hold up of barium tablet noted at the valleculae  Pyriform retention: trace-min at times  PPW coating: -  Osteophytes: -  CP prominence: not observed however visualization was reduced due to body habitus  Retropulsion from prominence: -  Transient penetration: trace (high) with liquids  Epiglottic undercoat: -  Penetration: -  Aspiration: -  Strategies: patient benefited from secondary/tertiary swallows    Screening of Esophageal stage:  Brief visualization of the esophagus with tablet hold up at LES- did not clear with liquid wash + retention of liquids and retrograde flow? ( difficult to visualize as liquid had limited barium in it). This is not a formal assessment of the esophagus and limited to small amounts in the upright position.    Penetration/Aspiration:  Thin: PAS - 1 (occ 2)  Nectar: PAS- DNT  Honey: PAS-DNT  Puree: PAS 1  Solid: PAS1           8-Point Penetration-Aspiration Scale   1 Material does not enter the airway   2 Material enters the airway, remains above the vocal folds, and is ejected  from the  airway    3 Material enters the airway, remains above the vocal folds, and is not ejected from the airway   4 Material enters the airway, contacts the vocal folds, and is ejected from the airway   5 Material enters the airway, contacts the vocal folds, and is not ejected from the airway    6 Material enters the airway, passes below the vocal folds and is ejected into the larynx or out of the airway    7 Material enters the airway, passes below the vocal folds, and is not ejected from the trachea despite effort    8 Material enters the airway, passes below the vocal folds, and no effort is made to eject          Assessment Summary;  Pt presents with min oral and mild-moderate pharyngeal dysphagia characterized primarily by reduced swallow  mechanics and driving forces on the bolus resulting in retention with subsequent redirection of bolus to oral cavity. The patient is able to clear majority of retention with secondary and tertiary swallows however he had increased difficulty initiating these. Limited benefit from L head turn today. He was noted to have frequent throat clearing throughout the exam however this appeared to be largely due to increased hypersensitivity to retention. No aspiration or penetration was observed today. Aspiration risk is low.     Recommendations;  Recommend puree/level 1 diet and thin liquids (to start), with upright posture, slow rate of feeding, small bites/sips, alternating bites and sips, and use of added moisteners .   Recommended Form of Meds:  as tolerated    Aspiration precautions   Reflux Precautions    If a dedicated assessment of the esophagus is desired, consider esophagram/barium swallow.     Results reviewed with patient, MD, and Neurology service.      Goals:  Pt will tolerate least restrictive diet w/out s/s aspiration or oral/pharyngeal difficulties.    Dysphagia Goals: pt will tolerate dysphagia 3 with thin liquids without s/s of aspiration x3 and pt will demonstrate accurate use of liquid wash/multiple swallows and small presentations strategy with 80% accuracy given no cues.    Will f/u for likely diet advancement      Kaylynn Garcia M.S., CCC-SLP  Speech Language Pathologist   Available via [employer]  [place] #[id number]  [place] #[id number]

## 2024-02-04 NOTE — PROGRESS NOTES
[facility]  Progress Note  Name: Joni Iglesias I  MRN: [id number]  Unit/Bed#: -01 I Date of Admission: 1/31/2024   Date of Service: 2/4/2024  Hospital Day: 4    Assessment/Plan   * Myasthenia gravis in crisis (HCC)  Assessment & Plan  Patient with past medical history of myasthenia gravis presented with worsening weakness for 3 days prior to presentation and ptosis concern for MG exacerbation.  Was managed in critical care initially.  Initiated on IVIG by neurology with significant improvement transfer to [facility] unit for further care.  Has residual generalized weakness with ptosis bilaterally however left more than right.  Left upper extremity and lower extremity strength around 4/5.  Today was noticed to have worsening of left-sided weakness however patient improved after only 1 dose of IVIG which is very unusual.  High suspicion for persistent myasthenia gravis crisis rather than other acute etiology.  Continues to have left-sided weakness.  Today reported to have swallowing difficulty.  Status otherwise stable and saturating well on room air.    Appreciate neurology support   Continue IVIG day 5 of 5  Speech therapy following plan for video barium swallow today.  Patient currently NPO.  Continue frequent neurochecks   Continue pyridostigmine 60 mg 4 times daily   Respiratory therapy following   Continue incentive spirometry  Continue NIF/VC every shift  See neurology's recommendation for further reference.    History of prediabetes  Assessment & Plan  Last A1C was 6.3 on 6/11/22       Microcytic anemia  Assessment & Plan  2/1 Hbg 9.9 with mcv 63    Plan  Iron supplementation initiated    Poor vision  Assessment & Plan  EOMs limited on exam, no leftward movement of eyes bilaterally, patient reports relatively new problem  No ophthalmology visits in past due to inability to drive and financial difficulties  Patient reports significantly reduced mobility and fitness due to  worsening vision over past few years, and reports was tole his poor vision due to myasthenia gravis  Patient reports feels safe at home, his son helps him get groceries and do things around the house when neeeded     Plan  PT/OT, Case Management consults in place    Personal history of peptic ulcer disease  Assessment & Plan  GD 5/31/23 GI bleed from peptic ulcer  Outpatient regimen pantoprazole 40mg BID  Patient reports has completed prescribed course by Gastroenterology, but he has not followed up with Gastro as recommended  No current GI bleed symptoms or PUD symptoms    Plan  Continue PPI  Appreciate speech support, continue regular diet    CHF (congestive heart failure) (HCC)  Assessment & Plan  ECHO 5/31/23: EF 40%  No outpatient medications addressing this condition    Appears euvolemic  No PTA meds at this time    Hyperlipidemia  Assessment & Plan  12/2/21   Patient reports not on Lipitor because of financial difficulty  1/31 Lipid panel results within normal limits    Plan  Lipid levels normal at this time and no indication for statin    Morbid obesity (HCC)  Assessment & Plan  Patient reports significantly reduced mobility and fitness due to vision changes, and reports was tole his poor vision due to myasthenia gravis  Patient reports feels safe at home, his son helps him get groceries and do things around the house when neeeded    Plan  PT/OT, Case Management consults in place    CAD (coronary artery disease)  Assessment & Plan  history of stent placement 2006  ECHO 5/31/23: EF 40%  Patient reports was on ASA 81mg daily, but since PUD with GI bleed, ASA discontinued  Patient reports being prescribed Lipitor in the past, but hasn't taken due to financial difficulties  Lipid panel 1/31 results within normal limits    Plan  Continue monitoring for signs and symptoms of volume overload     Ptosis, bilateral  Assessment & Plan  Consulted secondary to myasthenia gravis crisis                VTE  Pharmacologic Prophylaxis:   Moderate Risk (Score 3-4) - Pharmacological DVT Prophylaxis Ordered: heparin.    Mobility:   Basic Mobility Inpatient Raw Score: 23  JH-HLM Goal: 7: Walk 25 feet or more  JH-HLM Achieved: 7: Walk 25 feet or more  HLM Goal achieved. Continue to encourage appropriate mobility.    Patient Centered Rounds: I performed bedside rounds with nursing staff today.   Discussions with Specialists or Other Care Team Provider: Neurology note reviewed    Education and Discussions with Family / Patient: Patient declined call to .     Total Time Spent on Date of Encounter in care of patient: 30 mins. This time was spent on one or more of the following: performing physical exam; counseling and coordination of care; obtaining or reviewing history; documenting in the medical record; reviewing/ordering tests, medications or procedures; communicating with other healthcare professionals and discussing with patient's family/caregivers.    Current Length of Stay: 4 day(s)  Current Patient Status: Inpatient   Certification Statement: The patient will continue to require additional inpatient hospital stay due to myasthenia gravis crisis  Discharge Plan: Anticipate discharge in 48-72 hrs to discharge location to be determined pending rehab evaluations.    Code Status: Level 1 - Full Code    Subjective:   Was getting better however had trouble swallowing secretions today.  Vitals otherwise stable.  Oxygenating well saturating 100%.  Still has left-sided upper extremity and lower extremity weakness which is slowly improving but not up to the galilea.    Objective:     Vitals:   Temp (24hrs), Av.3 °F (36.8 °C), Min:98.1 °F (36.7 °C), Max:98.4 °F (36.9 °C)    Temp:  [98.1 °F (36.7 °C)-98.4 °F (36.9 °C)] 98.4 °F (36.9 °C)  HR:  [54-77] 70  Resp:  [18] 18  BP: (112-124)/(56-63) 124/63  SpO2:  [95 %-100 %] 96 %  Body mass index is 33.07 kg/m².     Input and Output Summary (last 24 hours):     Intake/Output  Summary (Last 24 hours) at 2/4/2024 1406  Last data filed at 2/4/2024 0900  Gross per 24 hour   Intake 390 ml   Output 950 ml   Net -560 ml       Physical Exam:   Physical Exam  Vitals and nursing note reviewed.   Constitutional:       General: He is not in acute distress.     Appearance: Normal appearance. He is not ill-appearing or diaphoretic.   HENT:      Head: Normocephalic and atraumatic.      Nose: Nose normal. No congestion.   Eyes:      General: No scleral icterus.        Right eye: No discharge.         Left eye: No discharge.      Conjunctiva/sclera: Conjunctivae normal.      Comments: B/l ptosis noted   Cardiovascular:      Rate and Rhythm: Normal rate and regular rhythm.      Pulses: Normal pulses.      Heart sounds: Normal heart sounds.   Pulmonary:      Effort: Pulmonary effort is normal. No respiratory distress.      Breath sounds: Normal breath sounds. No wheezing or rales.   Chest:      Chest wall: No tenderness.   Abdominal:      General: Abdomen is flat. Bowel sounds are normal. There is no distension.      Palpations: Abdomen is soft.      Tenderness: There is no abdominal tenderness.   Musculoskeletal:         General: No deformity. Normal range of motion.      Cervical back: Normal range of motion and neck supple.      Right lower leg: No edema.      Left lower leg: No edema.   Skin:     General: Skin is warm.      Coloration: Skin is not jaundiced or pale.      Findings: No rash.   Neurological:      Mental Status: He is alert and oriented to person, place, and time.      Cranial Nerves: No cranial nerve deficit.      Motor: Weakness present.      Comments: Left-sided upper extremity weakness 3/5 and left lower extremity weakness 4/5.   Psychiatric:         Mood and Affect: Mood normal.         Behavior: Behavior normal.          Additional Data:     Labs:  Results from last 7 days   Lab Units 02/03/24  0613   WBC Thousand/uL 5.27   HEMOGLOBIN g/dL 9.0*   HEMATOCRIT % 31.0*   PLATELETS  Thousands/uL 256   NEUTROS PCT % 47   LYMPHS PCT % 31   MONOS PCT % 14*   EOS PCT % 7*     Results from last 7 days   Lab Units 02/03/24  0955 02/02/24  0556 02/01/24  0459   SODIUM mmol/L 135   < > 135   POTASSIUM mmol/L 4.1   < > 4.6   CHLORIDE mmol/L 101   < > 102   CO2 mmol/L 30   < > 28   BUN mg/dL 13   < > 11   CREATININE mg/dL 0.99   < > 0.92   ANION GAP mmol/L 4   < > 5   CALCIUM mg/dL 9.4   < > 9.4   ALBUMIN g/dL  --   --  3.7   TOTAL BILIRUBIN mg/dL  --   --  0.57   ALK PHOS U/L  --   --  101   ALT U/L  --   --  9   AST U/L  --   --  16   GLUCOSE RANDOM mg/dL 94   < > 85    < > = values in this interval not displayed.     Results from last 7 days   Lab Units 01/31/24  0958   INR  1.01             Results from last 7 days   Lab Units 02/01/24  0459 01/31/24  2116   PROCALCITONIN ng/ml 0.07 0.06       Lines/Drains:  Invasive Devices       Peripheral Intravenous Line  Duration             Peripheral IV 02/01/24 Left;Ventral (anterior) Forearm 3 days                          Imaging: No pertinent imaging reviewed.    Recent Cultures (last 7 days):         Last 24 Hours Medication List:   Current Facility-Administered Medications   Medication Dose Route Frequency Provider Last Rate    Artificial Tears  2 drop Both Eyes Q4H PRN MARCELLA Art      chlorhexidine  15 mL Mouth/Throat Q12H TOÑA MARCELLA Art      ferrous sulfate  325 mg Oral Daily With Breakfast MARCELLA Art      heparin (porcine)  5,000 Units Subcutaneous Q8H TOÑA MARCELLA Art      melatonin  3 mg Oral HS MARCELLA Art      pantoprazole  40 mg Oral BID MARCELLA Art      pyridostigmine  60 mg Oral 4x Daily MARCELLA Art      senna  1 tablet Oral HS MARCELLA Art          Today, Patient Was Seen By: Meghan Cervantes MD    **Please Note: This note may have been constructed using a voice recognition system.**

## 2024-02-04 NOTE — ASSESSMENT & PLAN NOTE
Secondary to myasthenia gravis.  Currently patient noted using tape to keep both eyelids open..  Patient reports no significant improvement to ptosis since completing 5 days of IVIG.  Currently started on p.o. prednisone 60 mg daily per neurology.

## 2024-02-04 NOTE — SPEECH THERAPY NOTE
"Speech Language/Pathology    Speech/Language Pathology Progress Note    Patient Name: Joni Iglesias  Today's Date: 2/4/2024     Subjective:  Patient reports some globus sensation requiring cough/throat clear with secretions earlier. Otherwise he reports he has been tolerating current diet with use of L head turn.   He does report he has been ordering softer foods and has to go slowly- he reports his head gets tired and he needs to take breaks.   He reports he has not been able to sleep well and is tired.    Objective:  Patient seen upright in recliner with lunch tray. He consumed meatloaf, spinach, and mashed potatoes as well as jello and thin liquids. Bilateral eyes requiring tape to maintain opening today.     Straw suck, labial seal, bolus retrieval and containment were adequate. Mastication and oral processing appeared adequate. Increased globus sensation reported today with limited benefit from L head turn although patient continues to utilize this. Frequent persistent throat clear and expectoration noted across consistencies solids>puree>liquids.  No coughing, change in vocal quality or respiratory status noted today.     He reports \"it feels like it's closing up\" limited benefit from liquid wash.      Assessment:  Patients subjective swallow appears to be worse. He does admit to fatigue and is noted with increased throat clearing. ?esophageal spasm as he characterizes this as \"closing up\" vs pharyngeal dysphagia.    Plan/Recommendations:  Consider VBS to further assess  Add'l recs TBD after VBS    Kaylynn Garcia M.S., CCC-SLP  Speech Language Pathologist   Available via [employer]  [place] #[id number]  [place] #[id number]       "

## 2024-02-05 LAB
ANION GAP SERPL CALCULATED.3IONS-SCNC: 5 MMOL/L
BUN SERPL-MCNC: 13 MG/DL (ref 5–25)
CALCIUM SERPL-MCNC: 9.2 MG/DL (ref 8.4–10.2)
CHLORIDE SERPL-SCNC: 101 MMOL/L (ref 96–108)
CO2 SERPL-SCNC: 25 MMOL/L (ref 21–32)
CREAT SERPL-MCNC: 0.97 MG/DL (ref 0.6–1.3)
ERYTHROCYTE [DISTWIDTH] IN BLOOD BY AUTOMATED COUNT: 20.3 % (ref 11.6–15.1)
FERRITIN SERPL-MCNC: 33 NG/ML (ref 24–336)
GFR SERPL CREATININE-BSD FRML MDRD: 82 ML/MIN/1.73SQ M
GLUCOSE SERPL-MCNC: 112 MG/DL (ref 65–140)
HCT VFR BLD AUTO: 37.3 % (ref 36.5–49.3)
HGB BLD-MCNC: 10.7 G/DL (ref 12–17)
IRON SATN MFR SERPL: 4 % (ref 15–50)
IRON SERPL-MCNC: 17 UG/DL (ref 50–212)
MCH RBC QN AUTO: 18.7 PG (ref 26.8–34.3)
MCHC RBC AUTO-ENTMCNC: 28.7 G/DL (ref 31.4–37.4)
MCV RBC AUTO: 65 FL (ref 82–98)
PLATELET # BLD AUTO: 301 THOUSANDS/UL (ref 149–390)
PMV BLD AUTO: 9.5 FL (ref 8.9–12.7)
POTASSIUM SERPL-SCNC: 3.9 MMOL/L (ref 3.5–5.3)
RBC # BLD AUTO: 5.72 MILLION/UL (ref 3.88–5.62)
SODIUM SERPL-SCNC: 131 MMOL/L (ref 135–147)
TIBC SERPL-MCNC: 394 UG/DL (ref 250–450)
UIBC SERPL-MCNC: 377 UG/DL (ref 155–355)
WBC # BLD AUTO: 5.02 THOUSAND/UL (ref 4.31–10.16)

## 2024-02-05 PROCEDURE — 83540 ASSAY OF IRON: CPT | Performed by: STUDENT IN AN ORGANIZED HEALTH CARE EDUCATION/TRAINING PROGRAM

## 2024-02-05 PROCEDURE — 99232 SBSQ HOSP IP/OBS MODERATE 35: CPT | Performed by: PSYCHIATRY & NEUROLOGY

## 2024-02-05 PROCEDURE — 97168 OT RE-EVAL EST PLAN CARE: CPT

## 2024-02-05 PROCEDURE — 99232 SBSQ HOSP IP/OBS MODERATE 35: CPT | Performed by: STUDENT IN AN ORGANIZED HEALTH CARE EDUCATION/TRAINING PROGRAM

## 2024-02-05 PROCEDURE — 80048 BASIC METABOLIC PNL TOTAL CA: CPT | Performed by: STUDENT IN AN ORGANIZED HEALTH CARE EDUCATION/TRAINING PROGRAM

## 2024-02-05 PROCEDURE — 92526 ORAL FUNCTION THERAPY: CPT | Performed by: SPEECH-LANGUAGE PATHOLOGIST

## 2024-02-05 PROCEDURE — 82728 ASSAY OF FERRITIN: CPT | Performed by: STUDENT IN AN ORGANIZED HEALTH CARE EDUCATION/TRAINING PROGRAM

## 2024-02-05 PROCEDURE — 85027 COMPLETE CBC AUTOMATED: CPT | Performed by: STUDENT IN AN ORGANIZED HEALTH CARE EDUCATION/TRAINING PROGRAM

## 2024-02-05 PROCEDURE — 83550 IRON BINDING TEST: CPT | Performed by: STUDENT IN AN ORGANIZED HEALTH CARE EDUCATION/TRAINING PROGRAM

## 2024-02-05 PROCEDURE — 94150 VITAL CAPACITY TEST: CPT

## 2024-02-05 RX ORDER — FERROUS SULFATE 325(65) MG
325 TABLET ORAL 2 TIMES DAILY WITH MEALS
Status: DISCONTINUED | OUTPATIENT
Start: 2024-02-06 | End: 2024-02-07 | Stop reason: HOSPADM

## 2024-02-05 RX ORDER — PREDNISONE 20 MG/1
60 TABLET ORAL DAILY
Status: DISCONTINUED | OUTPATIENT
Start: 2024-02-05 | End: 2024-02-07 | Stop reason: HOSPADM

## 2024-02-05 RX ORDER — PYRIDOSTIGMINE BROMIDE 60 MG/1
30 TABLET ORAL 4 TIMES DAILY
Status: DISCONTINUED | OUTPATIENT
Start: 2024-02-05 | End: 2024-02-07 | Stop reason: HOSPADM

## 2024-02-05 RX ADMIN — PYRIDOSTIGMINE BROMIDE 30 MG: 60 TABLET ORAL at 16:43

## 2024-02-05 RX ADMIN — SENNOSIDES 8.6 MG: 8.6 TABLET, FILM COATED ORAL at 21:52

## 2024-02-05 RX ADMIN — DEXTRAN 70, GLYCERIN, HYPROMELLOSE 2 DROP: 1; 2; 3 SOLUTION/ DROPS OPHTHALMIC at 20:20

## 2024-02-05 RX ADMIN — FERROUS SULFATE TAB 325 MG (65 MG ELEMENTAL FE) 325 MG: 325 (65 FE) TAB at 08:00

## 2024-02-05 RX ADMIN — CHLORHEXIDINE GLUCONATE 0.12% ORAL RINSE 15 ML: 1.2 LIQUID ORAL at 21:52

## 2024-02-05 RX ADMIN — CHLORHEXIDINE GLUCONATE 0.12% ORAL RINSE 15 ML: 1.2 LIQUID ORAL at 09:36

## 2024-02-05 RX ADMIN — PANTOPRAZOLE SODIUM 40 MG: 40 TABLET, DELAYED RELEASE ORAL at 21:52

## 2024-02-05 RX ADMIN — HEPARIN SODIUM 5000 UNITS: 5000 INJECTION INTRAVENOUS; SUBCUTANEOUS at 21:52

## 2024-02-05 RX ADMIN — PANTOPRAZOLE SODIUM 40 MG: 40 TABLET, DELAYED RELEASE ORAL at 09:35

## 2024-02-05 RX ADMIN — PYRIDOSTIGMINE BROMIDE 60 MG: 60 TABLET ORAL at 05:16

## 2024-02-05 RX ADMIN — HEPARIN SODIUM 5000 UNITS: 5000 INJECTION INTRAVENOUS; SUBCUTANEOUS at 06:20

## 2024-02-05 RX ADMIN — PYRIDOSTIGMINE BROMIDE 30 MG: 60 TABLET ORAL at 10:55

## 2024-02-05 RX ADMIN — PREDNISONE 60 MG: 20 TABLET ORAL at 10:58

## 2024-02-05 RX ADMIN — Medication 6 MG: at 21:52

## 2024-02-05 RX ADMIN — PYRIDOSTIGMINE BROMIDE 30 MG: 60 TABLET ORAL at 21:52

## 2024-02-05 NOTE — PLAN OF CARE
Problem: OCCUPATIONAL THERAPY ADULT  Goal: Performs self-care activities at highest level of function for planned discharge setting.  See evaluation for individualized goals.  Description: Treatment Interventions: ADL retraining, UE strengthening/ROM          See flowsheet documentation for full assessment, interventions and recommendations.   Note: Limitation: Decreased ADL status, Decreased UE strength, Decreased UE ROM, Decreased endurance  Prognosis: Good  Assessment: Patient is a 63 y.o. male seen for OT re- evaluation s/p admit to [facility] on 1/31/2024 w/Myasthenia gravis in crisis (HCC). Orders placed for OT re- evaluation and treatment, secondary to decreased functional use of LUE , and pain.  Prior to admission,  Patient was independent with ADLs/ IADLs, ambulatory without AD. Patient lives with his Son in a 2 story house, 1 [address] with first floor set up.  Upon evaluation, patient requires Mod I assist for UB ADLs, minimal  assist for LB ADLs, transfers and functional ambulation in room and bathroom with supervision and set up assist.  Occupational performance is affected by the following deficits: decreased functional use of BUEs- LUE > RUE, limited active ROM, decreased muscle strength, impaired fine motor coordination, and increased pain.  Patient to benefit from continued Occupational Therapy treatment while in the hospital to address deficits as defined above and maximize level of functional independence with ADLs and functional mobility. Occupational Performance areas to address include: eating, grooming , bathing/ shower, and dressing. From OT standpoint, recommendation at time of d/c would be Level 3 Out patient therapy  services.     Rehab Resource Intensity Level, OT: III (Minimum Resource Intensity)

## 2024-02-05 NOTE — RESPIRATORY THERAPY NOTE
02/04/24 2612   Respiratory Assessment   Resp Comments pt was asleep prior to testing, noted that he received melatonin as well   Additional Assessments   $ Vital Capacity Mech/Peak Flow Yes   Vital Capacity 1.7 L  (L, good effort)   NIF -45 cm H2O  (cmH2o)

## 2024-02-05 NOTE — ASSESSMENT & PLAN NOTE
63 y.o. male with CAD, HLD and ACHR Ab positive myasthenia gravis dx 2021 maintained on mestinon presented to [facility] ED on 1/31/24 with worsening myasthenia symptoms (difficulty swallowing,worsening ptosis, worsening weakness, notable LUE weakness) x1 week.  He completed 5 day course of IVIG 2/4/24 without significant improvement of symptoms. 2/5/24 mestinon decreased to 30mg QID and started prednisone 60mg daily. At present exam is stable to slightly improved.  Work-up:  CTH 1/31/24: No acute intracranial abnormality   CTH 2/2/24: No acute intracranial abnormality. Stable chronic microangiopathic changes. Remote infarcts   NIF -50 (-45), VC 2.3 (1.7)   Recommendations:  Completed IVIG x5 days 2/4/24  Decreased mestinon to 30mg QID 2/5/24, continue as tolerated  Started Prednisone 60mg daily 2/5/24; plan to continue 60mg daily x2 weeks followed by slow taper of 5mg/week. Hold at 40mg daily   Continue protonix 40mg bid while on steroids given hx gib  Resume previously prescribed plavix 75mg daily  Medical management and correction of metabolic and infectious disturbances per primary team   If recurrent admission with exacerbation, recommend PLEX rather than IVIG  Medications to avoid in MG:  Would avoid the use of neuromuscular blockers  Macrolides, fluoroquinolones, aminoglycosides, tetracycline, and chloroquine, D-penicillamine  Antidysrhythmic agents - Beta blockers, calcium channel blockers, quinidine, lidocaine, procainamide, and trimethaphan  Antipsychotics - Phenothiazines, sulpride, atypicals  Cardiovascular- Propanolol, quinidine, verapamil, bretylium, statins  Miscellaneous - Diphenylhydantoin, magnesium sulfate, lithium, chlorpromazine, muscle relaxants, levothyroxine, adrenocorticotropic hormone (ACTH). Glucocorticoids can also exacerbate MG.

## 2024-02-05 NOTE — PROGRESS NOTES
Progress Note - Neurology   Joni Iglesias 63 y.o. male [id number]  Unit/Bed#: /-01    Assessment/Plan:  Joni Iglesias is a 63 y.o. male    * Myasthenia gravis in crisis (HCC)  Assessment & Plan  63 y.o. male with CAD, HLD and ACHR Ab positive myasthenia gravis dx 2021 maintained on mestinon presented to [facility] ED on 1/31/24 with worsening myasthenia symptoms (difficulty swallowing,worsening ptosis, worsening weakness, notable LUE weakness) x1 week.  He completed 5 day course of IVIG 2/4/24 without significant improvement of symptoms  Work-up:  CTH 1/31/24: No acute intracranial abnormality   CTH 2/2/24: No acute intracranial abnormality. Stable chronic microangiopathic changes. Remote infarcts   2/4/24 NIF -45, VC 1.7 (2/2/24 VC 2.8L, NIF -50)  Recommendations:  S/P IVIG x5 days (completed 2/4/24)  Decrease mestinon to 30mg QID  Start Prednisone 60mg daily x2 weeks with slow taper of 5mg/week holding at 40mg daily  Continue protonix 40mg bid while on steroids given hx gib  Monitor NIF/VC Qshift  PT/OT  Avoid CNS altering medications if possible  Continue to monitor neurologic status. Notify neurology with any changes in exam.   Resume plavix 75mg daily (previous rx ran out)  Medical management and correction of metabolic and infectious disturbances per primary team   If recurrent admission with exacerbation, recommend PLEX rather than IVIG  Medications to avoid in MG:  Would avoid the use of neuromuscular blockers  Macrolides, fluoroquinolones, aminoglycosides, tetracycline, and chloroquine, D-penicillamine  Antidysrhythmic agents - Beta blockers, calcium channel blockers, quinidine, lidocaine, procainamide, and trimethaphan  Antipsychotics - Phenothiazines, sulpride, atypicals  Cardiovascular- Propanolol, quinidine, verapamil, bretylium, statins  Miscellaneous - Diphenylhydantoin, magnesium sulfate, lithium, chlorpromazine, muscle relaxants, levothyroxine, adrenocorticotropic hormone (ACTH).  Glucocorticoids can also exacerbate MG.        Recommend hospital follow up with neuromuscular attending in about 6 weeks, general neurology if neuromuscular attending not available. Patient will require transportation assistance    Subjective:   Despite IVIG, patient does not report significant improvement. Feeling just a little bit better today swallowing improved compared to last week. Usually feels ok in the morning but gets worse by noon. Typically better within 1 hr of taking mestinon        Past Medical History:   Diagnosis Date    Coronary artery disease     Heart attack (HCC)     Hyperlipidemia     Myasthenia gravis (HCC)      Past Surgical History:   Procedure Laterality Date    APPENDECTOMY      CORONARY ANGIOPLASTY WITH STENT PLACEMENT       No family history on file.  Social History     Socioeconomic History    Marital status: /Civil Union     Spouse name: Not on file    Number of children: Not on file    Years of education: Not on file    Highest education level: Not on file   Occupational History    Not on file   Tobacco Use    Smoking status: Never    Smokeless tobacco: Never   Vaping Use    Vaping status: Never Used   Substance and Sexual Activity    Alcohol use: Never    Drug use: Never    Sexual activity: Not on file   Other Topics Concern    Not on file   Social History Narrative    Not on file     Social Determinants of Health     Financial Resource Strain: Not on file   Food Insecurity: No Food Insecurity (2/2/2024)    Hunger Vital Sign     Worried About Running Out of Food in the Last Year: Never true     Ran Out of Food in the Last Year: Never true   Transportation Needs: Unmet Transportation Needs (2/2/2024)    PRAPARE - Transportation     Lack of Transportation (Medical): Yes     Lack of Transportation (Non-Medical): Yes   Physical Activity: Not on file   Stress: Not on file   Social Connections: Not on file   Intimate Partner Violence: Not on file   Housing Stability: Low Risk   (2/2/2024)    Housing Stability Vital Sign     Unable to Pay for Housing in the Last Year: No     Number of Places Lived in the Last Year: 1     Unstable Housing in the Last Year: No     E-Cigarette/Vaping    E-Cigarette Use Never User      E-Cigarette/Vaping Substances    Nicotine No     THC No     CBD No     Flavoring No     Other No     Unknown No        Medications:  All current active meds have been reviewed and current meds:  Scheduled Meds:  Current Facility-Administered Medications   Medication Dose Route Frequency Provider Last Rate    Artificial Tears  2 drop Both Eyes Q4H PRN MARCELLA Art      barium sulfate  1 tablet Oral Once in imaging Meghan Cervantes MD      chlorhexidine  15 mL Mouth/Throat Q12H TOÑA MARCELLA Art      ferrous sulfate  325 mg Oral Daily With Breakfast MARCELLA Art      heparin (porcine)  5,000 Units Subcutaneous Q8H TOÑA MARCELLA Art      melatonin  6 mg Oral HS MARCELLA Saleh      pantoprazole  40 mg Oral BID MARCELLA Art      predniSONE  60 mg Oral Daily MARCELLA Gardiner      pyridostigmine  30 mg Oral 4x Daily MARCELLA Gardiner      senna  1 tablet Oral HS MARCELLA Art       Continuous Infusions:   PRN Meds:.  Artificial Tears    barium sulfate       ROS:   Review of Systems   Constitutional:  Positive for fatigue.   HENT:  Positive for trouble swallowing.    Neurological:  Positive for weakness.       Vitals:   /71   Pulse 74   Temp 98.2 °F (36.8 °C)   Resp 16   Ht 6' (1.829 m)   Wt 111 kg (243 lb 13.3 oz)   SpO2 95%   BMI 33.07 kg/m²     Physical Exam:   Physical Exam  Vitals reviewed.   Constitutional:       General: He is not in acute distress.     Appearance: He is ill-appearing.   HENT:      Head: Normocephalic and atraumatic.   Pulmonary:      Effort: Pulmonary effort is normal.   Skin:     General: Skin is warm and dry.   Neurological:      Mental Status: He is alert and oriented to person, place, and time.    Psychiatric:         Speech: Speech normal.       Neurologic Exam     Mental Status   Oriented to person, place, and time.   Follows 1 step commands.   Attention: normal. Concentration: normal.   Speech: speech is normal   Level of consciousness: alert         Cranial Nerves     CN III, IV, VI   Conjugate gaze: present  Face symmetric tongue midline  Bilateral ptosis L>R     Motor Exam     Strength   Strength 5/5 except as noted. LUE weakness 4/5 with reported left shoulder pain     Sensory Exam   Light touch normal.     Gait, Coordination, and Reflexes     Tremor   Resting tremor: absentGait deferred       Labs: I have personally reviewed pertinent reports.   Recent Results (from the past 24 hour(s))   CBC and Platelet    Collection Time: 02/05/24  9:54 AM   Result Value Ref Range    WBC 5.02 4.31 - 10.16 Thousand/uL    RBC 5.72 (H) 3.88 - 5.62 Million/uL    Hemoglobin 10.7 (L) 12.0 - 17.0 g/dL    Hematocrit 37.3 36.5 - 49.3 %    MCV 65 (L) 82 - 98 fL    MCH 18.7 (L) 26.8 - 34.3 pg    MCHC 28.7 (L) 31.4 - 37.4 g/dL    RDW 20.3 (H) 11.6 - 15.1 %    Platelets 301 149 - 390 Thousands/uL    MPV 9.5 8.9 - 12.7 fL   Basic metabolic panel    Collection Time: 02/05/24  9:54 AM   Result Value Ref Range    Sodium 131 (L) 135 - 147 mmol/L    Potassium 3.9 3.5 - 5.3 mmol/L    Chloride 101 96 - 108 mmol/L    CO2 25 21 - 32 mmol/L    ANION GAP 5 mmol/L    BUN 13 5 - 25 mg/dL    Creatinine 0.97 0.60 - 1.30 mg/dL    Glucose 112 65 - 140 mg/dL    Calcium 9.2 8.4 - 10.2 mg/dL    eGFR 82 ml/min/1.73sq m       Imaging: I have personally reviewed pertinent imaging in PACS and I have personally reviewed PACS reports.     EKG, Pathology, and Other Studies: I have personally reviewed pertinent reports.     Counseling / Coordination of Care  Assessment, images, and plan reviewed with Dr. Thompson. Plan discussed with patient and primary service

## 2024-02-05 NOTE — OCCUPATIONAL THERAPY NOTE
Occupational Therapy Re evaluation        Patient Name: Joni Iglesias  Today's Date: 2/5/2024 02/05/24 1150   OT Last Visit   OT Visit Date 02/05/24   Note Type   Note type Re-Evaluation   Pain Assessment   Pain Assessment Tool 0-10   Pain Score 7   Pain Location/Orientation Orientation: Left;Location: Shoulder;Orientation: Upper;Location: Back   Pain Radiating Towards left shoulder to lower back   Pain Onset/Description Onset: Ongoing   Effect of Pain on Daily Activities difficulty using left UE to complte self care routine   Hospital Pain Intervention(s) Repositioned;Emotional support;Relaxation technique   Multiple Pain Sites No   Restrictions/Precautions   Weight Bearing Precautions Per Order No   Home Living   Type of Home House   Home Layout Two level;Performs ADLs on one level;Able to live on main level with bedroom/bathroom;Stairs to enter without rails  (1 CHARLIE)   Bathroom Shower/Tub Walk-in shower   Bathroom Toilet Standard   Bathroom Equipment Grab bars in shower;Built-in shower seat   Bathroom Accessibility Accessible   Home Equipment Walker;Cane   Additional Comments ambulatory without AD   Prior Function   Level of Green Independent with ADLs;Independent with functional mobility;Independent with IADLS   Lives With Son   Receives Help From Family   IADLs Family/Friend/Other provides transportation;Family/Friend/Other provides meals;Independent with medication management   Falls in the last 6 months 0   Vocational Retired   Lifestyle   Autonomy Patient was independent  with ADLs/ IADLs, ambulatory without AD. Patient lives with his Son in a 2 story house, 1 [address] with first floor set up.   Reciprocal Relationships Supportive Family   Service to Others Retired   General   Family/Caregiver Present No   ADL   Eating Assistance 5  Supervision/Setup   Eating Deficit Setup;Increased time to complete;Bringing food to mouth assist   Grooming Assistance 5  Supervision/Setup   Grooming  Deficit Setup;Supervision/safety;Increased time to complete   UB Bathing Assistance 4  Minimal Assistance   UB Bathing Deficit Increased time to complete   LB Bathing Assistance 4  Minimal Assistance   LB Bathing Deficit Increased time to complete;Right lower leg including foot;Left lower leg including foot   UB Dressing Assistance 5  Supervision/Setup   UB Dressing Deficit Increased time to complete;Pull around back;Pull down in back   LB Dressing Assistance 4  Minimal Assistance   LB Dressing Deficit Setup;Requires assistive device for steadying;Increased time to complete;Don/doff R sock;Don/doff L sock  (decreased individual finger movements on left hand, decreased strength and coordination on LUE)   Toileting Assistance  5  Supervision/Setup   Toileting Deficit Setup;Increased time to complete   Functional Assistance 4  Minimal Assistance   Functional Deficit Setup;Increased time to complete   Bed Mobility   Additional Comments received patient sitting at edge of bed   Transfers   Sit to Stand 6  Modified independent   Additional items Armrests   Stand to Sit 6  Modified independent   Additional items Armrests   Functional Mobility   Functional Mobility 5  Supervision   Additional Comments supervision and set up   Additional items   (no AD)   Balance   Static Sitting Good   Dynamic Sitting Good   Static Standing Fair +   Dynamic Standing Fair +   Activity Tolerance   Activity Tolerance Patient tolerated treatment well   RUE Assessment   RUE Assessment WFL   LUE Assessment   LUE Assessment X   LUE Overall AROM   L Shoulder Flexion ~40 degrees of flexion   L Shoulder ABduction ~ 90 degrees   L Elbow Flexion full flexion   LUE Strength   LUE Overall Strength Deficits  (proximal- 2+/5, distal 3/5)   Hand Function   Gross Motor Coordination Functional   Fine Motor Coordination Impaired   Hand Function Comments left hand presents with decreased muscle strength and fine motor coordination   Sensation   Light Touch No  "apparent deficits  (BUEs)   Vision-Basic Assessment   Current Vision Does not wear glasses  (pt reports poor vision at times. He notes difficulty with focus/accommadation, and typically uses \"tape\"  to keep  eyes open due to chronic ptosis.)   Psychosocial   Psychosocial (WDL) WDL   Cognition   Overall Cognitive Status WFL   Arousal/Participation Alert;Cooperative   Attention Within functional limits   Orientation Level Oriented X4   Memory Within functional limits   Following Commands Follows all commands and directions without difficulty   Assessment   Limitation Decreased ADL status;Decreased UE strength;Decreased UE ROM;Decreased endurance   Prognosis Good   Assessment Patient is a 63 y.o. male seen for OT re- evaluation s/p admit to [facility] on 1/31/2024 w/Myasthenia gravis in crisis (HCC). Orders placed for OT re- evaluation and treatment, secondary to decreased functional use of LUE , and pain.  Prior to admission,  Patient was independent with ADLs/ IADLs, ambulatory without AD. Patient lives with his Son in a 2 story house, [facility] with first floor set up.  Upon evaluation, patient requires Mod I assist for UB ADLs, minimal  assist for LB ADLs, transfers and functional ambulation in room and bathroom with supervision and set up assist.  Occupational performance is affected by the following deficits: decreased functional use of BUEs- LUE > RUE, limited active ROM, decreased muscle strength, impaired fine motor coordination, and increased pain.  Patient to benefit from continued Occupational Therapy treatment while in the hospital to address deficits as defined above and maximize level of functional independence with ADLs and functional mobility. Occupational Performance areas to address include: eating, grooming , bathing/ shower, and dressing. From OT standpoint, recommendation at time of d/c would be Level 3 Out patient therapy  services.   Plan   Treatment Interventions ADL retraining;UE " strengthening/ROM   Goal Expiration Date 02/19/24   OT Frequency 1-2x/wk   Discharge Recommendation   Rehab Resource Intensity Level, OT III (Minimum Resource Intensity)   AM-PAC Daily Activity Inpatient   Lower Body Dressing 3   Bathing 3   Toileting 3   Upper Body Dressing 4   Grooming 4   Eating 3   Daily Activity Raw Score 20   Daily Activity Standardized Score (Calc for Raw Score >=11) 42.03   AM-PAC Applied Cognition Inpatient   Following a Speech/Presentation 4   Understanding Ordinary Conversation 4   Taking Medications 4   Remembering Where Things Are Placed or Put Away 4   Remembering List of 4-5 Errands 4   Taking Care of Complicated Tasks 4   Applied Cognition Raw Score 24   Applied Cognition Standardized Score 62.21   Barthel Index   Feeding 5   Bathing 0   Grooming Score 0   Dressing Score 5   Bladder Score 10   Bowels Score 10   Toilet Use Score 10   Transfers (Bed/Chair) Score 15   Mobility (Level Surface) Score 15   Stairs Score 0   Barthel Index Score 70       Patient will demonstrate use of left  hand as a functional stabilizer during bimanual tasks.    Patient will demonstrate use of  left hand as a functional assist during self feeding task.    Patient/ Family  will demonstrate competency with [facility] Home Exercise Program.

## 2024-02-05 NOTE — SPEECH THERAPY NOTE
"Speech Language/Pathology    Speech/Language Pathology Progress Note    Patient Name: Joni Iglesias  Today's Date: 2/5/2024    Subjective:  \"It's a little better- I just have to go slow\"  Patient reports he has been doing better with purees and thin liquids. He reports it is easier to swallow but occ still has some globus sensation.  Initially reported no desire for diet upgrade but with ongoing discussion re: trials today he reports he would like to have the option to order solids if he feels better.     Objective:  Patient see upright in recliner. He was given salmon and sliced ham ( deli meat). He reported increased globus sensation with both but able to tolerate some ham. Occ throat clearing persisted throughout this and with puree/thins. Education provided on results of VBS and recommendations to the patient and RN. No overt sxs of aspiration observed.     Assessment:  Swallow function appears relatively unchanged from yesterday. Aspiration risk  remains low and dysphagia is likely related to MG exacerbation. He reports he would like to keep eating puree/thins but have the option of ordering regular solids when his meds start to work.  Patient with good awareness of deficits + some anxiety related to dysphagia.    Plan/Recommendations:  Regular/thin ( patient to order soft to start)  Meds crushed    Aspiration precautions and compensatory swallowing strategies: upright posture, slow rate of feeding, small bites/sips, alternating bites and sips, and use of added moisteners    Will f/u    Kaylynn Garcia M.S., CCC-SLP  Speech Language Pathologist   Available via [employer]wolf  [place] #[id number]  [place] #[id number]    "

## 2024-02-05 NOTE — PLAN OF CARE
Problem: PAIN - ADULT  Goal: Verbalizes/displays adequate comfort level or baseline comfort level  Description: Interventions:  - Encourage patient to monitor pain and request assistance  - Assess pain using appropriate pain scale  - Administer analgesics based on type and severity of pain and evaluate response  - Implement non-pharmacological measures as appropriate and evaluate response  - Consider cultural and social influences on pain and pain management  - Notify physician/advanced practitioner if interventions unsuccessful or patient reports new pain  Outcome: Progressing     Problem: INFECTION - ADULT  Goal: Absence or prevention of progression during hospitalization  Description: INTERVENTIONS:  - Assess and monitor for signs and symptoms of infection  - Monitor lab/diagnostic results  - Monitor all insertion sites, i.e. indwelling lines, tubes, and drains  - Monitor endotracheal if appropriate and nasal secretions for changes in amount and color  - [place] appropriate cooling/warming therapies per order  - Administer medications as ordered  - Instruct and encourage patient and family to use good hand hygiene technique  - Identify and instruct in appropriate isolation precautions for identified infection/condition  Outcome: Progressing  Goal: Absence of fever/infection during neutropenic period  Description: INTERVENTIONS:  - Monitor WBC    Outcome: Progressing

## 2024-02-05 NOTE — CASE MANAGEMENT
Case Management Progress Note    Patient name Joni Iglesias  Location /-01 MRN [id number]  : 1960 Date 2024       LOS (days): 5  Geometric Mean LOS (GMLOS) (days):   Days to GMLOS:        OBJECTIVE:        Current admission status: Inpatient  Preferred Pharmacy:   [facility] - [facility] [address] [address] - [address]  [address]  [facility] [place] [address]  Phone: [phone] Fax: [phone]    [facility] - [place] - [address]  [address]  [place] [address]  Phone: [phone] Fax: [phone]    Primary Care Provider: No primary care provider on file.    Primary Insurance:   Secondary Insurance:     PROGRESS NOTE:  Pt is a tentative d/c in 24-48 hrs per SLIM during rounds.  Pt finished 5 days of IVIG and is still being followed by neurology.

## 2024-02-05 NOTE — PROGRESS NOTES
[facility]  Progress Note  Name: Joni Iglesias I  MRN: [id number]  Unit/Bed#: -01 I Date of Admission: 1/31/2024   Date of Service: 2/5/2024  Hospital Day: 5    Assessment/Plan   * Myasthenia gravis in crisis (HCC)  Assessment & Plan  Patient with past medical history of myasthenia gravis presented with worsening weakness for 3 days prior to presentation and ptosis concern for MG exacerbation.  Was managed in critical care initially.  Initiated on IVIG by neurology with significant improvement transfer to [facility] unit for further care.  Has residual generalized weakness with ptosis bilaterally however left more than right.  Left upper extremity and lower extremity strength around 4/5.  Today was noticed to have worsening of left-sided weakness however patient improved after only 1 dose of IVIG which is very unusual.  High suspicion for persistent myasthenia gravis crisis rather than other acute etiology.  Continues to have left-sided weakness.  Today reported to have swallowing difficulty.  Status otherwise stable and saturating well on room air.      During a.m. rounds patient does report overall feeling better however still complaining of both eyelid drooping.    Appreciate neurology support   Completed 5 days of IVIG.  Currently on regular diet per speech recommendation.  Decreased pyridostigmine 30 mg 4 times daily per neurology.  Neurology recommended start on prednisone 60 mg daily  Respiratory therapy following   Continue incentive spirometry  Continue NIF/VC every shift  See neurology's recommendation for further reference.    Ptosis, bilateral  Assessment & Plan  Secondary to myasthenia gravis.  Currently patient noted using tape to keep both eyelids open..  Patient reports no significant improvement to ptosis since completing 5 days of IVIG.  Currently started on p.o. prednisone 60 mg daily per neurology.    History of prediabetes  Assessment & Plan  Last A1C was 6.3 on  6/11/22       Microcytic anemia  Assessment & Plan  History of peptic ulcer disease.  Had EGD and 05/2023  Current hemoglobin 10.7  Iron panel noted with iron deficiency.    Plan  Continue with oral iron supplement twice daily.  Colace as needed.  Continue p.o. Protonix twice daily.    Poor vision  Assessment & Plan  EOMs limited on exam, no leftward movement of eyes bilaterally, patient reports relatively new problem  No ophthalmology visits in past due to inability to drive and financial difficulties  Patient reports significantly reduced mobility and fitness due to worsening vision over past few years, and reports was tole his poor vision due to myasthenia gravis  Patient reports feels safe at home, his son helps him get groceries and do things around the house when neeeded     Plan  PT/OT, Case Management consults in place    Personal history of peptic ulcer disease  Assessment & Plan  GD 5/31/23 GI bleed from peptic ulcer  Outpatient regimen pantoprazole 40mg BID  Patient reports has completed prescribed course by Gastroenterology, but he has not followed up with Gastro as recommended  No current GI bleed symptoms or PUD symptoms    Plan  Continue PPI  Appreciate speech support, continue regular diet    CHF (congestive heart failure) (HCC)  Assessment & Plan  ECHO 5/31/23: EF 40%  No outpatient medications addressing this condition    Appears euvolemic  No PTA meds at this time    Hyperlipidemia  Assessment & Plan  12/2/21   Patient reports not on Lipitor because of financial difficulty  1/31 Lipid panel results within normal limits    Plan  Lipid levels normal at this time and no indication for statin    Morbid obesity (HCC)  Assessment & Plan  Patient reports significantly reduced mobility and fitness due to vision changes, and reports was tole his poor vision due to myasthenia gravis  Patient reports feels safe at home, his son helps him get groceries and do things around the house when  neeeded  Patient was seen ambulating in hallway times.    Plan  PT/OT, Case Management consults in place    CAD (coronary artery disease)  Assessment & Plan  history of stent placement 2006  ECHO 5/31/23: EF 40%  Patient reports was on ASA 81mg daily, but since PUD with GI bleed, ASA discontinued  Patient reports being prescribed Lipitor in the past, but hasn't taken due to financial difficulties  Lipid panel 1/31 results within normal limits    Plan  Continue monitoring for signs and symptoms of volume overload                VTE Pharmacologic Prophylaxis:   Moderate Risk (Score 3-4) - Pharmacological DVT Prophylaxis Ordered: heparin.    Mobility:   Basic Mobility Inpatient Raw Score: 23  -[facility] Goal: 7: Walk 25 feet or more  -[facility] Achieved: 4: Move to chair/commode    Patient Centered Rounds: I performed bedside rounds with nursing staff today.   Discussions with Specialists or Other Care Team Provider: Neurology    Total Time Spent on Date of Encounter in care of patient: 25 mins. This time was spent on one or more of the following: performing physical exam; counseling and coordination of care; obtaining or reviewing history; documenting in the medical record; reviewing/ordering tests, medications or procedures; communicating with other healthcare professionals and discussing with patient's family/caregivers.    Current Length of Stay: 5 day(s)  Current Patient Status: Inpatient   Certification Statement: The patient will continue to require additional inpatient hospital stay due to still complaining of droopy eyelids and currently started on prednisone by neurology.  Will monitor response.  Discharge Plan: Anticipate discharge in 24-48 hrs to discharge location to be determined pending rehab evaluations.    Code Status: Level 1 - Full Code    Subjective:   Seen during a.m. rounds.  Patient seen sitting in a chair.  Patient was also noted ambulating in hallway times.  He is using tape to keep both eyelids open.   Patient overall reports improvement however reports that previously his droopy eyes are getting better with IVIG treatment however currently still complaining of bilateral ptosis.  He denies chest pain, dyspnea, fever, chills, nausea, vomiting, abdominal pain, dysuria, diarrhea, any other new complaints.    Objective:     Vitals:   Temp (24hrs), Av.6 °F (37 °C), Min:98.2 °F (36.8 °C), Max:98.8 °F (37.1 °C)    Temp:  [98.2 °F (36.8 °C)-98.8 °F (37.1 °C)] 98.4 °F (36.9 °C)  HR:  [62-87] 73  Resp:  [16-18] 16  BP: ()/(47-71) 105/53  SpO2:  [94 %-99 %] 94 %  Body mass index is 33.07 kg/m².     Input and Output Summary (last 24 hours):     Intake/Output Summary (Last 24 hours) at 2024  Last data filed at 2024 191  Gross per 24 hour   Intake 760 ml   Output 2150 ml   Net -1390 ml       Physical Exam:   Physical Exam  Constitutional:       General: He is not in acute distress.     Appearance: Normal appearance. He is not ill-appearing, toxic-appearing or diaphoretic.   HENT:      Head: Normocephalic and atraumatic.   Eyes:      Comments: Bilateral ptosis noted on exam. patient is using tapes to keep both eyelids open.   Cardiovascular:      Rate and Rhythm: Normal rate.      Pulses: Normal pulses.   Pulmonary:      Effort: Pulmonary effort is normal. No respiratory distress.      Breath sounds: Normal breath sounds. No wheezing.   Abdominal:      General: Bowel sounds are normal. There is no distension.      Palpations: Abdomen is soft.      Tenderness: There is no abdominal tenderness.   Musculoskeletal:      Cervical back: Normal range of motion.   Neurological:      Mental Status: He is alert and oriented to person, place, and time. Mental status is at baseline.   Psychiatric:         Mood and Affect: Mood normal.         Behavior: Behavior normal.          Additional Data:     Labs:  Results from last 7 days   Lab Units 24  0954 24  0613   WBC Thousand/uL 5.02 5.27   HEMOGLOBIN g/dL  10.7* 9.0*   HEMATOCRIT % 37.3 31.0*   PLATELETS Thousands/uL 301 256   NEUTROS PCT %  --  47   LYMPHS PCT %  --  31   MONOS PCT %  --  14*   EOS PCT %  --  7*     Results from last 7 days   Lab Units 02/05/24  0954 02/02/24  0556 02/01/24  0459   SODIUM mmol/L 131*   < > 135   POTASSIUM mmol/L 3.9   < > 4.6   CHLORIDE mmol/L 101   < > 102   CO2 mmol/L 25   < > 28   BUN mg/dL 13   < > 11   CREATININE mg/dL 0.97   < > 0.92   ANION GAP mmol/L 5   < > 5   CALCIUM mg/dL 9.2   < > 9.4   ALBUMIN g/dL  --   --  3.7   TOTAL BILIRUBIN mg/dL  --   --  0.57   ALK PHOS U/L  --   --  101   ALT U/L  --   --  9   AST U/L  --   --  16   GLUCOSE RANDOM mg/dL 112   < > 85    < > = values in this interval not displayed.     Results from last 7 days   Lab Units 01/31/24  0958   INR  1.01             Results from last 7 days   Lab Units 02/01/24  0459 01/31/24  2116   PROCALCITONIN ng/ml 0.07 0.06       Lines/Drains:  Invasive Devices       Peripheral Intravenous Line  Duration             Peripheral IV 02/01/24 Left;Ventral (anterior) Forearm 4 days                        Recent Cultures (last 7 days):         Last 24 Hours Medication List:   Current Facility-Administered Medications   Medication Dose Route Frequency Provider Last Rate    Artificial Tears  2 drop Both Eyes Q4H PRN MARCELLA Art      barium sulfate  1 tablet Oral Once in imaging Meghan Cervantes MD      chlorhexidine  15 mL Mouth/Throat Q12H [facility] MARCELLA Art      [START ON 2/6/2024] ferrous sulfate  325 mg Oral BID With Meals Alirio ESPINOZA MD      heparin (porcine)  5,000 Units Subcutaneous Q8H [facility] MARCELLA Art      melatonin  6 mg Oral HS MARCELLA Saleh      pantoprazole  40 mg Oral BID MARCELLA Art      predniSONE  60 mg Oral Daily MARCELLA Gardiner      pyridostigmine  30 mg Oral 4x Daily MARCELLA Gardiner      senna  1 tablet Oral HS MARCELLA Art          Today, Patient Was Seen By: Alirio Roth MD    **Please Note:  This note may have been constructed using a voice recognition system.**

## 2024-02-06 LAB
ANION GAP SERPL CALCULATED.3IONS-SCNC: 3 MMOL/L
BUN SERPL-MCNC: 12 MG/DL (ref 5–25)
CALCIUM SERPL-MCNC: 9.6 MG/DL (ref 8.4–10.2)
CHLORIDE SERPL-SCNC: 102 MMOL/L (ref 96–108)
CO2 SERPL-SCNC: 30 MMOL/L (ref 21–32)
CREAT SERPL-MCNC: 0.87 MG/DL (ref 0.6–1.3)
ERYTHROCYTE [DISTWIDTH] IN BLOOD BY AUTOMATED COUNT: 20.3 % (ref 11.6–15.1)
GFR SERPL CREATININE-BSD FRML MDRD: 91 ML/MIN/1.73SQ M
GLUCOSE SERPL-MCNC: 106 MG/DL (ref 65–140)
HCT VFR BLD AUTO: 35 % (ref 36.5–49.3)
HGB BLD-MCNC: 10.2 G/DL (ref 12–17)
MCH RBC QN AUTO: 18.4 PG (ref 26.8–34.3)
MCHC RBC AUTO-ENTMCNC: 29.1 G/DL (ref 31.4–37.4)
MCV RBC AUTO: 63 FL (ref 82–98)
PLATELET # BLD AUTO: 294 THOUSANDS/UL (ref 149–390)
PMV BLD AUTO: 9.2 FL (ref 8.9–12.7)
POTASSIUM SERPL-SCNC: 4.1 MMOL/L (ref 3.5–5.3)
RBC # BLD AUTO: 5.54 MILLION/UL (ref 3.88–5.62)
SODIUM SERPL-SCNC: 135 MMOL/L (ref 135–147)
WBC # BLD AUTO: 7.23 THOUSAND/UL (ref 4.31–10.16)

## 2024-02-06 PROCEDURE — 85027 COMPLETE CBC AUTOMATED: CPT | Performed by: STUDENT IN AN ORGANIZED HEALTH CARE EDUCATION/TRAINING PROGRAM

## 2024-02-06 PROCEDURE — 94150 VITAL CAPACITY TEST: CPT

## 2024-02-06 PROCEDURE — 99232 SBSQ HOSP IP/OBS MODERATE 35: CPT | Performed by: PSYCHIATRY & NEUROLOGY

## 2024-02-06 PROCEDURE — 97164 PT RE-EVAL EST PLAN CARE: CPT

## 2024-02-06 PROCEDURE — 99232 SBSQ HOSP IP/OBS MODERATE 35: CPT | Performed by: STUDENT IN AN ORGANIZED HEALTH CARE EDUCATION/TRAINING PROGRAM

## 2024-02-06 PROCEDURE — 80048 BASIC METABOLIC PNL TOTAL CA: CPT | Performed by: STUDENT IN AN ORGANIZED HEALTH CARE EDUCATION/TRAINING PROGRAM

## 2024-02-06 RX ORDER — PYRIDOSTIGMINE BROMIDE 30 MG/1
30 TABLET ORAL 4 TIMES DAILY
Qty: 30 TABLET | Refills: 0 | Status: SHIPPED | OUTPATIENT
Start: 2024-02-06 | End: 2024-03-07

## 2024-02-06 RX ORDER — FERROUS SULFATE 324(65)MG
324 TABLET, DELAYED RELEASE (ENTERIC COATED) ORAL
Qty: 60 TABLET | Refills: 2 | Status: SHIPPED | OUTPATIENT
Start: 2024-02-06 | End: 2024-02-06

## 2024-02-06 RX ORDER — FERROUS SULFATE 324(65)MG
324 TABLET, DELAYED RELEASE (ENTERIC COATED) ORAL
Qty: 60 TABLET | Refills: 0 | Status: SHIPPED | OUTPATIENT
Start: 2024-02-06 | End: 2024-05-06

## 2024-02-06 RX ORDER — PREDNISONE 20 MG/1
60 TABLET ORAL DAILY
Qty: 42 TABLET | Refills: 0 | Status: SHIPPED | OUTPATIENT
Start: 2024-02-07 | End: 2024-02-21

## 2024-02-06 RX ORDER — PREDNISONE 10 MG/1
TABLET ORAL DAILY
Qty: 141 TABLET | Refills: 0 | Status: SHIPPED | OUTPATIENT
Start: 2024-02-21 | End: 2024-03-22

## 2024-02-06 RX ORDER — SENNOSIDES 8.6 MG
8.6 TABLET ORAL
Qty: 30 TABLET | Refills: 1 | Status: SHIPPED | OUTPATIENT
Start: 2024-02-06 | End: 2024-02-06

## 2024-02-06 RX ORDER — PANTOPRAZOLE SODIUM 40 MG/1
40 TABLET, DELAYED RELEASE ORAL
Qty: 60 TABLET | Refills: 2 | Status: SHIPPED | OUTPATIENT
Start: 2024-02-06 | End: 2024-02-06

## 2024-02-06 RX ORDER — PANTOPRAZOLE SODIUM 40 MG/1
40 TABLET, DELAYED RELEASE ORAL
Qty: 30 TABLET | Refills: 0 | Status: SHIPPED | OUTPATIENT
Start: 2024-02-06

## 2024-02-06 RX ORDER — PREDNISONE 10 MG/1
TABLET ORAL DAILY
Qty: 141 TABLET | Refills: 0 | Status: SHIPPED | OUTPATIENT
Start: 2024-02-21 | End: 2024-02-06

## 2024-02-06 RX ORDER — PREDNISONE 20 MG/1
60 TABLET ORAL DAILY
Qty: 42 TABLET | Refills: 0 | Status: SHIPPED | OUTPATIENT
Start: 2024-02-07 | End: 2024-02-06

## 2024-02-06 RX ORDER — PYRIDOSTIGMINE BROMIDE 30 MG/1
30 TABLET ORAL 4 TIMES DAILY
Qty: 120 TABLET | Refills: 2 | Status: SHIPPED | OUTPATIENT
Start: 2024-02-06 | End: 2024-02-06

## 2024-02-06 RX ORDER — SENNOSIDES 8.6 MG
8.6 TABLET ORAL
Qty: 30 TABLET | Refills: 0 | Status: SHIPPED | OUTPATIENT
Start: 2024-02-06

## 2024-02-06 RX ADMIN — CHLORHEXIDINE GLUCONATE 0.12% ORAL RINSE 15 ML: 1.2 LIQUID ORAL at 22:19

## 2024-02-06 RX ADMIN — PYRIDOSTIGMINE BROMIDE 30 MG: 60 TABLET ORAL at 11:22

## 2024-02-06 RX ADMIN — PYRIDOSTIGMINE BROMIDE 30 MG: 60 TABLET ORAL at 22:20

## 2024-02-06 RX ADMIN — PYRIDOSTIGMINE BROMIDE 30 MG: 60 TABLET ORAL at 17:49

## 2024-02-06 RX ADMIN — PYRIDOSTIGMINE BROMIDE 30 MG: 60 TABLET ORAL at 05:02

## 2024-02-06 RX ADMIN — SENNOSIDES 8.6 MG: 8.6 TABLET, FILM COATED ORAL at 22:19

## 2024-02-06 RX ADMIN — CHLORHEXIDINE GLUCONATE 0.12% ORAL RINSE 15 ML: 1.2 LIQUID ORAL at 07:58

## 2024-02-06 RX ADMIN — PREDNISONE 60 MG: 20 TABLET ORAL at 07:58

## 2024-02-06 RX ADMIN — HEPARIN SODIUM 5000 UNITS: 5000 INJECTION INTRAVENOUS; SUBCUTANEOUS at 22:19

## 2024-02-06 RX ADMIN — Medication 6 MG: at 22:19

## 2024-02-06 RX ADMIN — FERROUS SULFATE TAB 325 MG (65 MG ELEMENTAL FE) 325 MG: 325 (65 FE) TAB at 07:58

## 2024-02-06 RX ADMIN — FERROUS SULFATE TAB 325 MG (65 MG ELEMENTAL FE) 325 MG: 325 (65 FE) TAB at 16:07

## 2024-02-06 RX ADMIN — PANTOPRAZOLE SODIUM 40 MG: 40 TABLET, DELAYED RELEASE ORAL at 22:19

## 2024-02-06 RX ADMIN — HEPARIN SODIUM 5000 UNITS: 5000 INJECTION INTRAVENOUS; SUBCUTANEOUS at 05:02

## 2024-02-06 RX ADMIN — PANTOPRAZOLE SODIUM 40 MG: 40 TABLET, DELAYED RELEASE ORAL at 07:58

## 2024-02-06 NOTE — ASSESSMENT & PLAN NOTE
history of stent placement 2006  ECHO 5/31/23: EF 40%  Patient reports was on ASA 81mg daily, but since PUD with GI bleed, ASA discontinued  Patient reports being prescribed Lipitor in the past, but hasn't taken due to financial difficulties  Lipid panel 1/31 results within normal limits

## 2024-02-06 NOTE — CASE MANAGEMENT
Case Management Discharge Planning Note    Patient name Joni Iglesias  Location /-01 MRN [id number]  : 1960 Date 2024       Current Admission Date: 2024  Current Admission Diagnosis:Myasthenia gravis   Patient Active Problem List    Diagnosis Date Noted    Microcytic anemia 2024    History of prediabetes 2024    Personal history of peptic ulcer disease 2024    Poor vision 2024    CHF (congestive heart failure) ([facility]) 2023    Bruise 2023    Acute blood loss anemia 2023    Hyperlipidemia     Myasthenia gravis     Myasthenia gravis ([facility]) 10/01/2021    Morbid obesity ([facility]) 06/10/2021    Ptosis, bilateral 2021    CAD (coronary artery disease) 2021      LOS (days): 6  Geometric Mean LOS (GMLOS) (days):   Days to GMLOS:     OBJECTIVE:  Risk of Unplanned Readmission Score: 12.34         Current admission status: Inpatient   Preferred Pharmacy:   [facility] - [facility] [place] - [address]  [address]  [facility] [place] [address]  Phone: [phone] Fax: [phone]    [facility]  [place], [place] - [address]  [address]  [place] [address]  Phone: [phone] Fax: [phone]    Primary Care Provider: No primary care provider on file.    Primary Insurance: SADIE ONEAL PENDING  Secondary Insurance:     DISCHARGE DETAILS:                           Contacts  Patient Contacts: Joni  Relationship to Patient:: Family  Phone Number: [phone]    Requested Home Health Care         Is the patient interested in HHC at discharge?: No    DME Referral Provided  Referral made for DME?: No    Other Referral/Resources/Interventions Provided:  Financial Resources Provided: Medicaid, Financial Counselor, Prescription Discount Card  Interventions: [employer] 2.1.1, Transportation  Referral Comments: Called [employer] regarding resources for patient. Can provide assistance for prescriptions, but have no  other programs at the time for anything else, especially medical treatment. Discussed potential help for patient to get citizenship or any other programs that might help him in his current status. Information sent to CM by email and transposed to patient's discharge instructions. Requested [employer] contact patient with any additional resources they have.    Would you like to participate in our [facility] service program?  : No - Declined    Treatment Team Recommendation: Home  Discharge Destination Plan:: Home  Transport at Discharge : Family (lyft)

## 2024-02-06 NOTE — ASSESSMENT & PLAN NOTE
Patient with past medical history of myasthenia gravis presented with worsening weakness for 3 days prior to presentation and ptosis concern for MG exacerbation.  Was managed in critical care initially.  Initiated on IVIG by neurology with significant improvement transfer to [facility] unit for further care.  Has residual generalized weakness with ptosis bilaterally however left more than right.  Left upper extremity and lower extremity strength around 4/5.  Today was noticed to have worsening of left-sided weakness however patient improved after only 1 dose of IVIG which is very unusual.  High suspicion for persistent myasthenia gravis crisis rather than other acute etiology.  Continues to have left-sided weakness.  Today reported to have swallowing difficulty.  Status otherwise stable and saturating well on room air.      During a.m. rounds patient does report overall feeling better however still complaining of both eyelid drooping.    Appreciate neurology support   Completed 5 days of IVIG.  Currently on regular diet per speech recommendation.  Decreased pyridostigmine 30 mg 4 times daily per neurology.  Neurology recommended start on prednisone 60 mg daily for 2 weeks that after taper 5 mg weekly with keeping 40 mg maintenance until outpatient follow-up.  Respiratory therapy following   Continue incentive spirometry  Continue NIF/VC every shift  See neurology's recommendation for further reference.

## 2024-02-06 NOTE — ASSESSMENT & PLAN NOTE
EOMs limited on exam, no leftward movement of eyes bilaterally, patient reports relatively new problem  No ophthalmology visits in past due to inability to drive and financial difficulties  Patient reports significantly reduced mobility and fitness due to worsening vision over past few years, and reports was tole his poor vision due to myasthenia gravis  Patient reports feels safe at home, his son helps him get groceries and do things around the house when neeeded

## 2024-02-06 NOTE — PLAN OF CARE
Problem: Potential for Falls  Goal: Patient will remain free of falls  Description: INTERVENTIONS:  - Educate patient/family on patient safety including physical limitations  - Instruct patient to call for assistance with activity   - Consult OT/PT to assist with strengthening/mobility   - Keep Call bell within reach  - Keep bed low and locked with side rails adjusted as appropriate  - Keep care items and personal belongings within reach  - Initiate and maintain comfort rounds  - Apply yellow socks and bracelet for high fall risk patients  - Consider moving patient to room near nurses station  Outcome: Progressing     Problem: PAIN - ADULT  Goal: Verbalizes/displays adequate comfort level or baseline comfort level  Description: Interventions:  - Encourage patient to monitor pain and request assistance  - Assess pain using appropriate pain scale  - Administer analgesics based on type and severity of pain and evaluate response  - Implement non-pharmacological measures as appropriate and evaluate response  - Consider cultural and social influences on pain and pain management  - Notify physician/advanced practitioner if interventions unsuccessful or patient reports new pain  Outcome: Progressing     Problem: INFECTION - ADULT  Goal: Absence or prevention of progression during hospitalization  Description: INTERVENTIONS:  - Assess and monitor for signs and symptoms of infection  - Monitor lab/diagnostic results  - Monitor all insertion sites, i.e. indwelling lines, tubes, and drains  - Monitor endotracheal if appropriate and nasal secretions for changes in amount and color  - [place] appropriate cooling/warming therapies per order  - Administer medications as ordered  - Instruct and encourage patient and family to use good hand hygiene technique  - Identify and instruct in appropriate isolation precautions for identified infection/condition  Outcome: Progressing  Goal: Absence of fever/infection during neutropenic  period  Description: INTERVENTIONS:  - Monitor WBC    Outcome: Progressing     Problem: SAFETY ADULT  Goal: Patient will remain free of falls  Description: INTERVENTIONS:  - Educate patient/family on patient safety including physical limitations  - Instruct patient to call for assistance with activity   - Consult OT/PT to assist with strengthening/mobility   - Keep Call bell within reach  - Keep bed low and locked with side rails adjusted as appropriate  - Keep care items and personal belongings within reach  - Initiate and maintain comfort rounds  - Make Fall Risk Sign visible to staff  - Apply yellow socks and bracelet for high fall risk patients  - Consider moving patient to room near nurses station  Outcome: Progressing  Goal: Maintain or return to baseline ADL function  Description: INTERVENTIONS:  -  Assess patient's ability to carry out ADLs; assess patient's baseline for ADL function and identify physical deficits which impact ability to perform ADLs (bathing, care of mouth/teeth, toileting, grooming, dressing, etc.)  - Assess/evaluate cause of self-care deficits   - Assess range of motion  - Assess patient's mobility; develop plan if impaired  - Assess patient's need for assistive devices and provide as appropriate  - Encourage maximum independence but intervene and supervise when necessary  - Involve family in performance of ADLs  - Assess for home care needs following discharge   - Consider OT consult to assist with ADL evaluation and planning for discharge  - Provide patient education as appropriate  Outcome: Progressing  Goal: Maintains/Returns to pre admission functional level  Description: INTERVENTIONS:  - Perform AM-PAC 6 Click Basic Mobility/ Daily Activity assessment daily.  - Set and communicate daily mobility goal to care team and patient/family/caregiver.   - Collaborate with rehabilitation services on mobility goals if consulted  - Out of bed for toileting  - Record patient progress and  toleration of activity level   Outcome: Progressing     Problem: DISCHARGE PLANNING  Goal: Discharge to home or other facility with appropriate resources  Description: INTERVENTIONS:  - Identify barriers to discharge w/patient and caregiver  - Arrange for needed discharge resources and transportation as appropriate  - Identify discharge learning needs (meds, wound care, etc.)  - Arrange for interpretive services to assist at discharge as needed  - Refer to Case Management Department for coordinating discharge planning if the patient needs post-hospital services based on physician/advanced practitioner order or complex needs related to functional status, cognitive ability, or social support system  Outcome: Progressing     Problem: Knowledge Deficit  Goal: Patient/family/caregiver demonstrates understanding of disease process, treatment plan, medications, and discharge instructions  Description: Complete learning assessment and assess knowledge base.  Interventions:  - Provide teaching at level of understanding  - Provide teaching via preferred learning methods  Outcome: Progressing     Problem: Nutrition/Hydration-ADULT  Goal: Nutrient/Hydration intake appropriate for improving, restoring or maintaining nutritional needs  Description: Monitor and assess patient's nutrition/hydration status for malnutrition. Collaborate with interdisciplinary team and initiate plan and interventions as ordered.  Monitor patient's weight and dietary intake as ordered or per policy. Utilize nutrition screening tool and intervene as necessary. Determine patient's food preferences and provide high-protein, high-caloric foods as appropriate.     INTERVENTIONS:  - Monitor oral intake, urinary output, labs, and treatment plans  - Assess nutrition and hydration status and recommend course of action  - Evaluate amount of meals eaten  - Assist patient with eating if necessary   - Allow adequate time for meals  - Recommend/ encourage  appropriate diets, oral nutritional supplements, and vitamin/mineral supplements  - Order, calculate, and assess calorie counts as needed  - Recommend, monitor, and adjust tube feedings and TPN/PPN based on assessed needs  - Assess need for intravenous fluids  - Provide specific nutrition/hydration education as appropriate  - Include patient/family/caregiver in decisions related to nutrition  Outcome: Progressing     Problem: MUSCULOSKELETAL - ADULT  Goal: Maintain or return mobility to safest level of function  Description: INTERVENTIONS:  - Assess patient's ability to carry out ADLs; assess patient's baseline for ADL function and identify physical deficits which impact ability to perform ADLs (bathing, care of mouth/teeth, toileting, grooming, dressing, etc.)  - Assess/evaluate cause of self-care deficits   - Assess range of motion  - Assess patient's mobility  - Assess patient's need for assistive devices and provide as appropriate  - Encourage maximum independence but intervene and supervise when necessary  - Involve family in performance of ADLs  - Assess for home care needs following discharge   - Consider OT consult to assist with ADL evaluation and planning for discharge  - Provide patient education as appropriate  Outcome: Progressing  Goal: Maintain proper alignment of affected body part  Description: INTERVENTIONS:  - Support, maintain and protect limb and body alignment  - Provide patient/ family with appropriate education  Outcome: Progressing     Problem: Prexisting or High Potential for Compromised Skin Integrity  Goal: Skin integrity is maintained or improved  Description: INTERVENTIONS:  - Identify patients at risk for skin breakdown  - Assess and monitor skin integrity  - Assess and monitor nutrition and hydration status  - Monitor labs   - Assess for incontinence   - Turn and reposition patient  - Assist with mobility/ambulation  - Relieve pressure over bony prominences  - Avoid friction and  shearing  - Provide appropriate hygiene as needed including keeping skin clean and dry  - Evaluate need for skin moisturizer/barrier cream  - Collaborate with interdisciplinary team   - Patient/family teaching  - Consider wound care consult   Outcome: Progressing

## 2024-02-06 NOTE — ASSESSMENT & PLAN NOTE
Secondary to myasthenia gravis.  Currently patient noted using tape to keep both eyelids open..  Patient reports no significant improvement to ptosis since completing 5 days of IVIG.  Currently started on p.o. prednisone 60 mg daily for 2 weeks after taper 5 mg weekly and maintain at 40 mg until outpatient follow-up.

## 2024-02-06 NOTE — PROGRESS NOTES
[facility]  Progress Note  Name: Joni Iglesias I  MRN: [id number]  Unit/Bed#: -01 I Date of Admission: 1/31/2024   Date of Service: 2/6/2024  Hospital Day: 6    Assessment/Plan   * Myasthenia gravis  Assessment & Plan  Patient with past medical history of myasthenia gravis presented with worsening weakness for 3 days prior to presentation and ptosis concern for MG exacerbation.  Was managed in critical care initially.  Initiated on IVIG by neurology with significant improvement transfer to [facility] unit for further care.  Has residual generalized weakness with ptosis bilaterally however left more than right.  Left upper extremity and lower extremity strength around 4/5.  Today was noticed to have worsening of left-sided weakness however patient improved after only 1 dose of IVIG which is very unusual.  High suspicion for persistent myasthenia gravis crisis rather than other acute etiology.  Continues to have left-sided weakness.  Today reported to have swallowing difficulty.  Status otherwise stable and saturating well on room air.      During a.m. rounds patient does report overall feeling better however still complaining of both eyelid drooping.    Appreciate neurology support   Completed 5 days of IVIG.  Currently on regular diet per speech recommendation.  Decreased pyridostigmine 30 mg 4 times daily per neurology.  Neurology recommended start on prednisone 60 mg daily for 2 weeks that after taper 5 mg weekly with keeping 40 mg maintenance until outpatient follow-up.  Respiratory therapy following   Continue incentive spirometry  Continue NIF/VC every shift  See neurology's recommendation for further reference.    Ptosis, bilateral  Assessment & Plan  Secondary to myasthenia gravis.  Currently patient noted using tape to keep both eyelids open..  Patient reports no significant improvement to ptosis since completing 5 days of IVIG.  Currently started on p.o. prednisone 60 mg daily  for 2 weeks after taper 5 mg weekly and maintain at 40 mg until outpatient follow-up.    History of prediabetes  Assessment & Plan  Last A1C was 6.3 on 6/11/22       Microcytic anemia  Assessment & Plan  History of peptic ulcer disease.  Had EGD and 05/2023  Current hemoglobin 10.2  Iron panel noted with iron deficiency.    Plan  Continue with oral iron supplement twice daily.  Colace as needed.  Continue p.o. Protonix twice daily.    Poor vision  Assessment & Plan  EOMs limited on exam, no leftward movement of eyes bilaterally, patient reports relatively new problem  No ophthalmology visits in past due to inability to drive and financial difficulties  Patient reports significantly reduced mobility and fitness due to worsening vision over past few years, and reports was tole his poor vision due to myasthenia gravis  Patient reports feels safe at home, his son helps him get groceries and do things around the house when neeeded       Personal history of peptic ulcer disease  Assessment & Plan  GD 5/31/23 GI bleed from peptic ulcer  Outpatient regimen pantoprazole 40mg BID  Patient reports has completed prescribed course by Gastroenterology, but he has not followed up with Gastro as recommended  No current GI bleed symptoms or PUD symptoms    Plan  Continue PPI  Appreciate speech support, continue regular diet    CHF (congestive heart failure) (H[facility])  Assessment & Plan  ECHO 5/31/23: EF 40%  No outpatient medications addressing this condition    Appears euvolemic  No PTA meds at this time    Hyperlipidemia  Assessment & Plan  12/2/21   Patient reports not on Lipitor because of financial difficulty  1/31 Lipid panel results within normal limits    Plan  Lipid levels normal at this time  Not on statin    Morbid obesity (HCC)  Assessment & Plan  Patient reports significantly reduced mobility and fitness due to vision changes, and reports was tole his poor vision due to myasthenia gravis  Patient reports feels safe  at home, his son helps him get groceries and do things around the house when neeeded  Patient was seen ambulating in hallway times.        CAD (coronary artery disease)  Assessment & Plan  history of stent placement 2006  ECHO 5/31/23: EF 40%  Patient reports was on ASA 81mg daily, but since PUD with GI bleed, ASA discontinued  Patient reports being prescribed Lipitor in the past, but hasn't taken due to financial difficulties  Lipid panel 1/31 results within normal limits                   VTE Pharmacologic Prophylaxis:   Moderate Risk (Score 3-4) - Pharmacological DVT Prophylaxis Ordered: heparin.    Mobility:   Basic Mobility Inpatient Raw Score: 24  -[facility] Goal: 8: Walk 250 feet or more  -HLM Achieved: 8: Walk 250 feet ot more      Patient Centered Rounds: I performed bedside rounds with nursing staff today.   Discussions with Specialists or Other Care Team Provider: Neurology      Total Time Spent on Date of Encounter in care of patient: 25 mins. This time was spent on one or more of the following: performing physical exam; counseling and coordination of care; obtaining or reviewing history; documenting in the medical record; reviewing/ordering tests, medications or procedures; communicating with other healthcare professionals and discussing with patient's family/caregivers.    Current Length of Stay: 6 day(s)  Current Patient Status: Inpatient   Certification Statement: The patient will continue to require additional inpatient hospital stay due to patient does not have money to afford prescribed medication therefore prescription has been sent to [facility] as per case management  Discharge Plan: Anticipate discharge tomorrow to home.    Code Status: Level 1 - Full Code    Subjective:     Seen during a.m. rounds.  Patient appears comfortable on discharge.  Noted overall feeling slightly better however without significant improvement to ptosis.  Plan was to discharge him home today however patient  reported not having any money to afford any of the medications.  Discussed with case management and prescription sent to [facility] at [place] and hopefully patient will be able to discharge home tomorrow.  Patient reports to help him get prescription for few days until son has money to buy refill.    Objective:     Vitals:   Temp (24hrs), Av.4 °F (36.9 °C), Min:98.1 °F (36.7 °C), Max:98.6 °F (37 °C)    Temp:  [98.1 °F (36.7 °C)-98.6 °F (37 °C)] 98.6 °F (37 °C)  HR:  [57-73] 64  Resp:  [16-17] 17  BP: (105-132)/(53-69) 132/68  SpO2:  [94 %-100 %] 97 %  Body mass index is 33.07 kg/m².     Input and Output Summary (last 24 hours):     Intake/Output Summary (Last 24 hours) at 2024 1731  Last data filed at 2024 1404  Gross per 24 hour   Intake 360 ml   Output 1750 ml   Net -1390 ml       Physical Exam:   Physical Exam  Constitutional:       General: He is not in acute distress.     Appearance: Normal appearance. He is not ill-appearing, toxic-appearing or diaphoretic.   HENT:      Head: Normocephalic and atraumatic.   Eyes:      Comments: Bilateral ptosis noted on exam. patient is using tapes to keep both eyelids open.   Cardiovascular:      Rate and Rhythm: Normal rate.      Pulses: Normal pulses.   Pulmonary:      Effort: Pulmonary effort is normal. No respiratory distress.      Breath sounds: Normal breath sounds. No wheezing.   Abdominal:      General: Bowel sounds are normal. There is no distension.      Palpations: Abdomen is soft.      Tenderness: There is no abdominal tenderness.   Musculoskeletal:      Cervical back: Normal range of motion.   Neurological:      Mental Status: He is alert and oriented to person, place, and time. Mental status is at baseline.   Psychiatric:         Mood and Affect: Mood normal.         Behavior: Behavior normal.         Additional Data:     Labs:  Results from last 7 days   Lab Units 24  0527 24  0954 24  0613   WBC Thousand/uL 7.23   < >  5.27   HEMOGLOBIN g/dL 10.2*   < > 9.0*   HEMATOCRIT % 35.0*   < > 31.0*   PLATELETS Thousands/uL 294   < > 256   NEUTROS PCT %  --   --  47   LYMPHS PCT %  --   --  31   MONOS PCT %  --   --  14*   EOS PCT %  --   --  7*    < > = values in this interval not displayed.     Results from last 7 days   Lab Units 02/06/24  0527 02/02/24  0556 02/01/24  0459   SODIUM mmol/L 135   < > 135   POTASSIUM mmol/L 4.1   < > 4.6   CHLORIDE mmol/L 102   < > 102   CO2 mmol/L 30   < > 28   BUN mg/dL 12   < > 11   CREATININE mg/dL 0.87   < > 0.92   ANION GAP mmol/L 3   < > 5   CALCIUM mg/dL 9.6   < > 9.4   ALBUMIN g/dL  --   --  3.7   TOTAL BILIRUBIN mg/dL  --   --  0.57   ALK PHOS U/L  --   --  101   ALT U/L  --   --  9   AST U/L  --   --  16   GLUCOSE RANDOM mg/dL 106   < > 85    < > = values in this interval not displayed.     Results from last 7 days   Lab Units 01/31/24  0958   INR  1.01             Results from last 7 days   Lab Units 02/01/24  0459 01/31/24  2116   PROCALCITONIN ng/ml 0.07 0.06       Lines/Drains:  Invasive Devices       None                         Recent Cultures (last 7 days):         Last 24 Hours Medication List:   Current Facility-Administered Medications   Medication Dose Route Frequency Provider Last Rate    Artificial Tears  2 drop Both Eyes Q4H PRN MARCELLA Art      barium sulfate  1 tablet Oral Once in imaging Meghan Cervantes MD      chlorhexidine  15 mL Mouth/Throat Q12H [facility] MARCELLA Art      ferrous sulfate  325 mg Oral BID With Meals Alirio ESPINOZA MD      heparin (porcine)  5,000 Units Subcutaneous Q8H TOÑA MARCELLA Art      melatonin  6 mg Oral HS MARCELLA Saleh      pantoprazole  40 mg Oral BID MARCELLA Art      predniSONE  60 mg Oral Daily MARCELLA Gardiner      pyridostigmine  30 mg Oral 4x Daily MARCELLA Gardiner      senna  1 tablet Oral HS MARCELLA Art          Today, Patient Was Seen By: Alirio Roth MD    **Please Note: This note may  have been constructed using a voice recognition system.**

## 2024-02-06 NOTE — ASSESSMENT & PLAN NOTE
12/2/21   Patient reports not on Lipitor because of financial difficulty  1/31 Lipid panel results within normal limits    Plan  Lipid levels normal at this time  Not on statin

## 2024-02-06 NOTE — ASSESSMENT & PLAN NOTE
Patient reports significantly reduced mobility and fitness due to vision changes, and reports was tole his poor vision due to myasthenia gravis  Patient reports feels safe at home, his son helps him get groceries and do things around the house when neeeded  Patient was seen ambulating in hallway times.

## 2024-02-06 NOTE — PROGRESS NOTES
Progress Note - Neurology   Joni Iglesias 63 y.o. male [id number]  Unit/Bed#: /-01    Assessment/Plan:  Joni Iglesias is a 63 y.o. male    * Myasthenia gravis  Assessment & Plan  63 y.o. male with CAD, HLD and ACHR Ab positive myasthenia gravis dx 2021 maintained on mestinon presented to [facility] ED on 1/31/24 with worsening myasthenia symptoms (difficulty swallowing,worsening ptosis, worsening weakness, notable LUE weakness) x1 week.  He completed 5 day course of IVIG 2/4/24 without significant improvement of symptoms. 2/5/24 mestinon decreased to 30mg QID and started prednisone 60mg daily. At present exam is stable to slightly improved.  Work-up:  CTH 1/31/24: No acute intracranial abnormality   CTH 2/2/24: No acute intracranial abnormality. Stable chronic microangiopathic changes. Remote infarcts   NIF -50 (-45), VC 2.3 (1.7)   Recommendations:  Completed IVIG x5 days 2/4/24  Decreased mestinon to 30mg QID 2/5/24, continue as tolerated  Started Prednisone 60mg daily 2/5/24; plan to continue 60mg daily x2 weeks followed by slow taper of 5mg/week. Hold at 40mg daily   Continue protonix 40mg bid while on steroids given hx gib  Resume previously prescribed plavix 75mg daily  Medical management and correction of metabolic and infectious disturbances per primary team   If recurrent admission with exacerbation, recommend PLEX rather than IVIG  Medications to avoid in MG:  Would avoid the use of neuromuscular blockers  Macrolides, fluoroquinolones, aminoglycosides, tetracycline, and chloroquine, D-penicillamine  Antidysrhythmic agents - Beta blockers, calcium channel blockers, quinidine, lidocaine, procainamide, and trimethaphan  Antipsychotics - Phenothiazines, sulpride, atypicals  Cardiovascular- Propanolol, quinidine, verapamil, bretylium, statins  Miscellaneous - Diphenylhydantoin, magnesium sulfate, lithium, chlorpromazine, muscle relaxants, levothyroxine, adrenocorticotropic hormone (ACTH).  Glucocorticoids can also exacerbate MG.      Recommend hospital follow up with neuromuscular attending in about 6 weeks, general neurology if neuromuscular attending not available. Patient will require transportation assistance. Outpatient follow up also complicated by lack of insurance/citizenship    Subjective:   Feels about the same to slightly improved compared to yesterday. Does not notice a significant difference with decreased dose of mestinon. Notices that symptoms begin to worsen if mestinon dose is delayed. Tolerating steroids without adverse effects. Reports mild SOB with ambulation    Past Medical History:   Diagnosis Date    Coronary artery disease     Heart attack (HCC)     Hyperlipidemia     Myasthenia gravis (HCC)      Past Surgical History:   Procedure Laterality Date    APPENDECTOMY      CORONARY ANGIOPLASTY WITH STENT PLACEMENT       No family history on file.  Social History     Socioeconomic History    Marital status: /Civil Union     Spouse name: Not on file    Number of children: Not on file    Years of education: Not on file    Highest education level: Not on file   Occupational History    Not on file   Tobacco Use    Smoking status: Never    Smokeless tobacco: Never   Vaping Use    Vaping status: Never Used   Substance and Sexual Activity    Alcohol use: Never    Drug use: Never    Sexual activity: Not on file   Other Topics Concern    Not on file   Social History Narrative    Not on file     Social Determinants of Health     Financial Resource Strain: Not on file   Food Insecurity: No Food Insecurity (2/2/2024)    Hunger Vital Sign     Worried About Running Out of Food in the Last Year: Never true     Ran Out of Food in the Last Year: Never true   Transportation Needs: Unmet Transportation Needs (2/2/2024)    PRAPARE - Transportation     Lack of Transportation (Medical): Yes     Lack of Transportation (Non-Medical): Yes   Physical Activity: Not on file   Stress: Not on file   Social  Connections: Not on file   Intimate Partner Violence: Not on file   Housing Stability: Low Risk  (2/2/2024)    Housing Stability Vital Sign     Unable to Pay for Housing in the Last Year: No     Number of Places Lived in the Last Year: 1     Unstable Housing in the Last Year: No     E-Cigarette/Vaping    E-Cigarette Use Never User      E-Cigarette/Vaping Substances    Nicotine No     THC No     CBD No     Flavoring No     Other No     Unknown No      Medications:  All current active meds have been reviewed and current meds:  Scheduled Meds:  Current Facility-Administered Medications   Medication Dose Route Frequency Provider Last Rate    Artificial Tears  2 drop Both Eyes Q4H PRN MARCELLA Art      barium sulfate  1 tablet Oral Once in imaging Meghan Cervantes MD      chlorhexidine  15 mL Mouth/Throat Q12H TOÑA MARCELLA Art      ferrous sulfate  325 mg Oral BID With Meals Alirio ESPINOZA MD      heparin (porcine)  5,000 Units Subcutaneous Q8H TOÑA MARCELLA Art      melatonin  6 mg Oral HS MARCELLA Saleh      pantoprazole  40 mg Oral BID MARCELLA Art      predniSONE  60 mg Oral Daily MARCELLA Gardiner      pyridostigmine  30 mg Oral 4x Daily MARCELLA Gardiner      senna  1 tablet Oral HS MARCELLA Art       Continuous Infusions:   PRN Meds:.  Artificial Tears    barium sulfate       ROS:   Review of Systems    Vitals:   /68   Pulse 64   Temp 98.6 °F (37 °C)   Resp 17   Ht 6' (1.829 m)   Wt 111 kg (243 lb 13.3 oz)   SpO2 97%   BMI 33.07 kg/m²     Physical Exam:   Physical Exam  Vitals reviewed.   HENT:      Head: Normocephalic and atraumatic.   Eyes:      Pupils: Pupils are equal, round, and reactive to light.   Pulmonary:      Effort: Pulmonary effort is normal.   Skin:     General: Skin is warm and dry.   Neurological:      Mental Status: He is alert and oriented to person, place, and time.   Psychiatric:         Speech: Speech normal.       Neurologic Exam      Mental Status   Oriented to person, place, and time.   Attention: normal. Concentration: normal.   Speech: speech is normal   Level of consciousness: alert    Cranial Nerves     CN III, IV, VI   Pupils are equal, round, and reactive to light.  Conjugate gaze: present    CN VII   Left facial weakness: none  Bilateral eye ptosis with adhesive tape in place     Motor Exam     Strength   Strength 5/5 except as noted. Mild proximal weakness LUE with decreased ROM, pain limited     Gait, Coordination, and Reflexes     Tremor   Resting tremor: absentObserved ambulating independently without difficulty     Labs: I have personally reviewed pertinent reports.   Recent Results (from the past 24 hour(s))   CBC and Platelet    Collection Time: 02/06/24  5:27 AM   Result Value Ref Range    WBC 7.23 4.31 - 10.16 Thousand/uL    RBC 5.54 3.88 - 5.62 Million/uL    Hemoglobin 10.2 (L) 12.0 - 17.0 g/dL    Hematocrit 35.0 (L) 36.5 - 49.3 %    MCV 63 (L) 82 - 98 fL    MCH 18.4 (L) 26.8 - 34.3 pg    MCHC 29.1 (L) 31.4 - 37.4 g/dL    RDW 20.3 (H) 11.6 - 15.1 %    Platelets 294 149 - 390 Thousands/uL    MPV 9.2 8.9 - 12.7 fL   Basic metabolic panel    Collection Time: 02/06/24  5:27 AM   Result Value Ref Range    Sodium 135 135 - 147 mmol/L    Potassium 4.1 3.5 - 5.3 mmol/L    Chloride 102 96 - 108 mmol/L    CO2 30 21 - 32 mmol/L    ANION GAP 3 mmol/L    BUN 12 5 - 25 mg/dL    Creatinine 0.87 0.60 - 1.30 mg/dL    Glucose 106 65 - 140 mg/dL    Calcium 9.6 8.4 - 10.2 mg/dL    eGFR 91 ml/min/1.73sq m       Imaging: I have personally reviewed pertinent imaging in PACS and I have personally reviewed PACS reports.     EKG, Pathology, and Other Studies: I have personally reviewed pertinent reports.     Counseling / Coordination of Care  Assessment, images and plan reviewed with Dr. Thmopson. Plan discussed with patient and primary service

## 2024-02-06 NOTE — ASSESSMENT & PLAN NOTE
History of peptic ulcer disease.  Had EGD and 05/2023  Current hemoglobin 10.2  Iron panel noted with iron deficiency.    Plan  Continue with oral iron supplement twice daily.  Colace as needed.  Continue p.o. Protonix twice daily.

## 2024-02-06 NOTE — PHYSICAL THERAPY NOTE
PHYSICAL THERAPY RE-EVALUATION  NAME:  Joni Iglesias  DATE: 02/06/24    AGE:   63 y.o.  Mrn:   [id number]  ADMIT DX:  Weakness [R53.1]  Myasthenia gravis with exacerbation, adult form (HCC) [G70.01]  Known medical problems [Z78.9]  Problem List:   Patient Active Problem List   Diagnosis    Ptosis, bilateral    CAD (coronary artery disease)    Morbid obesity ([facility])    Myasthenia gravis ([facility])    Hyperlipidemia    Myasthenia gravis    Acute blood loss anemia    Bruise    CHF (congestive heart failure) ([facility])    Personal history of peptic ulcer disease    Poor vision    Microcytic anemia    History of prediabetes       Past Medical History  Past Medical History:   Diagnosis Date    Coronary artery disease     Heart attack ([facility])     Hyperlipidemia     Myasthenia gravis ([facility])        Past Surgical History  Past Surgical History:   Procedure Laterality Date    APPENDECTOMY      CORONARY ANGIOPLASTY WITH STENT PLACEMENT         Length Of Stay: 6  Performed at least 2 patient identifiers during session: Name and Birthday       02/06/24 0925   PT Last Visit   PT Visit Date 02/06/24   Note Type   Note type Re-Evaluation   Pain Assessment   Pain Assessment Tool 0-10   Pain Score No Pain   Restrictions/Precautions   Weight Bearing Precautions Per Order No   Braces or Orthoses   (none reported)   Other Precautions Visual impairment  (ptosis bilaterally)   Home Living   Type of Home House   Home Layout Two level;Performs ADLs on one level;Able to live on main level with bedroom/bathroom;Stairs to enter with rails  (1 CHARLIE)   Bathroom Shower/Tub Walk-in shower   Bathroom Toilet Standard   Bathroom Equipment Grab bars in shower;Built-in shower seat   Bathroom Accessibility Accessible   Home Equipment Walker;Cane   Additional Comments no AD used during transfers   Prior Function   Level of [place] Independent with ADLs;Independent with functional mobility;Independent with IADLS   Lives With Son   Receives Help From Family   IADLs  "Family/Friend/Other provides transportation;Family/Friend/Other provides meals;Independent with medication management   Falls in the last 6 months   (1 fall last June)   General   Family/Caregiver Present No   Cognition   Overall Cognitive Status WFL   Arousal/Participation Alert   Attention Within functional limits   Orientation Level Oriented X4   Memory Within functional limits   Following Commands Follows all commands and directions without difficulty   Comments Pt agreeable to PT re-evaluation   RLE Assessment   RLE Assessment WFL  (B knee extension, ankle DF 4+/5)   LLE Assessment   LLE Assessment WFL  (B knee extension, ankle DF 4+/5)   Vision-Basic Assessment   Current Vision Does not wear glasses  (pt reports poor vision at times. He notes difficulty with focus/accommadation, and typically uses \"tape\" to keep eyes open due to chronic ptosis.)   Coordination   Sensation WFL   Heel to Shin Intact   Light Touch   RLE Light Touch Grossly intact   LLE Light Touch Grossly intact   Bed Mobility   Additional Comments bed mobility not tested as pt seated in bedside chair at start/end of session   Transfers   Sit to Stand 6  Modified independent   Additional items Armrests   Stand to Sit 6  Modified independent   Additional items Armrests   Additional Comments no AD used during transfers   Ambulation/Elevation   Gait pattern Inconsistent kelsie   Gait Assistance 6  Modified independent   Assistive Device None   Distance 140'x2  (seated rest between trials due to fatigue. Pt states \"I pay attention to when I need to take a break\")   Stair Management Assistance 6  Modified independent   Stair Management Technique Two rails;Alternating pattern   Number of Stairs 4   Balance   Static Sitting Normal   Dynamic Sitting Good   Static Standing Fair +   Dynamic Standing Fair +   Ambulatory Fair +   Activity Tolerance   Activity Tolerance Patient tolerated treatment well   Nurse Made Aware RN aware of session outcomes "   Assessment   Assessment Pt is 63 y.o. male seen for PT RE-evaluation s/p admit to [facility] on 1/31/2024 w/ Myasthenia gravis in crisis (HCC). PT consulted to assess pt's functional mobility and d/c needs. Order placed for PT eval and tx, w/  out of bed to chair  order. Re-evaluation completed due to change in strength with reported weakness, greatest in LUE. Comorbidities affecting pt's physical performance at time of assessment include: myasthenia gravis in crisis,bilateral ptosis, microcytic anemia, CHF. PTA, pt was  living with his son in a two story home with first floor set up and 1 CHARLIE . Pt reports independence at baseline with ADLs, IADLs, and functional mobility without AD. Personal factors affecting pt at time of IE include: lives in 2 story house, stairs to enter home, and visual impairments. Please find objective findings from PT assessment regarding body systems outlined above. Pt's clinical presentation is currently stable  seen in pt's presentation of completing all phases of mobility at Benji level with equal LE strength bilaterally, pt states he does not have concerns related to his mobility to return home. At time of evaluation, pt completed STS Benji, ambulated 140'x2 without AD at Benji level, and navigated 4 steps with alternating pattern and BUE support at Benji level. From PT/mobility standpoint, pt appears to be functioning close to or at mobility baseline, therefore no further immediate skilled PT needs are warranted at this time. PT recommends no post-acute rehabilitation needs. AMPAC 6-clicks score of 24 reflects a discharge destination of home. Pt currently performing all phases of functional mobility at independent level without need for AD. Recommend pt continue to mobilize ad minnie while here. Recommend pt return to previous living environment once medically cleared. Will sign off, D/C PT. Please reconsult if further immediate skilled PT needs are warranted.   Barriers to Discharge  None   Goals   Short Term Goal #1 n/a PT to sign off   PT Treatment Day 1   Plan   PT Frequency   (eval only)   Discharge Recommendation   Rehab Resource Intensity Level, PT III (Minimum Resource Intensity)   AM-PAC Basic Mobility Inpatient   Turning in Flat Bed Without Bedrails 4   Lying on Back to Sitting on Edge of Flat Bed Without Bedrails 4   Moving Bed to Chair 4   Standing Up From Chair Using Arms 4   Walk in Room 4   Climb 3-5 Stairs With Railing 4   Basic Mobility Inpatient Raw Score 24   Basic Mobility Standardized Score 57.68   Highest Level Of Mobility   JH-HLM Goal 8: Walk 250 feet or more   JH-HLM Achieved 8: Walk 250 feet ot more       Time In: 0923  Time Out: 0937  Total RE-Evaluation Minutes:    Alta Leija, PT

## 2024-02-06 NOTE — CASE MANAGEMENT
Case Management Progress Note    Patient name Joni Iglesias  Location /-01 MRN [id number]  : 1960 Date 2024       LOS (days): 6  Geometric Mean LOS (GMLOS) (days):   Days to GMLOS:        OBJECTIVE:        Current admission status: Inpatient  Preferred Pharmacy:   [id number] - [facility] [place] [address] - [address]  [address]  [facility] [place] [address]  Phone: [phone] Fax: [phone]    [facility] - [place], [place] - [address]  [address]  [place] [address]  Phone: [phone] Fax: [phone]    Primary Care Provider: No primary care provider on file.    Primary Insurance: [employer] PENDING  Secondary Insurance:     PROGRESS NOTE:  Indigent meds to be supplied to patient. Contacted [facility], order goes out at 10am. Will call CM back with prices for meds to complete form and fax over to them.  Spoke with patient to inform will be here overnight for med administration, verbalized understanding and gratitude for indigent meds. Asked about financial office number and location to speak with them about [facility]. Will find out and inform patient.

## 2024-02-07 VITALS
HEART RATE: 77 BPM | TEMPERATURE: 97.8 F | DIASTOLIC BLOOD PRESSURE: 68 MMHG | BODY MASS INDEX: 33.03 KG/M2 | WEIGHT: 243.83 LBS | OXYGEN SATURATION: 93 % | SYSTOLIC BLOOD PRESSURE: 135 MMHG | RESPIRATION RATE: 17 BRPM | HEIGHT: 72 IN

## 2024-02-07 PROCEDURE — 99239 HOSP IP/OBS DSCHRG MGMT >30: CPT | Performed by: STUDENT IN AN ORGANIZED HEALTH CARE EDUCATION/TRAINING PROGRAM

## 2024-02-07 PROCEDURE — 92526 ORAL FUNCTION THERAPY: CPT

## 2024-02-07 PROCEDURE — 94150 VITAL CAPACITY TEST: CPT

## 2024-02-07 RX ORDER — PYRIDOSTIGMINE BROMIDE 60 MG/1
60 TABLET ORAL 4 TIMES DAILY
Qty: 120 TABLET | Refills: 0 | Status: SHIPPED | OUTPATIENT
Start: 2024-02-07 | End: 2024-02-07

## 2024-02-07 RX ADMIN — CHLORHEXIDINE GLUCONATE 0.12% ORAL RINSE 15 ML: 1.2 LIQUID ORAL at 09:34

## 2024-02-07 RX ADMIN — PYRIDOSTIGMINE BROMIDE 30 MG: 60 TABLET ORAL at 09:35

## 2024-02-07 RX ADMIN — HEPARIN SODIUM 5000 UNITS: 5000 INJECTION INTRAVENOUS; SUBCUTANEOUS at 05:18

## 2024-02-07 RX ADMIN — PYRIDOSTIGMINE BROMIDE 30 MG: 60 TABLET ORAL at 05:18

## 2024-02-07 RX ADMIN — PREDNISONE 60 MG: 20 TABLET ORAL at 09:34

## 2024-02-07 RX ADMIN — FERROUS SULFATE TAB 325 MG (65 MG ELEMENTAL FE) 325 MG: 325 (65 FE) TAB at 09:34

## 2024-02-07 RX ADMIN — PANTOPRAZOLE SODIUM 40 MG: 40 TABLET, DELAYED RELEASE ORAL at 09:34

## 2024-02-07 NOTE — RESPIRATORY THERAPY NOTE
02/07/24 0752   Additional Assessments   Pulse 57   SpO2 98 %   $ Vital Capacity Mech/Peak Flow Yes   Position Sitting   Vital Capacity 2.1 L   NIF -60 cm H2O

## 2024-02-07 NOTE — SPEECH THERAPY NOTE
"Speech Language/Pathology     Speech/Language Pathology Progress Note     Patient Name: Joni Iglesias    Today's Date: 2/7/2024     Problem List  Principal Problem:    Myasthenia gravis  Active Problems:    Ptosis, bilateral    CAD (coronary artery disease)    Morbid obesity (HCC)    Hyperlipidemia    CHF (congestive heart failure) (HCC)    Personal history of peptic ulcer disease    Poor vision    Microcytic anemia    History of prediabetes       Subjective:  Patient received awake and alert, seated at bedside chair \"much much better\" re: swallow function and mgmt of oral intake     Previous/current diet: reg/thin     Objective:  Patient takes successive sips of thin liquids by straw.  Reports only concern to be fatigue with mastication of harder solid textures.    No overt s/s of aspiration or distress. Vocal quality noted to remain clear.     Edu re: s/s of dysphagia as it relates to MG, diet modifications/ choosing softer foods at baseline.  HOLD all PO should dysphagia symptoms re-occur.  Pt verbalizes understanding.       Assessment:  Oropharyngeal swallow function appears WFL/same from previous encounters.  Improved now that MG medications are well regimented.          Plan:  Reg/thin - choose softer options  Slow rate of intake  Monitor closely for reoccurrence of dysphagia signs/symptoms  Meds as tolerated  Will follow up as indicated      Kaur Lay MS, CCC-SLP  Speech-Language Pathologist  [place] #[id number]  [place] #[id number]   "

## 2024-02-07 NOTE — DISCHARGE SUMMARY
[facility]  Discharge- Joni Iglesias 1960, 63 y.o. male MRN: [id number]  Unit/Bed#: -01 Encounter: [id number]  Primary Care Provider: No primary care provider on file.   Date and time admitted to hospital: 1/31/2024  9:27 AM    * Myasthenia gravis  Assessment & Plan  Patient with past medical history of myasthenia gravis presented with worsening weakness for 3 days prior to presentation and ptosis concern for MG exacerbation.  Was managed in critical care initially.  Initiated on IVIG by neurology with significant improvement transfer to [facility] for further care.  Has residual generalized weakness with ptosis.  Symptoms had improved after IVIG course however complain of having persistent bulbar symptoms and subsequently patient was started on prednisone 60 mg daily by neurology.    During a.m. rounds patient does report overall feeling better and eager to go home.    Appreciate neurology support   Completed 5 days of IVIG.  Currently on regular diet per speech recommendation.  Decreased pyridostigmine 30 mg 4 times daily per neurology.  Neurology recommended start on prednisone 60 mg daily for 2 weeks that after taper 5 mg weekly with keeping 40 mg maintenance until outpatient follow-up.  Neurology recommended patient should  consider getting plasma exchange next time rather than IVIG.  Patient does not have insurance or documentation therefore medication has been arranged by home*pharmacy in [place] has been delivered at bedside.    Case management to provide all the resources to the patient.    Ptosis, bilateral  Assessment & Plan  Secondary to myasthenia gravis.  Currently patient noted using tape to keep both eyelids open..  Patient reports no significant improvement to ptosis since completing 5 days of IVIG.  Currently started on p.o. prednisone 60 mg daily for 2 weeks after taper 5 mg weekly and maintain at 40 mg until outpatient follow-up.  Medications delivered  from home for pharmacy at the bedside.    History of prediabetes  Assessment & Plan  Last A1C was 6.3 on 6/11/22       Microcytic anemia  Assessment & Plan  History of peptic ulcer disease.  Had EGD and 05/2023  Current hemoglobin 10.2  Iron panel noted with iron deficiency.    Plan  Continue with oral iron supplement twice daily.  Colace as needed.  Continue p.o. Protonix 40 mg twice daily since being discharged on prednisone.    Poor vision  Assessment & Plan  EOMs limited on exam, no leftward movement of eyes bilaterally, patient reports relatively new problem  No ophthalmology visits in past due to inability to drive and financial difficulties  Patient reports significantly reduced mobility and fitness due to worsening vision over past few years, and reports was tole his poor vision due to myasthenia gravis  Patient reports feels safe at home, his son helps him get groceries and do things around the house when needed.  Case management to follow-up with patient for resources.       Personal history of peptic ulcer disease  Assessment & Plan  GD 5/31/23 GI bleed from peptic ulcer  Outpatient regimen pantoprazole 40mg BID  Patient reports has completed prescribed course by Gastroenterology, but he has not followed up with Gastro as recommended  No current GI bleed symptoms or PUD symptoms    Plan  Continue Protonix 40 mg twice daily  Appreciate speech support, continue regular diet    CHF (congestive heart failure) (HCC)  Assessment & Plan  ECHO 5/31/23: EF 40%  No outpatient medications addressing this condition    Appears euvolemic  No PTA meds at this time    Hyperlipidemia  Assessment & Plan  12/2/21   Patient reports not on Lipitor because of financial difficulty  1/31 Lipid panel results within normal limits    Plan  Lipid levels normal at this time  Not on statin    Morbid obesity (HCC)  Assessment & Plan  Patient reports significantly reduced mobility and fitness due to vision changes, and reports  was tole his poor vision due to myasthenia gravis  Patient reports feels safe at home, his son helps him get groceries and do things around the house when neeeded  Patient was seen ambulating in hallway times.        CAD (coronary artery disease)  Assessment & Plan  history of stent placement 2006  ECHO 5/31/23: EF 40%  Patient reports was on ASA 81mg daily, but since PUD with GI bleed, ASA discontinued.  Patient reports being prescribed Lipitor in the past, but hasn't taken due to financial difficulties  Lipid panel 1/31 results within normal limits  Continue outpatient follow-up        Discharging Physician / Practitioner: Alirio Roth MD  PCP: No primary care provider on file.  Admission Date:   Admission Orders (From admission, onward)       Ordered        01/31/24 1042  INPATIENT ADMISSION  Once                          Discharge Date: 02/07/24    Medical Problems       Resolved Problems  Date Reviewed: 2/7/2024   None         Consultations During Hospital Stay:  Neurology    Reason for Admission: Myasthenia gravis crisis    Hospital Course:     Joni Iglesias is a 63 y.o. male patient with past medical history of myasthenia gravis, CAD, anemia, PUD, CHF, morbid obesity, chronic ptosis, who originally presented to the hospital on 1/31/2024 due to poor vision, weakness of arms and legs, bilateral eyelid drooping, difficulty with swallowing associate with choking, coughing sensation with eating and drinking.  Patient does report taking Prostigmin 60 mg 4 times daily as prescribed however has not had any medication since financial situation and subsequently started having worsening symptoms.  Patient also reports that he is not taking aspirin due to history of PUD as well as not taking statin due to financial strain.  Patient reports that he lives in the  that his son helps him pay for stuff.  patient was initially admitted to ICU level of care due to myasthenia gravis and started on IVIG.  Subsequently his  symptoms had improved however patient complained of having persistent ptosis without significant improvement therefore subsequently was started on prednisone 60 p.o. daily per neurology as well as pyridostigmine dose was decreased to 30 mg 4 times daily.  Patient was seen ambulating in hallway times.  Patient was also evaluated by speech initially requiring modified diet however currently recommended regular diet and has been tolerating regular food without any signs of dysphagia.  Patient also report overall symptoms improved compared to the hospitalization and currently reports feeling better and eager to go home.  Patient is unable to afford medications therefore home start pharmacy in [place] has arranged medication and has been delivered at the bedside.  CM had been following with the patient for resources.  Currently patient has been cleared by neurology for discharge with outpatient follow-up on tapering dose of steroids as above.  Neurology also recommended that in future plasma exchange should be considered instead of IVIG.  Currently he is hemodynamically stable for discharge.  No other events reported.  Refer to earlier notes for further clarification.      Please see above list of diagnoses and related plan for additional information.     Condition at Discharge: good     Discharge Day Visit / Exam:     Subjective: Seen during a.m. rounds.  Patient appears comfortable not in distress.  Reports tolerating diet well.  Reports his symptoms overall has improved however still noted with bilateral ptosis.  Denies any other new complaints.  Patient has been cleared by neurology for discharge with outpatient follow-up.  Patient is agreeable with above plan.    Vitals: Blood Pressure: 135/68 (02/07/24 1515)  Pulse: 77 (02/07/24 1515)  Temperature: 97.8 °F (36.6 °C) (02/07/24 1511)  Temp Source: Oral (02/05/24 1917)  Respirations: 17 (02/05/24 2141)  Height: 6' (182.9 cm) (01/31/24 1240)  Weight - Scale: 111 kg  (243 lb 13.3 oz) (02/01/24 0600)  SpO2: 93 % (02/07/24 1515)  Exam:   Physical Exam  Constitutional:       General: He is not in acute distress.     Appearance: Normal appearance. He is not ill-appearing, toxic-appearing or diaphoretic.   HENT:      Head: Normocephalic and atraumatic.   Eyes:      Comments: Bilateral ptosis noted on exam. patient is using tapes to keep both eyelids open.   Cardiovascular:      Rate and Rhythm: Normal rate.      Pulses: Normal pulses.   Pulmonary:      Effort: Pulmonary effort is normal. No respiratory distress.      Breath sounds: Normal breath sounds. No wheezing.   Abdominal:      General: Bowel sounds are normal. There is no distension.      Palpations: Abdomen is soft.      Tenderness: There is no abdominal tenderness.   Musculoskeletal:      Cervical back: Normal range of motion.   Neurological:      Mental Status: He is alert and oriented to person, place, and time. Mental status is at baseline.   Psychiatric:         Mood and Affect: Mood normal.         Behavior: Behavior normal.           Discharge instructions/Information to patient and family:   See after visit summary for information provided to patient and family.      Provisions for Follow-Up Care:  See after visit summary for information related to follow-up care and any pertinent home health orders.      Disposition:     Home      Planned Readmission: at risk of readmission due to social issues and not able to afford medications and follow-ups.     Discharge Statement:  I spent 35 minutes discharging the patient. This time was spent on the day of discharge. I had direct contact with the patient on the day of discharge. Greater than 50% of the total time was spent examining patient, answering all patient questions, arranging and discussing plan of care with patient as well as directly providing post-discharge instructions.  Additional time then spent on discharge activities.    Discharge Medications:  See after visit  summary for reconciled discharge medications provided to patient and family.      ** Please Note: This note has been constructed using a voice recognition system **

## 2024-02-07 NOTE — PLAN OF CARE
Problem: PAIN - ADULT  Goal: Verbalizes/displays adequate comfort level or baseline comfort level  Description: Interventions:  - Encourage patient to monitor pain and request assistance  - Assess pain using appropriate pain scale  - Administer analgesics based on type and severity of pain and evaluate response  - Implement non-pharmacological measures as appropriate and evaluate response  - Consider cultural and social influences on pain and pain management  - Notify physician/advanced practitioner if interventions unsuccessful or patient reports new pain  Outcome: Progressing     Problem: INFECTION - ADULT  Goal: Absence or prevention of progression during hospitalization  Description: INTERVENTIONS:  - Assess and monitor for signs and symptoms of infection  - Monitor lab/diagnostic results  - Monitor all insertion sites, i.e. indwelling lines, tubes, and drains  - Monitor endotracheal if appropriate and nasal secretions for changes in amount and color  - [place] appropriate cooling/warming therapies per order  - Administer medications as ordered  - Instruct and encourage patient and family to use good hand hygiene technique  - Identify and instruct in appropriate isolation precautions for identified infection/condition  Outcome: Progressing  Goal: Absence of fever/infection during neutropenic period  Description: INTERVENTIONS:  - Monitor WBC    Outcome: Progressing     Problem: SAFETY ADULT  Goal: Patient will remain free of falls  Description: INTERVENTIONS:  - Educate patient/family on patient safety including physical limitations  - Instruct patient to call for assistance with activity   - Consult OT/PT to assist with strengthening/mobility   - Keep Call bell within reach  - Keep bed low and locked with side rails adjusted as appropriate  - Keep care items and personal belongings within reach  - Initiate and maintain comfort rounds  - Make Fall Risk Sign visible to staff  - Apply yellow socks and bracelet  for high fall risk patients  - Consider moving patient to room near nurses station  Outcome: Progressing  Goal: Maintain or return to baseline ADL function  Description: INTERVENTIONS:  -  Assess patient's ability to carry out ADLs; assess patient's baseline for ADL function and identify physical deficits which impact ability to perform ADLs (bathing, care of mouth/teeth, toileting, grooming, dressing, etc.)  - Assess/evaluate cause of self-care deficits   - Assess range of motion  - Assess patient's mobility; develop plan if impaired  - Assess patient's need for assistive devices and provide as appropriate  - Encourage maximum independence but intervene and supervise when necessary  - Involve family in performance of ADLs  - Assess for home care needs following discharge   - Consider OT consult to assist with ADL evaluation and planning for discharge  - Provide patient education as appropriate  Outcome: Progressing  Goal: Maintains/Returns to pre admission functional level  Description: INTERVENTIONS:  - Perform AM-PAC 6 Click Basic Mobility/ Daily Activity assessment daily.  - Set and communicate daily mobility goal to care team and patient/family/caregiver.   - Collaborate with rehabilitation services on mobility goals if consulted  - Out of bed for toileting  - Record patient progress and toleration of activity level   Outcome: Progressing     Problem: DISCHARGE PLANNING  Goal: Discharge to home or other facility with appropriate resources  Description: INTERVENTIONS:  - Identify barriers to discharge w/patient and caregiver  - Arrange for needed discharge resources and transportation as appropriate  - Identify discharge learning needs (meds, wound care, etc.)  - Arrange for interpretive services to assist at discharge as needed  - Refer to Case Management Department for coordinating discharge planning if the patient needs post-hospital services based on physician/advanced practitioner order or complex needs  related to functional status, cognitive ability, or social support system  Outcome: Progressing     Problem: Knowledge Deficit  Goal: Patient/family/caregiver demonstrates understanding of disease process, treatment plan, medications, and discharge instructions  Description: Complete learning assessment and assess knowledge base.  Interventions:  - Provide teaching at level of understanding  - Provide teaching via preferred learning methods  Outcome: Progressing

## 2024-02-07 NOTE — OCCUPATIONAL THERAPY NOTE
Occupational Therapy Cancellation Note        Patient Name: Joni Iglesias  Today's Date: 2/7/2024 02/07/24 1058   OT Last Visit   OT Visit Date 02/07/24   Note Type   Note Type Treatment   Cancel Reasons Other  (cancelled session- patient being discharged)

## 2024-02-08 NOTE — ASSESSMENT & PLAN NOTE
Patient with past medical history of myasthenia gravis presented with worsening weakness for 3 days prior to presentation and ptosis concern for MG exacerbation.  Was managed in critical care initially.  Initiated on IVIG by neurology with significant improvement transfer to [facility] unit for further care.  Has residual generalized weakness with ptosis bilaterally however left more than right.  Left upper extremity and lower extremity strength around 4/5.  Today was noticed to have worsening of left-sided weakness however patient improved after only 1 dose of IVIG which is very unusual.  High suspicion for persistent myasthenia gravis crisis rather than other acute etiology.  Continues to have left-sided weakness.  Today reported to have swallowing difficulty.  Status otherwise stable and saturating well on room air.      During a.m. rounds patient does report overall feeling better however still complaining of both eyelid drooping.    Appreciate neurology support   Completed 5 days of IVIG.  Currently on regular diet per speech recommendation.  Decreased pyridostigmine 30 mg 4 times daily per neurology.  Neurology recommended start on prednisone 60 mg daily for 2 weeks that after taper 5 mg weekly with keeping 40 mg maintenance until outpatient follow-up.  Respiratory therapy following   Continue incentive spirometry  Continue NIF/VC every shift  See neurology's recommendation for further reference.

## 2024-03-08 DIAGNOSIS — G70.00 MYASTHENIA GRAVIS (HCC): ICD-10-CM

## 2024-03-08 RX ORDER — PYRIDOSTIGMINE BROMIDE 60 MG/1
60 TABLET ORAL 4 TIMES DAILY
Qty: 120 TABLET | Refills: 0 | Status: SHIPPED | OUTPATIENT
Start: 2024-03-08

## 2024-03-12 NOTE — TELEPHONE ENCOUNTER
received vm 3/8 at 3:21pm-A Hi, this is simba olmos,  march, 2nd. 1960. I put in for a refill for my sickness. My___________.  I need to get a prescription for 60 milligrams. _______________, My phone number is [phone] I have, I know the medicine and I come to the pharmacy in [facility] and they told me no, no refill on it. Please do somebody and see if I can stop. i'm out today and I need them, the medicine [phone]. I don't remember the name of the medicine, but it's for myastenia gravis.    ----------------------------------------------  Mestinon refill sent to pharm on 3/8    Called pt and confirmed that medication was mestinon.  Advised that refill was sent to pharm on 3/8 and advised him to contact pharm

## 2024-03-12 NOTE — TELEPHONE ENCOUNTER
Recd  3/8 3:28 PM    Joni olmos, March 2, 1960. I calling regarding my medication for mestinon; I need to continue to take that for my myasthenia gravis. I went to the pharmacy in [facility] and realized they didn't have the prescription for the medicine.  ____________   [phone]; already addressed.

## 2024-05-02 DIAGNOSIS — G70.00 MYASTHENIA GRAVIS (HCC): ICD-10-CM

## 2024-05-02 RX ORDER — PYRIDOSTIGMINE BROMIDE 60 MG/1
60 TABLET ORAL 4 TIMES DAILY
Qty: 120 TABLET | Refills: 0 | Status: SHIPPED | OUTPATIENT
Start: 2024-05-02

## 2024-05-06 ENCOUNTER — TELEPHONE (OUTPATIENT)
Dept: NEUROLOGY | Facility: CLINIC | Age: 64
End: 2024-05-06

## 2024-05-06 NOTE — TELEPHONE ENCOUNTER
HFU / 1/31/2024 - 2/7/2024 (7 days)  [facility] /   Myasthenia gravis    D/C to Home    Called pt to offer HFU appt. Pt states he don't work, has no income, has no ins, no ride. He does not qualify for assistance of any kind here. He has tried since being here 3yrs with son.Pt ask for Billing deprt phone to ask how will a visit be. I provided the phone number.     I offered to send a message to our social workers to see what kind of assistance they could offer. Pt declined stating that its all a waste of time. He does not want any letters being sent either.     Pt did thank me for offering but he its a waste of time, he does not qualify for anything here pt stated.    Per  Notes:  Date of Service: 2/6/2024  8:04 AM     MARCELLA Gardiner  Nurse Practitioner  Neurology      Recommend hospital follow up with neuromuscular attending in about 6 weeks, general neurology if neuromuscular attending not available. Patient will require transportation assistance. Outpatient follow up also complicated by lack of insurance/citizenship

## 2024-06-25 DIAGNOSIS — G70.00 MYASTHENIA GRAVIS (HCC): ICD-10-CM

## 2024-06-27 NOTE — TELEPHONE ENCOUNTER
Patient called the RX Refill Line. Message is being forwarded to the office.     Patient is requesting pyridostigmine (MESTINON) 60 mg tablet.    Patient wanted to know if we got his refill requset. Patient was told that we did received it and are waiting on the doctor.

## 2024-06-27 NOTE — TELEPHONE ENCOUNTER
06/27/24    Mr. Castanon (Pharmacy Technician) from [facility] called office today Requesting Refills on the following med: pyridostigmine (MESTINON) 60 mg tablet.     Mr. Castanon Stated that Pt called the Pharmacy about his refills and stated, “that he has been trying to contact our office”.    Related to Mr. Castanon that Provider and Clinical staff Message (PT HAS NOT BEEN SEEN IN 2 YEARS AND NEEDS TO F/U).    Mr. Castanon UNDERSTOOD, AGREED & stated that he will let the Pt know.       If Pt contact office, please Review 05/06/24 & 06/25/24 ENCOUNTER & assist Pt.   Than You.

## 2024-06-27 NOTE — TELEPHONE ENCOUNTER
Patient called the RX Refill Line. Message is being forwarded to the office.     Patient called to check on status of Mestinon. States that he is out of his medication since yesterday and would like the doctor to send in 1 last refill to the pharmacy for him. States he does not have transportation and does not work.     Please contact patient at [phone] to let him know if it gets sent to the pharmacy or any issues.

## 2024-06-27 NOTE — TELEPHONE ENCOUNTER
Called and spoke to patient. He states he is taking 60mg four times daily. He is nearly out of medication.     I did make him aware that he needs to be scheduled with a neuromuscular provider, but he states that he cannot right now due to not having a job, money, insurance, or transportation at this time for a visit. I asked if I could get him in touch with our social work team, however patient declines.

## 2024-06-28 ENCOUNTER — TELEPHONE (OUTPATIENT)
Age: 64
End: 2024-06-28

## 2024-06-28 RX ORDER — PYRIDOSTIGMINE BROMIDE 60 MG/1
60 TABLET ORAL 4 TIMES DAILY
Qty: 120 TABLET | Refills: 0 | Status: SHIPPED | OUTPATIENT
Start: 2024-06-28

## 2024-06-28 NOTE — TELEPHONE ENCOUNTER
Patient called medication refill line for a refill of Pyridostigmine 60mg and wanted to know what the cost of an office appointment would be. Please call patient back to schedule appointment and go over payment arrangements. Patient's phone number is [phone].

## 2024-06-28 NOTE — TELEPHONE ENCOUNTER
Patient called the RX Refill Line. Message is being forwarded to the office.     Patient is requesting - Pt called refill line and stated he is expecting a call from office regarding a medication and scheduling appt. Please review message from Didi Valentine LPN on Pt's chart. Thank you.    Please contact patient at - [phone]

## 2024-06-28 NOTE — TELEPHONE ENCOUNTER
Called patient and LMOM for patient.     Dr Vance- I don't see that script had been sent. Can you review and sign? Thank you!

## 2024-06-28 NOTE — TELEPHONE ENCOUNTER
Patient called to advise that he needs to schedule a f/u appt with Dr Vance.    Accepted next open slot on 1/21/25 230 pm. Added to wait list.     Patient advised that he does not have nay insurance and is asking how much will it be.      Advised patient to Bring $30 for the his co pay and he will be billed the remaining balance.  Patient understood.      Patient is need of a refill for medication listed below. Please call when its been submitted to the pharmacy.      pyridostigmine (MESTINON) 60 mg tablet       Sig: Take 1 tablet by mouth 4 times daily

## 2024-07-17 DIAGNOSIS — G70.00 MYASTHENIA GRAVIS (HCC): ICD-10-CM

## 2024-07-25 ENCOUNTER — TELEPHONE (OUTPATIENT)
Dept: NEUROLOGY | Facility: CLINIC | Age: 64
End: 2024-07-25

## 2024-07-25 NOTE — TELEPHONE ENCOUNTER
"New task created to address transportation/insurance needs.    MSW received the following task from Michell WALLER:    \"Michell Clifford   to Neurology Pod Clinical  Neurology    DT      7/25/24 11:03 AM   Is there any way that we could help patient? Please advise.  Thank you for your time!  Michell Clifford     DT    7/25/24 11:03 AM  Note      Called patient to schedule OVL appointment with neuromuscular provider per Dr Vance's recommendations. Unfortunately patient has no insurance and no means of transportation, so I was unable to assist with scheduling patient at the moment.     I did mention to patient that there are some forms of county assistance that he could try to apply for and see if he qualifies and medicare as well. Patient stated he will try to do apply.     Looked into previous encounters and patient did speak to one of our PEP's Desi that made encounter of patient stating that he has no insurance or transportation to get to other facilities for appointments.\"           "

## 2024-07-25 NOTE — TELEPHONE ENCOUNTER
Called patient to schedule OVL appointment with neuromuscular provider per Dr Vance's recommendations. Unfortunately patient has no insurance and no means of transportation, so I was unable to assist with scheduling patient at the moment.    I did mention to patient that there are some forms of county assistance that he could try to apply for and see if he qualifies and medicare as well. Patient stated he will try to do apply.    Looked into previous encounters and patient did speak to one of our PEP's Desi that made encounter of patient stating that he has no insurance or transportation to get to other facilities for appointments.     HFU  (Oldest Message First)May 6, 2024  Desi Haq     PW    5/6/24  7:52 PM  Note      HFU / 1/31/2024 - 2/7/2024 (7 days)  [facility] /   Myasthenia gravis    D/C to Home     Called pt to offer HFU appt. Pt states he don't work, has no income, has no ins, no ride. He does not qualify for assistance of any kind here. He has tried since being here 3yrs with son.Pt ask for Billing deprt phone to ask how will a visit be. I provided the phone number.      I offered to send a message to our social workers to see what kind of assistance they could offer. Pt declined stating that its all a waste of time. He does not want any letters being sent either.      Pt did thank me for offering but he its a waste of time, he does not qualify for anything here pt stated.     Per  Notes:  Date of Service: 2/6/2024  8:04 AM      MARCELLA Gardiner  Nurse Practitioner  Neurology        Recommend hospital follow up with neuromuscular attending in about 6 weeks, general neurology if neuromuscular attending not available. Patient will require transportation assistance. Outpatient follow up also complicated by lack of insurance/citizenship

## 2024-07-26 ENCOUNTER — TELEPHONE (OUTPATIENT)
Dept: NEUROLOGY | Facility: CLINIC | Age: 64
End: 2024-07-26

## 2024-07-26 RX ORDER — PYRIDOSTIGMINE BROMIDE 60 MG/1
60 TABLET ORAL 4 TIMES DAILY
Qty: 120 TABLET | Refills: 0 | Status: SHIPPED | OUTPATIENT
Start: 2024-07-26

## 2024-07-26 NOTE — TELEPHONE ENCOUNTER
MSW phoned [facility] at [phone] and spoke with Obed Marroquin - he stated that they do not have any residency requirements so patient can apply for [facility] Senior Shared Ride services. Obed stated that if patient does not have MA that he will need to pay a nominal fee for his fares (fee varies based on distance traveled). Obed stated that [facility] is not offering out of county transportation at this time.     No other transportation options available. MSW spoke with [facility]  - she okayed a one time, round trip Lyft ride.    MSW phoned patient at [phone]. [facility]. Patient is agreeable to receive the application by mail. Address verified.     MSW advised that since [facility] does not travel out of [facility] at this time, this writer will attempt to arrange a Lyft ride for his neurology appt once scheduled. MSW did advise that Lyft rides are subject to availability of drivers in patient's area at the time of the appt. Patient verbalized understanding.    Patient stated that he has been in the [facility] for 2.5 years. Patient stated that he had already applied for both MA and [facility] Financial Assistance and was denied for both, so he is not interested in reapplying. MSW did provide the phone number for [facility]  as [phone] as they offer PCP/some specialties and can connect patient to sliding scale grants for care.     MSW phoned the [facility] Financial Counselors office at [phone] and spoke with Ashlee - she stated that patient did apply in 5/23 but that his application was denied to due failure to provide needed financial documents.

## 2024-07-26 NOTE — TELEPHONE ENCOUNTER
pt called asking why he is recommended to see neuro muscular     per 6/25 encounter-Patient needs to be seen by neuromuscular specialist ASAP as have not seen this patient for 2 years and he has had multiple admissions and has been repeatedly told that he needs to follow-up with myasthenia gravis to consider other medication options    Made him aware of above.      He asked how much it would cost him to pay out of pocket to see neuromuscular     Advised I would have scheduling team reach out to him to schedule appt and give him an estimate of how much it would cost to pay out of pocket.     Dr. Vance-he is asking if you can refill med until he is seen by neuromuscular   Taking pyridostigmine 60mg 1 tab QID    Scheduling-please assist with out of pocket cost and contacting pt to schedule

## 2024-07-30 NOTE — TELEPHONE ENCOUNTER
MSW reviewed chart - neuromuscular appt not yet scheduled. MSW will follow to assist with arranging a Lyft once the neuromuscular appt is scheduled.

## 2024-07-31 NOTE — TELEPHONE ENCOUNTER
MSW reviewed chart - neuromuscular appt not yet scheduled. MSW will follow to assist with arranging a Lyft once the neuromuscular appt is scheduled.     Spoke with [facility] , she stated that the request to schedule with neuromuscular has been sent to the Urgent Add on Pool since there are no neuromuscular appts currently available. MSW will follow.

## 2024-08-05 NOTE — TELEPHONE ENCOUNTER
"MSW reviewed chart and noted that patient's neuromuscular appt is scheduled for 9/18 in [place]. It is too early to schedule Rideshare (Lyft/Uber) transportation, as same can only be scheduled out 30 days.     MSW phoned patient at [phone] to see if he received the [employer] application that was mailed to him. Patient stated \"I don't know\" and stated that he would look for same.    MSW will schedule rideshare transportation at the end of August and will follow-up with patient then to see if he received/completed/returned the MCTA application for local transportation. Patient was in agreement with this plan.   "

## 2024-08-30 NOTE — TELEPHONE ENCOUNTER
"MSW sent the following Rideshare request:    \"Patients Name: Joni Iglesias  : 1960  Phone Number: [phone]  Appointment Date: 24  Appointment time: 215PM ARRIVAL, 230PM APPT   Address: [address] [place] [address] Dr [address] [id number]  Drop off Facility/Office Name: [facility]  Drop off  Address: [address], [address], [place] [address]  Cost Center: [phone]  Special notes/directions for   Note for scheduling team\"    Awaiting response.     "

## 2024-09-03 NOTE — TELEPHONE ENCOUNTER
MSW phoned patient at [phone]. Patient stated that he never received the MCTA application. Address verified. MSW will call [facility] on 9/4 to request that they mail patient a copy of the application.     Patient then put his son, Joni, on the line. MSW did advise that this writer did schedule a Lyft ride for his 9/18 appt in [place] as [facility] does not travel out of [facility] on that day (they are only beginning to offer limited Out of County Transportation). MSW did advise that this writer is awaiting the  time for the 9/18 appt and that this writer will update patient as able.

## 2024-09-03 NOTE — TELEPHONE ENCOUNTER
Pt called for status update. Advised him request for transportation was submitted on 8/30/24 at end of day. Monday was a holiday. We are waiting on response from transportation company. Assured pt [facility] will call him w/update as soon as they receive one. Pt verbalized understanding.     Please call pt on his son's phone as his is out of commission:    Mehdi# [phone]    [facility] - faith

## 2024-09-05 NOTE — TELEPHONE ENCOUNTER
"MSW received the following response from the [facility]:    \"Rc Gale; P Patient [facility]  Wed, Sep 18  1:14pm\"  "

## 2024-09-05 NOTE — TELEPHONE ENCOUNTER
[phone] and spoke with Marisela. She will resend patient another 65+ application. Address verified.     MSW attempted to reach patient at [phone]. No answer. MSW left a detailed message with the [employer] estimated  time of 114PM on 9/18.    MSW will remind patient of this closer to the appt date.

## 2024-09-16 NOTE — TELEPHONE ENCOUNTER
MSW attempted to reach patient at [phone]. No answer. MSW left a detailed message with the Lyft estimated  time of 114PM on 9/18.    MSW will remind patient of this closer to the appt date and will follow-up about MCTA application then.

## 2024-09-17 NOTE — TELEPHONE ENCOUNTER
MSW attempted to reach patient again at [phone]. No answer. MSW left a detailed message with the Lyft estimated  time of 114PM on 9/18. MSW did provide the disclaimer that all Lyft rides are subject to  availability in patient's area at the time of the appt.     MSW requested callback to confirm receipt of message regarding Lyft  and to follow-up about MCTA application to ensure that patient received same.     No Communication Consent on file. Number for son is the same number listed for patient.

## 2024-09-18 DIAGNOSIS — G70.00 MYASTHENIA GRAVIS (HCC): ICD-10-CM

## 2024-09-18 RX ORDER — PYRIDOSTIGMINE BROMIDE 60 MG/1
60 TABLET ORAL 4 TIMES DAILY
Qty: 120 TABLET | Refills: 0 | Status: SHIPPED | OUTPATIENT
Start: 2024-09-18

## 2024-09-18 NOTE — TELEPHONE ENCOUNTER
"MSW phoned [employer] at [phone] to see if patient is on his way. MSW spoke with Mikaela who stated that there is a jennings Catarino Rogue there now to  patient.    MSW then phoned patient/son at [phone] to ensure that they met the . Patient's son answered. He stated that they are still waiting for the . MSW advised that as per [employer], the  is there. Patient got on the line and stated [place] and that the  will need to stop and get a pass at the office. Patient stated that he can make his way over to the gate to meet the .     MSW phoned [employer] back at [phone] to see if the  was still there to advise that he needs to stop at the gate. MSW spoke with Bryan who advised that the  cancelled (likely due to the gate issue). Bryan stated that he could reschedule the ride, but that patient would arrive late.    MSW spoke with [facility]  - she advised that Dr. Sebastian will not be able to see patient later due to a full schedule and offered to reschedule to Wed.  at 10AM in [place].    MSW phoned patient/son at [phone]. They were made aware that  cancelled likely due to need to stop at the gate. MSW offered the  10AM appt in [place]. Patient accepted same. MSW advised that this writer will reschedule the Lyft for  and will likely ask for earlier  due to gate issue. While on the phone, patient requested a refill of pyridostigmine. MSW will send request for refill to provider.     MSW notifed, KIMMY Beltran, that patient unable to make it to appt today, but that he is rescheduled for .     MSW sent the following rideshare request to the [employer] Pool this date:    \"Patients Name: Joni Iglesias  : 1960  Phone Number: [phone]  Appointment Date: 24  Appointment time: 945AM ARRIVAL, 10AM APPT   Address: [address] [place] [address] Dr Og [address]EL " "[id number]  Drop off Facility/Office Name: [facility]  Drop off  Address: [address] , [address], [place] [address]  Cost Center: [phone]  Special notes/directions for  - PT LIVES IN [place]. WILL NEED TO STOP AT GATE TO GET PASS TO ENTER DEVELOPMENT. PATIENT WILL CALL THE OFFICE AT THE GATE TO GIVE A HEADS UP THAT HE IS EXPECTING A . PATIENT WILL BE TRAVELING WITH HIS SON.  Note for scheduling team- PLEASE SCHEDULE  FOR 815AM TO ENSURE NO ISSUE WITH  NEEDING TO STOP AT THE GATE. \"    Awaiting response.  "

## 2024-09-18 NOTE — TELEPHONE ENCOUNTER
09/18/24    Patient called office returning a Missed call from our Office.    Confirm Reason of Call.     Related Message to Patient and Advised patient to Contact [facility] before to confirm medication ready to be picked up.     Patient UNDERSTOOD and AGREED.       Any questions, please contact patient.  Thank You.

## 2024-09-18 NOTE — TELEPHONE ENCOUNTER
MSW attempted to reach patient to make him aware that Pyridostigmine script was sent to [facility] in [place]. No answer at [phone] and no way to leave a message as the voicemail box was not set up.     MSW will try to reach patient again on 9/19.

## 2024-09-18 NOTE — TELEPHONE ENCOUNTER
MSW phoned patient again at [phone]. Patient's son answered. MSW advised of the Lyft estimated  time of 114PM this date for his 230PM appt at the neurology office in [place]. MSW did provide the disclaimer that all Lyft rides are subject to  availability in patient's area at the time of the appt. Patient's son inquired if the appt could be rescheduled if there is no  and this writer said yes.     MSW will follow-up to ensure that patient gets to appt this date.

## 2024-09-19 NOTE — TELEPHONE ENCOUNTER
MSW phoned patient at [phone] to make him aware of estimated Lyft  time for his 9/25 neurology appt. Patient's son answered. MSW reminded him of scheduled Lyft  time of 815AM (may take the  a few mins to arrive). Son aware that patient should be alert to text messages about . Son stated that they will meet at the gate/office. MSW did advise that Lyft rides are subject to  availability in patient's area at the time of the appt. MSW provided this writer's number if there are any issues.

## 2024-09-19 NOTE — TELEPHONE ENCOUNTER
"MS[facility] the [facility]    \"Rc Gale; P Patient [facility]  9/25 @ 08:15\"  "

## 2024-09-20 ENCOUNTER — TELEPHONE (OUTPATIENT)
Age: 64
End: 2024-09-20

## 2024-09-20 NOTE — TELEPHONE ENCOUNTER
Dr. Vance - are you agreeable to signing off on this form? Pt has not been seen since 7/15/22.    Please advise. Thank you.

## 2024-09-20 NOTE — TELEPHONE ENCOUNTER
Pt calling to ask for [facility] water bill form to be completed ASAP for him. Pt is having it faxed over today.     Please assist once forms are recieved.   Pt would like a call once completed.

## 2024-09-20 NOTE — TELEPHONE ENCOUNTER
Called re: below and left a general VM with a call back #. Awaiting return call.    Teresita Vance MD   to Me     9/20/24  1:04 PM   I cannot he would need to have the form signed by the PCP

## 2024-09-20 NOTE — TELEPHONE ENCOUNTER
Inbound call received from patient warm transferred from [place]. Patient confirmed [phone] is the best number for call back and we may leave a detailed message.  Mercedesgonzalo states he owes $2400 to the water company and if he does not pay by Monday they will disconnect the water. He also states he does not have a primary care provider only Dr. Vance. Henny made aware the office does close at 4:30 pm.    Dr. Vance, does not having a PCP change anything? Thank you!

## 2024-09-20 NOTE — TELEPHONE ENCOUNTER
Outbound call made to Henny and he was made aware we have no further advisement from the provider but also no forms were received today upon review of the chart.     Mercedesgasperbaljit said he will call the company. Fax number [phone] confirmed. Henny asked if it could be emailed to us and he was made aware we do not have an email but he could have it emailed to himself and he could bring it into the office or send it via [employer].     Patient aware of Dr. Vance's previous advisement that he could not complete the form and that we did not get any further guidance. Patient is hoping since he does not have a PCP Dr. Vance will help him.

## 2024-09-23 NOTE — TELEPHONE ENCOUNTER
From Henny Cason message encounter 09/22/2024:  Teresita Vance MD   to Rc Vázquez RN     9/23/24  8:46 AM  Please advise if I can fill this form, I requested him to be filled up by the PCP, I have requested multiple times in the past that he should follow-up with a neuromuscular specialist since he has myasthenia gravis and has had multiple hospital admissions and I do not have that much expertise in managing his care and I will not be following up with him going forward so please let me know what should we do about the form I advised him to follow-up with the PCP, ideally would like to help him but I think it might be more appropriate for the PCP to sign the form  -----------------------------------------------  Rc Vázquez RN   to Teresita Vance MD     9/23/24  9:38 AM  You can fill out this form if you feel it is appropriate. Patient is still within the 3 year window and is considered an established patient. If you feel like his MG would get worse if he does not have access to water then you can fill it out. You will need to do a REENA to a neuromuscular provider if you recommend him following with that team.  ---------------------------------------------------  Teresita Vance MD   to Rc Vázquez RN     9/23/24 11:45 AM  I have requested multiple times in the past for transfer of care to the neuromuscular team and even from the hospital they have recommended that he should see a neuromuscular specialist, could you please help with that appointment with the neuromuscular specialist I do not know how to correlate the water with his myasthenia gravis and hence I was hesitant so I think the family physician would be the best 1 to fill that form out.    Thank you  -------------------------------------------------    Outbound call made to Patient unsuccessful and a generic message was left. No consent for detailed message seen.   *Upon return call please inform the Patient Dr. Vance wants him to find a PCP  or Family Physician as they would be best to fill out the form.

## 2024-09-25 ENCOUNTER — OFFICE VISIT (OUTPATIENT)
Dept: NEUROLOGY | Facility: CLINIC | Age: 64
End: 2024-09-25

## 2024-09-25 VITALS
BODY MASS INDEX: 42.04 KG/M2 | WEIGHT: 310 LBS | SYSTOLIC BLOOD PRESSURE: 132 MMHG | HEART RATE: 90 BPM | DIASTOLIC BLOOD PRESSURE: 70 MMHG

## 2024-09-25 DIAGNOSIS — G70.00 MYASTHENIA GRAVIS (HCC): Primary | ICD-10-CM

## 2024-09-25 PROCEDURE — 99213 OFFICE O/P EST LOW 20 MIN: CPT | Performed by: PSYCHIATRY & NEUROLOGY

## 2024-09-25 NOTE — TELEPHONE ENCOUNTER
Please see Patient Mychart message thread from 09/22/2024. Patient called in 09/24/2024 and spoke to .

## 2024-09-25 NOTE — TELEPHONE ENCOUNTER
MSW reviewed the chart and noted that patient checked in at 953AM. Lyft waiver signed and will be scanned into the chart.     Lyft ride home to be requested once patient has checked out.

## 2024-09-25 NOTE — ASSESSMENT & PLAN NOTE
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  Traditionally he is treated with IVIG infusions several times a year when he develops myasthenic crisis.  This case has been more difficult to manage because he is self-pay and has not had consistent outpatient follow-up.  He understandably realizes this is not optimal.  He needs more definitive treatment to stop him from reaching myasthenic crisis in the first place.  This will be complicated by not only the fact that many medicines that might be used are expensive but also because many medicines will require frequent laboratory testing which is going to be problematic for him.  Therefore, I think a complement inhibitor may be an excellent choice as these medicines do not have the need for regular laboratory follow-up. [facility] has a patient assistance plan for those patients who are self-pay by which they can get free drug.  The infusions may still be a bit of an issue but hopefully this is something that we can overcome.  He understands the need for meningococcal vaccination before we can proceed.  He understands that if he worsens he would need to go back to the hospital of which she is certainly cognizant.  Further measures will be considered based on his clinical course.

## 2024-09-25 NOTE — PROGRESS NOTES
"Please note: this note was created with voice recognition software. Occasional wrong word or \"sound alike\" substitutions may occur due to the inherent limitations of voice recognition software. Read the chart carefully and recognize (using context) where substitutions may have occurred. I am available to discuss any questions.       1. Myasthenia gravis ([facility])  Assessment & Plan:  Mr Iglesias is antibody positive generalized myasthenia gravis.  Traditionally he is treated with IVIG infusions several times a year when he develops myasthenic crisis.  This case has been more difficult to manage because he is self-pay and has not had consistent outpatient follow-up.  He understandably realizes this is not optimal.  He needs more definitive treatment to stop him from reaching myasthenic crisis in the first place.  This will be complicated by not only the fact that many medicines that might be used are expensive but also because many medicines will require frequent laboratory testing which is going to be problematic for him.  Therefore, I think a complement inhibitor may be an excellent choice as these medicines do not have the need for regular laboratory follow-up. [facility] has a patient assistance plan for those patients who are self-pay by which they can get free drug.  The infusions may still be a bit of an issue but hopefully this is something that we can overcome.  He understands the need for meningococcal vaccination before we can proceed.  He understands that if he worsens he would need to go back to the hospital of which she is certainly cognizant.  Further measures will be considered based on his clinical course.      Problem List Items Addressed This Visit       Myasthenia gravis (HCC) - Primary     Mr Iglesias is antibody positive generalized myasthenia gravis.  Traditionally he is treated with IVIG infusions several times a year when he develops myasthenic crisis.  This case has been more difficult to manage " because he is self-pay and has not had consistent outpatient follow-up.  He understandably realizes this is not optimal.  He needs more definitive treatment to stop him from reaching myasthenic crisis in the first place.  This will be complicated by not only the fact that many medicines that might be used are expensive but also because many medicines will require frequent laboratory testing which is going to be problematic for him.  Therefore, I think a complement inhibitor may be an excellent choice as these medicines do not have the need for regular laboratory follow-up. [facility] has a patient assistance plan for those patients who are self-pay by which they can get free drug.  The infusions may still be a bit of an issue but hopefully this is something that we can overcome.  He understands the need for meningococcal vaccination before we can proceed.  He understands that if he worsens he would need to go back to the hospital of which she is certainly cognizant.  Further measures will be considered based on his clinical course.            Problem List       Acute blood loss anemia    Bruise    CAD (coronary artery disease)    CHF (congestive heart failure) ([facility])    History of prediabetes    Hyperlipidemia    Microcytic anemia    Morbid obesity ([facility])    Myasthenia gravis    Myasthenia gravis ([facility])    Current Assessment & Plan     Mr Iglesias is antibody positive generalized myasthenia gravis.  Traditionally he is treated with IVIG infusions several times a year when he develops myasthenic crisis.  This case has been more difficult to manage because he is self-pay and has not had consistent outpatient follow-up.  He understandably realizes this is not optimal.  He needs more definitive treatment to stop him from reaching myasthenic crisis in the first place.  This will be complicated by not only the fact that many medicines that might be used are expensive but also because many medicines will require frequent  laboratory testing which is going to be problematic for him.  Therefore, I think a complement inhibitor may be an excellent choice as these medicines do not have the need for regular laboratory follow-up. [facility] has a patient assistance plan for those patients who are self-pay by which they can get free drug.  The infusions may still be a bit of an issue but hopefully this is something that we can overcome.  He understands the need for meningococcal vaccination before we can proceed.  He understands that if he worsens he would need to go back to the hospital of which she is certainly cognizant.  Further measures will be considered based on his clinical course.         Personal history of peptic ulcer disease    Poor vision    Ptosis, bilateral         I had the pleasure of seeing Joni Iglesias, a 64 y.o. male, for neuromuscular consultation. The referral was for myasthenia gravis.     3 years ago he developed COVID and was hospitalized for 2 weeks.  He was discharged but later that year developed recurrence dyspnea.  He went back to the hospital where he was instead diagnosed with myasthenia gravis.  Review of medical record shows that he is indeed positive for binding, blocking, and modulating antibodies.  Over the years he has been treated with IVIG.  Since he is self-pay he has not had long-term definitive treatment of his condition.  For a brief amount of time he was on prednisone but this apparently caused upper GI bleeding.  On average he goes to the hospital every 3 to 4 months when his symptoms get bad and is treated with IVIG.  His last treatment was in July but he only perceives that it worked for 2 or 3 weeks.      Past Medical History:   Diagnosis Date    Coronary artery disease     Heart attack (HCC)     Hyperlipidemia     Myasthenia gravis (HCC)        Past Surgical History:   Procedure Laterality Date    APPENDECTOMY      CORONARY ANGIOPLASTY WITH STENT PLACEMENT         Patient has no known  allergies.    Social History     Tobacco Use   Smoking Status Never   Smokeless Tobacco Never       Social History     Substance and Sexual Activity   Alcohol Use Never       Social History     Substance and Sexual Activity   Drug Use Never       History reviewed. No pertinent family history.      Current Outpatient Medications:     Diclofenac Sodium (VOLTAREN) 1 %, Apply 2 g topically 2 (two) times a day as needed (arthralgias), Disp: 100 g, Rfl: 0    pantoprazole (PROTONIX) 40 mg tablet, Take 1 tablet (40 mg total) by mouth 2 (two) times a day before meals, Disp: 30 tablet, Rfl: 0    polyvinyl alcohol (LIQUIFILM TEARS) 1.4 % ophthalmic solution, Administer 1 drop to both eyes every 3 (three) hours as needed for dry eyes, Disp: 15 mL, Rfl: 0    pyridostigmine (MESTINON) 60 mg tablet, Take 1 tablet (60 mg total) by mouth 4 (four) times a day, Disp: 120 tablet, Rfl: 0    pyRIDostigmine Bromide 30 MG TABS, Take 30 mg by mouth 4 (four) times a day, Disp: 30 tablet, Rfl: 0    senna (SENOKOT) 8.6 mg, Take 1 tablet (8.6 mg total) by mouth daily at bedtime as needed for constipation, Disp: 30 tablet, Rfl: 0    ferrous sulfate 324 (65 Fe) mg, Take 1 tablet (324 mg total) by mouth 2 (two) times a day before meals, Disp: 60 tablet, Rfl: 0    No visits with results within 6 Month(s) from this visit.   Latest known visit with results is:   No results displayed because visit has over 200 results.           Results for orders placed during the hospital encounter of 06/07/21    MRI brain wo contrast    Narrative  MRI BRAIN WITHOUT CONTRAST    INDICATION: Visual disturbance.    COMPARISON:   Head CT 6/7/2021    TECHNIQUE:  Sagittal T1, axial T2, axial FLAIR, axial T1, axial [place] and axial diffusion imaging.    IMAGE QUALITY:  Diagnostic.    FINDINGS:    BRAIN PARENCHYMA:  There is no discrete mass, mass effect or midline shift. There is no intracranial hemorrhage.  Diffusion imaging is unremarkable.    Sequela of chronic infarct in  the right insula demonstrated as encephalomalacia and volume loss.    A few scattered foci of FLAIR hyperintensity involving periventricular and subcortical white matter, right greater than left, favoring mild microangiopathy.    VENTRICLES:  Normal for the patient's age.    SELLA AND PITUITARY GLAND:  Normal.    ORBITS:  Normal.    PARANASAL SINUSES:  Minimal mucosal thickening of left maxillary sinus.    VASCULATURE:  Evaluation of the major intracranial vasculature demonstrates appropriate flow voids.    CALVARIUM AND SKULL BASE:  Normal.    EXTRACRANIAL SOFT TISSUES:  Normal.    Impression  1.  No acute intracranial pathology.    2.  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  Mild microangiopathy.    Workstation performed: [id number]     Results for orders placed during the hospital encounter of 06/07/21    MRI brain w contrast    Narrative  MRI BRAIN WITH INTRAVENOUS CONTRAST    INDICATION: Third nerve deficits.    COMPARISON:  Recent precontrast brain MRI    TECHNIQUE:  No precontrast imaging was performed.  Sagittal, axial and coronal T1 postcontrast.   Axial bravo postcontrast with coronal reconstructions.    IV Contrast:  12 mL of Gadobutrol injection (SINGLE-DOSE)    IMAGE QUALITY:   Diagnostic.    FINDINGS:    POSTCONTRAST IMAGING:  Postcontrast imaging of the brain demonstrates no abnormal enhancement.    ADDITIONAL PERTINENT FINDINGS:  None.    Impression  No abnormal postcontrast enhancement.    Workstation performed: [id number]    No results found for this or any previous visit.    No results found for this or any previous visit.    No results found for this or any previous visit.    No results found for this or any previous visit.    No results found for this or any previous visit.    No results found for this or any previous visit.    No results found for this or any previous visit.    No results found for this or any previous visit.    No results found for this or any previous visit.    No  results found for this or any previous visit.      Review of Systems      On examination,     Blood pressure 132/70, pulse 90, weight (!) 141 kg (310 lb).    Well developed, well nourished, in no acute distress    Normocephalic, atraumatic    Heart: regular rate and rhythm  I did not hear a carotid bruit    Extremities: no clubbing, cyanosis, or edema    Speech and cognition appeared normal    Cranial nerves:  II: Pupils equal, round, and reactive to light. No gross visual field defect. I did not appreciate optic disc edema.  III, IV, VI: Bilateral ptosis (left eye taped up). EOMs restricted in all directions, especially the left lateral rectus muscle.  Interestingly, he did not have diplopia  V: Normal facial sensation in all three divisions of the trigeminal nerve bilaterally  VII: Normal facial strength  VIII: Hearing intact to finger rub bilaterally  IX, X: Palate elevated symmetrically  XI: Sternocleidomastoid strength normal bilaterally  XII: Tongue protruded in midline without atrophy or fibrillations    Motor:  Normal tone and bulk throughout.   Muscle strength testing by the MRC scale (listed below) was 4/5 in the deltoid, biceps, hip flexors; 5/5 in the triceps, wrist extensors, wrist flexors, finger extensors, finger flexors, quadriceps, ankle dorsiflexors, ankle plantar flexors, and EHL bilaterally  MRC scale:  0/5 No contraction is present   1/5 Incomplete range of active motion, even when gravity is eliminated   2/5 Complete active range of motion with gravity eliminated   3/5 Full motion against gravity, but not with any resistance  4/5 The patient is able to complete the action against gravity and some resistance by the examiner   5/5 Completely normal strength, overcoming gravity and all resistance by the examiner     Deep tendon reflexes:   Absent so what was really unusual about his case in the biceps, triceps, brachioradialis, patellas, and ankles  Toes downgoing (no Babinski sign)  No Eaton's  sign    Sensation: Normal pinprick and light touch throughout    Cerebellar: normal finger to nose and heel to shin testing    Gait: Normal             There are no Patient Instructions on file for this visit.      Thank you very much for allowing me to participate in your patient's care. Please feel free to contact me for any questions or concerns. Please be aware of the inherent limitations of voice recognition software, which may result in transcriptional errors.    Clem Sebastian MD

## 2024-09-25 NOTE — TELEPHONE ENCOUNTER
MSW received call from patient's son ([phone]) - patient never received a text/call about a .     MSW phoned [employer] at [phone] and spike with Mikaela. She stated that the a jennings Chevmauricio Cook was there at the office/gate area.     MSW phoned patient's son Joni back at [phone] to advise of above. Patient/son unable to locate jennings Brianavy Cook.     MSW phoned [employer] at [phone] and spoke with Bryan. He had to wait until the ride cleared until he could request another ride. Bryan stated that patient did not get a call/text before because [employer] pulled an old number for patient. Bryan advised that the jennings Chevy Cook would be coming back, ETA of 4 minutes.    MSW phoned patient's son Joni at [phone] to make him aware. MSW requested to be called once they were on their way.    MSW did not receive a call from patient/son after 10 minutes, so MSW phoned patient's son Joni at [phone]. He stated that the  went into the community instead of picking up at the gate so they were going to meet him.    At 853AM, MSW received a call from patient's son Joni stating that they are on their way, ETA is 53 mins (947AM). MSW asked patient's son to notify this writer if the ETA significantly changes.     MSW notified KIMMY Dowd, above and that patient is en route. MSW advised that the current ETA is that patient will arrive at 947AM but the ETA does not take into account any traffic/stop lights ETA. Noemí will notify Dr. Sebastian that patient may be a few mins late.     MSW will follow to ensure that patient arrives. MSW spoke with MR Marifer at [place], she will have patient sign Lyft waiver upon arrival.

## 2024-09-25 NOTE — TELEPHONE ENCOUNTER
MSW phoned patient/son at [phone] - they confirmed that they got home via [employer] without issue.     MSW inquired if patient received/completed the [employer] application. Patient's son stated that the did receive it, but he is still working on completing it. MSW will follow-up next week to ensure that they were able to complete/submit the application.

## 2024-09-26 ENCOUNTER — TELEPHONE (OUTPATIENT)
Dept: NEUROLOGY | Facility: CLINIC | Age: 64
End: 2024-09-26

## 2024-09-26 NOTE — TELEPHONE ENCOUNTER
Pt called back informed him he has a form to sign for the assistance program. He prefers the form mail to his address. He doesn't have a car to come to the office.

## 2024-09-26 NOTE — TELEPHONE ENCOUNTER
Per Dr Sebastian, would like to start pt on [employer], however pt is self pay so he would have to go thru pt assistance program. Form completed and signed by Dr Sebastian but I will need pt's signature as well. I LMOM for pt re same, advised if he would liek me to mail to him so he can sign and sedn back or if he would like to come to office to sign. Will await cb from pt.

## 2024-10-01 NOTE — TELEPHONE ENCOUNTER
MSW phoned patient/son at [phone] - patient's son Joni answered. He stated that patient was working on another application, but plans to complete the [employer] application tomorrow and will sent it out.     MSW will follow-up next week to ensure that they were able to complete/submit the application.

## 2024-10-08 NOTE — TELEPHONE ENCOUNTER
MSW attempted to reach patient to see if he completed/submitted the [employer] application. No answer at [phone]. MSW left a message requesting callback. Awaiting same.

## 2024-10-10 NOTE — TELEPHONE ENCOUNTER
MSW attempted to reach patient to see if he completed/submitted the [employer] application. No answer at [phone]. MSW left a message requesting callback. Awaiting same.

## 2024-10-11 NOTE — TELEPHONE ENCOUNTER
MSW phoned number for patient ([phone]) and spoke with patient's son, Joni. He stated that patient just mailed the [employer] application yesterday. MSW will follow-up with [employer] next week to check status of same.

## 2024-10-17 NOTE — TELEPHONE ENCOUNTER
MSW reached out to Marisela at [facility] to see if they had received patient's application yet. Awaiting response.

## 2024-10-17 NOTE — TELEPHONE ENCOUNTER
LATE ENTRY FROM 10/15/24:    MSW spoke with Marisela at [facility] - she had not received patient's application. MSW will follow-up later this week.

## 2024-10-18 ENCOUNTER — PATIENT MESSAGE (OUTPATIENT)
Dept: NEUROLOGY | Facility: CLINIC | Age: 64
End: 2024-10-18

## 2024-10-18 DIAGNOSIS — G70.00 MYASTHENIA GRAVIS (HCC): Primary | ICD-10-CM

## 2024-10-18 RX ORDER — PYRIDOSTIGMINE BROMIDE 60 MG/1
90 TABLET ORAL 4 TIMES DAILY
Qty: 120 TABLET | Refills: 0 | Status: SHIPPED | OUTPATIENT
Start: 2024-10-18

## 2024-10-18 NOTE — TELEPHONE ENCOUNTER
MSW spoke with Marisela at [facility] this date - she stated that they have not received patient's application yet, but that today's mail has not been checked yet. MSW will follow-up next week.

## 2024-10-24 NOTE — TELEPHONE ENCOUNTER
MSW was notified by Marisela at [facility] this date - that she still has not yet received patient's application.    MSW will follow-up with patient/son regarding same.

## 2024-10-25 NOTE — TELEPHONE ENCOUNTER
MSW attempted to reach [facility] to [facility]. MSW asked if they wanted to check to ensure that they do not have the application in their possession/see if they wanted another copy sent. No answer at [phone]. MSW left a message requesting callback. Awaiting same.

## 2024-10-31 NOTE — TELEPHONE ENCOUNTER
MSW reached out to Marisela at [facility] to see if they had received patient's application yet. Awaiting response.

## 2024-11-01 NOTE — TELEPHONE ENCOUNTER
MAYA spoke with Marisela at [facility] this date - she stated that she never received patient's application, so she will place another one in the mail to patient's home address. Address verified.

## 2024-11-06 ENCOUNTER — PATIENT MESSAGE (OUTPATIENT)
Dept: NEUROLOGY | Facility: CLINIC | Age: 64
End: 2024-11-06

## 2024-11-06 DIAGNOSIS — G70.00 MYASTHENIA GRAVIS (HCC): ICD-10-CM

## 2024-11-06 NOTE — TELEPHONE ENCOUNTER
MSW reached out to Marisela at [facility] this date to see if she received patient's [employer] application and to confirm patient's eligibility to apply. No answer. MSW left a message requesting callback. Awaiting same.

## 2024-11-07 RX ORDER — PYRIDOSTIGMINE BROMIDE 60 MG/1
90 TABLET ORAL 4 TIMES DAILY
Qty: 180 TABLET | Refills: 5 | Status: SHIPPED | OUTPATIENT
Start: 2024-11-07

## 2024-11-07 NOTE — PATIENT COMMUNICATION
Qty on last script for pyridostigmine was 120.  Pt would need qty 180 as it is prescribed 1.5 tabs QID.    New script entered and qty adjusted.  Additional refills also added.  Please sign off if agreeable

## 2024-11-07 NOTE — TELEPHONE ENCOUNTER
Marisela called back - she confirmed that patient can apply for the [employer] as long as he has a proof of age document (such as a passport, birth certificate, etc).     MSW phoned patient to make him aware that [employer] did not receive his original application, that that he will need to complete another one. MSW did advise that one was placed in the mail to him on 11/1 so he should be receiving it soon. MSW encouraged patient to complete and return it ASAP.  Patient agreeable.    MSW will follow-up next week.

## 2024-11-08 ENCOUNTER — TELEPHONE (OUTPATIENT)
Dept: NEUROLOGY | Facility: CLINIC | Age: 64
End: 2024-11-08

## 2024-11-08 NOTE — TELEPHONE ENCOUNTER
Patient called requesting refill for pyridostigmine (MESTINON) 60 mg . Patient made aware medication was refilled on 11/7/24 for 180 with 5 refills to [facility]  pharmacy. Patient instructed to contact the pharmacy to obtain refills of medication. Patient verbalized understanding.

## 2024-11-12 NOTE — TELEPHONE ENCOUNTER
MSW attempted to reach patient/son to see if they had received/completed/returned the [employer] application. No answer at [phone]. MSW left a message requesting callback. Awaiting same.

## 2024-11-14 NOTE — TELEPHONE ENCOUNTER
MSW attempted to reach patient/son to see if they had received/completed/returned the [employer] application. No answer at [phone]. MSW left a message requesting callback. Awaiting same.

## 2024-11-15 NOTE — TELEPHONE ENCOUNTER
"MSW attempted to reach patient/son to see if they had received/completed/returned the [employer] application. No answer at [phone]. MSW left a message requesting callback. Awaiting same.     Since there have been 3 unsuccessful attempts to reach patient, MSW sent an \"Unable to Reach\" letter via [email or web address] this date. If same is not read within a timely manner, MSW will mail a copy of the letter to patient's home address.     "

## 2024-11-20 NOTE — TELEPHONE ENCOUNTER
MSW reviewed chart. [employer] message not yet read.     MSW attempted to reach patient/son to see if they had received/completed/returned the [employer] application. No answer at [phone]. MSW left a message requesting callback. Awaiting same.    MSW did advise on the voicemail that as of 1/1/25, [facility] will only be able to offer a max of 5 Lyft rides for the calendar year, so it would be advantageous to get connected to [email or web address] for ongoing transportation needs.     MSW will be available should patient callback/return [employer] message.

## 2024-11-22 NOTE — TELEPHONE ENCOUNTER
MSW phoned patient at [phone] and spoke with his son Joni. MSW advised that [employer] did not receive his original application, that that he will need to complete another one. MSW did advise that an application was sent to him via [employer] on 11/15 and that a copy was placed in the mail to him on 11/18 so he should be receiving it soon. MSW encouraged patient to complete and return it ASAP. Patient's son will watch for same and they will complete/return it ASAP. Patient's son aware that patient does not need an SSN to apply, but must supply a proof of age document such as a passport to prove he is over 65.    Patient's son stated that he may be able to transport patient to infusion appts at the [facility], if needed. MSW advised that it would be a good idea to have a back up plan for transportation in case he is not available. MSW did advise that [employer] [place] is also traveling out of [facility] on limited days/times -     - 1st Thursday of the month to [place]    - 3rd Thursday of the month to [place]    - appts must be scheduled between 10AM-1PM    MSW will follow-up with patient/son in a few days to see if they were able to complete/return the [employer] application yet.

## 2024-11-29 NOTE — TELEPHONE ENCOUNTER
MSW attempted to reach patient/son to see if they had received/completed/returned the [employer] application. No answer at [phone]. MSW left a message requesting callback. Awaiting same.    MSW did advise on the voicemail that as of 1/1/25, [facility] will only be able to offer a max of 5 Lyft rides for the calendar year, so it would be advantageous to get connected to [facility] for ongoing transportation needs.     MSW will be available should patient callback/return [employer] message.

## 2024-12-02 NOTE — TELEPHONE ENCOUNTER
MSW attempted to reach patient/son to see if they had received/completed/returned the [employer] application. No answer at [phone]. MSW left a message requesting callback. Awaiting same.    MSW will be available should patient callback/return [employer] message.

## 2024-12-03 NOTE — TELEPHONE ENCOUNTER
MSW attempted to reach patient/son to see if they had received/completed/returned the [facility] application. No answer at [phone]. MSW left a message requesting callback. Awaiting same.    MSW will be available should patient callback/return [employer] message.     MSW reached out to Marisela at [facility] to see if she received an application for this patient. Awaiting response.    Marisela responded - she has not received an application back from this patient.

## 2024-12-04 ENCOUNTER — NURSE TRIAGE (OUTPATIENT)
Dept: OTHER | Facility: OTHER | Age: 64
End: 2024-12-04

## 2024-12-04 NOTE — TELEPHONE ENCOUNTER
MSW attempted to reach patient/son to see if they had received/completed/returned the [employer] application. No answer at [phone]. MSW left a message requesting callback. Awaiting same.    MSW will be available should patient callback/return [employer] message.

## 2024-12-05 ENCOUNTER — APPOINTMENT (EMERGENCY)
Dept: RADIOLOGY | Facility: HOSPITAL | Age: 64
DRG: 057 | End: 2024-12-05

## 2024-12-05 ENCOUNTER — HOSPITAL ENCOUNTER (INPATIENT)
Facility: HOSPITAL | Age: 64
LOS: 5 days | Discharge: HOME | DRG: 057 | End: 2024-12-10
Attending: FAMILY MEDICINE | Admitting: FAMILY MEDICINE

## 2024-12-05 DIAGNOSIS — R53.1 GENERALIZED WEAKNESS: Primary | ICD-10-CM

## 2024-12-05 DIAGNOSIS — G70.00 MYASTHENIA GRAVIS (HCC): ICD-10-CM

## 2024-12-05 DIAGNOSIS — G70.01 MYASTHENIA GRAVIS IN CRISIS (HCC): ICD-10-CM

## 2024-12-05 PROBLEM — H02.402 PTOSIS OF EYELID, LEFT: Status: ACTIVE | Noted: 2021-06-07

## 2024-12-05 PROBLEM — R29.898 WEAKNESS OF LEFT SHOULDER: Status: ACTIVE | Noted: 2024-12-05

## 2024-12-05 PROBLEM — R13.19 OTHER DYSPHAGIA: Status: ACTIVE | Noted: 2024-12-05

## 2024-12-05 LAB
2HR DELTA HS TROPONIN: -2 NG/L
ALBUMIN SERPL BCG-MCNC: 3.8 G/DL (ref 3.5–5)
ALP SERPL-CCNC: 76 U/L (ref 34–104)
ALT SERPL W P-5'-P-CCNC: 11 U/L (ref 7–52)
ANION GAP SERPL CALCULATED.3IONS-SCNC: 5 MMOL/L (ref 4–13)
AST SERPL W P-5'-P-CCNC: 22 U/L (ref 13–39)
ATRIAL RATE: 62 BPM
ATRIAL RATE: 63 BPM
BASOPHILS # BLD AUTO: 0.04 THOUSANDS/ÂΜL (ref 0–0.1)
BASOPHILS NFR BLD AUTO: 1 % (ref 0–1)
BILIRUB SERPL-MCNC: 0.38 MG/DL (ref 0.2–1)
BUN SERPL-MCNC: 15 MG/DL (ref 5–25)
CALCIUM SERPL-MCNC: 9 MG/DL (ref 8.4–10.2)
CARDIAC TROPONIN I PNL SERPL HS: 10 NG/L (ref ?–50)
CARDIAC TROPONIN I PNL SERPL HS: 8 NG/L (ref ?–50)
CHLORIDE SERPL-SCNC: 104 MMOL/L (ref 96–108)
CO2 SERPL-SCNC: 27 MMOL/L (ref 21–32)
CREAT SERPL-MCNC: 0.8 MG/DL (ref 0.6–1.3)
EOSINOPHIL # BLD AUTO: 0.38 THOUSAND/ÂΜL (ref 0–0.61)
EOSINOPHIL NFR BLD AUTO: 4 % (ref 0–6)
ERYTHROCYTE [DISTWIDTH] IN BLOOD BY AUTOMATED COUNT: 18.3 % (ref 11.6–15.1)
GFR SERPL CREATININE-BSD FRML MDRD: 93 ML/MIN/1.73SQ M
GLUCOSE SERPL-MCNC: 103 MG/DL (ref 65–140)
HCT VFR BLD AUTO: 37.4 % (ref 36.5–49.3)
HGB BLD-MCNC: 10.9 G/DL (ref 12–17)
IMM GRANULOCYTES # BLD AUTO: 0.02 THOUSAND/UL (ref 0–0.2)
IMM GRANULOCYTES NFR BLD AUTO: 0 % (ref 0–2)
LYMPHOCYTES # BLD AUTO: 1.95 THOUSANDS/ÂΜL (ref 0.6–4.47)
LYMPHOCYTES NFR BLD AUTO: 22 % (ref 14–44)
MCH RBC QN AUTO: 19.9 PG (ref 26.8–34.3)
MCHC RBC AUTO-ENTMCNC: 29.1 G/DL (ref 31.4–37.4)
MCV RBC AUTO: 68 FL (ref 82–98)
MONOCYTES # BLD AUTO: 0.78 THOUSAND/ÂΜL (ref 0.17–1.22)
MONOCYTES NFR BLD AUTO: 9 % (ref 4–12)
NEUTROPHILS # BLD AUTO: 5.59 THOUSANDS/ÂΜL (ref 1.85–7.62)
NEUTS SEG NFR BLD AUTO: 64 % (ref 43–75)
NRBC BLD AUTO-RTO: 0 /100 WBCS
P AXIS: 22 DEGREES
P AXIS: 42 DEGREES
PLATELET # BLD AUTO: 282 THOUSANDS/UL (ref 149–390)
PMV BLD AUTO: 9.4 FL (ref 8.9–12.7)
POTASSIUM SERPL-SCNC: 4.5 MMOL/L (ref 3.5–5.3)
PR INTERVAL: 146 MS
PR INTERVAL: 148 MS
PROT SERPL-MCNC: 7.4 G/DL (ref 6.4–8.4)
QRS AXIS: -63 DEGREES
QRS AXIS: -64 DEGREES
QRSD INTERVAL: 130 MS
QRSD INTERVAL: 132 MS
QT INTERVAL: 432 MS
QT INTERVAL: 436 MS
QTC INTERVAL: 438 MS
QTC INTERVAL: 446 MS
RBC # BLD AUTO: 5.47 MILLION/UL (ref 3.88–5.62)
SODIUM SERPL-SCNC: 136 MMOL/L (ref 135–147)
T WAVE AXIS: 102 DEGREES
T WAVE AXIS: 112 DEGREES
VENTRICULAR RATE: 62 BPM
VENTRICULAR RATE: 63 BPM
WBC # BLD AUTO: 8.76 THOUSAND/UL (ref 4.31–10.16)

## 2024-12-05 PROCEDURE — 92610 EVALUATE SWALLOWING FUNCTION: CPT

## 2024-12-05 PROCEDURE — 99223 1ST HOSP IP/OBS HIGH 75: CPT | Performed by: FAMILY MEDICINE

## 2024-12-05 PROCEDURE — 36415 COLL VENOUS BLD VENIPUNCTURE: CPT | Performed by: EMERGENCY MEDICINE

## 2024-12-05 PROCEDURE — 93010 ELECTROCARDIOGRAM REPORT: CPT | Performed by: INTERNAL MEDICINE

## 2024-12-05 PROCEDURE — 99255 IP/OBS CONSLTJ NEW/EST HI 80: CPT | Performed by: PSYCHIATRY & NEUROLOGY

## 2024-12-05 PROCEDURE — 71045 X-RAY EXAM CHEST 1 VIEW: CPT

## 2024-12-05 PROCEDURE — 93005 ELECTROCARDIOGRAM TRACING: CPT

## 2024-12-05 PROCEDURE — 99285 EMERGENCY DEPT VISIT HI MDM: CPT

## 2024-12-05 PROCEDURE — 96374 THER/PROPH/DIAG INJ IV PUSH: CPT

## 2024-12-05 PROCEDURE — 80053 COMPREHEN METABOLIC PANEL: CPT | Performed by: FAMILY MEDICINE

## 2024-12-05 PROCEDURE — 84484 ASSAY OF TROPONIN QUANT: CPT | Performed by: FAMILY MEDICINE

## 2024-12-05 PROCEDURE — 85025 COMPLETE CBC W/AUTO DIFF WBC: CPT | Performed by: FAMILY MEDICINE

## 2024-12-05 PROCEDURE — 94150 VITAL CAPACITY TEST: CPT

## 2024-12-05 RX ORDER — PANTOPRAZOLE SODIUM 40 MG/1
40 TABLET, DELAYED RELEASE ORAL
Status: DISCONTINUED | OUTPATIENT
Start: 2024-12-05 | End: 2024-12-10 | Stop reason: HOSPADM

## 2024-12-05 RX ORDER — PYRIDOSTIGMINE BROMIDE 60 MG/1
90 TABLET ORAL 4 TIMES DAILY
Status: DISCONTINUED | OUTPATIENT
Start: 2024-12-05 | End: 2024-12-10 | Stop reason: HOSPADM

## 2024-12-05 RX ORDER — ENOXAPARIN SODIUM 100 MG/ML
40 INJECTION SUBCUTANEOUS DAILY
Status: DISCONTINUED | OUTPATIENT
Start: 2024-12-05 | End: 2024-12-10 | Stop reason: HOSPADM

## 2024-12-05 RX ORDER — KETOROLAC TROMETHAMINE 30 MG/ML
15 INJECTION, SOLUTION INTRAMUSCULAR; INTRAVENOUS ONCE
Status: COMPLETED | OUTPATIENT
Start: 2024-12-05 | End: 2024-12-05

## 2024-12-05 RX ORDER — PYRIDOSTIGMINE BROMIDE 60 MG/1
30 TABLET ORAL 4 TIMES DAILY
Status: DISCONTINUED | OUTPATIENT
Start: 2024-12-05 | End: 2024-12-05

## 2024-12-05 RX ORDER — FERROUS SULFATE 325(65) MG
325 TABLET ORAL
Status: DISCONTINUED | OUTPATIENT
Start: 2024-12-05 | End: 2024-12-10 | Stop reason: HOSPADM

## 2024-12-05 RX ORDER — SENNOSIDES 8.6 MG
8.6 TABLET ORAL
Status: DISCONTINUED | OUTPATIENT
Start: 2024-12-05 | End: 2024-12-10 | Stop reason: HOSPADM

## 2024-12-05 RX ORDER — PYRIDOSTIGMINE BROMIDE 60 MG/1
90 TABLET ORAL 4 TIMES DAILY
Status: DISCONTINUED | OUTPATIENT
Start: 2024-12-05 | End: 2024-12-05

## 2024-12-05 RX ADMIN — PANTOPRAZOLE SODIUM 40 MG: 40 TABLET, DELAYED RELEASE ORAL at 15:54

## 2024-12-05 RX ADMIN — DEXTRAN 70, GLYCERIN, HYPROMELLOSE 1 DROP: 1; 2; 3 SOLUTION/ DROPS OPHTHALMIC at 09:54

## 2024-12-05 RX ADMIN — PYRIDOSTIGMINE BROMIDE 90 MG: 60 TABLET ORAL at 21:53

## 2024-12-05 RX ADMIN — SODIUM CHLORIDE 1000 ML: 0.9 INJECTION, SOLUTION INTRAVENOUS at 06:45

## 2024-12-05 RX ADMIN — PYRIDOSTIGMINE BROMIDE 90 MG: 60 TABLET ORAL at 15:54

## 2024-12-05 RX ADMIN — ENOXAPARIN SODIUM 40 MG: 40 INJECTION SUBCUTANEOUS at 08:54

## 2024-12-05 RX ADMIN — FERROUS SULFATE TAB 325 MG (65 MG ELEMENTAL FE) 325 MG: 325 (65 FE) TAB at 08:54

## 2024-12-05 RX ADMIN — DEXTRAN 70, GLYCERIN, HYPROMELLOSE 1 DROP: 1; 2; 3 SOLUTION/ DROPS OPHTHALMIC at 15:54

## 2024-12-05 RX ADMIN — Medication 40 G: at 12:51

## 2024-12-05 RX ADMIN — KETOROLAC TROMETHAMINE 15 MG: 30 INJECTION, SOLUTION INTRAMUSCULAR at 06:02

## 2024-12-05 RX ADMIN — PYRIDOSTIGMINE BROMIDE 90 MG: 60 TABLET ORAL at 09:52

## 2024-12-05 RX ADMIN — PANTOPRAZOLE SODIUM 40 MG: 40 TABLET, DELAYED RELEASE ORAL at 06:47

## 2024-12-05 NOTE — RESPIRATORY THERAPY NOTE
12/05/24 1145   Additional Assessments   Pulse 58   Respirations 18   SpO2 96 %   $ Vital Capacity Mech/Peak Flow Yes   Vital Capacity 2.9 L   NIF -40 cm H2O

## 2024-12-05 NOTE — SPEECH THERAPY NOTE
Speech-Language Pathology Bedside Swallow Evaluation        Patient Name: Joni Iglesias  Today's Date: 12/5/2024     Problem List  Principal Problem:    Myasthenia gravis  Active Problems:    Ptosis of eyelid, left    CAD (coronary artery disease)    Weakness of right shoulder    Other dysphagia       Past Medical History  Past Medical History:   Diagnosis Date    Coronary artery disease     Heart attack (HCC)     Hyperlipidemia     Myasthenia gravis (HCC)        Past Surgical History  Past Surgical History:   Procedure Laterality Date    APPENDECTOMY      CORONARY ANGIOPLASTY WITH STENT PLACEMENT         Summary/Impressions:    Pt presents with generally functional swallow at this time; MG symptoms noted though does not appear to impact swallow function at this time.  Per pt, no dysphagia symptoms.  Mestinon given at 4am and therefore able to tolerate breakfast well.  Primary concern appears to be facial/labial droop, eye ptosis (L-side).  Consumes breakfast w/ no s/s of aspiration or distress.     Note, given etiology of dysphagia (MG), recommend close monitoring as symptoms of dysphagia may change/worsen.     Recommendations:   Diet: regular diet and thin liquids   Meds:  as preferred/ tolerated     Feeding assistance: tray set up   Frequent Oral care: 2-4x/day  Aspiration precautions and compensatory swallowing strategies: upright posture, slow rate of feeding, small bites/sips, and monitor for changes/globus sensation, cough   Other Recommendations/ considerations: SLP will continue to follow to ensure adequate management of oral diet and adjust as clinically indicated x1-3 to ensure no changes/mgmt of oral diet        Current Medical Status  Pt is a 64 y.o. male who presented to [facility] with PMH of myasthenia gravis, coronary disease who presents with worsening of his myasthenia gravis symptoms.  Patient has been diagnosed with myasthenia gravis for the past 3 years.  Has had 3 flares during that  time which has required IVIG according to him.  Generally it helps the symptoms he is having which currently are left eye ptosis right arm weakness and difficulty swallowing.   Patient known to this dept; currently reporting no difficulties w/ swallowing x 4 hours s/p Mestinon.     Past medical history:  Please see H&P for details    Special Studies:  Chest XR 12/5/24:  No acute cardiopulmonary disease.     MBS 2/4/24:  Pt presents with min oral and mild-moderate pharyngeal dysphagia characterized primarily by reduced swallow mechanics and driving forces on the bolus resulting in retention with subsequent redirection of bolus to oral cavity. The patient is able to clear majority of retention with secondary and tertiary swallows however he had increased difficulty initiating these. Limited benefit from L head turn today. He was noted to have frequent throat clearing throughout the exam however this appeared to be largely due to increased hypersensitivity to retention. No aspiration or penetration was observed today. Aspiration risk is low.     Social/Education/Vocational Hx:  Pt lives alone    Swallow Information   Current Risks for Dysphagia & Aspiration: known history of dysphagia  Current Symptoms/Concerns:  none currently   Current Diet: regular diet and thin liquids   Baseline Diet: regular diet and thin liquids    Baseline Assessment   Behavior/Cognition: alert  Speech/Language Status: able to participate in conversation  Patient Positioning: upright in chair    Swallow Mechanism Exam   Facial: left facial droop  Labial: decreased ROM left side  Lingual: WFL  Velum: unable to visualize  Mandible: adequate ROM  Dentition: adequate  Vocal quality:clear/adequate   Volitional Cough: strong/productive   Respiratory: RA    Consistencies Assessed and Performance   Consistencies Administered: thin liquids, soft solids, and hard solids    Oral Stage: Adequate bolus retrieval.  Some anterior loss w/ larger quantities.   Mastication appears timely and complete.  Oral transfer prompt.  Min-mild solid retention along L-side of lingual surface.  Pt follows cues to clear using sips of liquid.      Pharyngeal Stage: Laryngeal rise noted upon palpation.  Swallow initiation appears delayed though seems complete at this time.  No overt s/s of aspiration or distress at any point. Vocal quality remains clear.         Esophageal Concerns: none reported    Results Reviewed with: RN and MD     Plan  Will continue to follow for x1-3    Dysphagia LTG  -Patient will demonstrate safe and effective oral intake (without overt s/s significant oral/pharyngeal dysphagia including s/s penetration or aspiration) for the highest appropriate diet level.      Short Term Goals:  -Pt will tolerate regular and thin liquid with no significant s/s oral or pharyngeal dysphagia across 1-3 diagnostic session/s         Kaur Lay MS, CCC-SLP  Speech-Language Pathologist  [place] #[id number]  [place] #[id number]

## 2024-12-05 NOTE — PLAN OF CARE
Recommendations:   Diet: regular diet and thin liquids   Meds: as preferred/ tolerated    Feeding assistance: tray set up   Frequent Oral care: 2-4x/day  Aspiration precautions and compensatory swallowing strategies: upright posture, slow rate of feeding, small bites/sips, and monitor for changes/globus sensation, cough   Other Recommendations/ considerations: SLP will continue to follow to ensure adequate management of oral diet and adjust as clinically indicated x1-3 to ensure no changes/mgmt of oral diet

## 2024-12-05 NOTE — TELEPHONE ENCOUNTER
Contacted on-call provider who after reviewing chart and notes from patient's primary neurologist, recommends ED evaluation.    Contacted patient to make him aware of on-call provider's recommendations.  He verbalized understanding and is agreeable, however, he is not sure if he has anybody to take him tonight but he is going to attempt to find someone.  He also declines calling an ambulance due to financial reasons.  Encouraged patient multiple times to try to get there tonight due to risk of decompensation.  On-call provider updated.

## 2024-12-05 NOTE — ASSESSMENT & PLAN NOTE
Patient with weakness of his right shoulder.  As above here for IVIG treatment, this is secondary to his myasthenia gravis

## 2024-12-05 NOTE — TELEPHONE ENCOUNTER
"Regarding: arm and  feel heavy, leg cramp ,teary eyes  ----- Message from Geraldine WALLER sent at 12/4/2024  8:22 PM EST -----  \"My arm and my neck feel heavy . I have cramps in my leg.  My left eye is shut and is tearing. I have a lot of salvia in my mouth.\"    "

## 2024-12-05 NOTE — PLAN OF CARE
Problem: PAIN - ADULT  Goal: Verbalizes/displays adequate comfort level or baseline comfort level  Description: Interventions:  - Encourage patient to monitor pain and request assistance  - Assess pain using appropriate pain scale  - Administer analgesics based on type and severity of pain and evaluate response  - Implement non-pharmacological measures as appropriate and evaluate response  - Consider cultural and social influences on pain and pain management  - Notify physician/advanced practitioner if interventions unsuccessful or patient reports new pain  Outcome: Progressing     Problem: INFECTION - ADULT  Goal: Absence or prevention of progression during hospitalization  Description: INTERVENTIONS:  - Assess and monitor for signs and symptoms of infection  - Monitor lab/diagnostic results  - Monitor all insertion sites, i.e. indwelling lines, tubes, and drains  - Monitor endotracheal if appropriate and nasal secretions for changes in amount and color  - Deale appropriate cooling/warming therapies per order  - Administer medications as ordered  - Instruct and encourage patient and family to use good hand hygiene technique  - Identify and instruct in appropriate isolation precautions for identified infection/condition  Outcome: Progressing  Goal: Absence of fever/infection during neutropenic period  Description: INTERVENTIONS:  - Monitor WBC    Outcome: Progressing     Problem: SAFETY ADULT  Goal: Patient will remain free of falls  Description: INTERVENTIONS:  - Educate patient/family on patient safety including physical limitations  - Instruct patient to call for assistance with activity   - Consult OT/PT to assist with strengthening/mobility   - Keep Call bell within reach  - Keep bed low and locked with side rails adjusted as appropriate  - Keep care items and personal belongings within reach  - Initiate and maintain comfort rounds  - Make Fall Risk Sign visible to staff  - Offer Toileting every  Hours,  in advance of need  - Initiate/Maintain alarm  - Obtain necessary fall risk management equipment:   - Apply yellow socks and bracelet for high fall risk patients  - Consider moving patient to room near nurses station  Outcome: Progressing  Goal: Maintain or return to baseline ADL function  Description: INTERVENTIONS:  -  Assess patient's ability to carry out ADLs; assess patient's baseline for ADL function and identify physical deficits which impact ability to perform ADLs (bathing, care of mouth/teeth, toileting, grooming, dressing, etc.)  - Assess/evaluate cause of self-care deficits   - Assess range of motion  - Assess patient's mobility; develop plan if impaired  - Assess patient's need for assistive devices and provide as appropriate  - Encourage maximum independence but intervene and supervise when necessary  - Involve family in performance of ADLs  - Assess for home care needs following discharge   - Consider OT consult to assist with ADL evaluation and planning for discharge  - Provide patient education as appropriate  Outcome: Progressing  Goal: Maintains/Returns to pre admission functional level  Description: INTERVENTIONS:  - Perform AM-PAC 6 Click Basic Mobility/ Daily Activity assessment daily.  - Set and communicate daily mobility goal to care team and patient/family/caregiver.   - Collaborate with rehabilitation services on mobility goals if consulted  - Perform Range of Motion times a day.  - Reposition patient every  hours.  - Dangle patient  times a day  - Stand patient  times a day  - Ambulate patient  times a day  - Out of bed to chair  times a day   - Out of bed for meals  times a day  - Out of bed for toileting  - Record patient progress and toleration of activity level   Outcome: Progressing     Problem: DISCHARGE PLANNING  Goal: Discharge to home or other facility with appropriate resources  Description: INTERVENTIONS:  - Identify barriers to discharge w/patient and caregiver  - Arrange for  needed discharge resources and transportation as appropriate  - Identify discharge learning needs (meds, wound care, etc.)  - Arrange for interpretive services to assist at discharge as needed  - Refer to Case Management Department for coordinating discharge planning if the patient needs post-hospital services based on physician/advanced practitioner order or complex needs related to functional status, cognitive ability, or social support system  Outcome: Progressing     Problem: Knowledge Deficit  Goal: Patient/family/caregiver demonstrates understanding of disease process, treatment plan, medications, and discharge instructions  Description: Complete learning assessment and assess knowledge base.  Interventions:  - Provide teaching at level of understanding  - Provide teaching via preferred learning methods  Outcome: Progressing

## 2024-12-05 NOTE — CASE MANAGEMENT
Case Management Assessment & Discharge Planning Note    Patient name Kit Iglesias  Location 2 EAST 254/2 E 254-01 MRN [id number]  : 1960 Date 2024       Current Admission Date: 2024  Current Admission Diagnosis:Myasthenia gravis   Patient Active Problem List    Diagnosis Date Noted Date Diagnosed    Weakness of right shoulder 2024     Other dysphagia 2024     Microcytic anemia 2024     History of prediabetes 2024     Personal history of peptic ulcer disease 2024     Poor vision 2024     CHF (congestive heart failure) ([facility]) 2023     Bruise 2023     Acute blood loss anemia 2023     Hyperlipidemia      Myasthenia gravis      Myasthenia gravis ([facility]) 10/01/2021     Morbid obesity ([facility]) 06/10/2021     Ptosis of eyelid, left 2021     CAD (coronary artery disease) 2021       LOS (days): 0  Geometric Mean LOS (GMLOS) (days):   Days to GMLOS:     OBJECTIVE:    Risk of Unplanned Readmission Score: 7.54         Current admission status: Inpatient       Preferred Pharmacy:   [id number] - [facility] [place] - [address]  [address]  [facility] [place] [address]  Phone: [phone] Fax: [phone]    [facility]  [place], [place] - [address]  [address]  [place] [address]  Phone: [phone] Fax: [phone]    [id number] - [facility] [place] - [address]  [address]  [facility] [place] [address]  Phone: [phone] Fax: [phone]    Primary Care Provider: No primary care provider on file.    Primary Insurance:   Secondary Insurance:     ASSESSMENT:  Active Health Care Proxies       KIT OLGUIN Health Care Representative - Son   Primary Phone: [phone] (Mobile)                 Advance Directives  Does patient have a Health Care POA?: No  Was patient offered paperwork?: Yes  Does patient currently have a Health Care decision maker?: Yes, please see Health Care Proxy  section  Does patient have Advance Directives?: No  Was patient offered paperwork?: Yes  Primary Contact: KIT (Son)  [phone]         Readmission Root Cause  30 Day Readmission: No    Patient Information  Admitted from:: Home  Mental Status: Alert  During Assessment patient was accompanied by: Not accompanied during assessment  Assessment information provided by:: Patient  Primary Caregiver: Self  Support Systems: Son  County of Residence: [place]  What city do you live in?: [place]  Home entry access options. Select all that apply.: Stairs  Number of steps to enter home.: 2  Do the steps have railings?: Yes  Type of Current Residence: Trailer Home  Living Arrangements: Lives Alone (lives on son's house property)  Is patient a ?: No    Activities of Daily Living Prior to Admission  Functional Status: Independent  Completes ADLs independently?: Yes  Ambulates independently?: Yes  Does patient use assisted devices?: No  Does patient currently own DME?: No  Does patient have a history of Outpatient Therapy (PT/OT)?: No  Does the patient have a history of Short-Term Rehab?: No  Does patient have a history of HHC?: Yes  Does patient currently have HHC?: No         Patient Information Continued  Income Source: Government aid  Does patient have prescription coverage?: No (pt reports his son pay for his medications)  Does patient receive dialysis treatments?: No  Does patient have a history of substance abuse?: No  Does patient have a history of Mental Health Diagnosis?: No         Means of Transportation  Means of Transport to Appts:: Family transport          DISCHARGE DETAILS:    Discharge planning discussed with:: patient at bedside  Freedom of Choice: Yes  Comments - Freedom of Choice: CM maintained freedom of choice as it pertains to discharge planning. Patient reports plan to return home at discharge. Patient confirms he is uninsured, CM offered to send PATHs referral but patient refused, he stated that  "this was attempted previously and he was denied medicaid and he did not wish to try again. Patient reports that he lives in a trailer in his sons yard, he reports that it has electric and heat and \"everything I need\" he reports if he needs, ho goes into his sons home. Patient reports he will need transportation home at discharge.  CM contacted family/caregiver?: No- see comments (pt declined/ alert and oriented independent adult)  Were Treatment Team discharge recommendations reviewed with patient/caregiver?: Yes  Did patient/caregiver verbalize understanding of patient care needs?: Yes  Were patient/caregiver advised of the risks associated with not following Treatment Team discharge recommendations?: Yes    Contacts  Reason/Outcome: Emergency Contact    Requested Home Health Care         Is the patient interested in HHC at discharge?: No    DME Referral Provided  Referral made for DME?: No    Other Referral/Resources/Interventions Provided:  Interventions: Declines resources    Would you like to participate in our [facility] service program?  : No - Declined    Treatment Team Recommendation: Home  Discharge Destination Plan:: Home  Transport at Discharge : Free Local Transportation    "

## 2024-12-05 NOTE — MALNUTRITION/BMI
This medical record reflects one or more clinical indicators suggestive of Class 3 Obesity      BMI Findings:  Class 3 Obesity        Body mass index is 41.28 kg/m².     See Nutrition note dated 12/5/24 for additional details.  Completed nutrition assessment is viewable in the nutrition documentation.

## 2024-12-05 NOTE — ASSESSMENT & PLAN NOTE
Patient with dysphagia secondary to his myasthenia gravis.  He reports that the dysphagia is helped by IVIG which she is being admitted for today

## 2024-12-05 NOTE — TELEPHONE ENCOUNTER
"Reason for Disposition  • [1] Loss of speech or garbled speech AND [2] sudden onset AND [3] present now    Answer Assessment - Initial Assessment Questions  1. SYMPTOM: \"What is the main symptom you are concerned about?\" (e.g., weakness, numbness)      Difficulty talking, both arms weak, left eye closed and leaking fluid, problems eating/swallowing, leg cramps     2. ONSET: \"When did this start?\" (minutes, hours, days; while sleeping)      2 days ago            3. CARDIAC SYMPTOMS: \"Have you had any of the following symptoms: chest pain, difficulty breathing, palpitations?\"      Denies     4. NEUROLOGIC SYMPTOMS: \"Have you had any of the following symptoms: headache, dizziness, vision loss, double vision, changes in speech, unsteady on your feet?\"      Weak all over, mild dizziness at times, difficulty speaking    5. OTHER SYMPTOMS: \"Do you have any other symptoms?\"      Both eyes itchy, fatigue    Patient reports that he has myasthenia gravis and is taking his medication as prescribed.  He reports that it usually helps but hasn't been for the past few days.  He reports that he does not want to go to the hospital and is calling to see if prednisone can be prescribed for him.    Protocols used: Neurologic Deficit-Adult-AH    "

## 2024-12-05 NOTE — H&P
H&P - Hospitalist   Name: Joni Iglesias 64 y.o. male I MRN: [id number]  Unit/Bed#: ED 13 I Date of Admission: 12/5/2024   Date of Service: 12/5/2024 I Hospital Day: 0     Assessment & Plan  Ptosis of eyelid, left  Patient with ptosis of the eyelid on the left.  As noted above here for IVIG treatment  CAD (coronary artery disease)  Patient with underlying coronary artery disease reports taking no medicines.  Had bypass surgery in the past.  No chest pain  Myasthenia gravis  Myasthenia gravis.  Present for 3 years.  Current issues are ptosis of his left eyelid weakness of his right shoulder and some dysphagia.  He will be admitted today with a neurology consult and they will administer IVIG which has helped him in the past  Weakness of right shoulder  Patient with weakness of his right shoulder.  As above here for IVIG treatment, this is secondary to his myasthenia gravis  Other dysphagia  Patient with dysphagia secondary to his myasthenia gravis.  He reports that the dysphagia is helped by IVIG which she is being admitted for today      VTE Pharmacologic Prophylaxis:   Moderate Risk (Score 3-4) - Pharmacological DVT Prophylaxis Contraindicated. Sequential Compression Devices Ordered.  Code Status: Prior ful  Discussion with family: Updated  (pt) at bedside.    Anticipated Length of Stay: Patient will be admitted on an inpatient basis with an anticipated length of stay of greater than 2 midnights secondary to  .  Myasthenia gravis exacerbation  History of Present Illness   Chief Complaint: Myasthenia gravis exacerbation    Joni Iglesias is a 64 y.o. male with a PMH of myasthenia gravis, coronary disease who presents with worsening of his myasthenia gravis symptoms.  Patient has been diagnosed with myasthenia gravis for the past 3 years.  Has had 3 flares during that time which has required IVIG according to him.  Generally it helps the symptoms he is having which currently are left eye ptosis right  arm weakness and difficulty swallowing.  He has coronary artery disease is not on medicines for that and he has no chest pain.  He feels very well aside from his issues with myasthenia gravis and that is why he is here today.  Does not smoke cigarettes or drink alcohol..    Review of Systems   Constitutional:  Positive for activity change. Negative for appetite change, chills, diaphoresis, fatigue, fever and unexpected weight change.   HENT:  Positive for congestion. Negative for dental problem, ear pain, mouth sores, postnasal drip, sinus pain and sore throat.    Eyes:  Positive for pain. Negative for discharge.   Respiratory:  Negative for apnea, choking, chest tightness, shortness of breath and stridor.    Cardiovascular:  Negative for chest pain, palpitations and leg swelling.   Gastrointestinal:  Negative for abdominal distention, anal bleeding and blood in stool.   Endocrine: Negative for polydipsia.   Genitourinary:  Negative for difficulty urinating, dysuria, enuresis, flank pain, frequency, genital sores, hematuria, penile pain and penile swelling.   Musculoskeletal:  Negative for arthralgias, back pain and gait problem.   Neurological:  Positive for syncope, facial asymmetry and speech difficulty. Negative for dizziness, tremors, seizures, weakness, numbness and headaches.   Hematological:  Negative for adenopathy. Does not bruise/bleed easily.   Psychiatric/Behavioral:  Negative for agitation and behavioral problems.        Historical Information   Past Medical History:   Diagnosis Date    Coronary artery disease     Heart attack (HCC)     Hyperlipidemia     Myasthenia gravis (HCC)      Past Surgical History:   Procedure Laterality Date    APPENDECTOMY      CORONARY ANGIOPLASTY WITH STENT PLACEMENT       Social History     Tobacco Use    Smoking status: Never    Smokeless tobacco: Never   Vaping Use    Vaping status: Never Used   Substance and Sexual Activity    Alcohol use: Never    Drug use: Never     Sexual activity: Not on file     E-Cigarette/Vaping    E-Cigarette Use Never User      E-Cigarette/Vaping Substances    Nicotine No     THC No     CBD No     Flavoring No     Other No     Unknown No        Social History:  Marital Status: /Civil Union   Occupation: Retired  Patient Pre-hospital Living Situation: Home  Patient Pre-hospital Level of Mobility: walks  Patient Pre-hospital Diet Restrictions: na    Meds/Allergies   I have reviewed home medications with patient personally.  Prior to Admission medications    Medication Sig Start Date End Date Taking? Authorizing Provider   Diclofenac Sodium (VOLTAREN) 1 % Apply 2 g topically 2 (two) times a day as needed (arthralgias) 6/2/23   Zoila Hopper MD   ferrous sulfate 324 (65 Fe) mg Take 1 tablet (324 mg total) by mouth 2 (two) times a day before meals 2/6/24 5/6/24  Alirio ESPINOZA MD   pantoprazole (PROTONIX) 40 mg tablet Take 1 tablet (40 mg total) by mouth 2 (two) times a day before meals 2/6/24   Alirio ESPINOZA MD   polyvinyl alcohol (LIQUIFILM TEARS) 1.4 % ophthalmic solution Administer 1 drop to both eyes every 3 (three) hours as needed for dry eyes 6/14/22   Leslie Rowan MD   pyridostigmine (MESTINON) 60 mg tablet Take 1.5 tablets (90 mg total) by mouth 4 (four) times a day 11/7/24   Clem Sebastian MD   pyRIDostigmine Bromide 30 MG TABS Take 30 mg by mouth 4 (four) times a day 2/6/24 9/25/24  Alirio ESPINOZA MD   senna (SENOKOT) 8.6 mg Take 1 tablet (8.6 mg total) by mouth daily at bedtime as needed for constipation 2/6/24   Alirio ESPINOZA MD     No Known Allergies    Objective :  Temp:  [97.7 °F (36.5 °C)] 97.7 °F (36.5 °C)  HR:  [54-66] 54  BP: (128-151)/(66-77) 132/67  Resp:  [18-25] 19  SpO2:  [95 %-100 %] 99 %  O2 Device: None (Room air)    Physical Exam  Constitutional:       General: He is not in acute distress.     Appearance: Normal appearance. He is not ill-appearing.   HENT:      Head: Atraumatic.      Right Ear:  Tympanic membrane normal. There is no impacted cerumen.      Left Ear: Tympanic membrane normal. There is no impacted cerumen.      Nose: Nose normal. No congestion or rhinorrhea.      Mouth/Throat:      Pharynx: No oropharyngeal exudate or posterior oropharyngeal erythema.   Eyes:      Comments: Patient with ptosis of his left lid   Neck:      Comments: Patient with decreased range of motion around his neck due to weakness  Cardiovascular:      Pulses: Normal pulses.      Heart sounds: Normal heart sounds.   Pulmonary:      Effort: Pulmonary effort is normal. No respiratory distress.      Breath sounds: Normal breath sounds. No stridor.   Musculoskeletal:      Cervical back: No rigidity or tenderness.      Comments: Patient without abduction of his right shoulder   Skin:     General: Skin is warm and dry.      Capillary Refill: Capillary refill takes less than 2 seconds.      Coloration: Skin is not jaundiced.   Neurological:      Mental Status: He is alert.      Comments: Patient with inability to abduct his right shoulder, ptosis of his left eyelid.  Generalized weakness of his upper extremity   Psychiatric:         Mood and Affect: Mood normal.         Behavior: Behavior normal.          Lines/Drains:            Lab Results: I have reviewed the following results:  Results from last 7 days   Lab Units 12/05/24  0258   WBC Thousand/uL 8.76   HEMOGLOBIN g/dL 10.9*   HEMATOCRIT % 37.4   PLATELETS Thousands/uL 282   SEGS PCT % 64   LYMPHO PCT % 22   MONO PCT % 9   EOS PCT % 4     Results from last 7 days   Lab Units 12/05/24  0258   SODIUM mmol/L 136   POTASSIUM mmol/L 4.5   CHLORIDE mmol/L 104   CO2 mmol/L 27   BUN mg/dL 15   CREATININE mg/dL 0.80   ANION GAP mmol/L 5   CALCIUM mg/dL 9.0   ALBUMIN g/dL 3.8   TOTAL BILIRUBIN mg/dL 0.38   ALK PHOS U/L 76   ALT U/L 11   AST U/L 22   GLUCOSE RANDOM mg/dL 103             Lab Results   Component Value Date    HGBA1C 6.3 (H) 06/11/2022    HGBA1C 6.1 (H) 12/02/2021     HGBA1C 6.2 (H) 06/08/2021           Imaging Results Review: I personally reviewed the following image studies/reports in PACS and discussed pertinent findings with Radiology: CT abdomen/pelvis. My interpretation of the radiology images/reports is:  .      Administrative Statements       ** Please Note: This note has been constructed using a voice recognition system. **

## 2024-12-05 NOTE — ASSESSMENT & PLAN NOTE
Patient with underlying coronary artery disease reports taking no medicines.  Had bypass surgery in the past.  No chest pain

## 2024-12-05 NOTE — TREATMENT PLAN
Patient examined, discussed with neurology, persistent left sided ptosis.  Concern for myasthenia gravis.  Neurology planning potential initiating IVIG.    Patient requires continued inpatient stay.Monitor ventilatory status, vital capacity.

## 2024-12-05 NOTE — ED PROVIDER NOTES
Time reflects when diagnosis was documented in both MDM as applicable and the Disposition within this note       Time User Action Codes Description Comment    12/5/2024  5:04 AM Dung Gerardo Add [R53.1] Generalized weakness     12/5/2024  5:04 AM Dung Gerardo Add [G70.01] Myasthenia gravis in crisis (HCC)           ED Disposition       ED Disposition   Admit    Condition   Stable    Date/Time   u Dec 5, 2024  5:04 AM    Comment   Case was discussed with CHARISSE and the patient's admission status was agreed to be Admission Status: observation status to the service of Dr. Vilchis .               Assessment & Plan       Medical Decision Making  The patient is a 64 y.o. male with a history of CAD, heart attack, hyperlipidemia, myasthenia gravis who presents to [facility] with a chief complaint of generalized weakness.   Patient having weakness of both arms, eyelids and neck.  Patient sent in by his neurologist for a myasthenia gravis crisis.  EKG without signs of ischemia.  Troponin negative.  Discussed with neurology on-call who recommended NIF/FVC, no steroids and admit to [facility] with neuro consult for IVIG.  Patient normal neuro examination with strength equal on both sides.  Discussed treatment plan with the patient who understands and agrees.    Problems Addressed:  Generalized weakness: acute illness or injury  Myasthenia gravis in crisis (HCC): acute illness or injury    Amount and/or Complexity of Data Reviewed  Labs:  Decision-making details documented in ED Course.    Risk  Prescription drug management.  Decision regarding hospitalization.        ED Course as of 12/05/24 0626   Thu Dec 05, 2024   0324 hs TnI 0hr: 10   0527 Delta 2hr hsTnI: -2       Medications   ketorolac (TORADOL) injection 15 mg (15 mg Intravenous Given 12/5/24 0602)       ED Risk Strat Scores   HEART Risk Score      Flowsheet Row Most Recent Value   Heart Score Risk Calculator    History 0 Filed at: 12/05/2024 0624   ECG 1  Filed at: 12/05/2024 0624   Age 2 Filed at: 12/05/2024 0624   Risk Factors 1 Filed at: 12/05/2024 0624   Troponin 0 Filed at: 12/05/2024 0624   HEART Score 4 Filed at: 12/05/2024 0624                              SBIRT 22yo+      Flowsheet Row Most Recent Value   Initial Alcohol Screen: US AUDIT-C     1. How often do you have a drink containing alcohol? 0 Filed at: 12/05/2024 0248   2. How many drinks containing alcohol do you have on a typical day you are drinking?  0 Filed at: 12/05/2024 0248   3a. Male UNDER 65: How often do you have five or more drinks on one occasion? 0 Filed at: 12/05/2024 0248   Audit-C Score 0 Filed at: 12/05/2024 0248   REAL: How many times in the past year have you...    Used an illegal drug or used a prescription medication for non-medical reasons? Never Filed at: 12/05/2024 0248                            History of Present Illness       Chief Complaint   Patient presents with    Weakness - Generalized     Sent by pcp for increasing weakness from myasthenia gravis x 2-3 days        Past Medical History:   Diagnosis Date    Coronary artery disease     Heart attack (HCC)     Hyperlipidemia     Myasthenia gravis (HCC)       Past Surgical History:   Procedure Laterality Date    APPENDECTOMY      CORONARY ANGIOPLASTY WITH STENT PLACEMENT        History reviewed. No pertinent family history.   Social History     Tobacco Use    Smoking status: Never    Smokeless tobacco: Never   Vaping Use    Vaping status: Never Used   Substance Use Topics    Alcohol use: Never    Drug use: Never      E-Cigarette/Vaping    E-Cigarette Use Never User       E-Cigarette/Vaping Substances    Nicotine No     THC No     CBD No     Flavoring No     Other No     Unknown No       I have reviewed and agree with the history as documented.     The patient is a 64 y.o. male with a history of CAD, heart attack, hyperlipidemia, myasthenia gravis who presents to [facility] with a chief complaint of  generalized weakness. Patient was sent in for worsening weakness of the arms, eyelids, and neck due to MG. Symptoms began a few weeks ago but have really worsened over the last 4-5 days. He has some right shoulder pain. Associated symptoms include generalized weakness of bilateral arms, eyelids, and neck. Symptoms are aggravated with exertion and alleviating factors include none noted. The patient denies fever, chills, night sweats, chest pain, shortness of breath, cough, sputum, hemoptysis, hematemesis, nausea, vomiting, diarrhea, numbness, tingling, visual changes. No other reported symptoms at this time.  Patient denies allergies to anything            History provided by:  Patient   used: No        Review of Systems   Constitutional:  Negative for chills and fever.   HENT:  Negative for ear pain and sore throat.    Eyes:  Negative for pain and visual disturbance.   Respiratory:  Negative for cough and shortness of breath.    Cardiovascular:  Negative for chest pain and palpitations.   Gastrointestinal:  Negative for abdominal pain and vomiting.   Genitourinary:  Negative for dysuria and hematuria.   Musculoskeletal:  Negative for arthralgias and back pain.   Skin:  Negative for color change and rash.   Neurological:  Positive for weakness. Negative for dizziness, seizures, syncope, facial asymmetry, light-headedness, numbness and headaches.   All other systems reviewed and are negative.          Objective       ED Triage Vitals [12/05/24 0249]   Temperature Pulse Blood Pressure Respirations SpO2 Patient Position - Orthostatic VS   97.7 °F (36.5 °C) 66 151/73 18 96 % Sitting      Temp Source Heart Rate Source BP Location FiO2 (%) Pain Score    Oral Monitor Left arm -- --      Vitals      Date and Time Temp Pulse SpO2 Resp BP Pain Score FACES Pain Rating User   12/05/24 0530 -- 54 99 % 19 132/67 -- -- KL   12/05/24 0500 -- 54 99 % 20 141/74 -- -- KL   12/05/24 0435 -- 59 100 % 25 -- -- -- MS    12/05/24 0430 -- 61 97 % 22 128/66 -- --    12/05/24 0400 -- 57 95 % 19 147/77 -- --    12/05/24 0300 -- 60 96 % 21 151/73 -- --    12/05/24 0249 97.7 °F (36.5 °C) 66 96 % 18 151/73 -- -- AM            Physical Exam  Vitals reviewed.   Constitutional:       General: He is not in acute distress.     Appearance: He is obese. He is not toxic-appearing.   HENT:      Head: Normocephalic.      Nose: Nose normal.      Mouth/Throat:      Mouth: Mucous membranes are moist.      Pharynx: Oropharynx is clear.   Eyes:      General: No scleral icterus.     Extraocular Movements: Extraocular movements intact.      Conjunctiva/sclera: Conjunctivae normal.      Pupils: Pupils are equal, round, and reactive to light.   Neck:      Vascular: No carotid bruit.   Cardiovascular:      Rate and Rhythm: Normal rate.      Pulses: Normal pulses.   Pulmonary:      Effort: Pulmonary effort is normal. No respiratory distress.      Breath sounds: Normal breath sounds. No stridor. No wheezing, rhonchi or rales.   Abdominal:      General: Abdomen is flat. Bowel sounds are normal.      Palpations: Abdomen is soft.      Tenderness: There is no abdominal tenderness.   Musculoskeletal:         General: No swelling, tenderness, deformity or signs of injury. Normal range of motion.      Cervical back: Normal range of motion and neck supple. No rigidity or tenderness.      Right lower leg: No edema.      Left lower leg: No edema.   Lymphadenopathy:      Cervical: No cervical adenopathy.   Skin:     General: Skin is warm and dry.      Capillary Refill: Capillary refill takes less than 2 seconds.      Coloration: Skin is not jaundiced.      Findings: No bruising or lesion.   Neurological:      General: No focal deficit present.      Mental Status: He is alert and oriented to person, place, and time. Mental status is at baseline.      GCS: GCS eye subscore is 4. GCS verbal subscore is 5. GCS motor subscore is 6.      Cranial Nerves: Cranial nerves  2-12 are intact. No cranial nerve deficit.      Sensory: No sensory deficit.      Coordination: Coordination normal. Finger-Nose-Finger Test normal.      Gait: Gait is intact. Gait normal.         Results Reviewed       Procedure Component Value Units Date/Time    HS Troponin I 2hr [[id number]]  (Normal) Collected: 12/05/24 0457    Lab Status: Final result Specimen: Blood from Arm, Right Updated: 12/05/24 0526     hs TnI 2hr 8 ng/L      Delta 2hr hsTnI -2 ng/L     HS Troponin I 4hr [[id number]]     Lab Status: No result Specimen: Blood     HS Troponin 0hr (reflex protocol) [[id number]]  (Normal) Collected: 12/05/24 0258    Lab Status: Final result Specimen: Blood Updated: 12/05/24 0324     hs TnI 0hr 10 ng/L     Comprehensive metabolic panel [[id number]] Collected: 12/05/24 0258    Lab Status: Final result Specimen: Blood Updated: 12/05/24 0317     Sodium 136 mmol/L      Potassium 4.5 mmol/L      Chloride 104 mmol/L      CO2 27 mmol/L      ANION GAP 5 mmol/L      BUN 15 mg/dL      Creatinine 0.80 mg/dL      Glucose 103 mg/dL      Calcium 9.0 mg/dL      AST 22 U/L      ALT 11 U/L      Alkaline Phosphatase 76 U/L      Total Protein 7.4 g/dL      Albumin 3.8 g/dL      Total Bilirubin 0.38 mg/dL      eGFR 93 ml/min/1.73sq m     Narrative:      National Kid[employer] Foundation guidelines for Chronic Kidney Disease (CKD):     Stage 1 with normal or high GFR (GFR > 90 mL/min/1.73 square meters)    Stage 2 Mild CKD (GFR = 60-89 mL/min/1.73 square meters)    Stage 3A Moderate CKD (GFR = 45-59 mL/min/1.73 square meters)    Stage 3B Moderate CKD (GFR = 30-44 mL/min/1.73 square meters)    Stage 4 Severe CKD (GFR = 15-29 mL/min/1.73 square meters)    Stage 5 End Stage CKD (GFR <15 mL/min/1.73 square meters)  Note: GFR calculation is accurate only with a steady state creatinine    CBC and differential [[id number]]  (Abnormal) Collected: 12/05/24 0258    Lab Status: Final result Specimen: Blood Updated: 12/05/24 0300     WBC 8.76  Thousand/uL      RBC 5.47 Million/uL      Hemoglobin 10.9 g/dL      Hematocrit 37.4 %      MCV 68 fL      MCH 19.9 pg      MCHC 29.1 g/dL      RDW 18.3 %      MPV 9.4 fL      Platelets 282 Thousands/uL      nRBC 0 /100 WBCs      Segmented % 64 %      Immature Grans % 0 %      Lymphocytes % 22 %      Monocytes % 9 %      Eosinophils Relative 4 %      Basophils Relative 1 %      Absolute Neutrophils 5.59 Thousands/µL      Absolute Immature Grans 0.02 Thousand/uL      Absolute Lymphocytes 1.95 Thousands/µL      Absolute Monocytes 0.78 Thousand/µL      Eosinophils Absolute 0.38 Thousand/µL      Basophils Absolute 0.04 Thousands/µL             XR chest 1 view portable    (Results Pending)       Procedures    ED Medication and Procedure Management   Prior to Admission Medications   Prescriptions Last Dose Informant Patient Reported? Taking?   Diclofenac Sodium (VOLTAREN) 1 %   No No   Sig: Apply 2 g topically 2 (two) times a day as needed (arthralgias)   ferrous sulfate 324 (65 Fe) mg   No No   Sig: Take 1 tablet (324 mg total) by mouth 2 (two) times a day before meals   pantoprazole (PROTONIX) 40 mg tablet   No No   Sig: Take 1 tablet (40 mg total) by mouth 2 (two) times a day before meals   polyvinyl alcohol (LIQUIFILM TEARS) 1.4 % ophthalmic solution   No No   Sig: Administer 1 drop to both eyes every 3 (three) hours as needed for dry eyes   pyRIDostigmine Bromide 30 MG TABS   No No   Sig: Take 30 mg by mouth 4 (four) times a day   pyridostigmine (MESTINON) 60 mg tablet   No No   Sig: Take 1.5 tablets (90 mg total) by mouth 4 (four) times a day   senna (SENOKOT) 8.6 mg   No No   Sig: Take 1 tablet (8.6 mg total) by mouth daily at bedtime as needed for constipation      Facility-Administered Medications: None     Patient's Medications   Discharge Prescriptions    No medications on file     No discharge procedures on file.  ED SEPSIS DOCUMENTATION   Time reflects when diagnosis was documented in both MDM as applicable  and the Disposition within this note       Time User Action Codes Description Comment    12/5/2024  5:04 AM Dung Gerardo Add [R53.1] Generalized weakness     12/5/2024  5:04 AM Dung Gerardo Add [G70.01] Myasthenia gravis in crisis (HCC)                  Dung Gerardo PA-C  12/05/24 0626

## 2024-12-05 NOTE — ASSESSMENT & PLAN NOTE
Myasthenia gravis.  Present for 3 years.  Current issues are ptosis of his left eyelid weakness of his right shoulder and some dysphagia.  He will be admitted today with a neurology consult and they will administer IVIG which has helped him in the past

## 2024-12-05 NOTE — CONSULTS
Consultation - Neurology   Name: Joni Iglesias 64 y.o. male I MRN: [id number]  Unit/Bed#: 2 E 254-01 I Date of Admission: 12/5/2024   Date of Service: 12/5/2024 I Hospital Day: 0   Inpatient consult to Neurology  Consult performed by: Rocío Shelton PA-C  Consult ordered by: Bharathi Vilchis MD        Physician Requesting Evaluation: Omar Lynn,*   Reason for Evaluation / Principal Problem: MG exacerbation     Assessment & Plan  Myasthenia gravis  64 y.o. male with antibody positive MG, CAD, CHF, HLD, who presented to [facility] on 12/5/24 with generalized weakness.    Plan   IVIG 400mg/kg x 5 days (day 1 is 12/5/24). Recommend monitoring heart function closely with EF 35-45%.   continue home mestinon 90 mg QID  Recommend NIF/VC  Qshift  12/5/24 AM: NIF -53, vital capacity 3.8  Recommend close monitoring of neurologic exam  Medications to avoid in MG:  Would avoid the use of neuromuscular blockers  Macrolides, fluoroquinolones, aminoglycosides, tetracycline, and chloroquine, D-penicillamine  Antidysrhythmic agents - Beta blockers, calcium channel blockers, quinidine, lidocaine, procainamide, and trimethaphan  Antipsychotics - Phenothiazines, sulpride, atypicals  Cardiovascular- Propanolol, quinidine, verapamil, bretylium, statins  Miscellaneous - Diphenylhydantoin, magnesium sulfate, lithium, chlorpromazine, muscle relaxants, levothyroxine, adrenocorticotropic hormone (ACTH). Glucocorticoids can also exacerbate MG.  Medical management and correction of metabolic and infectious disturbances per primary team   Avoid CNS altering medications if possible  Continue supportive care   Continue to monitor neurologic status. Notify neurology with any changes in exam.      Case and plan discussed with attending neurologist.  Please see attending attestation for any further recommendations and/or changes to plan.    Recommendations for outpatient neurological follow up have yet to be determined.    History of  Present Illness   Joni Iglesias is a 64 y.o.  male with antibody positive MG, CAD, CHF, HLD, who presented to [facility] on 12/5/24 with generalized weakness.    Patient reports last week he started to notice his shoulders becoming very weak and it becoming more difficult for him to raise his head.  Patient reports he also noticed worsening ptosis at that time.  Patient reports symptoms were the worst about 2 days ago.  Patient reports he is send called the outpatient neurology office who recommended he go to the ED for treatment with IVIG.    Patient reports that symptoms are similar to previous exacerbations of myasthenia gravis.  Patient reports worsening bilateral ptosis, fatigable weakness in bilateral upper extremities, dysphagia, shortness of breath upon exertion, and fatigable weakness in bilateral lower extremities.  Patient denies shortness of breath at rest.  Patient reports though he has minimal dysphagia, he is able to eat normal meals, and he is able to clear all food with water (thin liquid).  Patient reports symptoms have improved with IVIG infusions in the past and that he has not had any poor side effects from IVIG in the past.  Patient reports he received first meningococcal vaccination last month, has the second vaccination scheduled for this month on the 28th, and will receive his third in January 2025.    Patient denies tobacco use.  Patient denies illicit drug use.  Patient denies alcohol use.      Previous Neurologic History:   [place] [facility] [place], last seen by Dr. Sebastian on 9/25/24.  At that time, it was noted that patient is traditionally treated with IVIG infusion several times a year when he developed myasthenic crisis.  Patient noted to be self-pay and has not had consistent outpatient follow-up.  Patient recommended to have definitive treatment to stop him from reaching myasthenic crisis in the first place, though this is complicated by patient being self-pay and  many medications used for MG are not only expensive but also require frequent laboratory testing, which is problematic for the patient.  Patient recommended to start a complement inhibitor as these medicines do not need to have regular laboratory follow-up.  Patient recommended to start Altemeier assist medication as a patient assistance plan for patient's or self-pay.  Patient noted understanding of the need for a meningococcal vaccination before starting Ultomiris.    Review of Systems   Eyes:  Positive for visual disturbance (due to ptosis).   Neurological:  Positive for weakness. Negative for headaches.        I have reviewed the patient's PMH, PSH, Social History, Family History, Meds, and Allergies  Historical Information   Past Medical History:   Diagnosis Date    Coronary artery disease     Heart attack (HCC)     Hyperlipidemia     Myasthenia gravis (HCC)      Past Surgical History:   Procedure Laterality Date    APPENDECTOMY      CORONARY ANGIOPLASTY WITH STENT PLACEMENT       Social History     Tobacco Use    Smoking status: Never    Smokeless tobacco: Never   Vaping Use    Vaping status: Never Used   Substance and Sexual Activity    Alcohol use: Never    Drug use: Never    Sexual activity: Not on file     E-Cigarette/Vaping    E-Cigarette Use Never User      E-Cigarette/Vaping Substances    Nicotine No     THC No     CBD No     Flavoring No     Other No     Unknown No      History reviewed. No pertinent family history.  Social History     Tobacco Use    Smoking status: Never    Smokeless tobacco: Never   Vaping Use    Vaping status: Never Used   Substance and Sexual Activity    Alcohol use: Never    Drug use: Never    Sexual activity: Not on file       Current Facility-Administered Medications:     Artificial Tears Op Soln 1 drop, TID PRN    enoxaparin (LOVENOX) subcutaneous injection 40 mg, Daily    ferrous sulfate tablet 325 mg, Daily With Breakfast    immune globulin, human (GAMUNEX-C) 40 g 400 mL IV  soln, Daily    pantoprazole (PROTONIX) EC tablet 40 mg, BID AC    pyridostigmine (MESTINON) tablet 90 mg, 4x Daily    senna (SENOKOT) tablet 8.6 mg, HS PRN  Prior to Admission Medications   Prescriptions Last Dose Informant Patient Reported? Taking?   Diclofenac Sodium (VOLTAREN) 1 % Not Taking  No No   Sig: Apply 2 g topically 2 (two) times a day as needed (arthralgias)   Patient not taking: Reported on 12/5/2024   ferrous sulfate 324 (65 Fe) mg   No No   Sig: Take 1 tablet (324 mg total) by mouth 2 (two) times a day before meals   pantoprazole (PROTONIX) 40 mg tablet 12/5/2024 Morning  No Yes   Sig: Take 1 tablet (40 mg total) by mouth 2 (two) times a day before meals   polyvinyl alcohol (LIQUIFILM TEARS) 1.4 % ophthalmic solution 12/4/2024  No Yes   Sig: Administer 1 drop to both eyes every 3 (three) hours as needed for dry eyes   pyRIDostigmine Bromide 30 MG TABS   No No   Sig: Take 30 mg by mouth 4 (four) times a day   pyridostigmine (MESTINON) 60 mg tablet 12/5/2024 Morning  No Yes   Sig: Take 1.5 tablets (90 mg total) by mouth 4 (four) times a day   senna (SENOKOT) 8.6 mg Past Month  No Yes   Sig: Take 1 tablet (8.6 mg total) by mouth daily at bedtime as needed for constipation      Facility-Administered Medications: None       Objective :  Temp:  [97.3 °F (36.3 °C)-97.7 °F (36.5 °C)] 97.3 °F (36.3 °C)  HR:  [54-66] 54  BP: (128-151)/(66-77) 144/75  Resp:  [18-25] 18  SpO2:  [95 %-100 %] 99 %  O2 Device: None (Room air)    Physical Exam  Vitals and nursing note reviewed.   HENT:      Head: Normocephalic and atraumatic.   Cardiovascular:      Rate and Rhythm: Bradycardia present.   Pulmonary:      Effort: Pulmonary effort is normal.   Neurological:      Mental Status: He is alert.      Cranial Nerves: No dysarthria.     Neurological Exam  Mental Status  Alert. Oriented to person, place and time. no dysarthria present. Able to name objects and repeat. Follows two-step commands. Attention and concentration:  "Appropriately attends to provider.    Cranial Nerves    - restricted EOM overall with most notable L eye with decreased abduction  - b/l ptosis, L>R   - hearing grossly intact   - facial sensation intact and symmetric to LT b/l .    Motor                                               Right                     Left  Elbow flexion                         5                          5  Elbow extension                    5                          5  Finger flexion                         5                          5  Plantarflexion                         5                          5  Dorsiflexion                            5                          5    - b/l shoulder abduction 4+/5 with fatigable weakness  - b/l hip flexion 4/5 with fatigable weakness .    Sensory  Light touch is normal in upper and lower extremities.  Right extinction absent: Left extinction absent:    Coordination  Right: Finger-to-nose normal.Left: Finger-to-nose normal.        Lab Results: I have reviewed the following results:I have personally reviewed pertinent reports.  , CBC:   Results from last 7 days   Lab Units 12/05/24  0258   WBC Thousand/uL 8.76   RBC Million/uL 5.47   HEMOGLOBIN g/dL 10.9*   HEMATOCRIT % 37.4   MCV fL 68*   PLATELETS Thousands/uL 282   , BMP/CMP:   Results from last 7 days   Lab Units 12/05/24  0258   SODIUM mmol/L 136   POTASSIUM mmol/L 4.5   CHLORIDE mmol/L 104   CO2 mmol/L 27   BUN mg/dL 15   CREATININE mg/dL 0.80   CALCIUM mg/dL 9.0   AST U/L 22   ALT U/L 11   ALK PHOS U/L 76   EGFR ml/min/1.73sq m 93   , Vitamin B12:   , HgBA1C:   , TSH:   , Coagulation:   , Lipid Profile:   , Ammonia:   , Urinalysis:       Invalid input(s): \"URIBILINOGEN\", Drug Screen:   , Medication Drug Levels:       Invalid input(s): \"CARBAMAZEPINE\", \"OXCARBAZEPINE\"  Recent Labs     12/05/24  0258   WBC 8.76   HGB 10.9*   HCT 37.4      SODIUM 136   K 4.5      CO2 27   BUN 15   CREATININE 0.80   GLUC 103     Imaging Results Review: " No pertinent imaging studies reviewed.  Other Study Results Review: EKG was reviewed.     VTE Prophylaxis: Enoxaparin (Lovenox)    This note was completed in part utilizing Dragon Software.  Grammatical errors, random word insertions, spelling mistakes, and incomplete sentences may be an occasional consequence of this system secondary to software limitations, ambient noise, and hardware issues.  If you have any questions or concerns about the content, text, or information contained within the body of this dictation, please contact the provider for clarification.

## 2024-12-05 NOTE — ASSESSMENT & PLAN NOTE
64 y.o. male with antibody positive MG, CAD, CHF, HLD, who presented to [facility] on 12/5/24 with generalized weakness.    Plan   IVIG 400mg/kg x 5 days (day 1 is 12/5/24). Recommend monitoring heart function closely with EF 35-45%.   continue home mestinon 90 mg QID  Recommend NIF/VC  Qshift  12/5/24 AM: NIF -53, vital capacity 3.8  Recommend close monitoring of neurologic exam  Medications to avoid in MG:  Would avoid the use of neuromuscular blockers  Macrolides, fluoroquinolones, aminoglycosides, tetracycline, and chloroquine, D-penicillamine  Antidysrhythmic agents - Beta blockers, calcium channel blockers, quinidine, lidocaine, procainamide, and trimethaphan  Antipsychotics - Phenothiazines, sulpride, atypicals  Cardiovascular- Propanolol, quinidine, verapamil, bretylium, statins  Miscellaneous - Diphenylhydantoin, magnesium sulfate, lithium, chlorpromazine, muscle relaxants, levothyroxine, adrenocorticotropic hormone (ACTH). Glucocorticoids can also exacerbate MG.  Medical management and correction of metabolic and infectious disturbances per primary team   Avoid CNS altering medications if possible  Continue supportive care   Continue to monitor neurologic status. Notify neurology with any changes in exam.

## 2024-12-05 NOTE — RESPIRATORY THERAPY NOTE
RT Protocol Note  Joni Iglesias 64 y.o. male MRN: [id number]  Unit/Bed#: ED 13 Encounter: [id number]    Assessment    Active Problems:  There are no active Hospital Problems.      Home Pulmonary Medications:     12/05/24 0435   Additional Assessments   Pulse 59   Respirations (!) 25   SpO2 100 %   Vital Capacity 3.8 L   NIF -53 cm H2O            Past Medical History:   Diagnosis Date    Coronary artery disease     Heart attack (HCC)     Hyperlipidemia     Myasthenia gravis (HCC)      Social History     Socioeconomic History    Marital status: /Civil Union     Spouse name: None    Number of children: None    Years of education: None    Highest education level: None   Occupational History    None   Tobacco Use    Smoking status: Never    Smokeless tobacco: Never   Vaping Use    Vaping status: Never Used   Substance and Sexual Activity    Alcohol use: Never    Drug use: Never    Sexual activity: None   Other Topics Concern    None   Social History Narrative    None     Social Drivers of Health     Financial Resource Strain: Low Risk  (7/26/2024)    Received from [facility]    Overall Financial Resource Strain (CARDIA)     Difficulty of Paying Living Expenses: Not hard at all   Food Insecurity: No Food Insecurity (7/26/2024)    Received from [facility]    Hunger Vital Sign     Worried About Running Out of Food in the Last Year: Never true     Ran Out of Food in the Last Year: Never true   Transportation Needs: Unmet Transportation Needs (7/26/2024)    Received from [facility]    PRAPARE - Transportation     Lack of Transportation (Medical): Yes     Lack of Transportation (Non-Medical): Yes   Physical Activity: Inactive (7/26/2024)    Received from [facility]    Exercise Vital Sign     Days of Exercise per Week: 0 days     Minutes of Exercise per Session: 0 min   Stress: No Stress Concern Present (7/26/2024)    Received from [facility]    [employer] [place] [employer] - Occupational Stress Questionnaire     Feeling of Stress : Not at all   Social Connections: Socially Isolated (7/26/2024)    Received from [facility]    Social Connection and Isolation Panel [NHANES]     Frequency of Communication with Friends and Family: More than three times a week     Frequency of Social Gatherings with Friends and Family: More than three times a week     Attends Orthodox Services: Never     Active Member of Clubs or Organizations: No     Attends Club or Organization Meetings: Never     Marital Status:    Intimate Partner Violence: Not on file   Housing Stability: Low Risk  (2/2/2024)    Housing Stability Vital Sign     Unable to Pay for Housing in the Last Year: No     Number of Times Moved in the Last Year: 1     Homeless in the Last Year: No       Subjective         Objective    Physical Exam:        Vitals:  Blood pressure 147/77, pulse 59, temperature 97.7 °F (36.5 °C), temperature source Oral, resp. rate (!) 25, SpO2 100%.          Imaging and other studies: Results Review Statement: No pertinent imaging studies reviewed.          Plan

## 2024-12-06 LAB
ALBUMIN SERPL BCG-MCNC: 3.4 G/DL (ref 3.5–5)
ALP SERPL-CCNC: 77 U/L (ref 34–104)
ALT SERPL W P-5'-P-CCNC: 10 U/L (ref 7–52)
ANION GAP SERPL CALCULATED.3IONS-SCNC: 3 MMOL/L (ref 4–13)
AST SERPL W P-5'-P-CCNC: 12 U/L (ref 13–39)
BASOPHILS # BLD AUTO: 0.02 THOUSANDS/ÂΜL (ref 0–0.1)
BASOPHILS NFR BLD AUTO: 0 % (ref 0–1)
BILIRUB SERPL-MCNC: 0.36 MG/DL (ref 0.2–1)
BUN SERPL-MCNC: 13 MG/DL (ref 5–25)
CALCIUM ALBUM COR SERPL-MCNC: 9.3 MG/DL (ref 8.3–10.1)
CALCIUM SERPL-MCNC: 8.8 MG/DL (ref 8.4–10.2)
CHLORIDE SERPL-SCNC: 103 MMOL/L (ref 96–108)
CO2 SERPL-SCNC: 29 MMOL/L (ref 21–32)
CREAT SERPL-MCNC: 0.86 MG/DL (ref 0.6–1.3)
EOSINOPHIL # BLD AUTO: 0.34 THOUSAND/ÂΜL (ref 0–0.61)
EOSINOPHIL NFR BLD AUTO: 6 % (ref 0–6)
ERYTHROCYTE [DISTWIDTH] IN BLOOD BY AUTOMATED COUNT: 18 % (ref 11.6–15.1)
GFR SERPL CREATININE-BSD FRML MDRD: 90 ML/MIN/1.73SQ M
GLUCOSE SERPL-MCNC: 101 MG/DL (ref 65–140)
HCT VFR BLD AUTO: 35.6 % (ref 36.5–49.3)
HGB BLD-MCNC: 10.1 G/DL (ref 12–17)
IMM GRANULOCYTES # BLD AUTO: 0.01 THOUSAND/UL (ref 0–0.2)
IMM GRANULOCYTES NFR BLD AUTO: 0 % (ref 0–2)
LYMPHOCYTES # BLD AUTO: 1.33 THOUSANDS/ÂΜL (ref 0.6–4.47)
LYMPHOCYTES NFR BLD AUTO: 22 % (ref 14–44)
MCH RBC QN AUTO: 19.6 PG (ref 26.8–34.3)
MCHC RBC AUTO-ENTMCNC: 28.4 G/DL (ref 31.4–37.4)
MCV RBC AUTO: 69 FL (ref 82–98)
MONOCYTES # BLD AUTO: 0.6 THOUSAND/ÂΜL (ref 0.17–1.22)
MONOCYTES NFR BLD AUTO: 10 % (ref 4–12)
NEUTROPHILS # BLD AUTO: 3.89 THOUSANDS/ÂΜL (ref 1.85–7.62)
NEUTS SEG NFR BLD AUTO: 62 % (ref 43–75)
NRBC BLD AUTO-RTO: 0 /100 WBCS
PLATELET # BLD AUTO: 256 THOUSANDS/UL (ref 149–390)
PMV BLD AUTO: 9.6 FL (ref 8.9–12.7)
POTASSIUM SERPL-SCNC: 3.9 MMOL/L (ref 3.5–5.3)
PROT SERPL-MCNC: 7.3 G/DL (ref 6.4–8.4)
RBC # BLD AUTO: 5.14 MILLION/UL (ref 3.88–5.62)
SODIUM SERPL-SCNC: 135 MMOL/L (ref 135–147)
WBC # BLD AUTO: 6.19 THOUSAND/UL (ref 4.31–10.16)

## 2024-12-06 PROCEDURE — 80053 COMPREHEN METABOLIC PANEL: CPT | Performed by: INTERNAL MEDICINE

## 2024-12-06 PROCEDURE — 94150 VITAL CAPACITY TEST: CPT

## 2024-12-06 PROCEDURE — 99233 SBSQ HOSP IP/OBS HIGH 50: CPT | Performed by: INTERNAL MEDICINE

## 2024-12-06 PROCEDURE — 92526 ORAL FUNCTION THERAPY: CPT

## 2024-12-06 PROCEDURE — 99232 SBSQ HOSP IP/OBS MODERATE 35: CPT | Performed by: PSYCHIATRY & NEUROLOGY

## 2024-12-06 PROCEDURE — 85025 COMPLETE CBC W/AUTO DIFF WBC: CPT | Performed by: INTERNAL MEDICINE

## 2024-12-06 RX ORDER — ACETAMINOPHEN 325 MG/1
650 TABLET ORAL EVERY 6 HOURS PRN
Status: DISCONTINUED | OUTPATIENT
Start: 2024-12-06 | End: 2024-12-10 | Stop reason: HOSPADM

## 2024-12-06 RX ADMIN — DEXTRAN 70, GLYCERIN, HYPROMELLOSE 1 DROP: 1; 2; 3 SOLUTION/ DROPS OPHTHALMIC at 19:07

## 2024-12-06 RX ADMIN — Medication 40 G: at 11:13

## 2024-12-06 RX ADMIN — PANTOPRAZOLE SODIUM 40 MG: 40 TABLET, DELAYED RELEASE ORAL at 06:02

## 2024-12-06 RX ADMIN — PANTOPRAZOLE SODIUM 40 MG: 40 TABLET, DELAYED RELEASE ORAL at 16:02

## 2024-12-06 RX ADMIN — FERROUS SULFATE TAB 325 MG (65 MG ELEMENTAL FE) 325 MG: 325 (65 FE) TAB at 07:35

## 2024-12-06 RX ADMIN — DICLOFENAC SODIUM 2 G: 10 GEL TOPICAL at 11:58

## 2024-12-06 RX ADMIN — DEXTRAN 70, GLYCERIN, HYPROMELLOSE 1 DROP: 1; 2; 3 SOLUTION/ DROPS OPHTHALMIC at 11:58

## 2024-12-06 RX ADMIN — DICLOFENAC SODIUM 2 G: 10 GEL TOPICAL at 21:30

## 2024-12-06 RX ADMIN — ENOXAPARIN SODIUM 40 MG: 40 INJECTION SUBCUTANEOUS at 10:07

## 2024-12-06 RX ADMIN — PYRIDOSTIGMINE BROMIDE 90 MG: 60 TABLET ORAL at 16:02

## 2024-12-06 RX ADMIN — PYRIDOSTIGMINE BROMIDE 90 MG: 60 TABLET ORAL at 03:51

## 2024-12-06 RX ADMIN — DICLOFENAC SODIUM 2 G: 10 GEL TOPICAL at 17:00

## 2024-12-06 RX ADMIN — PYRIDOSTIGMINE BROMIDE 90 MG: 60 TABLET ORAL at 10:07

## 2024-12-06 RX ADMIN — PYRIDOSTIGMINE BROMIDE 90 MG: 60 TABLET ORAL at 21:30

## 2024-12-06 NOTE — SPEECH THERAPY NOTE
Speech Language/Pathology     Speech/Language Pathology Progress Note     Patient Name: Joni Iglesias    Today's Date: 12/6/2024     Problem List  Principal Problem:    Myasthenia gravis  Active Problems:    Ptosis of eyelid, left    CAD (coronary artery disease)    Weakness of right shoulder    Other dysphagia      Subjective:  Patient received awake and alert, setting up lunch tray.  Reports increased saliva pooling in throat prior to AM dose of Mestinon. Symptoms relieved w/ meds.  No concerns otherwise.      Previous/current diet: reg/thin     Objective:  The following consistencies were tested: regular solids, soft, puree, thins  Patient presents with WFL bolus containment, manipulation and control.  Mastication judged to be timely and complete.  Pt engages in conversational discourse throughout.  WFL vocal resonance and maintains dry vocal quality.  Repeatedly reports no concerns re: swallow function.    No overt s/s of aspiration or distress. Intake is good.      Assessment:  Oropharyngeal swallow function appears same/WFL with timing of intake w/ MG meds. Pt w/o complaints at this time.      Plan:  Reg/thin  Meds as tolerated  Will continue follow as indicated x1.       Kaur Lay MS, CCC-SLP  Speech-Language Pathologist  [place] #[id number]  [place] #[id number]

## 2024-12-06 NOTE — CASE MANAGEMENT
Case Management Discharge Planning Note    Patient name Joni Iglesias  Location 2 EAST 254/2 E 254-01 MRN [id number]  : 1960 Date 2024       Current Admission Date: 2024  Current Admission Diagnosis:Myasthenia gravis   Patient Active Problem List    Diagnosis Date Noted Date Diagnosed    Weakness of right shoulder 2024     Other dysphagia 2024     Microcytic anemia 2024     History of prediabetes 2024     Personal history of peptic ulcer disease 2024     Poor vision 2024     CHF (congestive heart failure) ([facility]) 2023     Bruise 2023     Acute blood loss anemia 2023     Hyperlipidemia      Myasthenia gravis      Myasthenia gravis ([facility]) 10/01/2021     Morbid obesity ([facility]) 06/10/2021     Ptosis of eyelid, left 2021     CAD (coronary artery disease) 2021       LOS (days): 1  Geometric Mean LOS (GMLOS) (days):   Days to GMLOS:     OBJECTIVE:  Risk of Unplanned Readmission Score: 10.57         Current admission status: Inpatient   Preferred Pharmacy:   [id number] - [facility] [place] - [address]  [address]  [facility] [place] [address]  Phone: [phone] Fax: [phone]    [facility]  [place], [place] - [address]  [address]  [place] [address]  Phone: [phone] Fax: [phone]    [id number] - [facility] [place] - [address]  [address]  [facility] [place] [address]  Phone: [phone] Fax: [phone]    Primary Care Provider: No primary care provider on file.    Primary Insurance:   Secondary Insurance:     DISCHARGE DETAILS:  Additional Comments: Per rounding w SLIM, anticipated d/c to home on 12/10 due to need for 5 days IVIG treatment. AMPAC 20. CM to continue to follow.

## 2024-12-06 NOTE — PROGRESS NOTES
Progress Note - Neurology   Name: Joni Iglesias 64 y.o. male I MRN: [id number]  Unit/Bed#: 2 E 254-01 I Date of Admission: 12/5/2024   Date of Service: 12/6/2024 I Hospital Day: 1    Assessment & Plan  Myasthenia gravis  64 y.o. male with antibody positive MG on Mestinon, CAD, CHF, and HLD who presented to [facility] on 12/5/2024 due to MG flare (bilateral eyelid ptosis (L>R), generalized weakness, and neck flexion/extension weakness).  Symptoms began approximately 10 days prior to arrival.    Patient follows with Dr. Sebastian and plans to start new DMT next month.  Patient used to receive IVIG every 6 months.  However, patient reports that he has not needed IVIG for approximately 1 year.    Etiology for MG flare remains unclear.  No metabolic or infectious disturbances have been discovered thus far.    Plan:  - IVIG 400mg/kg x 5 days (today is day 2 of 5). Recommend monitoring heart function closely with EF 35-45%.   - Continue home mestinon 90 mg 4x per day  - Recommend NIF/VC Qshift  12/5: NIF -53, vital capacity 3.8 L  12/6: NIF -32, vital capacity 2.1 L  - Recommend closely monitoring neurologic exam  - Medications to avoid in MG:  Would avoid the use of neuromuscular blockers  Macrolides, fluoroquinolones, aminoglycosides, tetracycline, and chloroquine, D-penicillamine  Antidysrhythmic agents - Beta blockers, calcium channel blockers, quinidine, lidocaine, procainamide, and trimethaphan  Antipsychotics - Phenothiazines, sulpride, atypicals  Cardiovascular- Propanolol, quinidine, verapamil, bretylium, statins  Miscellaneous - Diphenylhydantoin, magnesium sulfate, lithium, chlorpromazine, muscle relaxants, levothyroxine, adrenocorticotropic hormone (ACTH). Glucocorticoids can also exacerbate MG.  - Medical management and correction of metabolic and infectious disturbances per primary team   - Avoid CNS altering medications if possible  - Continue supportive care   - Continue to monitor neurologic status. Notify  neurology with any changes in exam.     Joni Iglesias should keep his appointment with Dr. Vance on 1/21/2025.    Subjective   Patient seen and examined at bedside.  Patient states that he is feeling well today.  He is significantly improved muscle strength.  He also states that the ptosis is not as severe.  Denies any headache, double vision, numbness, chest pain, shortness of breath, abdominal pain.    Review of Systems   Neurological:  Positive for weakness.        Ptosis   All other systems reviewed and are negative.        Objective :  Temp:  [97.9 °F (36.6 °C)-98.1 °F (36.7 °C)] 98.1 °F (36.7 °C)  HR:  [57-64] 57  BP: (106-128)/(51-65) 128/65  Resp:  [18-19] 18  SpO2:  [95 %-96 %] 96 %  O2 Device: None (Room air)    Physical Exam  Vitals and nursing note reviewed.   Constitutional:       Appearance: Normal appearance.      Comments: Patient sitting comfortably in bedside chair in no acute distress   HENT:      Head: Normocephalic and atraumatic.      Mouth/Throat:      Mouth: Mucous membranes are moist.      Pharynx: Oropharynx is clear.   Eyes:      Extraocular Movements: Extraocular movements intact.      Conjunctiva/sclera: Conjunctivae normal.      Pupils: Pupils are equal, round, and reactive to light.      Comments: Slight bilateral eyelid ptosis (L>R)   Pulmonary:      Effort: Pulmonary effort is normal.   Musculoskeletal:         General: Normal range of motion.   Skin:     General: Skin is warm and dry.   Neurological:      Mental Status: He is alert.     Neurological Exam  Mental Status  Alert.  Patient awake and alert.  Oriented to person, place, month, and year.  No dysarthria or aphasia.  No hypophonia  Able to follow simple midline and appendicular commands.  Able to count to 40 and 1 breath.  Did not appear out of breath while having conversations..    Cranial Nerves  CN II: Vision test: Slight bilateral eyelid ptosis (L>R).  CN III, IV, VI: Extraocular movements intact bilaterally. Pupils  equal round and reactive to light bilaterally.  Bilateral eyelid ptosis (L>R).  Pupils 3 mm, round, reactive to light bilaterally.  No visual field deficits.  Decreased abduction of both eyes (L>R) and decreased upward gaze.  Normal adduction in both eyes.  Facial sensation to light touch intact throughout.  Facial expressions full and symmetric.  Tongue midline.  Hearing grossly intact..    Motor  Normal muscle bulk throughout. Normal muscle tone. No pronator drift.  5/5 neck flexion and extension  5/5 strength in bilateral upper and lower extremities..    Sensory  Sensation to light touch, temperature, and vibration intact throughout except absent vibration bilateral great toes..    Reflexes  No ankle clonus bilaterally  Diminished reflexes thorughout.    Coordination    No ataxia with finger to nose bilaterally.    Gait    Steady gait.            VTE Pharmacologic Prophylaxis: VTE covered by:  enoxaparin, Subcutaneous, 40 mg at 12/06/24 1007    and Sequential compression device (Venodyne)

## 2024-12-06 NOTE — ASSESSMENT & PLAN NOTE
64 y.o. 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  Symptoms began approximately 10 days prior to arrival.    Patient follows with Dr. Sebastian and plans to start new DMT next month.  Patient used to receive IVIG every 6 months.  However, patient reports that he has not needed IVIG for approximately 1 year.    Etiology for MG flare remains unclear.  No metabolic or infectious disturbances have been discovered thus far.    Plan:  - IVIG 400mg/kg x 5 days (today is day 2 of 5). Recommend monitoring heart function closely with EF 35-45%.   - Continue home mestinon 90 mg 4x per day  - Recommend NIF/VC Qshift  12/5: NIF -53, vital capacity 3.8 L  12/6: NIF -32, vital capacity 2.1 L  - Recommend closely monitoring neurologic exam  - Medications to avoid in MG:  Would avoid the use of neuromuscular blockers  Macrolides, fluoroquinolones, aminoglycosides, tetracycline, and chloroquine, D-penicillamine  Antidysrhythmic agents - Beta blockers, calcium channel blockers, quinidine, lidocaine, procainamide, and trimethaphan  Antipsychotics - Phenothiazines, sulpride, atypicals  Cardiovascular- Propanolol, quinidine, verapamil, bretylium, statins  Miscellaneous - Diphenylhydantoin, magnesium sulfate, lithium, chlorpromazine, muscle relaxants, levothyroxine, adrenocorticotropic hormone (ACTH). Glucocorticoids can also exacerbate MG.  - Medical management and correction of metabolic and infectious disturbances per primary team   - Avoid CNS altering medications if possible  - Continue supportive care   - Continue to monitor neurologic status. Notify neurology with any changes in exam.

## 2024-12-06 NOTE — TELEPHONE ENCOUNTER
Patient is currently hospitalized (as of 12/5/24). MSW will follow-up about the MCTA application next week.

## 2024-12-06 NOTE — PROGRESS NOTES
Progress Note - Hospitalist   Name: Joni Iglesias 64 y.o. male I MRN: [id number]  Unit/Bed#: 2 E 254-01 I Date of Admission: 12/5/2024   Date of Service: 12/6/2024 I Hospital Day: 1    Assessment & Plan  Myasthenia gravis  Myasthenia gravis.  Present for 3 years.  Current issues are ptosis of his left eyelid weakness of his right shoulder and some dysphagia.      Neurology recommends IVIG - first dose 12/5 around 1PM  Plan for 5 day course  Monitor vital capacity  Monitor clinically, respiratory status  Ptosis of eyelid, left  Likely due to MG exacerbation  Monitor  CAD (coronary artery disease)  Patient with underlying coronary artery disease reports taking no medicines.   No CP  Monitor  Weakness of right shoulder  PT/OT  Other dysphagia  Patient with dysphagia secondary to his myasthenia gravis.    Improved, tolerating diet  Monitor    VTE Pharmacologic Prophylaxis: VTE Score: 4 Moderate Risk (Score 3-4) - Pharmacological DVT Prophylaxis Ordered: enoxaparin (Lovenox).    Mobility:   Basic Mobility Inpatient Raw Score: 20  JH-HLM Goal: 6: Walk 10 steps or more  JH-HLM Achieved: 7: Walk 25 feet or more  JH-HLM Goal achieved. Continue to encourage appropriate mobility.    Patient Centered Rounds: I performed bedside rounds with nursing staff today.   Discussions with Specialists or Other Care Team Provider: Discussed with care management team    Education and Discussions with Family / Patient: Patient declined call to .     Current Length of Stay: 1 day(s)  Current Patient Status: Inpatient   Certification Statement: The patient will continue to require additional inpatient hospital stay due to need for IV therapy  Discharge Plan: Anticipate discharge in >72 hrs to home.    Code Status: Level 1 - Full Code    Subjective     Patient feeling slightly better after initiation of IVIG yesterday.  Denies any shortness of breath chest pain nausea vomiting    Objective :  Temp:  [97.9 °F (36.6 °C)-98.1 °F  (36.7 °C)] 98.1 °F (36.7 °C)  HR:  [57-64] 57  BP: (106-128)/(51-65) 128/65  Resp:  [18-19] 18  SpO2:  [95 %-96 %] 96 %  O2 Device: None (Room air)    Body mass index is 41.28 kg/m².     Input and Output Summary (last 24 hours):     Intake/Output Summary (Last 24 hours) at 12/6/2024 1054  Last data filed at 12/6/2024 0900  Gross per 24 hour   Intake 1720 ml   Output 1600 ml   Net 120 ml       Physical Exam  Vitals and nursing note reviewed.   Constitutional:       Appearance: Normal appearance. He is obese.   HENT:      Head: Normocephalic and atraumatic.      Right Ear: External ear normal.      Left Ear: External ear normal.      Nose: Nose normal. No congestion or rhinorrhea.      Mouth/Throat:      Mouth: Mucous membranes are moist.      Pharynx: Oropharynx is clear. No oropharyngeal exudate or posterior oropharyngeal erythema.   Eyes:      General: No scleral icterus.        Right eye: No discharge.         Left eye: No discharge.      Pupils: Pupils are equal, round, and reactive to light.   Neck:      Vascular: No carotid bruit.   Cardiovascular:      Rate and Rhythm: Normal rate and regular rhythm.      Pulses: Normal pulses.      Heart sounds: No murmur heard.     No friction rub. No gallop.   Pulmonary:      Effort: Pulmonary effort is normal. No respiratory distress.      Breath sounds: Normal breath sounds. No stridor. No wheezing, rhonchi or rales.   Abdominal:      General: Abdomen is flat. Bowel sounds are normal. There is no distension.      Palpations: Abdomen is soft. There is no mass.      Tenderness: There is no abdominal tenderness. There is no guarding or rebound.      Hernia: No hernia is present.   Musculoskeletal:         General: No swelling, tenderness, deformity or signs of injury. Normal range of motion.      Cervical back: Normal range of motion. No rigidity. No muscular tenderness.   Lymphadenopathy:      Cervical: No cervical adenopathy.   Skin:     General: Skin is warm and dry.       Capillary Refill: Capillary refill takes less than 2 seconds.      Coloration: Skin is not jaundiced or pale.      Findings: No bruising or erythema.   Neurological:      General: No focal deficit present.      Mental Status: He is alert and oriented to person, place, and time. Mental status is at baseline.      Cranial Nerves: No cranial nerve deficit.      Sensory: No sensory deficit.      Motor: No weakness.      Coordination: Coordination normal.      Deep Tendon Reflexes: Reflexes normal.   Psychiatric:         Mood and Affect: Mood normal.         Behavior: Behavior normal.         Thought Content: Thought content normal.         Judgment: Judgment normal.           Lines/Drains:              Lab Results: I have reviewed the following results:   Results from last 7 days   Lab Units 12/06/24  0522   WBC Thousand/uL 6.19   HEMOGLOBIN g/dL 10.1*   HEMATOCRIT % 35.6*   PLATELETS Thousands/uL 256   SEGS PCT % 62   LYMPHO PCT % 22   MONO PCT % 10   EOS PCT % 6     Results from last 7 days   Lab Units 12/06/24  0522   SODIUM mmol/L 135   POTASSIUM mmol/L 3.9   CHLORIDE mmol/L 103   CO2 mmol/L 29   BUN mg/dL 13   CREATININE mg/dL 0.86   ANION GAP mmol/L 3*   CALCIUM mg/dL 8.8   ALBUMIN g/dL 3.4*   TOTAL BILIRUBIN mg/dL 0.36   ALK PHOS U/L 77   ALT U/L 10   AST U/L 12*   GLUCOSE RANDOM mg/dL 101                       Recent Cultures (last 7 days):               Last 24 Hours Medication List:     Current Facility-Administered Medications:     Artificial Tears Op Soln 1 drop, TID PRN    enoxaparin (LOVENOX) subcutaneous injection 40 mg, Daily    ferrous sulfate tablet 325 mg, Daily With Breakfast    immune globulin, human (GAMUNEX-C) 40 g 400 mL IV soln, Daily, Last Rate: Stopped (12/05/24 1520)    pantoprazole (PROTONIX) EC tablet 40 mg, BID AC    pyridostigmine (MESTINON) tablet 90 mg, 4x Daily    senna (SENOKOT) tablet 8.6 mg, HS PRN    Administrative Statements   Today, Patient Was Seen By: Omar Salgado,  MD      **Please Note: This note may have been constructed using a voice recognition system.**

## 2024-12-06 NOTE — ASSESSMENT & PLAN NOTE
Myasthenia gravis.  Present for 3 years.  Current issues are ptosis of his left eyelid weakness of his right shoulder and some dysphagia.      Neurology recommends IVIG - first dose 12/5 around 1PM  Plan for 5 day course  Monitor vital capacity  Monitor clinically, respiratory status

## 2024-12-06 NOTE — UTILIZATION REVIEW
Initial Clinical Review    Admission: Date/Time/Statement:   Admission Orders (From admission, onward)       Ordered        12/05/24 0616  Inpatient Admission  Once                          Orders Placed This Encounter   Procedures    Inpatient Admission     Standing Status:   Standing     Number of Occurrences:   1     Level of Care:   Med Surg [16]     Estimated length of stay:   More than 2 Midnights     Certification:   I certify that inpatient services are medically necessary for this patient for a duration of greater than two midnights. See H&P and MD Progress Notes for additional information about the patient's course of treatment.     ED Arrival Information       Expected   -    Arrival   12/5/2024 02:40    Acuity   Urgent              Means of arrival   Walk-In    Escorted by   Self    Service   Hospitalist    Admission type   Emergency              Arrival complaint   Sent by doc, weakness             Chief Complaint   Patient presents with    Weakness - Generalized     Sent by pcp for increasing weakness from myasthenia gravis x 2-3 days        Initial Presentation: 64 y.o. male to the ED from home with complaints of weakness. Admitted to inpatient for ptosis left eyelid. H/O  antibody positive MG, CAD, CHF, HLD,  obesity.   On arrival, he has weakness of arms, eyelids, neck.  Worsening over the past 4-5 days.  Right shoulder pain.  Experiencing dypshagia due to MG.  Decreased ROM neck due to weakness, unable to abduct right shoulder.   Neurology consult:  Plan for IVIG, continue home mestinon.   Monitor heart function closely with EF 35-45%.    Date: 12/6   Day 2: COntinue with IV IG therapy as per Neurology.  MOnitor vital capacity. Denies shortness of breath.       ED Treatment-Medication Administration from 12/05/2024 0240 to 12/05/2024 0746         Date/Time Order Dose Route Action     12/05/2024 0602 ketorolac (TORADOL) injection 15 mg 15 mg Intravenous Given     12/05/2024 0647 pantoprazole  (PROTONIX) EC tablet 40 mg 40 mg Oral Given     12/05/2024 0645 sodium chloride 0.9 % bolus 1,000 mL 1,000 mL Intravenous New Bag            Scheduled Medications:  enoxaparin, 40 mg, Subcutaneous, Daily  ferrous sulfate, 325 mg, Oral, Daily With Breakfast  immune globulin, human, 400 mg/kg (Adjusted), Intravenous, Daily  pantoprazole, 40 mg, Oral, BID AC  pyridostigmine, 90 mg, Oral, 4x Daily      Continuous IV Infusions:     PRN Meds:  Artificial Tears, 1 drop, Both Eyes, TID PRN  senna, 8.6 mg, Oral, HS PRN      ED Triage Vitals   Temperature Pulse Respirations Blood Pressure SpO2 Pain Score   12/05/24 0249 12/05/24 0249 12/05/24 0249 12/05/24 0249 12/05/24 0249 12/05/24 0629   97.7 °F (36.5 °C) 66 18 151/73 96 % 2     Weight (last 2 days)       Date/Time Weight    12/05/24 1059 138 (304.4)            Vital Signs (last 3 days)       Date/Time Temp Pulse Resp BP MAP (mmHg) SpO2 O2 Device Patient Position - Orthostatic VS [place] Coma Scale Score Pain    12/06/24 07:27:36 98.1 °F (36.7 °C) 57 18 128/65 86 96 % None (Room air) -- 15 No Pain    12/05/24 2300 -- -- -- -- -- -- -- -- -- No Pain    12/05/24 21:52:58 98 °F (36.7 °C) 64 19 109/62 78 95 % None (Room air) Lying 15 --    12/05/24 1900 -- -- -- -- -- -- -- -- -- No Pain    12/05/24 15:20:50 97.9 °F (36.6 °C) -- 18 106/51 69 95 % None (Room air) Lying -- --    12/05/24 1145 -- 58 18 -- -- 96 % -- -- -- --    12/05/24 07:49:49 97.3 °F (36.3 °C) -- 18 144/75 98 -- -- -- -- --    12/05/24 0749 -- -- -- -- -- -- -- -- 15 No Pain    12/05/24 0629 -- -- -- -- -- -- -- -- -- 2    12/05/24 0530 -- 54 19 132/67 94 99 % None (Room air) Sitting -- --    12/05/24 0500 -- 54 20 141/74 102 99 % -- Sitting -- --    12/05/24 0435 -- 59 25 -- -- 100 % -- -- -- --    12/05/24 0430 -- 61 22 128/66 90 97 % -- -- -- --    12/05/24 0400 -- 57 19 147/77 103 95 % None (Room air) Sitting -- --    12/05/24 0331 -- -- -- -- -- -- None (Room air) -- -- --    12/05/24 0300 -- 60 21 151/73  105 96 % None (Room air) Sitting -- --    12/05/24 0249 97.7 °F (36.5 °C) 66 18 151/73 -- 96 % None (Room air) Sitting -- --            Pertinent Labs/Diagnostic Test Results:   Radiology:  XR chest 1 view portable   Final Interpretation by Swapnil Gorman MD (12/05 0916)      No acute cardiopulmonary disease.            Workstation performed: [id number]           Cardiology:  ECG 12 lead   Final Result by Jan Huerta MD (12/05 0817)   Normal sinus rhythm   Left axis deviation   Non-specific intra-ventricular conduction block   Cannot rule out Septal infarct (cited on or before 31-Jan-2024)   T wave abnormality, consider lateral ischemia   Abnormal ECG   When compared with ECG of 05-Dec-2024 02:52, (unconfirmed)   No significant change was found   Confirmed by Jan Huerta ([id number]) on 12/5/2024 8:17:32 AM              Results from last 7 days   Lab Units 12/06/24 0522 12/05/24  0258   WBC Thousand/uL 6.19 8.76   HEMOGLOBIN g/dL 10.1* 10.9*   HEMATOCRIT % 35.6* 37.4   PLATELETS Thousands/uL 256 282   TOTAL NEUT ABS Thousands/µL 3.89 5.59         Results from last 7 days   Lab Units 12/06/24 0522 12/05/24  0258   SODIUM mmol/L 135 136   POTASSIUM mmol/L 3.9 4.5   CHLORIDE mmol/L 103 104   CO2 mmol/L 29 27   ANION GAP mmol/L 3* 5   BUN mg/dL 13 15   CREATININE mg/dL 0.86 0.80   EGFR ml/min/1.73sq m 90 93   CALCIUM mg/dL 8.8 9.0     Results from last 7 days   Lab Units 12/06/24  0522 12/05/24  0258   AST U/L 12* 22   ALT U/L 10 11   ALK PHOS U/L 77 76   TOTAL PROTEIN g/dL 7.3 7.4   ALBUMIN g/dL 3.4* 3.8   TOTAL BILIRUBIN mg/dL 0.36 0.38         Results from last 7 days   Lab Units 12/06/24 0522 12/05/24  0258   GLUCOSE RANDOM mg/dL 101 103       Results from last 7 days   Lab Units 12/05/24  0457 12/05/24  0258   HS TNI 0HR ng/L  --  10   HS TNI 2HR ng/L 8  --    HSTNI D2 ng/L -2  --          Past Medical History:   Diagnosis Date    Coronary artery disease     Heart attack (HCC)     Hyperlipidemia     Myasthenia  gravis (HCC)      Present on Admission:   CAD (coronary artery disease)   Myasthenia gravis   Weakness of right shoulder   Ptosis of eyelid, left      Admitting Diagnosis: Weakness generalized [R53.1]  Myasthenia gravis in crisis (HCC) [G70.01]  Generalized weakness [R53.1]  Age/Sex: 64 y.o. male    Network Utilization Review Department  ATTENTION: Please call with any questions or concerns to [phone] and carefully listen to the prompts so that you are directed to the right person. All voicemails are confidential.   For Discharge needs, contact Care Management DC Support Team at [phone] opt. [phone]  Send all requests for admission clinical reviews, approved or denied determinations and any other requests to dedicated fax number below belonging to the campus where the patient is receiving treatment. List of dedicated fax numbers for the Facilities:  FACILITY NAME UR FAX NUMBER   ADMISSION DENIALS (Administrative/Medical Necessity) [phone]   DISCHARGE SUPPORT TEAM (NETWORK) [phone]   PARENT CHILD HEALTH (Maternity/NICU/Pediatrics) [phone]   [facility] [phone]   [facility] [phone]   [facility] [phone]   [facility] [phone]   [facility] [phone]   [facility] [phone]   [facility] [phone]   [facility] [phone]   [facility] [phone]   [facility] [phone]   [facility] [phone]   [facility] [phone]

## 2024-12-07 LAB
ALBUMIN SERPL BCG-MCNC: 3.4 G/DL (ref 3.5–5)
ALP SERPL-CCNC: 82 U/L (ref 34–104)
ALT SERPL W P-5'-P-CCNC: 9 U/L (ref 7–52)
ANION GAP SERPL CALCULATED.3IONS-SCNC: 4 MMOL/L (ref 4–13)
AST SERPL W P-5'-P-CCNC: 12 U/L (ref 13–39)
BASOPHILS # BLD AUTO: 0.03 THOUSANDS/ÂΜL (ref 0–0.1)
BASOPHILS NFR BLD AUTO: 1 % (ref 0–1)
BILIRUB SERPL-MCNC: 0.37 MG/DL (ref 0.2–1)
BUN SERPL-MCNC: 13 MG/DL (ref 5–25)
CALCIUM ALBUM COR SERPL-MCNC: 9.3 MG/DL (ref 8.3–10.1)
CALCIUM SERPL-MCNC: 8.8 MG/DL (ref 8.4–10.2)
CHLORIDE SERPL-SCNC: 103 MMOL/L (ref 96–108)
CO2 SERPL-SCNC: 28 MMOL/L (ref 21–32)
CREAT SERPL-MCNC: 0.9 MG/DL (ref 0.6–1.3)
EOSINOPHIL # BLD AUTO: 0.31 THOUSAND/ÂΜL (ref 0–0.61)
EOSINOPHIL NFR BLD AUTO: 6 % (ref 0–6)
ERYTHROCYTE [DISTWIDTH] IN BLOOD BY AUTOMATED COUNT: 17.9 % (ref 11.6–15.1)
GFR SERPL CREATININE-BSD FRML MDRD: 89 ML/MIN/1.73SQ M
GLUCOSE SERPL-MCNC: 170 MG/DL (ref 65–140)
HCT VFR BLD AUTO: 36.2 % (ref 36.5–49.3)
HGB BLD-MCNC: 10.4 G/DL (ref 12–17)
IMM GRANULOCYTES # BLD AUTO: 0.01 THOUSAND/UL (ref 0–0.2)
IMM GRANULOCYTES NFR BLD AUTO: 0 % (ref 0–2)
LYMPHOCYTES # BLD AUTO: 1.12 THOUSANDS/ÂΜL (ref 0.6–4.47)
LYMPHOCYTES NFR BLD AUTO: 23 % (ref 14–44)
MCH RBC QN AUTO: 19.8 PG (ref 26.8–34.3)
MCHC RBC AUTO-ENTMCNC: 28.7 G/DL (ref 31.4–37.4)
MCV RBC AUTO: 69 FL (ref 82–98)
MONOCYTES # BLD AUTO: 0.43 THOUSAND/ÂΜL (ref 0.17–1.22)
MONOCYTES NFR BLD AUTO: 9 % (ref 4–12)
NEUTROPHILS # BLD AUTO: 3.07 THOUSANDS/ÂΜL (ref 1.85–7.62)
NEUTS SEG NFR BLD AUTO: 61 % (ref 43–75)
NRBC BLD AUTO-RTO: 0 /100 WBCS
PLATELET # BLD AUTO: 285 THOUSANDS/UL (ref 149–390)
PMV BLD AUTO: 9.8 FL (ref 8.9–12.7)
POTASSIUM SERPL-SCNC: 3.8 MMOL/L (ref 3.5–5.3)
PROT SERPL-MCNC: 7.7 G/DL (ref 6.4–8.4)
RBC # BLD AUTO: 5.26 MILLION/UL (ref 3.88–5.62)
SODIUM SERPL-SCNC: 135 MMOL/L (ref 135–147)
WBC # BLD AUTO: 4.97 THOUSAND/UL (ref 4.31–10.16)

## 2024-12-07 PROCEDURE — 99232 SBSQ HOSP IP/OBS MODERATE 35: CPT | Performed by: PSYCHIATRY & NEUROLOGY

## 2024-12-07 PROCEDURE — 94150 VITAL CAPACITY TEST: CPT

## 2024-12-07 PROCEDURE — 99233 SBSQ HOSP IP/OBS HIGH 50: CPT | Performed by: INTERNAL MEDICINE

## 2024-12-07 PROCEDURE — 80053 COMPREHEN METABOLIC PANEL: CPT | Performed by: INTERNAL MEDICINE

## 2024-12-07 PROCEDURE — 85025 COMPLETE CBC W/AUTO DIFF WBC: CPT | Performed by: INTERNAL MEDICINE

## 2024-12-07 RX ADMIN — PANTOPRAZOLE SODIUM 40 MG: 40 TABLET, DELAYED RELEASE ORAL at 05:04

## 2024-12-07 RX ADMIN — ENOXAPARIN SODIUM 40 MG: 40 INJECTION SUBCUTANEOUS at 09:47

## 2024-12-07 RX ADMIN — DICLOFENAC SODIUM 2 G: 10 GEL TOPICAL at 12:07

## 2024-12-07 RX ADMIN — PYRIDOSTIGMINE BROMIDE 90 MG: 60 TABLET ORAL at 22:00

## 2024-12-07 RX ADMIN — DICLOFENAC SODIUM 2 G: 10 GEL TOPICAL at 17:24

## 2024-12-07 RX ADMIN — DEXTRAN 70, GLYCERIN, HYPROMELLOSE 1 DROP: 1; 2; 3 SOLUTION/ DROPS OPHTHALMIC at 09:49

## 2024-12-07 RX ADMIN — PANTOPRAZOLE SODIUM 40 MG: 40 TABLET, DELAYED RELEASE ORAL at 16:07

## 2024-12-07 RX ADMIN — PYRIDOSTIGMINE BROMIDE 90 MG: 60 TABLET ORAL at 09:47

## 2024-12-07 RX ADMIN — Medication 40 G: at 10:20

## 2024-12-07 RX ADMIN — PYRIDOSTIGMINE BROMIDE 90 MG: 60 TABLET ORAL at 16:08

## 2024-12-07 RX ADMIN — PYRIDOSTIGMINE BROMIDE 90 MG: 60 TABLET ORAL at 03:41

## 2024-12-07 RX ADMIN — DICLOFENAC SODIUM 2 G: 10 GEL TOPICAL at 09:47

## 2024-12-07 RX ADMIN — FERROUS SULFATE TAB 325 MG (65 MG ELEMENTAL FE) 325 MG: 325 (65 FE) TAB at 08:01

## 2024-12-07 NOTE — RESPIRATORY THERAPY NOTE
12/06/24 1928   Additional Assessments   $ Vital Capacity Mech/Peak Flow Yes   Vital Capacity 2.4 L   NIF 35 cm H2O

## 2024-12-07 NOTE — PROGRESS NOTES
Progress Note - Neurology   Name: Joni Iglesias 64 y.o. male I MRN: [id number]  Unit/Bed#: 2 E 254-01 I Date of Admission: 12/5/2024   Date of Service: 12/7/2024 I Hospital Day: 2     Assessment & Plan  Myasthenia gravis  64 y.o. 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  Symptoms began approximately 10 days prior to arrival.    Patient follows with Dr. Sebastian and plans to start new DMT next month.  Patient used to receive IVIG every 6 months.  However, patient reports that he has not needed IVIG for approximately 1 year.    Etiology for MG flare remains unclear.  No metabolic or infectious disturbances have been discovered thus far.      Plan:  - IVIG 400mg/kg x 5 days (today is day 3 of 5).   - Recommend monitoring heart function closely with EF 35-45%.   - Continue home mestinon 90 mg 4x per day  - Recommend NIF/VC Qshift  12/5: NIF -53, vital capacity 3.8 L  12/6: NIF -32, vital capacity 2.1 L  12/6 (evening): NIF -35, vital capacity 2.4 L  - Recommend closely monitoring neurologic exam  - Medications to avoid in MG:  Would avoid the use of neuromuscular blockers  Macrolides, fluoroquinolones, aminoglycosides, tetracycline, and chloroquine, D-penicillamine  Antidysrhythmic agents - Beta blockers, calcium channel blockers, quinidine, lidocaine, procainamide, and trimethaphan  Antipsychotics - Phenothiazines, sulpride, atypicals  Cardiovascular- Propanolol, quinidine, verapamil, bretylium, statins  Miscellaneous - Diphenylhydantoin, magnesium sulfate, lithium, chlorpromazine, muscle relaxants, levothyroxine, adrenocorticotropic hormone (ACTH). Glucocorticoids can also exacerbate MG.  - Medical management and correction of metabolic and infectious disturbances per primary team   - Avoid CNS altering medications if possible  - Continue supportive care    - Continue to monitor neurologic status. Notify neurology with any changes in exam.       Neurology Follow Up Recs:   Joni Iglesias should keep his appointment with Dr. Vance on 1/21/2025.       Subjective:   He feels like his eye is  better today and overall feels better.   No further double vision noted.   He notices weakness as the day goes on, but overall feels stronger.         Past Medical History:   Diagnosis Date    Coronary artery disease     Heart attack (HCC)     Hyperlipidemia     Myasthenia gravis (HCC)      Past Surgical History:   Procedure Laterality Date    APPENDECTOMY      CORONARY ANGIOPLASTY WITH STENT PLACEMENT       History reviewed. No pertinent family history.  Social History     Socioeconomic History    Marital status: /Civil Union     Spouse name: None    Number of children: None    Years of education: None    Highest education level: None   Occupational History    None   Tobacco Use    Smoking status: Never    Smokeless tobacco: Never   Vaping Use    Vaping status: Never Used   Substance and Sexual Activity    Alcohol use: Never    Drug use: Never    Sexual activity: None   Other Topics Concern    None   Social History Narrative    None     Social Drivers of Health     Financial Resource Strain: Low Risk  (7/26/2024)    Received from [facility]    Overall Financial Resource Strain (CARDIA)     Difficulty of Paying Living Expenses: Not hard at all   Food Insecurity: No Food Insecurity (12/5/2024)    Nursing - Inadequate Food Risk Classification     Worried About Running Out of Food in the Last Year: Never true     Ran Out of Food in the Last Year: Never true     Ran Out of Food in the Last Year: 1   Transportation Needs: No Transportation Needs (12/5/2024)    Nursing - Transportation Risk Classification     Lack of Transportation: Not on file     Lack of Transportation: 2   Physical Activity: Inactive (7/26/2024)    Received from [facility]     Exercise Vital Sign     Days of Exercise per Week: 0 days     Minutes of Exercise per Session: 0 min   Stress: No Stress Concern Present (2024)    Received from [facility]    [place] [employer] - Occupational Stress Questionnaire     Feeling of Stress : Not at all   Social Connections: Socially Isolated (2024)    Received from [facility]    Social Connection and Isolation Panel [NHANES]     Frequency of Communication with Friends and Family: More than three times a week     Frequency of Social Gatherings with Friends and Family: More than three times a week     Attends Jewish Services: Never     Active Member of Clubs or Organizations: No     Attends Club or Organization Meetings: Never     Marital Status:    Intimate Partner Violence: Unknown (2024)    Nursing IPS     Feels Physically and Emotionally Safe: Not on file     Physically Hurt by Someone: Not on file     Humiliated or Emotionally Abused by Someone: Not on file     Physically Hurt by Someone: 2     Hurt or Threatened by Someone: 2   Housing Stability: Unknown (2024)    Nursing: Inadequate Housing Risk Classification     Has Housing: Not on file     Worried About Losing Housing: Not on file     Unable to Get Utilities: Not on file     Unable to Pay for Housing in the Last Year: 2     Has Housin         Medications:  All current active meds have been reviewed and current meds:  Scheduled Meds:  Current Facility-Administered Medications   Medication Dose Route Frequency Provider Last Rate    acetaminophen  650 mg Oral Q6H PRN Omar Lynn MD      Artificial Tears  1 drop Both Eyes TID PRN Bharathi Vilchis MD      Diclofenac Sodium  2 g Topical 4x Daily Omar Lynn MD      enoxaparin  40 mg Subcutaneous Daily Bharathi Vilchis MD      ferrous sulfate  325 mg Oral Daily With Breakfast Bharathi Vilchis MD      immune globulin, human  400  mg/kg (Adjusted) Intravenous Daily Omar Lynn MD 40 g (12/06/24 1113)    pantoprazole  40 mg Oral BID AC Bharathi Vilchis MD      pyridostigmine  90 mg Oral 4x Daily Omar Lynn MD      senna  8.6 mg Oral HS PRN Bharathi Vilchis MD       Continuous Infusions:   PRN Meds:.  acetaminophen    Artificial Tears    senna       ROS:   Review of Systems   HENT:  Negative for trouble swallowing.    Eyes:  Negative for visual disturbance.   Respiratory:  Negative for shortness of breath.    Neurological:  Positive for weakness (improving).             Vitals:   /67   Pulse 56   Temp 97.9 °F (36.6 °C)   Resp 18   Ht 6' (1.829 m)   Wt (!) 138 kg (304 lb 6.4 oz)   SpO2 95%   BMI 41.28 kg/m²     Physical Exam:   Physical Exam  Vitals and nursing note reviewed.   Constitutional:       General: He is not in acute distress.     Appearance: He is not ill-appearing, toxic-appearing or diaphoretic.   HENT:      Head: Normocephalic and atraumatic.      Nose: Nose normal.      Mouth/Throat:      Mouth: Mucous membranes are moist.      Pharynx: Oropharynx is clear.   Eyes:      Comments: Eyelid ptosis noted. Worse with prolonged upgaze. See exam below.    Cardiovascular:      Rate and Rhythm: Normal rate.   Pulmonary:      Effort: Pulmonary effort is normal. No respiratory distress.      Breath sounds: No stridor.   Skin:     General: Skin is warm and dry.   Neurological:      Motor: Motor strength is normal.  Psychiatric:         Speech: Speech normal.       Neurological Exam  Mental Status  Awake, alert and oriented to person, place and time. Speech is normal.    Cranial Nerves  CN II: Vision test: Eyelid ptosis noted. Worse with prolonged upgaze. See exam below. .  Bilateral eyes: ABduction palsy with R. Can ADduct. Difficulty with vertical gaze up/down. Right slightly worse.    Ptosis with prolonged gaze, worse on L compared to R.     Smile is symmetric.     Hearing intact to voice  .    Motor   Strength is 5/5 throughout all four extremities.    Sensory  Light touch is normal in upper and lower extremities.     Gait    Deferred.          Labs: I have personally reviewed pertinent reports.   Recent Results (from the past 24 hours)   CBC and differential    Collection Time: 12/07/24  4:58 AM   Result Value Ref Range    WBC 4.97 4.31 - 10.16 Thousand/uL    RBC 5.26 3.88 - 5.62 Million/uL    Hemoglobin 10.4 (L) 12.0 - 17.0 g/dL    Hematocrit 36.2 (L) 36.5 - 49.3 %    MCV 69 (L) 82 - 98 fL    MCH 19.8 (L) 26.8 - 34.3 pg    MCHC 28.7 (L) 31.4 - 37.4 g/dL    RDW 17.9 (H) 11.6 - 15.1 %    MPV 9.8 8.9 - 12.7 fL    Platelets 285 149 - 390 Thousands/uL    nRBC 0 /100 WBCs    Segmented % 61 43 - 75 %    Immature Grans % 0 0 - 2 %    Lymphocytes % 23 14 - 44 %    Monocytes % 9 4 - 12 %    Eosinophils Relative 6 0 - 6 %    Basophils Relative 1 0 - 1 %    Absolute Neutrophils 3.07 1.85 - 7.62 Thousands/µL    Absolute Immature Grans 0.01 0.00 - 0.20 Thousand/uL    Absolute Lymphocytes 1.12 0.60 - 4.47 Thousands/µL    Absolute Monocytes 0.43 0.17 - 1.22 Thousand/µL    Eosinophils Absolute 0.31 0.00 - 0.61 Thousand/µL    Basophils Absolute 0.03 0.00 - 0.10 Thousands/µL   Comprehensive metabolic panel    Collection Time: 12/07/24  4:58 AM   Result Value Ref Range    Sodium 135 135 - 147 mmol/L    Potassium 3.8 3.5 - 5.3 mmol/L    Chloride 103 96 - 108 mmol/L    CO2 28 21 - 32 mmol/L    ANION GAP 4 4 - 13 mmol/L    BUN 13 5 - 25 mg/dL    Creatinine 0.90 0.60 - 1.30 mg/dL    Glucose 170 (H) 65 - 140 mg/dL    Calcium 8.8 8.4 - 10.2 mg/dL    Corrected Calcium 9.3 8.3 - 10.1 mg/dL    AST 12 (L) 13 - 39 U/L    ALT 9 7 - 52 U/L    Alkaline Phosphatase 82 34 - 104 U/L    Total Protein 7.7 6.4 - 8.4 g/dL    Albumin 3.4 (L) 3.5 - 5.0 g/dL    Total Bilirubin 0.37 0.20 - 1.00 mg/dL    eGFR 89 ml/min/1.73sq m       Imaging: I have personally reviewed the images and reports for the following studies: No new neuroimaging to  review.       EKG, Pathology, and Other Studies: I have personally reviewed pertinent reports.       VTE Prophylaxis: VTE covered by:  enoxaparin, Subcutaneous, 40 mg at 12/06/24 1007

## 2024-12-07 NOTE — ASSESSMENT & PLAN NOTE
64 y.o. 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  Symptoms began approximately 10 days prior to arrival.    Patient follows with Dr. Sebastian and plans to start new DMT next month.  Patient used to receive IVIG every 6 months.  However, patient reports that he has not needed IVIG for approximately 1 year.    Etiology for MG flare remains unclear.  No metabolic or infectious disturbances have been discovered thus far.      Plan:  - IVIG 400mg/kg x 5 days (today is day 3 of 5).   - Recommend monitoring heart function closely with EF 35-45%.   - Continue home mestinon 90 mg 4x per day  - Recommend NIF/VC Qshift  12/5: NIF -53, vital capacity 3.8 L  12/6: NIF -32, vital capacity 2.1 L  12/6 (evening): NIF -35, vital capacity 2.4 L  - Recommend closely monitoring neurologic exam  - Medications to avoid in MG:  Would avoid the use of neuromuscular blockers  Macrolides, fluoroquinolones, aminoglycosides, tetracycline, and chloroquine, D-penicillamine  Antidysrhythmic agents - Beta blockers, calcium channel blockers, quinidine, lidocaine, procainamide, and trimethaphan  Antipsychotics - Phenothiazines, sulpride, atypicals  Cardiovascular- Propanolol, quinidine, verapamil, bretylium, statins  Miscellaneous - Diphenylhydantoin, magnesium sulfate, lithium, chlorpromazine, muscle relaxants, levothyroxine, adrenocorticotropic hormone (ACTH). Glucocorticoids can also exacerbate MG.  - Medical management and correction of metabolic and infectious disturbances per primary team   - Avoid CNS altering medications if possible  - Continue supportive care   - Continue to monitor neurologic status. Notify neurology with any changes in exam.

## 2024-12-07 NOTE — ASSESSMENT & PLAN NOTE
Myasthenia gravis.  Present for 3 years.  Current issues are ptosis of his left eyelid weakness of his right shoulder and some dysphagia.      Neurology recommends IVIG - first dose 12/5 around 1PM  Plan for 5 day course  Continue IVIG course  Continue to monitor clinically  Monitor vital capacity 12/06 7PM - Vital Capacity 2.4 L  NIF 35 cm H2O

## 2024-12-07 NOTE — PROGRESS NOTES
Progress Note - Hospitalist   Name: Joni Iglesias 64 y.o. male I MRN: [id number]  Unit/Bed#: 2 E 254-01 I Date of Admission: 12/5/2024   Date of Service: 12/7/2024 I Hospital Day: 2    Assessment & Plan  Myasthenia gravis  Myasthenia gravis.  Present for 3 years.  Current issues are ptosis of his left eyelid weakness of his right shoulder and some dysphagia.      Neurology recommends IVIG - first dose 12/5 around 1PM  Plan for 5 day course  Continue IVIG course  Continue to monitor clinically  Monitor vital capacity 12/06 7PM - Vital Capacity 2.4 L  NIF 35 cm H2O   Ptosis of eyelid, left  Likely due to MG exacerbation  Monitor  CAD (coronary artery disease)  Patient with underlying coronary artery disease reports taking no medicines.   No CP  Monitor  Weakness of right shoulder  PT/OT  Other dysphagia  Patient with dysphagia secondary to his myasthenia gravis.    Improved, tolerating diet  Monitor    VTE Pharmacologic Prophylaxis: VTE Score: 4 Moderate Risk (Score 3-4) - Pharmacological DVT Prophylaxis Ordered: enoxaparin (Lovenox).    Mobility:   Basic Mobility Inpatient Raw Score: 20  JH-HLM Goal: 6: Walk 10 steps or more  JH-HLM Achieved: 6: Walk 10 steps or more  JH-HLM Goal achieved. Continue to encourage appropriate mobility.    Patient Centered Rounds: I performed bedside rounds with nursing staff today.   Discussions with Specialists or Other Care Team Provider: Discussed with care management team    Education and Discussions with Family / Patient: Patient declined call to .     Current Length of Stay: 2 day(s)  Current Patient Status: Inpatient   Certification Statement: The patient will continue to require additional inpatient hospital stay due to need for IV therapy  Discharge Plan: Anticipate discharge in 48-72 hrs to home with home services.    Code Status: Level 1 - Full Code    Subjective     Doing well, denies any dysphagia, shortness of breath    Objective :  Temp:  [97.9 °F (36.6  °C)-98 °F (36.7 °C)] 97.9 °F (36.6 °C)  HR:  [56-64] 56  BP: (122-130)/(67-74) 122/67  Resp:  [18] 18  SpO2:  [95 %-96 %] 95 %  O2 Device: None (Room air)    Body mass index is 41.28 kg/m².     Input and Output Summary (last 24 hours):     Intake/Output Summary (Last 24 hours) at 12/7/2024 1320  Last data filed at 12/7/2024 0301  Gross per 24 hour   Intake 960 ml   Output 1900 ml   Net -940 ml       Physical Exam  Vitals and nursing note reviewed.   Constitutional:       Appearance: Normal appearance. He is obese.   HENT:      Head: Normocephalic and atraumatic.      Right Ear: External ear normal.      Left Ear: External ear normal.      Nose: Nose normal. No congestion or rhinorrhea.      Mouth/Throat:      Mouth: Mucous membranes are moist.      Pharynx: Oropharynx is clear. No oropharyngeal exudate or posterior oropharyngeal erythema.   Eyes:      General: No scleral icterus.        Right eye: No discharge.         Left eye: No discharge.      Pupils: Pupils are equal, round, and reactive to light.   Neck:      Vascular: No carotid bruit.   Cardiovascular:      Rate and Rhythm: Normal rate and regular rhythm.      Pulses: Normal pulses.      Heart sounds: No murmur heard.     No friction rub. No gallop.   Pulmonary:      Effort: Pulmonary effort is normal. No respiratory distress.      Breath sounds: Normal breath sounds. No stridor. No wheezing, rhonchi or rales.   Abdominal:      General: Abdomen is flat. Bowel sounds are normal. There is no distension.      Palpations: Abdomen is soft. There is no mass.      Tenderness: There is no abdominal tenderness. There is no guarding or rebound.      Hernia: No hernia is present.   Musculoskeletal:         General: No swelling, tenderness, deformity or signs of injury. Normal range of motion.      Cervical back: Normal range of motion. No rigidity. No muscular tenderness.   Lymphadenopathy:      Cervical: No cervical adenopathy.   Skin:     General: Skin is warm and  dry.      Capillary Refill: Capillary refill takes less than 2 seconds.      Coloration: Skin is not jaundiced or pale.      Findings: No bruising or erythema.   Neurological:      General: No focal deficit present.      Mental Status: He is alert and oriented to person, place, and time. Mental status is at baseline.      Cranial Nerves: No cranial nerve deficit.      Sensory: No sensory deficit.      Motor: No weakness.      Coordination: Coordination normal.      Deep Tendon Reflexes: Reflexes normal.      Comments: AAOx4, GCS15, no ptosis but here are adhesives holding his eyelids, no gross motor or sensory deficits noted   Psychiatric:         Mood and Affect: Mood normal.         Behavior: Behavior normal.         Thought Content: Thought content normal.         Judgment: Judgment normal.           Lines/Drains:              Lab Results: I have reviewed the following results:   Results from last 7 days   Lab Units 12/07/24  0458   WBC Thousand/uL 4.97   HEMOGLOBIN g/dL 10.4*   HEMATOCRIT % 36.2*   PLATELETS Thousands/uL 285   SEGS PCT % 61   LYMPHO PCT % 23   MONO PCT % 9   EOS PCT % 6     Results from last 7 days   Lab Units 12/07/24  0458   SODIUM mmol/L 135   POTASSIUM mmol/L 3.8   CHLORIDE mmol/L 103   CO2 mmol/L 28   BUN mg/dL 13   CREATININE mg/dL 0.90   ANION GAP mmol/L 4   CALCIUM mg/dL 8.8   ALBUMIN g/dL 3.4*   TOTAL BILIRUBIN mg/dL 0.37   ALK PHOS U/L 82   ALT U/L 9   AST U/L 12*   GLUCOSE RANDOM mg/dL 170*                       Recent Cultures (last 7 days):           Last 24 Hours Medication List:     Current Facility-Administered Medications:     acetaminophen (TYLENOL) tablet 650 mg, Q6H PRN    Artificial Tears Op Soln 1 drop, TID PRN    Diclofenac Sodium (VOLTAREN) 1 % topical gel 2 g, 4x Daily    enoxaparin (LOVENOX) subcutaneous injection 40 mg, Daily    ferrous sulfate tablet 325 mg, Daily With Breakfast    immune globulin, human (GAMUNEX-C) 40 g 400 mL IV soln, Daily, Last Rate: 40 g  (12/06/24 1113)    pantoprazole (PROTONIX) EC tablet 40 mg, BID AC    pyridostigmine (MESTINON) tablet 90 mg, 4x Daily    senna (SENOKOT) tablet 8.6 mg, HS PRN    Administrative Statements   Today, Patient Was Seen By: Omar Salgado MD      **Please Note: This note may have been constructed using a voice recognition system.**

## 2024-12-08 LAB
ALBUMIN SERPL BCG-MCNC: 3.2 G/DL (ref 3.5–5)
ALP SERPL-CCNC: 77 U/L (ref 34–104)
ALT SERPL W P-5'-P-CCNC: 9 U/L (ref 7–52)
ANION GAP SERPL CALCULATED.3IONS-SCNC: 4 MMOL/L (ref 4–13)
AST SERPL W P-5'-P-CCNC: 12 U/L (ref 13–39)
BASOPHILS # BLD AUTO: 0.03 THOUSANDS/ÂΜL (ref 0–0.1)
BASOPHILS NFR BLD AUTO: 1 % (ref 0–1)
BILIRUB SERPL-MCNC: 0.3 MG/DL (ref 0.2–1)
BUN SERPL-MCNC: 15 MG/DL (ref 5–25)
CALCIUM ALBUM COR SERPL-MCNC: 9.2 MG/DL (ref 8.3–10.1)
CALCIUM SERPL-MCNC: 8.6 MG/DL (ref 8.4–10.2)
CHLORIDE SERPL-SCNC: 103 MMOL/L (ref 96–108)
CO2 SERPL-SCNC: 27 MMOL/L (ref 21–32)
CREAT SERPL-MCNC: 0.87 MG/DL (ref 0.6–1.3)
EOSINOPHIL # BLD AUTO: 0.35 THOUSAND/ÂΜL (ref 0–0.61)
EOSINOPHIL NFR BLD AUTO: 7 % (ref 0–6)
ERYTHROCYTE [DISTWIDTH] IN BLOOD BY AUTOMATED COUNT: 18 % (ref 11.6–15.1)
GFR SERPL CREATININE-BSD FRML MDRD: 90 ML/MIN/1.73SQ M
GLUCOSE SERPL-MCNC: 138 MG/DL (ref 65–140)
HCT VFR BLD AUTO: 34.9 % (ref 36.5–49.3)
HGB BLD-MCNC: 10.1 G/DL (ref 12–17)
IMM GRANULOCYTES # BLD AUTO: 0.01 THOUSAND/UL (ref 0–0.2)
IMM GRANULOCYTES NFR BLD AUTO: 0 % (ref 0–2)
LYMPHOCYTES # BLD AUTO: 1.32 THOUSANDS/ÂΜL (ref 0.6–4.47)
LYMPHOCYTES NFR BLD AUTO: 26 % (ref 14–44)
MCH RBC QN AUTO: 19.8 PG (ref 26.8–34.3)
MCHC RBC AUTO-ENTMCNC: 28.9 G/DL (ref 31.4–37.4)
MCV RBC AUTO: 68 FL (ref 82–98)
MONOCYTES # BLD AUTO: 0.52 THOUSAND/ÂΜL (ref 0.17–1.22)
MONOCYTES NFR BLD AUTO: 10 % (ref 4–12)
NEUTROPHILS # BLD AUTO: 2.81 THOUSANDS/ÂΜL (ref 1.85–7.62)
NEUTS SEG NFR BLD AUTO: 56 % (ref 43–75)
NRBC BLD AUTO-RTO: 0 /100 WBCS
PLATELET # BLD AUTO: 262 THOUSANDS/UL (ref 149–390)
PMV BLD AUTO: 9.5 FL (ref 8.9–12.7)
POTASSIUM SERPL-SCNC: 4.1 MMOL/L (ref 3.5–5.3)
PROT SERPL-MCNC: 7.8 G/DL (ref 6.4–8.4)
RBC # BLD AUTO: 5.11 MILLION/UL (ref 3.88–5.62)
SODIUM SERPL-SCNC: 134 MMOL/L (ref 135–147)
WBC # BLD AUTO: 5.04 THOUSAND/UL (ref 4.31–10.16)

## 2024-12-08 PROCEDURE — 85025 COMPLETE CBC W/AUTO DIFF WBC: CPT | Performed by: INTERNAL MEDICINE

## 2024-12-08 PROCEDURE — 94150 VITAL CAPACITY TEST: CPT

## 2024-12-08 PROCEDURE — 80053 COMPREHEN METABOLIC PANEL: CPT | Performed by: INTERNAL MEDICINE

## 2024-12-08 PROCEDURE — 99233 SBSQ HOSP IP/OBS HIGH 50: CPT | Performed by: INTERNAL MEDICINE

## 2024-12-08 RX ADMIN — PYRIDOSTIGMINE BROMIDE 90 MG: 60 TABLET ORAL at 09:49

## 2024-12-08 RX ADMIN — FERROUS SULFATE TAB 325 MG (65 MG ELEMENTAL FE) 325 MG: 325 (65 FE) TAB at 08:07

## 2024-12-08 RX ADMIN — DEXTRAN 70, GLYCERIN, HYPROMELLOSE 1 DROP: 1; 2; 3 SOLUTION/ DROPS OPHTHALMIC at 09:49

## 2024-12-08 RX ADMIN — PYRIDOSTIGMINE BROMIDE 90 MG: 60 TABLET ORAL at 21:47

## 2024-12-08 RX ADMIN — PANTOPRAZOLE SODIUM 40 MG: 40 TABLET, DELAYED RELEASE ORAL at 08:07

## 2024-12-08 RX ADMIN — DICLOFENAC SODIUM 2 G: 10 GEL TOPICAL at 09:48

## 2024-12-08 RX ADMIN — Medication 40 G: at 10:15

## 2024-12-08 RX ADMIN — DICLOFENAC SODIUM 2 G: 10 GEL TOPICAL at 21:47

## 2024-12-08 RX ADMIN — PANTOPRAZOLE SODIUM 40 MG: 40 TABLET, DELAYED RELEASE ORAL at 16:19

## 2024-12-08 RX ADMIN — PYRIDOSTIGMINE BROMIDE 90 MG: 60 TABLET ORAL at 04:00

## 2024-12-08 RX ADMIN — ENOXAPARIN SODIUM 40 MG: 40 INJECTION SUBCUTANEOUS at 09:48

## 2024-12-08 NOTE — PROGRESS NOTES
Progress Note - Hospitalist   Name: Joni Iglesias 64 y.o. male I MRN: [id number]  Unit/Bed#: 2 E 254-01 I Date of Admission: 12/5/2024   Date of Service: 12/8/2024 I Hospital Day: 3    Assessment & Plan  Myasthenia gravis  Myasthenia gravis.  Present for 3 years.  Current issues are ptosis of his left eyelid weakness of his right shoulder and some dysphagia.      Neurology recommends IVIG - first dose 12/5 around 1PM  Plan for 5 day course - today is 4/5  Continue IVIG course  Continue to monitor clinically, neurological exam  Continue to monitor vital capacity and NIF  Ptosis of eyelid, left  Likely due to MG exacerbation  Slightly better although still present  Monitor  CAD (coronary artery disease)  Patient with underlying coronary artery disease reports taking no medicines.   No CP  Monitor  Weakness of right shoulder  PT/OT  Other dysphagia  Patient with dysphagia secondary to his myasthenia gravis.    Improved, tolerating diet  Monitor    VTE Pharmacologic Prophylaxis: VTE Score: 4 Moderate Risk (Score 3-4) - Pharmacological DVT Prophylaxis Ordered: enoxaparin (Lovenox).    Mobility:   Basic Mobility Inpatient Raw Score: 21  JH-HLM Goal: 6: Walk 10 steps or more  JH-HLM Achieved: 6: Walk 10 steps or more  JH-HLM Goal achieved. Continue to encourage appropriate mobility.    Patient Centered Rounds: I performed bedside rounds with nursing staff today.   Discussions with Specialists or Other Care Team Provider: Discussed with care management team    Education and Discussions with Family / Patient: Patient declined call to .     Current Length of Stay: 3 day(s)  Current Patient Status: Inpatient   Certification Statement: Patient requires continued patient stay for IVIG treatment  Discharge Plan: Anticipate discharge in 48 hrs to home.    Code Status: Level 1 - Full Code    Subjective     Patient evaluated this morning.  Continues to complain of left sided ptosis but denies any trouble breathing or  swallowing or weakness otherwise.    Objective :  Temp:  [97.9 °F (36.6 °C)-98.1 °F (36.7 °C)] 97.9 °F (36.6 °C)  HR:  [58-61] 58  BP: (102-163)/(46-63) 163/63  SpO2:  [92 %-96 %] 96 %    Body mass index is 41.28 kg/m².     Input and Output Summary (last 24 hours):     Intake/Output Summary (Last 24 hours) at 12/8/2024 1100  Last data filed at 12/8/2024 0347  Gross per 24 hour   Intake 300 ml   Output 1000 ml   Net -700 ml       Physical Exam  Vitals and nursing note reviewed.   Constitutional:       General: He is not in acute distress.     Appearance: Normal appearance. He is not ill-appearing, toxic-appearing or diaphoretic.   HENT:      Head: Normocephalic and atraumatic.      Right Ear: External ear normal.      Left Ear: External ear normal.      Nose: Nose normal. No congestion or rhinorrhea.      Mouth/Throat:      Mouth: Mucous membranes are moist.      Pharynx: Oropharynx is clear. No oropharyngeal exudate or posterior oropharyngeal erythema.   Eyes:      General: No scleral icterus.        Right eye: No discharge.         Left eye: No discharge.      Pupils: Pupils are equal, round, and reactive to light.   Neck:      Vascular: No carotid bruit.   Cardiovascular:      Rate and Rhythm: Normal rate and regular rhythm.      Pulses: Normal pulses.      Heart sounds: No murmur heard.     No friction rub. No gallop.   Pulmonary:      Effort: Pulmonary effort is normal. No respiratory distress.      Breath sounds: Normal breath sounds. No stridor. No wheezing, rhonchi or rales.   Abdominal:      General: Abdomen is flat. Bowel sounds are normal. There is no distension.      Palpations: Abdomen is soft. There is no mass.      Tenderness: There is no abdominal tenderness. There is no guarding or rebound.      Hernia: No hernia is present.   Musculoskeletal:         General: No swelling, tenderness, deformity or signs of injury. Normal range of motion.      Cervical back: Normal range of motion. No rigidity. No  muscular tenderness.   Lymphadenopathy:      Cervical: No cervical adenopathy.   Skin:     General: Skin is warm and dry.      Capillary Refill: Capillary refill takes less than 2 seconds.      Coloration: Skin is not jaundiced or pale.      Findings: No bruising or erythema.   Neurological:      General: No focal deficit present.      Mental Status: He is alert and oriented to person, place, and time. Mental status is at baseline.      Cranial Nerves: No cranial nerve deficit.      Sensory: No sensory deficit.      Motor: No weakness.      Coordination: Coordination normal.      Deep Tendon Reflexes: Reflexes normal.      Comments: Left sided ptosis noted   Psychiatric:         Mood and Affect: Mood normal.         Behavior: Behavior normal.         Thought Content: Thought content normal.         Judgment: Judgment normal.           Lines/Drains:              Lab Results: I have reviewed the following results:   Results from last 7 days   Lab Units 12/08/24  0517   WBC Thousand/uL 5.04   HEMOGLOBIN g/dL 10.1*   HEMATOCRIT % 34.9*   PLATELETS Thousands/uL 262   SEGS PCT % 56   LYMPHO PCT % 26   MONO PCT % 10   EOS PCT % 7*     Results from last 7 days   Lab Units 12/08/24  0517   SODIUM mmol/L 134*   POTASSIUM mmol/L 4.1   CHLORIDE mmol/L 103   CO2 mmol/L 27   BUN mg/dL 15   CREATININE mg/dL 0.87   ANION GAP mmol/L 4   CALCIUM mg/dL 8.6   ALBUMIN g/dL 3.2*   TOTAL BILIRUBIN mg/dL 0.30   ALK PHOS U/L 77   ALT U/L 9   AST U/L 12*   GLUCOSE RANDOM mg/dL 138                       Recent Cultures (last 7 days):               Last 24 Hours Medication List:     Current Facility-Administered Medications:     acetaminophen (TYLENOL) tablet 650 mg, Q6H PRN    Artificial Tears Op Soln 1 drop, TID PRN    Diclofenac Sodium (VOLTAREN) 1 % topical gel 2 g, 4x Daily    enoxaparin (LOVENOX) subcutaneous injection 40 mg, Daily    ferrous sulfate tablet 325 mg, Daily With Breakfast    immune globulin, human (GAMUNEX-C) 40 g 400 mL IV  soln, Daily, Last Rate: 40 g (12/06/24 1113)    pantoprazole (PROTONIX) EC tablet 40 mg, BID AC    pyridostigmine (MESTINON) tablet 90 mg, 4x Daily    senna (SENOKOT) tablet 8.6 mg, HS PRN    Administrative Statements   Today, Patient Was Seen By: Omar Salgado MD      **Please Note: This note may have been constructed using a voice recognition system.**

## 2024-12-08 NOTE — PLAN OF CARE
Problem: PAIN - ADULT  Goal: Verbalizes/displays adequate comfort level or baseline comfort level  Description: Interventions:  - Encourage patient to monitor pain and request assistance  - Assess pain using appropriate pain scale  - Administer analgesics based on type and severity of pain and evaluate response  - Implement non-pharmacological measures as appropriate and evaluate response  - Consider cultural and social influences on pain and pain management  - Notify physician/advanced practitioner if interventions unsuccessful or patient reports new pain  Outcome: Progressing     Problem: INFECTION - ADULT  Goal: Absence or prevention of progression during hospitalization  Description: INTERVENTIONS:  - Assess and monitor for signs and symptoms of infection  - Monitor lab/diagnostic results  - Monitor all insertion sites, i.e. indwelling lines, tubes, and drains  - Monitor endotracheal if appropriate and nasal secretions for changes in amount and color  - [place] appropriate cooling/warming therapies per order  - Administer medications as ordered  - Instruct and encourage patient and family to use good hand hygiene technique  - Identify and instruct in appropriate isolation precautions for identified infection/condition  Outcome: Progressing  Goal: Absence of fever/infection during neutropenic period  Description: INTERVENTIONS:  - Monitor WBC    Outcome: Progressing     Problem: SAFETY ADULT  Goal: Patient will remain free of falls  Description: INTERVENTIONS:  - Educate patient/family on patient safety including physical limitations  - Instruct patient to call for assistance with activity   - Consult OT/PT to assist with strengthening/mobility   - Keep Call bell within reach  - Keep bed low and locked with side rails adjusted as appropriate  - Keep care items and personal belongings within reach  - Initiate and maintain comfort rounds  - Make Fall Risk Sign visible to staff  - Offer Toileting every 2 Hours,  in advance of need  - Obtain necessary fall risk management equipment: nonskid socks  - Apply yellow socks and bracelet for high fall risk patients  - Consider moving patient to room near nurses station  Outcome: Progressing  Goal: Maintain or return to baseline ADL function  Description: INTERVENTIONS:  -  Assess patient's ability to carry out ADLs; assess patient's baseline for ADL function and identify physical deficits which impact ability to perform ADLs (bathing, care of mouth/teeth, toileting, grooming, dressing, etc.)  - Assess/evaluate cause of self-care deficits   - Assess range of motion  - Assess patient's mobility; develop plan if impaired  - Assess patient's need for assistive devices and provide as appropriate  - Encourage maximum independence but intervene and supervise when necessary  - Involve family in performance of ADLs  - Assess for home care needs following discharge   - Consider OT consult to assist with ADL evaluation and planning for discharge  - Provide patient education as appropriate  Outcome: Progressing  Goal: Maintains/Returns to pre admission functional level  Description: INTERVENTIONS:  - Perform AM-PAC 6 Click Basic Mobility/ Daily Activity assessment daily.  - Set and communicate daily mobility goal to care team and patient/family/caregiver.   - Collaborate with rehabilitation services on mobility goals if consulted  - Perform Range of Motion 4 times a day.  - Reposition patient every 2 hours.  - Dangle patient 4 times a day  - Stand patient 4 times a day  - Ambulate patient 4 times a day  - Out of bed to chair 4 times a day   - Out of bed for meals 4 times a day  - Out of bed for toileting  - Record patient progress and toleration of activity level   Outcome: Progressing     Problem: DISCHARGE PLANNING  Goal: Discharge to home or other facility with appropriate resources  Description: INTERVENTIONS:  - Identify barriers to discharge w/patient and caregiver  - Arrange for needed  discharge resources and transportation as appropriate  - Identify discharge learning needs (meds, wound care, etc.)  - Arrange for interpretive services to assist at discharge as needed  - Refer to Case Management Department for coordinating discharge planning if the patient needs post-hospital services based on physician/advanced practitioner order or complex needs related to functional status, cognitive ability, or social support system  Outcome: Progressing     Problem: Knowledge Deficit  Goal: Patient/family/caregiver demonstrates understanding of disease process, treatment plan, medications, and discharge instructions  Description: Complete learning assessment and assess knowledge base.  Interventions:  - Provide teaching at level of understanding  - Provide teaching via preferred learning methods  Outcome: Progressing

## 2024-12-08 NOTE — ASSESSMENT & PLAN NOTE
Myasthenia gravis.  Present for 3 years.  Current issues are ptosis of his left eyelid weakness of his right shoulder and some dysphagia.      Neurology recommends IVIG - first dose 12/5 around 1PM  Plan for 5 day course - today is 4/5  Continue IVIG course  Continue to monitor clinically, neurological exam  Continue to monitor vital capacity and NIF

## 2024-12-09 PROBLEM — D64.9 ANEMIA: Status: ACTIVE | Noted: 2024-02-01

## 2024-12-09 LAB
ALBUMIN SERPL BCG-MCNC: 3.2 G/DL (ref 3.5–5)
ALP SERPL-CCNC: 76 U/L (ref 34–104)
ALT SERPL W P-5'-P-CCNC: 9 U/L (ref 7–52)
ANION GAP SERPL CALCULATED.3IONS-SCNC: 4 MMOL/L (ref 4–13)
AST SERPL W P-5'-P-CCNC: 13 U/L (ref 13–39)
BASOPHILS # BLD AUTO: 0.03 THOUSANDS/ÂΜL (ref 0–0.1)
BASOPHILS NFR BLD AUTO: 1 % (ref 0–1)
BILIRUB SERPL-MCNC: 0.32 MG/DL (ref 0.2–1)
BUN SERPL-MCNC: 14 MG/DL (ref 5–25)
CALCIUM ALBUM COR SERPL-MCNC: 9.1 MG/DL (ref 8.3–10.1)
CALCIUM SERPL-MCNC: 8.5 MG/DL (ref 8.4–10.2)
CHLORIDE SERPL-SCNC: 102 MMOL/L (ref 96–108)
CO2 SERPL-SCNC: 29 MMOL/L (ref 21–32)
CREAT SERPL-MCNC: 0.9 MG/DL (ref 0.6–1.3)
EOSINOPHIL # BLD AUTO: 0.31 THOUSAND/ÂΜL (ref 0–0.61)
EOSINOPHIL NFR BLD AUTO: 7 % (ref 0–6)
ERYTHROCYTE [DISTWIDTH] IN BLOOD BY AUTOMATED COUNT: 18.5 % (ref 11.6–15.1)
GFR SERPL CREATININE-BSD FRML MDRD: 89 ML/MIN/1.73SQ M
GLUCOSE SERPL-MCNC: 130 MG/DL (ref 65–140)
HCT VFR BLD AUTO: 36.6 % (ref 36.5–49.3)
HGB BLD-MCNC: 10.2 G/DL (ref 12–17)
IMM GRANULOCYTES # BLD AUTO: 0 THOUSAND/UL (ref 0–0.2)
IMM GRANULOCYTES NFR BLD AUTO: 0 % (ref 0–2)
LYMPHOCYTES # BLD AUTO: 1.38 THOUSANDS/ÂΜL (ref 0.6–4.47)
LYMPHOCYTES NFR BLD AUTO: 32 % (ref 14–44)
MCH RBC QN AUTO: 19.4 PG (ref 26.8–34.3)
MCHC RBC AUTO-ENTMCNC: 27.9 G/DL (ref 31.4–37.4)
MCV RBC AUTO: 70 FL (ref 82–98)
MONOCYTES # BLD AUTO: 0.5 THOUSAND/ÂΜL (ref 0.17–1.22)
MONOCYTES NFR BLD AUTO: 12 % (ref 4–12)
NEUTROPHILS # BLD AUTO: 2.13 THOUSANDS/ÂΜL (ref 1.85–7.62)
NEUTS SEG NFR BLD AUTO: 48 % (ref 43–75)
NRBC BLD AUTO-RTO: 0 /100 WBCS
PLATELET # BLD AUTO: 269 THOUSANDS/UL (ref 149–390)
PMV BLD AUTO: 9.9 FL (ref 8.9–12.7)
POTASSIUM SERPL-SCNC: 4.4 MMOL/L (ref 3.5–5.3)
PROT SERPL-MCNC: 8 G/DL (ref 6.4–8.4)
RBC # BLD AUTO: 5.26 MILLION/UL (ref 3.88–5.62)
SODIUM SERPL-SCNC: 135 MMOL/L (ref 135–147)
WBC # BLD AUTO: 4.35 THOUSAND/UL (ref 4.31–10.16)

## 2024-12-09 PROCEDURE — 94150 VITAL CAPACITY TEST: CPT

## 2024-12-09 PROCEDURE — 99233 SBSQ HOSP IP/OBS HIGH 50: CPT | Performed by: STUDENT IN AN ORGANIZED HEALTH CARE EDUCATION/TRAINING PROGRAM

## 2024-12-09 PROCEDURE — 99233 SBSQ HOSP IP/OBS HIGH 50: CPT | Performed by: INTERNAL MEDICINE

## 2024-12-09 PROCEDURE — 80053 COMPREHEN METABOLIC PANEL: CPT | Performed by: INTERNAL MEDICINE

## 2024-12-09 PROCEDURE — 92526 ORAL FUNCTION THERAPY: CPT

## 2024-12-09 PROCEDURE — 85025 COMPLETE CBC W/AUTO DIFF WBC: CPT | Performed by: INTERNAL MEDICINE

## 2024-12-09 RX ORDER — PYRIDOSTIGMINE BROMIDE 60 MG/1
90 TABLET ORAL 4 TIMES DAILY
Qty: 180 TABLET | Refills: 0 | Status: SHIPPED | OUTPATIENT
Start: 2024-12-09 | End: 2024-12-10

## 2024-12-09 RX ADMIN — PANTOPRAZOLE SODIUM 40 MG: 40 TABLET, DELAYED RELEASE ORAL at 08:15

## 2024-12-09 RX ADMIN — PYRIDOSTIGMINE BROMIDE 90 MG: 60 TABLET ORAL at 09:44

## 2024-12-09 RX ADMIN — DICLOFENAC SODIUM 2 G: 10 GEL TOPICAL at 21:04

## 2024-12-09 RX ADMIN — Medication 40 G: at 09:30

## 2024-12-09 RX ADMIN — ENOXAPARIN SODIUM 40 MG: 40 INJECTION SUBCUTANEOUS at 09:13

## 2024-12-09 RX ADMIN — PYRIDOSTIGMINE BROMIDE 90 MG: 60 TABLET ORAL at 21:04

## 2024-12-09 RX ADMIN — DICLOFENAC SODIUM 2 G: 10 GEL TOPICAL at 09:14

## 2024-12-09 RX ADMIN — FERROUS SULFATE TAB 325 MG (65 MG ELEMENTAL FE) 325 MG: 325 (65 FE) TAB at 08:15

## 2024-12-09 RX ADMIN — PANTOPRAZOLE SODIUM 40 MG: 40 TABLET, DELAYED RELEASE ORAL at 15:33

## 2024-12-09 RX ADMIN — PYRIDOSTIGMINE BROMIDE 90 MG: 60 TABLET ORAL at 15:32

## 2024-12-09 RX ADMIN — PYRIDOSTIGMINE BROMIDE 90 MG: 60 TABLET ORAL at 03:44

## 2024-12-09 NOTE — PLAN OF CARE
Problem: PAIN - ADULT  Goal: Verbalizes/displays adequate comfort level or baseline comfort level  Description: Interventions:  - Encourage patient to monitor pain and request assistance  - Assess pain using appropriate pain scale  - Administer analgesics based on type and severity of pain and evaluate response  - Implement non-pharmacological measures as appropriate and evaluate response  - Consider cultural and social influences on pain and pain management  - Notify physician/advanced practitioner if interventions unsuccessful or patient reports new pain  Outcome: Progressing     Problem: INFECTION - ADULT  Goal: Absence or prevention of progression during hospitalization  Description: INTERVENTIONS:  - Assess and monitor for signs and symptoms of infection  - Monitor lab/diagnostic results  - Monitor all insertion sites, i.e. indwelling lines, tubes, and drains  - Monitor endotracheal if appropriate and nasal secretions for changes in amount and color  - [place] appropriate cooling/warming therapies per order  - Administer medications as ordered  - Instruct and encourage patient and family to use good hand hygiene technique  - Identify and instruct in appropriate isolation precautions for identified infection/condition  Outcome: Progressing  Goal: Absence of fever/infection during neutropenic period  Description: INTERVENTIONS:  - Monitor WBC    Outcome: Progressing     Problem: DISCHARGE PLANNING  Goal: Discharge to home or other facility with appropriate resources  Description: INTERVENTIONS:  - Identify barriers to discharge w/patient and caregiver  - Arrange for needed discharge resources and transportation as appropriate  - Identify discharge learning needs (meds, wound care, etc.)  - Arrange for interpretive services to assist at discharge as needed  - Refer to Case Management Department for coordinating discharge planning if the patient needs post-hospital services based on physician/advanced  practitioner order or complex needs related to functional status, cognitive ability, or social support system  Outcome: Progressing     Problem: Knowledge Deficit  Goal: Patient/family/caregiver demonstrates understanding of disease process, treatment plan, medications, and discharge instructions  Description: Complete learning assessment and assess knowledge base.  Interventions:  - Provide teaching at level of understanding  - Provide teaching via preferred learning methods  Outcome: Progressing

## 2024-12-09 NOTE — PLAN OF CARE
Problem: PAIN - ADULT  Goal: Verbalizes/displays adequate comfort level or baseline comfort level  Description: Interventions:  - Encourage patient to monitor pain and request assistance  - Assess pain using appropriate pain scale  - Administer analgesics based on type and severity of pain and evaluate response  - Implement non-pharmacological measures as appropriate and evaluate response  - Consider cultural and social influences on pain and pain management  - Notify physician/advanced practitioner if interventions unsuccessful or patient reports new pain  Outcome: Progressing     Problem: INFECTION - ADULT  Goal: Absence or prevention of progression during hospitalization  Description: INTERVENTIONS:  - Assess and monitor for signs and symptoms of infection  - Monitor lab/diagnostic results  - Monitor all insertion sites, i.e. indwelling lines, tubes, and drains  - Monitor endotracheal if appropriate and nasal secretions for changes in amount and color  - [place] appropriate cooling/warming therapies per order  - Administer medications as ordered  - Instruct and encourage patient and family to use good hand hygiene technique  - Identify and instruct in appropriate isolation precautions for identified infection/condition  Outcome: Progressing  Goal: Absence of fever/infection during neutropenic period  Description: INTERVENTIONS:  - Monitor WBC    Outcome: Progressing     Problem: SAFETY ADULT  Goal: Patient will remain free of falls  Description: INTERVENTIONS:  - Educate patient/family on patient safety including physical limitations  - Instruct patient to call for assistance with activity   - Consult OT/PT to assist with strengthening/mobility   - Keep Call bell within reach  - Keep bed low and locked with side rails adjusted as appropriate  - Keep care items and personal belongings within reach  - Initiate and maintain comfort rounds  - Make Fall Risk Sign visible to staff  - Offer Toileting every 2 Hours,  in advance of need  - Obtain necessary fall risk management equipment: nonskid socks  - Apply yellow socks and bracelet for high fall risk patients  - Consider moving patient to room near nurses station  Outcome: Progressing  Goal: Maintain or return to baseline ADL function  Description: INTERVENTIONS:  -  Assess patient's ability to carry out ADLs; assess patient's baseline for ADL function and identify physical deficits which impact ability to perform ADLs (bathing, care of mouth/teeth, toileting, grooming, dressing, etc.)  - Assess/evaluate cause of self-care deficits   - Assess range of motion  - Assess patient's mobility; develop plan if impaired  - Assess patient's need for assistive devices and provide as appropriate  - Encourage maximum independence but intervene and supervise when necessary  - Involve family in performance of ADLs  - Assess for home care needs following discharge   - Consider OT consult to assist with ADL evaluation and planning for discharge  - Provide patient education as appropriate  Outcome: Progressing  Goal: Maintains/Returns to pre admission functional level  Description: INTERVENTIONS:  - Perform AM-PAC 6 Click Basic Mobility/ Daily Activity assessment daily.  - Set and communicate daily mobility goal to care team and patient/family/caregiver.   - Collaborate with rehabilitation services on mobility goals if consulted  - Perform Range of Motion 4 times a day.  - Reposition patient every 2 hours.  - Dangle patient 4 times a day  - Stand patient 4 times a day  - Ambulate patient 4 times a day  - Out of bed to chair 4 times a day   - Out of bed for meals 4 times a day  - Out of bed for toileting  - Record patient progress and toleration of activity level   Outcome: Progressing     Problem: DISCHARGE PLANNING  Goal: Discharge to home or other facility with appropriate resources  Description: INTERVENTIONS:  - Identify barriers to discharge w/patient and caregiver  - Arrange for needed  discharge resources and transportation as appropriate  - Identify discharge learning needs (meds, wound care, etc.)  - Arrange for interpretive services to assist at discharge as needed  - Refer to Case Management Department for coordinating discharge planning if the patient needs post-hospital services based on physician/advanced practitioner order or complex needs related to functional status, cognitive ability, or social support system  Outcome: Progressing     Problem: Knowledge Deficit  Goal: Patient/family/caregiver demonstrates understanding of disease process, treatment plan, medications, and discharge instructions  Description: Complete learning assessment and assess knowledge base.  Interventions:  - Provide teaching at level of understanding  - Provide teaching via preferred learning methods  Outcome: Progressing

## 2024-12-09 NOTE — PROGRESS NOTES
Progress Note - Hospitalist   Name: Joni Iglesias 64 y.o. male I MRN: [id number]  Unit/Bed#: 2 E 254-01 I Date of Admission: 12/5/2024   Date of Service: 12/9/2024 I Hospital Day: 4    Assessment & Plan  Myasthenia gravis  Myasthenia gravis.  Present for 3 years.  Current issues are ptosis of his left eyelid weakness of his right shoulder and some dysphagia.      Neurology recommended IVIG - first dose 12/5 around 1PM  Patient finishing course of IVIG today.  Neurology wants to continue to monitor neurological exam for another day in the hospital.  Meantime patient is uninsured, I did ask case management to look into the pyridostigmine for him.  Prescription sent to home*.  Continue to monitor clinically, neurological exam    If patient continues to improve anticipate potential discharge on 12/10 please confirm with case management that pyridostigmine is arranged  Ptosis of eyelid, left  Likely due to MG exacerbation  Slightly better although still present  Monitor  CAD (coronary artery disease)  Patient with underlying coronary artery disease reports taking no medicines.   No CP  Monitor  Weakness of right shoulder  PT/OT  Other dysphagia  Patient with dysphagia secondary to his myasthenia gravis.    Improved, tolerating diet  Monitor  Anemia  Hemoglobin typically around 10.  At baseline  Denies any bleeding  Monitor    VTE Pharmacologic Prophylaxis: VTE Score: 4 Moderate Risk (Score 3-4) - Pharmacological DVT Prophylaxis Ordered: enoxaparin (Lovenox).    Mobility:   Basic Mobility Inpatient Raw Score: 21  JH-HLM Goal: 6: Walk 10 steps or more  JH-HLM Achieved: 6: Walk 10 steps or more  JH-HLM Goal achieved. Continue to encourage appropriate mobility.    Patient Centered Rounds: I performed bedside rounds with nursing staff today.   Discussions with Specialists or Other Care Team Provider: Discussed with care management team    Education and Discussions with Family / Patient: Patient declined call to contact  person.     Current Length of Stay: 4 day(s)  Current Patient Status: Inpatient   Certification Statement: The patient will continue to require additional inpatient hospital stay due to need for IV therapy  Discharge Plan: Anticipate discharge tomorrow to home.    Code Status: Level 1 - Full Code    Subjective     Patient valuated this morning.  Still has some ptosis on the left eyelid although better compared to yesterday.  Denies any muscle weakness or dysphagia    Objective :  Temp:  [97.8 °F (36.6 °C)-97.9 °F (36.6 °C)] 97.8 °F (36.6 °C)  HR:  [55-58] 55  BP: (116-159)/(61-79) 116/61  Resp:  [18] 18  SpO2:  [96 %-97 %] 97 %  O2 Device: None (Room air)    Body mass index is 41.28 kg/m².     Input and Output Summary (last 24 hours):     Intake/Output Summary (Last 24 hours) at 12/9/2024 1255  Last data filed at 12/9/2024 0900  Gross per 24 hour   Intake 360 ml   Output 1600 ml   Net -1240 ml       Physical Exam  Vitals and nursing note reviewed.   Constitutional:       Appearance: Normal appearance. He is obese.      Comments: Morbidly obese chronically ill appearing male in bed, awake   HENT:      Head: Normocephalic and atraumatic.      Right Ear: External ear normal.      Left Ear: External ear normal.      Nose: Nose normal. No congestion or rhinorrhea.      Mouth/Throat:      Mouth: Mucous membranes are moist.      Pharynx: Oropharynx is clear. No oropharyngeal exudate or posterior oropharyngeal erythema.   Eyes:      General: No scleral icterus.        Right eye: No discharge.         Left eye: No discharge.      Pupils: Pupils are equal, round, and reactive to light.   Neck:      Vascular: No carotid bruit.   Cardiovascular:      Rate and Rhythm: Normal rate and regular rhythm.      Pulses: Normal pulses.      Heart sounds: No murmur heard.     No friction rub. No gallop.   Pulmonary:      Effort: Pulmonary effort is normal. No respiratory distress.      Breath sounds: Normal breath sounds. No stridor. No  wheezing, rhonchi or rales.   Abdominal:      General: Abdomen is flat. Bowel sounds are normal. There is no distension.      Palpations: Abdomen is soft. There is no mass.      Tenderness: There is no abdominal tenderness. There is no guarding or rebound.      Hernia: No hernia is present.   Musculoskeletal:         General: No swelling, tenderness, deformity or signs of injury. Normal range of motion.      Cervical back: Normal range of motion. No rigidity. No muscular tenderness.   Lymphadenopathy:      Cervical: No cervical adenopathy.   Skin:     General: Skin is warm and dry.      Capillary Refill: Capillary refill takes less than 2 seconds.      Coloration: Skin is not jaundiced or pale.      Findings: No bruising or erythema.   Neurological:      General: No focal deficit present.      Mental Status: He is alert and oriented to person, place, and time. Mental status is at baseline.      Cranial Nerves: No cranial nerve deficit.      Sensory: No sensory deficit.      Motor: No weakness.      Coordination: Coordination normal.      Deep Tendon Reflexes: Reflexes normal.      Comments: Left-sided ptosis noted   Psychiatric:         Mood and Affect: Mood normal.         Behavior: Behavior normal.         Thought Content: Thought content normal.         Judgment: Judgment normal.           Lines/Drains:              Lab Results: I have reviewed the following results:   Results from last 7 days   Lab Units 12/09/24  0610   WBC Thousand/uL 4.35   HEMOGLOBIN g/dL 10.2*   HEMATOCRIT % 36.6   PLATELETS Thousands/uL 269   SEGS PCT % 48   LYMPHO PCT % 32   MONO PCT % 12   EOS PCT % 7*     Results from last 7 days   Lab Units 12/09/24  0610   SODIUM mmol/L 135   POTASSIUM mmol/L 4.4   CHLORIDE mmol/L 102   CO2 mmol/L 29   BUN mg/dL 14   CREATININE mg/dL 0.90   ANION GAP mmol/L 4   CALCIUM mg/dL 8.5   ALBUMIN g/dL 3.2*   TOTAL BILIRUBIN mg/dL 0.32   ALK PHOS U/L 76   ALT U/L 9   AST U/L 13   GLUCOSE RANDOM mg/dL 130                        Recent Cultures (last 7 days):               Last 24 Hours Medication List:     Current Facility-Administered Medications:     acetaminophen (TYLENOL) tablet 650 mg, Q6H PRN    Artificial Tears Op Soln 1 drop, TID PRN    Diclofenac Sodium (VOLTAREN) 1 % topical gel 2 g, 4x Daily    enoxaparin (LOVENOX) subcutaneous injection 40 mg, Daily    ferrous sulfate tablet 325 mg, Daily With Breakfast    pantoprazole (PROTONIX) EC tablet 40 mg, BID AC    pyridostigmine (MESTINON) tablet 90 mg, 4x Daily    senna (SENOKOT) tablet 8.6 mg, HS PRN    Administrative Statements   Today, Patient Was Seen By: Omar Salgado MD      **Please Note: This note may have been constructed using a voice recognition system.**

## 2024-12-09 NOTE — ASSESSMENT & PLAN NOTE
Myasthenia gravis.  Present for 3 years.  Current issues are ptosis of his left eyelid weakness of his right shoulder and some dysphagia.      Neurology recommended IVIG - first dose 12/5 around 1PM  Patient finishing course of IVIG today.  Neurology wants to continue to monitor neurological exam for another day in the hospital.  Meantime patient is uninsured, I did ask case management to look into the pyridostigmine for him.  Prescription sent to home*.  Continue to monitor clinically, neurological exam    If patient continues to improve anticipate potential discharge on 12/10 please confirm with case management that pyridostigmine is arranged

## 2024-12-09 NOTE — SPEECH THERAPY NOTE
Speech Language/Pathology     Speech/Language Pathology Progress Note     Patient Name: Joni Iglesias    Today's Date: 12/9/2024     Problem List  Principal Problem:    Myasthenia gravis  Active Problems:    Ptosis of eyelid, left    CAD (coronary artery disease)    Weakness of right shoulder    Other dysphagia      Subjective:  Patient received awake and alert, seated in bedside chair finishing breakfast. Noted ongoing improvement in overall status.     Previous/current diet: reg/thin     Objective:  The following consistencies were tested regular solids, thin liquids in successive cup and straw sip.  Patient presents with functional bolus containment, manipulation and control.  Mastication judged to be timely and complete.   No overt s/s of aspiration or distress. Vocal quality noted to remain clear.      Assessment:  Oropharyngeal swallow function appears same/baseline; pt continues to report no difficulties.  Verbalizes understanding to education provided re: monitoring for return of dysphagia symptoms.  No concerns noted by patient at this time     Plan:  Reg/thin  No further ST follow-up; please re-consult should symptoms return or status change.        Kaur Lay MS, CCC-SLP  Speech-Language Pathologist  [place] #[id number]  [place] #[id number]

## 2024-12-09 NOTE — PROGRESS NOTES
Progress Note - Neurology   Name: Joni Iglesias 64 y.o. male I MRN: [id number]  Unit/Bed#: 2 E 254-01 I Date of Admission: 12/5/2024   Date of Service: 12/9/2024 I Hospital Day: 4     Assessment & Plan  Myasthenia gravis  64 y.o. 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  Symptoms began approximately 10 days prior to arrival.    Patient follows with Dr. Sebastian and plans to start new DMT next month.  Patient used to receive IVIG every 6 months.  However, patient reports that he has not needed IVIG for approximately 1 year.    Etiology for MG flare remains unclear.  No metabolic or infectious disturbances have been discovered thus far.    Neurological examination is improving.     Plan:  - IVIG 400mg/kg x 5 days (today is day 5 of 5).   - Recommend monitoring heart function closely with EF 35-45%.   - Continue home mestinon 90 mg 4x per day  - Recommend NIF/VC Qshift  12/5: NIF -53, vital capacity 3.8 L  12/6: NIF -32, vital capacity 2.1 L  12/6 (evening): NIF -35, vital capacity 2.4 L  12/8 NIF 55, VC 2,   - Recommend closely monitoring neurologic exam, notify neurology with any changes in examination.   - Medical management and correction of metabolic and infectious disturbances per primary team   - Aspiration precautions.   - Avoid CNS altering medications if possible  - Continue supportive care   - Continue to monitor neurologic status. Notify neurology with any changes in exam.     Neurology Follow Up Recs:   Joni Iglesias should keep his appointment with Dr. Vance on 1/21/2025.    Subjective   Neurology follow up.   Continues to feel like he is improving to day will be 5/5 for his IVIG.   The patient reports he is improving every day, he is back to about 80% from his baseline, he still see double but this is improving, his overall weakness is  improving as well with no dyspnea.  He does report as the the day goes on he will be more fatigued, but no new symptoms.     No no chest pain, stiff neck, headache, change in mental status, no new one-sided weakness or neurologic symptoms, he appears to be tolerating the IVIG.  He reports an overall improvement with IVIG and follows with Dr. Sebastian as an outpatient.    Overall improvement of symptoms.     Objective :  Temp:  [97.8 °F (36.6 °C)-97.9 °F (36.6 °C)] 97.8 °F (36.6 °C)  HR:  [55-58] 55  BP: (116-159)/(61-79) 116/61  Resp:  [18] 18  SpO2:  [96 %-97 %] 97 %  O2 Device: None (Room air)    Current Facility-Administered Medications   Medication Dose Route Frequency Provider Last Rate    acetaminophen  650 mg Oral Q6H PRN Omar Lynn MD      Artificial Tears  1 drop Both Eyes TID PRN Bharathi Vilchis MD      Diclofenac Sodium  2 g Topical 4x Daily Omar Lynn MD      enoxaparin  40 mg Subcutaneous Daily Bharathi Vilchis MD      ferrous sulfate  325 mg Oral Daily With Breakfast Bharathi Vilchis MD      immune globulin, human  400 mg/kg (Adjusted) Intravenous Daily Omar Lynn MD 40 g (12/06/24 1113)    pantoprazole  40 mg Oral BID AC Bharathi Vilchis MD      pyridostigmine  90 mg Oral 4x Daily Omar Lynn MD      senna  8.6 mg Oral HS PRN Bharathi Vilchis MD        Neurological  Mental status - the patient is awake alert oriented x 3, with no evidence of aphasia or dysarthria, patient is able to follow simple commands, is able to follow complex commands.  No para-phasic errors noted.  He is able to read and repeat, he is able to provide entire hx, he is able to tell me current events  Cranial nerves 2 through 12 are intact -with eyelid proptosis noted bilaterally.  Cannot fully abduct bilaterally,.   Motor - 5/5 upper extremities and lower extremities, without drift, normal tone and bulk.  No drift in the upper or lowers, non focal motor  examination.  Neck flexion and extension 5/5.  No tremor or fixed deficit noted  Rapid movements are equal bilaterally  Sensation - non-focal to touch, no neglect.   Coordination -  no ataxia noted on finger-to-nose   No evidence of clonus or myoclonus or tremor.  No evidence of seizure activity, observed.      Neck flexion extension strength 5 out of 5, no dyspnea conversation on examination, non focal neurological examination.       Lab Results: I have reviewed the following results:    Recent Results (from the past 24 hours)   CBC and differential    Collection Time: 12/09/24  6:10 AM   Result Value Ref Range    WBC 4.35 4.31 - 10.16 Thousand/uL    RBC 5.26 3.88 - 5.62 Million/uL    Hemoglobin 10.2 (L) 12.0 - 17.0 g/dL    Hematocrit 36.6 36.5 - 49.3 %    MCV 70 (L) 82 - 98 fL    MCH 19.4 (L) 26.8 - 34.3 pg    MCHC 27.9 (L) 31.4 - 37.4 g/dL    RDW 18.5 (H) 11.6 - 15.1 %    MPV 9.9 8.9 - 12.7 fL    Platelets 269 149 - 390 Thousands/uL    nRBC 0 /100 WBCs    Segmented % 48 43 - 75 %    Immature Grans % 0 0 - 2 %    Lymphocytes % 32 14 - 44 %    Monocytes % 12 4 - 12 %    Eosinophils Relative 7 (H) 0 - 6 %    Basophils Relative 1 0 - 1 %    Absolute Neutrophils 2.13 1.85 - 7.62 Thousands/µL    Absolute Immature Grans 0.00 0.00 - 0.20 Thousand/uL    Absolute Lymphocytes 1.38 0.60 - 4.47 Thousands/µL    Absolute Monocytes 0.50 0.17 - 1.22 Thousand/µL    Eosinophils Absolute 0.31 0.00 - 0.61 Thousand/µL    Basophils Absolute 0.03 0.00 - 0.10 Thousands/µL   Comprehensive metabolic panel    Collection Time: 12/09/24  6:10 AM   Result Value Ref Range    Sodium 135 135 - 147 mmol/L    Potassium 4.4 3.5 - 5.3 mmol/L    Chloride 102 96 - 108 mmol/L    CO2 29 21 - 32 mmol/L    ANION GAP 4 4 - 13 mmol/L    BUN 14 5 - 25 mg/dL    Creatinine 0.90 0.60 - 1.30 mg/dL    Glucose 130 65 - 140 mg/dL    Calcium 8.5 8.4 - 10.2 mg/dL    Corrected Calcium 9.1 8.3 - 10.1 mg/dL    AST 13 13 - 39 U/L    ALT 9 7 - 52 U/L    Alkaline  Phosphatase 76 34 - 104 U/L    Total Protein 8.0 6.4 - 8.4 g/dL    Albumin 3.2 (L) 3.5 - 5.0 g/dL    Total Bilirubin 0.32 0.20 - 1.00 mg/dL    eGFR 89 ml/min/1.73sq m     No results found.     Reviewed case with neurology attending, history and physical examination, labs and imaging completed, plan of care as per attending physician.  Please see attestation for further details.   Examined alongside attending physician.    Acted as scribe

## 2024-12-09 NOTE — CASE MANAGEMENT
Case Management Discharge Planning Note    Patient name Joni Iglesias  Location 2 EAST 254/2 E 254-01 MRN [id number]  : 1960 Date 2024       Current Admission Date: 2024  Current Admission Diagnosis:Myasthenia gravis   Patient Active Problem List    Diagnosis Date Noted Date Diagnosed    Weakness of right shoulder 2024     Other dysphagia 2024     Anemia 2024     History of prediabetes 2024     Personal history of peptic ulcer disease 2024     Poor vision 2024     CHF (congestive heart failure) ([facility]) 2023     Bruise 2023     Acute blood loss anemia 2023     Hyperlipidemia      Myasthenia gravis      Myasthenia gravis ([facility]) 10/01/2021     Morbid obesity ([facility]) 06/10/2021     Ptosis of eyelid, left 2021     CAD (coronary artery disease) 2021       LOS (days): 4  Geometric Mean LOS (GMLOS) (days): 3.8  Days to GMLOS:-0.6     OBJECTIVE:  Risk of Unplanned Readmission Score: 11.23         Current admission status: Inpatient   Preferred Pharmacy:   [id number] - [facility] [place] - [address]  [address]  [facility] [place] [address]  Phone: [phone] Fax: [phone]    [facility]  [place], [place] - [address]  [address]  [place] [address]  Phone: [phone] Fax: [phone]    [id number] - [facility] [place] - [address]  [address]  [facility] [place] [address]  Phone: [phone] Fax: [phone]    [facility] [place] - [address]  [address]  [place] [address]  Phone: [phone] Fax: [phone]    Primary Care Provider: No primary care provider on file.    Primary Insurance:   Secondary Insurance:     DISCHARGE DETAILS:    Discharge planning discussed with:: patient at bedside  Freedom of Choice: Yes  Comments - Freedom of Choice: CM maintained freedom of choice as it pertains to discharge planning. CM advised  of price check for Pyridostigmine being $70 for 1 month. Patient reports he is unable to pay this and requests to be given a month supply, he requests to use [facility]  CM contacted family/caregiver?: No- see comments (pt declined)  Were Treatment Team discharge recommendations reviewed with patient/caregiver?: Yes  Did patient/caregiver verbalize understanding of patient care needs?: Yes  Were patient/caregiver advised of the risks associated with not following Treatment Team discharge recommendations?: Yes    Requested Home Health Care         Is the patient interested in HHC at discharge?: No    DME Referral Provided  Referral made for DME?: No    Other Referral/Resources/Interventions Provided:  Financial Resources Provided: Indigent Medication  Interventions: Prescription Price Check  Referral Comments: Discussed w [facility] staff via [employer] secure chat who reports OOP cost for patient's medications to be $70. NILSA completed form for indigent meds also signed by NILSA CC and faxed to [facility] pharmacy who confirmed receiving fax. They report they cannot have the med available for today but can have it for tomorrow. CM updated SLIM.    Would you like to participate in our [facility] service program?  : Yes    Treatment Team Recommendation: Home  Discharge Destination Plan:: Home  Transport at Discharge : Free Local Transportation

## 2024-12-09 NOTE — ASSESSMENT & PLAN NOTE
64 y.o. 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  Symptoms began approximately 10 days prior to arrival.    Patient follows with Dr. Sebastian and plans to start new DMT next month.  Patient used to receive IVIG every 6 months.  However, patient reports that he has not needed IVIG for approximately 1 year.    Etiology for MG flare remains unclear.  No metabolic or infectious disturbances have been discovered thus far.    Neurological examination is improving.     Plan:  - IVIG 400mg/kg x 5 days (today is day 5 of 5).   - Recommend monitoring heart function closely with EF 35-45%.   - Continue home mestinon 90 mg 4x per day  - Recommend NIF/VC Qshift  12/5: NIF -53, vital capacity 3.8 L  12/6: NIF -32, vital capacity 2.1 L  12/6 (evening): NIF -35, vital capacity 2.4 L  12/8 NIF 55, VC 2,   - Recommend closely monitoring neurologic exam, notify neurology with any changes in examination.   - Medical management and correction of metabolic and infectious disturbances per primary team   - Aspiration precautions.   - Avoid CNS altering medications if possible  - Continue supportive care   - Continue to monitor neurologic status. Notify neurology with any changes in exam.

## 2024-12-10 ENCOUNTER — TELEPHONE (OUTPATIENT)
Age: 64
End: 2024-12-10

## 2024-12-10 VITALS
HEIGHT: 72 IN | HEART RATE: 68 BPM | TEMPERATURE: 98.2 F | OXYGEN SATURATION: 95 % | SYSTOLIC BLOOD PRESSURE: 129 MMHG | WEIGHT: 304.4 LBS | BODY MASS INDEX: 41.23 KG/M2 | DIASTOLIC BLOOD PRESSURE: 88 MMHG | RESPIRATION RATE: 18 BRPM

## 2024-12-10 LAB
ANION GAP SERPL CALCULATED.3IONS-SCNC: 2 MMOL/L (ref 4–13)
BASOPHILS # BLD AUTO: 0.03 THOUSANDS/ÂΜL (ref 0–0.1)
BASOPHILS NFR BLD AUTO: 1 % (ref 0–1)
BUN SERPL-MCNC: 15 MG/DL (ref 5–25)
CALCIUM SERPL-MCNC: 9.1 MG/DL (ref 8.4–10.2)
CHLORIDE SERPL-SCNC: 101 MMOL/L (ref 96–108)
CO2 SERPL-SCNC: 30 MMOL/L (ref 21–32)
CREAT SERPL-MCNC: 0.9 MG/DL (ref 0.6–1.3)
EOSINOPHIL # BLD AUTO: 0.3 THOUSAND/ÂΜL (ref 0–0.61)
EOSINOPHIL NFR BLD AUTO: 7 % (ref 0–6)
ERYTHROCYTE [DISTWIDTH] IN BLOOD BY AUTOMATED COUNT: 18.2 % (ref 11.6–15.1)
GFR SERPL CREATININE-BSD FRML MDRD: 89 ML/MIN/1.73SQ M
GLUCOSE SERPL-MCNC: 117 MG/DL (ref 65–140)
HCT VFR BLD AUTO: 35.9 % (ref 36.5–49.3)
HGB BLD-MCNC: 10.4 G/DL (ref 12–17)
IMM GRANULOCYTES # BLD AUTO: 0.01 THOUSAND/UL (ref 0–0.2)
IMM GRANULOCYTES NFR BLD AUTO: 0 % (ref 0–2)
LYMPHOCYTES # BLD AUTO: 1.28 THOUSANDS/ÂΜL (ref 0.6–4.47)
LYMPHOCYTES NFR BLD AUTO: 29 % (ref 14–44)
MCH RBC QN AUTO: 20.1 PG (ref 26.8–34.3)
MCHC RBC AUTO-ENTMCNC: 29 G/DL (ref 31.4–37.4)
MCV RBC AUTO: 69 FL (ref 82–98)
MONOCYTES # BLD AUTO: 0.55 THOUSAND/ÂΜL (ref 0.17–1.22)
MONOCYTES NFR BLD AUTO: 13 % (ref 4–12)
NEUTROPHILS # BLD AUTO: 2.2 THOUSANDS/ÂΜL (ref 1.85–7.62)
NEUTS SEG NFR BLD AUTO: 50 % (ref 43–75)
NRBC BLD AUTO-RTO: 0 /100 WBCS
PLATELET # BLD AUTO: 266 THOUSANDS/UL (ref 149–390)
PMV BLD AUTO: 10.1 FL (ref 8.9–12.7)
POTASSIUM SERPL-SCNC: 4.2 MMOL/L (ref 3.5–5.3)
RBC # BLD AUTO: 5.17 MILLION/UL (ref 3.88–5.62)
SODIUM SERPL-SCNC: 133 MMOL/L (ref 135–147)
WBC # BLD AUTO: 4.37 THOUSAND/UL (ref 4.31–10.16)

## 2024-12-10 PROCEDURE — 80048 BASIC METABOLIC PNL TOTAL CA: CPT | Performed by: INTERNAL MEDICINE

## 2024-12-10 PROCEDURE — 85025 COMPLETE CBC W/AUTO DIFF WBC: CPT | Performed by: INTERNAL MEDICINE

## 2024-12-10 PROCEDURE — 94150 VITAL CAPACITY TEST: CPT

## 2024-12-10 PROCEDURE — 99232 SBSQ HOSP IP/OBS MODERATE 35: CPT | Performed by: PHYSICIAN ASSISTANT

## 2024-12-10 PROCEDURE — 99239 HOSP IP/OBS DSCHRG MGMT >30: CPT | Performed by: STUDENT IN AN ORGANIZED HEALTH CARE EDUCATION/TRAINING PROGRAM

## 2024-12-10 RX ORDER — PYRIDOSTIGMINE BROMIDE 60 MG/1
90 TABLET ORAL 4 TIMES DAILY
Qty: 540 TABLET | Refills: 0 | Status: SHIPPED | OUTPATIENT
Start: 2024-12-10

## 2024-12-10 RX ADMIN — PANTOPRAZOLE SODIUM 40 MG: 40 TABLET, DELAYED RELEASE ORAL at 09:05

## 2024-12-10 RX ADMIN — DICLOFENAC SODIUM 2 G: 10 GEL TOPICAL at 09:02

## 2024-12-10 RX ADMIN — PYRIDOSTIGMINE BROMIDE 90 MG: 60 TABLET ORAL at 09:02

## 2024-12-10 RX ADMIN — FERROUS SULFATE TAB 325 MG (65 MG ELEMENTAL FE) 325 MG: 325 (65 FE) TAB at 09:01

## 2024-12-10 RX ADMIN — ENOXAPARIN SODIUM 40 MG: 40 INJECTION SUBCUTANEOUS at 09:02

## 2024-12-10 RX ADMIN — PYRIDOSTIGMINE BROMIDE 90 MG: 60 TABLET ORAL at 04:12

## 2024-12-10 NOTE — DISCHARGE SUMMARY
Discharge Summary - Hospitalist   Name: Joni Iglesias 64 y.o. male I MRN: [id number]  Unit/Bed#: 2 E 254-01 I Date of Admission: 12/5/2024   Date of Service: 12/10/2024 I Hospital Day: 5     Assessment & Plan  Myasthenia gravis  Myasthenia gravis.  Present for 3 years.  Current issues are ptosis of his left eyelid weakness of his right shoulder and some dysphagia.      Neurology recommended IVIG - first dose 12/5  -Patient finishing course of IVIG yesterday   Meantime patient is uninsured,  case management to look into the pyridostigmine for him.  Prescription sent to home  Continue to monitor clinically, neurological exam    Ptosis of eyelid, left  Likely due to MG exacerbation  Slightly better although still present  Monitor  CAD (coronary artery disease)  Patient with underlying coronary artery disease reports taking no medicines.   No CP  Monitor  Weakness of right shoulder  PT/OT  Other dysphagia  Patient with dysphagia secondary to his myasthenia gravis.    Improved, tolerating diet  Monitor  Anemia  Hemoglobin typically around 10.  At baseline  Denies any bleeding  Monitor     Medical Problems       Resolved Problems  Date Reviewed: 2/7/2024   None       Discharging Physician / Practitioner: Murali Bass MD  PCP: No primary care provider on file.  Admission Date:   Admission Orders (From admission, onward)       Ordered        12/05/24 0616  Inpatient Admission  Once                          Discharge Date: 12/10/24    Consultations During Hospital Stay:  Neurology    Procedures Performed:   None    Significant Findings / Test Results:   None      Test Results Pending at Discharge (will require follow up):   None     Outpatient Tests Requested:  None    Complications: None    Reason for Admission: 1 week of left eye drooping and right arm weakness    Hospital Course:   Joni Iglesias is a 64 y.o. male patient who originally presented to the hospital on 12/5/2024 due to myasthenia gravis flare.  He  initially presented with left eye ptosis, right eye weakness and difficulty swallowing.  Neurology was consulted and recommended IVIG.  Swallow evaluation was done, he was continued on a normal diet.  The patient received 5 days of IVIG and symptoms improved.  Patient is not on any meds outpatient secondary to no insurance.  The patient was recommended to be discharged on pyridostigmine, patient expresses understanding.      Please see above list of diagnoses and related plan for additional information.     Condition at Discharge: stable    Discharge Day Visit / Exam:   Subjective: Sitting in chair in street clothing, ready to go home.  Feels his left eye is still a little bit droopy but much better than before.  Says that this condition is curable, answered questions.  Otherwise no complaints.    Vitals: Blood Pressure: 129/88 (12/10/24 0717)  Pulse: 68 (12/09/24 2237)  Temperature: 98.2 °F (36.8 °C) (12/10/24 0717)  Temp Source: Oral (12/08/24 1500)  Respirations: 18 (12/10/24 0717)  Height: 6' (182.9 cm) (12/05/24 1248)  Weight - Scale: (!) 138 kg (304 lb 6.4 oz) (12/05/24 1059)  SpO2: 95 % (12/09/24 2237)  Physical Exam  Constitutional:       General: He is not in acute distress.  HENT:      Head: Normocephalic and atraumatic.   Eyes:      Comments: Left eye with taped up eyebrow, ptosis compared to the right eye   Neurological:      Mental Status: He is alert.          Discussion with Family: Patient declined call to .     Discharge instructions/Information to patient and family:   See after visit summary for information provided to patient and family.      Provisions for Follow-Up Care:  See after visit summary for information related to follow-up care and any pertinent home health orders.      Mobility at time of Discharge:   Basic Mobility Inpatient Raw Score: 23  JH-HLM Goal: 7: Walk 25 feet or more  JH-HLM Achieved: 7: Walk 25 feet or more  HLM Goal achieved. Continue to encourage appropriate  mobility.     Disposition:   Home    Planned Readmission: no    Discharge Medications:  See after visit summary for reconciled discharge medications provided to patient and/or family.      Administrative Statements   Discharge Statement:  I have spent a total time of 45 minutes in caring for this patient on the day of the visit/encounter. >30 minutes of time was spent on: Prognosis, Risks and benefits of tx options, Patient and family education, Risk factor reductions, Impressions, Counseling / Coordination of care, Documenting in the medical record, Reviewing / ordering tests, medicine, procedures  , and Communicating with other healthcare professionals .    **Please Note: This note may have been constructed using a voice recognition system**

## 2024-12-10 NOTE — ASSESSMENT & PLAN NOTE
64 y.o. 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  Symptoms began approximately 10 days prior to arrival.    Patient follows with Dr. Sebastian and plans to start new DMT next month.  Patient used to receive IVIG every 6 months.  However, patient reports that he has not needed IVIG for approximately 1 year.    Etiology for MG flare remains unclear.  No metabolic or infectious disturbances have been discovered thus far.      Plan:  - S/p 5 days of IVIG   - Continue home mestinon 90 mg 4x per day  - Recommend NIF/VC Qshift  12/5: NIF -53, vital capacity 3.8 L  12/6: NIF -32, vital capacity 2.1 L  12/6 (evening): NIF -35, vital capacity 2.4 L  12/8 NIF 55, VC 2,   - Medical management and correction of metabolic and infectious disturbances per primary team   - Aspiration precautions.   - Avoid CNS altering medications if possible  - Continue supportive care   - Continue to monitor neurologic status. Notify neurology with any changes in exam.     Patient is stable for discharge from a neurologic standpoint.

## 2024-12-10 NOTE — PLAN OF CARE
Problem: PAIN - ADULT  Goal: Verbalizes/displays adequate comfort level or baseline comfort level  Description: Interventions:  - Encourage patient to monitor pain and request assistance  - Assess pain using appropriate pain scale  - Administer analgesics based on type and severity of pain and evaluate response  - Implement non-pharmacological measures as appropriate and evaluate response  - Consider cultural and social influences on pain and pain management  - Notify physician/advanced practitioner if interventions unsuccessful or patient reports new pain  Outcome: Progressing     Problem: INFECTION - ADULT  Goal: Absence or prevention of progression during hospitalization  Description: INTERVENTIONS:  - Assess and monitor for signs and symptoms of infection  - Monitor lab/diagnostic results  - Monitor all insertion sites, i.e. indwelling lines, tubes, and drains  - Monitor endotracheal if appropriate and nasal secretions for changes in amount and color  - Dieterich appropriate cooling/warming therapies per order  - Administer medications as ordered  - Instruct and encourage patient and family to use good hand hygiene technique  - Identify and instruct in appropriate isolation precautions for identified infection/condition  Outcome: Progressing  Goal: Absence of fever/infection during neutropenic period  Description: INTERVENTIONS:  - Monitor WBC    Outcome: Progressing     Problem: DISCHARGE PLANNING  Goal: Discharge to home or other facility with appropriate resources  Description: INTERVENTIONS:  - Identify barriers to discharge w/patient and caregiver  - Arrange for needed discharge resources and transportation as appropriate  - Identify discharge learning needs (meds, wound care, etc.)  - Arrange for interpretive services to assist at discharge as needed  - Refer to Case Management Department for coordinating discharge planning if the patient needs post-hospital services based on physician/advanced  practitioner order or complex needs related to functional status, cognitive ability, or social support system  Outcome: Progressing

## 2024-12-10 NOTE — TELEPHONE ENCOUNTER
[facility]/  KENNEDY/ Myasthenia gravis     DC- HOME- 12/10/2024    [facility]  Good morning,    This patient was seen at [facility] for a Myasthenia gravis exacerbation. He follows with Dr. Sebastian, but has an appointment scheduled with Dr. Vance on 1/21/25.  I sent a message to Dr. Sebastian asking if this was okay, but he prefers the patient be scheduled with him.    Can you re-schedule this appointment to be with Dr. Sebastian?  Due to the severity of his disease, I  would request that it be no later than the appointment he currently has in place.    Please let me know if you have any questions.  Thank you in advance,    [place]

## 2024-12-10 NOTE — ASSESSMENT & PLAN NOTE
Myasthenia gravis.  Present for 3 years.  Current issues are ptosis of his left eyelid weakness of his right shoulder and some dysphagia.      Neurology recommended IVIG - first dose 12/5  -Patient finishing course of IVIG yesterday   Meantime patient is uninsured,  case management to look into the pyridostigmine for him.  Prescription sent to home  Continue to monitor clinically, neurological exam

## 2024-12-10 NOTE — PROGRESS NOTES
Progress Note - Neurology   Name: Joni Iglesias 64 y.o. male I MRN: [id number]  Unit/Bed#: 2 E 254-01 I Date of Admission: 12/5/2024   Date of Service: 12/10/2024 I Hospital Day: 5     Assessment & Plan  Myasthenia gravis  64 y.o. 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 on 12/5/2024 due to MG flare (bilateral eyelid ptosis (L>R), generalized weakness, and neck flexion/extension weakness).  Symptoms began approximately 10 days prior to arrival.    Patient follows with Dr. Sebastian and plans to start new DMT next month.  Patient used to receive IVIG every 6 months.  However, patient reports that he has not needed IVIG for approximately 1 year.    Etiology for MG flare remains unclear.  No metabolic or infectious disturbances have been discovered thus far.      Plan:  - S/p 5 days of IVIG   - Continue home mestinon 90 mg 4x per day  - Recommend NIF/VC Qshift  12/5: NIF -53, vital capacity 3.8 L  12/6: NIF -32, vital capacity 2.1 L  12/6 (evening): NIF -35, vital capacity 2.4 L  12/8 NIF 55, VC 2,   - Medical management and correction of metabolic and infectious disturbances per primary team   - Aspiration precautions.   - Avoid CNS altering medications if possible  - Continue supportive care   - Continue to monitor neurologic status. Notify neurology with any changes in exam.     Patient is stable for discharge from a neurologic standpoint.    Neurology Follow Up Recs:   Joni Iglesias should keep his appointment with Dr. Vance on 1/21/2025.     Subjective:   Patient reports that he is doing well today.  He denies any diplopia this morning.  He does continue to note ptosis and some fatigable weakness that is worse at the end of the day.        Past Medical History:   Diagnosis Date    Coronary artery disease     Heart attack (HCC)     Hyperlipidemia     Myasthenia gravis (HCC)      Past Surgical History:   Procedure Laterality Date    APPENDECTOMY      CORONARY ANGIOPLASTY  WITH STENT PLACEMENT       History reviewed. No pertinent family history.  Social History     Socioeconomic History    Marital status: /Civil Union     Spouse name: None    Number of children: None    Years of education: None    Highest education level: None   Occupational History    None   Tobacco Use    Smoking status: Never    Smokeless tobacco: Never   Vaping Use    Vaping status: Never Used   Substance and Sexual Activity    Alcohol use: Never    Drug use: Never    Sexual activity: None   Other Topics Concern    None   Social History Narrative    None     Social Drivers of Health     Financial Resource Strain: Low Risk  (7/26/2024)    Received from [facility]    Overall Financial Resource Strain (CARDIA)     Difficulty of Paying Living Expenses: Not hard at all   Food Insecurity: No Food Insecurity (12/5/2024)    Nursing - Inadequate Food Risk Classification     Worried About Running Out of Food in the Last Year: Never true     Ran Out of Food in the Last Year: Never true     Ran Out of Food in the Last Year: 1   Transportation Needs: No Transportation Needs (12/5/2024)    Nursing - Transportation Risk Classification     Lack of Transportation: Not on file     Lack of Transportation: 2   Physical Activity: Inactive (7/26/2024)    Received from [facility]    Exercise Vital Sign     Days of Exercise per Week: 0 days     Minutes of Exercise per Session: 0 min   Stress: No Stress Concern Present (7/26/2024)    Received from [facility]    Citizen of [place] of Occupational Health - Occupational Stress Questionnaire     Feeling of Stress : Not at all   Social Connections: Socially Isolated (7/26/2024)    Received from [facility]    Social Connection and Isolation Panel [NHANES]     Frequency of Communication with Friends and Family: More than three times a week     Frequency of Social Gatherings with Friends and Family: More than three times a week      Attends Mandaeism Services: Never     Active Member of Clubs or Organizations: No     Attends Club or Organization Meetings: Never     Marital Status:    Intimate Partner Violence: Unknown (2024)    Nursing IPS     Feels Physically and Emotionally Safe: Not on file     Physically Hurt by Someone: Not on file     Humiliated or Emotionally Abused by Someone: Not on file     Physically Hurt by Someone: 2     Hurt or Threatened by Someone: 2   Housing Stability: Unknown (2024)    Nursing: Inadequate Housing Risk Classification     Has Housing: Not on file     Worried About Losing Housing: Not on file     Unable to Get Utilities: Not on file     Unable to Pay for Housing in the Last Year: 2     Has Housin         Medications:  All current active meds have been reviewed and current meds:  Scheduled Meds:  Current Facility-Administered Medications   Medication Dose Route Frequency Provider Last Rate    acetaminophen  650 mg Oral Q6H PRN Omar Lynn MD      Artificial Tears  1 drop Both Eyes TID PRN Bharathi Vilchis MD      Diclofenac Sodium  2 g Topical 4x Daily Omar Lynn MD      enoxaparin  40 mg Subcutaneous Daily Bharathi Vilchis MD      ferrous sulfate  325 mg Oral Daily With Breakfast Bharathi Vilchis MD      pantoprazole  40 mg Oral BID AC Bharathi Vilchis MD      pyridostigmine  90 mg Oral 4x Daily Omar Lynn MD      senna  8.6 mg Oral HS PRN Bharathi Vilchis MD       Continuous Infusions:   PRN Meds:.  acetaminophen    Artificial Tears    senna       ROS:   Review of Systems   Eyes:         Ptosis   Musculoskeletal:         Gait is slow but steady   Neurological:         Fatigable weakness, worse at end of day             Vitals:   /88   Pulse 68   Temp 98.2 °F (36.8 °C)   Resp 18   Ht 6' (1.829 m)   Wt (!) 138 kg (304 lb 6.4 oz)   SpO2 95%   BMI 41.28 kg/m²       Physical Exam:   Vital signs and nursing notes  reviewed.   Constitutional: Patient is resting comfortably. Well developed, well nourished, in no acute distress. No diaphoresis.   HENT: Normocephalic, atraumatic.   Eyes: EOMs abnormal- cannot full abduct bilaterally. L eyelid ptosis present, worse with prolonged upgaze. intact. No scleral icterus or injection. No discharge.   Neck: Supple, normal ROM. No stridor noted.   Cardiovascular: Regular rate per review of telemetry   Pulmonary: No respiratory distress. Effort normal.   Musculoskeletal: Moves all extremities spontaneously and anti-gravity.   Neurological: Alert. Follows all commands. Detailed below.   Skin: Warm and dry.   Psychiatric: Normal mood and affect. No hallucinations or agitation.     Neurologic Exam:  Mental Status: Patient is awake and alert. Oriented. Follows commands without difficulty. No dysarthria. No aphasia.   Cranial Nerves: Cranial nerves 2-12 grossly intact except for eyelid ptosis on the left, worse with prolonged upgaze, and abnormal EOMs (cannot fully ABduct)  Motor: Moves all extremities spontaneously and anti-gravity. No focal weakness.   Sensation: Sensation to light touch intact.   No tremors noted.   Gait: Deferred           Labs: I have personally reviewed pertinent reports.   Recent Results (from the past 24 hours)   CBC and differential    Collection Time: 12/10/24  4:35 AM   Result Value Ref Range    WBC 4.37 4.31 - 10.16 Thousand/uL    RBC 5.17 3.88 - 5.62 Million/uL    Hemoglobin 10.4 (L) 12.0 - 17.0 g/dL    Hematocrit 35.9 (L) 36.5 - 49.3 %    MCV 69 (L) 82 - 98 fL    MCH 20.1 (L) 26.8 - 34.3 pg    MCHC 29.0 (L) 31.4 - 37.4 g/dL    RDW 18.2 (H) 11.6 - 15.1 %    MPV 10.1 8.9 - 12.7 fL    Platelets 266 149 - 390 Thousands/uL    nRBC 0 /100 WBCs    Segmented % 50 43 - 75 %    Immature Grans % 0 0 - 2 %    Lymphocytes % 29 14 - 44 %    Monocytes % 13 (H) 4 - 12 %    Eosinophils Relative 7 (H) 0 - 6 %    Basophils Relative 1 0 - 1 %    Absolute Neutrophils 2.20 1.85 - 7.62  Thousands/µL    Absolute Immature Grans 0.01 0.00 - 0.20 Thousand/uL    Absolute Lymphocytes 1.28 0.60 - 4.47 Thousands/µL    Absolute Monocytes 0.55 0.17 - 1.22 Thousand/µL    Eosinophils Absolute 0.30 0.00 - 0.61 Thousand/µL    Basophils Absolute 0.03 0.00 - 0.10 Thousands/µL   Basic metabolic panel    Collection Time: 12/10/24  4:35 AM   Result Value Ref Range    Sodium 133 (L) 135 - 147 mmol/L    Potassium 4.2 3.5 - 5.3 mmol/L    Chloride 101 96 - 108 mmol/L    CO2 30 21 - 32 mmol/L    ANION GAP 2 (L) 4 - 13 mmol/L    BUN 15 5 - 25 mg/dL    Creatinine 0.90 0.60 - 1.30 mg/dL    Glucose 117 65 - 140 mg/dL    Calcium 9.1 8.4 - 10.2 mg/dL    eGFR 89 ml/min/1.73sq m       Imaging: I have personally reviewed the images and reports for the following studies: No neuroimaging to review       EKG, Pathology, and Other Studies: I have personally reviewed pertinent reports.       VTE Prophylaxis: VTE covered by:  enoxaparin, Subcutaneous, 40 mg at 12/10/24 0902

## 2024-12-10 NOTE — CASE MANAGEMENT
Case Management Discharge Planning Note    Patient name Joni Iglesias  Location 2 EAST 254/2 E 254-01 MRN [id number]  : 1960 Date 12/10/2024       Current Admission Date: 2024  Current Admission Diagnosis:Myasthenia gravis   Patient Active Problem List    Diagnosis Date Noted Date Diagnosed    Weakness of right shoulder 2024     Other dysphagia 2024     Anemia 2024     History of prediabetes 2024     Personal history of peptic ulcer disease 2024     Poor vision 2024     CHF (congestive heart failure) ([facility]) 2023     Bruise 2023     Acute blood loss anemia 2023     Hyperlipidemia      Myasthenia gravis      Myasthenia gravis ([facility]) 10/01/2021     Morbid obesity ([facility]) 06/10/2021     Ptosis of eyelid, left 2021     CAD (coronary artery disease) 2021       LOS (days): 5  Geometric Mean LOS (GMLOS) (days): 3.8  Days to GMLOS:-1.5     OBJECTIVE:  Risk of Unplanned Readmission Score: 11.38         Current admission status: Inpatient   Preferred Pharmacy:   [id number] - [facility] [place] - [address]  [address]  [facility] [place] [address]  Phone: [phone] Fax: [phone]    [facility]  [place], [place] - [address]  [address]  [place] [address]  Phone: [phone] Fax: [phone]    [id number] - [facility] [place] - [address]  [address]  [facility] [place] [address]  Phone: [phone] Fax: [phone]    [facility] [place] - [address]  [address]  [place] [address]  Phone: [phone] Fax: [phone]    Primary Care Provider: No primary care provider on file.    Primary Insurance:   Secondary Insurance:     DISCHARGE DETAILS:      Other Referral/Resources/Interventions Provided:  Interventions: Prescription Price Check  Referral Comments: [facility] confirms Rx ready and bedside RN reports retreiving for  patient.  Treatment Team Recommendation: Home  Discharge Destination Plan:: Home  Transport at Discharge : Free Local Transportation (Lyft via roundtrip at 1325)

## 2024-12-10 NOTE — PLAN OF CARE
Problem: PAIN - ADULT  Goal: Verbalizes/displays adequate comfort level or baseline comfort level  Description: Interventions:  - Encourage patient to monitor pain and request assistance  - Assess pain using appropriate pain scale  - Administer analgesics based on type and severity of pain and evaluate response  - Implement non-pharmacological measures as appropriate and evaluate response  - Consider cultural and social influences on pain and pain management  - Notify physician/advanced practitioner if interventions unsuccessful or patient reports new pain  Outcome: Progressing     Problem: INFECTION - ADULT  Goal: Absence or prevention of progression during hospitalization  Description: INTERVENTIONS:  - Assess and monitor for signs and symptoms of infection  - Monitor lab/diagnostic results  - Monitor all insertion sites, i.e. indwelling lines, tubes, and drains  - Monitor endotracheal if appropriate and nasal secretions for changes in amount and color  - [place] appropriate cooling/warming therapies per order  - Administer medications as ordered  - Instruct and encourage patient and family to use good hand hygiene technique  - Identify and instruct in appropriate isolation precautions for identified infection/condition  Outcome: Progressing  Goal: Absence of fever/infection during neutropenic period  Description: INTERVENTIONS:  - Monitor WBC    Outcome: Progressing     Problem: SAFETY ADULT  Goal: Patient will remain free of falls  Description: INTERVENTIONS:  - Educate patient/family on patient safety including physical limitations  - Instruct patient to call for assistance with activity   - Consult OT/PT to assist with strengthening/mobility   - Keep Call bell within reach  - Keep bed low and locked with side rails adjusted as appropriate  - Keep care items and personal belongings within reach  - Initiate and maintain comfort rounds  - Make Fall Risk Sign visible to staff    - Apply yellow socks and  bracelet for high fall risk patients  - Consider moving patient to room near nurses station  Outcome: Progressing  Goal: Maintain or return to baseline ADL function  Description: INTERVENTIONS:  -  Assess patient's ability to carry out ADLs; assess patient's baseline for ADL function and identify physical deficits which impact ability to perform ADLs (bathing, care of mouth/teeth, toileting, grooming, dressing, etc.)  - Assess/evaluate cause of self-care deficits   - Assess range of motion  - Assess patient's mobility; develop plan if impaired  - Assess patient's need for assistive devices and provide as appropriate  - Encourage maximum independence but intervene and supervise when necessary  - Involve family in performance of ADLs  - Assess for home care needs following discharge   - Consider OT consult to assist with ADL evaluation and planning for discharge  - Provide patient education as appropriate  Outcome: Progressing  Goal: Maintains/Returns to pre admission functional level  Description: INTERVENTIONS:  - Perform AM-PAC 6 Click Basic Mobility/ Daily Activity assessment daily.  - Set and communicate daily mobility goal to care team and patient/family/caregiver.   - Collaborate with rehabilitation services on mobility goals if consulted  -  - Out of bed for toileting  - Record patient progress and toleration of activity level   Outcome: Progressing     Problem: DISCHARGE PLANNING  Goal: Discharge to home or other facility with appropriate resources  Description: INTERVENTIONS:  - Identify barriers to discharge w/patient and caregiver  - Arrange for needed discharge resources and transportation as appropriate  - Identify discharge learning needs (meds, wound care, etc.)  - Arrange for interpretive services to assist at discharge as needed  - Refer to Case Management Department for coordinating discharge planning if the patient needs post-hospital services based on physician/advanced practitioner order or  complex needs related to functional status, cognitive ability, or social support system  Outcome: Progressing     Problem: Knowledge Deficit  Goal: Patient/family/caregiver demonstrates understanding of disease process, treatment plan, medications, and discharge instructions  Description: Complete learning assessment and assess knowledge base.  Interventions:  - Provide teaching at level of understanding  - Provide teaching via preferred learning methods  Outcome: Progressing

## 2024-12-11 NOTE — TELEPHONE ENCOUNTER
MSW attempted to reach patient/son to see if they had received/completed/returned the [employer] application. No answer at [phone]. MSW left a message requesting callback. Awaiting same.    MSW will be available should patient callback/return [employer] message.

## 2024-12-16 NOTE — TELEPHONE ENCOUNTER
MSW attempted to reach patient/son to see if they had received/completed/returned the [employer] application. No answer at [phone]. MSW left a message requesting callback. Awaiting same.    MSW will close task at this time due to no call back despite MULTIPLE messages left, Unable to Reach letter sent via mail/[employer]. [employer] separately mailed patient the M[employer]A application.    MSW will be available should patient callback/return [employer] message.

## 2024-12-24 ENCOUNTER — TELEPHONE (OUTPATIENT)
Age: 64
End: 2024-12-24

## 2025-01-12 DIAGNOSIS — G70.00 MYASTHENIA GRAVIS (HCC): ICD-10-CM

## 2025-01-13 RX ORDER — PYRIDOSTIGMINE BROMIDE 60 MG/1
60 TABLET ORAL 4 TIMES DAILY
Qty: 120 TABLET | Refills: 3 | Status: SHIPPED | OUTPATIENT
Start: 2025-01-13

## 2025-01-13 NOTE — TELEPHONE ENCOUNTER
Pt sent my chart msg with refill request.    LOV 9/25/24 Dr Sebastian  Next OV  1/21/25 Dr Vance      Routed to provider to see pended med and send if agreeable.     Sent Secure chat msg as well as pt will be out of meds after today per my chart msg.

## 2025-01-16 ENCOUNTER — TELEPHONE (OUTPATIENT)
Dept: NEUROLOGY | Facility: CLINIC | Age: 65
End: 2025-01-16

## 2025-01-16 NOTE — TELEPHONE ENCOUNTER
I spoke with the patient, patient wishes to continue seeing Dr. Sebastian as he started patient on a new treatment. I let the patient know I scheduled patient for Dr. Sebastian's first available on 2/19/25 @ 10:30am in the [place] office. Patient will call back if unable to come in due to transportation.

## 2025-01-16 NOTE — TELEPHONE ENCOUNTER
MSW received call from patient this date ([phone]). Patient stated that he has an appt currently scheduled with Dr. Vance on 1/21 and he want to change same to 1/24. Patient stated that he does not have a ride on 1/21, but that he son can take him on 1/24. MSW advised that this writer will need to forward this request to a .     Patient requested a callback to [phone].    MSW reached to to KIMMY Hess. She will address request.

## 2025-01-22 DIAGNOSIS — G70.00 MYASTHENIA GRAVIS (HCC): Primary | ICD-10-CM

## 2025-01-22 DIAGNOSIS — G70.01 MYASTHENIA GRAVIS IN CRISIS (HCC): ICD-10-CM

## 2025-01-22 RX ORDER — SODIUM CHLORIDE 9 MG/ML
20 INJECTION, SOLUTION INTRAVENOUS ONCE
OUTPATIENT
Start: 2025-01-31

## 2025-01-29 ENCOUNTER — PATIENT MESSAGE (OUTPATIENT)
Dept: NEUROLOGY | Facility: CLINIC | Age: 65
End: 2025-01-29

## 2025-01-30 ENCOUNTER — TELEPHONE (OUTPATIENT)
Dept: NEUROLOGY | Facility: CLINIC | Age: 65
End: 2025-01-30

## 2025-01-30 NOTE — TELEPHONE ENCOUNTER
Triston'd confirmation from [facility]/ [facility] specialist Nohemi:     I just spoke with PB and he is all set for 3PM tomorrow! I shared the information with him in your email and he was confident he knows where to go.      I let PB know to call me if he needs anything and otherwise, we will check in with him on Monday to do a post-infusion call to check in and see how we can support him moving forward.    Nohemi Antonio   [facility] , Neurology    TELEPHONE [phone]   DIRECT [phone]   FAX [phone]   EMAIL [email or web address]

## 2025-01-30 NOTE — TELEPHONE ENCOUNTER
LM for patient or son to confirm 1/31/25 3 pm appointment for Ultomiris Infusion at [address] [place] [address]  Tel# [phone]    Please confirm the above information with patient if he calls back, thank you!    [facility] has shipped his Ultomiris medication for delivery tomorrow morning direct to [facility]

## 2025-01-31 ENCOUNTER — HOSPITAL ENCOUNTER (OUTPATIENT)
Dept: INFUSION CENTER | Facility: CLINIC | Age: 65
End: 2025-01-31

## 2025-01-31 VITALS
BODY MASS INDEX: 41.18 KG/M2 | DIASTOLIC BLOOD PRESSURE: 71 MMHG | RESPIRATION RATE: 17 BRPM | WEIGHT: 303.6 LBS | TEMPERATURE: 98.1 F | SYSTOLIC BLOOD PRESSURE: 148 MMHG | HEART RATE: 83 BPM

## 2025-01-31 DIAGNOSIS — G70.00 MYASTHENIA GRAVIS (HCC): Primary | ICD-10-CM

## 2025-01-31 DIAGNOSIS — G70.01 MYASTHENIA GRAVIS IN CRISIS (HCC): ICD-10-CM

## 2025-01-31 PROCEDURE — 96413 CHEMO IV INFUSION 1 HR: CPT

## 2025-01-31 RX ORDER — SODIUM CHLORIDE 9 MG/ML
20 INJECTION, SOLUTION INTRAVENOUS ONCE
Status: COMPLETED | OUTPATIENT
Start: 2025-01-31 | End: 2025-01-31

## 2025-01-31 RX ORDER — SODIUM CHLORIDE 9 MG/ML
20 INJECTION, SOLUTION INTRAVENOUS ONCE
Status: CANCELLED | OUTPATIENT
Start: 2025-02-14

## 2025-01-31 RX ADMIN — SODIUM CHLORIDE 20 ML/HR: 0.9 INJECTION, SOLUTION INTRAVENOUS at 15:52

## 2025-01-31 RX ADMIN — RAVULIZUMAB 3000 MG: 300 SOLUTION, CONCENTRATE INTRAVENOUS at 16:00

## 2025-01-31 NOTE — PROGRESS NOTES
Pt here for Ultomiris infusion, offering no complaints. Reached out to Clem Sebastian MD, ok to proceed with CBC from 12/10/24. Peripheral IV placed with positive blood return noted. Tolerating infusion so far, report  given to Lorena Maloney RN. Next appointment on 2/14/25 at 3pm.

## 2025-01-31 NOTE — PROGRESS NOTES
Patient tolerated remainder of Ultomiris infusion without incident. PIV removed.     Next appointment: 2/14/25 @ 1500

## 2025-02-10 ENCOUNTER — PATIENT MESSAGE (OUTPATIENT)
Dept: NEUROLOGY | Facility: CLINIC | Age: 65
End: 2025-02-10

## 2025-02-12 NOTE — PATIENT COMMUNICATION
Called and left message for patient to Confirm his 2/14/25 3 pm appointment for his 2nd Ultomiris infusion at [facility].   Patient has not responded via My Chart thus far.

## 2025-02-14 ENCOUNTER — HOSPITAL ENCOUNTER (OUTPATIENT)
Dept: INFUSION CENTER | Facility: CLINIC | Age: 65
End: 2025-02-14

## 2025-02-14 VITALS
TEMPERATURE: 97.8 F | HEART RATE: 98 BPM | RESPIRATION RATE: 18 BRPM | WEIGHT: 305 LBS | BODY MASS INDEX: 41.37 KG/M2 | DIASTOLIC BLOOD PRESSURE: 73 MMHG | SYSTOLIC BLOOD PRESSURE: 162 MMHG

## 2025-02-14 DIAGNOSIS — G70.00 MYASTHENIA GRAVIS (HCC): Primary | ICD-10-CM

## 2025-02-14 DIAGNOSIS — G70.01 MYASTHENIA GRAVIS IN CRISIS (HCC): ICD-10-CM

## 2025-02-14 PROCEDURE — 96413 CHEMO IV INFUSION 1 HR: CPT

## 2025-02-14 RX ORDER — SODIUM CHLORIDE 9 MG/ML
20 INJECTION, SOLUTION INTRAVENOUS ONCE
OUTPATIENT
Start: 2025-03-28

## 2025-02-14 RX ORDER — SODIUM CHLORIDE 9 MG/ML
20 INJECTION, SOLUTION INTRAVENOUS ONCE
Status: COMPLETED | OUTPATIENT
Start: 2025-02-14 | End: 2025-02-14

## 2025-02-14 RX ADMIN — SODIUM CHLORIDE 20 ML/HR: 9 INJECTION, SOLUTION INTRAVENOUS at 15:46

## 2025-02-14 RX ADMIN — RAVULIZUMAB 3600 MG: 300 SOLUTION, CONCENTRATE INTRAVENOUS at 16:02

## 2025-02-14 NOTE — PROGRESS NOTES
Report taken from Carlee ALDRICH, Infusion completed w/o any further issues once rate was decreased, IV removed, AVS was given, pt aware of his next appt on 4/11 at 2 pm, Pt D/C'd home

## 2025-02-14 NOTE — PROGRESS NOTES
Pt to clinic for Ultomiris. Pt offers no complaints today. Tolerating infusion without complications. Aware of next appointment on 4/11/25 at 1400. AVS declined. Report given to Alondra Palmer RN.

## 2025-02-14 NOTE — PROGRESS NOTES
Called over by pt reporting sudden onset of b/l shooting leg pain. Ultomiris infusion stopped. Per Clem Sebastian MD ok to restart at slower rate of 92ml/hr with no other interventions at this time. Order to restart placed in therapy plan. Pt currently tolerating infusion at slower rate.

## 2025-02-16 DIAGNOSIS — G70.00 MYASTHENIA GRAVIS (HCC): Primary | ICD-10-CM

## 2025-02-16 DIAGNOSIS — G70.01 MYASTHENIA GRAVIS IN CRISIS (HCC): ICD-10-CM

## 2025-02-17 DIAGNOSIS — G70.00 MYASTHENIA GRAVIS (HCC): Primary | ICD-10-CM

## 2025-02-17 DIAGNOSIS — G70.01 MYASTHENIA GRAVIS IN CRISIS (HCC): ICD-10-CM

## 2025-02-18 ENCOUNTER — TELEPHONE (OUTPATIENT)
Dept: NEUROLOGY | Facility: CLINIC | Age: 65
End: 2025-02-18

## 2025-02-19 NOTE — TELEPHONE ENCOUNTER
Unable to reach by phone sent message to patient. Informing him that he is reschedule for 04/09 at 10:30 am with Dr. Sebastian

## 2025-03-05 ENCOUNTER — TELEPHONE (OUTPATIENT)
Age: 65
End: 2025-03-05

## 2025-03-05 NOTE — TELEPHONE ENCOUNTER
03/05/25    Patient called office again due that call got disconnected when he was talking to one of my PEP TEAM.    Patient restated the reason of his call and is requesting for Dr. Sebastian or his Clinical Team to review WATER Form that was faxed over last week and Emailed by Patient Son.     I was not able to confirm form, did not see in Media.    Please Review and Contact Patient with any status.    Patient WATER WAS SHUT OFF TODAY.       Please and Thank You.

## 2025-03-05 NOTE — TELEPHONE ENCOUNTER
Spoke with patient son, mentioned to son fax was not received. Provided son with Direct office fax number. Mentioned to son to fax to office and will notify Dr. Sebastian. Patient son was appreciative of call back.

## 2025-03-05 NOTE — TELEPHONE ENCOUNTER
Pt called in stating a fax was sent in from the water company and that it was so his water did not get shut off. Pt water has now been shut off.

## 2025-03-06 NOTE — TELEPHONE ENCOUNTER
03/06/25    Patient called office today to check on the status of the Water Shut off Form / Medical Certification if we received it.    Confirmed to Patient that form was received / attached to a My Chart Message yesterday 03/05/25 around 8:02 PM.    Please Review PATIENT MESSAGE, Print and forwards to Dr. Romulo ROCKWELL Per Patient Request.      Any questions, please contact Patient or Son.  Please and Thank You.

## 2025-04-02 ENCOUNTER — PATIENT MESSAGE (OUTPATIENT)
Dept: NEUROLOGY | Facility: CLINIC | Age: 65
End: 2025-04-02

## 2025-04-03 ENCOUNTER — TELEPHONE (OUTPATIENT)
Dept: NEUROLOGY | Facility: CLINIC | Age: 65
End: 2025-04-03

## 2025-04-03 NOTE — TELEPHONE ENCOUNTER
Emailed Marina Martin @ [facility] regarding delivery of Ultomiris 3600 mg dose to [facility] for patient's 4/11/25 Infusion.  Will await return confirmation from [facility]

## 2025-04-07 NOTE — TELEPHONE ENCOUNTER
Per Marina, Ultomiris will be shipping out today for delivery tomorrow 4/8/25 to [facility]   [address] ( delivery address for meds/pharmacy)  [place] [address]    I asked for tracking number when available.

## 2025-04-08 ENCOUNTER — TELEPHONE (OUTPATIENT)
Dept: NEUROLOGY | Facility: CLINIC | Age: 65
End: 2025-04-08

## 2025-04-09 NOTE — TELEPHONE ENCOUNTER
[employer] Tracking #   [id number]    DELIVERED  Wednesday 4/9/25 at 9:33 AM  Signed for by: Da    Delivered  Delivered  [place]  Delivered  4/9/25 at 9:33 AM

## 2025-04-10 ENCOUNTER — TELEPHONE (OUTPATIENT)
Dept: NEUROLOGY | Facility: CLINIC | Age: 65
End: 2025-04-10

## 2025-04-10 DIAGNOSIS — G70.00 MYASTHENIA GRAVIS (HCC): ICD-10-CM

## 2025-04-10 RX ORDER — PYRIDOSTIGMINE BROMIDE 60 MG/1
60 TABLET ORAL 4 TIMES DAILY
Qty: 120 TABLET | Refills: 3 | Status: SHIPPED | OUTPATIENT
Start: 2025-04-10

## 2025-04-10 RX ORDER — PYRIDOSTIGMINE BROMIDE 60 MG/1
60 TABLET ORAL 4 TIMES DAILY
Qty: 120 TABLET | Refills: 0 | OUTPATIENT
Start: 2025-04-10

## 2025-04-10 NOTE — TELEPHONE ENCOUNTER
Reason for call:   [x] Refill   [] Prior Auth  [] Other:     Office:   [] PCP/Provider -   [x] Specialty/Provider - Neuro    Medication: Pyridostigmine    Dose/Frequency: 60 mg    Quantity: 120 tablets    Pharmacy: [id number] - [facility] [place] - [id number]     Local Pharmacy   Does the patient have enough for 3 days?   [] Yes   [x] No - Send as HP to POD    Mail Away Pharmacy   Does the patient have enough for 10 days?   [] Yes   [] No - Send as HP to POD

## 2025-04-10 NOTE — TELEPHONE ENCOUNTER
Patient called in stating that he is completely out of medication and requesting refill. He states that he is out of medication currently.

## 2025-04-10 NOTE — TELEPHONE ENCOUNTER
Received back line transfer call from patient requesting to r/s his missed appointment. He is only able to schedule on Mondays d/t transportation needs. Rescheduled to 5/5.

## 2025-04-11 ENCOUNTER — HOSPITAL ENCOUNTER (OUTPATIENT)
Dept: INFUSION CENTER | Facility: CLINIC | Age: 65
End: 2025-04-11
Attending: PSYCHIATRY & NEUROLOGY

## 2025-04-11 VITALS
HEART RATE: 59 BPM | BODY MASS INDEX: 40.9 KG/M2 | TEMPERATURE: 97.4 F | WEIGHT: 301.6 LBS | RESPIRATION RATE: 18 BRPM | SYSTOLIC BLOOD PRESSURE: 143 MMHG | DIASTOLIC BLOOD PRESSURE: 63 MMHG

## 2025-04-11 DIAGNOSIS — G70.00 MYASTHENIA GRAVIS (HCC): Primary | ICD-10-CM

## 2025-04-11 DIAGNOSIS — G70.01 MYASTHENIA GRAVIS IN CRISIS (HCC): ICD-10-CM

## 2025-04-11 LAB
BASOPHILS # BLD AUTO: 0.04 THOUSANDS/ÂΜL (ref 0–0.1)
BASOPHILS NFR BLD AUTO: 0 % (ref 0–1)
EOSINOPHIL # BLD AUTO: 0.39 THOUSAND/ÂΜL (ref 0–0.61)
EOSINOPHIL NFR BLD AUTO: 4 % (ref 0–6)
ERYTHROCYTE [DISTWIDTH] IN BLOOD BY AUTOMATED COUNT: 19.4 % (ref 11.6–15.1)
HCT VFR BLD AUTO: 39.6 % (ref 36.5–49.3)
HGB BLD-MCNC: 11.6 G/DL (ref 12–17)
IMM GRANULOCYTES # BLD AUTO: 0.03 THOUSAND/UL (ref 0–0.2)
IMM GRANULOCYTES NFR BLD AUTO: 0 % (ref 0–2)
LYMPHOCYTES # BLD AUTO: 1.93 THOUSANDS/ÂΜL (ref 0.6–4.47)
LYMPHOCYTES NFR BLD AUTO: 20 % (ref 14–44)
MCH RBC QN AUTO: 20.9 PG (ref 26.8–34.3)
MCHC RBC AUTO-ENTMCNC: 29.3 G/DL (ref 31.4–37.4)
MCV RBC AUTO: 71 FL (ref 82–98)
MONOCYTES # BLD AUTO: 0.78 THOUSAND/ÂΜL (ref 0.17–1.22)
MONOCYTES NFR BLD AUTO: 8 % (ref 4–12)
NEUTROPHILS # BLD AUTO: 6.27 THOUSANDS/ÂΜL (ref 1.85–7.62)
NEUTS SEG NFR BLD AUTO: 68 % (ref 43–75)
NRBC BLD AUTO-RTO: 0 /100 WBCS
PLATELET # BLD AUTO: 268 THOUSANDS/UL (ref 149–390)
PMV BLD AUTO: 10.1 FL (ref 8.9–12.7)
RBC # BLD AUTO: 5.55 MILLION/UL (ref 3.88–5.62)
WBC # BLD AUTO: 9.44 THOUSAND/UL (ref 4.31–10.16)

## 2025-04-11 PROCEDURE — 85025 COMPLETE CBC W/AUTO DIFF WBC: CPT | Performed by: PSYCHIATRY & NEUROLOGY

## 2025-04-11 PROCEDURE — 96413 CHEMO IV INFUSION 1 HR: CPT

## 2025-04-11 RX ORDER — SODIUM CHLORIDE 9 MG/ML
20 INJECTION, SOLUTION INTRAVENOUS ONCE
Status: COMPLETED | OUTPATIENT
Start: 2025-04-11 | End: 2025-04-11

## 2025-04-11 RX ORDER — SODIUM CHLORIDE 9 MG/ML
20 INJECTION, SOLUTION INTRAVENOUS ONCE
OUTPATIENT
Start: 2025-04-25

## 2025-04-11 RX ADMIN — SODIUM CHLORIDE 20 ML/HR: 0.9 INJECTION, SOLUTION INTRAVENOUS at 12:57

## 2025-04-11 RX ADMIN — RAVULIZUMAB 3600 MG: 300 SOLUTION, CONCENTRATE INTRAVENOUS at 13:34

## 2025-04-11 NOTE — PROGRESS NOTES
Pt here today for an Ultimiris  infusion, offers no complaints, states that he is feeling much better since he has been getting this infusion, many symptoms have decreased, CBC drawn, infusion completed w/o complications, aware of there next appt on 6/6 at 8:30, AVS declined but calenders given, and pt D/C home

## 2025-04-11 NOTE — PROGRESS NOTES
Reached out to Clem Sebastian MD, pt has a CBC with diff in today's treatment plan, Per Clem Sebastian MD okay to proceed CBC with diff from 12/10/24 today and to have labs drawn prior to next treatment.

## 2025-04-21 ENCOUNTER — TELEPHONE (OUTPATIENT)
Age: 65
End: 2025-04-21

## 2025-04-21 NOTE — TELEPHONE ENCOUNTER
Please see My chart msg sent encounter dated 4/2/25, msg sent 4/18/25.    I called gerard Quinones per communication consent, as that is the preferred number.    I had to leave a detailed msg and requesting he call our office back at , option 3 to speak to a nurse, as we need to ask more questions to see how he is currently doing, to give that insight to our provider, in order to provide recommendations.    I will also send a reply via my chart requesting a call back as well

## 2025-04-24 NOTE — PATIENT COMMUNICATION
Called patient and had to leave message. Advised to call our office for further assessment and advisement regarding his My chart message to Dr. Sebastian.

## 2025-04-28 ENCOUNTER — TELEPHONE (OUTPATIENT)
Dept: NEUROLOGY | Facility: CLINIC | Age: 65
End: 2025-04-28

## 2025-05-01 NOTE — TELEPHONE ENCOUNTER
Called and spoke to patient. Explained he look into [facility] and speak to his pharmacist. He verbalized understanding and denies questions at this time. I stated to give us a call if he has any further issues regarding this.

## 2025-06-04 ENCOUNTER — TELEPHONE (OUTPATIENT)
Dept: INFUSION CENTER | Facility: CLINIC | Age: 65
End: 2025-06-04

## 2025-06-04 NOTE — TELEPHONE ENCOUNTER
LVM for patient to please get labs done prior to his appt on Friday 6/6/25. Callback # left for questions.

## 2025-06-05 ENCOUNTER — TELEPHONE (OUTPATIENT)
Dept: INFUSION CENTER | Facility: CLINIC | Age: 65
End: 2025-06-05

## 2025-06-05 NOTE — TELEPHONE ENCOUNTER
Patient called regarding appointment for 6/6/2025 at 8:30 AM.  Patient without transportation and public transportation/transfers will not allow for him to make it to the clinic as early as 8:30 AM.  Patient is requesting a later time slot, closer to 11:30 AM as this is the projected ETA.

## 2025-06-06 ENCOUNTER — TELEPHONE (OUTPATIENT)
Dept: INFUSION CENTER | Facility: CLINIC | Age: 65
End: 2025-06-06

## 2025-06-06 ENCOUNTER — HOSPITAL ENCOUNTER (OUTPATIENT)
Dept: INFUSION CENTER | Facility: CLINIC | Age: 65
End: 2025-06-06
Attending: PSYCHIATRY & NEUROLOGY

## 2025-06-06 ENCOUNTER — APPOINTMENT (OUTPATIENT)
Dept: LAB | Facility: HOSPITAL | Age: 65
End: 2025-06-06

## 2025-06-06 VITALS
RESPIRATION RATE: 18 BRPM | DIASTOLIC BLOOD PRESSURE: 65 MMHG | HEART RATE: 71 BPM | WEIGHT: 294.6 LBS | TEMPERATURE: 98.3 F | BODY MASS INDEX: 39.95 KG/M2 | SYSTOLIC BLOOD PRESSURE: 136 MMHG

## 2025-06-06 DIAGNOSIS — G70.01 MYASTHENIA GRAVIS IN CRISIS (HCC): ICD-10-CM

## 2025-06-06 DIAGNOSIS — G70.00 MYASTHENIA GRAVIS (HCC): Primary | ICD-10-CM

## 2025-06-06 DIAGNOSIS — G70.00 MYASTHENIA GRAVIS (HCC): ICD-10-CM

## 2025-06-06 LAB
BASOPHILS # BLD AUTO: 0.04 THOUSANDS/ÂΜL (ref 0–0.1)
BASOPHILS NFR BLD AUTO: 1 % (ref 0–1)
EOSINOPHIL # BLD AUTO: 0.33 THOUSAND/ÂΜL (ref 0–0.61)
EOSINOPHIL NFR BLD AUTO: 4 % (ref 0–6)
ERYTHROCYTE [DISTWIDTH] IN BLOOD BY AUTOMATED COUNT: 17.2 % (ref 11.6–15.1)
HCT VFR BLD AUTO: 37.4 % (ref 36.5–49.3)
HGB BLD-MCNC: 11.3 G/DL (ref 12–17)
IMM GRANULOCYTES # BLD AUTO: 0.02 THOUSAND/UL (ref 0–0.2)
IMM GRANULOCYTES NFR BLD AUTO: 0 % (ref 0–2)
LYMPHOCYTES # BLD AUTO: 2.18 THOUSANDS/ÂΜL (ref 0.6–4.47)
LYMPHOCYTES NFR BLD AUTO: 27 % (ref 14–44)
MCH RBC QN AUTO: 21.2 PG (ref 26.8–34.3)
MCHC RBC AUTO-ENTMCNC: 30.2 G/DL (ref 31.4–37.4)
MCV RBC AUTO: 70 FL (ref 82–98)
MONOCYTES # BLD AUTO: 0.7 THOUSAND/ÂΜL (ref 0.17–1.22)
MONOCYTES NFR BLD AUTO: 9 % (ref 4–12)
NEUTROPHILS # BLD AUTO: 4.87 THOUSANDS/ÂΜL (ref 1.85–7.62)
NEUTS SEG NFR BLD AUTO: 59 % (ref 43–75)
NRBC BLD AUTO-RTO: 0 /100 WBCS
PLATELET # BLD AUTO: 252 THOUSANDS/UL (ref 149–390)
PMV BLD AUTO: 9.5 FL (ref 8.9–12.7)
RBC # BLD AUTO: 5.33 MILLION/UL (ref 3.88–5.62)
WBC # BLD AUTO: 8.14 THOUSAND/UL (ref 4.31–10.16)

## 2025-06-06 PROCEDURE — 85025 COMPLETE CBC W/AUTO DIFF WBC: CPT

## 2025-06-06 PROCEDURE — 36415 COLL VENOUS BLD VENIPUNCTURE: CPT

## 2025-06-06 PROCEDURE — 96413 CHEMO IV INFUSION 1 HR: CPT

## 2025-06-06 RX ORDER — SODIUM CHLORIDE 9 MG/ML
20 INJECTION, SOLUTION INTRAVENOUS ONCE
Status: COMPLETED | OUTPATIENT
Start: 2025-06-06 | End: 2025-06-06

## 2025-06-06 RX ORDER — SODIUM CHLORIDE 9 MG/ML
20 INJECTION, SOLUTION INTRAVENOUS ONCE
OUTPATIENT
Start: 2025-06-20

## 2025-06-06 RX ADMIN — SODIUM CHLORIDE 20 ML/HR: 9 INJECTION, SOLUTION INTRAVENOUS at 13:09

## 2025-06-06 RX ADMIN — RAVULIZUMAB 3600 MG: 300 SOLUTION, CONCENTRATE INTRAVENOUS at 13:13

## 2025-06-06 NOTE — TELEPHONE ENCOUNTER
VM left to remind patient that lab work is needed and must be resulted for today's appointment at the [facility] center at 12:30 PM.

## 2025-06-06 NOTE — PROGRESS NOTES
Joni Iglesias today for an Ultomiris  infusion, offers no complaints.  Tolerated infusion without complication.  PIV removed.  AVS offered and patient left in stable condition.    Next appointment: 8/1/2025 at 3:30 PM.

## 2025-06-09 DIAGNOSIS — Z74.8 ASSISTANCE NEEDED WITH TRANSPORTATION: Primary | ICD-10-CM

## 2025-06-10 ENCOUNTER — TELEPHONE (OUTPATIENT)
Dept: NEUROLOGY | Facility: CLINIC | Age: 65
End: 2025-06-10

## 2025-06-10 ENCOUNTER — PATIENT OUTREACH (OUTPATIENT)
Dept: CASE MANAGEMENT | Facility: OTHER | Age: 65
End: 2025-06-10

## 2025-06-10 NOTE — TELEPHONE ENCOUNTER
"The following message was received today to the Social Work Pool:    \"Virginia Posadas LCSW  P Neurology   Good morning,    Our team received a referral from an oncology SW sharing that this pt has a need for transportation. They could not assist as is infusions are not cancer related. The ordering provider for treatment is a neuro provider. Are you able to assist this pt?    Please let me know.    Thank you,  [place]ia  Outpatient Care Management\"    MSW responded with:    \"Wilder Coleman,     I will call this gentleman one more time. I have sent him the [facility] applications several times and called him countless times to follow-up on it (all documented in Epic). He does not want to disclose the information on it, so I doubt he will comply but like I said I will try him one more time.     Thanks for reaching out.     MAYA Bourne\"    Please see previous Telephone Encounters dated 7/25/24 and 9/26/24 for previous attempts to offer [employer].    MSW attempted to reach patient at [phone]. No answer. There was a recording that states \"the person you are trying to reach has a voicemail box that has not been set up yet.\"    Patient's son, Joni, is listed on the Communication Consent but has the same number listed as patient's.     MSW will attempt to reach patient 2 more times by phone, and if no answer, then will send another Unable to Reach letter.   "

## 2025-06-10 NOTE — PROGRESS NOTES
ANMOL ASH received referral from OP CM AC. Referral sent by Oncology ANMOL d/t need for transportation. Pt is receiving infusions that are not cancer related and are ordered by Neurology.    ANMOL ASH sent IB to Neurology ANMOL and Jojo CORAE f/u with pt regarding [facility] transportation applications. Referral closed.

## 2025-06-11 NOTE — TELEPHONE ENCOUNTER
MSW attempted to reach patient at [phone] to follow-up about the [employer] application (to see if he ever completed/submitted same). No answer. MSW left a message requesting callback. Awaiting same.

## 2025-06-12 NOTE — TELEPHONE ENCOUNTER
"MSW confirmed with Marisela at [employer] that she never received an application from this patient.     MSW attempted to reach patient at [phone] to follow-up about the [employer] [place] application (to see if he ever completed/submitted same). No answer. MSW left a message requesting callback. Awaiting same.     Since there have been 3 unsuccessful attempts to reach patient, MSW sent an \"Unable to Reach\" letter and [employer] application via [employer] this date. If same is not read within a timely manner, MSW will mail a copy of the letter to patient's home address.     "

## 2025-06-16 NOTE — TELEPHONE ENCOUNTER
MSW attempted to reach patient at [phone] to follow-up about the [employer] application (to see if he ever completed/submitted same). No answer. MSW left a message requesting callback. Awaiting same.     [employer] message has not yet been read. MSW will request that a copy of the Unable to Reach Letter and the [employer] application be placed in the mail to patient's home address this date.

## 2025-07-10 DIAGNOSIS — G70.01 MYASTHENIA GRAVIS IN CRISIS (HCC): ICD-10-CM

## 2025-07-10 DIAGNOSIS — G70.00 MYASTHENIA GRAVIS (HCC): Primary | ICD-10-CM

## 2025-07-16 DIAGNOSIS — G70.00 MYASTHENIA GRAVIS (HCC): ICD-10-CM

## 2025-07-16 RX ORDER — PYRIDOSTIGMINE BROMIDE 60 MG/1
60 TABLET ORAL 4 TIMES DAILY
Qty: 120 TABLET | Refills: 0 | Status: SHIPPED | OUTPATIENT
Start: 2025-07-16 | End: 2025-07-16 | Stop reason: SDUPTHER

## 2025-07-16 RX ORDER — PYRIDOSTIGMINE BROMIDE 60 MG/1
60 TABLET ORAL 4 TIMES DAILY
Qty: 120 TABLET | Refills: 0 | Status: SHIPPED | OUTPATIENT
Start: 2025-07-16

## 2025-07-16 NOTE — TELEPHONE ENCOUNTER
Reason for call:   [x] Refill   [] Prior Auth  [] Other:     Office: [facility]   [] PCP/Provider -   [x] Specialty/Provider - Neurology/ Clem Sebastian     Medication: pyridostigmine (MESTINON)     Dose/Frequency: 60 mg/ 1 tab 4 times daily     Quantity: 30 day supply     Pharmacy: [facility] in [place]     Local Pharmacy   Does the patient have enough for 3 days?   [] Yes   [x] No - Send as HP to POD- out completely.     Mail Away Pharmacy   Does the patient have enough for 10 days?   [] Yes   [] No - Send as HP to POD

## 2025-07-30 ENCOUNTER — TELEPHONE (OUTPATIENT)
Dept: INFUSION CENTER | Facility: CLINIC | Age: 65
End: 2025-07-30

## 2025-08-01 ENCOUNTER — TELEPHONE (OUTPATIENT)
Dept: INFUSION CENTER | Facility: CLINIC | Age: 65
End: 2025-08-01

## 2025-08-01 ENCOUNTER — HOSPITAL ENCOUNTER (OUTPATIENT)
Dept: INFUSION CENTER | Facility: CLINIC | Age: 65
Discharge: HOME | End: 2025-08-01
Attending: PSYCHIATRY & NEUROLOGY

## 2025-08-01 ENCOUNTER — APPOINTMENT (OUTPATIENT)
Dept: LAB | Facility: HOSPITAL | Age: 65
End: 2025-08-01

## 2025-08-01 VITALS
DIASTOLIC BLOOD PRESSURE: 69 MMHG | BODY MASS INDEX: 41.31 KG/M2 | RESPIRATION RATE: 18 BRPM | HEART RATE: 62 BPM | TEMPERATURE: 97.3 F | SYSTOLIC BLOOD PRESSURE: 146 MMHG | WEIGHT: 304.6 LBS

## 2025-08-01 DIAGNOSIS — G70.00 MYASTHENIA GRAVIS (HCC): ICD-10-CM

## 2025-08-01 DIAGNOSIS — G70.01 MYASTHENIA GRAVIS IN CRISIS (HCC): ICD-10-CM

## 2025-08-01 DIAGNOSIS — G70.00 MYASTHENIA GRAVIS (HCC): Primary | ICD-10-CM

## 2025-08-01 LAB
BASOPHILS # BLD AUTO: 0.04 THOUSANDS/ÂΜL (ref 0–0.1)
BASOPHILS NFR BLD AUTO: 1 % (ref 0–1)
EOSINOPHIL # BLD AUTO: 0.34 THOUSAND/ÂΜL (ref 0–0.61)
EOSINOPHIL NFR BLD AUTO: 4 % (ref 0–6)
ERYTHROCYTE [DISTWIDTH] IN BLOOD BY AUTOMATED COUNT: 18.8 % (ref 11.6–15.1)
HCT VFR BLD AUTO: 38.7 % (ref 36.5–49.3)
HGB BLD-MCNC: 11.4 G/DL (ref 12–17)
IMM GRANULOCYTES # BLD AUTO: 0.03 THOUSAND/UL (ref 0–0.2)
IMM GRANULOCYTES NFR BLD AUTO: 0 % (ref 0–2)
LYMPHOCYTES # BLD AUTO: 1.4 THOUSANDS/ÂΜL (ref 0.6–4.47)
LYMPHOCYTES NFR BLD AUTO: 16 % (ref 14–44)
MCH RBC QN AUTO: 21.2 PG (ref 26.8–34.3)
MCHC RBC AUTO-ENTMCNC: 29.5 G/DL (ref 31.4–37.4)
MCV RBC AUTO: 72 FL (ref 82–98)
MONOCYTES # BLD AUTO: 0.91 THOUSAND/ÂΜL (ref 0.17–1.22)
MONOCYTES NFR BLD AUTO: 10 % (ref 4–12)
NEUTROPHILS # BLD AUTO: 6.02 THOUSANDS/ÂΜL (ref 1.85–7.62)
NEUTS SEG NFR BLD AUTO: 69 % (ref 43–75)
NRBC BLD AUTO-RTO: 0 /100 WBCS
PLATELET # BLD AUTO: 277 THOUSANDS/UL (ref 149–390)
PMV BLD AUTO: 9.9 FL (ref 8.9–12.7)
RBC # BLD AUTO: 5.37 MILLION/UL (ref 3.88–5.62)
WBC # BLD AUTO: 8.74 THOUSAND/UL (ref 4.31–10.16)

## 2025-08-01 PROCEDURE — 85025 COMPLETE CBC W/AUTO DIFF WBC: CPT

## 2025-08-01 PROCEDURE — 36415 COLL VENOUS BLD VENIPUNCTURE: CPT

## 2025-08-01 PROCEDURE — 96413 CHEMO IV INFUSION 1 HR: CPT

## 2025-08-01 RX ORDER — SODIUM CHLORIDE 9 MG/ML
20 INJECTION, SOLUTION INTRAVENOUS ONCE
Status: COMPLETED | OUTPATIENT
Start: 2025-08-01 | End: 2025-08-01

## 2025-08-01 RX ORDER — SODIUM CHLORIDE 9 MG/ML
20 INJECTION, SOLUTION INTRAVENOUS ONCE
OUTPATIENT
Start: 2025-08-15

## 2025-08-01 RX ADMIN — SODIUM CHLORIDE 20 ML/HR: 9 INJECTION, SOLUTION INTRAVENOUS at 16:14

## 2025-08-01 RX ADMIN — RAVULIZUMAB 3600 MG: 300 SOLUTION, CONCENTRATE INTRAVENOUS at 16:16

## 2025-08-07 DIAGNOSIS — G70.00 MYASTHENIA GRAVIS (HCC): ICD-10-CM

## 2025-08-08 RX ORDER — PYRIDOSTIGMINE BROMIDE 60 MG/1
60 TABLET ORAL 4 TIMES DAILY
Qty: 120 TABLET | Refills: 0 | Status: SHIPPED | OUTPATIENT
Start: 2025-08-08

## 2025-08-20 ENCOUNTER — APPOINTMENT (EMERGENCY)
Dept: RADIOLOGY | Facility: HOSPITAL | Age: 65
End: 2025-08-20

## 2025-08-20 ENCOUNTER — HOSPITAL ENCOUNTER (EMERGENCY)
Facility: HOSPITAL | Age: 65
Discharge: HOME | End: 2025-08-21
Attending: EMERGENCY MEDICINE | Admitting: EMERGENCY MEDICINE

## 2025-08-20 ENCOUNTER — APPOINTMENT (EMERGENCY)
Dept: CT IMAGING | Facility: HOSPITAL | Age: 65
End: 2025-08-20

## 2025-08-20 DIAGNOSIS — H57.9 EYE EXAM ABNORMAL: ICD-10-CM

## 2025-08-20 DIAGNOSIS — H54.7 LOSS OF VISION: Primary | ICD-10-CM

## 2025-08-20 LAB
2HR DELTA HS TROPONIN: -1 NG/L
4HR DELTA HS TROPONIN: 0 NG/L
ALBUMIN SERPL BCG-MCNC: 3.8 G/DL (ref 3.5–5)
ALP SERPL-CCNC: 85 U/L (ref 34–104)
ALT SERPL W P-5'-P-CCNC: 14 U/L (ref 7–52)
ANION GAP SERPL CALCULATED.3IONS-SCNC: 6 MMOL/L (ref 4–13)
APTT PPP: 31 SECONDS (ref 23–34)
AST SERPL W P-5'-P-CCNC: 17 U/L (ref 13–39)
ATRIAL RATE: 59 BPM
BASOPHILS # BLD AUTO: 0.04 THOUSANDS/ÂΜL (ref 0–0.1)
BASOPHILS NFR BLD AUTO: 1 % (ref 0–1)
BILIRUB SERPL-MCNC: 0.58 MG/DL (ref 0.2–1)
BUN SERPL-MCNC: 20 MG/DL (ref 5–25)
CALCIUM SERPL-MCNC: 9.2 MG/DL (ref 8.4–10.2)
CARDIAC TROPONIN I PNL SERPL HS: 6 NG/L (ref ?–50)
CARDIAC TROPONIN I PNL SERPL HS: 7 NG/L (ref ?–50)
CARDIAC TROPONIN I PNL SERPL HS: 7 NG/L (ref ?–50)
CHLORIDE SERPL-SCNC: 103 MMOL/L (ref 96–108)
CO2 SERPL-SCNC: 27 MMOL/L (ref 21–32)
CREAT SERPL-MCNC: 1.03 MG/DL (ref 0.6–1.3)
CRP SERPL QL: 5.9 MG/L
EOSINOPHIL # BLD AUTO: 0.2 THOUSAND/ÂΜL (ref 0–0.61)
EOSINOPHIL NFR BLD AUTO: 3 % (ref 0–6)
ERYTHROCYTE [DISTWIDTH] IN BLOOD BY AUTOMATED COUNT: 18.8 % (ref 11.6–15.1)
ERYTHROCYTE [SEDIMENTATION RATE] IN BLOOD: 54 MM/HOUR (ref 0–19)
GFR SERPL CREATININE-BSD FRML MDRD: 75 ML/MIN/1.73SQ M
GLUCOSE SERPL-MCNC: 114 MG/DL (ref 65–140)
HCT VFR BLD AUTO: 39.4 % (ref 36.5–49.3)
HGB BLD-MCNC: 12.2 G/DL (ref 12–17)
IMM GRANULOCYTES # BLD AUTO: 0.02 THOUSAND/UL (ref 0–0.2)
IMM GRANULOCYTES NFR BLD AUTO: 0 % (ref 0–2)
INR PPP: 1.05 (ref 0.85–1.19)
LYMPHOCYTES # BLD AUTO: 1.15 THOUSANDS/ÂΜL (ref 0.6–4.47)
LYMPHOCYTES NFR BLD AUTO: 15 % (ref 14–44)
MCH RBC QN AUTO: 21.6 PG (ref 26.8–34.3)
MCHC RBC AUTO-ENTMCNC: 31 G/DL (ref 31.4–37.4)
MCV RBC AUTO: 70 FL (ref 82–98)
MONOCYTES # BLD AUTO: 0.73 THOUSAND/ÂΜL (ref 0.17–1.22)
MONOCYTES NFR BLD AUTO: 10 % (ref 4–12)
NEUTROPHILS # BLD AUTO: 5.57 THOUSANDS/ÂΜL (ref 1.85–7.62)
NEUTS SEG NFR BLD AUTO: 71 % (ref 43–75)
NRBC BLD AUTO-RTO: 0 /100 WBCS
P AXIS: 42 DEGREES
PLATELET # BLD AUTO: 261 THOUSANDS/UL (ref 149–390)
PMV BLD AUTO: 9.5 FL (ref 8.9–12.7)
POTASSIUM SERPL-SCNC: 4 MMOL/L (ref 3.5–5.3)
PR INTERVAL: 140 MS
PROT SERPL-MCNC: 7.2 G/DL (ref 6.4–8.4)
PROTHROMBIN TIME: 14.3 SECONDS (ref 12.3–15)
QRS AXIS: -58 DEGREES
QRSD INTERVAL: 126 MS
QT INTERVAL: 450 MS
QTC INTERVAL: 445 MS
RBC # BLD AUTO: 5.65 MILLION/UL (ref 3.88–5.62)
SODIUM SERPL-SCNC: 136 MMOL/L (ref 135–147)
T WAVE AXIS: 121 DEGREES
VENTRICULAR RATE: 59 BPM
WBC # BLD AUTO: 7.71 THOUSAND/UL (ref 4.31–10.16)

## 2025-08-20 PROCEDURE — 94150 VITAL CAPACITY TEST: CPT

## 2025-08-20 PROCEDURE — 70496 CT ANGIOGRAPHY HEAD: CPT

## 2025-08-20 PROCEDURE — 71046 X-RAY EXAM CHEST 2 VIEWS: CPT

## 2025-08-20 PROCEDURE — 85025 COMPLETE CBC W/AUTO DIFF WBC: CPT

## 2025-08-20 PROCEDURE — 70498 CT ANGIOGRAPHY NECK: CPT

## 2025-08-20 PROCEDURE — 85610 PROTHROMBIN TIME: CPT

## 2025-08-20 PROCEDURE — 36415 COLL VENOUS BLD VENIPUNCTURE: CPT

## 2025-08-20 PROCEDURE — 93010 ELECTROCARDIOGRAM REPORT: CPT | Performed by: INTERNAL MEDICINE

## 2025-08-20 PROCEDURE — 84484 ASSAY OF TROPONIN QUANT: CPT

## 2025-08-20 PROCEDURE — 93005 ELECTROCARDIOGRAM TRACING: CPT

## 2025-08-20 PROCEDURE — 80053 COMPREHEN METABOLIC PANEL: CPT

## 2025-08-20 PROCEDURE — 99285 EMERGENCY DEPT VISIT HI MDM: CPT

## 2025-08-20 PROCEDURE — 86140 C-REACTIVE PROTEIN: CPT

## 2025-08-20 PROCEDURE — 85730 THROMBOPLASTIN TIME PARTIAL: CPT

## 2025-08-20 PROCEDURE — 85652 RBC SED RATE AUTOMATED: CPT

## 2025-08-20 PROCEDURE — 86618 LYME DISEASE ANTIBODY: CPT

## 2025-08-20 RX ORDER — TETRACAINE HYDROCHLORIDE 5 MG/ML
1 SOLUTION OPHTHALMIC ONCE
Status: COMPLETED | OUTPATIENT
Start: 2025-08-20 | End: 2025-08-20

## 2025-08-20 RX ORDER — MAGNESIUM HYDROXIDE/ALUMINUM HYDROXICE/SIMETHICONE 120; 1200; 1200 MG/30ML; MG/30ML; MG/30ML
30 SUSPENSION ORAL ONCE
Status: COMPLETED | OUTPATIENT
Start: 2025-08-20 | End: 2025-08-20

## 2025-08-20 RX ADMIN — TETRACAINE HYDROCHLORIDE 1 DROP: 5 SOLUTION OPHTHALMIC at 13:32

## 2025-08-20 RX ADMIN — IOHEXOL 85 ML: 350 INJECTION, SOLUTION INTRAVENOUS at 13:58

## 2025-08-20 RX ADMIN — ALUMINUM HYDROXIDE, MAGNESIUM HYDROXIDE, AND DIMETHICONE 30 ML: 200; 20; 200 SUSPENSION ORAL at 22:06

## 2025-08-20 RX ADMIN — FLUORESCEIN SODIUM 1 STRIP: 1 STRIP OPHTHALMIC at 13:32

## 2025-08-21 ENCOUNTER — APPOINTMENT (EMERGENCY)
Dept: VASCULAR ULTRASOUND | Facility: HOSPITAL | Age: 65
End: 2025-08-21

## 2025-08-21 ENCOUNTER — OFFICE VISIT (OUTPATIENT)
Age: 65
End: 2025-08-21

## 2025-08-21 VITALS
OXYGEN SATURATION: 98 % | HEIGHT: 72 IN | SYSTOLIC BLOOD PRESSURE: 126 MMHG | HEART RATE: 65 BPM | TEMPERATURE: 97.4 F | WEIGHT: 304 LBS | BODY MASS INDEX: 41.17 KG/M2 | DIASTOLIC BLOOD PRESSURE: 76 MMHG | RESPIRATION RATE: 18 BRPM

## 2025-08-21 DIAGNOSIS — H25.13 NUCLEAR SCLEROSIS OF BOTH EYES: ICD-10-CM

## 2025-08-21 DIAGNOSIS — H18.513 FUCHS' CORNEAL DYSTROPHY OF BOTH EYES: Primary | ICD-10-CM

## 2025-08-21 DIAGNOSIS — H40.053 BILATERAL OCULAR HYPERTENSION: ICD-10-CM

## 2025-08-21 LAB — B BURGDOR IGG+IGM SER QL IA: NEGATIVE

## 2025-08-21 PROCEDURE — 99204 OFFICE O/P NEW MOD 45 MIN: CPT | Performed by: OPHTHALMOLOGY

## 2025-08-21 PROCEDURE — 93882 EXTRACRANIAL UNI/LTD STUDY: CPT

## 2025-08-22 ENCOUNTER — OFFICE VISIT (OUTPATIENT)
Age: 65
End: 2025-08-22

## 2025-08-22 DIAGNOSIS — H25.13 NUCLEAR SCLEROSIS OF BOTH EYES: ICD-10-CM

## 2025-08-22 DIAGNOSIS — H40.053 BILATERAL OCULAR HYPERTENSION: ICD-10-CM

## 2025-08-22 DIAGNOSIS — H40.039 PRIMARY ANGLE CLOSURE: Primary | ICD-10-CM

## 2025-08-22 DIAGNOSIS — H18.20 CORNEAL EDEMA OF LEFT EYE: ICD-10-CM

## 2025-08-22 DIAGNOSIS — Z98.890 HX OF LASIK: ICD-10-CM

## 2025-08-22 PROCEDURE — 92020 GONIOSCOPY: CPT | Performed by: OPHTHALMOLOGY

## 2025-08-22 PROCEDURE — 99214 OFFICE O/P EST MOD 30 MIN: CPT | Performed by: OPHTHALMOLOGY

## 2025-08-22 RX ORDER — PREDNISOLONE ACETATE 10 MG/ML
SUSPENSION/ DROPS OPHTHALMIC
Qty: 5 ML | Refills: 6 | Status: SHIPPED | OUTPATIENT
Start: 2025-08-22

## 2025-08-22 RX ORDER — ACETAZOLAMIDE 250 MG/1
TABLET ORAL
Qty: 60 TABLET | Refills: 6 | Status: SHIPPED | OUTPATIENT
Start: 2025-08-22